# Patient Record
Sex: FEMALE | Race: WHITE | NOT HISPANIC OR LATINO | Employment: OTHER | URBAN - NONMETROPOLITAN AREA
[De-identification: names, ages, dates, MRNs, and addresses within clinical notes are randomized per-mention and may not be internally consistent; named-entity substitution may affect disease eponyms.]

---

## 2023-03-10 DIAGNOSIS — F41.1 GENERALIZED ANXIETY DISORDER: Primary | ICD-10-CM

## 2023-03-10 PROBLEM — I74.9 CHRONIC THROMBOEMBOLIC DISEASE (MULTI): Status: ACTIVE | Noted: 2023-03-10

## 2023-03-10 PROBLEM — R60.0 LOWER LEG EDEMA: Status: ACTIVE | Noted: 2023-03-10

## 2023-03-10 PROBLEM — R29.898 WEAKNESS OF BOTH LOWER EXTREMITIES: Status: ACTIVE | Noted: 2023-03-10

## 2023-03-10 PROBLEM — K11.1 SUBMANDIBULAR GLAND HYPERTROPHY: Status: RESOLVED | Noted: 2023-03-10 | Resolved: 2023-03-10

## 2023-03-10 PROBLEM — R73.9 ELEVATED BLOOD SUGAR: Status: ACTIVE | Noted: 2023-03-10

## 2023-03-10 PROBLEM — R53.83 MALAISE AND FATIGUE: Status: RESOLVED | Noted: 2023-03-10 | Resolved: 2023-03-10

## 2023-03-10 PROBLEM — I10 HYPERTENSION: Status: ACTIVE | Noted: 2023-03-10

## 2023-03-10 PROBLEM — G47.33 OBSTRUCTIVE SLEEP APNEA: Status: ACTIVE | Noted: 2023-03-10

## 2023-03-10 PROBLEM — M48.062 NEUROGENIC CLAUDICATION DUE TO LUMBAR SPINAL STENOSIS: Status: ACTIVE | Noted: 2023-03-10

## 2023-03-10 PROBLEM — D52.9 FOLATE-DEFICIENCY ANEMIA: Status: ACTIVE | Noted: 2023-03-10

## 2023-03-10 PROBLEM — R10.9 ABDOMINAL PAIN: Status: RESOLVED | Noted: 2023-03-10 | Resolved: 2023-03-10

## 2023-03-10 PROBLEM — R01.1 HEART MURMUR: Status: RESOLVED | Noted: 2023-03-10 | Resolved: 2023-03-10

## 2023-03-10 PROBLEM — E87.5 HYPERKALEMIA: Status: RESOLVED | Noted: 2023-03-10 | Resolved: 2023-03-10

## 2023-03-10 PROBLEM — G25.81 RESTLESS LEGS SYNDROME (RLS): Status: ACTIVE | Noted: 2023-03-10

## 2023-03-10 PROBLEM — K59.09 OTHER CONSTIPATION: Status: ACTIVE | Noted: 2023-03-10

## 2023-03-10 PROBLEM — I34.2 NONRHEUMATIC MITRAL VALVE STENOSIS: Status: ACTIVE | Noted: 2023-03-10

## 2023-03-10 PROBLEM — M51.26 LUMBAR DISC HERNIATION: Status: RESOLVED | Noted: 2023-03-10 | Resolved: 2023-03-10

## 2023-03-10 PROBLEM — I87.2 CHRONIC VENOUS INSUFFICIENCY: Status: ACTIVE | Noted: 2023-03-10

## 2023-03-10 PROBLEM — E53.8 VITAMIN B12 DEFICIENCY: Status: ACTIVE | Noted: 2023-03-10

## 2023-03-10 PROBLEM — M25.561 RIGHT KNEE PAIN: Status: RESOLVED | Noted: 2023-03-10 | Resolved: 2023-03-10

## 2023-03-10 PROBLEM — D50.9 IRON DEFICIENCY ANEMIA: Status: ACTIVE | Noted: 2023-03-10

## 2023-03-10 PROBLEM — K11.5 SUBMANDIBULAR SIALOLITHIASIS: Status: RESOLVED | Noted: 2023-03-10 | Resolved: 2023-03-10

## 2023-03-10 PROBLEM — I27.20 PULMONARY HYPERTENSION (MULTI): Status: ACTIVE | Noted: 2023-03-10

## 2023-03-10 PROBLEM — R53.81 MALAISE AND FATIGUE: Status: RESOLVED | Noted: 2023-03-10 | Resolved: 2023-03-10

## 2023-03-10 PROBLEM — D64.9 ANEMIA: Status: RESOLVED | Noted: 2023-03-10 | Resolved: 2023-03-10

## 2023-03-10 PROBLEM — M17.11 OSTEOARTHRITIS OF RIGHT KNEE: Status: ACTIVE | Noted: 2023-03-10

## 2023-03-10 PROBLEM — R00.0 SINUS TACHYCARDIA: Status: RESOLVED | Noted: 2023-03-10 | Resolved: 2023-03-10

## 2023-03-10 PROBLEM — E78.5 HYPERLIPIDEMIA: Status: ACTIVE | Noted: 2023-03-10

## 2023-03-10 PROBLEM — D75.9 BONE MARROW DISORDER: Status: ACTIVE | Noted: 2023-03-10

## 2023-03-10 PROBLEM — I35.0 AORTIC STENOSIS, MODERATE: Status: ACTIVE | Noted: 2023-03-10

## 2023-03-10 PROBLEM — M25.552 ACUTE HIP PAIN, LEFT: Status: RESOLVED | Noted: 2023-03-10 | Resolved: 2023-03-10

## 2023-03-10 PROBLEM — R00.2 PALPITATIONS: Status: RESOLVED | Noted: 2023-03-10 | Resolved: 2023-03-10

## 2023-03-10 PROBLEM — M17.12 OSTEOARTHRITIS OF LEFT KNEE: Status: ACTIVE | Noted: 2023-03-10

## 2023-03-10 PROBLEM — M19.072: Status: ACTIVE | Noted: 2023-03-10

## 2023-03-10 PROBLEM — N63.0 LUMP, BREAST: Status: RESOLVED | Noted: 2023-03-10 | Resolved: 2023-03-10

## 2023-03-10 PROBLEM — F51.01 IDIOPATHIC INSOMNIA: Status: ACTIVE | Noted: 2023-03-10

## 2023-03-10 PROBLEM — R06.02 SHORTNESS OF BREATH: Status: RESOLVED | Noted: 2023-03-10 | Resolved: 2023-03-10

## 2023-03-10 PROBLEM — Z85.038 HISTORY OF COLON CANCER: Status: RESOLVED | Noted: 2023-03-10 | Resolved: 2023-03-10

## 2023-03-10 PROBLEM — I89.0 LYMPHEDEMA: Status: ACTIVE | Noted: 2023-03-10

## 2023-03-10 PROBLEM — J44.9 COPD (CHRONIC OBSTRUCTIVE PULMONARY DISEASE) (MULTI): Status: ACTIVE | Noted: 2023-03-10

## 2023-03-10 PROBLEM — R60.9 LIPOEDEMA: Status: ACTIVE | Noted: 2023-03-10

## 2023-03-10 PROBLEM — L29.9 PRURITUS: Status: RESOLVED | Noted: 2023-03-10 | Resolved: 2023-03-10

## 2023-03-10 PROBLEM — I51.7 ATRIAL DILATATION, BILATERAL: Status: ACTIVE | Noted: 2023-03-10

## 2023-03-10 PROBLEM — N39.41 URGE INCONTINENCE OF URINE: Status: ACTIVE | Noted: 2023-03-10

## 2023-03-10 PROBLEM — M54.16 CHRONIC LUMBAR RADICULOPATHY: Status: ACTIVE | Noted: 2023-03-10

## 2023-03-10 PROBLEM — J01.80 OTHER ACUTE SINUSITIS: Status: RESOLVED | Noted: 2023-03-10 | Resolved: 2023-03-10

## 2023-03-10 PROBLEM — L03.115 CELLULITIS OF LEG WITHOUT FOOT, RIGHT: Status: RESOLVED | Noted: 2023-03-10 | Resolved: 2023-03-10

## 2023-03-10 PROBLEM — F33.1 MDD (MAJOR DEPRESSIVE DISORDER), RECURRENT EPISODE, MODERATE (MULTI): Status: ACTIVE | Noted: 2023-03-10

## 2023-03-10 PROBLEM — D75.1 POLYCYTHEMIA: Status: ACTIVE | Noted: 2023-03-10

## 2023-03-10 PROBLEM — R04.2 HEMOPTYSIS: Status: RESOLVED | Noted: 2023-03-10 | Resolved: 2023-03-10

## 2023-03-10 PROBLEM — B30.9 ACUTE VIRAL CONJUNCTIVITIS OF LEFT EYE: Status: RESOLVED | Noted: 2023-03-10 | Resolved: 2023-03-10

## 2023-03-10 RX ORDER — ALPRAZOLAM 0.5 MG/1
0.25 TABLET ORAL 2 TIMES DAILY PRN
Qty: 30 TABLET | Refills: 0 | Status: SHIPPED | OUTPATIENT
Start: 2023-03-10 | End: 2023-05-17

## 2023-03-10 NOTE — PROGRESS NOTES
Subjective   Patient ID: Neisha Graham is a 62 y.o. female who presents for No chief complaint on file..  HPI    Review of Systems    Objective   Physical Exam    Assessment/Plan

## 2023-03-15 DIAGNOSIS — I74.9 CHRONIC THROMBOEMBOLIC DISEASE (MULTI): Primary | ICD-10-CM

## 2023-03-15 DIAGNOSIS — J43.2 CENTRILOBULAR EMPHYSEMA (MULTI): ICD-10-CM

## 2023-03-15 RX ORDER — FLUTICASONE FUROATE, UMECLIDINIUM BROMIDE AND VILANTEROL TRIFENATATE 200; 62.5; 25 UG/1; UG/1; UG/1
1 POWDER RESPIRATORY (INHALATION) DAILY
COMMUNITY
End: 2023-03-15 | Stop reason: SDUPTHER

## 2023-03-15 RX ORDER — FLUTICASONE FUROATE, UMECLIDINIUM BROMIDE AND VILANTEROL TRIFENATATE 200; 62.5; 25 UG/1; UG/1; UG/1
1 POWDER RESPIRATORY (INHALATION) DAILY
Qty: 1 EACH | Refills: 2 | Status: SHIPPED | OUTPATIENT
Start: 2023-03-15 | End: 2023-06-10 | Stop reason: SDUPTHER

## 2023-04-11 DIAGNOSIS — E78.2 MIXED HYPERLIPIDEMIA: Primary | ICD-10-CM

## 2023-04-11 RX ORDER — PRAVASTATIN SODIUM 40 MG/1
40 TABLET ORAL NIGHTLY
Qty: 90 TABLET | Refills: 0 | Status: SHIPPED | OUTPATIENT
Start: 2023-04-11 | End: 2023-06-10 | Stop reason: SDUPTHER

## 2023-04-11 RX ORDER — PRAVASTATIN SODIUM 40 MG/1
40 TABLET ORAL NIGHTLY
COMMUNITY
Start: 2015-01-08 | End: 2023-04-11 | Stop reason: SDUPTHER

## 2023-05-05 ENCOUNTER — TELEPHONE (OUTPATIENT)
Dept: PRIMARY CARE | Facility: CLINIC | Age: 63
End: 2023-05-05
Payer: MEDICARE

## 2023-05-05 DIAGNOSIS — J43.2 CENTRILOBULAR EMPHYSEMA (MULTI): Primary | ICD-10-CM

## 2023-05-05 NOTE — TELEPHONE ENCOUNTER
Roya from [facility] called and said that Neisha INR is 4.6. I told her I would let you know.

## 2023-05-15 RX ORDER — ALBUTEROL SULFATE 90 UG/1
2 AEROSOL, METERED RESPIRATORY (INHALATION) EVERY 4 HOURS PRN
Qty: 18 G | Refills: 3 | Status: SHIPPED | OUTPATIENT
Start: 2023-05-15 | End: 2023-06-10 | Stop reason: SDUPTHER

## 2023-05-15 RX ORDER — ALBUTEROL SULFATE 90 UG/1
2 AEROSOL, METERED RESPIRATORY (INHALATION) EVERY 4 HOURS PRN
COMMUNITY
Start: 2016-01-19 | End: 2023-05-15 | Stop reason: SDUPTHER

## 2023-05-16 DIAGNOSIS — F41.1 GENERALIZED ANXIETY DISORDER: ICD-10-CM

## 2023-05-17 RX ORDER — ALPRAZOLAM 0.5 MG/1
TABLET ORAL
Qty: 30 TABLET | Refills: 0 | Status: SHIPPED | OUTPATIENT
Start: 2023-05-17 | End: 2023-06-10 | Stop reason: SDUPTHER

## 2023-05-31 DIAGNOSIS — I74.9 CHRONIC THROMBOEMBOLIC DISEASE (MULTI): ICD-10-CM

## 2023-05-31 RX ORDER — WARFARIN 7.5 MG/1
TABLET ORAL
COMMUNITY
Start: 2021-04-16 | End: 2023-05-31 | Stop reason: SDUPTHER

## 2023-05-31 RX ORDER — WARFARIN 7.5 MG/1
7.5 TABLET ORAL EVERY EVENING
Qty: 30 TABLET | Refills: 3 | Status: SHIPPED | OUTPATIENT
Start: 2023-05-31 | End: 2023-06-10 | Stop reason: SDUPTHER

## 2023-06-10 ENCOUNTER — TELEPHONE (OUTPATIENT)
Dept: PRIMARY CARE | Facility: CLINIC | Age: 63
End: 2023-06-10
Payer: MEDICARE

## 2023-06-10 DIAGNOSIS — F41.1 GENERALIZED ANXIETY DISORDER: ICD-10-CM

## 2023-06-10 DIAGNOSIS — I74.9 CHRONIC THROMBOEMBOLIC DISEASE (MULTI): ICD-10-CM

## 2023-06-10 DIAGNOSIS — J43.2 CENTRILOBULAR EMPHYSEMA (MULTI): ICD-10-CM

## 2023-06-10 DIAGNOSIS — E78.2 MIXED HYPERLIPIDEMIA: ICD-10-CM

## 2023-06-10 RX ORDER — ALBUTEROL SULFATE 90 UG/1
2 AEROSOL, METERED RESPIRATORY (INHALATION) EVERY 4 HOURS PRN
Qty: 18 G | Refills: 3 | Status: SHIPPED | OUTPATIENT
Start: 2023-06-10 | End: 2023-09-05

## 2023-06-10 RX ORDER — PRAVASTATIN SODIUM 40 MG/1
40 TABLET ORAL NIGHTLY
Qty: 90 TABLET | Refills: 0 | Status: SHIPPED | OUTPATIENT
Start: 2023-06-10 | End: 2023-08-17

## 2023-06-10 RX ORDER — ALPRAZOLAM 0.5 MG/1
TABLET ORAL
Qty: 30 TABLET | Refills: 0 | Status: SHIPPED | OUTPATIENT
Start: 2023-06-10

## 2023-06-10 RX ORDER — WARFARIN 7.5 MG/1
7.5 TABLET ORAL EVERY EVENING
Qty: 30 TABLET | Refills: 3 | Status: SHIPPED | OUTPATIENT
Start: 2023-06-10 | End: 2023-11-13

## 2023-06-10 RX ORDER — FLUTICASONE FUROATE, UMECLIDINIUM BROMIDE AND VILANTEROL TRIFENATATE 200; 62.5; 25 UG/1; UG/1; UG/1
1 POWDER RESPIRATORY (INHALATION) DAILY
Qty: 1 EACH | Refills: 2 | Status: SHIPPED | OUTPATIENT
Start: 2023-06-10 | End: 2023-09-12

## 2023-06-10 NOTE — TELEPHONE ENCOUNTER
Left message on vm but believes he has bladder infection wants a prescription sent over. Not able to come in today. [facility] in [place].

## 2023-06-12 DIAGNOSIS — N30.00 ACUTE CYSTITIS WITHOUT HEMATURIA: Primary | ICD-10-CM

## 2023-06-12 RX ORDER — NITROFURANTOIN 25; 75 MG/1; MG/1
100 CAPSULE ORAL 2 TIMES DAILY
Qty: 10 CAPSULE | Refills: 0 | Status: SHIPPED | OUTPATIENT
Start: 2023-06-12 | End: 2023-06-16 | Stop reason: SINTOL

## 2023-06-16 NOTE — PROGRESS NOTES
Subjective   Patient ID: Neisha Graham is a 63 y.o. female who presents for No chief complaint on file..  HPI    Review of Systems    Objective   Physical Exam    Assessment/Plan

## 2023-06-20 DIAGNOSIS — I10 PRIMARY HYPERTENSION: Primary | ICD-10-CM

## 2023-06-20 RX ORDER — DILTIAZEM HYDROCHLORIDE 180 MG/1
180 CAPSULE, EXTENDED RELEASE ORAL DAILY
COMMUNITY
Start: 2020-12-29 | End: 2023-06-20 | Stop reason: SDUPTHER

## 2023-06-20 RX ORDER — DILTIAZEM HYDROCHLORIDE 180 MG/1
180 CAPSULE, EXTENDED RELEASE ORAL DAILY
Qty: 30 CAPSULE | Refills: 1 | Status: SHIPPED | OUTPATIENT
Start: 2023-06-20 | End: 2023-06-22

## 2023-06-22 DIAGNOSIS — I10 PRIMARY HYPERTENSION: ICD-10-CM

## 2023-06-22 RX ORDER — DILTIAZEM HYDROCHLORIDE 180 MG/1
CAPSULE, EXTENDED RELEASE ORAL
Qty: 90 CAPSULE | Refills: 1 | Status: SHIPPED | OUTPATIENT
Start: 2023-06-22 | End: 2023-08-14

## 2023-06-28 PROCEDURE — G0250 MD INR TEST REVIE INTER MGMT: HCPCS | Performed by: FAMILY MEDICINE

## 2023-07-18 DIAGNOSIS — D75.1 POLYCYTHEMIA: Primary | ICD-10-CM

## 2023-07-18 RX ORDER — NALOXONE HYDROCHLORIDE 4 MG/.1ML
SPRAY NASAL
COMMUNITY
Start: 2020-04-06

## 2023-07-18 RX ORDER — HYDROCORTISONE 25 MG/G
1 CREAM TOPICAL
COMMUNITY
Start: 2022-03-01

## 2023-07-18 RX ORDER — TORSEMIDE 20 MG/1
1 TABLET ORAL DAILY
COMMUNITY
Start: 2021-02-10 | End: 2023-08-17 | Stop reason: ALTCHOICE

## 2023-07-18 RX ORDER — VENLAFAXINE HYDROCHLORIDE 37.5 MG/1
1 CAPSULE, EXTENDED RELEASE ORAL DAILY
COMMUNITY
Start: 2022-10-19 | End: 2023-08-17 | Stop reason: ALTCHOICE

## 2023-07-18 RX ORDER — OXYCODONE AND ACETAMINOPHEN 10; 325 MG/1; MG/1
1 TABLET ORAL EVERY 4 HOURS PRN
COMMUNITY
End: 2023-12-19 | Stop reason: WASHOUT

## 2023-07-18 RX ORDER — NYSTATIN 100000 U/G
CREAM TOPICAL
COMMUNITY
Start: 2021-06-11 | End: 2023-11-10 | Stop reason: SDUPTHER

## 2023-07-18 RX ORDER — CYCLOBENZAPRINE HCL 10 MG
10 TABLET ORAL 2 TIMES DAILY PRN
COMMUNITY
End: 2023-12-19 | Stop reason: SDUPTHER

## 2023-07-18 RX ORDER — OXYCODONE HCL 10 MG/1
TABLET, FILM COATED, EXTENDED RELEASE ORAL
COMMUNITY
End: 2023-08-17 | Stop reason: ALTCHOICE

## 2023-07-18 RX ORDER — CHOLESTYRAMINE 4 G/9G
POWDER, FOR SUSPENSION ORAL 2 TIMES DAILY
COMMUNITY
Start: 2022-11-23

## 2023-07-18 RX ORDER — OXYCODONE HYDROCHLORIDE 10 MG/1
TABLET ORAL
COMMUNITY
End: 2023-11-17 | Stop reason: SDUPTHER

## 2023-07-18 RX ORDER — DULOXETIN HYDROCHLORIDE 20 MG/1
20 CAPSULE, DELAYED RELEASE ORAL DAILY
COMMUNITY
End: 2023-08-26 | Stop reason: SDUPTHER

## 2023-07-18 RX ORDER — TRIAMCINOLONE ACETONIDE 1 MG/G
CREAM TOPICAL
COMMUNITY
Start: 2019-12-11

## 2023-07-18 RX ORDER — CHOLESTYRAMINE 4 G/9G
POWDER, FOR SUSPENSION ORAL
COMMUNITY
Start: 2023-01-26 | End: 2023-08-17 | Stop reason: SDUPTHER

## 2023-07-18 RX ORDER — TOBRAMYCIN 3 MG/ML
1 SOLUTION/ DROPS OPHTHALMIC
COMMUNITY
Start: 2022-09-15 | End: 2023-08-17 | Stop reason: ALTCHOICE

## 2023-07-18 RX ORDER — AMINO ACIDS/PROTEIN HYDROLYS 15 G-60/30
LIQUID (ML) ORAL
COMMUNITY
Start: 2021-10-26

## 2023-07-18 RX ORDER — CHOLECALCIFEROL (VITAMIN D3) 50 MCG
1 TABLET ORAL DAILY
COMMUNITY

## 2023-07-18 RX ORDER — HYDROXYZINE HYDROCHLORIDE 50 MG/1
TABLET, FILM COATED ORAL
COMMUNITY
Start: 2021-08-19 | End: 2023-08-17 | Stop reason: ALTCHOICE

## 2023-07-18 RX ORDER — MUPIROCIN 20 MG/G
OINTMENT TOPICAL
COMMUNITY
Start: 2021-09-13 | End: 2023-08-17 | Stop reason: ALTCHOICE

## 2023-07-18 RX ORDER — TORSEMIDE 10 MG/1
TABLET ORAL
COMMUNITY
Start: 2021-06-10 | End: 2023-08-17 | Stop reason: ALTCHOICE

## 2023-07-18 RX ORDER — PRAMIPEXOLE DIHYDROCHLORIDE 1 MG/1
1 TABLET ORAL 2 TIMES DAILY
COMMUNITY
Start: 2021-06-09

## 2023-07-18 RX ORDER — AMMONIUM LACTATE 12 G/100G
1 CREAM TOPICAL 2 TIMES DAILY
COMMUNITY

## 2023-07-18 RX ORDER — HYDROXYUREA 500 MG/1
2 CAPSULE ORAL DAILY
COMMUNITY
Start: 2012-11-08 | End: 2023-07-18 | Stop reason: SDUPTHER

## 2023-07-18 RX ORDER — DOCUSATE SODIUM 100 MG/1
1 CAPSULE, LIQUID FILLED ORAL DAILY
COMMUNITY

## 2023-07-18 RX ORDER — FOLIC ACID 1 MG/1
1 TABLET ORAL DAILY
COMMUNITY
Start: 2020-06-23 | End: 2024-05-06 | Stop reason: ALTCHOICE

## 2023-07-18 RX ORDER — LISINOPRIL 20 MG/1
TABLET ORAL
COMMUNITY
Start: 2022-09-02

## 2023-07-18 RX ORDER — HYDROXYUREA 500 MG/1
1000 CAPSULE ORAL DAILY
Qty: 180 CAPSULE | Refills: 3 | Status: SHIPPED | OUTPATIENT
Start: 2023-07-18 | End: 2024-05-06 | Stop reason: SDUPTHER

## 2023-07-25 ENCOUNTER — TELEPHONE (OUTPATIENT)
Dept: PRIMARY CARE | Facility: CLINIC | Age: 63
End: 2023-07-25
Payer: MEDICARE

## 2023-07-25 NOTE — TELEPHONE ENCOUNTER
Moved into a handicap unit in [place] and unable to move hospital bed with her she needs another request for hospital bed.

## 2023-08-14 DIAGNOSIS — I10 PRIMARY HYPERTENSION: ICD-10-CM

## 2023-08-14 RX ORDER — DILTIAZEM HYDROCHLORIDE 180 MG/1
CAPSULE, EXTENDED RELEASE ORAL
Qty: 90 CAPSULE | Refills: 0 | Status: SHIPPED | OUTPATIENT
Start: 2023-08-14 | End: 2024-03-04 | Stop reason: CLARIF

## 2023-08-17 ENCOUNTER — OFFICE VISIT (OUTPATIENT)
Dept: PRIMARY CARE | Facility: CLINIC | Age: 63
End: 2023-08-17
Payer: MEDICARE

## 2023-08-17 VITALS
HEIGHT: 64 IN | DIASTOLIC BLOOD PRESSURE: 84 MMHG | OXYGEN SATURATION: 92 % | BODY MASS INDEX: 50.02 KG/M2 | SYSTOLIC BLOOD PRESSURE: 136 MMHG | HEART RATE: 84 BPM | WEIGHT: 293 LBS

## 2023-08-17 DIAGNOSIS — I10 PRIMARY HYPERTENSION: ICD-10-CM

## 2023-08-17 DIAGNOSIS — F33.1 MDD (MAJOR DEPRESSIVE DISORDER), RECURRENT EPISODE, MODERATE (MULTI): ICD-10-CM

## 2023-08-17 DIAGNOSIS — I27.20 PULMONARY HYPERTENSION (MULTI): ICD-10-CM

## 2023-08-17 DIAGNOSIS — I74.9 CHRONIC THROMBOEMBOLIC DISEASE (MULTI): ICD-10-CM

## 2023-08-17 DIAGNOSIS — I89.0 LYMPHEDEMA: ICD-10-CM

## 2023-08-17 DIAGNOSIS — I48.3 TYPICAL ATRIAL FLUTTER (MULTI): Primary | ICD-10-CM

## 2023-08-17 DIAGNOSIS — E78.2 MIXED HYPERLIPIDEMIA: ICD-10-CM

## 2023-08-17 DIAGNOSIS — D47.3 ESSENTIAL THROMBOCYTOSIS (MULTI): ICD-10-CM

## 2023-08-17 DIAGNOSIS — E66.01 CLASS 3 SEVERE OBESITY DUE TO EXCESS CALORIES WITH SERIOUS COMORBIDITY AND BODY MASS INDEX (BMI) OF 50.0 TO 59.9 IN ADULT (MULTI): ICD-10-CM

## 2023-08-17 DIAGNOSIS — J43.2 CENTRILOBULAR EMPHYSEMA (MULTI): ICD-10-CM

## 2023-08-17 DIAGNOSIS — Z12.31 ENCOUNTER FOR SCREENING MAMMOGRAM FOR BREAST CANCER: ICD-10-CM

## 2023-08-17 PROBLEM — E66.813 CLASS 3 SEVERE OBESITY DUE TO EXCESS CALORIES WITH SERIOUS COMORBIDITY AND BODY MASS INDEX (BMI) OF 50.0 TO 59.9 IN ADULT: Status: ACTIVE | Noted: 2023-08-17

## 2023-08-17 PROBLEM — K76.0 FATTY LIVER: Status: ACTIVE | Noted: 2023-08-17

## 2023-08-17 PROBLEM — R17 ELEVATED BILIRUBIN: Status: RESOLVED | Noted: 2023-08-17 | Resolved: 2023-08-17

## 2023-08-17 PROBLEM — R50.9 FEVER: Status: RESOLVED | Noted: 2023-08-17 | Resolved: 2023-08-17

## 2023-08-17 PROBLEM — R74.01 ELEVATED TRANSAMINASE LEVEL: Status: RESOLVED | Noted: 2023-08-17 | Resolved: 2023-08-17

## 2023-08-17 PROBLEM — D75.9 BONE MARROW DISORDER: Status: RESOLVED | Noted: 2023-08-17 | Resolved: 2023-08-17

## 2023-08-17 PROBLEM — R51.9 DAILY HEADACHE: Status: RESOLVED | Noted: 2023-08-17 | Resolved: 2023-08-17

## 2023-08-17 PROBLEM — R74.8 ALKALINE PHOSPHATASE ELEVATION: Status: RESOLVED | Noted: 2023-08-17 | Resolved: 2023-08-17

## 2023-08-17 PROCEDURE — 3075F SYST BP GE 130 - 139MM HG: CPT | Performed by: FAMILY MEDICINE

## 2023-08-17 PROCEDURE — 3008F BODY MASS INDEX DOCD: CPT | Performed by: FAMILY MEDICINE

## 2023-08-17 PROCEDURE — 99214 OFFICE O/P EST MOD 30 MIN: CPT | Performed by: FAMILY MEDICINE

## 2023-08-17 PROCEDURE — 3079F DIAST BP 80-89 MM HG: CPT | Performed by: FAMILY MEDICINE

## 2023-08-17 PROCEDURE — 4004F PT TOBACCO SCREEN RCVD TLK: CPT | Performed by: FAMILY MEDICINE

## 2023-08-17 NOTE — PROGRESS NOTES
Subjective   Patient ID: Neisha Graham is a 63 y.o. female who presents for paperwork.  HPI  Needs a new hospital bed  Needs to be able to elevate legs to get swelling down  Also breathes better with legs elevated so sleeps better and feels more refreshed- still wearing oxygen at night (2.5 liters)- makes her more comfortable and sleep better  No coughing, wheezing  Breathing has improved since stopped smoking for the most part'  No dizziness    Ophtho- been awhile  Dentist- been awhile  Colonoscopy- 12/19- repeat 5 years  DERICK-  FOBT-  UA/Micro-  Mammo- ordered  DEXA-  PAP-  Lung CT-  Coronary Calcium CT Score-  AAA-  EKG-  Pneumovax-  Prevnar-  Flu-  Shingrix-  Td-  Hep C-  Advance Directives-        Current Outpatient Medications:     albuterol (Ventolin HFA) 90 mcg/actuation inhaler, Inhale 2 puffs every 4 hours if needed for wheezing., Disp: 18 g, Rfl: 3    ALPRAZolam (Xanax) 0.5 mg tablet, take 1/2 tablet (0.25 MILLIGRAMS) by mouth twice a day if needed for anxiety, Disp: 30 tablet, Rfl: 0    cyclobenzaprine (Flexeril) 10 mg tablet, Take 1 tablet (10 mg) by mouth 2 times a day as needed., Disp: , Rfl:     dilTIAZem ER (Tiazac) 180 mg 24 hr capsule, take 1 capsule by mouth once daily, Disp: 90 capsule, Rfl: 0    hydroxyurea (Hydrea) 500 mg capsule, Take 2 capsules (1,000 mg total) by mouth once daily., Disp: 180 capsule, Rfl: 3    amino acids-protein hydrolys (ProSource No Carb) 15-60 gram-kcal/30 mL liquid, Take by mouth., Disp: , Rfl:     ammonium lactate (Amlactin) 12 % cream, Apply 1 Film topically 2 times a day., Disp: , Rfl:     cholecalciferol (Vitamin D-3) 50 MCG (2000 UT) tablet, Take 1 tablet (50 mcg) by mouth once daily., Disp: , Rfl:     cholestyramine (Questran) 4 gram packet, Take by mouth twice a day., Disp: , Rfl:     docusate sodium (Colace) 100 mg capsule, Take 1 capsule (100 mg) by mouth once daily., Disp: , Rfl:     DULoxetine (Cymbalta) 20 mg DR capsule, Take 1 capsule (20 mg) by mouth  once daily., Disp: , Rfl:     fluticasone-umeclidin-vilanter (Trelegy Ellipta) 200-62.5-25 mcg blister with device, Inhale 1 puff once daily., Disp: 1 each, Rfl: 2    folic acid (Folvite) 1 mg tablet, Take 1 tablet (1 mg) by mouth once daily., Disp: , Rfl:     hydrocortisone 2.5 % cream, Insert 1 Applicatorful into the rectum., Disp: , Rfl:     hydrocortisone acetate 2.5 % cream with perineal applicator, twice a day., Disp: , Rfl:     lisinopril 20 mg tablet, take 1 tablet by mouth once daily  -DR REQUESTING APPOINTMENT, Disp: , Rfl:     naloxone (Narcan) 4 mg/0.1 mL nasal spray, Administer into affected nostril(s)., Disp: , Rfl:     nystatin (Mycostatin) cream, APPLY TO AFFECTED AREA(S) 2 TO 3 TIMES A DAY, Disp: , Rfl:     oxyCODONE (Roxicodone) 10 mg immediate release tablet, take 1 tablet by mouth every 4 hours if needed, Disp: , Rfl:     oxyCODONE-acetaminophen (Percocet)  mg tablet, Take 1 tablet by mouth every 4 hours if needed., Disp: , Rfl:     pramipexole (Mirapex) 1 mg tablet, Take 1 tablet (1 mg) by mouth 2 times a day., Disp: , Rfl:     triamcinolone (Kenalog) 0.1 % cream, APPLY SPARINGLY AND MASSAGE IN ONCE A DAY, Disp: , Rfl:     warfarin (Coumadin) 7.5 mg tablet, Take 1 tablet (7.5 mg) by mouth once daily in the evening for 30 doses., Disp: 30 tablet, Rfl: 3   Past Surgical History:   Procedure Laterality Date    KNEE SURGERY  09/19/2013    Knee Surgery Right    OTHER SURGICAL HISTORY  09/19/2013    Direct Laryngoscopy With Foreign Body Removal    OTHER SURGICAL HISTORY  09/19/2013    Laminectomy With Drainage Of Intramedullary Cyst    OTHER SURGICAL HISTORY  09/27/2013    Ovarian Cystectomy    TONSILLECTOMY  09/19/2013    Tonsillectomy    TUBAL LIGATION  09/19/2013    Tubal Ligation      Past Medical History:   Diagnosis Date    Acute bronchitis due to other specified organisms 10/14/2019    Acute bronchitis due to other specified organisms    Acute viral conjunctivitis of left eye 03/10/2023     Anemia 03/10/2023    Cellulitis of left lower limb 04/08/2021    Cellulitis of left lower leg    Chronic obstructive pulmonary disease with (acute) exacerbation (CMS/[facility]) 01/09/2019    COPD exacerbation    Chronic sinusitis, unspecified     Sinusitis    COVID-19 11/28/2022    COVID-19    Heart murmur 03/10/2023    Hemoptysis 03/10/2023    Hyperkalemia 03/10/2023    Malaise and fatigue 03/10/2023    Morbid (severe) obesity due to excess calories (CMS/[facility])     Morbid obesity    Other conditions influencing health status     History Of ___ Previous Pregnancies    Other conditions influencing health status 03/12/2020    Arthritis    Other conditions influencing health status 03/06/2017    History of cough    Other conditions influencing health status     Menstruation    Other conditions influencing health status     Osteoarthritis    Other conditions influencing health status     Pulmonary Disease    Pain in unspecified ankle and joints of unspecified foot 06/24/2016    Ankle joint pain    Pain in unspecified foot 12/08/2015    Foot pain    Pain in unspecified knee     Joint pain, knee    Palpitations 03/10/2023    Personal history of diseases of the skin and subcutaneous tissue     History of cellulitis    Personal history of other diseases of the female genital tract     Vaginal delivery    Personal history of other diseases of the nervous system and sense organs 09/12/2019    History of conjunctivitis    Personal history of other diseases of the nervous system and sense organs 09/17/2015    History of blurred vision    Personal history of other diseases of the respiratory system     History of pleurisy    Personal history of other diseases of the respiratory system 10/17/2016    History of bronchitis    Personal history of other diseases of the respiratory system 11/29/2017    History of acute bronchitis    Personal history of other diseases of the respiratory system 11/16/2016    History of acute pharyngitis     "Personal history of other drug therapy 10/14/2016    History of influenza vaccination    Personal history of other infectious and parasitic diseases     History of hepatitis    Personal history of other infectious and parasitic diseases 07/22/2020    History of candidiasis of mouth    Personal history of other medical treatment     History of mammogram    Personal history of other specified conditions     History of shortness of breath    Personal history of other specified conditions 01/28/2015    History of shortness of breath    Personal history of other specified conditions     History of abnormal Pap smear    Personal history of pneumonia (recurrent)     History of pneumonia    Pneumonia, unspecified organism 01/11/2018    Community acquired pneumonia    Postmenopausal bleeding 09/30/2014    Postmenopausal bleeding    Right knee pain 03/10/2023    Shortness of breath 03/10/2023    Sinus tachycardia 03/10/2023    Submandibular sialolithiasis 03/10/2023    Unilateral primary osteoarthritis, unspecified knee 02/03/2017    Osteoarthritis, localized, knee    Unspecified acute conjunctivitis, bilateral 08/20/2020    Acute bacterial conjunctivitis of both eyes     Social History     Tobacco Use    Smoking status: Some Days     Types: Cigarettes    Smokeless tobacco: Never      No family history on file.   Review of Systems    Objective   /84   Pulse 84   Ht 1.626 m (5' 4\")   Wt 147 kg (324 lb 9.6 oz)   SpO2 92%   BMI 55.72 kg/m²    Physical Exam  Vitals and nursing note reviewed.   Constitutional:       General: She is not in acute distress.     Appearance: Normal appearance.   HENT:      Head: Normocephalic and atraumatic.      Right Ear: Tympanic membrane, ear canal and external ear normal.      Left Ear: Tympanic membrane, ear canal and external ear normal.      Nose: Nose normal.      Mouth/Throat:      Mouth: Mucous membranes are moist.      Pharynx: Oropharynx is clear.   Eyes:      Extraocular " Movements: Extraocular movements intact.      Conjunctiva/sclera: Conjunctivae normal.      Pupils: Pupils are equal, round, and reactive to light.   Neck:      Vascular: No carotid bruit.   Cardiovascular:      Rate and Rhythm: Normal rate and regular rhythm.      Pulses: Normal pulses.      Heart sounds: Murmur heard.      Systolic murmur is present with a grade of 3/6.      Comments: B/L lymphedema  Pulmonary:      Effort: Pulmonary effort is normal.      Breath sounds: Normal breath sounds.   Abdominal:      General: Abdomen is flat. Bowel sounds are normal.      Palpations: Abdomen is soft. There is no mass.   Musculoskeletal:      Cervical back: Normal range of motion and neck supple.   Lymphadenopathy:      Cervical: No cervical adenopathy.   Skin:     Capillary Refill: Capillary refill takes less than 2 seconds.   Neurological:      General: No focal deficit present.      Mental Status: She is alert and oriented to person, place, and time.   Psychiatric:         Mood and Affect: Mood normal.         Behavior: Behavior normal.       Assessment/Plan   Problem List Items Addressed This Visit    None    Positioning to get legs elevated to help with lymphedema and breathing not possible with a normal bed. Elevation is over 30 degrees of head and/or legs due to these issues. Will need frequent repositioning due to excess weight increasing her risk for pressure sores. She also needs to reposition due to chronic low back pain so needs to be able to find a comfortable postion.    Renewed/continued rest of medications  will check labs     HTN, controlled- continue meds, low sodium diet     Obesity- caloric restriction, increase CV exercise     Hyperlipidemia- continue meds, low fat/cholesterol diet     RLS, controlled- requip, fluids     Pulmonary HTN/COPD- albuterol, trelegy, nocturnal oxygen, hospital bed, avoid poor air quality     BE- CPA, weight loss     Lymphedema- wrap legs, elevate legs, hospital bed      Lumbar Radiculitis- f/u with pain management     Anemia/thrombocytosis- follows with hematology, folic acid, iron     Chronic Thromboembolic Dx/atrial flutter (resolved)- coumadin, check INR's     Weakness- Home PT, [facility]     MDD- duloxetine, xanax prn     F/U 3-6 months    Per patient: movers refused to move her bed and then old landlord threw out the bed before anyone else could come in and move the bed for her.      Patient understands and agrees with treatment plan    Micheal Gonsalez, DO

## 2023-08-26 DIAGNOSIS — F33.1 MDD (MAJOR DEPRESSIVE DISORDER), RECURRENT EPISODE, MODERATE (MULTI): ICD-10-CM

## 2023-08-26 DIAGNOSIS — F41.1 GENERALIZED ANXIETY DISORDER: Primary | ICD-10-CM

## 2023-08-26 RX ORDER — DULOXETIN HYDROCHLORIDE 20 MG/1
20 CAPSULE, DELAYED RELEASE ORAL DAILY
Qty: 90 CAPSULE | Refills: 1 | Status: SHIPPED | OUTPATIENT
Start: 2023-08-26 | End: 2024-04-19

## 2023-09-03 DIAGNOSIS — J43.2 CENTRILOBULAR EMPHYSEMA (MULTI): ICD-10-CM

## 2023-09-05 RX ORDER — ALBUTEROL SULFATE 90 UG/1
2 AEROSOL, METERED RESPIRATORY (INHALATION) EVERY 4 HOURS PRN
Qty: 18 G | Refills: 3 | Status: SHIPPED | OUTPATIENT
Start: 2023-09-05

## 2023-09-12 DIAGNOSIS — J43.2 CENTRILOBULAR EMPHYSEMA (MULTI): ICD-10-CM

## 2023-09-12 RX ORDER — FLUTICASONE FUROATE, UMECLIDINIUM BROMIDE AND VILANTEROL TRIFENATATE 200; 62.5; 25 UG/1; UG/1; UG/1
POWDER RESPIRATORY (INHALATION)
Qty: 180 EACH | Refills: 1 | Status: SHIPPED | OUTPATIENT
Start: 2023-09-12 | End: 2024-03-20

## 2023-10-02 DIAGNOSIS — L03.119 CELLULITIS OF LOWER EXTREMITY, UNSPECIFIED LATERALITY: Primary | ICD-10-CM

## 2023-10-02 RX ORDER — DOXYCYCLINE 100 MG/1
100 CAPSULE ORAL 2 TIMES DAILY
Qty: 20 CAPSULE | Refills: 0 | Status: SHIPPED | OUTPATIENT
Start: 2023-10-02 | End: 2023-10-13 | Stop reason: ALTCHOICE

## 2023-10-13 DIAGNOSIS — M54.16 CHRONIC LUMBAR RADICULOPATHY: Primary | ICD-10-CM

## 2023-10-13 DIAGNOSIS — M17.12 PRIMARY OSTEOARTHRITIS OF LEFT KNEE: ICD-10-CM

## 2023-10-13 RX ORDER — PREDNISONE 10 MG/1
TABLET ORAL
Qty: 30 TABLET | Refills: 0 | Status: SHIPPED | OUTPATIENT
Start: 2023-10-13 | End: 2023-10-23

## 2023-10-16 RX ORDER — PREDNISONE 20 MG/1
TABLET ORAL
Qty: 10 TABLET | Refills: 0 | Status: SHIPPED | OUTPATIENT
Start: 2023-10-16

## 2023-10-30 DIAGNOSIS — L03.119 CELLULITIS OF LOWER EXTREMITY, UNSPECIFIED LATERALITY: ICD-10-CM

## 2023-10-30 RX ORDER — DOXYCYCLINE 100 MG/1
100 CAPSULE ORAL 2 TIMES DAILY
Qty: 20 CAPSULE | Refills: 0 | Status: SHIPPED | OUTPATIENT
Start: 2023-10-30 | End: 2024-02-03 | Stop reason: SDUPTHER

## 2023-11-10 DIAGNOSIS — B35.4 TINEA CORPORIS: Primary | ICD-10-CM

## 2023-11-10 RX ORDER — NYSTATIN 100000 U/G
CREAM TOPICAL
Qty: 30 G | Refills: 2 | Status: SHIPPED | OUTPATIENT
Start: 2023-11-10

## 2023-11-12 DIAGNOSIS — I74.9 CHRONIC THROMBOEMBOLIC DISEASE (MULTI): ICD-10-CM

## 2023-11-13 RX ORDER — WARFARIN 7.5 MG/1
7.5 TABLET ORAL
Qty: 30 TABLET | Refills: 3 | Status: SHIPPED | OUTPATIENT
Start: 2023-11-13 | End: 2024-02-24 | Stop reason: SDUPTHER

## 2023-11-17 DIAGNOSIS — M54.16 CHRONIC LUMBAR RADICULOPATHY: ICD-10-CM

## 2023-11-20 RX ORDER — OXYCODONE HYDROCHLORIDE 10 MG/1
10 TABLET ORAL EVERY 4 HOURS PRN
Qty: 168 TABLET | Refills: 0 | Status: SHIPPED | OUTPATIENT
Start: 2023-11-21 | End: 2023-12-19 | Stop reason: SDUPTHER

## 2023-12-01 DIAGNOSIS — J20.8 ACUTE BRONCHITIS DUE TO OTHER SPECIFIED ORGANISMS: Primary | ICD-10-CM

## 2023-12-01 RX ORDER — AZITHROMYCIN 250 MG/1
TABLET, FILM COATED ORAL
Qty: 6 TABLET | Refills: 0 | Status: SHIPPED | OUTPATIENT
Start: 2023-12-01 | End: 2023-12-06

## 2023-12-18 NOTE — PROGRESS NOTES
Assessment/Plan   Problem List Items Addressed This Visit             ICD-10-CM    Neurogenic claudication due to lumbar spinal stenosis - Primary M48.062    Relevant Medications    oxyCODONE (Roxicodone) 10 mg immediate release tablet (Start on 1/16/2024)    oxyCODONE (Roxicodone) 10 mg immediate release tablet (Start on 2/13/2024)    cyclobenzaprine (Flexeril) 10 mg tablet    lidocaine 5 % gel    Chronic lumbar radiculopathy M54.16    Relevant Medications    oxyCODONE (Roxicodone) 10 mg immediate release tablet    oxyCODONE (Roxicodone) 10 mg immediate release tablet (Start on 1/16/2024)    oxyCODONE (Roxicodone) 10 mg immediate release tablet (Start on 2/13/2024)    cyclobenzaprine (Flexeril) 10 mg tablet    lidocaine 5 % gel    Osteoarthritis of left knee M17.12    Relevant Medications    oxyCODONE (Roxicodone) 10 mg immediate release tablet (Start on 1/16/2024)    oxyCODONE (Roxicodone) 10 mg immediate release tablet (Start on 2/13/2024)    cyclobenzaprine (Flexeril) 10 mg tablet    lidocaine 5 % gel    Osteoarthritis of right knee M17.11    Relevant Medications    oxyCODONE (Roxicodone) 10 mg immediate release tablet (Start on 1/16/2024)    oxyCODONE (Roxicodone) 10 mg immediate release tablet (Start on 2/13/2024)    cyclobenzaprine (Flexeril) 10 mg tablet    lidocaine 5 % gel       Follow-up 12 weeks.    Patient ID: Neisha Graham is a 63 y.o. female who presents for chronic pain management of low back pain and knee joint pain from arthritis.    HPI    Neisha follows up for refill of medications and interval reevaluation of her low back pain from lumbar stenosis, disc herniation and radiculitis of the lower extremities. Neisha also follows up for hip and knee pain from arthritis     Percocet 10 mg every 4 hours up to 6 per daily as needed and Flexeril 10 mg once to twice daily as needed help to decrease pain and improve function up to 60%. Average pain score with medication is 6 out of 10. Senokot or other  laxative as needed to help avoid constipation. Is able to manage her ADLs with improved function from medications. She reports of having a below average quality of family and social life with current condition and treatment.      Neisha is of polypharmacy and was targeting the medications of Flexeril, Xanax and hydroxyzine. To address Flexeril she takes this very as needed and not twice daily. I do not prescribe the Xanax or the hydroxyzine. She understands to space her pain medication with these other medications as mentioned above by 2 hours to avoid sedation, respiratory depression, risk of coma and death. She does have a Narcan on status sent 2023.     Toxicology consistent April 5, 2023. Annual controlled substance agreement and opioid risk tool are completed and scanned into the chart April 5, 2023.     Her chronic low back pain is related to degenerative disc disease, lumbar stenosis. This low back pain is greater on the right than the left is dull and achy. reports she was lifting object off the floor and is having a muscle spasm in the back. This was a couple of days ago and continues to improve. When she does have radiating pain it does travel as shooting and sharp pain to the lateral hip, posterior lateral lower leg and into the foot. As of right now there is no paresthesias or weakness in the lower extremities.      Neisha has chronic knee pain from osteoarthritis. Pain is increased with weightbearing activities. the right knee has more pain and decreased range of motion compared to the left knee. She cannot have surgery because of her BMI greater than 40. She does have instability in the knees especially with weightbearing activity in standing and walking. Reports that the right knee can give out on her with prolonged standing. Has difficulty getting in and because of weakness of the knee and has decreased range of motion. Has tried hinged bracing to the knees. Bracing feels too heavy and hard to get  around. She no longer wears them.     History of intra-articular knee joint injection steroid therapy has helped in the past from orthopedic surgeon. She does have several of these per year.     She currently gets around in her [employer]. Feels much more secure then utilizing a walker at home. No falls since last office visit. Renewed new tires. Dr. Gonsalez renewed new chair and arm.     Moved into adapted living apartment since last office visit. Continues to adjust.     For continuity:   Given the patient's report of reduced pain and improved functional ability without adverse effects, it is reasonable to treat with narcotic medications. The terms of the opioid agreement as well as the potential risks and adverse effects of the patient's medication regimen were discussed in detail. This includes if applicable due to dosage of medication permission to discuss and coordinate care with other treatment providers relevant to the patients condition. The patient verbalized understanding.      Risks and side effects of chronic opioid therapy including but not limited to tolerance, dependence, constipation, hyperalgesia, cognitive side effects, addiction and possible death due to overuse and or misuse were discussed. I also discussed that such medications when co-administered with other sedative agents including but not limited to alcohol, benzodiazepines, sedative hypnotics and illegal drugs could pose life threatening consequences including death. I also explained the impact that the administration of such medication has on a patient with obstructive sleep apnea and continued recommendations for use of apnea devices if ordered are prescribed by other physicians. In order to effectively and safely treat the pain, I also emphasized the importance of compliance with the treatment plan, as well as compliance with the terms of the opioid agreement, which was reviewed in detail. I explained the importance of being responsible  with the medications and to take these only as prescribed, never in excess and never for reasons other than pain reduction. The patient was counseled on keeping the medications safe and locked away from children and other adults as well as disposal methods and options. The patient understood the risks and instructions.      I also discussed with the patient in detail that based on the clinical response to the opioid medications and improvements of activities of daily living, sleep, and work performance in light of compliance with the treatment plan we can continue this form of therapy for the above chronic pain. The goal and rationale used for current treatment with chronic opioid medication is to control the pain and alleviate disability induced by the chronic pain condition noted above after failures of other non-opioid and nonpharmacological modalities to treat the chronic pain and the symptoms associated with have failed. The patient understood the goals in terms of the above treatment plan and had no further questions prior to leaving the office today.      Of note, the above-mentioned diagnoses/conditions and expected fluctuating nature of pain, and pain characteristic changes may lead to prolonged functional impairment requiring frequent and multiple reassessments with continued high level medical decision making. As noted, medication and medication management may require opiate therapy in excess of a routine less than 30 day medication requirement. The patient may require daily opiate therapy necessitating month-long prescription medication as noted above in order to perform activities of daily living and achieve acceptable quality of life with respect to their chronic pain condition for the foreseeable future. We monitor our patient's carefully through drug monitoring, medication counts, urine drug testing specific to their medication as well as a myriad of other substances and with frequent follow-ups with  interval reassement of the chronic pain condition, its pathophysiology and prognosis.      The level of clinical decision making at this office visit is high due to high risks and complications including mortality and morbidity related to acute and chronic pain with respects to life, bodily function, and treatment. Risks and clinical decisions with respect to under treatment, failure to maintain adequate treatment, and/or overtreatment complications and outcomes were discussed with the patient with respect to their chronic pain conditions, interventional therapies, as well as the use of various medications including possible controlled/dangerous medications. The amount and complexity of reviewed data at this in subsequent office visits is high given patient's fluctuating clinical presentation, laboratory and radiographic reports, prescription monitoring program data, and medication history as well as other relevant data as noted above. Pertinent negatives and positives data was used in consideration for the above-mentioned high complexity.       Given the patient's total MED, general use of daily opiates, or other coadministered medications in various classes the patient was offered a prescription for Narcan. I instructed the patient that it is important that patient fill this medication in order to demonstrate understanding of the gravity of possible side effects including respiratory depression and risk of overdose of this opiate load or medication combination. As such patient will be required to bring Narcan prescription to follow-up appointments as part of compliance with continued opiate care.      With respect to opiate induced constipation I discussed multiple ways to combat this problem including staying hydrated and taking over-the-counter medications such as Dulcolax, Miralax and Senna. If these treatments are not effective we could consider such medications as Amitiza, Linzess and Movantik.       Disclaimer: This note was transcribed using an audio transcription device. As such, minor errors may be present with regard to spelling, punctuation, and inadvertent word insertion. Please disregard such errors.       Review of Systems   Constitutional: Negative.    HENT: Negative.     Eyes: Negative.    Respiratory: Negative.     Cardiovascular: Negative.    Gastrointestinal: Negative.    Endocrine: Negative.    Genitourinary: Negative.    Musculoskeletal:  Positive for arthralgias, back pain, gait problem, joint swelling and myalgias. Negative for neck pain and neck stiffness.   Skin: Negative.    Allergic/Immunologic: Negative.    Neurological:  Positive for weakness and numbness. Negative for dizziness, tremors, seizures, syncope, facial asymmetry, speech difficulty, light-headedness and headaches.   Hematological: Negative.    Psychiatric/Behavioral: Negative.         Physical Exam  Vitals and nursing note reviewed.   Constitutional:       Appearance: Normal appearance.   HENT:      Head: Normocephalic and atraumatic.   Cardiovascular:      Rate and Rhythm: Normal rate and regular rhythm.      Pulses: Normal pulses.      Heart sounds: Murmur heard.      Comments: Systolic murmur right upper sternal border 2-3 out of 6.  Pulmonary:      Effort: Pulmonary effort is normal.      Breath sounds: Rhonchi present.      Comments: Harsh cough clears rhonchi posterior bases.  Fair air exchange.  Abdominal:      General: Abdomen is flat. Bowel sounds are normal.      Palpations: Abdomen is soft.   Musculoskeletal:      Right lower leg: Edema present.      Left lower leg: Edema present.      Comments: Lymphedema lower extremities.  Lower extremities considered with venous stasis changes of swelling, hyperpigmentation dry scaly skin.  Noted no open wounds or sores to lower extremities.    Active range of motion of right and left knees limited secondary to pain and stiffness with extension and flexion.    Active range of  motion of lumbar spine admitted secondary to pain with extension but not with flexion.    Presents in electric chair.   Skin:     General: Skin is warm and dry.      Capillary Refill: Capillary refill takes 2 to 3 seconds.   Neurological:      Mental Status: She is alert and oriented to person, place, and time.      Cranial Nerves: No cranial nerve deficit.      Sensory: No sensory deficit.      Motor: Weakness present.      Gait: Gait abnormal.   Psychiatric:         Behavior: Behavior normal.             Past Surgical History:   Procedure Laterality Date    KNEE SURGERY  09/19/2013    Knee Surgery Right    OTHER SURGICAL HISTORY  09/19/2013    Direct Laryngoscopy With Foreign Body Removal    OTHER SURGICAL HISTORY  09/19/2013    Laminectomy With Drainage Of Intramedullary Cyst    OTHER SURGICAL HISTORY  09/27/2013    Ovarian Cystectomy    TONSILLECTOMY  09/19/2013    Tonsillectomy    TUBAL LIGATION  09/19/2013    Tubal Ligation         Current Outpatient Medications:     ALPRAZolam (Xanax) 0.5 mg tablet, take 1/2 tablet (0.25 MILLIGRAMS) by mouth twice a day if needed for anxiety, Disp: 30 tablet, Rfl: 0    amino acids-protein hydrolys (ProSource No Carb) 15-60 gram-kcal/30 mL liquid, Take by mouth., Disp: , Rfl:     ammonium lactate (Amlactin) 12 % cream, Apply 1 Film topically 2 times a day., Disp: , Rfl:     cholecalciferol (Vitamin D-3) 50 MCG (2000 UT) tablet, Take 1 tablet (50 mcg) by mouth once daily., Disp: , Rfl:     cholestyramine (Questran) 4 gram packet, Take by mouth twice a day., Disp: , Rfl:     cyclobenzaprine (Flexeril) 10 mg tablet, Take 1 tablet (10 mg) by mouth 2 times a day as needed., Disp: , Rfl:     dilTIAZem ER (Tiazac) 180 mg 24 hr capsule, take 1 capsule by mouth once daily, Disp: 90 capsule, Rfl: 0    docusate sodium (Colace) 100 mg capsule, Take 1 capsule (100 mg) by mouth once daily., Disp: , Rfl:     DULoxetine (Cymbalta) 20 mg DR capsule, Take 1 capsule (20 mg) by mouth once daily.,  Disp: 90 capsule, Rfl: 1    fluticasone-umeclidin-vilanter (Trelegy Ellipta) 200-62.5-25 mcg blister with device, INHALE 1 PUFF BY MOUTH AND INTO THE LUNGS ONCE A DAY, Disp: 180 each, Rfl: 1    folic acid (Folvite) 1 mg tablet, Take 1 tablet (1 mg) by mouth once daily., Disp: , Rfl:     hydrocortisone 2.5 % cream, Insert 1 Applicatorful into the rectum., Disp: , Rfl:     hydrocortisone acetate 2.5 % cream with perineal applicator, twice a day., Disp: , Rfl:     hydroxyurea (Hydrea) 500 mg capsule, Take 2 capsules (1,000 mg total) by mouth once daily., Disp: 180 capsule, Rfl: 3    lisinopril 20 mg tablet, take 1 tablet by mouth once daily  -DR REQUESTING APPOINTMENT, Disp: , Rfl:     naloxone (Narcan) 4 mg/0.1 mL nasal spray, Administer into affected nostril(s)., Disp: , Rfl:     nystatin (Mycostatin) cream, APPLY TO AFFECTED AREA(S) 2 TO 3 TIMES A DAY, Disp: 30 g, Rfl: 2    oxyCODONE (Roxicodone) 10 mg immediate release tablet, Take 1 tablet (10 mg) by mouth every 4 hours if needed for severe pain (7 - 10). Do not start before November 21, 2023., Disp: 168 tablet, Rfl: 0    oxyCODONE-acetaminophen (Percocet)  mg tablet, Take 1 tablet by mouth every 4 hours if needed., Disp: , Rfl:     pramipexole (Mirapex) 1 mg tablet, Take 1 tablet (1 mg) by mouth 2 times a day., Disp: , Rfl:     predniSONE (Deltasone) 20 mg tablet, Take one tab po bid for 5 days, Disp: 10 tablet, Rfl: 0    triamcinolone (Kenalog) 0.1 % cream, APPLY SPARINGLY AND MASSAGE IN ONCE A DAY, Disp: , Rfl:     Ventolin HFA 90 mcg/actuation inhaler, inhale 2 puffs by mouth and INTO THE LUNGS every 4 hours if needed for wheezing, Disp: 18 g, Rfl: 3    warfarin (Coumadin) 7.5 mg tablet, take 1 tablet by mouth once daily, Disp: 30 tablet, Rfl: 3    Allergies   Allergen Reactions    Vancomycin Other    Macrobid [Nitrofurantoin Monohyd/M-Cryst] Dizziness    Sulfamethoxazole-Trimethoprim Unknown       Results/Data  Xray Bilateral Knee, 1 or 2 views  [id number] 08:52AM Soren Martinez   ORDER REVISED TO A BILATERAL KNEE; 1 OR 2 VIEWS BY RADIOLOGIST; Original Order Number: [id number]      Test Name Result Flag Reference   Xray Bilateral Knee, 1 or 2 views (Report)       FINAL REPORT  Interpreted by: SCHOENBERGER, JOSEPH, A, MD   05/19/22 11:29  MRN: [id number]  Patient Name: LION PEREZ     STUDY:  BILATERAL KNEE; 1 OR 2 VIEWS; ; 5/17/2022 8:52 am     INDICATION:  AP WB bilat in one shot (orthoball in plane w/ bone), PA 30 degree  flex WB bilat in one shot (no orthoball), LAT (orthoball in plane w/  bone), merchant view 30 degree flex bilat in one shot (no orthoball)  M25.561: Bilateral knee pain M25.562:.     COMPARISON:  None.     ACCESSION NUMBER(S):  [id number]     ORDERING CLINICIAN:  SOREN MARTINEZ     FINDINGS:  There is advanced tricompartmental degenerative change in the left  knee with bone-on-bone appearance of both the medial tibiofemoral and  lateral tibiofemoral compartments. There is slight lateral  subluxation of the tibia in like relation to the femur. There is no  fracture or dislocation.     There is advanced tricompartmental degenerative change in the right  knee. Bone-on-bone appearance of both the medial tibiofemoral and  lateral tibiofemoral compartments. Lateral subluxation of the tibia  in relation to the femoral condyles. No acute fracture or dislocation.     IMPRESSION:  Advanced degenerative change in both knees.        Electronically signed by: SCHOENBERGER, JOSEPH  05/19/22 11:29      CT L Spine without Contrast [id number] 11:49AM Non Ambulatory, Provider   Ordering Provider: LION ALMANZA [id number]      Test Name Result Flag Reference   CT L Spine without Contrast (Report)       Interpreted by: MADELAINE LANDERS  02/03/21 12:10  MRN: [id number]  Patient Name: LION PEREZ     STUDY:  CT L-SPINE WO CONTRAST 2/3/2021 11:49 am     INDICATION:  fall , severe back pain     COMPARISON:  None.     ACCESSION NUMBER(S):  [id number]      ORDERING CLINICIAN:  LION ALMANZA     TECHNIQUE:  Axial CT images of the lumbar spine are obtained. Axial, coronal and  sagittal reconstructions are provided for review.     FINDINGS:  There is disc space narrowing, vacuum disc phenomenon and moderate  anterior osteophytic spurring at T12-L1.  There is moderate anterior osteophytic spurring at L1-2.  There is mild anterior osteophytic spurring at L2-3 through L5-S1.     Alignment: There is 5 mm anterior subluxation of L4 with respect to  L5.  There is a mild left convex lumbar scoliosis.     Vertebrae/Disc Spaces:  The vertebral body heights are intact. The  disc spaces are preserved.     Lower Thoracic Spine: There is no significant central canal stenosis  in the included lower thoracic region.     T12-L1: There is no significant central canal stenosis.     L1-2: There is no significant central canal or neural foraminal  stenosis.     L2-3: There is mild facet joint hypertrophy. There is no significant  central canal or neural foraminal stenosis.     L3-4: There is moderate facet joint hypertrophy. There is no  significant central canal or neural foraminal stenosis.     L4-5: There is moderate facet joint hypertrophy. There is no  significant central canal or neural foraminal stenosis.     L5-S1: There is mild facet joint hypertrophy. There is no  significant central canal stenosis.  There is a small right lateral disc bulge or herniation. This causes  no compression on the exiting right L4 nerve root.     Prevertebral/Paraspinal Soft Tissues: The prevertebral and paraspinal  soft tissues are unremarkable.     There is atherosclerotic calcification of the thoracic aorta.     IMPRESSION:  1. No acute bony abnormality.  2. Degenerative change.  3. No central canal stenosis or neural foraminal compromise at any  level.  4. Small right lateral disc bulge or herniation causing no  compression on the exiting right L4 nerve root.     Electronically signed by:  MADELAINE LANDERS  02/03/21 12:10      MRI L Spine without Contrast [id number] 05:47PM Karla Artis   [Feb 28, 2017 11:15AM Chetan Chauhan]  AMA Intake Activity Log Entry by Chetan Chauhan ([email or web address]) on 2/28/2017 11:10 AM  Status Change: To Closed - Confirmed-Appt Past   [Feb 13, 2017 4:00PM Karla Artis]  Reason: Unspecified for MRI L Spine without Contrast      Test Name Result Flag Reference   MRI L Spine without Contrast (Report)       Interpreted by: ALEAH GLOVER  02/16/17 07:03  MRN: [id number]  Patient Name: LION PEREZ     STUDY:  MRI L-SPINE WO; 2/15/2017 5:47 pm  MRI L-SPINE WO; 2/15/2017 5:47 pm     INDICATION:  Signs/Symptoms: Acute on chronic low back pain; increased low back  pain.  Signs/Symptoms: Acute on chronic low back pain; increased low back  pain. RIGHT ONE     COMPARISON:  1/2016.     ACCESSION NUMBER(S):  [id number]     ORDERING CLINICIAN:  KARLA BARBOUR     TECHNIQUE:  The lumbar spine was studied in the sagital, axial and coronal planes  utiliing T1 and T2 weighted images.     FINDINGS:     The marrow signal and vertebral body height are normal. The conus and  sacrum are normal.  Images at each interspace reveal the following:  L1/L2  There is normal alignment and vertebral body height. The disc space  is normal. There is no evidence of canal or foraminal narrowing.  There is no evidence of bulging or herniated disc.  L2/L3  There is normal alignment and vertebral body height. The disc space  is normal. There is no evidence of canal or foraminal narrowing.  There is no evidence of bulging or herniated disc.  L3/L4  Slight progression of spondylolisthesis since the previous exam.  Interval development of focal herniated fragment in the midline  measuring approximately 4 mm in size on parasagittal T2 weighted  image 7/14. Flattening of the thecal sac which measures 9 mm in AP  dimension in the midline. Focal narrowing of the left lateral recess  and neural  foramen.  L4/L5  Trace spondylolisthesis and facet hypertrophy. Bilateral facet  hypertrophy with retained fluid in the facet joint bilaterally as  well as re-demonstration of a small synovial cyst which narrows the  right lateral recess on axial T2 weighted image 34/48. No evidence of  disc herniation or measurable canal stenosis  L5/S1  Mild facet hypertrophy without canal or foraminal narrowing     IMPRESSION:  *Progressive spondylolisthesis and central disc herniation at L3/L4  THIS EXAMIN[place]PRET[place] [facility] [place] [facility] [place]  Transcribed by: Serjio, User  Electronically signed by: Serjio, User  02/16/17 07:03      Xray Knee 1 or 2 View [id number] 12:56PM Junaid Pettit   [Dec 16, 2015 12:47PM Junaid Pettit]  Reason: Unspecified for Xray Knee 1 or 2 View      Test Name Result Flag Reference   Xray Knee 1 or 2 View (Report)       Interpreted by: MARILYN COLE  12/16/15 14:53  STUDY: Bilateral knee dated 12/16/2015 12:56 PM.     INDICATION: Pain.     COMPARISON: None.     ACCESSION NUMBER(S): [id number], [id number].     ORDERING CLINICIAN: JUNAID PETTIT, [phone].     TECHNIQUE: 2 views of the bilateral knee.     FINDINGS: No fracture or dislocation evident. No joint effusion   evident. There is moderate bilateral patellofemoral and lateral   femorotibial and severe bilateral medial femorotibial joint space   degenerative change with note of a degree of lateral shift being of   the tibias with respect to the femurs greater on the right as   compared to the left. The soft tissues are grossly unremarkable.     IMPRESSION:     Degenerative change as discussed above without osseous injury evident.  Transcribed by: Alysia Matute  Electronically signed by: Giovani [facility]  12/16/15 14:53           Active Ambulatory Problems     Diagnosis Date Noted    Neurogenic claudication due to lumbar spinal stenosis 03/10/2023    Folate-deficiency anemia 03/10/2023    Iron deficiency anemia 03/10/2023     Aortic stenosis, moderate 03/10/2023    Atrial dilatation, bilateral 03/10/2023    COPD (chronic obstructive pulmonary disease) (CMS/[facility]) 03/10/2023    Chronic thromboembolic disease (CMS/[facility]) 03/10/2023    Chronic venous insufficiency 03/10/2023    Generalized anxiety disorder 03/10/2023    Mixed hyperlipidemia 03/10/2023    Primary hypertension 03/10/2023    Idiopathic insomnia 03/10/2023    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    Lymphedema 03/10/2023    Lower leg edema 03/10/2023    Chronic lumbar radiculopathy 03/10/2023    MDD (major depressive disorder), recurrent episode, moderate (CMS/[facility]) 03/10/2023    Nonrheumatic mitral valve stenosis 03/10/2023    BE on CPAP 12/12/2011    Osteoarthritis of left knee 03/10/2023    Osteoarthritis of right knee 03/10/2023    Other constipation 03/10/2023    Pulmonary hypertension (CMS/[facility]) 03/10/2023    Primary localized osteoarthritis of ankle, left 03/10/2023    Urge incontinence of urine 03/10/2023    Vitamin B12 deficiency 03/10/2023    Restless legs syndrome (RLS) 03/10/2023    Weakness of both lower extremities 03/10/2023    Elevated blood sugar 03/10/2023    Polycythemia 03/10/2023    Typical atrial flutter (CMS/[facility]) 08/17/2023    Class 3 severe obesity due to excess calories with serious comorbidity and body mass index (BMI) of 50.0 to 59.9 in adult (CMS/[facility]) 08/17/2023    Fatty liver 08/17/2023    Essential thrombocytosis (CMS/[facility]) 08/17/2023    Pain in joint, lower leg 03/31/2010    Low back pain 02/24/2010    Spinal stenosis, lumbar region, without neurogenic claudication 03/31/2010    Panic disorder without agoraphobia 07/06/2009    Depression 07/06/2009    Degeneration of lumbar or lumbosacral intervertebral disc 02/01/2010    Cavernous sinus thrombosis 05/10/2012    Asthma 04/22/2011    Arthritis of knee 02/01/2010     Resolved Ambulatory Problems     Diagnosis Date Noted    Abdominal pain 03/10/2023    Acute hip pain, left 03/10/2023    Acute viral conjunctivitis of  left eye 03/10/2023    Anemia 03/10/2023    Cellulitis of leg without foot, right 03/10/2023    Heart murmur 03/10/2023    Hemoptysis 03/10/2023    History of colon cancer 03/10/2023    Hyperkalemia 03/10/2023    Lumbar disc herniation 03/10/2023    Lump, breast 03/10/2023    Malaise and fatigue 03/10/2023    Other acute sinusitis 03/10/2023    Palpitations 03/10/2023    Pruritus 03/10/2023    Right knee pain 03/10/2023    Shortness of breath 03/10/2023    Sinus tachycardia 03/10/2023    Submandibular gland hypertrophy 03/10/2023    Submandibular sialolithiasis 03/10/2023    Bone marrow disorder 08/17/2023    Alkaline phosphatase elevation 08/17/2023    Daily headache 08/17/2023    Elevated bilirubin 08/17/2023    Elevated transaminase level 08/17/2023    Fever 08/17/2023    Fever 08/17/2023     Past Medical History:   Diagnosis Date    Acute bronchitis due to other specified organisms 10/14/2019    Cellulitis of left lower limb 04/08/2021    Chronic obstructive pulmonary disease with (acute) exacerbation (CMS/HCC) 01/09/2019    Chronic sinusitis, unspecified     COVID-19 11/28/2022    Morbid (severe) obesity due to excess calories (CMS/[facility])     Other conditions influencing health status     Other conditions influencing health status 03/12/2020    Other conditions influencing health status 03/06/2017    Other conditions influencing health status     Other conditions influencing health status     Other conditions influencing health status     Pain in unspecified ankle and joints of unspecified foot 06/24/2016    Pain in unspecified foot 12/08/2015    Pain in unspecified knee     Personal history of diseases of the skin and subcutaneous tissue     Personal history of other diseases of the female genital tract     Personal history of other diseases of the nervous system and sense organs 09/12/2019    Personal history of other diseases of the nervous system and sense organs 09/17/2015    Personal history of other  diseases of the respiratory system     Personal history of other diseases of the respiratory system 10/17/2016    Personal history of other diseases of the respiratory system 11/29/2017    Personal history of other diseases of the respiratory system 11/16/2016    Personal history of other drug therapy 10/14/2016    Personal history of other infectious and parasitic diseases     Personal history of other infectious and parasitic diseases 07/22/2020    Personal history of other medical treatment     Personal history of other specified conditions     Personal history of other specified conditions 01/28/2015    Personal history of other specified conditions     Personal history of pneumonia (recurrent)     Pneumonia, unspecified organism 01/11/2018    Postmenopausal bleeding 09/30/2014    Unilateral primary osteoarthritis, unspecified knee 02/03/2017    Unspecified acute conjunctivitis, bilateral 08/20/2020

## 2023-12-19 ENCOUNTER — OFFICE VISIT (OUTPATIENT)
Dept: PAIN MEDICINE | Facility: CLINIC | Age: 63
End: 2023-12-19
Payer: MEDICARE

## 2023-12-19 VITALS
SYSTOLIC BLOOD PRESSURE: 166 MMHG | HEART RATE: 90 BPM | HEIGHT: 64 IN | RESPIRATION RATE: 18 BRPM | DIASTOLIC BLOOD PRESSURE: 96 MMHG | BODY MASS INDEX: 50.02 KG/M2 | WEIGHT: 293 LBS

## 2023-12-19 DIAGNOSIS — M48.062 NEUROGENIC CLAUDICATION DUE TO LUMBAR SPINAL STENOSIS: Primary | ICD-10-CM

## 2023-12-19 DIAGNOSIS — M17.12 PRIMARY OSTEOARTHRITIS OF LEFT KNEE: ICD-10-CM

## 2023-12-19 DIAGNOSIS — M54.16 CHRONIC LUMBAR RADICULOPATHY: ICD-10-CM

## 2023-12-19 DIAGNOSIS — M17.11 PRIMARY OSTEOARTHRITIS OF RIGHT KNEE: ICD-10-CM

## 2023-12-19 PROCEDURE — 99213 OFFICE O/P EST LOW 20 MIN: CPT | Performed by: NURSE PRACTITIONER

## 2023-12-19 PROCEDURE — 3077F SYST BP >= 140 MM HG: CPT | Performed by: NURSE PRACTITIONER

## 2023-12-19 PROCEDURE — 3080F DIAST BP >= 90 MM HG: CPT | Performed by: NURSE PRACTITIONER

## 2023-12-19 PROCEDURE — 4004F PT TOBACCO SCREEN RCVD TLK: CPT | Performed by: NURSE PRACTITIONER

## 2023-12-19 PROCEDURE — 3008F BODY MASS INDEX DOCD: CPT | Performed by: NURSE PRACTITIONER

## 2023-12-19 RX ORDER — OXYCODONE HYDROCHLORIDE 10 MG/1
10 TABLET ORAL EVERY 4 HOURS PRN
Qty: 168 TABLET | Refills: 0 | Status: SHIPPED | OUTPATIENT
Start: 2023-12-19 | End: 2024-03-07 | Stop reason: SDUPTHER

## 2023-12-19 RX ORDER — OXYCODONE HYDROCHLORIDE 10 MG/1
10 TABLET ORAL EVERY 4 HOURS PRN
Qty: 168 TABLET | Refills: 0 | Status: SHIPPED | OUTPATIENT
Start: 2024-01-16 | End: 2024-03-07 | Stop reason: SDUPTHER

## 2023-12-19 RX ORDER — OXYCODONE HYDROCHLORIDE 10 MG/1
10 TABLET ORAL EVERY 4 HOURS PRN
Qty: 168 TABLET | Refills: 0 | Status: SHIPPED | OUTPATIENT
Start: 2024-02-13 | End: 2024-03-07 | Stop reason: SDUPTHER

## 2023-12-19 RX ORDER — CYCLOBENZAPRINE HCL 10 MG
10 TABLET ORAL 2 TIMES DAILY PRN
Qty: 30 TABLET | Refills: 2 | Status: SHIPPED | OUTPATIENT
Start: 2023-12-19 | End: 2024-03-07 | Stop reason: SDUPTHER

## 2023-12-19 ASSESSMENT — PAIN DESCRIPTION - DESCRIPTORS: DESCRIPTORS: ACHING;SHARP

## 2023-12-19 ASSESSMENT — PAIN - FUNCTIONAL ASSESSMENT: PAIN_FUNCTIONAL_ASSESSMENT: 0-10

## 2023-12-19 ASSESSMENT — ENCOUNTER SYMPTOMS
MYALGIAS: 1
EYES NEGATIVE: 1
DIZZINESS: 0
ARTHRALGIAS: 1
ALLERGIC/IMMUNOLOGIC NEGATIVE: 1
SEIZURES: 0
PSYCHIATRIC NEGATIVE: 1
RESPIRATORY NEGATIVE: 1
WEAKNESS: 1
NECK STIFFNESS: 0
FACIAL ASYMMETRY: 0
CARDIOVASCULAR NEGATIVE: 1
HEADACHES: 0
LOSS OF SENSATION IN FEET: 0
JOINT SWELLING: 1
SPEECH DIFFICULTY: 0
CONSTITUTIONAL NEGATIVE: 1
DEPRESSION: 0
BACK PAIN: 1
HEMATOLOGIC/LYMPHATIC NEGATIVE: 1
NUMBNESS: 1
NECK PAIN: 0
LIGHT-HEADEDNESS: 0
GASTROINTESTINAL NEGATIVE: 1
OCCASIONAL FEELINGS OF UNSTEADINESS: 1
ENDOCRINE NEGATIVE: 1
TREMORS: 0

## 2023-12-19 ASSESSMENT — PAIN SCALES - GENERAL
PAINLEVEL: 6
PAINLEVEL_OUTOF10: 6

## 2024-01-03 DIAGNOSIS — M54.16 CHRONIC LUMBAR RADICULOPATHY: ICD-10-CM

## 2024-01-03 DIAGNOSIS — M17.12 PRIMARY OSTEOARTHRITIS OF LEFT KNEE: ICD-10-CM

## 2024-01-03 DIAGNOSIS — M48.062 NEUROGENIC CLAUDICATION DUE TO LUMBAR SPINAL STENOSIS: ICD-10-CM

## 2024-01-03 DIAGNOSIS — M17.11 PRIMARY OSTEOARTHRITIS OF RIGHT KNEE: ICD-10-CM

## 2024-01-04 ENCOUNTER — OFFICE VISIT (OUTPATIENT)
Dept: HEMATOLOGY/ONCOLOGY | Facility: CLINIC | Age: 64
End: 2024-01-04
Payer: MEDICARE

## 2024-01-04 ENCOUNTER — APPOINTMENT (OUTPATIENT)
Dept: LAB | Facility: HOSPITAL | Age: 64
End: 2024-01-04
Payer: COMMERCIAL

## 2024-01-04 VITALS
BODY MASS INDEX: 57.52 KG/M2 | SYSTOLIC BLOOD PRESSURE: 161 MMHG | TEMPERATURE: 97.9 F | RESPIRATION RATE: 17 BRPM | DIASTOLIC BLOOD PRESSURE: 101 MMHG | WEIGHT: 293 LBS | HEART RATE: 89 BPM | OXYGEN SATURATION: 96 %

## 2024-01-04 DIAGNOSIS — R05.2 SUBACUTE COUGH: ICD-10-CM

## 2024-01-04 DIAGNOSIS — D50.9 IRON DEFICIENCY ANEMIA, UNSPECIFIED IRON DEFICIENCY ANEMIA TYPE: Primary | ICD-10-CM

## 2024-01-04 LAB
ALBUMIN SERPL BCP-MCNC: 4.2 G/DL (ref 3.4–5)
ALP SERPL-CCNC: 159 U/L (ref 33–136)
ALT SERPL W P-5'-P-CCNC: 14 U/L (ref 7–45)
ANION GAP SERPL CALC-SCNC: 12 MMOL/L (ref 10–20)
AST SERPL W P-5'-P-CCNC: 19 U/L (ref 9–39)
BASOPHILS # BLD AUTO: 0.02 X10*3/UL (ref 0–0.1)
BASOPHILS NFR BLD AUTO: 0.3 %
BILIRUB SERPL-MCNC: 0.4 MG/DL (ref 0–1.2)
BUN SERPL-MCNC: 13 MG/DL (ref 6–23)
CALCIUM SERPL-MCNC: 9.1 MG/DL (ref 8.6–10.3)
CHLORIDE SERPL-SCNC: 102 MMOL/L (ref 98–107)
CO2 SERPL-SCNC: 28 MMOL/L (ref 21–32)
CREAT SERPL-MCNC: 0.71 MG/DL (ref 0.5–1.05)
EOSINOPHIL # BLD AUTO: 0.2 X10*3/UL (ref 0–0.7)
EOSINOPHIL NFR BLD AUTO: 3.3 %
ERYTHROCYTE [DISTWIDTH] IN BLOOD BY AUTOMATED COUNT: 13 % (ref 11.5–14.5)
FERRITIN SERPL-MCNC: 140 NG/ML (ref 8–150)
GFR SERPL CREATININE-BSD FRML MDRD: >90 ML/MIN/1.73M*2
GLUCOSE SERPL-MCNC: 96 MG/DL (ref 74–99)
HCT VFR BLD AUTO: 48.4 % (ref 36–46)
HGB BLD-MCNC: 15.6 G/DL (ref 12–16)
IMM GRANULOCYTES # BLD AUTO: 0 X10*3/UL (ref 0–0.7)
IMM GRANULOCYTES NFR BLD AUTO: 0 % (ref 0–0.9)
IRON SATN MFR SERPL: 24 % (ref 25–45)
IRON SERPL-MCNC: 83 UG/DL (ref 35–150)
LYMPHOCYTES # BLD AUTO: 1.14 X10*3/UL (ref 1.2–4.8)
LYMPHOCYTES NFR BLD AUTO: 18.7 %
MCH RBC QN AUTO: 36.3 PG (ref 26–34)
MCHC RBC AUTO-ENTMCNC: 32.2 G/DL (ref 32–36)
MCV RBC AUTO: 113 FL (ref 80–100)
MONOCYTES # BLD AUTO: 0.52 X10*3/UL (ref 0.1–1)
MONOCYTES NFR BLD AUTO: 8.5 %
NEUTROPHILS # BLD AUTO: 4.23 X10*3/UL (ref 1.2–7.7)
NEUTROPHILS NFR BLD AUTO: 69.2 %
NRBC BLD-RTO: ABNORMAL /100{WBCS}
PLATELET # BLD AUTO: 341 X10*3/UL (ref 150–450)
POTASSIUM SERPL-SCNC: 4.9 MMOL/L (ref 3.5–5.3)
PROT SERPL-MCNC: 6.9 G/DL (ref 6.4–8.2)
RBC # BLD AUTO: 4.3 X10*6/UL (ref 4–5.2)
RBC MORPH BLD: NORMAL
SODIUM SERPL-SCNC: 137 MMOL/L (ref 136–145)
TIBC SERPL-MCNC: 344 UG/DL (ref 240–445)
UIBC SERPL-MCNC: 261 UG/DL (ref 110–370)
VIT B12 SERPL-MCNC: 655 PG/ML (ref 211–911)
WBC # BLD AUTO: 6.1 X10*3/UL (ref 4.4–11.3)

## 2024-01-04 PROCEDURE — 80053 COMPREHEN METABOLIC PANEL: CPT | Performed by: PHYSICIAN ASSISTANT

## 2024-01-04 PROCEDURE — 3077F SYST BP >= 140 MM HG: CPT | Performed by: PHYSICIAN ASSISTANT

## 2024-01-04 PROCEDURE — 3080F DIAST BP >= 90 MM HG: CPT | Performed by: PHYSICIAN ASSISTANT

## 2024-01-04 PROCEDURE — 85025 COMPLETE CBC W/AUTO DIFF WBC: CPT | Performed by: PHYSICIAN ASSISTANT

## 2024-01-04 PROCEDURE — 99214 OFFICE O/P EST MOD 30 MIN: CPT | Performed by: PHYSICIAN ASSISTANT

## 2024-01-04 PROCEDURE — 3008F BODY MASS INDEX DOCD: CPT | Performed by: PHYSICIAN ASSISTANT

## 2024-01-04 PROCEDURE — 85060 BLOOD SMEAR INTERPRETATION: CPT | Performed by: PHYSICIAN ASSISTANT

## 2024-01-04 PROCEDURE — 82728 ASSAY OF FERRITIN: CPT | Performed by: PHYSICIAN ASSISTANT

## 2024-01-04 PROCEDURE — 99214 OFFICE O/P EST MOD 30 MIN: CPT | Mod: 25 | Performed by: PHYSICIAN ASSISTANT

## 2024-01-04 PROCEDURE — 36415 COLL VENOUS BLD VENIPUNCTURE: CPT | Performed by: PHYSICIAN ASSISTANT

## 2024-01-04 PROCEDURE — 82607 VITAMIN B-12: CPT | Performed by: PHYSICIAN ASSISTANT

## 2024-01-04 PROCEDURE — 83540 ASSAY OF IRON: CPT | Performed by: PHYSICIAN ASSISTANT

## 2024-01-04 ASSESSMENT — ENCOUNTER SYMPTOMS
OCCASIONAL FEELINGS OF UNSTEADINESS: 1
LOSS OF SENSATION IN FEET: 1
DEPRESSION: 0

## 2024-01-04 ASSESSMENT — PAIN SCALES - GENERAL: PAINLEVEL: 5

## 2024-01-05 LAB — PATH REVIEW-CBC DIFFERENTIAL: NORMAL

## 2024-01-05 NOTE — PROGRESS NOTES
Patient Visit Information:   Visit Type: Follow Up Visit     Cancer History:   Treatment Synopsis:    Essential thrombocytosis, on Coumadin, aspirin, and hydroxyurea. 2. History of central venous sinus thromboses. 3. Tobacco abuse.     History of Present Illness:   Patient presents for follow up. She continues on Hydrea, tolerating well.  CPAP machine not working so she hasn't been using it. Continues to smoke but is down to 6 cigarettes. C/O intermittent left chest pain, relieved with food. Otherwise doing ok and denies any other complaints at this time.    Review of Systems:    Constitutional:  No fever, chills, anorexia, or weight loss  Eyes:  No blurry vision, diplopia, or vision loss  ENT:  No nasal congestion, earache, or sore throat  Respiratory:  No cough, SOB, hemoptysis or wheezing  Cardiac:  No chest pain, palpitations, dyspnea on exertion, or orthopnea  Gastrointestinal:  No abdominal pain, nausea, vomiting, diarrhea, melena, hemoptysis, or hematochezia  Genitourinary:  No dysuria, hematuria, frequency, urgency, or flank pain  Musculoskeletal:  No myalgia, or weakness  Neurological:  No dizziness, light-headedness, headache, or syncope  Psychiatric:  No history of anxiety, depression, hallucinations  Skin:  No rash or pruritis  Endocrine:  No heat or cold intolerance, polyuria, or polydipsia  Hematologic:  No bruising     Allergies and Intolerances:       Allergies:   vancomycin: Drug, Other (Severe), Active   Bactrim: Drug, Unknown, Active    Outpatient Medication Profile:  * Patient Currently Takes Medications as of 07-Sep-2023 15:55 documented in Structured Notes   Hydrea 500 mg oral capsule: 2 cap(s) orally once a day , Start Date: 19-May-2022   warfarin 2.5 mg oral tablet: 1 tab(s) orally once a day plus 5mg for total of 7.5mg daily      GOAL INR 2.5-3.5, Start Date: 11-Feb-2021   warfarin 5 mg oral tablet: 1 tab(s) orally once a day      TARGET INR 2.5-3.5, Start Date: 11-Feb-2021   torsemide 20 mg  oral tablet: 1 tab(s) orally once a day , Start Date: 10-Feb-2021   Trelegy Ellipta inhalation powder: 1 puff(s) inhaled once a day, Start Date: 29-Nov-2019   albuterol 90 mcg/inh inhalation aerosol: 2 puff(s) inhaled every 4 hours, As Needed   DilTIAZem (Eqv-Cardizem CD) 180 mg/24 hours oral capsule, extended release: 1 cap(s) orally once a day   Questran:    oxyCODONE 10 mg oral tablet, extended release:    Cymbalta 20 mg oral delayed release capsule:            Medical History:   Murmur, cardiac: ICD-10: R01.1, Status: Active   Essential thrombocythemia: ICD-10: D47.3, Status: Active    Family History: No Family History items are recorded in the problem list.     Social History:   Social Substance History:  · Smoking Status current every day smoker (1)         Performance:   ECOG Performance Status: 1- Restricted from physically stenuous work     Visit Vitals  BP (!) 161/101 (BP Location: Right arm)   Pulse 89   Temp 36.6 °C (97.9 °F)   Resp 17   Wt (!) 152 kg (335 lb 1.6 oz)   SpO2 96%   BMI 57.52 kg/m²   Smoking Status Some Days   BSA 2.62 m²     Physical Exam:   Constitutional: wheelchair bound, alert, awake and oriented, not in acute distress   HEENT: moist mucous membranes, normal nose   Neck: supple, no lymphadenopathy   EYES: PERRL, EOM intact, conjunctiva normal  Skin: no jaundice, rash or erythema  Neurological: AAOx3, no gross focal deficit   Psychiatric: normal mood and behavior   Lymph: no supraclavicular, axillary or inguinal LAD    Assessment:    ET, diagnosed via [facility] 30+ years ago, on hydrea for many years.     CMP w/elevated T bili and LFTs--now improved. Off Statin and On Cholestyramine. F/U w/PCP    currently on hydrea 2 tabs daily. Continue current dose and schedule, plts w/good response    Polycythemia,-stable, improving with trying to quit smoking and encourage to get new CPAP machine.    CT chest to evaluate CP; Consider EGD if negative; Due for c-scope 12/2024.    COPD/sleep apnea on CPAP  and O2 at home.     continue warfarin for DVT.    H/O hep B, hep panel negative    RTC in 4 months     Patient verbalized understanding, and all her questions were answered to her satisfaction    30 min spent with patient greater than 50 % of which was spent in consultation and coordination of care.

## 2024-01-19 ENCOUNTER — APPOINTMENT (OUTPATIENT)
Dept: RADIOLOGY | Facility: HOSPITAL | Age: 64
End: 2024-01-19
Payer: MEDICARE

## 2024-02-03 DIAGNOSIS — L03.119 CELLULITIS OF LOWER EXTREMITY, UNSPECIFIED LATERALITY: ICD-10-CM

## 2024-02-03 RX ORDER — DOXYCYCLINE 100 MG/1
100 CAPSULE ORAL 2 TIMES DAILY
Qty: 20 CAPSULE | Refills: 0 | Status: SHIPPED | OUTPATIENT
Start: 2024-02-03 | End: 2024-02-13

## 2024-02-24 DIAGNOSIS — I74.9 CHRONIC THROMBOEMBOLIC DISEASE (MULTI): ICD-10-CM

## 2024-02-24 RX ORDER — WARFARIN 7.5 MG/1
7.5 TABLET ORAL EVERY EVENING
Qty: 30 TABLET | Refills: 3 | Status: SHIPPED | OUTPATIENT
Start: 2024-02-24 | End: 2024-06-11

## 2024-03-04 DIAGNOSIS — I10 PRIMARY HYPERTENSION: ICD-10-CM

## 2024-03-04 RX ORDER — DILTIAZEM HYDROCHLORIDE 180 MG/1
180 CAPSULE, EXTENDED RELEASE ORAL DAILY
Qty: 90 CAPSULE | Refills: 3 | Status: SHIPPED | OUTPATIENT
Start: 2024-03-04 | End: 2024-03-07 | Stop reason: SDUPTHER

## 2024-03-07 ENCOUNTER — OFFICE VISIT (OUTPATIENT)
Dept: PAIN MEDICINE | Facility: CLINIC | Age: 64
End: 2024-03-07
Payer: MEDICARE

## 2024-03-07 VITALS
OXYGEN SATURATION: 96 % | RESPIRATION RATE: 17 BRPM | DIASTOLIC BLOOD PRESSURE: 82 MMHG | HEART RATE: 82 BPM | SYSTOLIC BLOOD PRESSURE: 154 MMHG

## 2024-03-07 DIAGNOSIS — M17.12 PRIMARY OSTEOARTHRITIS OF LEFT KNEE: ICD-10-CM

## 2024-03-07 DIAGNOSIS — M54.16 CHRONIC LUMBAR RADICULOPATHY: ICD-10-CM

## 2024-03-07 DIAGNOSIS — I51.7 ATRIAL DILATATION, BILATERAL: Primary | ICD-10-CM

## 2024-03-07 DIAGNOSIS — M48.062 NEUROGENIC CLAUDICATION DUE TO LUMBAR SPINAL STENOSIS: Primary | ICD-10-CM

## 2024-03-07 DIAGNOSIS — I48.3 TYPICAL ATRIAL FLUTTER (MULTI): ICD-10-CM

## 2024-03-07 DIAGNOSIS — M17.11 PRIMARY OSTEOARTHRITIS OF RIGHT KNEE: ICD-10-CM

## 2024-03-07 PROCEDURE — 99214 OFFICE O/P EST MOD 30 MIN: CPT | Performed by: NURSE PRACTITIONER

## 2024-03-07 PROCEDURE — 3077F SYST BP >= 140 MM HG: CPT | Performed by: NURSE PRACTITIONER

## 2024-03-07 PROCEDURE — 3008F BODY MASS INDEX DOCD: CPT | Performed by: NURSE PRACTITIONER

## 2024-03-07 PROCEDURE — 3079F DIAST BP 80-89 MM HG: CPT | Performed by: NURSE PRACTITIONER

## 2024-03-07 RX ORDER — CYCLOBENZAPRINE HCL 10 MG
10 TABLET ORAL 2 TIMES DAILY PRN
Qty: 30 TABLET | Refills: 2 | Status: SHIPPED | OUTPATIENT
Start: 2024-03-07 | End: 2024-05-30 | Stop reason: SDUPTHER

## 2024-03-07 RX ORDER — DILTIAZEM HYDROCHLORIDE 180 MG/1
180 CAPSULE, EXTENDED RELEASE ORAL DAILY
Qty: 90 CAPSULE | Refills: 1 | Status: SHIPPED | OUTPATIENT
Start: 2024-03-07

## 2024-03-07 RX ORDER — OXYCODONE HYDROCHLORIDE 10 MG/1
10 TABLET ORAL EVERY 4 HOURS PRN
Qty: 168 TABLET | Refills: 0 | Status: SHIPPED | OUTPATIENT
Start: 2024-03-12 | End: 2024-05-30 | Stop reason: SDUPTHER

## 2024-03-07 RX ORDER — OXYCODONE HYDROCHLORIDE 10 MG/1
10 TABLET ORAL EVERY 4 HOURS PRN
Qty: 168 TABLET | Refills: 0 | Status: SHIPPED | OUTPATIENT
Start: 2024-05-07 | End: 2024-05-30 | Stop reason: SDUPTHER

## 2024-03-07 RX ORDER — OXYCODONE HYDROCHLORIDE 10 MG/1
10 TABLET ORAL EVERY 4 HOURS PRN
Qty: 168 TABLET | Refills: 0 | Status: SHIPPED | OUTPATIENT
Start: 2024-04-09 | End: 2024-05-30 | Stop reason: SDUPTHER

## 2024-03-07 ASSESSMENT — ENCOUNTER SYMPTOMS
CONSTITUTIONAL NEGATIVE: 1
NUMBNESS: 1
LIGHT-HEADEDNESS: 0
GASTROINTESTINAL NEGATIVE: 1
DIZZINESS: 0
ALLERGIC/IMMUNOLOGIC NEGATIVE: 1
HEMATOLOGIC/LYMPHATIC NEGATIVE: 1
CARDIOVASCULAR NEGATIVE: 1
WEAKNESS: 1
NECK PAIN: 0
TREMORS: 0
RESPIRATORY NEGATIVE: 1
SPEECH DIFFICULTY: 0
BACK PAIN: 1
MYALGIAS: 1
EYES NEGATIVE: 1
JOINT SWELLING: 1
ARTHRALGIAS: 1
SEIZURES: 0
PAIN: 1
PSYCHIATRIC NEGATIVE: 1
NECK STIFFNESS: 0
HEADACHES: 0
ENDOCRINE NEGATIVE: 1
FACIAL ASYMMETRY: 0

## 2024-03-07 ASSESSMENT — PAIN SCALES - GENERAL: PAINLEVEL: 6

## 2024-03-07 NOTE — PROGRESS NOTES
Assessment/Plan   Problem List Items Addressed This Visit    None    Follow-up 12 weeks.    Patient ID: Neisha Graham is a 63 y.o. female who presents for chronic pain management of low back pain and knee joint pain from arthritis.    Med Refill  Associated symptoms include arthralgias, joint swelling, myalgias, numbness and weakness. Pertinent negatives include no headaches or neck pain.   Pain  Associated symptoms include joint swelling and weakness. Pertinent negatives include no headaches.       Neisha follows up for refill of medications and interval reevaluation of her low back pain from lumbar stenosis, disc herniation and radiculitis of the lower extremities. Neisha also follows up for hip and knee pain from arthritis     Percocet 10 mg every 4 hours up to 6 per daily as needed and Flexeril 10 mg once to twice daily as needed help to decrease pain and improve function between 50 and 60%. Average pain score with medication is 6 out of 10. Senokot or other laxative as needed to help avoid constipation. Is able to manage her ADLs with improved function from medications. She reports of having a below average quality of family and social life with current condition and treatment.      Neisha is of polypharmacy and was targeting the medications of Flexeril, Xanax and hydroxyzine. To address Flexeril she takes this very as needed and not twice daily. I do not prescribe the Xanax or the hydroxyzine. She understands to space her pain medication with these other medications as mentioned above by 2 hours to avoid sedation, respiratory depression, risk of coma and death. She does have a Narcan on status sent 2023.     Toxicology consistent April 5, 2023.  Toxicology today.  Annual controlled substance agreement and opioid risk tool are completed and scanned into the chart March 7, 2024.     Over the last several weeks, today is with increased sharp stabbing radiating pain from the left anterior knee to ankle and foot  that awakens her most mornings at 3 AM.  She has had sparing use of the Flexeril and only fills this prescription once every 3 months.  Advised to take a Flexeril 10 mg in the evening within 2 hours of her pain medication to help with overnight muscle spasm to the legs.  Has tried gabapentin, pregabalin and baclofen in the past that have given her negative side effects.     Neisha has chronic knee pain from osteoarthritis. Pain is increased with weightbearing activities. the right knee has more pain and decreased range of motion compared to the left knee. She cannot have surgery because of her BMI greater than 40. She does have instability in the knees especially with weightbearing activity in standing and walking. Reports that the right knee can give out on her with prolonged standing. Has difficulty getting in and because of weakness of the knee and has decreased range of motion. Has tried hinged bracing to the knees. Bracing feels too heavy and hard to get around. She no longer wears them.     History of intra-articular knee joint injection steroid therapy has helped in the past from orthopedic surgeon. She does have several of these per year.     She currently gets around in her Hoveround. Feels much more secure then utilizing a walker at home. No falls since last office visit.      For continuity:   Given the patient's report of reduced pain and improved functional ability without adverse effects, it is reasonable to treat with narcotic medications. The terms of the opioid agreement as well as the potential risks and adverse effects of the patient's medication regimen were discussed in detail. This includes if applicable due to dosage of medication permission to discuss and coordinate care with other treatment providers relevant to the patients condition. The patient verbalized understanding.      Risks and side effects of chronic opioid therapy including but not limited to tolerance, dependence, constipation,  hyperalgesia, cognitive side effects, addiction and possible death due to overuse and or misuse were discussed. I also discussed that such medications when co-administered with other sedative agents including but not limited to alcohol, benzodiazepines, sedative hypnotics and illegal drugs could pose life threatening consequences including death. I also explained the impact that the administration of such medication has on a patient with obstructive sleep apnea and continued recommendations for use of apnea devices if ordered are prescribed by other physicians. In order to effectively and safely treat the pain, I also emphasized the importance of compliance with the treatment plan, as well as compliance with the terms of the opioid agreement, which was reviewed in detail. I explained the importance of being responsible with the medications and to take these only as prescribed, never in excess and never for reasons other than pain reduction. The patient was counseled on keeping the medications safe and locked away from children and other adults as well as disposal methods and options. The patient understood the risks and instructions.      I also discussed with the patient in detail that based on the clinical response to the opioid medications and improvements of activities of daily living, sleep, and work performance in light of compliance with the treatment plan we can continue this form of therapy for the above chronic pain. The goal and rationale used for current treatment with chronic opioid medication is to control the pain and alleviate disability induced by the chronic pain condition noted above after failures of other non-opioid and nonpharmacological modalities to treat the chronic pain and the symptoms associated with have failed. The patient understood the goals in terms of the above treatment plan and had no further questions prior to leaving the office today.      Of note, the above-mentioned  diagnoses/conditions and expected fluctuating nature of pain, and pain characteristic changes may lead to prolonged functional impairment requiring frequent and multiple reassessments with continued high level medical decision making. As noted, medication and medication management may require opiate therapy in excess of a routine less than 30 day medication requirement. The patient may require daily opiate therapy necessitating month-long prescription medication as noted above in order to perform activities of daily living and achieve acceptable quality of life with respect to their chronic pain condition for the foreseeable future. We monitor our patient's carefully through drug monitoring, medication counts, urine drug testing specific to their medication as well as a myriad of other substances and with frequent follow-ups with interval reassement of the chronic pain condition, its pathophysiology and prognosis.      The level of clinical decision making at this office visit is high due to high risks and complications including mortality and morbidity related to acute and chronic pain with respects to life, bodily function, and treatment. Risks and clinical decisions with respect to under treatment, failure to maintain adequate treatment, and/or overtreatment complications and outcomes were discussed with the patient with respect to their chronic pain conditions, interventional therapies, as well as the use of various medications including possible controlled/dangerous medications. The amount and complexity of reviewed data at this in subsequent office visits is high given patient's fluctuating clinical presentation, laboratory and radiographic reports, prescription monitoring program data, and medication history as well as other relevant data as noted above. Pertinent negatives and positives data was used in consideration for the above-mentioned high complexity.       Given the patient's total MED, general use of  daily opiates, or other coadministered medications in various classes the patient was offered a prescription for Narcan. I instructed the patient that it is important that patient fill this medication in order to demonstrate understanding of the gravity of possible side effects including respiratory depression and risk of overdose of this opiate load or medication combination. As such patient will be required to bring Narcan prescription to follow-up appointments as part of compliance with continued opiate care.      With respect to opiate induced constipation I discussed multiple ways to combat this problem including staying hydrated and taking over-the-counter medications such as Dulcolax, Miralax and Senna. If these treatments are not effective we could consider such medications as Amitiza, Linzess and Movantik.      Disclaimer: This note was transcribed using an audio transcription device. As such, minor errors may be present with regard to spelling, punctuation, and inadvertent word insertion. Please disregard such errors.       Review of Systems   Constitutional: Negative.    HENT: Negative.     Eyes: Negative.    Respiratory: Negative.     Cardiovascular: Negative.    Gastrointestinal: Negative.    Endocrine: Negative.    Genitourinary: Negative.    Musculoskeletal:  Positive for arthralgias, back pain, gait problem, joint swelling and myalgias. Negative for neck pain and neck stiffness.   Skin: Negative.    Allergic/Immunologic: Negative.    Neurological:  Positive for weakness and numbness. Negative for dizziness, tremors, seizures, syncope, facial asymmetry, speech difficulty, light-headedness and headaches.   Hematological: Negative.    Psychiatric/Behavioral: Negative.         Physical Exam  Vitals and nursing note reviewed.   Constitutional:       Appearance: Normal appearance.   HENT:      Head: Normocephalic and atraumatic.   Eyes:      Conjunctiva/sclera: Conjunctivae normal.   Cardiovascular:       Rate and Rhythm: Normal rate and regular rhythm.      Pulses: Normal pulses.      Heart sounds: Murmur heard.      Comments: Systolic murmur right upper sternal border 2-3 out of 6.  Pulmonary:      Effort: Pulmonary effort is normal.      Breath sounds: Rhonchi present.      Comments: Harsh cough clears rhonchi posterior bases.  Fair air exchange.  Abdominal:      General: Abdomen is flat. Bowel sounds are normal.      Palpations: Abdomen is soft.   Musculoskeletal:      Right lower leg: Edema present.      Left lower leg: Edema present.      Comments: Lymphedema lower extremities.  Lower extremities considered with venous stasis changes of swelling, hyperpigmentation dry scaly skin.  Noted no open wounds or sores to lower extremities.    Active range of motion of right and left knees limited secondary to pain and stiffness with extension and flexion.    Active range of motion of lumbar spine admitted secondary to pain with extension but not with flexion.    Presents in electric chair.   Skin:     General: Skin is warm and dry.      Capillary Refill: Capillary refill takes 2 to 3 seconds.   Neurological:      Mental Status: She is alert and oriented to person, place, and time.      Cranial Nerves: No cranial nerve deficit.      Sensory: No sensory deficit.      Motor: Weakness present.      Gait: Gait abnormal.   Psychiatric:         Behavior: Behavior normal.             Past Surgical History:   Procedure Laterality Date    KNEE SURGERY  09/19/2013    Knee Surgery Right    OTHER SURGICAL HISTORY  09/19/2013    Direct Laryngoscopy With Foreign Body Removal    OTHER SURGICAL HISTORY  09/19/2013    Laminectomy With Drainage Of Intramedullary Cyst    OTHER SURGICAL HISTORY  09/27/2013    Ovarian Cystectomy    TONSILLECTOMY  09/19/2013    Tonsillectomy    TUBAL LIGATION  09/19/2013    Tubal Ligation         Current Outpatient Medications:     ALPRAZolam (Xanax) 0.5 mg tablet, take 1/2 tablet (0.25 MILLIGRAMS) by mouth  twice a day if needed for anxiety, Disp: 30 tablet, Rfl: 0    amino acids-protein hydrolys (ProSource No Carb) 15-60 gram-kcal/30 mL liquid, Take by mouth., Disp: , Rfl:     ammonium lactate (Amlactin) 12 % cream, Apply 1 Film topically 2 times a day., Disp: , Rfl:     cholecalciferol (Vitamin D-3) 50 MCG (2000 UT) tablet, Take 1 tablet (2,000 Units) by mouth once daily., Disp: , Rfl:     cholestyramine (Questran) 4 gram packet, Take by mouth twice a day., Disp: , Rfl:     cyclobenzaprine (Flexeril) 10 mg tablet, Take 1 tablet (10 mg) by mouth 2 times a day as needed for muscle spasms., Disp: 30 tablet, Rfl: 2    dilTIAZem XR (Dilacor XR) 180 mg 24 hr capsule, Take 1 capsule (180 mg) by mouth once daily., Disp: 90 capsule, Rfl: 3    docusate sodium (Colace) 100 mg capsule, Take 1 capsule (100 mg) by mouth once daily., Disp: , Rfl:     DULoxetine (Cymbalta) 20 mg DR capsule, Take 1 capsule (20 mg) by mouth once daily., Disp: 90 capsule, Rfl: 1    fluticasone-umeclidin-vilanter (Trelegy Ellipta) 200-62.5-25 mcg blister with device, INHALE 1 PUFF BY MOUTH AND INTO THE LUNGS ONCE A DAY, Disp: 180 each, Rfl: 1    folic acid (Folvite) 1 mg tablet, Take 1 tablet (1 mg) by mouth once daily., Disp: , Rfl:     hydrocortisone 2.5 % cream, Insert 1 Applicatorful into the rectum., Disp: , Rfl:     hydrocortisone acetate 2.5 % cream with perineal applicator, twice a day., Disp: , Rfl:     hydroxyurea (Hydrea) 500 mg capsule, Take 2 capsules (1,000 mg total) by mouth once daily., Disp: 180 capsule, Rfl: 3    lidocaine 5 % gel, Apply 1 inch topically 3 times a day as needed (apply to affected area)., Disp: 100 g, Rfl: 2    lisinopril 20 mg tablet, take 1 tablet by mouth once daily  -DR REQUESTING APPOINTMENT, Disp: , Rfl:     naloxone (Narcan) 4 mg/0.1 mL nasal spray, Administer into affected nostril(s)., Disp: , Rfl:     nystatin (Mycostatin) cream, APPLY TO AFFECTED AREA(S) 2 TO 3 TIMES A DAY, Disp: 30 g, Rfl: 2    oxyCODONE  (Roxicodone) 10 mg immediate release tablet, Take 1 tablet (10 mg) by mouth every 4 hours if needed for severe pain (7 - 10) or moderate pain (4 - 6) for up to 28 days., Disp: 168 tablet, Rfl: 0    oxyCODONE (Roxicodone) 10 mg immediate release tablet, Take 1 tablet (10 mg) by mouth every 4 hours if needed for severe pain (7 - 10) or moderate pain (4 - 6) for up to 28 days. Do not start before January 16, 2024., Disp: 168 tablet, Rfl: 0    oxyCODONE (Roxicodone) 10 mg immediate release tablet, Take 1 tablet (10 mg) by mouth every 4 hours if needed for severe pain (7 - 10) or moderate pain (4 - 6) for up to 28 days. Do not start before February 13, 2024., Disp: 168 tablet, Rfl: 0    pramipexole (Mirapex) 1 mg tablet, Take 1 tablet (1 mg) by mouth 2 times a day., Disp: , Rfl:     predniSONE (Deltasone) 20 mg tablet, Take one tab po bid for 5 days (Patient not taking: Reported on 12/19/2023), Disp: 10 tablet, Rfl: 0    triamcinolone (Kenalog) 0.1 % cream, APPLY SPARINGLY AND MASSAGE IN ONCE A DAY, Disp: , Rfl:     Ventolin HFA 90 mcg/actuation inhaler, inhale 2 puffs by mouth and INTO THE LUNGS every 4 hours if needed for wheezing, Disp: 18 g, Rfl: 3    warfarin (Coumadin) 7.5 mg tablet, Take 1 tablet (7.5 mg) by mouth once daily in the evening., Disp: 30 tablet, Rfl: 3    Allergies   Allergen Reactions    Vancomycin Other    Macrobid [Nitrofurantoin Monohyd/M-Cryst] Dizziness    Sulfamethoxazole-Trimethoprim Unknown       Results/Data  Xray Bilateral Knee, 1 or 2 views [id number] 08:52AM Soren Martinez   ORDER REVISED TO A BILATERAL KNEE; 1 OR 2 VIEWS BY RADIOLOGIST; Original Order Number: [id number]      Test Name Result Flag Reference   Xray Bilateral Knee, 1 or 2 views (Report)       FINAL REPORT  Interpreted by: SCHOENBERGER, JOSEPH, A, MD   05/19/22 11:29  MRN: [id number]  Patient Name: LION PEREZ     STUDY:  BILATERAL KNEE; 1 OR 2 VIEWS; ; 5/17/2022 8:52 am     INDICATION:  AP WB bilat in one shot  (orthoball in plane w/ bone), PA 30 degree  flex WB bilat in one shot (no orthoball), LAT (orthoball in plane w/  bone), merchant view 30 degree flex bilat in one shot (no orthoball)  M25.561: Bilateral knee pain M25.562:.     COMPARISON:  None.     ACCESSION NUMBER(S):  [id number]     ORDERING CLINICIAN:  VANNESSA RAPHAEL     FINDINGS:  There is advanced tricompartmental degenerative change in the left  knee with bone-on-bone appearance of both the medial tibiofemoral and  lateral tibiofemoral compartments. There is slight lateral  subluxation of the tibia in like relation to the femur. There is no  fracture or dislocation.     There is advanced tricompartmental degenerative change in the right  knee. Bone-on-bone appearance of both the medial tibiofemoral and  lateral tibiofemoral compartments. Lateral subluxation of the tibia  in relation to the femoral condyles. No acute fracture or dislocation.     IMPRESSION:  Advanced degenerative change in both knees.        Electronically signed by: SCHOENBERGER, JOSEPH  05/19/22 11:29      CT L Spine without Contrast [id number] 11:49AM Non Ambulatory, Provider   Ordering Provider: LION ALMANZA [id number]      Test Name Result Flag Reference   CT L Spine without Contrast (Report)       Interpreted by: MADELAINE LANDERS  02/03/21 12:10  MRN: [id number]  Patient Name: LION PEREZ     STUDY:  CT L-SPINE WO CONTRAST 2/3/2021 11:49 am     INDICATION:  fall , severe back pain     COMPARISON:  None.     ACCESSION NUMBER(S):  [id number]     ORDERING CLINICIAN:  LION ALMANZA     TECHNIQUE:  Axial CT images of the lumbar spine are obtained. Axial, coronal and  sagittal reconstructions are provided for review.     FINDINGS:  There is disc space narrowing, vacuum disc phenomenon and moderate  anterior osteophytic spurring at T12-L1.  There is moderate anterior osteophytic spurring at L1-2.  There is mild anterior osteophytic spurring at L2-3 through L5-S1.     Alignment:  There is 5 mm anterior subluxation of L4 with respect to  L5.  There is a mild left convex lumbar scoliosis.     Vertebrae/Disc Spaces:  The vertebral body heights are intact. The  disc spaces are preserved.     Lower Thoracic Spine: There is no significant central canal stenosis  in the included lower thoracic region.     T12-L1: There is no significant central canal stenosis.     L1-2: There is no significant central canal or neural foraminal  stenosis.     L2-3: There is mild facet joint hypertrophy. There is no significant  central canal or neural foraminal stenosis.     L3-4: There is moderate facet joint hypertrophy. There is no  significant central canal or neural foraminal stenosis.     L4-5: There is moderate facet joint hypertrophy. There is no  significant central canal or neural foraminal stenosis.     L5-S1: There is mild facet joint hypertrophy. There is no  significant central canal stenosis.  There is a small right lateral disc bulge or herniation. This causes  no compression on the exiting right L4 nerve root.     Prevertebral/Paraspinal Soft Tissues: The prevertebral and paraspinal  soft tissues are unremarkable.     There is atherosclerotic calcification of the thoracic aorta.     IMPRESSION:  1. No acute bony abnormality.  2. Degenerative change.  3. No central canal stenosis or neural foraminal compromise at any  level.  4. Small right lateral disc bulge or herniation causing no  compression on the exiting right L4 nerve root.     Electronically signed by: MADELAINE LANDERS  02/03/21 12:10      MRI L Spine without Contrast [id number] 05:47PM Karla Artis   [Feb 28, 2017 11:15AM Chetan Chauhan]  AMA Intake Activity Log Entry by Chetan Chauhan ([email or web address]) on 2/28/2017 11:10 AM  Status Change: To Closed - Confirmed-Appt Past   [Feb 13, 2017 4:00PM Karla Artis]  Reason: Unspecified for MRI L Spine without Contrast      Test Name Result Flag Reference   MRI L Spine without  Contrast (Report)       Interpreted by: ALEAH GLOVER  02/16/17 07:03  MRN: [id number]  Patient Name: LION PEREZ     STUDY:  MRI L-SPINE WO; 2/15/2017 5:47 pm  MRI L-SPINE WO; 2/15/2017 5:47 pm     INDICATION:  Signs/Symptoms: Acute on chronic low back pain; increased low back  pain.  Signs/Symptoms: Acute on chronic low back pain; increased low back  pain. RIGHT ONE     COMPARISON:  1/2016.     ACCESSION NUMBER(S):  [id number]     ORDERING CLINICIAN:  DIGNA BARBOUR     TECHNIQUE:  The lumbar spine was studied in the sagital, axial and coronal planes  utiliing T1 and T2 weighted images.     FINDINGS:     The marrow signal and vertebral body height are normal. The conus and  sacrum are normal.  Images at each interspace reveal the following:  L1/L2  There is normal alignment and vertebral body height. The disc space  is normal. There is no evidence of canal or foraminal narrowing.  There is no evidence of bulging or herniated disc.  L2/L3  There is normal alignment and vertebral body height. The disc space  is normal. There is no evidence of canal or foraminal narrowing.  There is no evidence of bulging or herniated disc.  L3/L4  Slight progression of spondylolisthesis since the previous exam.  Interval development of focal herniated fragment in the midline  measuring approximately 4 mm in size on parasagittal T2 weighted  image 7/14. Flattening of the thecal sac which measures 9 mm in AP  dimension in the midline. Focal narrowing of the left lateral recess  and neural foramen.  L4/L5  Trace spondylolisthesis and facet hypertrophy. Bilateral facet  hypertrophy with retained fluid in the facet joint bilaterally as  well as re-demonstration of a small synovial cyst which narrows the  right lateral recess on axial T2 weighted image 34/48. No evidence of  disc herniation or measurable canal stenosis  L5/S1  Mild facet hypertrophy without canal or foraminal narrowing     IMPRESSION:  *Progressive  spondylolisthesis and central disc herniation at L3/L4  [place]AM[place] [facility] [place]  Transcribed by: [email or web address], User  Electronically signed by: [email or web address], User  02/16/17 07:03      Xray Knee 1 or 2 View [id number] 12:56PM Junaid Pettit   [Dec 16, 2015 12:47PM Junaid Pettit]  Reason: Unspecified for Xray Knee 1 or 2 View      Test Name Result Flag Reference   Xray Knee 1 or 2 View (Report)       Interpreted by: MARILYN COLE  12/16/15 14:53  STUDY: Bilateral knee dated 12/16/2015 12:56 PM.     INDICATION: Pain.     COMPARISON: None.     ACCESSION NUMBER(S): [id number], [id number].     ORDERING CLINICIAN: JUNAID PETTIT, [phone].     TECHNIQUE: 2 views of the bilateral knee.     FINDINGS: No fracture or dislocation evident. No joint effusion   evident. There is moderate bilateral patellofemoral and lateral   femorotibial and severe bilateral medial femorotibial joint space   degenerative change with note of a degree of lateral shift being of   the tibias with respect to the femurs greater on the right as   compared to the left. The soft tissues are grossly unremarkable.     IMPRESSION:     Degenerative change as discussed above without osseous injury evident.  Transcribed by: Alysia Matute  Electronically signed by: Giovani Secured [employer]  12/16/15 14:53           Active Ambulatory Problems     Diagnosis Date Noted    Neurogenic claudication due to lumbar spinal stenosis 03/10/2023    Folate-deficiency anemia 03/10/2023    Iron deficiency anemia 03/10/2023    Aortic stenosis, moderate 03/10/2023    Atrial dilatation, bilateral 03/10/2023    COPD (chronic obstructive pulmonary disease) (CMS/HCC) 03/10/2023    Chronic thromboembolic disease (CMS/[facility]) 03/10/2023    Chronic venous insufficiency 03/10/2023    Generalized anxiety disorder 03/10/2023    Mixed hyperlipidemia 03/10/2023    Primary hypertension 03/10/2023    Idiopathic insomnia 03/10/2023    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     Lymphedema 03/10/2023    Lower leg edema 03/10/2023    Chronic lumbar radiculopathy 03/10/2023    MDD (major depressive disorder), recurrent episode, moderate (CMS/HCC) 03/10/2023    Nonrheumatic mitral valve stenosis 03/10/2023    BE on CPAP 12/12/2011    Osteoarthritis of left knee 03/10/2023    Osteoarthritis of right knee 03/10/2023    Other constipation 03/10/2023    Pulmonary hypertension (CMS/HCC) 03/10/2023    Primary localized osteoarthritis of ankle, left 03/10/2023    Urge incontinence of urine 03/10/2023    Vitamin B12 deficiency 03/10/2023    Restless legs syndrome (RLS) 03/10/2023    Weakness of both lower extremities 03/10/2023    Elevated blood sugar 03/10/2023    Polycythemia 03/10/2023    Typical atrial flutter (CMS/[facility]) 08/17/2023    Class 3 severe obesity due to excess calories with serious comorbidity and body mass index (BMI) of 50.0 to 59.9 in adult (CMS/[facility]) 08/17/2023    Fatty liver 08/17/2023    Essential thrombocytosis (CMS/[facility]) 08/17/2023    Pain in joint, lower leg 03/31/2010    Low back pain 02/24/2010    Spinal stenosis, lumbar region, without neurogenic claudication 03/31/2010    Panic disorder without agoraphobia 07/06/2009    Depression 07/06/2009    Degeneration of lumbar or lumbosacral intervertebral disc 02/01/2010    Cavernous sinus thrombosis 05/10/2012    Asthma 04/22/2011    Arthritis of knee 02/01/2010     Resolved Ambulatory Problems     Diagnosis Date Noted    Abdominal pain 03/10/2023    Acute hip pain, left 03/10/2023    Acute viral conjunctivitis of left eye 03/10/2023    Anemia 03/10/2023    Cellulitis of leg without foot, right 03/10/2023    Heart murmur 03/10/2023    Hemoptysis 03/10/2023    History of colon cancer 03/10/2023    Hyperkalemia 03/10/2023    Lumbar disc herniation 03/10/2023    Lump, breast 03/10/2023    Malaise and fatigue 03/10/2023    Other acute sinusitis 03/10/2023    Palpitations 03/10/2023    Pruritus 03/10/2023    Right knee pain 03/10/2023     Shortness of breath 03/10/2023    Sinus tachycardia 03/10/2023    Submandibular gland hypertrophy 03/10/2023    Submandibular sialolithiasis 03/10/2023    Bone marrow disorder 08/17/2023    Alkaline phosphatase elevation 08/17/2023    Daily headache 08/17/2023    Elevated bilirubin 08/17/2023    Elevated transaminase level 08/17/2023    Fever 08/17/2023    Fever 08/17/2023     Past Medical History:   Diagnosis Date    Acute bronchitis due to other specified organisms 10/14/2019    Cellulitis of left lower limb 04/08/2021    Chronic obstructive pulmonary disease with (acute) exacerbation (CMS/[facility]) 01/09/2019    Chronic sinusitis, unspecified     COVID-19 11/28/2022    Morbid (severe) obesity due to excess calories (CMS/[facility])     Other conditions influencing health status     Other conditions influencing health status 03/12/2020    Other conditions influencing health status 03/06/2017    Other conditions influencing health status     Other conditions influencing health status     Other conditions influencing health status     Pain in unspecified ankle and joints of unspecified foot 06/24/2016    Pain in unspecified foot 12/08/2015    Pain in unspecified knee     Personal history of diseases of the skin and subcutaneous tissue     Personal history of other diseases of the female genital tract     Personal history of other diseases of the nervous system and sense organs 09/12/2019    Personal history of other diseases of the nervous system and sense organs 09/17/2015    Personal history of other diseases of the respiratory system     Personal history of other diseases of the respiratory system 10/17/2016    Personal history of other diseases of the respiratory system 11/29/2017    Personal history of other diseases of the respiratory system 11/16/2016    Personal history of other drug therapy 10/14/2016    Personal history of other infectious and parasitic diseases     Personal history of other infectious and parasitic  diseases 07/22/2020    Personal history of other medical treatment     Personal history of other specified conditions     Personal history of other specified conditions 01/28/2015    Personal history of other specified conditions     Personal history of pneumonia (recurrent)     Pneumonia, unspecified organism 01/11/2018    Postmenopausal bleeding 09/30/2014    Unilateral primary osteoarthritis, unspecified knee 02/03/2017    Unspecified acute conjunctivitis, bilateral 08/20/2020

## 2024-03-14 DIAGNOSIS — N30.00 ACUTE CYSTITIS WITHOUT HEMATURIA: Primary | ICD-10-CM

## 2024-03-14 RX ORDER — CIPROFLOXACIN 500 MG/1
500 TABLET ORAL 2 TIMES DAILY
Qty: 10 TABLET | Refills: 0 | Status: SHIPPED | OUTPATIENT
Start: 2024-03-14 | End: 2024-03-19

## 2024-03-19 DIAGNOSIS — J43.2 CENTRILOBULAR EMPHYSEMA (MULTI): ICD-10-CM

## 2024-03-20 ENCOUNTER — HOSPITAL ENCOUNTER (OUTPATIENT)
Dept: RADIOLOGY | Facility: HOSPITAL | Age: 64
Discharge: HOME | End: 2024-03-20
Payer: MEDICARE

## 2024-03-20 DIAGNOSIS — D50.9 IRON DEFICIENCY ANEMIA, UNSPECIFIED IRON DEFICIENCY ANEMIA TYPE: ICD-10-CM

## 2024-03-20 DIAGNOSIS — R05.2 SUBACUTE COUGH: ICD-10-CM

## 2024-03-20 PROCEDURE — 71250 CT THORAX DX C-: CPT

## 2024-03-20 PROCEDURE — 71250 CT THORAX DX C-: CPT | Performed by: RADIOLOGY

## 2024-03-20 RX ORDER — FLUTICASONE FUROATE, UMECLIDINIUM BROMIDE AND VILANTEROL TRIFENATATE 200; 62.5; 25 UG/1; UG/1; UG/1
POWDER RESPIRATORY (INHALATION)
Qty: 180 EACH | Refills: 1 | Status: SHIPPED | OUTPATIENT
Start: 2024-03-20

## 2024-04-19 DIAGNOSIS — F33.1 MDD (MAJOR DEPRESSIVE DISORDER), RECURRENT EPISODE, MODERATE (MULTI): ICD-10-CM

## 2024-04-19 DIAGNOSIS — F41.1 GENERALIZED ANXIETY DISORDER: ICD-10-CM

## 2024-04-19 RX ORDER — DULOXETIN HYDROCHLORIDE 20 MG/1
20 CAPSULE, DELAYED RELEASE ORAL DAILY
Qty: 90 CAPSULE | Refills: 1 | Status: SHIPPED | OUTPATIENT
Start: 2024-04-19

## 2024-05-06 ENCOUNTER — OFFICE VISIT (OUTPATIENT)
Dept: HEMATOLOGY/ONCOLOGY | Facility: CLINIC | Age: 64
End: 2024-05-06
Payer: COMMERCIAL

## 2024-05-06 VITALS
SYSTOLIC BLOOD PRESSURE: 158 MMHG | HEIGHT: 64 IN | TEMPERATURE: 97.5 F | HEART RATE: 86 BPM | DIASTOLIC BLOOD PRESSURE: 88 MMHG | RESPIRATION RATE: 16 BRPM | BODY MASS INDEX: 50.02 KG/M2 | WEIGHT: 293 LBS | OXYGEN SATURATION: 92 %

## 2024-05-06 DIAGNOSIS — D75.1 POLYCYTHEMIA: ICD-10-CM

## 2024-05-06 DIAGNOSIS — D50.9 IRON DEFICIENCY ANEMIA, UNSPECIFIED IRON DEFICIENCY ANEMIA TYPE: ICD-10-CM

## 2024-05-06 DIAGNOSIS — D47.3 ESSENTIAL THROMBOCYTOSIS (MULTI): Primary | ICD-10-CM

## 2024-05-06 LAB
ALBUMIN SERPL BCP-MCNC: 4.2 G/DL (ref 3.4–5)
ALP SERPL-CCNC: 153 U/L (ref 33–136)
ALT SERPL W P-5'-P-CCNC: 14 U/L (ref 7–45)
ANION GAP SERPL CALC-SCNC: 12 MMOL/L (ref 10–20)
AST SERPL W P-5'-P-CCNC: 23 U/L (ref 9–39)
BASOPHILS # BLD AUTO: 0.02 X10*3/UL (ref 0–0.1)
BASOPHILS NFR BLD AUTO: 0.2 %
BILIRUB SERPL-MCNC: 0.4 MG/DL (ref 0–1.2)
BUN SERPL-MCNC: 13 MG/DL (ref 6–23)
CALCIUM SERPL-MCNC: 9.2 MG/DL (ref 8.6–10.3)
CHLORIDE SERPL-SCNC: 104 MMOL/L (ref 98–107)
CO2 SERPL-SCNC: 27 MMOL/L (ref 21–32)
CREAT SERPL-MCNC: 0.73 MG/DL (ref 0.5–1.05)
EGFRCR SERPLBLD CKD-EPI 2021: >90 ML/MIN/1.73M*2
EOSINOPHIL # BLD AUTO: 0.11 X10*3/UL (ref 0–0.7)
EOSINOPHIL NFR BLD AUTO: 1.3 %
ERYTHROCYTE [DISTWIDTH] IN BLOOD BY AUTOMATED COUNT: 13.1 % (ref 11.5–14.5)
GLUCOSE SERPL-MCNC: 97 MG/DL (ref 74–99)
HCT VFR BLD AUTO: 46.4 % (ref 36–46)
HGB BLD-MCNC: 14.6 G/DL (ref 12–16)
IMM GRANULOCYTES # BLD AUTO: 0 X10*3/UL (ref 0–0.7)
IMM GRANULOCYTES NFR BLD AUTO: 0 % (ref 0–0.9)
LYMPHOCYTES # BLD AUTO: 1.93 X10*3/UL (ref 1.2–4.8)
LYMPHOCYTES NFR BLD AUTO: 23.4 %
MCH RBC QN AUTO: 35.2 PG (ref 26–34)
MCHC RBC AUTO-ENTMCNC: 31.5 G/DL (ref 32–36)
MCV RBC AUTO: 112 FL (ref 80–100)
MONOCYTES # BLD AUTO: 0.73 X10*3/UL (ref 0.1–1)
MONOCYTES NFR BLD AUTO: 8.8 %
NEUTROPHILS # BLD AUTO: 5.46 X10*3/UL (ref 1.2–7.7)
NEUTROPHILS NFR BLD AUTO: 66.3 %
PLATELET # BLD AUTO: 338 X10*3/UL (ref 150–450)
POTASSIUM SERPL-SCNC: 4.8 MMOL/L (ref 3.5–5.3)
PROT SERPL-MCNC: 6.9 G/DL (ref 6.4–8.2)
RBC # BLD AUTO: 4.15 X10*6/UL (ref 4–5.2)
SODIUM SERPL-SCNC: 138 MMOL/L (ref 136–145)
WBC # BLD AUTO: 8.3 X10*3/UL (ref 4.4–11.3)

## 2024-05-06 PROCEDURE — 99214 OFFICE O/P EST MOD 30 MIN: CPT | Performed by: PHYSICIAN ASSISTANT

## 2024-05-06 PROCEDURE — 3008F BODY MASS INDEX DOCD: CPT | Performed by: PHYSICIAN ASSISTANT

## 2024-05-06 PROCEDURE — 85025 COMPLETE CBC W/AUTO DIFF WBC: CPT | Performed by: PHYSICIAN ASSISTANT

## 2024-05-06 PROCEDURE — 36415 COLL VENOUS BLD VENIPUNCTURE: CPT | Performed by: PHYSICIAN ASSISTANT

## 2024-05-06 PROCEDURE — 1125F AMNT PAIN NOTED PAIN PRSNT: CPT | Performed by: PHYSICIAN ASSISTANT

## 2024-05-06 PROCEDURE — 3079F DIAST BP 80-89 MM HG: CPT | Performed by: PHYSICIAN ASSISTANT

## 2024-05-06 PROCEDURE — 1160F RVW MEDS BY RX/DR IN RCRD: CPT | Performed by: PHYSICIAN ASSISTANT

## 2024-05-06 PROCEDURE — 1159F MED LIST DOCD IN RCRD: CPT | Performed by: PHYSICIAN ASSISTANT

## 2024-05-06 PROCEDURE — 84075 ASSAY ALKALINE PHOSPHATASE: CPT | Performed by: PHYSICIAN ASSISTANT

## 2024-05-06 PROCEDURE — 3077F SYST BP >= 140 MM HG: CPT | Performed by: PHYSICIAN ASSISTANT

## 2024-05-06 RX ORDER — HYDROXYUREA 500 MG/1
1000 CAPSULE ORAL DAILY
Qty: 180 CAPSULE | Refills: 3 | Status: SHIPPED | OUTPATIENT
Start: 2024-05-06 | End: 2025-05-06

## 2024-05-06 ASSESSMENT — PAIN SCALES - GENERAL: PAINLEVEL: 6

## 2024-05-06 NOTE — PROGRESS NOTES
Patient Visit Information:   Visit Type: Follow Up Visit     Cancer History:   Treatment Synopsis:    Essential thrombocytosis, on Coumadin, aspirin, and hydroxyurea. 2. History of central venous sinus thromboses. 3. Tobacco abuse.     History of Present Illness:   Patient presents for follow up. She continues on Hydrea, tolerating well.  CPAP machine not working so she hasn't been using it. Continues to smoke but down to only a couple of cigarette a day. C/O intermittent left chest pain, relieved with food. Otherwise doing ok and denies any other complaints at this time.    Review of Systems:    All of the systems have been reviewed and are negative for complaints except what is stated in the HPI and/or past medical history.    Allergies and Intolerances:       Allergies:   vancomycin: Drug, Other (Severe), Active    Current Outpatient Medications on File Prior to Visit   Medication Sig Dispense Refill    ALPRAZolam (Xanax) 0.5 mg tablet take 1/2 tablet (0.25 MILLIGRAMS) by mouth twice a day if needed for anxiety 30 tablet 0    amino acids-protein hydrolys (ProSource No Carb) 15-60 gram-kcal/30 mL liquid Take by mouth.      ammonium lactate (Amlactin) 12 % cream Apply 1 Film topically 2 times a day.      cholecalciferol (Vitamin D-3) 50 MCG (2000 UT) tablet Take 1 tablet (2,000 Units) by mouth once daily.      cholestyramine (Questran) 4 gram packet Take by mouth twice a day.      cyclobenzaprine (Flexeril) 10 mg tablet Take 1 tablet (10 mg) by mouth 2 times a day as needed for muscle spasms. 30 tablet 2    docusate sodium (Colace) 100 mg capsule Take 1 capsule (100 mg) by mouth once daily.      DULoxetine (Cymbalta) 20 mg DR capsule TAKE 1 CAPSULE BY MOUTH ONCE DAILY 90 capsule 1    hydrocortisone 2.5 % cream Insert 1 Applicatorful into the rectum.      hydrocortisone acetate 2.5 % cream with perineal applicator twice a day.      hydroxyurea (Hydrea) 500 mg capsule Take 2 capsules (1,000 mg total) by mouth once  daily. 180 capsule 3    lidocaine 5 % gel Apply 1 inch topically 3 times a day as needed (apply to affected area). 100 g 2    lisinopril 20 mg tablet take 1 tablet by mouth once daily  -DR REQUESTING APPOINTMENT      naloxone (Narcan) 4 mg/0.1 mL nasal spray Administer into affected nostril(s).      nystatin (Mycostatin) cream APPLY TO AFFECTED AREA(S) 2 TO 3 TIMES A DAY 30 g 2    oxyCODONE (Roxicodone) 10 mg immediate release tablet Take 1 tablet (10 mg) by mouth every 4 hours if needed for moderate pain (4 - 6) for up to 28 days. Do not start before March 12, 2024. 168 tablet 0    oxyCODONE (Roxicodone) 10 mg immediate release tablet Take 1 tablet (10 mg) by mouth every 4 hours if needed for moderate pain (4 - 6) for up to 28 days. Do not start before April 9, 2024. 168 tablet 0    [START ON 5/7/2024] oxyCODONE (Roxicodone) 10 mg immediate release tablet Take 1 tablet (10 mg) by mouth every 4 hours if needed for moderate pain (4 - 6) for up to 28 days. Do not start before May 7, 2024. 168 tablet 0    pramipexole (Mirapex) 1 mg tablet Take 1 tablet (1 mg) by mouth 2 times a day.      predniSONE (Deltasone) 20 mg tablet Take one tab po bid for 5 days (Patient not taking: Reported on 12/19/2023) 10 tablet 0    Tiadylt  mg 24 hr capsule TAKE 1 CAPSULE BY MOUTH ONCE DAILY 90 capsule 1    Trelegy Ellipta 200-62.5-25 mcg blister with device INHALE 1 PUFF BY MOUTH AND IN TO LUNGS ONCE DAILY 180 each 1    triamcinolone (Kenalog) 0.1 % cream APPLY SPARINGLY AND MASSAGE IN ONCE A DAY      Ventolin HFA 90 mcg/actuation inhaler inhale 2 puffs by mouth and INTO THE LUNGS every 4 hours if needed for wheezing 18 g 3    warfarin (Coumadin) 7.5 mg tablet Take 1 tablet (7.5 mg) by mouth once daily in the evening. 30 tablet 3    [DISCONTINUED] folic acid (Folvite) 1 mg tablet Take 1 tablet (1 mg) by mouth once daily.       No current facility-administered medications on file prior to visit.     Visit Vitals  Ht 1.622 m (5'  "3.86\")   BMI 57.78 kg/m²   Smoking Status Some Days   BSA 2.62 m²     Physical Exam:   Constitutional: wheelchair bound, alert, awake and oriented, not in acute distress   HEENT: moist mucous membranes, normal nose   Neck: supple, no lymphadenopathy   EYES: PERRL, EOM intact, conjunctiva normal  Skin: no jaundice, rash or erythema  Neurological: AAOx3, no gross focal deficit   Psychiatric: normal mood and behavior   Lymph: no supraclavicular, axillary or inguinal LAD    Labs:  Lab Results   Component Value Date    WBC 8.3 05/06/2024    NEUTROABS 5.46 05/06/2024    IGABSOL 0.00 05/06/2024    LYMPHSABS 1.93 05/06/2024    MONOSABS 0.73 05/06/2024    EOSABS 0.11 05/06/2024    BASOSABS 0.02 05/06/2024    RBC 4.15 05/06/2024     (H) 05/06/2024    MCHC 31.5 (L) 05/06/2024    HGB 14.6 05/06/2024    HCT 46.4 (H) 05/06/2024     05/06/2024     No results found for: \"RETICCTPCT\"   Lab Results   Component Value Date    CREATININE 0.73 05/06/2024    BUN 13 05/06/2024    EGFR >90 05/06/2024     05/06/2024    K 4.8 05/06/2024     05/06/2024    CO2 27 05/06/2024      Lab Results   Component Value Date    ALT 14 05/06/2024    AST 23 05/06/2024     (H) 11/14/2022    ALKPHOS 153 (H) 05/06/2024    BILITOT 0.4 05/06/2024      Lab Results   Component Value Date    TSH 2.16 11/14/2022     Lab Results   Component Value Date    TSH 2.16 11/14/2022     Lab Results   Component Value Date    IRON 83 01/04/2024    TIBC 344 01/04/2024    FERRITIN 140 01/04/2024      Lab Results   Component Value Date    RNCKZVPD49 655 01/04/2024      Lab Results   Component Value Date    FOLATE >24.0 11/14/2022     Lab Results   Component Value Date    ROLLY NEGATIVE 06/19/2020    RF 10 06/19/2020    SEDRATE 36 (H) 06/19/2020      Lab Results   Component Value Date    CRP 1.84 (A) 06/19/2020      Lab Results   Component Value Date     09/27/2020     No images are attached to the encounter.    Narrative & Impression "   Interpreted By:  Corinne Perry,   STUDY:  CT CHEST WO IV CONTRAST;  3/20/2024 1:27 pm      INDICATION:  Signs/Symptoms:smoker, chest pain, cough.      COMPARISON:  11/25/2019      ACCESSION NUMBER(S):  [id number]      ORDERING CLINICIAN:  VIRGIE CABRAL      TECHNIQUE:  CT of the chest was performed. Sagittal and coronal reconstructions  were generated.  No intravenous contrast given for the examination.      FINDINGS:  Hilar, vessel, and solid organ evaluation limited without IV  contrast. Artifact related to overlying left upper extremity.      CHEST WALL AND LOWER NECK: No significant axillary lymphadenopathy.      MEDIASTINUM AND EVLIA:  Limited hilar assessment on unenhanced exam.  No significant mediastinal or hilar lymphadenopathy within limits of  unenhanced study.      HEART AND VESSELS:  Lack of IV contrast precludes vascular luminal  assessment. The heart is normal in size. No significant pericardial  effusion. Multifocal atherosclerotic calcifications including the  coronary arteries and mitral valve region.      LUNGS, PLEURA, LARGE AIRWAYS:  Trace apical cystic/emphysematous  changes. 10 mm right upper lobe nodule image 64/304 with minimal  surrounding micro nodularity. Few faint bandlike densities scattered  in each lung base. No significant pleural effusion. The central  airways are patent.      UPPER ABDOMEN:  No focal lesion in the included unenhanced upper  abdominal viscera.      BONES:  Scoliosis and degenerative changes of the visualized spine.  Small amount of fluid surrounding the left shoulder and proximal  humerus.      IMPRESSION:  10 mm nodule in the right upper lobe with minimal surrounding micro  nodularity could be infectious/inflammatory however neoplasm not  excluded. Recommend clinical correlation and further  evaluation/follow-up. See below.      Prominent atherosclerotic calcifications.      Nonspecific fluid around the left shoulder and proximal humerus.      MACRO:  Incidental  Finding:  A solid non-calcified pulmonary nodule measuring  greater than 8 mm.  (**-YCF-**)      Instructions:  Consider short term follow up non-contrast Chest CT at  3 months, PET/CT or tissue sampling. Note, PET/CT may be limited in  low grade malignancy, nodules <1 cm size or those located close to  diaphragm. (Bogdan Ferreira et al., Guidelines for management of  incidental pulmonary nodules detected on CT images: From the  Fleischner Society 2017, Radiology. 2017 Jul;284 (1):228-243.)  [id number]         Assessment:    ET, diagnosed via [facility] 30+ years ago, on hydrea for many years.     CMP w/elevated T bili and LFTs--now improved. Off Statin and On Cholestyramine. F/U w/PCP    currently on hydrea 2 tabs daily. Continue current dose and schedule, plts w/good response    Polycythemia,-stable, improving with trying to quit smoking and encourage to get new CPAP machine.    Due for c-scope 12/2024.    CT chest with 10 mm RUL nodularity; will repeat CT in 2 months     COPD/sleep apnea on CPAP and O2 at home.     continue warfarin for DVT.    H/O hep B, hep panel negative    RTC in 4 months     Patient verbalized understanding, and all her questions were answered to her satisfaction    30 min spent with patient greater than 50 % of which was spent in consultation and coordination of care.

## 2024-05-30 ENCOUNTER — OFFICE VISIT (OUTPATIENT)
Dept: PAIN MEDICINE | Facility: CLINIC | Age: 64
End: 2024-05-30
Payer: MEDICARE

## 2024-05-30 VITALS
RESPIRATION RATE: 17 BRPM | HEART RATE: 85 BPM | OXYGEN SATURATION: 98 % | SYSTOLIC BLOOD PRESSURE: 164 MMHG | DIASTOLIC BLOOD PRESSURE: 88 MMHG

## 2024-05-30 DIAGNOSIS — M54.16 CHRONIC LUMBAR RADICULOPATHY: ICD-10-CM

## 2024-05-30 DIAGNOSIS — M48.062 NEUROGENIC CLAUDICATION DUE TO LUMBAR SPINAL STENOSIS: ICD-10-CM

## 2024-05-30 DIAGNOSIS — M17.11 PRIMARY OSTEOARTHRITIS OF RIGHT KNEE: ICD-10-CM

## 2024-05-30 DIAGNOSIS — M17.12 PRIMARY OSTEOARTHRITIS OF LEFT KNEE: Primary | ICD-10-CM

## 2024-05-30 PROCEDURE — 99213 OFFICE O/P EST LOW 20 MIN: CPT | Performed by: NURSE PRACTITIONER

## 2024-05-30 PROCEDURE — 3008F BODY MASS INDEX DOCD: CPT | Performed by: NURSE PRACTITIONER

## 2024-05-30 PROCEDURE — 1125F AMNT PAIN NOTED PAIN PRSNT: CPT | Performed by: NURSE PRACTITIONER

## 2024-05-30 PROCEDURE — 3079F DIAST BP 80-89 MM HG: CPT | Performed by: NURSE PRACTITIONER

## 2024-05-30 PROCEDURE — 1159F MED LIST DOCD IN RCRD: CPT | Performed by: NURSE PRACTITIONER

## 2024-05-30 PROCEDURE — 3077F SYST BP >= 140 MM HG: CPT | Performed by: NURSE PRACTITIONER

## 2024-05-30 PROCEDURE — 1160F RVW MEDS BY RX/DR IN RCRD: CPT | Performed by: NURSE PRACTITIONER

## 2024-05-30 RX ORDER — CYCLOBENZAPRINE HCL 10 MG
10 TABLET ORAL 2 TIMES DAILY PRN
Qty: 30 TABLET | Refills: 2 | Status: SHIPPED | OUTPATIENT
Start: 2024-05-30 | End: 2024-08-28

## 2024-05-30 RX ORDER — OXYCODONE HYDROCHLORIDE 10 MG/1
10 TABLET ORAL EVERY 4 HOURS PRN
Qty: 168 TABLET | Refills: 0 | Status: SHIPPED | OUTPATIENT
Start: 2024-06-04 | End: 2024-07-02

## 2024-05-30 RX ORDER — OXYCODONE HYDROCHLORIDE 10 MG/1
10 TABLET ORAL EVERY 4 HOURS PRN
Qty: 168 TABLET | Refills: 0 | Status: SHIPPED | OUTPATIENT
Start: 2024-07-02 | End: 2024-07-30

## 2024-05-30 RX ORDER — OXYCODONE HYDROCHLORIDE 10 MG/1
10 TABLET ORAL EVERY 4 HOURS PRN
Qty: 168 TABLET | Refills: 0 | Status: SHIPPED | OUTPATIENT
Start: 2024-07-30 | End: 2024-08-27

## 2024-05-30 ASSESSMENT — ENCOUNTER SYMPTOMS
NECK PAIN: 0
SEIZURES: 0
OCCASIONAL FEELINGS OF UNSTEADINESS: 1
TREMORS: 0
CARDIOVASCULAR NEGATIVE: 1
HEADACHES: 0
ARTHRALGIAS: 1
HEMATOLOGIC/LYMPHATIC NEGATIVE: 1
PAIN: 1
LOSS OF SENSATION IN FEET: 0
PSYCHIATRIC NEGATIVE: 1
MYALGIAS: 1
WEAKNESS: 1
NUMBNESS: 1
FACIAL ASYMMETRY: 0
EYES NEGATIVE: 1
DIZZINESS: 0
ALLERGIC/IMMUNOLOGIC NEGATIVE: 1
ENDOCRINE NEGATIVE: 1
JOINT SWELLING: 1
RESPIRATORY NEGATIVE: 1
CONSTITUTIONAL NEGATIVE: 1
NECK STIFFNESS: 0
DEPRESSION: 1
SPEECH DIFFICULTY: 0
LIGHT-HEADEDNESS: 0
GASTROINTESTINAL NEGATIVE: 1
BACK PAIN: 1

## 2024-05-30 ASSESSMENT — PAIN SCALES - GENERAL: PAINLEVEL: 6

## 2024-05-30 NOTE — PROGRESS NOTES
Patient ID: Neisha Graham is a 64 y.o. female who presents for chronic pain management of low back pain and knee joint pain from arthritis.    Med Refill  Associated symptoms include arthralgias, joint swelling, myalgias, numbness and weakness. Pertinent negatives include no headaches or neck pain.   Pain  Associated symptoms include joint swelling and weakness. Pertinent negatives include no headaches.       Neisha follows up for refill of medications and interval reevaluation of her low back pain from lumbar stenosis, disc herniation and radiculitis of the lower extremities. Neisha also follows up for hip and knee pain from arthritis     Percocet 10 mg every 4 hours up to 6 per daily as needed and Flexeril 10 mg once to twice daily as needed help to decrease pain and improve function between 50 and 60%. Average pain score with medication is 6 out of 10. Senokot or other laxative as needed to help avoid constipation. Is able to manage her ADLs with improved function from medications. She reports of having a below average quality of family and social life with current condition and treatment.      Neisha is of polypharmacy and was targeting the medications of Flexeril, Xanax and hydroxyzine. To address Flexeril she takes this very as needed and not twice daily. I do not prescribe the Xanax or the hydroxyzine. She understands to space her pain medication with these other medications as mentioned above by 2 hours to avoid sedation, respiratory depression, risk of coma and death. She does have a Narcan on status sent 2024.     Toxicology consistent March 7, 2024.  Annual controlled substance agreement and opioid risk tool are completed and scanned into the chart March 7, 2024.     Neisha has chronic knee pain from osteoarthritis. Pain is increased with weightbearing activities. Right knee has more pain and decreased range of motion compared to the left knee. She cannot have surgery because of her BMI greater than 40. She  does have instability in the knees especially with weightbearing activity in standing and walking. Reports that the right knee can give out on her with prolonged standing. Has difficulty getting in and because of weakness of the knee and has decreased range of motion. Has tried hinged bracing to the knees. Bracing feels too heavy and hard to get around. She no longer wears them.     History of intra-articular knee joint injection steroid therapy has helped in the past from orthopedic surgeon. She does have several of these per year.     She currently gets around in her Hoveround. Feels much more secure then utilizing a walker at home. No falls since last office visit.      For continuity:   Given the patient's report of reduced pain and improved functional ability without adverse effects, it is reasonable to treat with narcotic medications. The terms of the opioid agreement as well as the potential risks and adverse effects of the patient's medication regimen were discussed in detail. This includes if applicable due to dosage of medication permission to discuss and coordinate care with other treatment providers relevant to the patients condition. The patient verbalized understanding.      Risks and side effects of chronic opioid therapy including but not limited to tolerance, dependence, constipation, hyperalgesia, cognitive side effects, addiction and possible death due to overuse and or misuse were discussed. I also discussed that such medications when co-administered with other sedative agents including but not limited to alcohol, benzodiazepines, sedative hypnotics and illegal drugs could pose life threatening consequences including death. I also explained the impact that the administration of such medication has on a patient with obstructive sleep apnea and continued recommendations for use of apnea devices if ordered are prescribed by other physicians. In order to effectively and safely treat the pain, I also  emphasized the importance of compliance with the treatment plan, as well as compliance with the terms of the opioid agreement, which was reviewed in detail. I explained the importance of being responsible with the medications and to take these only as prescribed, never in excess and never for reasons other than pain reduction. The patient was counseled on keeping the medications safe and locked away from children and other adults as well as disposal methods and options. The patient understood the risks and instructions.      I also discussed with the patient in detail that based on the clinical response to the opioid medications and improvements of activities of daily living, sleep, and work performance in light of compliance with the treatment plan we can continue this form of therapy for the above chronic pain. The goal and rationale used for current treatment with chronic opioid medication is to control the pain and alleviate disability induced by the chronic pain condition noted above after failures of other non-opioid and nonpharmacological modalities to treat the chronic pain and the symptoms associated with have failed. The patient understood the goals in terms of the above treatment plan and had no further questions prior to leaving the office today.      Of note, the above-mentioned diagnoses/conditions and expected fluctuating nature of pain, and pain characteristic changes may lead to prolonged functional impairment requiring frequent and multiple reassessments with continued high level medical decision making. As noted, medication and medication management may require opiate therapy in excess of a routine less than 30 day medication requirement. The patient may require daily opiate therapy necessitating month-long prescription medication as noted above in order to perform activities of daily living and achieve acceptable quality of life with respect to their chronic pain condition for the foreseeable  future. We monitor our patient's carefully through drug monitoring, medication counts, urine drug testing specific to their medication as well as a myriad of other substances and with frequent follow-ups with interval reassement of the chronic pain condition, its pathophysiology and prognosis.      The level of clinical decision making at this office visit is high due to high risks and complications including mortality and morbidity related to acute and chronic pain with respects to life, bodily function, and treatment. Risks and clinical decisions with respect to under treatment, failure to maintain adequate treatment, and/or overtreatment complications and outcomes were discussed with the patient with respect to their chronic pain conditions, interventional therapies, as well as the use of various medications including possible controlled/dangerous medications. The amount and complexity of reviewed data at this in subsequent office visits is high given patient's fluctuating clinical presentation, laboratory and radiographic reports, prescription monitoring program data, and medication history as well as other relevant data as noted above. Pertinent negatives and positives data was used in consideration for the above-mentioned high complexity.       Given the patient's total MED, general use of daily opiates, or other coadministered medications in various classes the patient was offered a prescription for Narcan. I instructed the patient that it is important that patient fill this medication in order to demonstrate understanding of the gravity of possible side effects including respiratory depression and risk of overdose of this opiate load or medication combination. As such patient will be required to bring Narcan prescription to follow-up appointments as part of compliance with continued opiate care.      With respect to opiate induced constipation I discussed multiple ways to combat this problem including  staying hydrated and taking over-the-counter medications such as Dulcolax, Miralax and Senna. If these treatments are not effective we could consider such medications as Amitiza, Linzess and Movantik.      Disclaimer: This note was transcribed using an audio transcription device. As such, minor errors may be present with regard to spelling, punctuation, and inadvertent word insertion. Please disregard such errors.       Review of Systems   Constitutional: Negative.    HENT: Negative.     Eyes: Negative.    Respiratory: Negative.     Cardiovascular: Negative.    Gastrointestinal: Negative.    Endocrine: Negative.    Genitourinary: Negative.    Musculoskeletal:  Positive for arthralgias, back pain, gait problem, joint swelling and myalgias. Negative for neck pain and neck stiffness.   Skin: Negative.    Allergic/Immunologic: Negative.    Neurological:  Positive for weakness and numbness. Negative for dizziness, tremors, seizures, syncope, facial asymmetry, speech difficulty, light-headedness and headaches.   Hematological: Negative.    Psychiatric/Behavioral: Negative.         Physical Exam  Vitals and nursing note reviewed.   Constitutional:       Appearance: Normal appearance.   HENT:      Head: Normocephalic and atraumatic.   Eyes:      Conjunctiva/sclera: Conjunctivae normal.   Cardiovascular:      Rate and Rhythm: Normal rate and regular rhythm.      Pulses: Normal pulses.      Heart sounds: Murmur heard.      Comments: Systolic murmur right upper sternal border 2-3 out of 6.  Pulmonary:      Effort: Pulmonary effort is normal.      Breath sounds: Rhonchi present.      Comments: Harsh cough clears rhonchi posterior bases.  Fair air exchange.  Abdominal:      General: Abdomen is flat. Bowel sounds are normal.      Palpations: Abdomen is soft.   Musculoskeletal:      Right lower leg: Edema present.      Left lower leg: Edema present.      Comments: Lymphedema lower extremities.  Lower extremities considered with  venous stasis changes of swelling, hyperpigmentation dry scaly skin.  Noted no open wounds or sores to lower extremities.    Active range of motion of right and left knees limited secondary to pain and stiffness with extension and flexion.    Active range of motion of lumbar spine admitted secondary to pain with extension but not with flexion.    Presents in electric chair.   Skin:     General: Skin is warm and dry.      Capillary Refill: Capillary refill takes 2 to 3 seconds.   Neurological:      Mental Status: She is alert and oriented to person, place, and time.      Cranial Nerves: No cranial nerve deficit.      Sensory: No sensory deficit.      Motor: Weakness present.      Gait: Gait abnormal.   Psychiatric:         Behavior: Behavior normal.     Results/Data  Xray Bilateral Knee, 1 or 2 views 17May2022 08:52AM Vannessa Raphael   ORDER REVISED TO A BILATERAL KNEE; 1 OR 2 VIEWS BY RADIOLOGIST; Original Order Number: [id number]      Test Name Result Flag Reference   Xray Bilateral Knee, 1 or 2 views (Report)       FINAL REPORT  Interpreted by: SCHOENBERGER, JOSEPH, A, MD   05/19/22 11:29  MRN: [id number]  Patient Name: LION PEREZ     STUDY:  BILATERAL KNEE; 1 OR 2 VIEWS; ; 5/17/2022 8:52 am     INDICATION:  AP WB bilat in one shot (orthoball in plane w/ bone), PA 30 degree  flex WB bilat in one shot (no orthoball), LAT (orthoball in plane w/  bone), merchant view 30 degree flex bilat in one shot (no orthoball)  M25.561: Bilateral knee pain M25.562:.     COMPARISON:  None.     ACCESSION NUMBER(S):  [id number]     ORDERING CLINICIAN:  VANNESSA RAPHAEL     FINDINGS:  There is advanced tricompartmental degenerative change in the left  knee with bone-on-bone appearance of both the medial tibiofemoral and  lateral tibiofemoral compartments. There is slight lateral  subluxation of the tibia in like relation to the femur. There is no  fracture or dislocation.     There is advanced tricompartmental degenerative change  in the right  knee. Bone-on-bone appearance of both the medial tibiofemoral and  lateral tibiofemoral compartments. Lateral subluxation of the tibia  in relation to the femoral condyles. No acute fracture or dislocation.     IMPRESSION:  Advanced degenerative change in both knees.        Electronically signed by: SCHOENBERGER, BHUPENDRA  05/19/22 11:29      CT L Spine without Contrast [id number] 11:49AM Non Ambulatory, Provider   Ordering Provider: LION ALMANZA [id number]      Test Name Result Flag Reference   CT L Spine without Contrast (Report)       Interpreted by: MADELAINE LANDERS  02/03/21 12:10  MRN: [id number]  Patient Name: LION PEREZ     STUDY:  CT L-SPINE WO CONTRAST 2/3/2021 11:49 am     INDICATION:  fall , severe back pain     COMPARISON:  None.     ACCESSION NUMBER(S):  [id number]     ORDERING CLINICIAN:  LION ALMANZA     TECHNIQUE:  Axial CT images of the lumbar spine are obtained. Axial, coronal and  sagittal reconstructions are provided for review.     FINDINGS:  There is disc space narrowing, vacuum disc phenomenon and moderate  anterior osteophytic spurring at T12-L1.  There is moderate anterior osteophytic spurring at L1-2.  There is mild anterior osteophytic spurring at L2-3 through L5-S1.     Alignment: There is 5 mm anterior subluxation of L4 with respect to  L5.  There is a mild left convex lumbar scoliosis.     Vertebrae/Disc Spaces:  The vertebral body heights are intact. The  disc spaces are preserved.     Lower Thoracic Spine: There is no significant central canal stenosis  in the included lower thoracic region.     T12-L1: There is no significant central canal stenosis.     L1-2: There is no significant central canal or neural foraminal  stenosis.     L2-3: There is mild facet joint hypertrophy. There is no significant  central canal or neural foraminal stenosis.     L3-4: There is moderate facet joint hypertrophy. There is no  significant central canal or neural foraminal  stenosis.     L4-5: There is moderate facet joint hypertrophy. There is no  significant central canal or neural foraminal stenosis.     L5-S1: There is mild facet joint hypertrophy. There is no  significant central canal stenosis.  There is a small right lateral disc bulge or herniation. This causes  no compression on the exiting right L4 nerve root.     Prevertebral/Paraspinal Soft Tissues: The prevertebral and paraspinal  soft tissues are unremarkable.     There is atherosclerotic calcification of the thoracic aorta.     IMPRESSION:  1. No acute bony abnormality.  2. Degenerative change.  3. No central canal stenosis or neural foraminal compromise at any  level.  4. Small right lateral disc bulge or herniation causing no  compression on the exiting right L4 nerve root.     Electronically signed by: MADELAINE LANDERS  02/03/21 12:10      MRI L Spine without Contrast [id number] 05:47PM Karla Artis   [Feb 28, 2017 11:15AM Chetan Chauhan]  AMA Intake Activity Log Entry by Chetan Chauhan ([email or web address]) on 2/28/2017 11:10 AM  Status Change: To Closed - Confirmed-Appt Past   [Feb 13, 2017 4:00PM Karla Artis]  Reason: Unspecified for MRI L Spine without Contrast      Test Name Result Flag Reference   MRI L Spine without Contrast (Report)       Interpreted by: ALEAH GLOVER  02/16/17 07:03  MRN: [id number]  Patient Name: LION PEREZ     STUDY:  MRI L-SPINE WO; 2/15/2017 5:47 pm  MRI L-SPINE WO; 2/15/2017 5:47 pm     INDICATION:  Signs/Symptoms: Acute on chronic low back pain; increased low back  pain.  Signs/Symptoms: Acute on chronic low back pain; increased low back  pain. RIGHT ONE     COMPARISON:  1/2016.     ACCESSION NUMBER(S):  [id number]     ORDERING CLINICIAN:  KARLA BARBOUR     TECHNIQUE:  The lumbar spine was studied in the sagital, axial and coronal planes  utiliing T1 and T2 weighted images.     FINDINGS:     The marrow signal and vertebral body height are normal. The conus  and  sacrum are normal.  Images at each interspace reveal the following:  L1/L2  There is normal alignment and vertebral body height. The disc space  is normal. There is no evidence of canal or foraminal narrowing.  There is no evidence of bulging or herniated disc.  L2/L3  There is normal alignment and vertebral body height. The disc space  is normal. There is no evidence of canal or foraminal narrowing.  There is no evidence of bulging or herniated disc.  L3/L4  Slight progression of spondylolisthesis since the previous exam.  Interval development of focal herniated fragment in the midline  measuring approximately 4 mm in size on parasagittal T2 weighted  image 7/14. Flattening of the thecal sac which measures 9 mm in AP  dimension in the midline. Focal narrowing of the left lateral recess  and neural foramen.  L4/L5  Trace spondylolisthesis and facet hypertrophy. Bilateral facet  hypertrophy with retained fluid in the facet joint bilaterally as  well as re-demonstration of a small synovial cyst which narrows the  right lateral recess on axial T2 weighted image 34/48. No evidence of  disc herniation or measurable canal stenosis  L5/S1  Mild facet hypertrophy without canal or foraminal narrowing     IMPRESSION:  *Progressive spondylolisthesis and central disc herniation at L3/L4  [place] [facility] [place]  Transcribed by: [email or web address], User  Electronically signed by: [email or web address], User  02/16/17 07:03      Xray Knee 1 or 2 View [id number] 12:56PM Junaid Pettit   [Dec 16, 2015 12:47PM Junaid Pettit]  Reason: Unspecified for Xray Knee 1 or 2 View      Test Name Result Flag Reference   Xray Knee 1 or 2 View (Report)       Interpreted by: MARILYN COLE  12/16/15 14:53  STUDY: Bilateral knee dated 12/16/2015 12:56 PM.     INDICATION: Pain.     COMPARISON: None.     ACCESSION NUMBER(S): [id number], [id number].     ORDERING CLINICIAN: JUNAID PETTIT, [phone].     TECHNIQUE: 2 views of the  bilateral knee.     FINDINGS: No fracture or dislocation evident. No joint effusion   evident. There is moderate bilateral patellofemoral and lateral   femorotibial and severe bilateral medial femorotibial joint space   degenerative change with note of a degree of lateral shift being of   the tibias with respect to the femurs greater on the right as   compared to the left. The soft tissues are grossly unremarkable.     IMPRESSION:     Degenerative change as discussed above without osseous injury evident.  Transcribed by: Giovani [employer]  Electronically signed by: Giovani UBIKODpaula  12/16/15 14:53      Neisha was seen today for med refill and pain.  Diagnoses and all orders for this visit:  Primary osteoarthritis of left knee (Primary)  -     oxyCODONE (Roxicodone) 10 mg immediate release tablet; Take 1 tablet (10 mg) by mouth every 4 hours if needed for moderate pain (4 - 6) for up to 28 days. Do not fill before July 2, 2024.  -     oxyCODONE (Roxicodone) 10 mg immediate release tablet; Take 1 tablet (10 mg) by mouth every 4 hours if needed for moderate pain (4 - 6) for up to 28 days. Do not fill before July 30, 2024.  -     cyclobenzaprine (Flexeril) 10 mg tablet; Take 1 tablet (10 mg) by mouth 2 times a day as needed for muscle spasms.  Primary osteoarthritis of right knee  -     oxyCODONE (Roxicodone) 10 mg immediate release tablet; Take 1 tablet (10 mg) by mouth every 4 hours if needed for moderate pain (4 - 6) for up to 28 days. Do not fill before July 2, 2024.  -     oxyCODONE (Roxicodone) 10 mg immediate release tablet; Take 1 tablet (10 mg) by mouth every 4 hours if needed for moderate pain (4 - 6) for up to 28 days. Do not fill before July 30, 2024.  -     cyclobenzaprine (Flexeril) 10 mg tablet; Take 1 tablet (10 mg) by mouth 2 times a day as needed for muscle spasms.  Chronic lumbar radiculopathy  -     oxyCODONE (Roxicodone) 10 mg immediate release tablet; Take 1 tablet (10 mg) by mouth every 4  hours if needed for moderate pain (4 - 6) for up to 28 days. Do not fill before June 4, 2024.  -     oxyCODONE (Roxicodone) 10 mg immediate release tablet; Take 1 tablet (10 mg) by mouth every 4 hours if needed for moderate pain (4 - 6) for up to 28 days. Do not fill before July 2, 2024.  -     oxyCODONE (Roxicodone) 10 mg immediate release tablet; Take 1 tablet (10 mg) by mouth every 4 hours if needed for moderate pain (4 - 6) for up to 28 days. Do not fill before July 30, 2024.  -     cyclobenzaprine (Flexeril) 10 mg tablet; Take 1 tablet (10 mg) by mouth 2 times a day as needed for muscle spasms.  Neurogenic claudication due to lumbar spinal stenosis  -     oxyCODONE (Roxicodone) 10 mg immediate release tablet; Take 1 tablet (10 mg) by mouth every 4 hours if needed for moderate pain (4 - 6) for up to 28 days. Do not fill before July 2, 2024.  -     oxyCODONE (Roxicodone) 10 mg immediate release tablet; Take 1 tablet (10 mg) by mouth every 4 hours if needed for moderate pain (4 - 6) for up to 28 days. Do not fill before July 30, 2024.  -     cyclobenzaprine (Flexeril) 10 mg tablet; Take 1 tablet (10 mg) by mouth 2 times a day as needed for muscle spasms.     Follow up in 12 weeks.

## 2024-06-11 DIAGNOSIS — I74.9 CHRONIC THROMBOEMBOLIC DISEASE (MULTI): ICD-10-CM

## 2024-06-11 RX ORDER — WARFARIN 7.5 MG/1
7.5 TABLET ORAL EVERY EVENING
Qty: 90 TABLET | Refills: 2 | Status: SHIPPED | OUTPATIENT
Start: 2024-06-11

## 2024-06-24 DIAGNOSIS — F41.1 GENERALIZED ANXIETY DISORDER: ICD-10-CM

## 2024-06-24 RX ORDER — ALPRAZOLAM 0.5 MG/1
TABLET ORAL
Qty: 30 TABLET | Refills: 0 | Status: SHIPPED | OUTPATIENT
Start: 2024-06-24

## 2024-06-28 DIAGNOSIS — J43.2 CENTRILOBULAR EMPHYSEMA (MULTI): ICD-10-CM

## 2024-06-28 RX ORDER — ALBUTEROL SULFATE 90 UG/1
2 AEROSOL, METERED RESPIRATORY (INHALATION) EVERY 4 HOURS PRN
Qty: 18 G | Refills: 3 | Status: SHIPPED | OUTPATIENT
Start: 2024-06-28

## 2024-07-02 DIAGNOSIS — M17.12 PRIMARY OSTEOARTHRITIS OF LEFT KNEE: ICD-10-CM

## 2024-07-02 RX ORDER — PREDNISONE 20 MG/1
TABLET ORAL
Qty: 10 TABLET | Refills: 0 | Status: SHIPPED | OUTPATIENT
Start: 2024-07-02

## 2024-07-08 ENCOUNTER — HOSPITAL ENCOUNTER (OUTPATIENT)
Dept: RADIOLOGY | Facility: HOSPITAL | Age: 64
Discharge: HOME | End: 2024-07-08
Payer: MEDICARE

## 2024-07-08 DIAGNOSIS — D47.3 ESSENTIAL THROMBOCYTOSIS (MULTI): ICD-10-CM

## 2024-07-08 DIAGNOSIS — D50.9 IRON DEFICIENCY ANEMIA, UNSPECIFIED IRON DEFICIENCY ANEMIA TYPE: ICD-10-CM

## 2024-07-08 PROCEDURE — 71250 CT THORAX DX C-: CPT | Performed by: RADIOLOGY

## 2024-07-08 PROCEDURE — 71250 CT THORAX DX C-: CPT

## 2024-07-09 DIAGNOSIS — D47.3 ESSENTIAL THROMBOCYTOSIS (MULTI): Primary | ICD-10-CM

## 2024-07-09 RX ORDER — ENOXAPARIN SODIUM 150 MG/ML
150 INJECTION SUBCUTANEOUS 2 TIMES DAILY
Qty: 14 EACH | Refills: 1 | Status: SHIPPED | OUTPATIENT
Start: 2024-07-09

## 2024-07-24 DIAGNOSIS — S81.802A WOUND OF LEFT LOWER EXTREMITY, INITIAL ENCOUNTER: Primary | ICD-10-CM

## 2024-07-24 RX ORDER — CEFUROXIME AXETIL 250 MG/1
250 TABLET ORAL 2 TIMES DAILY
Qty: 20 TABLET | Refills: 0 | Status: SHIPPED | OUTPATIENT
Start: 2024-07-24 | End: 2024-08-03

## 2024-08-01 ENCOUNTER — OFFICE VISIT (OUTPATIENT)
Dept: WOUND CARE | Facility: HOSPITAL | Age: 64
End: 2024-08-01
Payer: MEDICARE

## 2024-08-01 DIAGNOSIS — L97.909 ATHEROSCLEROSIS OF ARTERY OF EXTREMITY WITH ULCERATION (MULTI): Primary | ICD-10-CM

## 2024-08-01 DIAGNOSIS — I70.299 ATHEROSCLEROSIS OF ARTERY OF EXTREMITY WITH ULCERATION (MULTI): Primary | ICD-10-CM

## 2024-08-01 DIAGNOSIS — I70.242 ATHEROSCLEROSIS OF NATIVE ARTERY OF LEFT LOWER EXTREMITY WITH ULCERATION OF CALF (MULTI): ICD-10-CM

## 2024-08-01 DIAGNOSIS — B37.2 CANDIDAL INTERTRIGO: ICD-10-CM

## 2024-08-01 PROCEDURE — 11042 DBRDMT SUBQ TIS 1ST 20SQCM/<: CPT

## 2024-08-01 PROCEDURE — 99213 OFFICE O/P EST LOW 20 MIN: CPT | Mod: 25

## 2024-08-01 RX ORDER — NYSTATIN 100000 [USP'U]/G
1 POWDER TOPICAL 2 TIMES DAILY
Status: SHIPPED | OUTPATIENT
Start: 2024-08-01 | End: 2024-08-31

## 2024-08-05 ENCOUNTER — TELEPHONE (OUTPATIENT)
Dept: PRIMARY CARE | Facility: CLINIC | Age: 64
End: 2024-08-05
Payer: COMMERCIAL

## 2024-08-05 DIAGNOSIS — B35.4 TINEA CORPORIS: Primary | ICD-10-CM

## 2024-08-05 RX ORDER — CLOTRIMAZOLE 1 %
CREAM (GRAM) TOPICAL 2 TIMES DAILY
Qty: 60 G | Refills: 1 | Status: SHIPPED | OUTPATIENT
Start: 2024-08-05 | End: 2024-12-03

## 2024-08-05 RX ORDER — FLUCONAZOLE 150 MG/1
150 TABLET ORAL ONCE
Qty: 1 TABLET | Refills: 0 | Status: SHIPPED | OUTPATIENT
Start: 2024-08-05 | End: 2024-08-05

## 2024-08-05 NOTE — TELEPHONE ENCOUNTER
Romy from [facility] calling they are taking care of her legs  She has yeast infection under her knee's breast's   Cn you please call her in an oral and a cream for this to [facility]

## 2024-08-08 ENCOUNTER — APPOINTMENT (OUTPATIENT)
Dept: WOUND CARE | Facility: HOSPITAL | Age: 64
End: 2024-08-08
Payer: COMMERCIAL

## 2024-08-09 DIAGNOSIS — M54.16 CHRONIC LUMBAR RADICULOPATHY: ICD-10-CM

## 2024-08-09 RX ORDER — PREGABALIN 25 MG/1
25 CAPSULE ORAL 3 TIMES DAILY
Qty: 90 CAPSULE | Refills: 0 | Status: SHIPPED | OUTPATIENT
Start: 2024-08-09 | End: 2024-09-08

## 2024-08-09 NOTE — TELEPHONE ENCOUNTER
Pt called requesting to restart lyrica. She tapered off over a year ago because her nerve pain was improved. She is having a reoccurrence of leg wounds and nerve pain has returned. She had 100mg caps at home. Advised since she has been off of it she should not start at that dose. Will review with provider and request RX for 25mg dose. She does have a follow up appt next week

## 2024-08-12 ENCOUNTER — HOSPITAL ENCOUNTER (INPATIENT)
Facility: HOSPITAL | Age: 64
LOS: 1 days | Discharge: HOME HEALTH | End: 2024-08-15
Attending: STUDENT IN AN ORGANIZED HEALTH CARE EDUCATION/TRAINING PROGRAM | Admitting: STUDENT IN AN ORGANIZED HEALTH CARE EDUCATION/TRAINING PROGRAM
Payer: MEDICARE

## 2024-08-12 DIAGNOSIS — L03.119 CELLULITIS OF LOWER EXTREMITY, UNSPECIFIED LATERALITY: ICD-10-CM

## 2024-08-12 DIAGNOSIS — B37.2 CANDIDAL INTERTRIGO: ICD-10-CM

## 2024-08-12 DIAGNOSIS — L03.90 CELLULITIS, UNSPECIFIED CELLULITIS SITE: Primary | ICD-10-CM

## 2024-08-12 DIAGNOSIS — I15.9 SECONDARY HYPERTENSION: ICD-10-CM

## 2024-08-12 DIAGNOSIS — I74.9 CHRONIC THROMBOEMBOLIC DISEASE (MULTI): ICD-10-CM

## 2024-08-12 DIAGNOSIS — R79.1 SUPRATHERAPEUTIC INR: ICD-10-CM

## 2024-08-12 DIAGNOSIS — E87.5 HYPERKALEMIA: ICD-10-CM

## 2024-08-12 LAB
ALBUMIN SERPL BCP-MCNC: 3.7 G/DL (ref 3.4–5)
ALP SERPL-CCNC: 206 U/L (ref 33–136)
ALT SERPL W P-5'-P-CCNC: 19 U/L (ref 7–45)
ANION GAP SERPL CALC-SCNC: 13 MMOL/L (ref 10–20)
AST SERPL W P-5'-P-CCNC: 22 U/L (ref 9–39)
BASOPHILS # BLD AUTO: 0.05 X10*3/UL (ref 0–0.1)
BASOPHILS NFR BLD AUTO: 0.5 %
BILIRUB SERPL-MCNC: 0.4 MG/DL (ref 0–1.2)
BNP SERPL-MCNC: 49 PG/ML (ref 0–99)
BUN SERPL-MCNC: 35 MG/DL (ref 6–23)
CALCIUM SERPL-MCNC: 9.1 MG/DL (ref 8.6–10.3)
CHLORIDE SERPL-SCNC: 98 MMOL/L (ref 98–107)
CO2 SERPL-SCNC: 26 MMOL/L (ref 21–32)
CREAT SERPL-MCNC: 1.03 MG/DL (ref 0.5–1.05)
EGFRCR SERPLBLD CKD-EPI 2021: 60 ML/MIN/1.73M*2
EOSINOPHIL # BLD AUTO: 0.13 X10*3/UL (ref 0–0.7)
EOSINOPHIL NFR BLD AUTO: 1.2 %
ERYTHROCYTE [DISTWIDTH] IN BLOOD BY AUTOMATED COUNT: 13.1 % (ref 11.5–14.5)
GLUCOSE SERPL-MCNC: 126 MG/DL (ref 74–99)
HCT VFR BLD AUTO: 48.3 % (ref 36–46)
HGB BLD-MCNC: 16.6 G/DL (ref 12–16)
IMM GRANULOCYTES # BLD AUTO: 0.1 X10*3/UL (ref 0–0.7)
IMM GRANULOCYTES NFR BLD AUTO: 0.9 % (ref 0–0.9)
INR PPP: 5.9 (ref 0.9–1.1)
LACTATE SERPL-SCNC: 1.4 MMOL/L (ref 0.4–2)
LYMPHOCYTES # BLD AUTO: 1.62 X10*3/UL (ref 1.2–4.8)
LYMPHOCYTES NFR BLD AUTO: 14.9 %
MAGNESIUM SERPL-MCNC: 2.29 MG/DL (ref 1.6–2.4)
MCH RBC QN AUTO: 35 PG (ref 26–34)
MCHC RBC AUTO-ENTMCNC: 34.4 G/DL (ref 32–36)
MCV RBC AUTO: 102 FL (ref 80–100)
MONOCYTES # BLD AUTO: 1.59 X10*3/UL (ref 0.1–1)
MONOCYTES NFR BLD AUTO: 14.6 %
NEUTROPHILS # BLD AUTO: 7.41 X10*3/UL (ref 1.2–7.7)
NEUTROPHILS NFR BLD AUTO: 67.9 %
NRBC BLD-RTO: 0 /100 WBCS (ref 0–0)
PLATELET # BLD AUTO: 544 X10*3/UL (ref 150–450)
POTASSIUM SERPL-SCNC: 5.9 MMOL/L (ref 3.5–5.3)
PROT SERPL-MCNC: 6.8 G/DL (ref 6.4–8.2)
PROTHROMBIN TIME: 67.8 SECONDS (ref 9.8–12.8)
RBC # BLD AUTO: 4.74 X10*6/UL (ref 4–5.2)
SODIUM SERPL-SCNC: 131 MMOL/L (ref 136–145)
WBC # BLD AUTO: 10.9 X10*3/UL (ref 4.4–11.3)

## 2024-08-12 PROCEDURE — 2500000004 HC RX 250 GENERAL PHARMACY W/ HCPCS (ALT 636 FOR OP/ED): Performed by: STUDENT IN AN ORGANIZED HEALTH CARE EDUCATION/TRAINING PROGRAM

## 2024-08-12 PROCEDURE — 86140 C-REACTIVE PROTEIN: CPT | Performed by: NURSE PRACTITIONER

## 2024-08-12 PROCEDURE — 83735 ASSAY OF MAGNESIUM: CPT | Performed by: HEALTH CARE PROVIDER

## 2024-08-12 PROCEDURE — 87040 BLOOD CULTURE FOR BACTERIA: CPT | Mod: GEALAB | Performed by: HEALTH CARE PROVIDER

## 2024-08-12 PROCEDURE — 85610 PROTHROMBIN TIME: CPT | Performed by: HEALTH CARE PROVIDER

## 2024-08-12 PROCEDURE — 94640 AIRWAY INHALATION TREATMENT: CPT

## 2024-08-12 PROCEDURE — 81001 URINALYSIS AUTO W/SCOPE: CPT | Performed by: HEALTH CARE PROVIDER

## 2024-08-12 PROCEDURE — 36415 COLL VENOUS BLD VENIPUNCTURE: CPT | Performed by: HEALTH CARE PROVIDER

## 2024-08-12 PROCEDURE — 83605 ASSAY OF LACTIC ACID: CPT | Performed by: HEALTH CARE PROVIDER

## 2024-08-12 PROCEDURE — 99285 EMERGENCY DEPT VISIT HI MDM: CPT | Mod: 25

## 2024-08-12 PROCEDURE — 85025 COMPLETE CBC W/AUTO DIFF WBC: CPT | Performed by: HEALTH CARE PROVIDER

## 2024-08-12 PROCEDURE — 80053 COMPREHEN METABOLIC PANEL: CPT | Performed by: HEALTH CARE PROVIDER

## 2024-08-12 PROCEDURE — 83880 ASSAY OF NATRIURETIC PEPTIDE: CPT | Performed by: STUDENT IN AN ORGANIZED HEALTH CARE EDUCATION/TRAINING PROGRAM

## 2024-08-12 PROCEDURE — 96365 THER/PROPH/DIAG IV INF INIT: CPT

## 2024-08-12 PROCEDURE — 84132 ASSAY OF SERUM POTASSIUM: CPT | Performed by: STUDENT IN AN ORGANIZED HEALTH CARE EDUCATION/TRAINING PROGRAM

## 2024-08-12 ASSESSMENT — COLUMBIA-SUICIDE SEVERITY RATING SCALE - C-SSRS
6. HAVE YOU EVER DONE ANYTHING, STARTED TO DO ANYTHING, OR PREPARED TO DO ANYTHING TO END YOUR LIFE?: NO
1. IN THE PAST MONTH, HAVE YOU WISHED YOU WERE DEAD OR WISHED YOU COULD GO TO SLEEP AND NOT WAKE UP?: NO
2. HAVE YOU ACTUALLY HAD ANY THOUGHTS OF KILLING YOURSELF?: NO

## 2024-08-12 ASSESSMENT — LIFESTYLE VARIABLES
EVER FELT BAD OR GUILTY ABOUT YOUR DRINKING: NO
EVER HAD A DRINK FIRST THING IN THE MORNING TO STEADY YOUR NERVES TO GET RID OF A HANGOVER: NO
TOTAL SCORE: 0
HAVE PEOPLE ANNOYED YOU BY CRITICIZING YOUR DRINKING: NO
HAVE YOU EVER FELT YOU SHOULD CUT DOWN ON YOUR DRINKING: NO

## 2024-08-12 ASSESSMENT — PAIN SCALES - GENERAL: PAINLEVEL_OUTOF10: 5 - MODERATE PAIN

## 2024-08-12 ASSESSMENT — PAIN DESCRIPTION - LOCATION: LOCATION: LEG

## 2024-08-12 ASSESSMENT — PAIN DESCRIPTION - ORIENTATION: ORIENTATION: RIGHT;LEFT

## 2024-08-12 ASSESSMENT — PAIN - FUNCTIONAL ASSESSMENT: PAIN_FUNCTIONAL_ASSESSMENT: 0-10

## 2024-08-12 NOTE — ED TRIAGE NOTES
BIBS ( [facility] ) for Bilateral leg swellig and wounds.  Has been under [place] for wound s for several months.  Has C coming n to do dressing changes.

## 2024-08-13 ENCOUNTER — APPOINTMENT (OUTPATIENT)
Dept: CARDIOLOGY | Facility: HOSPITAL | Age: 64
End: 2024-08-13
Payer: MEDICARE

## 2024-08-13 PROBLEM — L03.90 CELLULITIS: Status: ACTIVE | Noted: 2024-08-13

## 2024-08-13 LAB
ANION GAP SERPL CALC-SCNC: 12 MMOL/L (ref 10–20)
APPEARANCE UR: CLEAR
BACTERIA #/AREA URNS AUTO: ABNORMAL /HPF
BILIRUB UR STRIP.AUTO-MCNC: NEGATIVE MG/DL
BUN SERPL-MCNC: 27 MG/DL (ref 6–23)
CALCIUM SERPL-MCNC: 8.6 MG/DL (ref 8.6–10.3)
CHLORIDE SERPL-SCNC: 100 MMOL/L (ref 98–107)
CO2 SERPL-SCNC: 25 MMOL/L (ref 21–32)
COLOR UR: YELLOW
CREAT SERPL-MCNC: 0.84 MG/DL (ref 0.5–1.05)
CRP SERPL-MCNC: 7.94 MG/DL
EGFRCR SERPLBLD CKD-EPI 2021: 77 ML/MIN/1.73M*2
ERYTHROCYTE [DISTWIDTH] IN BLOOD BY AUTOMATED COUNT: 12.9 % (ref 11.5–14.5)
EST. AVERAGE GLUCOSE BLD GHB EST-MCNC: 123 MG/DL
GLUCOSE BLD MANUAL STRIP-MCNC: 114 MG/DL (ref 74–99)
GLUCOSE BLD MANUAL STRIP-MCNC: 151 MG/DL (ref 74–99)
GLUCOSE SERPL-MCNC: 149 MG/DL (ref 74–99)
GLUCOSE UR STRIP.AUTO-MCNC: NORMAL MG/DL
HBA1C MFR BLD: 5.9 %
HCT VFR BLD AUTO: 49.2 % (ref 36–46)
HGB BLD-MCNC: 15.8 G/DL (ref 12–16)
HOLD SPECIMEN: NORMAL
HOLD SPECIMEN: NORMAL
HYALINE CASTS #/AREA URNS AUTO: ABNORMAL /LPF
INR PPP: 5.5 (ref 0.9–1.1)
KETONES UR STRIP.AUTO-MCNC: NEGATIVE MG/DL
LEUKOCYTE ESTERASE UR QL STRIP.AUTO: NEGATIVE
MCH RBC QN AUTO: 34.1 PG (ref 26–34)
MCHC RBC AUTO-ENTMCNC: 32.1 G/DL (ref 32–36)
MCV RBC AUTO: 106 FL (ref 80–100)
NITRITE UR QL STRIP.AUTO: NEGATIVE
NRBC BLD-RTO: 0 /100 WBCS (ref 0–0)
PH UR STRIP.AUTO: 5.5 [PH]
PLATELET # BLD AUTO: 478 X10*3/UL (ref 150–450)
POTASSIUM SERPL-SCNC: 5.2 MMOL/L (ref 3.5–5.3)
POTASSIUM SERPL-SCNC: 5.8 MMOL/L (ref 3.5–5.3)
PROT UR STRIP.AUTO-MCNC: NORMAL MG/DL
PROTHROMBIN TIME: 63.5 SECONDS (ref 9.8–12.8)
RBC # BLD AUTO: 4.64 X10*6/UL (ref 4–5.2)
RBC # UR STRIP.AUTO: NEGATIVE /UL
RBC #/AREA URNS AUTO: ABNORMAL /HPF
SODIUM SERPL-SCNC: 132 MMOL/L (ref 136–145)
SP GR UR STRIP.AUTO: 1.02
SQUAMOUS #/AREA URNS AUTO: ABNORMAL /HPF
UROBILINOGEN UR STRIP.AUTO-MCNC: NORMAL MG/DL
WBC # BLD AUTO: 9.5 X10*3/UL (ref 4.4–11.3)
WBC #/AREA URNS AUTO: ABNORMAL /HPF

## 2024-08-13 PROCEDURE — 99223 1ST HOSP IP/OBS HIGH 75: CPT | Performed by: INTERNAL MEDICINE

## 2024-08-13 PROCEDURE — 93005 ELECTROCARDIOGRAM TRACING: CPT

## 2024-08-13 PROCEDURE — 2500000001 HC RX 250 WO HCPCS SELF ADMINISTERED DRUGS (ALT 637 FOR MEDICARE OP): Performed by: INTERNAL MEDICINE

## 2024-08-13 PROCEDURE — 2500000004 HC RX 250 GENERAL PHARMACY W/ HCPCS (ALT 636 FOR OP/ED): Performed by: STUDENT IN AN ORGANIZED HEALTH CARE EDUCATION/TRAINING PROGRAM

## 2024-08-13 PROCEDURE — 2500000004 HC RX 250 GENERAL PHARMACY W/ HCPCS (ALT 636 FOR OP/ED): Performed by: INTERNAL MEDICINE

## 2024-08-13 PROCEDURE — 2500000002 HC RX 250 W HCPCS SELF ADMINISTERED DRUGS (ALT 637 FOR MEDICARE OP, ALT 636 FOR OP/ED): Performed by: NURSE PRACTITIONER

## 2024-08-13 PROCEDURE — G0378 HOSPITAL OBSERVATION PER HR: HCPCS

## 2024-08-13 PROCEDURE — 2500000004 HC RX 250 GENERAL PHARMACY W/ HCPCS (ALT 636 FOR OP/ED): Mod: JZ | Performed by: STUDENT IN AN ORGANIZED HEALTH CARE EDUCATION/TRAINING PROGRAM

## 2024-08-13 PROCEDURE — 2500000004 HC RX 250 GENERAL PHARMACY W/ HCPCS (ALT 636 FOR OP/ED): Performed by: NURSE PRACTITIONER

## 2024-08-13 PROCEDURE — 2500000002 HC RX 250 W HCPCS SELF ADMINISTERED DRUGS (ALT 637 FOR MEDICARE OP, ALT 636 FOR OP/ED): Performed by: STUDENT IN AN ORGANIZED HEALTH CARE EDUCATION/TRAINING PROGRAM

## 2024-08-13 PROCEDURE — 82947 ASSAY GLUCOSE BLOOD QUANT: CPT

## 2024-08-13 PROCEDURE — 94760 N-INVAS EAR/PLS OXIMETRY 1: CPT

## 2024-08-13 PROCEDURE — 36415 COLL VENOUS BLD VENIPUNCTURE: CPT | Performed by: NURSE PRACTITIONER

## 2024-08-13 PROCEDURE — 2500000005 HC RX 250 GENERAL PHARMACY W/O HCPCS: Performed by: STUDENT IN AN ORGANIZED HEALTH CARE EDUCATION/TRAINING PROGRAM

## 2024-08-13 PROCEDURE — 80048 BASIC METABOLIC PNL TOTAL CA: CPT | Performed by: NURSE PRACTITIONER

## 2024-08-13 PROCEDURE — 87081 CULTURE SCREEN ONLY: CPT | Mod: GEALAB | Performed by: INTERNAL MEDICINE

## 2024-08-13 PROCEDURE — 85027 COMPLETE CBC AUTOMATED: CPT | Performed by: NURSE PRACTITIONER

## 2024-08-13 PROCEDURE — 83036 HEMOGLOBIN GLYCOSYLATED A1C: CPT | Mod: GEALAB | Performed by: INTERNAL MEDICINE

## 2024-08-13 PROCEDURE — 96375 TX/PRO/DX INJ NEW DRUG ADDON: CPT

## 2024-08-13 PROCEDURE — 96367 TX/PROPH/DG ADDL SEQ IV INF: CPT

## 2024-08-13 PROCEDURE — 85610 PROTHROMBIN TIME: CPT | Performed by: NURSE PRACTITIONER

## 2024-08-13 PROCEDURE — 96366 THER/PROPH/DIAG IV INF ADDON: CPT

## 2024-08-13 PROCEDURE — 96365 THER/PROPH/DIAG IV INF INIT: CPT

## 2024-08-13 PROCEDURE — 2500000001 HC RX 250 WO HCPCS SELF ADMINISTERED DRUGS (ALT 637 FOR MEDICARE OP): Performed by: NURSE PRACTITIONER

## 2024-08-13 PROCEDURE — S4991 NICOTINE PATCH NONLEGEND: HCPCS | Performed by: NURSE PRACTITIONER

## 2024-08-13 RX ORDER — DEXTROSE 50 % IN WATER (D50W) INTRAVENOUS SYRINGE
25 ONCE
Status: COMPLETED | OUTPATIENT
Start: 2024-08-13 | End: 2024-08-13

## 2024-08-13 RX ORDER — WARFARIN SODIUM 5 MG/1
7.5 TABLET ORAL DAILY
Status: DISCONTINUED | OUTPATIENT
Start: 2024-08-13 | End: 2024-08-13

## 2024-08-13 RX ORDER — WARFARIN 7.5 MG/1
1.5 TABLET ORAL 3 TIMES WEEKLY
COMMUNITY

## 2024-08-13 RX ORDER — IBUPROFEN 200 MG
1 TABLET ORAL DAILY
Status: DISCONTINUED | OUTPATIENT
Start: 2024-09-24 | End: 2024-08-13

## 2024-08-13 RX ORDER — ACETAMINOPHEN 325 MG/1
650 TABLET ORAL EVERY 4 HOURS PRN
Status: DISCONTINUED | OUTPATIENT
Start: 2024-08-13 | End: 2024-08-14

## 2024-08-13 RX ORDER — IBUPROFEN 200 MG
1 TABLET ORAL DAILY
Status: DISCONTINUED | OUTPATIENT
Start: 2024-08-13 | End: 2024-08-15 | Stop reason: HOSPADM

## 2024-08-13 RX ORDER — IBUPROFEN 200 MG
1 TABLET ORAL DAILY
Status: DISCONTINUED | OUTPATIENT
Start: 2024-09-24 | End: 2024-08-15 | Stop reason: HOSPADM

## 2024-08-13 RX ORDER — OXYCODONE HYDROCHLORIDE 10 MG/1
10 TABLET ORAL EVERY 4 HOURS PRN
Status: DISCONTINUED | OUTPATIENT
Start: 2024-08-13 | End: 2024-08-15 | Stop reason: HOSPADM

## 2024-08-13 RX ORDER — FLUTICASONE FUROATE AND VILANTEROL 200; 25 UG/1; UG/1
1 POWDER RESPIRATORY (INHALATION)
Status: DISCONTINUED | OUTPATIENT
Start: 2024-08-13 | End: 2024-08-15 | Stop reason: HOSPADM

## 2024-08-13 RX ORDER — NYSTATIN 100000 U/G
CREAM TOPICAL 3 TIMES DAILY
Status: DISCONTINUED | OUTPATIENT
Start: 2024-08-13 | End: 2024-08-15 | Stop reason: HOSPADM

## 2024-08-13 RX ORDER — ALBUTEROL SULFATE 90 UG/1
2 INHALANT RESPIRATORY (INHALATION) EVERY 4 HOURS PRN
Status: DISCONTINUED | OUTPATIENT
Start: 2024-08-13 | End: 2024-08-15 | Stop reason: HOSPADM

## 2024-08-13 RX ORDER — CHOLESTYRAMINE 4 G/9G
4 POWDER, FOR SUSPENSION ORAL
Status: DISCONTINUED | OUTPATIENT
Start: 2024-08-13 | End: 2024-08-15 | Stop reason: HOSPADM

## 2024-08-13 RX ORDER — SODIUM CHLORIDE 9 MG/ML
75 INJECTION, SOLUTION INTRAVENOUS CONTINUOUS
Status: DISCONTINUED | OUTPATIENT
Start: 2024-08-13 | End: 2024-08-15 | Stop reason: HOSPADM

## 2024-08-13 RX ORDER — ALBUTEROL SULFATE 0.83 MG/ML
10 SOLUTION RESPIRATORY (INHALATION) ONCE
Status: COMPLETED | OUTPATIENT
Start: 2024-08-13 | End: 2024-08-13

## 2024-08-13 RX ORDER — TALC
3 POWDER (GRAM) TOPICAL NIGHTLY PRN
Status: DISCONTINUED | OUTPATIENT
Start: 2024-08-13 | End: 2024-08-15 | Stop reason: HOSPADM

## 2024-08-13 RX ORDER — IBUPROFEN 200 MG
1 TABLET ORAL DAILY
Status: DISCONTINUED | OUTPATIENT
Start: 2024-08-13 | End: 2024-08-13

## 2024-08-13 RX ORDER — PRAMIPEXOLE DIHYDROCHLORIDE 1 MG/1
1 TABLET ORAL 2 TIMES DAILY
Status: DISCONTINUED | OUTPATIENT
Start: 2024-08-13 | End: 2024-08-15 | Stop reason: HOSPADM

## 2024-08-13 RX ORDER — ACETAMINOPHEN 650 MG/1
650 SUPPOSITORY RECTAL EVERY 4 HOURS PRN
Status: DISCONTINUED | OUTPATIENT
Start: 2024-08-13 | End: 2024-08-14

## 2024-08-13 RX ORDER — ALPRAZOLAM 0.5 MG/1
0.5 TABLET ORAL 2 TIMES DAILY PRN
Status: DISCONTINUED | OUTPATIENT
Start: 2024-08-13 | End: 2024-08-15 | Stop reason: HOSPADM

## 2024-08-13 RX ORDER — DOCUSATE SODIUM 100 MG/1
100 CAPSULE, LIQUID FILLED ORAL 2 TIMES DAILY
Status: DISCONTINUED | OUTPATIENT
Start: 2024-08-13 | End: 2024-08-15 | Stop reason: HOSPADM

## 2024-08-13 RX ORDER — ACETAMINOPHEN 160 MG/5ML
650 SOLUTION ORAL EVERY 4 HOURS PRN
Status: DISCONTINUED | OUTPATIENT
Start: 2024-08-13 | End: 2024-08-14

## 2024-08-13 RX ORDER — AMMONIUM LACTATE 12 G/100G
LOTION TOPICAL 2 TIMES DAILY
Status: DISCONTINUED | OUTPATIENT
Start: 2024-08-13 | End: 2024-08-15 | Stop reason: HOSPADM

## 2024-08-13 RX ORDER — CHOLECALCIFEROL (VITAMIN D3) 25 MCG
2000 TABLET ORAL DAILY
Status: DISCONTINUED | OUTPATIENT
Start: 2024-08-13 | End: 2024-08-15 | Stop reason: HOSPADM

## 2024-08-13 RX ORDER — VANCOMYCIN HYDROCHLORIDE 1 G/20ML
INJECTION, POWDER, LYOPHILIZED, FOR SOLUTION INTRAVENOUS DAILY PRN
Status: DISCONTINUED | OUTPATIENT
Start: 2024-08-13 | End: 2024-08-13

## 2024-08-13 RX ORDER — SODIUM POLYSTYRENE SULFONATE 15 G/60ML
15 SUSPENSION ORAL; RECTAL ONCE
Status: DISCONTINUED | OUTPATIENT
Start: 2024-08-13 | End: 2024-08-15 | Stop reason: HOSPADM

## 2024-08-13 RX ORDER — PREGABALIN 25 MG/1
25 CAPSULE ORAL 3 TIMES DAILY
Status: DISCONTINUED | OUTPATIENT
Start: 2024-08-13 | End: 2024-08-13

## 2024-08-13 RX ORDER — LINEZOLID 2 MG/ML
600 INJECTION, SOLUTION INTRAVENOUS EVERY 12 HOURS
Status: DISCONTINUED | OUTPATIENT
Start: 2024-08-13 | End: 2024-08-13

## 2024-08-13 RX ORDER — ALBUTEROL SULFATE 5 MG/ML
10 SOLUTION RESPIRATORY (INHALATION) ONCE
Status: DISCONTINUED | OUTPATIENT
Start: 2024-08-13 | End: 2024-08-13

## 2024-08-13 RX ORDER — CEFAZOLIN SODIUM 1 G/50ML
1 SOLUTION INTRAVENOUS EVERY 8 HOURS
Status: DISCONTINUED | OUTPATIENT
Start: 2024-08-13 | End: 2024-08-15 | Stop reason: HOSPADM

## 2024-08-13 RX ORDER — DILTIAZEM HYDROCHLORIDE 180 MG/1
180 CAPSULE, COATED, EXTENDED RELEASE ORAL DAILY
Status: DISCONTINUED | OUTPATIENT
Start: 2024-08-13 | End: 2024-08-15 | Stop reason: HOSPADM

## 2024-08-13 RX ORDER — SODIUM BICARBONATE 1 MEQ/ML
50 SYRINGE (ML) INTRAVENOUS ONCE
Status: COMPLETED | OUTPATIENT
Start: 2024-08-13 | End: 2024-08-13

## 2024-08-13 RX ORDER — PREGABALIN 25 MG/1
25 CAPSULE ORAL 3 TIMES DAILY
Status: DISCONTINUED | OUTPATIENT
Start: 2024-08-13 | End: 2024-08-15 | Stop reason: HOSPADM

## 2024-08-13 RX ORDER — HYDROXYUREA 500 MG/1
1000 CAPSULE ORAL DAILY
Status: DISCONTINUED | OUTPATIENT
Start: 2024-08-13 | End: 2024-08-15 | Stop reason: HOSPADM

## 2024-08-13 RX ORDER — LISINOPRIL 20 MG/1
20 TABLET ORAL DAILY
Status: DISCONTINUED | OUTPATIENT
Start: 2024-08-13 | End: 2024-08-15 | Stop reason: HOSPADM

## 2024-08-13 RX ORDER — NICOTINE 7MG/24HR
1 PATCH, TRANSDERMAL 24 HOURS TRANSDERMAL DAILY
Status: DISCONTINUED | OUTPATIENT
Start: 2024-10-08 | End: 2024-08-15 | Stop reason: HOSPADM

## 2024-08-13 RX ORDER — NICOTINE 7MG/24HR
1 PATCH, TRANSDERMAL 24 HOURS TRANSDERMAL DAILY
Status: DISCONTINUED | OUTPATIENT
Start: 2024-10-08 | End: 2024-08-13

## 2024-08-13 RX ORDER — CALCIUM GLUCONATE 20 MG/ML
2 INJECTION, SOLUTION INTRAVENOUS ONCE
Status: COMPLETED | OUTPATIENT
Start: 2024-08-13 | End: 2024-08-13

## 2024-08-13 RX ORDER — CYCLOBENZAPRINE HCL 10 MG
10 TABLET ORAL 2 TIMES DAILY PRN
Status: DISCONTINUED | OUTPATIENT
Start: 2024-08-13 | End: 2024-08-15 | Stop reason: HOSPADM

## 2024-08-13 RX ORDER — DULOXETIN HYDROCHLORIDE 20 MG/1
20 CAPSULE, DELAYED RELEASE ORAL DAILY
Status: DISCONTINUED | OUTPATIENT
Start: 2024-08-13 | End: 2024-08-15 | Stop reason: HOSPADM

## 2024-08-13 RX ORDER — MORPHINE SULFATE 4 MG/ML
4 INJECTION INTRAVENOUS ONCE
Status: COMPLETED | OUTPATIENT
Start: 2024-08-13 | End: 2024-08-13

## 2024-08-13 SDOH — SOCIAL STABILITY: SOCIAL INSECURITY: DO YOU FEEL ANYONE HAS EXPLOITED OR TAKEN ADVANTAGE OF YOU FINANCIALLY OR OF YOUR PERSONAL PROPERTY?: NO

## 2024-08-13 SDOH — SOCIAL STABILITY: SOCIAL INSECURITY: ARE YOU OR HAVE YOU BEEN THREATENED OR ABUSED PHYSICALLY, EMOTIONALLY, OR SEXUALLY BY ANYONE?: NO

## 2024-08-13 SDOH — SOCIAL STABILITY: SOCIAL INSECURITY: HAVE YOU HAD ANY THOUGHTS OF HARMING ANYONE ELSE?: NO

## 2024-08-13 SDOH — SOCIAL STABILITY: SOCIAL INSECURITY: DO YOU FEEL UNSAFE GOING BACK TO THE PLACE WHERE YOU ARE LIVING?: NO

## 2024-08-13 SDOH — SOCIAL STABILITY: SOCIAL INSECURITY: ARE THERE ANY APPARENT SIGNS OF INJURIES/BEHAVIORS THAT COULD BE RELATED TO ABUSE/NEGLECT?: NO

## 2024-08-13 SDOH — SOCIAL STABILITY: SOCIAL INSECURITY: HAS ANYONE EVER THREATENED TO HURT YOUR FAMILY OR YOUR PETS?: NO

## 2024-08-13 SDOH — SOCIAL STABILITY: SOCIAL INSECURITY: WERE YOU ABLE TO COMPLETE ALL THE BEHAVIORAL HEALTH SCREENINGS?: YES

## 2024-08-13 SDOH — SOCIAL STABILITY: SOCIAL INSECURITY: HAVE YOU HAD THOUGHTS OF HARMING ANYONE ELSE?: NO

## 2024-08-13 SDOH — SOCIAL STABILITY: SOCIAL INSECURITY: ABUSE: ADULT

## 2024-08-13 SDOH — SOCIAL STABILITY: SOCIAL INSECURITY: DOES ANYONE TRY TO KEEP YOU FROM HAVING/CONTACTING OTHER FRIENDS OR DOING THINGS OUTSIDE YOUR HOME?: NO

## 2024-08-13 ASSESSMENT — COGNITIVE AND FUNCTIONAL STATUS - GENERAL
DAILY ACTIVITIY SCORE: 23
DAILY ACTIVITIY SCORE: 23
MOVING TO AND FROM BED TO CHAIR: A LITTLE
TOILETING: A LITTLE
PATIENT BASELINE BEDBOUND: NO
TOILETING: A LITTLE
MOVING FROM LYING ON BACK TO SITTING ON SIDE OF FLAT BED WITH BEDRAILS: A LITTLE
CLIMB 3 TO 5 STEPS WITH RAILING: A LITTLE
CLIMB 3 TO 5 STEPS WITH RAILING: A LOT
WALKING IN HOSPITAL ROOM: A LITTLE
MOVING TO AND FROM BED TO CHAIR: A LITTLE
STANDING UP FROM CHAIR USING ARMS: A LITTLE
WALKING IN HOSPITAL ROOM: A LITTLE
STANDING UP FROM CHAIR USING ARMS: A LITTLE
MOBILITY SCORE: 20
MOBILITY SCORE: 18

## 2024-08-13 ASSESSMENT — LIFESTYLE VARIABLES
SKIP TO QUESTIONS 9-10: 1
HOW OFTEN DO YOU HAVE A DRINK CONTAINING ALCOHOL: NEVER
AUDIT-C TOTAL SCORE: 0
HOW OFTEN DO YOU HAVE 6 OR MORE DRINKS ON ONE OCCASION: NEVER
AUDIT-C TOTAL SCORE: 0
HOW MANY STANDARD DRINKS CONTAINING ALCOHOL DO YOU HAVE ON A TYPICAL DAY: PATIENT DOES NOT DRINK

## 2024-08-13 ASSESSMENT — ENCOUNTER SYMPTOMS
PSYCHIATRIC NEGATIVE: 1
ROS SKIN COMMENTS: SEE HPI
HEMATOLOGIC/LYMPHATIC NEGATIVE: 1
NEUROLOGICAL NEGATIVE: 1
ALLERGIC/IMMUNOLOGIC NEGATIVE: 1
RESPIRATORY NEGATIVE: 1
EYES NEGATIVE: 1
GASTROINTESTINAL NEGATIVE: 1
ACTIVITY CHANGE: 1
FEVER: 1
ENDOCRINE NEGATIVE: 1

## 2024-08-13 ASSESSMENT — PAIN - FUNCTIONAL ASSESSMENT
PAIN_FUNCTIONAL_ASSESSMENT: 0-10

## 2024-08-13 ASSESSMENT — PAIN SCALES - GENERAL
PAINLEVEL_OUTOF10: 0 - NO PAIN
PAINLEVEL_OUTOF10: 5 - MODERATE PAIN
PAINLEVEL_OUTOF10: 0 - NO PAIN
PAINLEVEL_OUTOF10: 5 - MODERATE PAIN
PAINLEVEL_OUTOF10: 5 - MODERATE PAIN
PAINLEVEL_OUTOF10: 6

## 2024-08-13 ASSESSMENT — ACTIVITIES OF DAILY LIVING (ADL)
GROOMING: INDEPENDENT
FEEDING YOURSELF: INDEPENDENT
TOILETING: INDEPENDENT
DRESSING YOURSELF: INDEPENDENT
ASSISTIVE_DEVICE: WALKER;WHEELCHAIR
BATHING: INDEPENDENT
HEARING - LEFT EAR: FUNCTIONAL
ADEQUATE_TO_COMPLETE_ADL: YES
JUDGMENT_ADEQUATE_SAFELY_COMPLETE_DAILY_ACTIVITIES: YES
HEARING - RIGHT EAR: FUNCTIONAL
LACK_OF_TRANSPORTATION: NO
PATIENT'S MEMORY ADEQUATE TO SAFELY COMPLETE DAILY ACTIVITIES?: YES
WALKS IN HOME: NEEDS ASSISTANCE

## 2024-08-13 ASSESSMENT — PATIENT HEALTH QUESTIONNAIRE - PHQ9
2. FEELING DOWN, DEPRESSED OR HOPELESS: NOT AT ALL
SUM OF ALL RESPONSES TO PHQ9 QUESTIONS 1 & 2: 0
1. LITTLE INTEREST OR PLEASURE IN DOING THINGS: NOT AT ALL

## 2024-08-13 NOTE — H&P
"History Of Present Illness  Neisha Graham is a 64 y.o. female with history of essential thrombocytosis, central venous sinus thrombosis, severe obesity and HTN presenting with BLE redness, pain and swelling. She says it all began in June when she noticed what she describes as \"little spots\" on her legs. Over the next couple of weeks, she developed superficial ulceration of the lower legs with spreading erythema with weeping and pain. Her PCP put her on a 10 day course of Ceftin but she did not improve so she presented to the ED tonight. She reports having low grade fever and chills.      Past Medical History  Essential thrombocytosis.  Central venous sinuses thrombosis.  Tobacco use disorder.  Lymphedema.  COPD  Hypertension.  Morbid obesity.  Anxiety disorder.  Sleep apnea.  Restless leg syndrome.  Hyperlipidemia.  Chronic low back pain.  Osteoarthritis.  Iron deficiency anemia.  Depression.  DM  Asthma  Hepatitis B  IBS    Past Surgical History  Right knee surgery.  Lumbar laminectomy.  Ovarian cystectomy.  Tonsillectomy.  Tubal ligation  Right eye cataract extraction    Social History  Smoking Status moderate tobacco user (smokes >10 cigs daily, OR > ½ ppd, OR 2-3 cans/pouches loose leaf tobacco per week)    Alcohol Use denies   Drug Use denies    Occupation She is on disability.   Social History      and lives alone. Has one child.       Family History  Cancer: yes    Diabetes: yes    Hypertension: yes    Stroke: yes         Allergies  Vancomycin, Macrobid [nitrofurantoin monohyd/m-cryst], and Sulfamethoxazole-trimethoprim    Review of Systems   Constitutional:  Positive for activity change and fever.   HENT: Negative.     Eyes: Negative.    Respiratory: Negative.     Cardiovascular:  Positive for leg swelling.   Gastrointestinal: Negative.    Endocrine: Negative.    Genitourinary: Negative.    Musculoskeletal:         See HPI   Skin:         See HPI   Allergic/Immunologic: Negative.    Neurological: " Negative.    Hematological: Negative.    Psychiatric/Behavioral: Negative.          Physical Exam  Constitutional:       General: She is not in acute distress.     Appearance: She is obese. She is ill-appearing. She is not toxic-appearing or diaphoretic.   HENT:      Head: Normocephalic and atraumatic.      Nose: Nose normal.      Mouth/Throat:      Mouth: Mucous membranes are dry.      Pharynx: Oropharynx is clear. No oropharyngeal exudate or posterior oropharyngeal erythema.   Eyes:      General: No scleral icterus.        Right eye: No discharge.         Left eye: No discharge.      Conjunctiva/sclera: Conjunctivae normal.   Cardiovascular:      Rate and Rhythm: Normal rate and regular rhythm.      Heart sounds: Murmur heard.   Pulmonary:      Breath sounds: No wheezing, rhonchi or rales.   Abdominal:      General: There is no distension.      Tenderness: There is no abdominal tenderness. There is no right CVA tenderness, left CVA tenderness, guarding or rebound.   Musculoskeletal:      Cervical back: Neck supple.      Right lower leg: Edema present.      Left lower leg: Edema present.      Comments: BLE edema and erythema with weeping   Lymphadenopathy:      Cervical: No cervical adenopathy.   Skin:     Comments: Superficial ulceration of both lower legs with spreading erythema  Candida intertrigo in groin and underneath both breasts   Neurological:      General: No focal deficit present.      Mental Status: She is oriented to person, place, and time.   Psychiatric:         Mood and Affect: Mood normal.         Behavior: Behavior normal.          Last Recorded Vitals  Blood pressure 164/80, pulse 82, temperature 36.5 °C (97.7 °F), temperature source Oral, resp. rate 18, weight (!) 152 kg (335 lb), SpO2 97%.    Relevant Results   Latest Reference Range & Units 08/12/24 20:57 08/12/24 22:46 08/12/24 22:50 08/12/24 23:54 08/12/24 23:56   GLUCOSE 74 - 99 mg/dL  126 (H)      SODIUM 136 - 145 mmol/L  131 (L)       POTASSIUM 3.5 - 5.3 mmol/L  5.9 (H)   5.8 (H)   CHLORIDE 98 - 107 mmol/L  98      Bicarbonate 21 - 32 mmol/L  26      Anion Gap 10 - 20 mmol/L  13      Blood Urea Nitrogen 6 - 23 mg/dL  35 (H)      Creatinine 0.50 - 1.05 mg/dL  1.03      EGFR >60 mL/min/1.73m*2  60 (L)      Calcium 8.6 - 10.3 mg/dL  9.1      Albumin 3.4 - 5.0 g/dL  3.7      Alkaline Phosphatase 33 - 136 U/L  206 (H)      ALT 7 - 45 U/L  19      AST 9 - 39 U/L  22      Bilirubin Total 0.0 - 1.2 mg/dL  0.4      Total Protein 6.4 - 8.2 g/dL  6.8      MAGNESIUM 1.60 - 2.40 mg/dL  2.29      Lactate 0.4 - 2.0 mmol/L 1.4       C-Reactive Protein <1.00 mg/dL     7.94 (H)   BNP 0 - 99 pg/mL  49      INR 0.9 - 1.1    5.9 (HH)     Protime 9.8 - 12.8 seconds   67.8 (HH)     LEUKOCYTES (10*3/UL) IN BLOOD BY AUTOMATED COUNT, Thai 4.4 - 11.3 x10*3/uL 10.9       nRBC 0.0 - 0.0 /100 WBCs 0.0       ERYTHROCYTES (10*6/UL) IN BLOOD BY AUTOMATED COUNT, Thai 4.00 - 5.20 x10*6/uL 4.74       HEMOGLOBIN 12.0 - 16.0 g/dL 16.6 (H)       HEMATOCRIT 36.0 - 46.0 % 48.3 (H)       MCV 80 - 100 fL 102 (H)       MCH 26.0 - 34.0 pg 35.0 (H)       MCHC 32.0 - 36.0 g/dL 34.4       RED CELL DISTRIBUTION WIDTH 11.5 - 14.5 % 13.1       PLATELETS (10*3/UL) IN BLOOD AUTOMATED COUNT, Thai 150 - 450 x10*3/uL 544 (H)       NEUTROPHILS/100 LEUKOCYTES IN BLOOD BY AUTOMATED COUNT, Thai 40.0 - 80.0 % 67.9       Immature Granulocytes %, Automated 0.0 - 0.9 % 0.9       Lymphocytes % 13.0 - 44.0 % 14.9       Monocytes % 2.0 - 10.0 % 14.6       Eosinophils % 0.0 - 6.0 % 1.2       Basophils % 0.0 - 2.0 % 0.5       NEUTROPHILS (10*3/UL) IN BLOOD BY AUTOMATED COUNT, Thai 1.20 - 7.70 x10*3/uL 7.41       Immature Granulocytes Absolute, Automated 0.00 - 0.70 x10*3/uL 0.10       Lymphocytes Absolute 1.20 - 4.80 x10*3/uL 1.62       Monocytes Absolute 0.10 - 1.00 x10*3/uL 1.59 (H)       Eosinophils Absolute 0.00 - 0.70 x10*3/uL 0.13       Basophils Absolute 0.00 - 0.10 x10*3/uL 0.05        BLOOD CULTURE  Rpt (P)  Rpt (P)       Color, Urine Light-Yellow, Yellow, Dark-Yellow     Yellow    Appearance, Urine Clear     Clear    Specific Gravity, Urine 1.005 - 1.035     1.023    pH, Urine 5.0, 5.5, 6.0, 6.5, 7.0, 7.5, 8.0     5.5    Protein, Urine NEGATIVE, 10 (TRACE), 20 (TRACE) mg/dL    10 (TRACE)    Glucose, Urine Normal mg/dL    Normal    Blood, Urine NEGATIVE     NEGATIVE    Ketones, Urine NEGATIVE mg/dL    NEGATIVE    Bilirubin, Urine NEGATIVE     NEGATIVE    Urobilinogen, Urine Normal mg/dL    Normal    Nitrite, Urine NEGATIVE     NEGATIVE    Leukocyte Esterase, Urine NEGATIVE     NEGATIVE    Hyaline Casts, Urine NONE /LPF    OCCASIONAL !    Squamous Epithelial Cells, Urine Reference range not established. /HPF    1-9 (SPARSE)    Bacteria, Urine NONE SEEN /HPF    1+ !    RBC, Urine NONE, 1-2, 3-5 /HPF    1-2    WBC, Urine 1-5, NONE /HPF    NONE    (HH): Data is critically high  (H): Data is abnormally high  (L): Data is abnormally low  !: Data is abnormal  (P): Preliminary  Rpt: View report in Results Review for more information     Assessment/Plan   BLE Cellulitis  Pt with lower leg ulcerations although not concerned about necrotizing fasciitis at this time  Admit to medical floor  Failed outpatient treatment with a cephalosporin  Pt allergic to vancomycin  Will begin Linezolid and add Zosyn  Elevation  ID consulted    Hyperkalemia  Received the hyperkalemia protocol in ED  Will add Kayexalate and recheck K level    Hyponatremia  Na 131  Suspect hypotonic hypovolemic subtype as pt is clinically hypovolemic  Volume expansion with NS and will monitor     Type II DM  Accu checks and will add ISS coverage as needed  Check HgA1c    Essential thrombocytosis  On hydroxyurea and long term anticoagulation with warfarin    Supratherapeutic INR  INR 5.9  Will hold warfarin for now  Recheck INR in am.       I spent 70 minutes in the professional and overall care of this patient.      Luciana Beard MD

## 2024-08-13 NOTE — PROGRESS NOTES
08/13/24 0809   Discharge Planning   Living Arrangements Alone   Support Systems Children   Assistance Needed A&OX3; independent with ADLs with walker and electric wheelchair; doesn't drive; 2.5L NC @ HS PR[facility] (concentrator at home) currently 2L NC   Type of Residence Private residence;Other (Comment)  (Stated just moved into handicapped apartment)   Number of Stairs to Enter Residence 0   Number of Stairs Within Residence 0   Do you have animals or pets at home? No   Who is requesting discharge planning? Provider   Expected Discharge Disposition  Services   Does the patient need discharge transport arranged? No  (Patient active with [facility])   Financial Resource Strain   How hard is it for you to pay for the very basics like food, housing, medical care, and heating? Not hard   Housing Stability   In the last 12 months, was there a time when you were not able to pay the mortgage or rent on time? N   In the past 12 months, how many times have you moved where you were living? 1   At any time in the past 12 months, were you homeless or living in a shelter (including now)? N   Transportation Needs   In the past 12 months, has lack of transportation kept you from medical appointments or from getting medications? no   In the past 12 months, has lack of transportation kept you from meetings, work, or from getting things needed for daily living? No     08/13/2024 0811am  Spoke with patient bedside in ED

## 2024-08-13 NOTE — CARE PLAN
"Per HPI: Neisha Graham is a 64 y.o. female with history of essential thrombocytosis, central venous sinus thrombosis, severe obesity and HTN presenting with BLE redness, pain and swelling. She says it all began in June when she noticed what she describes as \"little spots\" on her legs. Over the next couple of weeks, she developed superficial ulceration of the lower legs with spreading erythema with weeping and pain.      BLE Cellulitis  Pt with lower leg ulcerations although not concerned about necrotizing fasciitis at this time  Pt allergic to vancomycin  Stopping Linezolid and Zosyn  Starting cefazolin  Leg elevation recommended to the patient and she verbalized understanding.  Wound care consult added  Blood cultures pending  ID consulted    Intertrigo (severe)  She uses nystatin powder and cream at home with no improvement  ID provided rec interdry dressings  Wound care consult     Hyperkalemia (resolved)  Treated with Kayexalate     Hyponatremia  Na 131  Suspect hypotonic hypovolemic subtype as pt is clinically hypovolemic  Volume expansion with NS continued     Type II DM  Accu checks and will add ISS coverage as needed  Check HgA1c     Essential thrombocytosis  On hydroxyurea and long term anticoagulation with warfarin     Supratherapeutic INR  INR 5.5  Will hold warfarin for now  Recheck INR in am.     DVT prophylaxis  SCDs     " Subjective:      Patient ID: Brie Starks is a 50 y.o. female.    Vitals:  vitals were not taken for this visit.     Chief Complaint: URI      Visit Type: TELE AUDIOVISUAL    Present with the patient at the time of consultation: TELEMED PRESENT WITH PATIENT: None        Past Medical History:   Diagnosis Date    Anemia of chronic disorder 1/16/2019    Anemia, unspecified 1/16/2019    Autoimmune disorder     Hyperlipidemia     Hypertension     Leucopenia 1/16/2019    Normocytic normochromic anemia 1/16/2019     Past Surgical History:   Procedure Laterality Date    CARPAL TUNNEL RELEASE Right 06/2015    CYSTOSCOPY N/A 12/14/2020    Procedure: CYSTOSCOPY;  Surgeon: Luisa Villaseñor MD;  Location: [facility] OR;  Service: OB/GYN;  Laterality: N/A;    DILATION AND CURETTAGE OF UTERUS  2020    Attempt to dilate cervix, unsuccessful    HYSTERECTOMY  2020    ROBOT-ASSISTED LAPAROSCOPIC HYSTERECTOMY N/A 12/14/2020    Procedure: ROBOTIC HYSTERECTOMY;  Surgeon: Luisa Villaseñor MD;  Location: [facility] OR;  Service: OB/GYN;  Laterality: N/A;    ROBOT-ASSISTED LAPAROSCOPIC SALPINGO-OOPHORECTOMY Bilateral 12/14/2020    Procedure: ROBOTIC SALPINGO-OOPHORECTOMY;  Surgeon: Luisa Villaseñor MD;  Location: [facility] OR;  Service: OB/GYN;  Laterality: Bilateral;    WISDOM TOOTH EXTRACTION       Review of patient's allergies indicates:   Allergen Reactions    Sulfamethoxazole-trimethoprim Hives    Adhesive Rash     Current Outpatient Medications on File Prior to Visit   Medication Sig Dispense Refill    amLODIPine (NORVASC) 5 MG tablet Take 1 tablet (5 mg total) by mouth every evening. 90 tablet 1    cyclobenzaprine (FLEXERIL) 10 MG tablet Take 1 tablet (10 mg total) by mouth every evening. 1/2-1 po hs 90 tablet 1    diclofenac sodium (VOLTAREN) 1 % Gel Apply 2 g topically 4 (four) times daily. 1 Tube 3    estradioL (ESTRACE) 2 MG tablet Take 1 tablet (2 mg total) by mouth once daily. 90 tablet 3    hydroxychloroquine (PLAQUENIL) 200 mg tablet  Take 1.5 tablets (300 mg total) by mouth once daily. 180 tablet 1    lisinopriL (PRINIVIL,ZESTRIL) 20 MG tablet Take 1 tablet (20 mg total) by mouth once daily. 90 tablet 1    valACYclovir (VALTREX) 500 MG tablet Take 1 tablet twice daily for 3 days while symptomatic. 90 tablet 1     No current facility-administered medications on file prior to visit.     Family History   Problem Relation Age of Onset    Hypertension Mother     Scleroderma Sister     Arthritis Sister     Hypertension Sister     Hypertension Brother     Arthritis Paternal Grandmother         Great Grandmother    Glaucoma Neg Hx     Macular degeneration Neg Hx     Retinal detachment Neg Hx        Medications Ordered                [id number] [address] [place] [address]   [address] [place] [address] [place] [address]    Telephone: [phone]   Fax: [phone]   Hours: Not open 24 hours                         E-Prescribed (1 of 1)              predniSONE (DELTASONE) 20 MG tablet    Sig: Take 1 tablet (20 mg total) by mouth once daily. for 5 days       Start: 3/23/24     Quantity: 5 tablet Refills: 0                           Ohs Peq Odvv Intake    3/23/2024 12:14 PM CDT - Filed by Patient   What is your current physical address in the event of a medical emergency? [address]. [place] [address]   Are you able to take your vital signs? Yes   Systolic Blood Pressure: 120   Diastolic Blood Pressure: 80   Weight: 130   Height: 63   Pulse: 94   Temperature: 100.1   Respiration rate:    Pulse Oxygen:    Please attach any relevant images or files          49 yo female with c/o dry cough, fever 100.5 , congestion and body aches for one day. She states negative home covid. She denies chest congestion. Positive pnd. She states has mucous feeling in chest.     URI   Associated symptoms include congestion, coughing and sneezing. Pertinent negatives include no headaches, sore throat or wheezing.       Constitution: Positive for  generalized weakness. Negative for fever.   HENT:  Positive for congestion and postnasal drip. Negative for sore throat.    Cardiovascular:  Negative for sob on exertion.   Respiratory:  Positive for cough and sputum production. Negative for shortness of breath and wheezing.    Allergic/Immunologic: Positive for sneezing.   Neurological:  Negative for headaches.        Objective:   The physical exam was conducted virtually.  LOCATION OF PATIENT home  Physical Exam   Constitutional: She is oriented to person, place, and time. She appears well-developed.   HENT:   Head: Normocephalic and atraumatic.   Ears:   Right Ear: Hearing, tympanic membrane and external ear normal.   Left Ear: Hearing, tympanic membrane and external ear normal.   Nose: Rhinorrhea and congestion present.   Mouth/Throat: Uvula is midline, oropharynx is clear and moist and mucous membranes are normal.   Eyes: Conjunctivae and EOM are normal. Pupils are equal, round, and reactive to light.   Neck: Neck supple.   Cardiovascular: Normal rate.   Pulmonary/Chest: Effort normal and breath sounds normal.   Musculoskeletal: Normal range of motion.         General: Normal range of motion.   Neurological: She is alert and oriented to person, place, and time.   Skin: Skin is warm.   Psychiatric: Her behavior is normal. Thought content normal.   Nursing note and vitals reviewed.      Assessment:     1. URI with cough and congestion        Plan:     Patient Instructions   -Below are suggestions for symptomatic relief:              -Tylenol every 4 hours OR ibuprofen every 6 hours as needed for pain/fever.              -Salt water gargles to soothe throat pain.              -Chloroseptic spray also helps to numb throat pain.              -Nasal saline spray reduces inflammation and dryness.              -Warm face compresses to help with facial sinus pain/pressure.              -Vicks vapor rub at night.              -Flonase OTC or Nasacort OTC for nasal  congestion.              -Simple foods like chicken noodle soup.              -Delsym helps with coughing at night              -Zyrtec/Claritin during the day & Benadryl at night may help with allergies.     If you DO NOT have Hypertension or any history of palpitations, it is ok to take over the counter Sudafed or Mucinex D or Allegra-D or Claritin-D or Zyrtec-D.  If you do take one of the above, it is ok to combine that with plain over the counter Mucinex or Allegra or Claritin or Zyrtec. If, for example, you are taking Zyrtec -D, you can combine that with Mucinex, but not Mucinex-D.  If you are taking Mucinex-D, you can combine that with plain Allegra or Claritin or Zyrtec.   If you DO have Hypertension or palpitations, it is safe to take Coricidin HBP for relief of sinus symptoms.     Please follow up with your Primary care provider within 2-5 days if your signs and symptoms have not resolved or worsen.      If your condition worsens or fails to improve we recommend that you receive another evaluation at the emergency room immediately or contact your primary medical clinic to discuss your concerns.   You must understand that you have received an Urgent Care treatment only and that you may be released before all of your medical problems are known or treated. You, the patient, will arrange for follow up care as instructed.      RED FLAGS/WARNING SYMPTOMS DISCUSSED WITH PATIENT THAT WOULD WARRANT EMERGENT MEDICAL ATTENTION. PATIENT VERBALIZED UNDERSTANDING.       URI with cough and congestion  -     predniSONE (DELTASONE) 20 MG tablet; Take 1 tablet (20 mg total) by mouth once daily. for 5 days  Dispense: 5 tablet; Refill: 0

## 2024-08-13 NOTE — CONSULTS
"  Wound Care Consult     Visit Date: 8/13/2024      Patient Name: Neisha Graham         MRN: [id number]           YOB: 1960     Reason for Consult:   Cutaneous yeast;  cellulitis, weeping BLE and right medial thigh     Wound History:  Patient reports is very prone to yeast and did have cellulitis and sepsis in the past with BLE wounds      Pertinent Labs:   Albumin   Date Value Ref Range Status   08/12/2024 3.7 3.4 - 5.0 g/dL Final       Wound Assessment:  Pleasant, alert patient reports her BLE and chronic edema were under \"pretty good control\" until lately when they became more swollen and draining.  She is seen to have maroon red discoloration with taunt, weeping edema to RLE from ankle to upper medial thigh and left lower leg.  There are multiple patchy denuded areas to bilateral lateral lower legs, but no deep nor defined ulcerations. The drainage is most copious from the right thigh which cannot be dressed and protect her skin - recommend: adaptic, ABD, Kerlix, ace  wraps to BLE (change BID and as needed), for the thigh use Premier Pro pads to wick away fluid.  This was d/w attending provider, SLY Jean NP and orders obtained, care provided.     Patient is also seen to have brilliant red, moist, inflamed, malodorous rash under abdominal pannus and breasts consistent with cutaneous yeast.  Assessment d/w attending, ? Diflucan needed. Meanwhile skin folds cleansed, dried and Interdry AG applied.      Wound Team Plan: BLE - adaptic, ABDs, Kerlix, ace and elevate with Premier Pro pad under thighs.  Interdry AG with antifungals as ordered under breasts and pannus of abdomen.      Hilda Bowden RN  8/13/2024  5:50 PM        "

## 2024-08-13 NOTE — CARE PLAN
Problem: Skin  Goal: Decreased wound size/increased tissue granulation at next dressing change  Outcome: Progressing  Goal: Participates in plan/prevention/treatment measures  Outcome: Progressing  Goal: Prevent/manage excess moisture  Outcome: Progressing  Goal: Prevent/minimize sheer/friction injuries  Outcome: Progressing  Goal: Promote/optimize nutrition  Outcome: Progressing  Goal: Promote skin healing  Outcome: Progressing   The patient's goals for the shift include      The clinical goals for the shift include

## 2024-08-13 NOTE — ED PROVIDER NOTES
HPI   Chief Complaint   Patient presents with   • Leg Swelling     Under tx for leg wounds and drainage for several months        CC: Increased lymphedema concern for cellulitis  HPI:   64-year-old female, morbidly obese, history of iron deficiency anemia, thrombocytosis, polycythemia, chronic pain, chronic lymphedema chronic venous stasis, established with wound care, last seen on 8/1/2024 patient is here for concern for possible infection in the lower extremities bilaterally she notes increased lymphedema, chronic draining soft tissue, patient denies any fever, chills,     Additional Limitations to History:   External Records Reviewed: I reviewed recent and relevant outside records including   History Obtained From:     Past Medical History: Per HPI  Medications: Reviewed in EMR and with patient  Allergies:  Reviewed in EMR  Past Surgical History:   Social History:     ------------------------------------------------------------------------------------------------------  Physical Exam:  --Vital signs reviewed in nursing triage note, EMR flow sheets, and at patient's bedside  GEN:  A&Ox3, no acute distress, appears comfortable.  Conversational and appropriate.  No confusion or gross mental status changes.  EYES: EOMI, non-injected sclera.  ENT: Moist mucous membranes, no apparent injuries or lesions.   CARDIO: Normal rate and regular rhythm. No murmurs, rubs, or gallops.  2+ equal pulses of the distal extremities.   PULM: Clear to auscultation bilaterally. No rales, rhonchi, or wheezes. Good symmetric chest expansion.  GI: Soft, non-tender, non-distended. No rebound tenderness or guarding.  SKIN: Warm and dry, no rashes or lesions.  MSK: ROM intact the extremities without contractures.   EXT: Chronic mild severe lymphedema lower extremities bilaterally, moderately erythematous, warm, serous drainage pedal pulses are intact bilaterally, ROM intact.   NEURO: Cranial nerves II-XII grossly intact. Sensation to light  touch intact and equal bilaterally in upper and lower extremities.  Symmetric 5/5 strength in upper and lower extremities.  PSYCH: Appropriate mood and behavior, converses and responds appropriately during exam.  -------------------------------------------------------------------------------------------------------      Differential Diagnoses Considered:   Chronic Medical Conditions Significantly Affecting Care:   Diagnostic testing considered: [PERC, D-Dimer, PECARN, etc.]    - EKG interpreted by myself   - I independently interpreted: [CXR, CT, POCUS, etc. including your interpretation]  - Labs notable for     Escalation of Care: Appropriate for   Social Determinants of Health Significantly Affecting Care: [Homelessness, lacking transportation, uninsured, unable to afford medications]  Prescription Drug Consideration: [Antibiotics, antivirals, pain medications, etc.]  Discussion of Management with Other Providers:  I discussed the patient/results with: [admitting team, consultant, radiologist, social work, EPAT, case management, PT/OT, RT, PCP, etc.]      Ayo Larios PA-C              Patient History   Past Medical History:   Diagnosis Date   • Acute bronchitis due to other specified organisms 10/14/2019    Acute bronchitis due to other specified organisms   • Acute viral conjunctivitis of left eye 03/10/2023   • Alkaline phosphatase elevation 08/17/2023   • Anemia 03/10/2023   • Bone marrow disorder 08/17/2023   • Cellulitis of left lower limb 04/08/2021    Cellulitis of left lower leg   • Chronic obstructive pulmonary disease with (acute) exacerbation (Multi) 01/09/2019    COPD exacerbation   • Chronic sinusitis, unspecified     Sinusitis   • COVID-19 11/28/2022    COVID-19   • Daily headache 08/17/2023   • Elevated bilirubin 08/17/2023   • Elevated transaminase level 08/17/2023   • Fever 08/17/2023   • Fever 08/17/2023   • Heart murmur 03/10/2023   • Hemoptysis 03/10/2023   • Hyperkalemia 03/10/2023   •  Malaise and fatigue 03/10/2023   • Morbid (severe) obesity due to excess calories (Multi)     Morbid obesity   • Other conditions influencing health status     History Of ___ Previous Pregnancies   • Other conditions influencing health status 03/12/2020    Arthritis   • Other conditions influencing health status 03/06/2017    History of cough   • Other conditions influencing health status     Menstruation   • Other conditions influencing health status     Osteoarthritis   • Other conditions influencing health status     Pulmonary Disease   • Pain in unspecified ankle and joints of unspecified foot 06/24/2016    Ankle joint pain   • Pain in unspecified foot 12/08/2015    Foot pain   • Pain in unspecified knee     Joint pain, knee   • Palpitations 03/10/2023   • Personal history of diseases of the skin and subcutaneous tissue     History of cellulitis   • Personal history of other diseases of the female genital tract     Vaginal delivery   • Personal history of other diseases of the nervous system and sense organs 09/12/2019    History of conjunctivitis   • Personal history of other diseases of the nervous system and sense organs 09/17/2015    History of blurred vision   • Personal history of other diseases of the respiratory system     History of pleurisy   • Personal history of other diseases of the respiratory system 10/17/2016    History of bronchitis   • Personal history of other diseases of the respiratory system 11/29/2017    History of acute bronchitis   • Personal history of other diseases of the respiratory system 11/16/2016    History of acute pharyngitis   • Personal history of other drug therapy 10/14/2016    History of influenza vaccination   • Personal history of other infectious and parasitic diseases     History of hepatitis   • Personal history of other infectious and parasitic diseases 07/22/2020    History of candidiasis of mouth   • Personal history of other medical treatment     History of  mammogram   • Personal history of other specified conditions     History of shortness of breath   • Personal history of other specified conditions 01/28/2015    History of shortness of breath   • Personal history of other specified conditions     History of abnormal Pap smear   • Personal history of pneumonia (recurrent)     History of pneumonia   • Pneumonia, unspecified organism 01/11/2018    Community acquired pneumonia   • Postmenopausal bleeding 09/30/2014    Postmenopausal bleeding   • Right knee pain 03/10/2023   • Shortness of breath 03/10/2023   • Sinus tachycardia 03/10/2023   • Submandibular sialolithiasis 03/10/2023   • Unilateral primary osteoarthritis, unspecified knee 02/03/2017    Osteoarthritis, localized, knee   • Unspecified acute conjunctivitis, bilateral 08/20/2020    Acute bacterial conjunctivitis of both eyes     Past Surgical History:   Procedure Laterality Date   • KNEE SURGERY  09/19/2013    Knee Surgery Right   • OTHER SURGICAL HISTORY  09/19/2013    Direct Laryngoscopy With Foreign Body Removal   • OTHER SURGICAL HISTORY  09/19/2013    Laminectomy With Drainage Of Intramedullary Cyst   • OTHER SURGICAL HISTORY  09/27/2013    Ovarian Cystectomy   • TONSILLECTOMY  09/19/2013    Tonsillectomy   • TUBAL LIGATION  09/19/2013    Tubal Ligation     No family history on file.  Social History     Tobacco Use   • Smoking status: Some Days     Types: Cigarettes   • Smokeless tobacco: Never   Substance Use Topics   • Alcohol use: Not on file   • Drug use: Not on file       Physical Exam   ED Triage Vitals [08/12/24 1951]   Temperature Heart Rate Respirations BP   36.5 °C (97.7 °F) 86 18 162/79      Pulse Ox Temp Source Heart Rate Source Patient Position   98 % Oral Monitor;Radial Sitting      BP Location FiO2 (%)     Right arm --       Physical Exam      ED Course & MDM   ED Course as of 08/13/24 0034   Mon Aug 12, 2024   2320 INR(!!): 5.9 [WL]   7801 There is a 64-year-old female coming in with  chief complaint of increased leg swelling.  Patient actually here states her wounds are weeping and has worsened over the past month.  There is erythematous warm to touch tender to palpation.  Does follow with wound control and was on the fifth of this month started on fluconazole and cream with concern for possible yeast infection but stated to worsen since then.  Neurovascular intact distally.  No pain out of proportion to exam.  Erythema is present on feet up to just below knees because of the back pain with serous drainage on the lateral wound bilateral.  Findings distant with cellulitis.  Plan will be blood work and admission.  Is on Coumadin.  Is supratherapeutic so low suspicion for DVT.  Plan will be to admit to medicine.  Was given a dose of Zosyn given her antibiotic allergies. [WL]   2353 POTASSIUM(!): 5.9 [KM]   Tue Aug 13, 2024   0002 Spoke with Karla anderson for hospitalist who agrees on admission [WL]   0013 EKG interpreted by me shows normal sinus rhythm possible left atrial margin nonspecific ST-T wave abnormalities.  No STEMI.  Slightly peaked T waves.  Rate 83 bpm. [WL]   0014 Was given calcium gluconate given concern for possible hyperkalemia but awaiting the redraw. [WL]      ED Course User Index  [KM] Ayo Larios PA-C  [WL] Micheal Dahl, DO         Diagnoses as of 08/13/24 0034   Cellulitis, unspecified cellulitis site   Supratherapeutic INR                 No data recorded     Alicia Coma Scale Score: 15 (08/12/24 2005 : Wilton Ayoub RN)                           Medical Decision Making      Procedure  Procedures     Ayo Larios PA-C  08/12/24 2036       Ayo Larios PA-C  08/12/24 2037       Ayo Larios PA-C  08/13/24 0034

## 2024-08-13 NOTE — PROGRESS NOTES
Pharmacy Medication History Review    Neisha rGaham is a 64 y.o. female admitted for Cellulitis. Pharmacy reviewed the patient's vamcn-se-lmgyrrdvr medications and allergies for accuracy.    The list below reflectives the updated PTA list. Please review each medication in order reconciliation for additional clarification and justification.  Prior to Admission Medications   Prescriptions Last Dose Informant Patient Reported? Taking?   ALPRAZolam (Xanax) 0.5 mg tablet 8/12/2024 at pm Self No Yes   Sig: take 1/2 tablet (0.25 MILLIGRAMS) by mouth twice a day if needed for anxiety   DULoxetine (Cymbalta) 20 mg DR capsule 8/12/2024 at am Self No Yes   Sig: TAKE 1 CAPSULE BY MOUTH ONCE DAILY   Tiadylt  mg 24 hr capsule 8/12/2024 at am Self No Yes   Sig: TAKE 1 CAPSULE BY MOUTH ONCE DAILY   Trelegy Ellipta 200-62.5-25 mcg blister with device 8/12/2024 at am Self No Yes   Sig: INHALE 1 PUFF BY MOUTH AND IN TO LUNGS ONCE DAILY   albuterol 90 mcg/actuation inhaler 8/11/2024 Self No Yes   Sig: INHALE 2 PUFFS BY MOUTH AND INTO THE LUNGS EVERY 4 HOURS IF NEEDED FOR WHEEZING   amino acids-protein hydrolys ([employer] No Carb) 15-60 gram-kcal/30 mL liquid  Self Yes Yes   Sig: Take by mouth.   ammonium lactate (Amlactin) 12 % cream Unknown Self Yes Yes   Sig: Apply 1 Film topically 2 times a day as needed.   clotrimazole (Lotrimin) 1 % cream Unknown Self No Yes   Sig: Apply topically 2 times a day. apply to affected area   cyclobenzaprine (Flexeril) 10 mg tablet 8/12/2024 Self No Yes   Sig: Take 1 tablet (10 mg) by mouth 2 times a day as needed for muscle spasms.   enoxaparin (Lovenox) 150 mg/mL injection Unknown Self No See Med Rec   Sig: Inject 1 mL (150 mg) under the skin 2 times a day.   fluconazole (Diflucan) 150 mg tablet 8/12/2024 at am Self No Yes   Sig: Take 1 tablet (150 mg) by mouth 1 time for 1 dose.   hydroxyurea (Hydrea) 500 mg capsule 8/12/2024 at pm Self No Yes   Sig: Take 2 capsules (1,000 mg total) by mouth  once daily.   lidocaine 5 % gel Unknown Self No Yes   Sig: Apply 1 inch topically 3 times a day as needed (apply to affected area).   naloxone (Narcan) 4 mg/0.1 mL nasal spray  Self Yes    Sig: Administer into affected nostril(s).   nystatin (Mycostatin) cream 8/12/2024 Self No Yes   Sig: APPLY TO AFFECTED AREA(S) 2 TO 3 TIMES A DAY   oxyCODONE (Roxicodone) 10 mg immediate release tablet   No    Sig: Take 1 tablet (10 mg) by mouth every 4 hours if needed for moderate pain (4 - 6) for up to 28 days. Do not fill before June 4, 2024.   oxyCODONE (Roxicodone) 10 mg immediate release tablet   No    Sig: Take 1 tablet (10 mg) by mouth every 4 hours if needed for moderate pain (4 - 6) for up to 28 days. Do not fill before July 2, 2024.   oxyCODONE (Roxicodone) 10 mg immediate release tablet 8/12/2024 at pm Self No Yes   Sig: Take 1 tablet (10 mg) by mouth every 4 hours if needed for moderate pain (4 - 6) for up to 28 days. Do not fill before July 30, 2024.   pramipexole (Mirapex) 1 mg tablet 8/11/2024 Self Yes Yes   Sig: Take 1 tablet (1 mg) by mouth 2 times a day.   predniSONE (Deltasone) 20 mg tablet Not Taking Self No No   Sig: Take one tab po bid for 5 days   Patient not taking: Reported on 8/13/2024   pregabalin (Lyrica) 25 mg capsule 8/12/2024 at pm Self No Yes   Sig: Take 1 capsule (25 mg) by mouth 3 times a day.   triamcinolone (Kenalog) 0.1 % cream Unknown Self Yes Yes   Sig: APPLY SPARINGLY AND MASSAGE IN ONCE A DAY   warfarin (Coumadin) 7.5 mg tablet 8/12/2024 at pm Self No Yes   Sig: TAKE 1 TABLET (7.5 MG) BY MOUTH ONCE DAILY IN THE EVENING.   Patient taking differently: Take 1 tablet (7.5 mg) by mouth 4 times a week. On Sunday, Monday, Wednesday, Friday   warfarin (Coumadin) 7.5 mg tablet 8/10/2024 at pm Self Yes No   Sig: Take 1.5 tablets (11.25 mg) by mouth 3 (three) times a week. On Tuesday Thursday and Saturday      Facility-Administered Medications Last Administration Doses Remaining   nystatin  (Mycostatin) 100,000 unit/gram powder 1 Application None recorded 60              The list below reflectives the updated allergy list. Please review each documented allergy for additional clarification and justification.  Allergies  Reviewed by Luciana Beard MD on 8/13/2024        Severity Reactions Comments    Vancomycin High Other     Macrobid [nitrofurantoin Monohyd/m-cryst] Not Specified Dizziness     Sulfamethoxazole-trimethoprim Not Specified Unknown             Below are additional concerns with the patient's PTA list.      Carito Paris CPhT     7

## 2024-08-13 NOTE — CONSULTS
Consults  Reason For Consult  BLE cellulitis    History Of Present Illness  Neisha Graham is a 64 y.o. female  with history of essential thrombocytosis, central venous sinus thrombosis, severe obesity, chronic lymphedema, chronic venous stasis and HTN presenting with BLE redness, pain, and swelling with concern of infection. States that this began weeks ago when she started to notice dark spots on her lower legs which then turned to ulcerated, weeping wounds. She was able to establish with wound care at this point, However, the erythema spread to her proximal lower extremity. Notably she developed redness and discoloration to her R medial thigh, the skin then broke and became ulcerated and has been weeping a clear/yellow fluid since. She also endorse subjective fever and chills but never had a true fever upon taking her temperature. She went to her PCP who put her on a 10-day course of Ceftin, but she states that this did not improve her symptoms and they continued to worsen which brought her to the ED. Additionally, notes fungal intertrigo under bilateral breasts and pannus. She has been treating this with nystatin cream and powder with very little improvement.     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), Cellulitis of left lower limb (04/08/2021), Chronic obstructive pulmonary disease with (acute) exacerbation (Multi) (01/09/2019), Chronic sinusitis, unspecified, COVID-19 (11/28/2022), Daily headache (08/17/2023), Elevated bilirubin (08/17/2023), Elevated transaminase level (08/17/2023), Fever (08/17/2023), Fever (08/17/2023), Heart murmur (03/10/2023), Hemoptysis (03/10/2023), Hyperkalemia (03/10/2023), Malaise and fatigue (03/10/2023), Morbid (severe) obesity due to excess calories  (Multi), Other conditions influencing health status, Other conditions influencing health status (03/12/2020), Other conditions influencing health status (03/06/2017), Other conditions influencing health status, Other conditions influencing health status, Other conditions influencing health status, Pain in unspecified ankle and joints of unspecified foot (06/24/2016), Pain in unspecified foot (12/08/2015), Pain in unspecified knee, Palpitations (03/10/2023), Personal history of diseases of the skin and subcutaneous tissue, Personal history of other diseases of the female genital tract, Personal history of other diseases of the nervous system and sense organs (09/12/2019), Personal history of other diseases of the nervous system and sense organs (09/17/2015), Personal history of other diseases of the respiratory system, Personal history of other diseases of the respiratory system (10/17/2016), Personal history of other diseases of the respiratory system (11/29/2017), Personal history of other diseases of the respiratory system (11/16/2016), Personal history of other drug therapy (10/14/2016), Personal history of other infectious and parasitic diseases, Personal history of other infectious and parasitic diseases (07/22/2020), Personal history of other medical treatment, Personal history of other specified conditions, Personal history of other specified conditions (01/28/2015), Personal history of other specified conditions, Personal history of pneumonia (recurrent), Pneumonia, unspecified organism (01/11/2018), Postmenopausal bleeding (09/30/2014), Right knee pain (03/10/2023), Shortness of breath (03/10/2023), Sinus tachycardia (03/10/2023), Submandibular sialolithiasis (03/10/2023), Unilateral primary osteoarthritis, unspecified knee (02/03/2017), and Unspecified acute conjunctivitis, bilateral (08/20/2020).    Surgical History  She has a past surgical history that includes Other surgical history (09/19/2013); Other  surgical history (09/19/2013); Tonsillectomy (09/19/2013); Knee surgery (09/19/2013); Tubal ligation (09/19/2013); and Other surgical history (09/27/2013).     Social History  She reports that she has been smoking cigarettes. She has never used smokeless tobacco. No history on file for alcohol use and drug use.    Family History  No family history on file.   Pneumoniavaccine is up to date  Foreman fall risk 75, preventive protocol was implemented  Depression screen is negative    Allergies  Vancomycin, Macrobid [nitrofurantoin monohyd/m-cryst], and Sulfamethoxazole-trimethoprim    Review of Systems  12 points review of system is negative except as stated in the HPI above.      Physical Exam  Physical Exam  Constitutional:       Appearance: She is obese.   HENT:      Head: Normocephalic and atraumatic.      Mouth/Throat:      Pharynx: Oropharynx is clear.   Cardiovascular:      Rate and Rhythm: Normal rate and regular rhythm.      Heart sounds: Murmur (Systolic murmur heard) heard.   Pulmonary:      Effort: Respiratory distress present.      Breath sounds: Normal breath sounds.   Abdominal:      General: Bowel sounds are normal.      Palpations: Abdomen is soft.   Musculoskeletal:         General: Swelling and tenderness present.      Right lower leg: Edema present.      Left lower leg: Edema present.      Comments: Bilateral lower extremity edema and erythema with weeping wounds   Skin:     Findings: Erythema and lesion present.   Neurological:      General: No focal deficit present.      Mental Status: She is alert and oriented to person, place, and time.   Psychiatric:         Mood and Affect: Mood normal.         Behavior: Behavior normal.          Last Recorded Vitals  /72   Pulse 98   Temp 36.5 °C (97.7 °F) (Oral)   Resp 19   Wt (!) 152 kg (335 lb)   SpO2 96%   BMI 57.50, nutritional consult    Relevant Results  Results for orders placed or performed during the hospital encounter of 08/12/24 (from the  past 24 hour(s))   CBC and Auto Differential   Result Value Ref Range    WBC 10.9 4.4 - 11.3 x10*3/uL    nRBC 0.0 0.0 - 0.0 /100 WBCs    RBC 4.74 4.00 - 5.20 x10*6/uL    Hemoglobin 16.6 (H) 12.0 - 16.0 g/dL    Hematocrit 48.3 (H) 36.0 - 46.0 %     (H) 80 - 100 fL    MCH 35.0 (H) 26.0 - 34.0 pg    MCHC 34.4 32.0 - 36.0 g/dL    RDW 13.1 11.5 - 14.5 %    Platelets 544 (H) 150 - 450 x10*3/uL    Neutrophils % 67.9 40.0 - 80.0 %    Immature Granulocytes %, Automated 0.9 0.0 - 0.9 %    Lymphocytes % 14.9 13.0 - 44.0 %    Monocytes % 14.6 2.0 - 10.0 %    Eosinophils % 1.2 0.0 - 6.0 %    Basophils % 0.5 0.0 - 2.0 %    Neutrophils Absolute 7.41 1.20 - 7.70 x10*3/uL    Immature Granulocytes Absolute, Automated 0.10 0.00 - 0.70 x10*3/uL    Lymphocytes Absolute 1.62 1.20 - 4.80 x10*3/uL    Monocytes Absolute 1.59 (H) 0.10 - 1.00 x10*3/uL    Eosinophils Absolute 0.13 0.00 - 0.70 x10*3/uL    Basophils Absolute 0.05 0.00 - 0.10 x10*3/uL   Lactate   Result Value Ref Range    Lactate 1.4 0.4 - 2.0 mmol/L   Blood Culture    Specimen: Peripheral Venipuncture; Blood culture   Result Value Ref Range    Blood Culture Loaded on Instrument - Culture in progress    Blood Culture    Specimen: Peripheral Venipuncture; Blood culture   Result Value Ref Range    Blood Culture Loaded on Instrument - Culture in progress    Magnesium   Result Value Ref Range    Magnesium 2.29 1.60 - 2.40 mg/dL   Comprehensive metabolic panel   Result Value Ref Range    Glucose 126 (H) 74 - 99 mg/dL    Sodium 131 (L) 136 - 145 mmol/L    Potassium 5.9 (H) 3.5 - 5.3 mmol/L    Chloride 98 98 - 107 mmol/L    Bicarbonate 26 21 - 32 mmol/L    Anion Gap 13 10 - 20 mmol/L    Urea Nitrogen 35 (H) 6 - 23 mg/dL    Creatinine 1.03 0.50 - 1.05 mg/dL    eGFR 60 (L) >60 mL/min/1.73m*2    Calcium 9.1 8.6 - 10.3 mg/dL    Albumin 3.7 3.4 - 5.0 g/dL    Alkaline Phosphatase 206 (H) 33 - 136 U/L    Total Protein 6.8 6.4 - 8.2 g/dL    AST 22 9 - 39 U/L    Bilirubin, Total 0.4 0.0 -  1.2 mg/dL    ALT 19 7 - 45 U/L   Lavender Top   Result Value Ref Range    Extra Tube Hold for add-ons.    B-Type Natriuretic Peptide   Result Value Ref Range    BNP 49 0 - 99 pg/mL   Protime-INR   Result Value Ref Range    Protime 67.8 (HH) 9.8 - 12.8 seconds    INR 5.9 (HH) 0.9 - 1.1   Urinalysis with Reflex Culture and Microscopic   Result Value Ref Range    Color, Urine Yellow Light-Yellow, Yellow, Dark-Yellow    Appearance, Urine Clear Clear    Specific Gravity, Urine 1.023 1.005 - 1.035    pH, Urine 5.5 5.0, 5.5, 6.0, 6.5, 7.0, 7.5, 8.0    Protein, Urine 10 (TRACE) NEGATIVE, 10 (TRACE), 20 (TRACE) mg/dL    Glucose, Urine Normal Normal mg/dL    Blood, Urine NEGATIVE NEGATIVE    Ketones, Urine NEGATIVE NEGATIVE mg/dL    Bilirubin, Urine NEGATIVE NEGATIVE    Urobilinogen, Urine Normal Normal mg/dL    Nitrite, Urine NEGATIVE NEGATIVE    Leukocyte Esterase, Urine NEGATIVE NEGATIVE   Extra Urine Gray Tube   Result Value Ref Range    Extra Tube Hold for add-ons.    Urinalysis Microscopic   Result Value Ref Range    WBC, Urine NONE 1-5, NONE /HPF    RBC, Urine 1-2 NONE, 1-2, 3-5 /HPF    Squamous Epithelial Cells, Urine 1-9 (SPARSE) Reference range not established. /HPF    Bacteria, Urine 1+ (A) NONE SEEN /HPF    Hyaline Casts, Urine OCCASIONAL (A) NONE /LPF   Potassium   Result Value Ref Range    Potassium 5.8 (H) 3.5 - 5.3 mmol/L   C-reactive protein   Result Value Ref Range    C-Reactive Protein 7.94 (H) <1.00 mg/dL   POCT GLUCOSE   Result Value Ref Range    POCT Glucose 114 (H) 74 - 99 mg/dL   POCT GLUCOSE   Result Value Ref Range    POCT Glucose 151 (H) 74 - 99 mg/dL   CBC   Result Value Ref Range    WBC 9.5 4.4 - 11.3 x10*3/uL    nRBC 0.0 0.0 - 0.0 /100 WBCs    RBC 4.64 4.00 - 5.20 x10*6/uL    Hemoglobin 15.8 12.0 - 16.0 g/dL    Hematocrit 49.2 (H) 36.0 - 46.0 %     (H) 80 - 100 fL    MCH 34.1 (H) 26.0 - 34.0 pg    MCHC 32.1 32.0 - 36.0 g/dL    RDW 12.9 11.5 - 14.5 %    Platelets 478 (H) 150 - 450 x10*3/uL    Basic metabolic panel   Result Value Ref Range    Glucose 149 (H) 74 - 99 mg/dL    Sodium 132 (L) 136 - 145 mmol/L    Potassium 5.2 3.5 - 5.3 mmol/L    Chloride 100 98 - 107 mmol/L    Bicarbonate 25 21 - 32 mmol/L    Anion Gap 12 10 - 20 mmol/L    Urea Nitrogen 27 (H) 6 - 23 mg/dL    Creatinine 0.84 0.50 - 1.05 mg/dL    eGFR 77 >60 mL/min/1.73m*2    Calcium 8.6 8.6 - 10.3 mg/dL   Protime-INR   Result Value Ref Range    Protime 63.5 (HH) 9.8 - 12.8 seconds    INR 5.5 (HH) 0.9 - 1.1          Assessment/Plan     Bilateral lower extremity cellulitis 2/2 chronic venous stasis and lymphedema   Fungal intertrigo under bilateral breasts and pannus     Recommendations:  -Can de-escalate antibiotics - stop Linezolid/Zosyn and start Cephazolin - as infection is likely due to strep  -Await blood culture results  -Follow inflammatory markers  -Follow labs  -Wound care - recommend interdry dressings for intertrigo   -Ensure legs are elevated, especially when sleeping  -Ultimately, patient will need to make lifestyle changes to avoid repeated infection - will need to keep legs elevated when sleeping and have sufficient wound care      Alyse Spann MD  Internal Medicine, PGY-1        Attending note : the patient was evaluated, the note was reviewed and updated  Legs stasis / possible cellulitis  Recommendations :  Cefazolin  Keep the leg elevated  Wound care    Gorge Myers M.D

## 2024-08-14 ENCOUNTER — TELEPHONE (OUTPATIENT)
Dept: PAIN MEDICINE | Facility: CLINIC | Age: 64
End: 2024-08-14
Payer: COMMERCIAL

## 2024-08-14 ENCOUNTER — APPOINTMENT (OUTPATIENT)
Dept: PAIN MEDICINE | Facility: CLINIC | Age: 64
End: 2024-08-14
Payer: MEDICARE

## 2024-08-14 DIAGNOSIS — M54.16 CHRONIC LUMBAR RADICULOPATHY: ICD-10-CM

## 2024-08-14 PROBLEM — L03.90 CELLULITIS, UNSPECIFIED CELLULITIS SITE: Status: ACTIVE | Noted: 2024-08-14

## 2024-08-14 LAB
ANION GAP SERPL CALC-SCNC: 10 MMOL/L (ref 10–20)
BUN SERPL-MCNC: 17 MG/DL (ref 6–23)
CALCIUM SERPL-MCNC: 7.7 MG/DL (ref 8.6–10.3)
CHLORIDE SERPL-SCNC: 105 MMOL/L (ref 98–107)
CO2 SERPL-SCNC: 25 MMOL/L (ref 21–32)
CREAT SERPL-MCNC: 0.75 MG/DL (ref 0.5–1.05)
EGFRCR SERPLBLD CKD-EPI 2021: 88 ML/MIN/1.73M*2
ERYTHROCYTE [DISTWIDTH] IN BLOOD BY AUTOMATED COUNT: 13.1 % (ref 11.5–14.5)
GLUCOSE SERPL-MCNC: 103 MG/DL (ref 74–99)
HCT VFR BLD AUTO: 43.7 % (ref 36–46)
HGB BLD-MCNC: 14.1 G/DL (ref 12–16)
INR PPP: 4.1 (ref 0.9–1.1)
MCH RBC QN AUTO: 33.8 PG (ref 26–34)
MCHC RBC AUTO-ENTMCNC: 32.3 G/DL (ref 32–36)
MCV RBC AUTO: 105 FL (ref 80–100)
NRBC BLD-RTO: 0 /100 WBCS (ref 0–0)
PLATELET # BLD AUTO: 425 X10*3/UL (ref 150–450)
POTASSIUM SERPL-SCNC: 4.8 MMOL/L (ref 3.5–5.3)
PROTHROMBIN TIME: 47.1 SECONDS (ref 9.8–12.8)
RBC # BLD AUTO: 4.17 X10*6/UL (ref 4–5.2)
SODIUM SERPL-SCNC: 135 MMOL/L (ref 136–145)
WBC # BLD AUTO: 8.8 X10*3/UL (ref 4.4–11.3)

## 2024-08-14 PROCEDURE — 85610 PROTHROMBIN TIME: CPT | Performed by: NURSE PRACTITIONER

## 2024-08-14 PROCEDURE — 94760 N-INVAS EAR/PLS OXIMETRY 1: CPT

## 2024-08-14 PROCEDURE — 80048 BASIC METABOLIC PNL TOTAL CA: CPT | Performed by: NURSE PRACTITIONER

## 2024-08-14 PROCEDURE — 2500000004 HC RX 250 GENERAL PHARMACY W/ HCPCS (ALT 636 FOR OP/ED): Performed by: NURSE PRACTITIONER

## 2024-08-14 PROCEDURE — 1100000001 HC PRIVATE ROOM DAILY

## 2024-08-14 PROCEDURE — 2500000001 HC RX 250 WO HCPCS SELF ADMINISTERED DRUGS (ALT 637 FOR MEDICARE OP): Performed by: INTERNAL MEDICINE

## 2024-08-14 PROCEDURE — 85027 COMPLETE CBC AUTOMATED: CPT | Performed by: NURSE PRACTITIONER

## 2024-08-14 PROCEDURE — 2500000002 HC RX 250 W HCPCS SELF ADMINISTERED DRUGS (ALT 637 FOR MEDICARE OP, ALT 636 FOR OP/ED): Performed by: INTERNAL MEDICINE

## 2024-08-14 PROCEDURE — 2500000004 HC RX 250 GENERAL PHARMACY W/ HCPCS (ALT 636 FOR OP/ED): Performed by: INTERNAL MEDICINE

## 2024-08-14 PROCEDURE — 94664 DEMO&/EVAL PT USE INHALER: CPT

## 2024-08-14 PROCEDURE — 99233 SBSQ HOSP IP/OBS HIGH 50: CPT | Performed by: NURSE PRACTITIONER

## 2024-08-14 PROCEDURE — 2500000004 HC RX 250 GENERAL PHARMACY W/ HCPCS (ALT 636 FOR OP/ED): Mod: JZ | Performed by: STUDENT IN AN ORGANIZED HEALTH CARE EDUCATION/TRAINING PROGRAM

## 2024-08-14 PROCEDURE — S4991 NICOTINE PATCH NONLEGEND: HCPCS | Performed by: INTERNAL MEDICINE

## 2024-08-14 PROCEDURE — 9420000001 HC RT PATIENT EDUCATION 5 MIN

## 2024-08-14 PROCEDURE — 36415 COLL VENOUS BLD VENIPUNCTURE: CPT | Performed by: NURSE PRACTITIONER

## 2024-08-14 RX ORDER — OXYCODONE HYDROCHLORIDE 10 MG/1
10 TABLET ORAL EVERY 4 HOURS PRN
Qty: 168 TABLET | Refills: 0 | Status: SHIPPED | OUTPATIENT
Start: 2024-08-27 | End: 2024-08-15 | Stop reason: HOSPADM

## 2024-08-14 RX ORDER — ACETAMINOPHEN 325 MG/1
975 TABLET ORAL EVERY 8 HOURS
Status: DISCONTINUED | OUTPATIENT
Start: 2024-08-14 | End: 2024-08-15 | Stop reason: HOSPADM

## 2024-08-14 ASSESSMENT — PAIN SCALES - GENERAL
PAINLEVEL_OUTOF10: 0 - NO PAIN
PAINLEVEL_OUTOF10: 7
PAINLEVEL_OUTOF10: 3
PAINLEVEL_OUTOF10: 8
PAINLEVEL_OUTOF10: 6
PAINLEVEL_OUTOF10: 7
PAINLEVEL_OUTOF10: 8

## 2024-08-14 ASSESSMENT — COGNITIVE AND FUNCTIONAL STATUS - GENERAL
STANDING UP FROM CHAIR USING ARMS: A LITTLE
TOILETING: A LITTLE
STANDING UP FROM CHAIR USING ARMS: A LITTLE
DAILY ACTIVITIY SCORE: 23
CLIMB 3 TO 5 STEPS WITH RAILING: A LOT
MOVING TO AND FROM BED TO CHAIR: A LITTLE
MOBILITY SCORE: 19
TOILETING: A LITTLE
CLIMB 3 TO 5 STEPS WITH RAILING: A LOT
WALKING IN HOSPITAL ROOM: A LITTLE
WALKING IN HOSPITAL ROOM: A LITTLE
MOVING TO AND FROM BED TO CHAIR: A LITTLE
DAILY ACTIVITIY SCORE: 23
MOBILITY SCORE: 19

## 2024-08-14 ASSESSMENT — PAIN DESCRIPTION - LOCATION
LOCATION: LEG
LOCATION: LEG

## 2024-08-14 ASSESSMENT — PAIN SCALES - PAIN ASSESSMENT IN ADVANCED DEMENTIA (PAINAD): TOTALSCORE: MEDICATION (SEE MAR)

## 2024-08-14 ASSESSMENT — PAIN - FUNCTIONAL ASSESSMENT
PAIN_FUNCTIONAL_ASSESSMENT: 0-10

## 2024-08-14 ASSESSMENT — ACTIVITIES OF DAILY LIVING (ADL): LACK_OF_TRANSPORTATION: NO

## 2024-08-14 ASSESSMENT — PAIN DESCRIPTION - ORIENTATION
ORIENTATION: RIGHT;LEFT;LOWER
ORIENTATION: RIGHT;LEFT;LOWER

## 2024-08-14 NOTE — PROGRESS NOTES
08/14/24 1027   Discharge Planning   Living Arrangements Alone   Support Systems Children   Assistance Needed Alert and oriented x 3; Independent with ADL's,  Utilizes a walker and electric wheelchair; Doesn't drive, Patient's insurance pays for transportation to patient's appointments, Patient has her groceries delivered,  2.5L NC @ HS [facility] (concentrator at home) currently 2L NC   Type of Residence Private residence   Number of Stairs to Enter Residence 0   Number of Stairs Within Residence 0   Do you have animals or pets at home? No   Who is requesting discharge planning? Provider   Home or Post Acute Services In home services   Type of Home Care Services Home nursing visits   Expected Discharge Disposition  Services  ([facility]   Does the patient need discharge transport arranged? Yes   RoundTrip coordination needed? Yes   Has discharge transport been arranged? No   Financial Resource Strain   How hard is it for you to pay for the very basics like food, housing, medical care, and heating? Not hard   Housing Stability   In the last 12 months, was there a time when you were not able to pay the mortgage or rent on time? N   In the past 12 months, how many times have you moved where you were living? 1   At any time in the past 12 months, were you homeless or living in a shelter (including now)? N   Transportation Needs   In the past 12 months, has lack of transportation kept you from medical appointments or from getting medications? no   In the past 12 months, has lack of transportation kept you from meetings, work, or from getting things needed for daily living? No   Patient Choice   Provider Choice list and CMS website (https://medicare.gov/care-compare#search) for post-acute Quality and Resource Measure Data were provided and reviewed with: Patient   Patient / Family choosing to utilize agency / facility established prior to hospitalization No

## 2024-08-14 NOTE — CARE PLAN
The patient's goals for the shift include will be free from injury throughout shift.    The clinical goals for the shift include pt will remain safe and use call light for assistance during this shift      Problem: Skin  Goal: Decreased wound size/increased tissue granulation at next dressing change  Outcome: Progressing  Goal: Participates in plan/prevention/treatment measures  Outcome: Progressing  Goal: Prevent/manage excess moisture  Outcome: Progressing  Goal: Prevent/minimize sheer/friction injuries  Outcome: Progressing  Flowsheets (Taken 8/14/2024 1812)  Prevent/minimize sheer/friction injuries: Turn/reposition every 2 hours/use positioning/transfer devices  Goal: Promote/optimize nutrition  Outcome: Progressing  Goal: Promote skin healing  Outcome: Progressing

## 2024-08-14 NOTE — PROGRESS NOTES
"Neisha Graham is a 64 y.o. female on day 0 of admission presenting with Cellulitis.      Subjective   Patient seen and examined at bedside this morning. States that she feels \"crappy\", but expresses a lot of frustration about her room. Her legs feel better overall. She states they are less red, there is less pain, and decreases drainage from the R thigh. In terms of her intertrigo, she feels that it is slightly better with the interdry dressings in place but has not been able to clean it well since getting to the hospital.     Discussed that she will need to make lifestyle modifications for her legs to improve. She would need to keep them elevated, especially at night when she is sleeping. She states this isn't possible for her because she cannot lay flat because she becomes short of breath and she cannot reach well to put pillows under her legs.       Objective     Last Recorded Vitals  /67 (BP Location: Right arm, Patient Position: Lying)   Pulse 90   Temp 36.4 °C (97.5 °F) (Temporal)   Resp 19   Wt (!) 152 kg (335 lb)   SpO2 93%   Intake/Output last 3 Shifts:    Intake/Output Summary (Last 24 hours) at 8/14/2024 1310  Last data filed at 8/14/2024 0551  Gross per 24 hour   Intake 1196.25 ml   Output --   Net 1196.25 ml       Admission Weight  Weight: (!) 152 kg (335 lb) (08/12/24 1951)    Daily Weight  08/13/24 : (!) 152 kg (335 lb)    Image Results  CT chest wo IV contrast  Narrative: Interpreted By:  Eric Nunez,   STUDY:  CT CHEST WO IV CONTRAST;  7/8/2024 12:32 pm      INDICATION:  Signs/Symptoms:f/u 10 mm RUL nodule.      COMPARISON:  03/20/2024      ACCESSION NUMBER(S):  [id number]      ORDERING CLINICIAN:  VIRGIE CABRAL      TECHNIQUE:  Helical data acquisition of the chest was obtained  without IV  contrast material.  Images were reformatted in axial, coronal, and  sagittal planes.      FINDINGS:  LUNGS AND AIRWAYS:  The trachea and central airways are patent. No endobronchial lesion.    "   There is mild bibasilar atelectasis. There is no consolidation or  effusion.      Previously seen right upper lobe nodule has near completely resolved  with only a 3 mm nodule is present on today's study in this location  on slice 76. No new nodules seen      MEDIASTINUM AND ELVIA, LOWER NECK AND AXILLA:  The visualized thyroid gland is within normal limits.      No evidence of thoracic lymphadenopathy by CT criteria.      Esophagus appears within normal limits as seen.      HEART AND VESSELS:  The thoracic aorta is of normal course and caliber with moderate  vascular calcifications.      Main pulmonary artery and its branches are normal in caliber.      Moderate coronary artery calcifications are seen. The study is not  optimized for evaluation of coronary arteries.      The cardiac chambers are not enlarged. Severe mitral annular  calcifications present      No evidence of pericardial effusion.      UPPER ABDOMEN:  The visualized subdiaphragmatic structures demonstrate no remarkable  findings.      CHEST WALL AND OSSEOUS STRUCTURES:  There are no suspicious osseous lesions. Mild multilevel spondylosis      Impression: 1. Interval significant decrease in size of a 9 mm nodule in the  right upper lobe which now measures approximately 3 mm. No new  nodules seen.  2. Moderate atherosclerotic calcifications present.      MACRO:  None      Signed by: Eric Nunez 7/9/2024 11:21 PM  Dictation workstation:   [id number]      Physical Exam  Constitutional:       Appearance: She is obese.   HENT:      Head: Normocephalic and atraumatic.      Mouth/Throat:      Pharynx: Oropharynx is clear.   Cardiovascular:      Rate and Rhythm: Normal rate and regular rhythm.      Heart sounds: Murmur (Systolic murmur heard) heard.   Pulmonary:      Effort: Normal effort, no respiratory distress present     Breath sounds: Normal breath sounds.   Abdominal:      General: Bowel sounds are normal.      Palpations: Abdomen is soft.    Musculoskeletal:         General: Swelling and tenderness present.      Right lower leg: Edema present.      Left lower leg: Edema present.      Comments: Bilateral lower extremity edema and erythema with weeping wounds- erythema has decreased from previous exam with less fluid drainage  Skin:     Findings: Erythema and lesion present.   Neurological:      General: No focal deficit present.      Mental Status: She is alert and oriented to person, place, and time.   Psychiatric:         Mood and Affect: Mood normal.         Behavior: Behavior normal.     Relevant Results  Scheduled medications  ammonium lactate, , Topical, BID  ceFAZolin, 1 g, intravenous, q8h  cholecalciferol, 2,000 Units, oral, Daily  cholestyramine, 4 g, oral, BID  dilTIAZem CD, 180 mg, oral, Daily  docusate sodium, 100 mg, oral, BID  DULoxetine, 20 mg, oral, Daily  tiotropium, 2 puff, inhalation, Daily   And  fluticasone furoate-vilanteroL, 1 puff, inhalation, Daily  hydroxyurea, 1,000 mg, oral, Daily  lisinopril, 20 mg, oral, Daily  nicotine, 1 patch, transdermal, Daily   Followed by  [START ON 9/24/2024] nicotine, 1 patch, transdermal, Daily   Followed by  [START ON 10/8/2024] nicotine, 1 patch, transdermal, Daily  nystatin, , Topical, TID  pramipexole, 1 mg, oral, BID  pregabalin, 25 mg, oral, TID  sodium polystyrene, 15 g, oral, Once      Continuous medications  sodium chloride 0.9%, 75 mL/hr, Last Rate: 75 mL/hr (08/14/24 0106)      PRN medications  PRN medications: acetaminophen **OR** acetaminophen **OR** acetaminophen, albuterol, ALPRAZolam, cyclobenzaprine, melatonin, oxyCODONE    Results for orders placed or performed during the hospital encounter of 08/12/24 (from the past 24 hour(s))   Protime-INR   Result Value Ref Range    Protime 47.1 (H) 9.8 - 12.8 seconds    INR 4.1 (H) 0.9 - 1.1   CBC   Result Value Ref Range    WBC 8.8 4.4 - 11.3 x10*3/uL    nRBC 0.0 0.0 - 0.0 /100 WBCs    RBC 4.17 4.00 - 5.20 x10*6/uL    Hemoglobin 14.1 12.0  - 16.0 g/dL    Hematocrit 43.7 36.0 - 46.0 %     (H) 80 - 100 fL    MCH 33.8 26.0 - 34.0 pg    MCHC 32.3 32.0 - 36.0 g/dL    RDW 13.1 11.5 - 14.5 %    Platelets 425 150 - 450 x10*3/uL   Basic Metabolic Panel   Result Value Ref Range    Glucose 103 (H) 74 - 99 mg/dL    Sodium 135 (L) 136 - 145 mmol/L    Potassium 4.8 3.5 - 5.3 mmol/L    Chloride 105 98 - 107 mmol/L    Bicarbonate 25 21 - 32 mmol/L    Anion Gap 10 10 - 20 mmol/L    Urea Nitrogen 17 6 - 23 mg/dL    Creatinine 0.75 0.50 - 1.05 mg/dL    eGFR 88 >60 mL/min/1.73m*2    Calcium 7.7 (L) 8.6 - 10.3 mg/dL         Assessment/Plan        Assessment & Plan  Cellulitis    Bilateral lower extremity cellulitis 2/2 chronic venous stasis and lymphedema   Fungal intertrigo under bilateral breasts and pannus      Recommendations:  -Continue Cefazolin - can transition to Keflex at discharge for 7 days  -Follow blood cultures, negative to date  -Follow labs  -Wound care - recommend interdry dressings for intertrigo   -Ensure legs are elevated, especially when sleeping  -Ultimately, patient will need to make lifestyle changes to avoid repeated infection - will need to keep legs elevated when sleeping and have sufficient wound care    Discussed with Dr. Louis Spann MD  Internal Medicine, PGY-1          Attending note : the patient was evaluated, the note was reviewed and updated  Legs stasis / possible cellulitis  Recommendations :  Continue Cefazolin, plan on oral Keflex if discharged  Keep the leg elevated     Gorge Myers M.D

## 2024-08-14 NOTE — PROGRESS NOTES
Neisha Graham is a 64 y.o. female on day 0 of admission presenting with Cellulitis.      Subjective   The patient is sitting up to the side of the bed with legs dangling. She also complains of generalized pain.        Objective     Last Recorded Vitals  /78 (BP Location: Right arm, Patient Position: Lying)   Pulse 94   Temp 36.2 °C (97.2 °F) (Temporal)   Resp 18   Wt (!) 152 kg (335 lb)   SpO2 92%   Intake/Output last 3 Shifts:    Intake/Output Summary (Last 24 hours) at 8/14/2024 1440  Last data filed at 8/14/2024 1304  Gross per 24 hour   Intake 1246.25 ml   Output --   Net 1246.25 ml       Admission Weight  Weight: (!) 152 kg (335 lb) (08/12/24 1951)    Daily Weight  08/13/24 : (!) 152 kg (335 lb)    Image Results  CT chest wo IV contrast  Narrative: Interpreted By:  Eric Nunez,   STUDY:  CT CHEST WO IV CONTRAST;  7/8/2024 12:32 pm      INDICATION:  Signs/Symptoms:f/u 10 mm RUL nodule.      COMPARISON:  03/20/2024      ACCESSION NUMBER(S):  [id number]      ORDERING CLINICIAN:  VIRGIE CABRAL      TECHNIQUE:  Helical data acquisition of the chest was obtained  without IV  contrast material.  Images were reformatted in axial, coronal, and  sagittal planes.      FINDINGS:  LUNGS AND AIRWAYS:  The trachea and central airways are patent. No endobronchial lesion.      There is mild bibasilar atelectasis. There is no consolidation or  effusion.      Previously seen right upper lobe nodule has near completely resolved  with only a 3 mm nodule is present on today's study in this location  on slice 76. No new nodules seen      MEDIASTINUM AND ELVIA, LOWER NECK AND AXILLA:  The visualized thyroid gland is within normal limits.      No evidence of thoracic lymphadenopathy by CT criteria.      Esophagus appears within normal limits as seen.      HEART AND VESSELS:  The thoracic aorta is of normal course and caliber with moderate  vascular calcifications.      Main pulmonary artery and its branches are normal  in caliber.      Moderate coronary artery calcifications are seen. The study is not  optimized for evaluation of coronary arteries.      The cardiac chambers are not enlarged. Severe mitral annular  calcifications present      No evidence of pericardial effusion.      UPPER ABDOMEN:  The visualized subdiaphragmatic structures demonstrate no remarkable  findings.      CHEST WALL AND OSSEOUS STRUCTURES:  There are no suspicious osseous lesions. Mild multilevel spondylosis      Impression: 1. Interval significant decrease in size of a 9 mm nodule in the  right upper lobe which now measures approximately 3 mm. No new  nodules seen.  2. Moderate atherosclerotic calcifications present.      MACRO:  None      Signed by: Eric Nnuez 7/9/2024 11:21 PM  Dictation workstation:   [id number]      Physical Exam  Constitutional:       Appearance: She is obese.   Eyes:      Extraocular Movements: Extraocular movements intact.   Cardiovascular:      Rate and Rhythm: Regular rhythm.      Heart sounds: Murmur heard.   Pulmonary:      Effort: No respiratory distress.   Musculoskeletal:      Right lower leg: Swelling and deformity present. 3+ Edema present.      Left lower leg: Swelling and deformity present. 3+ Edema present.      Comments: Lymphedema   Non pitting   Neurological:      Mental Status: She is alert and oriented to person, place, and time.   Psychiatric:         Mood and Affect: Mood is anxious.         Relevant Results      Results for orders placed or performed during the hospital encounter of 08/12/24 (from the past 24 hour(s))   Protime-INR   Result Value Ref Range    Protime 47.1 (H) 9.8 - 12.8 seconds    INR 4.1 (H) 0.9 - 1.1   CBC   Result Value Ref Range    WBC 8.8 4.4 - 11.3 x10*3/uL    nRBC 0.0 0.0 - 0.0 /100 WBCs    RBC 4.17 4.00 - 5.20 x10*6/uL    Hemoglobin 14.1 12.0 - 16.0 g/dL    Hematocrit 43.7 36.0 - 46.0 %     (H) 80 - 100 fL    MCH 33.8 26.0 - 34.0 pg    MCHC 32.3 32.0 - 36.0 g/dL    RDW  "13.1 11.5 - 14.5 %    Platelets 425 150 - 450 x10*3/uL   Basic Metabolic Panel   Result Value Ref Range    Glucose 103 (H) 74 - 99 mg/dL    Sodium 135 (L) 136 - 145 mmol/L    Potassium 4.8 3.5 - 5.3 mmol/L    Chloride 105 98 - 107 mmol/L    Bicarbonate 25 21 - 32 mmol/L    Anion Gap 10 10 - 20 mmol/L    Urea Nitrogen 17 6 - 23 mg/dL    Creatinine 0.75 0.50 - 1.05 mg/dL    eGFR 88 >60 mL/min/1.73m*2    Calcium 7.7 (L) 8.6 - 10.3 mg/dL               Assessment/Plan        Neisha Graham is a 64 y.o. female with history of essential thrombocytosis, central venous sinus thrombosis, severe obesity and HTN presenting with BLE redness, pain and swelling. She says it all began in June when she noticed what she describes as \"little spots\" on her legs. Over the next couple of weeks, she developed superficial ulceration of the lower legs with spreading erythema with weeping and pain.     #BLE Cellulitis  #BLE Lymphedema  Pt with lower leg ulcerations although not concerned about necrotizing fasciitis at this time  Pt allergic to vancomycin  Stopping Linezolid and Zosyn  Continuing the cefazolin, plan to transition to Keflex for 7 days at discharge  Leg elevation recommended to the patient and she verbalized understanding.  Blood cultures results pending  ID following  She has difficulty walking due to the above. PT/OT evaluation. Thanks for your recommendations.     #Intertrigo (severe)  She uses nystatin powder and cream at home with no improvement  ID provided rec interdry dressings  Wound care following     #Hyperkalemia (resolved)  Treated with Kayexalate      #Hyponatremia (resolved)  Suspect hypotonic hypovolemic subtype as pt is clinically hypovolemic  Volume expansion with NS continued     #Type II DM  Accu checks and will add ISS coverage as needed  Check HgA1c     #Essential thrombocytosis  On hydroxyurea and long term anticoagulation with warfarin     #Supra therapeutic INR  INR 4.1  Will hold warfarin for " now  Recheck INR in am.      #DVT prophylaxis  SCDs    Dispo: Anticipate discharge tomorrow, 8/15/24. Pending BC results and PT/ OT evaluation.                 Maricruz Jean, APRN-CNP

## 2024-08-14 NOTE — TELEPHONE ENCOUNTER
Patient had appointment today but is in the hospital. Should be in hospital couple more days but unclear on exactly when will be discharged. I scheduled her for 09/05/24 but if she gets out soon she will be out of meds before next appointment. How to proceed?

## 2024-08-14 NOTE — CONSULTS
Do you have any home inhalers?    yes  Do you get relief when using it?     yes  Spacer education and have them teach back yes  If using home inhaler do you rinse your mouth? no  Any barriers? no  When were you diagnosed with COPD? 5 yrs ago  Any previous PFTs? yes  If so, when? 5 yrs ago  Do you have a pulmonary Dr.? no  Name:  Phone number:  Date of last appt:  Pulmonary cards given yes  Do you currently smoke or vape or have you ever?   former  Quit date or planning to quit? April 2024 quit   How long have you smoked for? 30+ yrs  PPD?   1  Smoking education given and class information given yrd  Get orders for nicotine supplement no  Do you have a Primary Dr.? yes  Name: Dr. Angel  Phone number: n/a  Date of last appt: last year  Do you have any home O2 or CPAP/BiPAP?  Home oxygen thru medical services for 2.5L  Educate on acceptable O2 levels and the importance of monitoring O2 at home. Complete home O2 evaluation if needed.    This RT to see patient for COPD consult. The patient was given a COPD booklet with educational materials regarding pulmonary issues. Smoking cessation education reviewed, documentation given. Smoking history? I discussed various options for quitting smoking. I talked about different strategies that can be used to support changing habits and reduce the urges of withdraw symptoms.  [employer] support group discussed. Flyer given for next month's meeting. Patient diagnosed with COPD When? I educated patient about the disease process and how it affected the lungs making it difficult to breathe. We discussed current medications and if their current medications give them relief. PFTs? Pulmonary Dr? Last appt? Primary Dr? O2 at home? Last 6mwt? CPAP/BiPAP? Patient given  pulmonary office phone number to make an appt. Patient was very receptive to all information given.     *Add education notes- Spacer, Acapella or Peak Flow   Spacer- The patient was given an aerochamber (spacer) to  be administered with a meter dose inhaler at home. I instructed the patient on how to use the spacer with the proper technique to get the best results. In return, the patient demonstrated spacer training appropriately with a good understanding of how it is utilized. I also discussed the names, reasons, and side effects of respiratory medications that are prescribed at home, including does and frequency.     P

## 2024-08-15 ENCOUNTER — PHARMACY VISIT (OUTPATIENT)
Dept: PHARMACY | Facility: CLINIC | Age: 64
End: 2024-08-15
Payer: COMMERCIAL

## 2024-08-15 VITALS
HEART RATE: 102 BPM | RESPIRATION RATE: 18 BRPM | SYSTOLIC BLOOD PRESSURE: 150 MMHG | TEMPERATURE: 97.7 F | WEIGHT: 293 LBS | OXYGEN SATURATION: 95 % | HEIGHT: 64 IN | DIASTOLIC BLOOD PRESSURE: 83 MMHG | BODY MASS INDEX: 50.02 KG/M2

## 2024-08-15 PROBLEM — L03.90 CELLULITIS, UNSPECIFIED CELLULITIS SITE: Status: RESOLVED | Noted: 2024-08-14 | Resolved: 2024-08-15

## 2024-08-15 PROBLEM — L03.90 CELLULITIS: Status: RESOLVED | Noted: 2024-08-13 | Resolved: 2024-08-15

## 2024-08-15 LAB
ANION GAP SERPL CALC-SCNC: 11 MMOL/L (ref 10–20)
BUN SERPL-MCNC: 14 MG/DL (ref 6–23)
CALCIUM SERPL-MCNC: 7.6 MG/DL (ref 8.6–10.3)
CHLORIDE SERPL-SCNC: 105 MMOL/L (ref 98–107)
CO2 SERPL-SCNC: 25 MMOL/L (ref 21–32)
CREAT SERPL-MCNC: 0.64 MG/DL (ref 0.5–1.05)
EGFRCR SERPLBLD CKD-EPI 2021: >90 ML/MIN/1.73M*2
ERYTHROCYTE [DISTWIDTH] IN BLOOD BY AUTOMATED COUNT: 13.2 % (ref 11.5–14.5)
GLUCOSE BLD MANUAL STRIP-MCNC: 122 MG/DL (ref 74–99)
GLUCOSE BLD MANUAL STRIP-MCNC: 126 MG/DL (ref 74–99)
GLUCOSE SERPL-MCNC: 91 MG/DL (ref 74–99)
HCT VFR BLD AUTO: 44.5 % (ref 36–46)
HGB BLD-MCNC: 14.4 G/DL (ref 12–16)
INR PPP: 2.4 (ref 0.9–1.1)
MCH RBC QN AUTO: 34.1 PG (ref 26–34)
MCHC RBC AUTO-ENTMCNC: 32.4 G/DL (ref 32–36)
MCV RBC AUTO: 106 FL (ref 80–100)
NRBC BLD-RTO: 0 /100 WBCS (ref 0–0)
PLATELET # BLD AUTO: 420 X10*3/UL (ref 150–450)
POTASSIUM SERPL-SCNC: 4.9 MMOL/L (ref 3.5–5.3)
PROTHROMBIN TIME: 26.8 SECONDS (ref 9.8–12.8)
RBC # BLD AUTO: 4.22 X10*6/UL (ref 4–5.2)
SODIUM SERPL-SCNC: 136 MMOL/L (ref 136–145)
STAPHYLOCOCCUS SPEC CULT: NORMAL
WBC # BLD AUTO: 6.7 X10*3/UL (ref 4.4–11.3)

## 2024-08-15 PROCEDURE — 2500000004 HC RX 250 GENERAL PHARMACY W/ HCPCS (ALT 636 FOR OP/ED): Mod: JZ | Performed by: STUDENT IN AN ORGANIZED HEALTH CARE EDUCATION/TRAINING PROGRAM

## 2024-08-15 PROCEDURE — 2500000004 HC RX 250 GENERAL PHARMACY W/ HCPCS (ALT 636 FOR OP/ED): Performed by: INTERNAL MEDICINE

## 2024-08-15 PROCEDURE — 82947 ASSAY GLUCOSE BLOOD QUANT: CPT

## 2024-08-15 PROCEDURE — 2500000001 HC RX 250 WO HCPCS SELF ADMINISTERED DRUGS (ALT 637 FOR MEDICARE OP): Performed by: INTERNAL MEDICINE

## 2024-08-15 PROCEDURE — 2500000004 HC RX 250 GENERAL PHARMACY W/ HCPCS (ALT 636 FOR OP/ED): Performed by: NURSE PRACTITIONER

## 2024-08-15 PROCEDURE — 85027 COMPLETE CBC AUTOMATED: CPT | Performed by: NURSE PRACTITIONER

## 2024-08-15 PROCEDURE — 36415 COLL VENOUS BLD VENIPUNCTURE: CPT | Performed by: NURSE PRACTITIONER

## 2024-08-15 PROCEDURE — RXMED WILLOW AMBULATORY MEDICATION CHARGE

## 2024-08-15 PROCEDURE — 82374 ASSAY BLOOD CARBON DIOXIDE: CPT | Performed by: NURSE PRACTITIONER

## 2024-08-15 PROCEDURE — 85610 PROTHROMBIN TIME: CPT | Performed by: NURSE PRACTITIONER

## 2024-08-15 PROCEDURE — 99239 HOSP IP/OBS DSCHRG MGMT >30: CPT | Performed by: NURSE PRACTITIONER

## 2024-08-15 PROCEDURE — 97161 PT EVAL LOW COMPLEX 20 MIN: CPT | Mod: GP

## 2024-08-15 PROCEDURE — 2500000002 HC RX 250 W HCPCS SELF ADMINISTERED DRUGS (ALT 637 FOR MEDICARE OP, ALT 636 FOR OP/ED): Performed by: INTERNAL MEDICINE

## 2024-08-15 PROCEDURE — S4991 NICOTINE PATCH NONLEGEND: HCPCS | Performed by: INTERNAL MEDICINE

## 2024-08-15 RX ORDER — CEPHALEXIN 500 MG/1
500 CAPSULE ORAL 2 TIMES DAILY
Qty: 14 CAPSULE | Refills: 0 | Status: SHIPPED | OUTPATIENT
Start: 2024-08-15 | End: 2024-08-22

## 2024-08-15 RX ORDER — LISINOPRIL 20 MG/1
20 TABLET ORAL DAILY
Qty: 30 TABLET | Refills: 0 | Status: SHIPPED | OUTPATIENT
Start: 2024-08-16 | End: 2024-09-15

## 2024-08-15 RX ORDER — WARFARIN 7.5 MG/1
11.25 TABLET ORAL ONCE
Status: DISCONTINUED | OUTPATIENT
Start: 2024-08-15 | End: 2024-08-15 | Stop reason: HOSPADM

## 2024-08-15 RX ORDER — WARFARIN 7.5 MG/1
7.5 TABLET ORAL
Qty: 16 TABLET | Refills: 0 | Status: SHIPPED | OUTPATIENT
Start: 2024-08-15 | End: 2024-09-14

## 2024-08-15 ASSESSMENT — PAIN SCALES - GENERAL
PAINLEVEL_OUTOF10: 5 - MODERATE PAIN
PAINLEVEL_OUTOF10: 6
PAINLEVEL_OUTOF10: 6
PAINLEVEL_OUTOF10: 10 - WORST POSSIBLE PAIN
PAINLEVEL_OUTOF10: 5 - MODERATE PAIN
PAINLEVEL_OUTOF10: 5 - MODERATE PAIN
PAINLEVEL_OUTOF10: 4
PAINLEVEL_OUTOF10: 8

## 2024-08-15 ASSESSMENT — COGNITIVE AND FUNCTIONAL STATUS - GENERAL
DAILY ACTIVITIY SCORE: 23
TOILETING: A LITTLE
CLIMB 3 TO 5 STEPS WITH RAILING: TOTAL
MOBILITY SCORE: 19
MOBILITY SCORE: 21
CLIMB 3 TO 5 STEPS WITH RAILING: A LOT
STANDING UP FROM CHAIR USING ARMS: A LITTLE
MOVING TO AND FROM BED TO CHAIR: A LITTLE
WALKING IN HOSPITAL ROOM: A LITTLE

## 2024-08-15 ASSESSMENT — PAIN - FUNCTIONAL ASSESSMENT
PAIN_FUNCTIONAL_ASSESSMENT: 0-10

## 2024-08-15 ASSESSMENT — PAIN DESCRIPTION - DESCRIPTORS: DESCRIPTORS: ACHING;SORE;HEAVINESS

## 2024-08-15 ASSESSMENT — ACTIVITIES OF DAILY LIVING (ADL): ADLS_ADDRESSED: YES

## 2024-08-15 ASSESSMENT — PAIN DESCRIPTION - ORIENTATION: ORIENTATION: RIGHT;LEFT

## 2024-08-15 ASSESSMENT — PAIN DESCRIPTION - LOCATION: LOCATION: LEG

## 2024-08-15 NOTE — CARE PLAN
The patient's goals for the shift include  Pt will sit up in chair for 2 hours this shift Progressing  Problem: Skin  Goal: Decreased wound size/increased tissue granulation at next dressing change  Outcome: Progressing  Goal: Participates in plan/prevention/treatment measures  Outcome: Progressing  Goal: Prevent/manage excess moisture  Outcome: Progressing  Goal: Prevent/minimize sheer/friction injuries  Outcome: Progressing  Goal: Promote/optimize nutrition  Outcome: Progressing  Goal: Promote skin healing  Outcome: Progressing

## 2024-08-15 NOTE — PROGRESS NOTES
Physical Therapy    Physical Therapy Evaluation    Patient Name: Neisha Graham  MRN: [id number]  Today's Date: 8/15/2024   Time Calculation  Start Time: 1009  Stop Time: 1023  Time Calculation (min): 14 min    Assessment/Plan   PT Assessment  PT Assessment Results: Decreased endurance, Obesity, Decreased skin integrity, Pain  Rehab Prognosis: Good  Barriers to Discharge: None - pt denied concerns with returning home  Evaluation/Treatment Tolerance: Patient limited by fatigue, Patient limited by pain  Medical Staff Made Aware: Yes  End of Session Communication: Bedside nurse, Care Coordinator  End of Session Patient Position: Bed, 3 rail up, Alarm off, not on at start of session (notified RN of alarm status)    Assessment:   Pt is a 63 yo female presenting for PT evaluation this date.  Pt currently completes functional mobility and ADLs with mod-I and ww. Pt is currently functioning close to baseline. No further acute PT needs at this time; will discontinue order. Pt to continue mobilizing with nursing staff until medically cleared for discharge.       IP OR SWING BED PT PLAN  Inpatient or Swing Bed: Inpatient  PT Plan  PT Plan: PT Eval only  PT Eval Only Reason: At baseline function  PT Frequency: PT eval only  PT Discharge Recommendations: No further acute PT, No PT needed after discharge ([facility] services 3x/week for wound care)  Equipment Recommended upon Discharge: Wheeled walker, Wheelchair (attempted to issue bariatric ww; pt declined need)  PT Recommended Transfer Status: Independent  PT - OK to Discharge: Yes (per PT POC)      Subjective   General Visit Information:  General  Reason for Referral: Pt is a 63 yo female who presented to [facility] ED on 8/12/24 with BLE redness, pain, and swelling.  Past Medical History Relevant to Rehab: HTN, essential thrombocytosis, central venous sinus thrombosis, obesity  Family/Caregiver Present: No  Prior to Session Communication: Care Coordinator  Patient Position  Received: Bed, 3 rail up, Alarm off, not on at start of session  General Comment: Pt agreeable to PT session with encouragement.  Home Living:  Home Living  Type of Home: Apartment  Lives With: Alone  Home Adaptive Equipment: Wheelchair-power (Rollator (broken brakes))  Home Layout: One level  Home Access: Level entry, No concerns  Bathroom Shower/Tub: Walk-in tub  Bathroom Toilet: Handicapped height  Bathroom Equipment: Grab bars in shower, Shower chair with back  Home Living Comments: Pt lives in ADA-accessible apt  Prior Level of Function:  Prior Function Per Pt/Caregiver Report  Level of [place]: Independent with ADLs and functional transfers, Independent with homemaking with wheelchair  Prior Function Comments: Pt reports amb with rollator for household distances and uses power w/c when out in the community. +Falls history (in April, pt reports tripping over threshold of doorway, falling flat on her stomach; activated EMS button for assistance).  Precautions:  Precautions  Medical Precautions: Fall precautions  Precautions Comment: BLE lymphedema, legs wrapped in ACE    Objective   Pain:  Pain Assessment  Pain Assessment: 0-10  0-10 (Numeric) Pain Score: 6  Pain Type: Chronic pain  Pain Location: Leg  Pain Orientation: Right, Left  Pain Descriptors: Aching, Sore, Heaviness  Pain Frequency: Constant/continuous  Pain Interventions: Repositioned, Ambulation/increased activity  Response to Interventions: Pt tolerated session with no increased pain  Cognition:  Cognition  Overall Cognitive Status: Within Functional Limits    General Assessments:       Activity Tolerance  Endurance: Tolerates less than 10 min exercise, no significant change in vital signs    Strength  Strength Comments: No functional strength deficit noted  Static Sitting Balance  Static Sitting-Balance Support: Feet supported, No upper extremity supported  Static Sitting-Level of Assistance: Independent    Static Standing Balance  Static  Standing-Balance Support: Bilateral upper extremity supported  Static Standing-Level of Assistance: Modified independent  Functional Assessments:  ADL  ADL's Addressed: Yes  Eating Assistance: Independent  Grooming Assistance: Independent  UE Dressing Assistance: Independent  LE Dressing Assistance: Independent  Toileting Assistance with Device: Independent    Bed Mobility  Bed Mobility: Yes  Bed Mobility 1  Bed Mobility 1: Supine to sitting, Sitting to supine  Level of Assistance 1: Modified independent  Bed Mobility Comments 1: flat bed; increased time to complete    Transfers  Transfer: Yes  Transfer 1  Technique 1: Sit to stand, Stand to sit  Transfer Device 1: Walker  Transfer Level of Assistance 1: Modified independent  Trials/Comments 1: pt completes with modified technique (wide RITA, uses momentum/rocking to initiate transfer)  Transfers 2  Technique 2: Sit to stand, Stand to sit  Transfer Device 2: Walker  Transfer Level of Assistance 2: Modified independent  Trials/Comments 2: toilet transfer, with grab bar    Ambulation/Gait Training  Ambulation/Gait Training Performed: Yes  Ambulation/Gait Training 1  Device 1: Rolling walker  Assistance 1: Modified independent  Quality of Gait 1: Wide base of support, Decreased step length, Antalgic, Trendelenburg  Comments/Distance (ft) 1: 15' x2; pt demo slow, steady gait and safe techniques with ww  Extremity/Trunk Assessments:  RLE   RLE : Within Functional Limits  LLE   LLE : Within Functional Limits  Outcome Measures:  [facility] Basic Mobility  Turning from your back to your side while in a flat bed without using bedrails: None  Moving from lying on your back to sitting on the side of a flat bed without using bedrails: None  Moving to and from bed to chair (including a wheelchair): None  Standing up from a chair using your arms (e.g. wheelchair or bedside chair): None  To walk in hospital room: None  Climbing 3-5 steps with railing: Total  Basic Mobility - Total  Score: 21        Education Documentation  Mobility Training, taught by Katerina Ayala, PT at 8/15/2024 12:41 PM.  Learner: Patient  Readiness: Acceptance  Method: Explanation, Demonstration  Response: Verbalizes Understanding    Role of acute care PT, POC, discharge recs, hospital safety, importance of mobility in recovery process.

## 2024-08-15 NOTE — PROGRESS NOTES
08/15/24 1100   Discharge Planning   Expected Discharge Disposition  Services  (dc confirm and patient aware of it. Transport being requested by floor for [facility]. After visit summary and home care orders sent to [facility] and they are aware of dc. RN and LAURIE Bear on floor aware of dc)

## 2024-08-15 NOTE — NURSING NOTE
1900- Assumed care of the patient.    2315- Took pictures of her leg wounds and put on fresh clean dressings on both lower legs.    2345- Patient said the pain in her legs is back up to an 8/10, I sent a secure chat to the night hospitalist Dr. Diaz and NP Karla Allan to get some other medication that might help with the leg pain.

## 2024-08-15 NOTE — DISCHARGE SUMMARY
Discharge Diagnosis  Cellulitis    Issues Requiring Follow-Up  Wound care    Discharge Meds     Your medication list        START taking these medications        Instructions Last Dose Given Next Dose Due   cephalexin 500 mg capsule  Commonly known as: Keflex      Take 1 capsule (500 mg) by mouth 2 times a day for 7 days.              CONTINUE taking these medications        Instructions Last Dose Given Next Dose Due   albuterol 90 mcg/actuation inhaler      INHALE 2 PUFFS BY MOUTH AND INTO THE LUNGS EVERY 4 HOURS IF NEEDED FOR WHEEZING       ALPRAZolam 0.5 mg tablet  Commonly known as: Xanax      take 1/2 tablet (0.25 MILLIGRAMS) by mouth twice a day if needed for anxiety       ammonium lactate 12 % cream  Commonly known as: Amlactin           clotrimazole 1 % cream  Commonly known as: Lotrimin      Apply topically 2 times a day. apply to affected area       cyclobenzaprine 10 mg tablet  Commonly known as: Flexeril      Take 1 tablet (10 mg) by mouth 2 times a day as needed for muscle spasms.       DULoxetine 20 mg DR capsule  Commonly known as: Cymbalta      TAKE 1 CAPSULE BY MOUTH ONCE DAILY       hydroxyurea 500 mg capsule  Commonly known as: Hydrea      Take 2 capsules (1,000 mg total) by mouth once daily.       lidocaine 5 % gel      Apply 1 inch topically 3 times a day as needed (apply to affected area).       lisinopril 20 mg tablet  Start taking on: August 16, 2024      Take 1 tablet (20 mg) by mouth once daily.       Narcan 4 mg/0.1 mL nasal spray  Generic drug: naloxone           oxyCODONE 10 mg immediate release tablet  Commonly known as: Roxicodone      Take 1 tablet (10 mg) by mouth every 4 hours if needed for moderate pain (4 - 6) for up to 28 days. Do not fill before July 2, 2024.       oxyCODONE 10 mg immediate release tablet  Commonly known as: Roxicodone      Take 1 tablet (10 mg) by mouth every 4 hours if needed for moderate pain (4 - 6) for up to 28 days. Do not fill before July 30, 2024.      "  pramipexole 1 mg tablet  Commonly known as: Mirapex           pregabalin 25 mg capsule  Commonly known as: Lyrica      Take 1 capsule (25 mg) by mouth 3 times a day.       ProSource No Carb 15-60 gram-kcal/30 mL liquid  Generic drug: amino acids-protein hydrolys           Tiadylt  mg 24 hr capsule  Generic drug: dilTIAZem ER      TAKE 1 CAPSULE BY MOUTH ONCE DAILY       Trelegy Ellipta 200-62.5-25 mcg blister with device  Generic drug: fluticasone-umeclidin-vilanter      INHALE 1 PUFF BY MOUTH AND IN TO LUNGS ONCE DAILY       triamcinolone 0.1 % cream  Commonly known as: Kenalog           warfarin 7.5 mg tablet  Commonly known as: Coumadin           warfarin 7.5 mg tablet  Commonly known as: Coumadin      Take 1 tablet (7.5 mg) by mouth 4 times a week. On Sunday, Monday, Wednesday, Friday              STOP taking these medications      nystatin cream  Commonly known as: Mycostatin               ASK your doctor about these medications        Instructions Last Dose Given Next Dose Due   predniSONE 20 mg tablet  Commonly known as: Deltasone      Take one tab po bid for 5 days                 Where to Get Your Medications        These medications were sent to [facility]  [id number] , [facility] [address]      Hours: 9 AM to 5 PM Mon-Fri Phone: [phone]   cephalexin 500 mg capsule  lisinopril 20 mg tablet  warfarin 7.5 mg tablet         Test Results Pending At Discharge  Pending Labs       Order Current Status    Blood Culture Preliminary result    Blood Culture Preliminary result            Hospital Course   Neisha Graham is a 64 y.o. female with history of essential thrombocytosis, central venous sinus thrombosis, severe obesity and HTN presenting with BLE redness, pain and swelling. She says it all began in June when she noticed what she describes as \"little spots\" on her legs. Over the next couple of weeks, she developed superficial ulceration of the lower legs with spreading erythema " with weeping and pain.      #BLE Cellulitis  #BLE Lymphedema  Stopped Linezolid and Zosyn given upon admission  Treated with cefazolin,transitioned to Keflex for 7 days at discharge  Leg elevation recommended to the patient and she verbalized understanding.  Blood cultures negative  She has difficulty walking due to the above. PT/OT rec low intensity therapy. Bariatric walker provided.   #Intertrigo (severe)  She uses nystatin powder and cream at home with no improvement. Will stop the nystatin at home.   ID provided rec interdry dressings, which improved the erythema   Wound care provided rec  #Hyperkalemia (resolved)  Treated with Kayexalate   #Hyponatremia (resolved)    #Essential thrombocytosis  #Supra therapeutic INR  On hydroxyurea and long term anticoagulation with warfarin  INR 2.4  Resumed warfarin at prescribed dose  Patient to discharge home on Keflex for 7 days. [facility] for wound care and      Pertinent Physical Exam At Time of Discharge  Physical Exam  Constitutional:       Appearance: She is obese.   Eyes:      Extraocular Movements: Extraocular movements intact.   Cardiovascular:      Rate and Rhythm: Regular rhythm.      Heart sounds: Murmur heard.   Pulmonary:      Effort: No respiratory distress.   Musculoskeletal:      Right lower leg: Swelling and deformity present. 3+ Edema present.      Left lower leg: Swelling and deformity present. 3+ Edema present.      Comments: Lymphedema   Non pitting   Skin: red, moist, inflamed, malodorous rash under abdominal pannus and breasts consistent with cutaneous yeast. Weeping wounds on the lower extremities  Neurological:      Mental Status: She is alert and oriented to person, place, and time.   Psychiatric:         Mood and Affect: Mood is anxious.     Outpatient Follow-Up  Future Appointments   Date Time Provider Department Center   9/5/2024 11:45 AM LAKEISHA Lo-CNP [facility]   9/9/2024 12:30 PM Wen Reese PA-C [id number]         Maricruz WYNNE  Ted, APRN-CNP

## 2024-08-15 NOTE — PROGRESS NOTES
Occupational Therapy                 Therapy Communication Note    Patient Name: Neisha Graham  MRN: [id number]  Today's Date: 8/15/2024     Discipline: Occupational Therapy    Missed Visit Reason: Missed Visit Reason: Cancel (Discussed with evaluating PT at 1100, pt presents near baseline with no skilled OT needs at this time. OT eval not indicated, re-consult if needs change)    Missed Time: Cancel

## 2024-08-15 NOTE — NURSING NOTE
1300 Discharge instructions reviewed with pt. All questions and concerns addressed. Medication provided to pt from outpt pharmacy. IV to be removed closer to  time.

## 2024-08-16 ENCOUNTER — PATIENT OUTREACH (OUTPATIENT)
Dept: PRIMARY CARE | Facility: CLINIC | Age: 64
End: 2024-08-16
Payer: COMMERCIAL

## 2024-08-16 ENCOUNTER — DOCUMENTATION (OUTPATIENT)
Dept: PRIMARY CARE | Facility: CLINIC | Age: 64
End: 2024-08-16
Payer: COMMERCIAL

## 2024-08-16 ENCOUNTER — TELEPHONE (OUTPATIENT)
Dept: PRIMARY CARE | Facility: CLINIC | Age: 64
End: 2024-08-16
Payer: COMMERCIAL

## 2024-08-16 NOTE — PROGRESS NOTES
Discharge Facility: [facility]     Discharge Diagnosis:    Cellulitis     Issues Requiring Follow-Up  Wound care       Admission Date: 8/13/2024   Discharge Date:  8/15/2024     PCP Appointment Date: Tasked to office     Specialist Appointment Date:     [facility] involved     Follow up with CAMDEN Lo (Pain Medicine) 9/5/2024     Hospital Encounter and Summary Linked: Yes    See discharge assessment below for further details     Engagement  Call Start Time: 1106 (8/16/2024 11:06 AM)    Medications  Medications reviewed with patient/caregiver?: Yes (8/16/2024 11:06 AM)  Is the patient having any side effects they believe may be caused by any medication additions or changes?: No (8/16/2024 11:06 AM)  Does the patient have all medications ordered at discharge?: Yes (8/16/2024 11:06 AM)  Care Management Interventions: No intervention needed (8/16/2024 11:06 AM)  Prescription Comments: New Keflex reviewed (8/16/2024 11:06 AM)  Is the patient taking all medications as directed (includes completed medication regime)?: -- (Pt states has meds) (8/16/2024 11:06 AM)  Medication Comments: Patient has no questions or concerns Aware to STOP Nystatin Cream (8/16/2024 11:06 AM)    Appointments  Does the patient have a primary care provider?: Yes (8/16/2024 11:06 AM)  Care Management Interventions: Educated patient on importance of making appointment (8/16/2024 11:06 AM)  Has the patient kept scheduled appointments due by today?: Yes (8/16/2024 11:06 AM)    Self Management  What is the home health agency?: [facility] , aware of OBS patient has been in contact with nurse (8/16/2024 11:06 AM)  What Durable Medical Equipment (DME) was ordered?: Walker (8/16/2024 11:06 AM)  Has all Durable Medical Equipment (DME) been delivered?: Yes (8/16/2024 11:06 AM)    Patient Teaching  Care Management Interventions: Reviewed instructions with patient (8/16/2024 11:06 AM)  What is the patient's perception of their health  status since discharge?: Improving (8/16/2024 11:06 AM)  Is the patient/caregiver able to teach back the hierarchy of who to call/visit for symptoms/problems? PCP, Specialist, Home Health nurse, Urgent Care, ED, 911: Yes (8/16/2024 11:06 AM)  Patient/Caregiver Education Comments: Patient encourgaed to keep legs elevated , able to do wound care , has supplies , nurse will be in to visit Tuesday , patient awrae to call providers for any questions cocnerns or change in condition Patients uses Dial a ride though insurance , message to PCP regarding F/U appt. Patient states she has family DTR assistance if in need. Patient denies any side effects of ATB states no diarrhea she will eat Yogurt daily has in home. (8/16/2024 11:06 AM)

## 2024-08-16 NOTE — TELEPHONE ENCOUNTER
----- Message from Christi Abdalla sent at 8/16/2024 11:00 AM EDT -----  Regarding: Hello, can you please call patient to [facility] F/U with in 2 weeks by 8/28/2024 for TCM billing thank You !    Can you please contact pt to schedule hosp  follow up within 14 days of discharge.    This patient was discharged from:  [facility]     Discharge diagnosis:       Cellulitis     Issues Requiring Follow-Up  Wound care     Date of discharge:    8/15/2024       Thank you

## 2024-08-17 LAB
BACTERIA BLD CULT: NORMAL
BACTERIA BLD CULT: NORMAL

## 2024-08-18 NOTE — DOCUMENTATION CLARIFICATION NOTE
"    PATIENT:               LION PEREZ  ACCT #:                  [id number]  MRN:                       [id number]  :                       1960  ADMIT DATE:       2024 7:41 PM  DISCH DATE:        8/15/2024 5:24 PM  RESPONDING PROVIDER #:        [id number]          PROVIDER RESPONSE TEXT:    Hyponatremia is ruled out    CDI QUERY TEXT:    Clarification      Instruction:    Based on your assessment of the patient and the clinical information, please provide the requested documentation by clicking on the appropriate radio button and enter any additional information if prompted.    Question: Please clarify the diagnosis of Hyponatremia    When answering this query, please exercise your independent professional judgment. The fact that a question is being asked, does not imply that any particular answer is desired or expected.    The patient's clinical indicators include:  Clinical Information: Patient admitted with bilateral lower extremity cellulitis 2/2 chronic venous stasis and lymphedema    -Documented Diagnosis: History and physical assessment: \"Hyponatremia\"  -Laboratory Results: 24-8/15/24: Sodium-131, 132, 135, 136  -Clinical Manifestations/Physical Exam Findings: H/P- \"Suspect hypotonic hypovolemic subtype as pt is clinically hypovolemic\"    Treatment: sodium chloride 0.9 percent infusion-24-8/15/24    Risk Factors: polypharmacy  Options provided:  -- Hyponatremia is clinically significant, as evidenced by and treated with, Please specify additional information below  -- Hyponatremia is ruled out  -- Other - I will add my own diagnosis  -- Refer to Clinical Documentation Reviewer    Query created by: Karla Jasso on 8/15/2024 1:50 PM      Electronically signed by:  VIANCA KINNEY MD 2024 3:54 PM          "

## 2024-08-19 NOTE — SIGNIFICANT EVENT
Follow Up Phone Call    Outgoing phone call    Spoke to: Neisha Graham Relationship:self   Phone number: [phone]      Outcome: contacted patient/ family   Chief Complaint   Patient presents with    Leg Swelling     Under tx for leg wounds and drainage for several months           Diagnosis:Not applicable    States she is feeling better in general but still not feeling well. She is preparing to move to [place] in mid September.  No further questions or concerns.

## 2024-08-20 NOTE — SIGNIFICANT EVENT
Follow Up Phone Call    Outgoing phone call    Spoke to: Neisha Graham Relationship:self   Phone number: [phone]      Outcome: contacted patient/ family   Chief Complaint   Patient presents with    Leg Swelling     Under tx for leg wounds and drainage for several months           Diagnosis:Not applicable    States she is feeling somewhat better. She has a call out to the wound center. She missed last weeks appointment. States the skin is flaking off on the back of her thigh. This is new. She will discuss with the wound center. She needs a 2 day notice to obtain a ride to appointments. No further questions or concerns.

## 2024-08-23 DIAGNOSIS — J44.9 CHRONIC OBSTRUCTIVE PULMONARY DISEASE, UNSPECIFIED COPD TYPE (MULTI): Primary | ICD-10-CM

## 2024-08-26 ENCOUNTER — APPOINTMENT (OUTPATIENT)
Dept: PRIMARY CARE | Facility: CLINIC | Age: 64
End: 2024-08-26
Payer: MEDICARE

## 2024-08-26 DIAGNOSIS — I27.20 PULMONARY HYPERTENSION (MULTI): ICD-10-CM

## 2024-08-26 DIAGNOSIS — E66.01 CLASS 3 SEVERE OBESITY DUE TO EXCESS CALORIES WITH SERIOUS COMORBIDITY AND BODY MASS INDEX (BMI) OF 50.0 TO 59.9 IN ADULT (MULTI): ICD-10-CM

## 2024-08-26 DIAGNOSIS — Z12.12 SCREENING FOR COLORECTAL CANCER: ICD-10-CM

## 2024-08-26 DIAGNOSIS — I89.0 LYMPHEDEMA: ICD-10-CM

## 2024-08-26 DIAGNOSIS — J45.30 MILD PERSISTENT ASTHMA WITHOUT COMPLICATION (HHS-HCC): ICD-10-CM

## 2024-08-26 DIAGNOSIS — Z12.31 ENCOUNTER FOR SCREENING MAMMOGRAM FOR BREAST CANCER: ICD-10-CM

## 2024-08-26 DIAGNOSIS — G47.33 OSA ON CPAP: ICD-10-CM

## 2024-08-26 DIAGNOSIS — B35.4 TINEA CORPORIS: ICD-10-CM

## 2024-08-26 DIAGNOSIS — I48.3 TYPICAL ATRIAL FLUTTER (MULTI): ICD-10-CM

## 2024-08-26 DIAGNOSIS — Z71.89 CARDIAC RISK COUNSELING: ICD-10-CM

## 2024-08-26 DIAGNOSIS — F33.1 MDD (MAJOR DEPRESSIVE DISORDER), RECURRENT EPISODE, MODERATE (MULTI): ICD-10-CM

## 2024-08-26 DIAGNOSIS — Z00.00 ROUTINE GENERAL MEDICAL EXAMINATION AT HEALTH CARE FACILITY: Primary | ICD-10-CM

## 2024-08-26 DIAGNOSIS — J43.2 CENTRILOBULAR EMPHYSEMA (MULTI): ICD-10-CM

## 2024-08-26 DIAGNOSIS — L03.119 CELLULITIS OF LOWER EXTREMITY, UNSPECIFIED LATERALITY: ICD-10-CM

## 2024-08-26 DIAGNOSIS — I74.9 CHRONIC THROMBOEMBOLIC DISEASE (MULTI): ICD-10-CM

## 2024-08-26 DIAGNOSIS — Z12.11 SCREENING FOR COLORECTAL CANCER: ICD-10-CM

## 2024-08-26 DIAGNOSIS — G08 CAVERNOUS SINUS THROMBOSIS (HHS-HCC): ICD-10-CM

## 2024-08-26 DIAGNOSIS — E78.2 MIXED HYPERLIPIDEMIA: ICD-10-CM

## 2024-08-26 DIAGNOSIS — D47.3 ESSENTIAL THROMBOCYTOSIS (MULTI): ICD-10-CM

## 2024-08-26 PROCEDURE — G0439 PPPS, SUBSEQ VISIT: HCPCS | Performed by: FAMILY MEDICINE

## 2024-08-26 PROCEDURE — 1111F DSCHRG MED/CURRENT MED MERGE: CPT | Performed by: FAMILY MEDICINE

## 2024-08-26 PROCEDURE — 99495 TRANSJ CARE MGMT MOD F2F 14D: CPT | Performed by: FAMILY MEDICINE

## 2024-08-26 PROCEDURE — 1170F FXNL STATUS ASSESSED: CPT | Performed by: FAMILY MEDICINE

## 2024-08-26 PROCEDURE — 1123F ACP DISCUSS/DSCN MKR DOCD: CPT | Performed by: FAMILY MEDICINE

## 2024-08-26 PROCEDURE — G0446 INTENS BEHAVE THER CARDIO DX: HCPCS | Performed by: FAMILY MEDICINE

## 2024-08-26 PROCEDURE — 1158F ADVNC CARE PLAN TLK DOCD: CPT | Performed by: FAMILY MEDICINE

## 2024-08-26 PROCEDURE — 4004F PT TOBACCO SCREEN RCVD TLK: CPT | Performed by: FAMILY MEDICINE

## 2024-08-26 PROCEDURE — 1159F MED LIST DOCD IN RCRD: CPT | Performed by: FAMILY MEDICINE

## 2024-08-26 PROCEDURE — 1160F RVW MEDS BY RX/DR IN RCRD: CPT | Performed by: FAMILY MEDICINE

## 2024-08-26 RX ORDER — FLUCONAZOLE 150 MG/1
150 TABLET ORAL
Qty: 3 TABLET | Refills: 0 | Status: SHIPPED | OUTPATIENT
Start: 2024-08-26 | End: 2024-09-16

## 2024-08-26 RX ORDER — CLOTRIMAZOLE 1 %
CREAM (GRAM) TOPICAL 2 TIMES DAILY
Qty: 60 G | Refills: 1 | Status: SHIPPED | OUTPATIENT
Start: 2024-08-26 | End: 2024-12-24

## 2024-08-26 ASSESSMENT — ACTIVITIES OF DAILY LIVING (ADL)
BATHING: INDEPENDENT
MANAGING_FINANCES: INDEPENDENT
DRESSING: INDEPENDENT
GROCERY_SHOPPING: NEEDS ASSISTANCE
TAKING_MEDICATION: INDEPENDENT
DOING_HOUSEWORK: NEEDS ASSISTANCE

## 2024-08-26 ASSESSMENT — PATIENT HEALTH QUESTIONNAIRE - PHQ9
1. LITTLE INTEREST OR PLEASURE IN DOING THINGS: NOT AT ALL
2. FEELING DOWN, DEPRESSED OR HOPELESS: NOT AT ALL
SUM OF ALL RESPONSES TO PHQ9 QUESTIONS 1 AND 2: 0

## 2024-08-26 ASSESSMENT — ENCOUNTER SYMPTOMS
OCCASIONAL FEELINGS OF UNSTEADINESS: 1
DEPRESSION: 0
LOSS OF SENSATION IN FEET: 0

## 2024-08-26 NOTE — PATIENT INSTRUCTIONS
Quitting Smoking    Quitting smoking is the most important step you can take to improve your health. We're glad you have set a goal to improve your health.    Quit Smoking Resources    In addition to medications, use the STAR plan to help you successfully quit.   Stick with your quit date!   Tell friends, family, and coworkers your quit date. Request their understanding and support.  Anticipate and prepare for challenges. Some examples are withdrawal symptoms, being around others who smoke, and drinking alcohol.  Remove all tobacco products and paraphernalia from your environment. Make your home and vehicles smoke-free.    Free resources for additional support:  National tobacco quitline: 1-800-QUIT-NOW (1-140.123.6155).  SmokefreeTXT is a free text program to assist you in quitting. Visit https://www.smokefree.gov/smokefreetxt for more information.  Feel free to call your care manager at ([phone]) for additional support.            Ways to Help Prevent Falls at Home    Quick Tips   ? Ask for help if you need it. Most people want to help!   ? Get up slowly after sitting or laying down   ? Wear a medical alert device or keep cell phone in your pocket   ? Use night lights, especially areas near a bathroom   ? Keep the items you use often within reach on a small stool or end table   ? Use an assistive device such as walker or cane, as directed by provider/physical therapy   ? Use a non-slip mat and grab bars in your bathroom. Look for home health sections for best options     Other Areas to Focus On   ? Exercise and nutrition: Regular exercise or taking a falls prevention class are great ways improve strength and balance. Don’t forget to stay hydrated and bring a snack!   ? Medicine side effects: Some medicines can make you sleepy or dizzy, which could cause a fall. Ask your healthcare provider about the side effects your medicines could cause. Be sure to let them know if you take any vitamins or supplements as  well.   ? Tripping hazards: Remove items you could trip on, such as loose mats, rugs, cords, and clutter. Wear closed toe shoes with rubber soles.   ? Health and wellness: Get regular checkups with your healthcare provider, plus routine vision and hearing screenings. Talk with your healthcare provider about:   o Your medicines and the possible side effects - bring them in a bag if that is easier!   o Problems with balance or feeling dizzy   o Ways to promote bone health, such as Vitamin D and calcium supplements   o Questions or concerns about falling     *Ask your healthcare team if you have questions     ©[facility], 2022

## 2024-08-26 NOTE — PROGRESS NOTES
"Subjective   Reason for Visit: Neisha Graham is an 64 y.o. female here for a Medicare Wellness visit.     Past Medical, Surgical, and Family History reviewed and updated in chart.    Reviewed all medications by prescribing practitioner or clinical pharmacist (such as prescriptions, OTCs, herbal therapies and supplements) and documented in the medical record.    HPI  Patient: Neisha Graham  : 1960  PCP: Micheal Gonsalez DO  MRN: [id number]  Program: Transitional Care Management  Status: Enrolled  Effective Dates: 2024 - present  Responsible Staff: Christi Abdalla  Social Determinants to be Addressed: Physical Activity, Social Connections, Stress, Tobacco Use         Neisha Graham is a 64 y.o. female presenting today for follow-up after being discharged from the hospital 11 days ago. The main problem requiring admission was Cellulitis. The discharge summary and/or Transitional Care Management documentation was reviewed. Medication reconciliation was performed as indicated via the \"Haroldo as Reviewed\" timestamp.     Neisha Graham was contacted by Transitional Care Management services two days after her discharge. This encounter and supporting documentation was reviewed.    Was in the hospital for cellulitis- 3 days of IV antibiotics  Sent home on Keflex  Legs looking about the same- they will dry up but then open back up and start seeping  Having to lay with legs elevated to help  Will be at wound clinic on Wednesday  Having peeling on back of leg and buttocks    Having rash under breasts that won't go away  Has been on nystatin and clotrimazole for this- has not gone away  Area is burning  Will itch at times also  No discharge from it  It is turning bras green    No fever  Some chills  No dizziness, vomiting  Slight nausea  No CP, SOB, palpitations, numbness, weakness    Ophtho-  Dentist-  Colonoscopy- - repeat 5 years  DERICK-  FOBT-  UA/Micro-  Mammo- ordered  DEXA-  PAP-  Lung CT-  Coronary " Calcium CT Score-  AAA-  EKG-  Pneumovax-  Prevnar-  Flu- 10/16  Shingrix-  Td-  Hep C-  Advance Directives-  AWV- 8/24  CV risk- 8/24      The complexity of medical decision making for this patient's transitional care is moderate.    Patient Care Team:  Micheal Gonsalez DO as PCP - General  Micheal Gonsalez DO as PCP - [facility] PCP  LAKEISHA Lo-CNP as PCP - [facility] Attributed Provider  Christi Abdalla as Care Manager (Case Management)     Review of Systems    Objective   Vitals:  There were no vitals taken for this visit.      Physical Exam    Assessment/Plan   Problem List Items Addressed This Visit             ICD-10-CM    Typical atrial flutter (Multi) I48.3    Pulmonary hypertension (Multi) I27.20    BE on CPAP G47.33    Mixed hyperlipidemia E78.2    MDD (major depressive disorder), recurrent episode, moderate (Multi) F33.1    Lymphedema I89.0    Essential thrombocytosis (Multi) D47.3    COPD (chronic obstructive pulmonary disease) (Multi) J44.9    Class 3 severe obesity due to excess calories with serious comorbidity and body mass index (BMI) of 50.0 to 59.9 in adult (Multi) E66.01, Z68.43    Chronic thromboembolic disease (Multi) I74.9    [facility] G08    Asthma ([facility]) J45.909     Other Visit Diagnoses         Codes    Routine general medical examination at health care facility    -  Primary Z00.00    Cardiac risk counseling [Z71.89]     Z71.89    Cellulitis of lower extremity, unspecified laterality     L03.119    Tinea corporis     B35.4    Relevant Medications    fluconazole (Diflucan) 150 mg tablet    clotrimazole (Lotrimin) 1 % cream    Encounter for screening mammogram for breast cancer     Z12.31    Relevant Orders    BI mammo bilateral screening tomosynthesis    Screening for colorectal cancer     Z12.11, Z12.12    Relevant Orders    Colonoscopy Screening; Average Risk Patient        Renewed/continued rest of medications  Updated HM     HTN,  controlled- continue meds, low sodium diet     Obesity- caloric restriction, increase CV exercise     Hyperlipidemia- continue meds, low fat/cholesterol diet     RLS, controlled- requip, fluids     Pulmonary HTN/COPD/Asthma- albuterol, trelegy, nocturnal oxygen, hospital bed, avoid poor air quality     BE- CPA, weight loss     Lymphedema- wrap legs, elevate legs, hospital bed, wound management     Lumbar Radiculitis- f/u with pain management     Anemia/thrombocytosis- follows with hematology, folic acid, iron     Chronic Thromboembolic Dx/atrial flutter (resolved)/cavernpous sinus thrombosis- coumadin, check INR's     MDD- duloxetine, xanax prn    Thrombocytosis- follow CBC    Tinea Corporis- fluconazole 150 mg weekly for 3 weeks, clotrimazole cream daily    Cellulitis- follow up with wound management     F/U 3-6 months    Cardiovascular risk discussed and, if needed, lifestyle modifications recommended, including nutritional choices, exercise, and elimination of habits contributing to risk. We agreed on a plan to reduce the current cardiovascular risk. See the ASCVD Risk  Plus (23.2%) for data discussed regarding risk and risk reduction opportunities. Aspirin use/disuse was discussed after reviewing updated guidelines    I discussed with the patient the potential benefits and risks of the use of telephone or video-conferencing that differ from in-person services (e.g., limits to patient confidentiality, limitations on the provider’s ability to observe the patient, limitations on the diagnostic tools available). I explained to the patient that I may determine at any point that telehealth services are not appropriate based on the patient’s circumstances and either party may therefore end the service to schedule an alternative in-person service or contact 911 to address a medical emergency. With the understanding of these risks, benefits and alternatives, the patient agreed to use the telephone or  "video-conferencing platform selected for this virtual session and further the patient confirmed his/her understanding that the services do not guarantee a specific outcome or recovery.  Spent 36 minutes with pt face to face and more than 50% of this time was spent in counseling and coordination of care.\"      "

## 2024-08-27 DIAGNOSIS — M17.12 PRIMARY OSTEOARTHRITIS OF LEFT KNEE: ICD-10-CM

## 2024-08-27 DIAGNOSIS — M48.062 NEUROGENIC CLAUDICATION DUE TO LUMBAR SPINAL STENOSIS: ICD-10-CM

## 2024-08-27 DIAGNOSIS — M54.16 CHRONIC LUMBAR RADICULOPATHY: ICD-10-CM

## 2024-08-27 DIAGNOSIS — M17.11 PRIMARY OSTEOARTHRITIS OF RIGHT KNEE: ICD-10-CM

## 2024-08-27 DIAGNOSIS — J43.2 CENTRILOBULAR EMPHYSEMA (MULTI): ICD-10-CM

## 2024-08-27 RX ORDER — FLUTICASONE FUROATE, UMECLIDINIUM BROMIDE AND VILANTEROL TRIFENATATE 200; 62.5; 25 UG/1; UG/1; UG/1
POWDER RESPIRATORY (INHALATION)
Qty: 180 EACH | Refills: 1 | Status: SHIPPED | OUTPATIENT
Start: 2024-08-27

## 2024-08-27 RX ORDER — OXYCODONE HYDROCHLORIDE 10 MG/1
10 TABLET ORAL EVERY 4 HOURS PRN
Qty: 168 TABLET | Refills: 0 | Status: SHIPPED | OUTPATIENT
Start: 2024-08-27 | End: 2024-09-24

## 2024-08-28 ENCOUNTER — APPOINTMENT (OUTPATIENT)
Dept: WOUND CARE | Facility: HOSPITAL | Age: 64
End: 2024-08-28
Payer: COMMERCIAL

## 2024-08-28 ENCOUNTER — PATIENT OUTREACH (OUTPATIENT)
Dept: PRIMARY CARE | Facility: CLINIC | Age: 64
End: 2024-08-28
Payer: COMMERCIAL

## 2024-08-28 NOTE — PROGRESS NOTES
Call regarding appt. with PCP on 8/26/2024 after hospitalization. Neisha states her ride for wound clinic  car broke down per transportation service, will now see on Wed. 9/4/2024 , has visiting nurse active in home through  [facility] . Patient has completed ATB from [facility] . Patient states legs are no better feels as though they are becoming worse ..  I Called [facility] spoke with  Sasha nurse states nurse in home had a  missed visit on 8/28   D/T patient  appt. Will see she if can have a PRN yue. In home asap. I provided my  name and number as well for any communication. Needs/concerns. I will F/U

## 2024-08-30 ENCOUNTER — TELEMEDICINE (OUTPATIENT)
Dept: PHARMACY | Facility: HOSPITAL | Age: 64
End: 2024-08-30
Payer: COMMERCIAL

## 2024-08-30 ENCOUNTER — PATIENT OUTREACH (OUTPATIENT)
Dept: PRIMARY CARE | Facility: CLINIC | Age: 64
End: 2024-08-30

## 2024-08-30 DIAGNOSIS — F41.1 GENERALIZED ANXIETY DISORDER: Primary | ICD-10-CM

## 2024-08-30 DIAGNOSIS — J44.9 CHRONIC OBSTRUCTIVE PULMONARY DISEASE, UNSPECIFIED COPD TYPE (MULTI): ICD-10-CM

## 2024-08-30 RX ORDER — HYDROXYZINE HYDROCHLORIDE 50 MG/1
50 TABLET, FILM COATED ORAL EVERY 8 HOURS PRN
Qty: 30 TABLET | Refills: 3 | Status: SHIPPED | OUTPATIENT
Start: 2024-08-30

## 2024-08-30 RX ORDER — PRAMIPEXOLE DIHYDROCHLORIDE 1 MG/1
1 TABLET ORAL 2 TIMES DAILY
COMMUNITY

## 2024-08-30 RX ORDER — HYDROXYZINE HYDROCHLORIDE 50 MG/1
50 TABLET, FILM COATED ORAL EVERY 8 HOURS PRN
COMMUNITY
End: 2024-08-30 | Stop reason: SDUPTHER

## 2024-08-30 NOTE — PROGRESS NOTES
Pharmacy Post-Discharge Visit    Neisha Graham is a 64 y.o. female was referred to Clinical Pharmacy Team to complete a post-discharge medication optimization and monitoring visit.  The patient was referred for their COPD.    Admission Date: 8/12/24 (cellulitis)  Discharge Date: 8/15/24    Referring Provider: Micheal Gonsalez DO  PCP: Micheal Gonsalez DO  Last Visit: 8/26/24 telehealth  Next visit: not yet scheduled    Subjective   HPI  COPD  Patient has been diagnosed with: COPD and Asthma  does not see a pulmonologist    Current Regimen  albuterol as needed  Trelegy Ellipta 200/62.5/25 mcg, one puff once daily    Symptom Management  Current symptoms: some sputum is normal for patient, no change in color or amount  No recent COPD exacerbations, but patient is frequently on antibiotics and/or steroids for management of other conditions  Patient continues smoking, but reports it is minimal due to being unable to smoke in her apartment    Appropriate Inhaler Technique  How do you use your rescue inhaler?  Appropriate technique, occasional use  How do you use your maintenance inhaler?  Appropriate technique, no missed doses, rinses mouth after    Notable Medication changes following discharge  Start: cephalexin (now completed)  Stop: lisinopril, nystatin, oxycodone    Medication Reconciliation  Updated med list  Added hydroxyzine, adjusted warfarin dose  Patient needs a new script for hydroxyzine as her current bottle is from 2021     Medication System Management  Patient's preferred pharmacy: [facility] in [place], [place]  Adherence/Organization: Patient fills 3 weeks of pill boxes, denies missed doses  Affordability/Accessibility: No concerns at this time      Objective   Allergies   Allergen Reactions    Vancomycin Other    Macrobid [Nitrofurantoin Monohyd/M-Cryst] Dizziness    Sulfamethoxazole-Trimethoprim Unknown     Social History     Social History Narrative    Not on file      Medication Review  Current Outpatient  Medications   Medication Instructions    albuterol 90 mcg/actuation inhaler 2 puffs, inhalation, Every 4 hours PRN    ALPRAZolam (Xanax) 0.5 mg tablet take 1/2 tablet (0.25 MILLIGRAMS) by mouth twice a day if needed for anxiety    amino acids-protein hydrolys (ProSource No Carb) 15-60 gram-kcal/30 mL liquid oral    ammonium lactate (Amlactin) 12 % cream 1 Film, Topical, 2 times daily PRN    clotrimazole (Lotrimin) 1 % cream Topical, 2 times daily, apply to affected area    cyclobenzaprine (FLEXERIL) 10 mg, oral, 2 times daily PRN    DULoxetine (CYMBALTA) 20 mg, oral, Daily    fluconazole (DIFLUCAN) 150 mg, oral, Once Weekly    hydroxyurea (HYDREA) 1,000 mg, oral, Daily    hydrOXYzine HCL (ATARAX) 50 mg, oral, Every 8 hours PRN    lidocaine 5 % gel 1 inch, topical (top), 3 times daily PRN    naloxone (Narcan) 4 mg/0.1 mL nasal spray nasal    oxyCODONE (ROXICODONE) 10 mg, oral, Every 4 hours PRN    pramipexole (MIRAPEX) 1 mg, oral, 2 times daily    pregabalin (LYRICA) 25 mg, oral, 3 times daily    Tiadylt  mg, oral, Daily    Trelegy Ellipta 200-62.5-25 mcg blister with device INHALE 1 PUFF BY MOUTH AND IN TO LUNGS ONCE DAILY    triamcinolone (Kenalog) 0.1 % cream APPLY SPARINGLY AND MASSAGE IN ONCE A DAY    warfarin (Coumadin) 7.5 mg tablet 1.5 tablets, oral, Weekly, On Tuesday Thursday and Saturday    warfarin (COUMADIN) 7.5 mg, oral, 4 times weekly, On Sunday, Monday, Wednesday, Friday      Vitals  BP Readings from Last 2 Encounters:   08/15/24 150/83   05/30/24 164/88     BMI Readings from Last 1 Encounters:   08/13/24 57.50 kg/m²      Labs  A1C  Lab Results   Component Value Date    HGBA1C 5.9 (H) 08/13/2024    HGBA1C 5.8 09/27/2020     BMP  Lab Results   Component Value Date    CALCIUM 7.6 (L) 08/15/2024     08/15/2024    K 4.9 08/15/2024    CO2 25 08/15/2024     08/15/2024    BUN 14 08/15/2024    CREATININE 0.64 08/15/2024    EGFR >90 08/15/2024     LFTs  Lab Results   Component Value Date     "ALT 19 08/12/2024    AST 22 08/12/2024    ALKPHOS 206 (H) 08/12/2024    BILITOT 0.4 08/12/2024     FLP  Lab Results   Component Value Date    TRIG 200 (H) 11/14/2022    CHOL 157 11/14/2022    LDLF 96 11/14/2022    HDL 21.1 (A) 11/14/2022     Urine Microalbumin  No results found for: \"MICROALBCREA\"  Wt Readings from Last 3 Encounters:   08/13/24 (!) 152 kg (335 lb)   05/06/24 (!) 155 kg (341 lb 4.4 oz)   01/04/24 (!) 152 kg (335 lb 1.6 oz)      There is no height or weight on file to calculate BMI.       Assessment/Plan   Problem List Items Addressed This Visit       COPD (chronic obstructive pulmonary disease) (Multi)     DISCUSSION/PLAN:  Patient with COPD that is currently controlled and stable.    CONTINUE  albuterol as needed  Trelegy Ellipta 200/62.5/25 mcg, one puff once daily    Monitoring and Education:  Reviewed inhaler technique, monitoring, and adverse effects  Discussed patient's rash that she reports started the day after hospital discharge. The rash extends from right thigh and buttocks with areas of peeling skin. Patient denies black areas and states that the area has not increased in size over the last 24-48 hours. She was discharged on Keflex but reports tolerating this medication before. Patient currently follows with home health nurse and has a wound care appointment next week. Advised her to seek emergency treatment if rash extends or if she notices black/blistering areas.  Strongly encouraged smoking cessation  Answered all patient questions    Continue all meds under the continuation of care with the referring provider and clinical pharmacy team.    Clinical Pharmacist follow-up: as needed  Orders placed: None at this time    Thank you,   Neva Membreno, PharmD  Clinical Pharmacy Specialist, Primary Care   [phone]    Verbal consent to manage patient's drug therapy was obtained from the patient . Patient was informed she may decline to participate or withdraw from participation in pharmacy " services at any time.

## 2024-08-30 NOTE — PROGRESS NOTES
Call regarding  F/U Patient states [facility]illed [facility] nurse in home today , assessment done on wounds. Will see Wound clinic next week, ordered wound supplies, states she is doing okay for right now.    Encouraged to call providers for any questions concerns or change in condition

## 2024-08-30 NOTE — Clinical Note
Hello Doctor, Please send a new script for hydroxyzine PRN if you feel appropriate - patient is currently using  medication. She also reports a new rash since discharge.

## 2024-09-03 DIAGNOSIS — L03.119 CELLULITIS OF LOWER EXTREMITY, UNSPECIFIED LATERALITY: ICD-10-CM

## 2024-09-03 RX ORDER — DOXYCYCLINE 100 MG/1
100 CAPSULE ORAL 2 TIMES DAILY
Qty: 20 CAPSULE | Refills: 0 | Status: SHIPPED | OUTPATIENT
Start: 2024-09-03 | End: 2024-09-13

## 2024-09-04 ENCOUNTER — OFFICE VISIT (OUTPATIENT)
Dept: WOUND CARE | Facility: HOSPITAL | Age: 64
End: 2024-09-04
Payer: MEDICARE

## 2024-09-04 DIAGNOSIS — I48.3 TYPICAL ATRIAL FLUTTER (MULTI): ICD-10-CM

## 2024-09-04 PROCEDURE — 11042 DBRDMT SUBQ TIS 1ST 20SQCM/<: CPT

## 2024-09-04 PROCEDURE — 11045 DBRDMT SUBQ TISS EACH ADDL: CPT

## 2024-09-04 RX ORDER — DILTIAZEM HYDROCHLORIDE 180 MG/1
180 CAPSULE, EXTENDED RELEASE ORAL DAILY
Qty: 90 CAPSULE | Refills: 1 | Status: SHIPPED | OUTPATIENT
Start: 2024-09-04

## 2024-09-05 ENCOUNTER — OFFICE VISIT (OUTPATIENT)
Dept: PAIN MEDICINE | Facility: CLINIC | Age: 64
End: 2024-09-05
Payer: MEDICARE

## 2024-09-05 VITALS
DIASTOLIC BLOOD PRESSURE: 76 MMHG | OXYGEN SATURATION: 98 % | RESPIRATION RATE: 17 BRPM | SYSTOLIC BLOOD PRESSURE: 127 MMHG | HEART RATE: 79 BPM

## 2024-09-05 DIAGNOSIS — M48.062 NEUROGENIC CLAUDICATION DUE TO LUMBAR SPINAL STENOSIS: ICD-10-CM

## 2024-09-05 DIAGNOSIS — M54.16 CHRONIC LUMBAR RADICULOPATHY: Primary | ICD-10-CM

## 2024-09-05 DIAGNOSIS — M17.11 PRIMARY OSTEOARTHRITIS OF RIGHT KNEE: ICD-10-CM

## 2024-09-05 DIAGNOSIS — M17.12 PRIMARY OSTEOARTHRITIS OF LEFT KNEE: ICD-10-CM

## 2024-09-05 DIAGNOSIS — Z79.891 ENCOUNTER FOR LONG-TERM USE OF OPIATE ANALGESIC: ICD-10-CM

## 2024-09-05 DIAGNOSIS — M79.18 MYOFASCIAL PAIN: ICD-10-CM

## 2024-09-05 PROCEDURE — 3074F SYST BP LT 130 MM HG: CPT | Performed by: NURSE PRACTITIONER

## 2024-09-05 PROCEDURE — 1111F DSCHRG MED/CURRENT MED MERGE: CPT | Performed by: NURSE PRACTITIONER

## 2024-09-05 PROCEDURE — 99213 OFFICE O/P EST LOW 20 MIN: CPT | Performed by: NURSE PRACTITIONER

## 2024-09-05 PROCEDURE — 1160F RVW MEDS BY RX/DR IN RCRD: CPT | Performed by: NURSE PRACTITIONER

## 2024-09-05 PROCEDURE — 3078F DIAST BP <80 MM HG: CPT | Performed by: NURSE PRACTITIONER

## 2024-09-05 PROCEDURE — 4004F PT TOBACCO SCREEN RCVD TLK: CPT | Performed by: NURSE PRACTITIONER

## 2024-09-05 PROCEDURE — 1159F MED LIST DOCD IN RCRD: CPT | Performed by: NURSE PRACTITIONER

## 2024-09-05 RX ORDER — OXYCODONE HYDROCHLORIDE 10 MG/1
10 TABLET ORAL EVERY 4 HOURS PRN
Qty: 168 TABLET | Refills: 0 | Status: SHIPPED | OUTPATIENT
Start: 2024-09-24 | End: 2024-10-22

## 2024-09-05 RX ORDER — CYCLOBENZAPRINE HCL 10 MG
10 TABLET ORAL 2 TIMES DAILY PRN
Qty: 30 TABLET | Refills: 2 | Status: SHIPPED | OUTPATIENT
Start: 2024-09-05 | End: 2024-12-04

## 2024-09-05 RX ORDER — OXYCODONE HYDROCHLORIDE 10 MG/1
10 TABLET ORAL EVERY 4 HOURS PRN
Qty: 168 TABLET | Refills: 0 | Status: SHIPPED | OUTPATIENT
Start: 2024-10-22 | End: 2024-11-19

## 2024-09-05 RX ORDER — NALOXONE HYDROCHLORIDE 4 MG/.1ML
1 SPRAY NASAL AS NEEDED
Qty: 2 EACH | Refills: 0 | Status: SHIPPED | OUTPATIENT
Start: 2024-09-05

## 2024-09-05 RX ORDER — PREGABALIN 50 MG/1
50 CAPSULE ORAL 3 TIMES DAILY
Qty: 180 CAPSULE | Refills: 2 | Status: SHIPPED | OUTPATIENT
Start: 2024-09-05 | End: 2025-03-04

## 2024-09-05 ASSESSMENT — ENCOUNTER SYMPTOMS
ALLERGIC/IMMUNOLOGIC NEGATIVE: 1
CARDIOVASCULAR NEGATIVE: 1
BACK PAIN: 1
PAIN: 1
MYALGIAS: 1
WOUND: 1
NECK STIFFNESS: 0
JOINT SWELLING: 1
CONSTITUTIONAL NEGATIVE: 1
GASTROINTESTINAL NEGATIVE: 1
HEMATOLOGIC/LYMPHATIC NEGATIVE: 1
ENDOCRINE NEGATIVE: 1
DIZZINESS: 0
ARTHRALGIAS: 1
SEIZURES: 0
NECK PAIN: 0
LIGHT-HEADEDNESS: 0
HEADACHES: 0
FACIAL ASYMMETRY: 0
PSYCHIATRIC NEGATIVE: 1
TREMORS: 0
SPEECH DIFFICULTY: 0
EYES NEGATIVE: 1
WEAKNESS: 1
NUMBNESS: 1
RESPIRATORY NEGATIVE: 1

## 2024-09-05 NOTE — PROGRESS NOTES
Patient ID: Neisha Graham is a 64 y.o. female who presents for chronic pain management of low back pain and knee joint pain from arthritis.    Med Refill  Associated symptoms include arthralgias, joint swelling, myalgias, numbness, a rash and weakness. Pertinent negatives include no headaches or neck pain.   Pain  Associated symptoms include joint swelling, a rash and weakness. Pertinent negatives include no headaches.       Neisha follows up for refill of medications and interval reevaluation of her low back pain from lumbar stenosis, disc herniation and radiculitis of the lower extremities. Neisha also follows up for hip and knee pain from arthritis     Percocet 10 mg every 4 hours up to 6 per daily as needed and Flexeril 10 mg once to twice daily as needed help to decrease pain and improve function between 40 and 50%. Average pain score with medication is 6  to 7 out of 10. Senokot or other laxative as needed to help avoid constipation. Is able to manage her ADLs with improved function from medications. She reports of having a below average quality of family and social life with current condition and treatment.      Neisha is of polypharmacy with Flexeril, Xanax and hydroxyzine. To address Flexeril she takes this very as needed and not twice daily. I do not prescribe the Xanax or the hydroxyzine. She understands to space her pain medication with these other medications as mentioned above by 2 hours to avoid sedation, respiratory depression, risk of coma and death. She does have a Narcan on status sent 2024.     Toxicology consistent March 7, 2024.  Annual controlled substance agreement and opioid risk tool are completed and scanned into the chart March 7, 2024.     Neisha has chronic knee pain from osteoarthritis. Pain is increased with weightbearing activities. Right knee has more pain and decreased range of motion compared to the left knee. She cannot have surgery because of her BMI greater than 40. She does have  instability in the knees especially with weightbearing activity in standing and walking. Reports that the right knee can give out on her with prolonged standing. Has difficulty getting in and because of weakness of the knee and has decreased range of motion. Has tried hinged bracing to the knees. Bracing feels too heavy and hard to get around. She no longer wears them.     History of intra-articular knee joint injection steroid therapy has helped in the past from orthopedic surgeon. She does have several of these per year.     She currently gets around in her Hoveround. Feels much more secure then utilizing a walker at home. No falls since last office visit.     Hospitalized for cellulitis and venous stasis ulcers August 12 through 15.  Currently follows with wound clinic weekly.  Is with increased leg pain from wound ulcers.  Sharp stabbing and burning pain can be as high as a 7 out of 10.  Requesting an increase on pregabalin.  Reviewed renal function from August 2024 hospitalization.  Increase pregabalin from 25 mg 3 times daily to 50 mg 3 times daily.  To have increased moodiness and sleepiness when medication was increased to 100 mg capsules.     For continuity:   Given the patient's report of reduced pain and improved functional ability without adverse effects, it is reasonable to treat with narcotic medications. The terms of the opioid agreement as well as the potential risks and adverse effects of the patient's medication regimen were discussed in detail. This includes if applicable due to dosage of medication permission to discuss and coordinate care with other treatment providers relevant to the patients condition. The patient verbalized understanding.      Risks and side effects of chronic opioid therapy including but not limited to tolerance, dependence, constipation, hyperalgesia, cognitive side effects, addiction and possible death due to overuse and or misuse were discussed. I also discussed that such  medications when co-administered with other sedative agents including but not limited to alcohol, benzodiazepines, sedative hypnotics and illegal drugs could pose life threatening consequences including death. I also explained the impact that the administration of such medication has on a patient with obstructive sleep apnea and continued recommendations for use of apnea devices if ordered are prescribed by other physicians. In order to effectively and safely treat the pain, I also emphasized the importance of compliance with the treatment plan, as well as compliance with the terms of the opioid agreement, which was reviewed in detail. I explained the importance of being responsible with the medications and to take these only as prescribed, never in excess and never for reasons other than pain reduction. The patient was counseled on keeping the medications safe and locked away from children and other adults as well as disposal methods and options. The patient understood the risks and instructions.      I also discussed with the patient in detail that based on the clinical response to the opioid medications and improvements of activities of daily living, sleep, and work performance in light of compliance with the treatment plan we can continue this form of therapy for the above chronic pain. The goal and rationale used for current treatment with chronic opioid medication is to control the pain and alleviate disability induced by the chronic pain condition noted above after failures of other non-opioid and nonpharmacological modalities to treat the chronic pain and the symptoms associated with have failed. The patient understood the goals in terms of the above treatment plan and had no further questions prior to leaving the office today.      Of note, the above-mentioned diagnoses/conditions and expected fluctuating nature of pain, and pain characteristic changes may lead to prolonged functional impairment requiring  frequent and multiple reassessments with continued high level medical decision making. As noted, medication and medication management may require opiate therapy in excess of a routine less than 30 day medication requirement. The patient may require daily opiate therapy necessitating month-long prescription medication as noted above in order to perform activities of daily living and achieve acceptable quality of life with respect to their chronic pain condition for the foreseeable future. We monitor our patient's carefully through drug monitoring, medication counts, urine drug testing specific to their medication as well as a myriad of other substances and with frequent follow-ups with interval reassement of the chronic pain condition, its pathophysiology and prognosis.      The level of clinical decision making at this office visit is high due to high risks and complications including mortality and morbidity related to acute and chronic pain with respects to life, bodily function, and treatment. Risks and clinical decisions with respect to under treatment, failure to maintain adequate treatment, and/or overtreatment complications and outcomes were discussed with the patient with respect to their chronic pain conditions, interventional therapies, as well as the use of various medications including possible controlled/dangerous medications. The amount and complexity of reviewed data at this in subsequent office visits is high given patient's fluctuating clinical presentation, laboratory and radiographic reports, prescription monitoring program data, and medication history as well as other relevant data as noted above. Pertinent negatives and positives data was used in consideration for the above-mentioned high complexity.       Given the patient's total MED, general use of daily opiates, or other coadministered medications in various classes the patient was offered a prescription for Narcan. I instructed the patient  that it is important that patient fill this medication in order to demonstrate understanding of the gravity of possible side effects including respiratory depression and risk of overdose of this opiate load or medication combination. As such patient will be required to bring Narcan prescription to follow-up appointments as part of compliance with continued opiate care.      With respect to opiate induced constipation I discussed multiple ways to combat this problem including staying hydrated and taking over-the-counter medications such as Dulcolax, Miralax and Senna. If these treatments are not effective we could consider such medications as Amitiza, Linzess and Movantik.      Disclaimer: This note was transcribed using an audio transcription device. As such, minor errors may be present with regard to spelling, punctuation, and inadvertent word insertion. Please disregard such errors.       Review of Systems   Constitutional: Negative.    HENT: Negative.     Eyes: Negative.    Respiratory: Negative.     Cardiovascular: Negative.    Gastrointestinal: Negative.    Endocrine: Negative.    Genitourinary: Negative.    Musculoskeletal:  Positive for arthralgias, back pain, gait problem, joint swelling and myalgias. Negative for neck pain and neck stiffness.   Skin:  Positive for rash and wound.        Treated for fungal skin infection with Diflucan weekly for 3 weeks.    Follows wound clinic for lower leg wounds.   Allergic/Immunologic: Negative.    Neurological:  Positive for weakness and numbness. Negative for dizziness, tremors, seizures, syncope, facial asymmetry, speech difficulty, light-headedness and headaches.   Hematological: Negative.    Psychiatric/Behavioral: Negative.         Physical Exam  Vitals and nursing note reviewed.   Constitutional:       Appearance: Normal appearance.   HENT:      Head: Normocephalic and atraumatic.   Eyes:      Conjunctiva/sclera: Conjunctivae normal.   Cardiovascular:      Rate  and Rhythm: Normal rate and regular rhythm.      Pulses: Normal pulses.      Heart sounds: Murmur heard.      Comments: Systolic murmur right upper sternal border 2-3 out of 6.  Pulmonary:      Effort: Pulmonary effort is normal. No respiratory distress.      Breath sounds: Rhonchi present.      Comments: Harsh cough clears rhonchi posterior bases.  Fair air exchange.  Abdominal:      General: Abdomen is flat. Bowel sounds are normal.      Palpations: Abdomen is soft.   Musculoskeletal:      Right lower leg: Edema present.      Left lower leg: Edema present.      Comments: Lymphedema lower extremities.  Lower extremities consistent  with venous stasis changes of swelling, hyperpigmentation dry scaly skin.  Noted no open wounds or sores to lower extremities.    Active range of motion of right and left knees limited secondary to pain and stiffness with extension and flexion.    Active range of motion of lumbar spine admitted secondary to pain with extension but not with flexion.    Presents in electric chair.   Skin:     General: Skin is warm and dry.      Capillary Refill: Capillary refill takes 2 to 3 seconds.      Findings: Rash present.      Comments: Changes consistent with intertrigo they are widespread to trunk, skin folds, arms and hands including fissuring on the hands.   Neurological:      Mental Status: She is alert and oriented to person, place, and time.      Cranial Nerves: No cranial nerve deficit.      Sensory: No sensory deficit.      Motor: Weakness present.      Gait: Gait abnormal.   Psychiatric:         Behavior: Behavior normal.       Results/Data  Xray Bilateral Knee, 1 or 2 views [id number] 08:52AM Soren Martinez   ORDER REVISED TO A BILATERAL KNEE; 1 OR 2 VIEWS BY RADIOLOGIST; Original Order Number: [id number]      Test Name Result Flag Reference   Xray Bilateral Knee, 1 or 2 views (Report)       FINAL REPORT  Interpreted by: SCHOENBERGER, JOSEPH, A, MD   05/19/22 11:29  MRN:  [id number]  Patient Name: LION PEREZ     STUDY:  BILATERAL KNEE; 1 OR 2 VIEWS; ; 5/17/2022 8:52 am     INDICATION:  AP WB bilat in one shot (orthoball in plane w/ bone), PA 30 degree  flex WB bilat in one shot (no orthoball), LAT (orthoball in plane w/  bone), merchant view 30 degree flex bilat in one shot (no orthoball)  M25.561: Bilateral knee pain M25.562:.     COMPARISON:  None.     ACCESSION NUMBER(S):  [id number]     ORDERING CLINICIAN:  VANNESSA RAPHAEL     FINDINGS:  There is advanced tricompartmental degenerative change in the left  knee with bone-on-bone appearance of both the medial tibiofemoral and  lateral tibiofemoral compartments. There is slight lateral  subluxation of the tibia in like relation to the femur. There is no  fracture or dislocation.     There is advanced tricompartmental degenerative change in the right  knee. Bone-on-bone appearance of both the medial tibiofemoral and  lateral tibiofemoral compartments. Lateral subluxation of the tibia  in relation to the femoral condyles. No acute fracture or dislocation.     IMPRESSION:  Advanced degenerative change in both knees.        Electronically signed by: SCHOENBERGER, JOSEPH  05/19/22 11:29      CT L Spine without Contrast 03Feb2021 11:49AM Non Ambulatory, Provider   Ordering Provider: LION ALMANZA [id number]      Test Name Result Flag Reference   CT L Spine without Contrast (Report)       Interpreted by: MADELAINE LANDERS  02/03/21 12:10  MRN: [id number]  Patient Name: LION PEREZ     STUDY:  CT L-SPINE WO CONTRAST 2/3/2021 11:49 am     INDICATION:  fall , severe back pain     COMPARISON:  None.     ACCESSION NUMBER(S):  [id number]     ORDERING CLINICIAN:  LION ALMANZA     TECHNIQUE:  Axial CT images of the lumbar spine are obtained. Axial, coronal and  sagittal reconstructions are provided for review.     FINDINGS:  There is disc space narrowing, vacuum disc phenomenon and moderate  anterior osteophytic spurring at  T12-L1.  There is moderate anterior osteophytic spurring at L1-2.  There is mild anterior osteophytic spurring at L2-3 through L5-S1.     Alignment: There is 5 mm anterior subluxation of L4 with respect to  L5.  There is a mild left convex lumbar scoliosis.     Vertebrae/Disc Spaces:  The vertebral body heights are intact. The  disc spaces are preserved.     Lower Thoracic Spine: There is no significant central canal stenosis  in the included lower thoracic region.     T12-L1: There is no significant central canal stenosis.     L1-2: There is no significant central canal or neural foraminal  stenosis.     L2-3: There is mild facet joint hypertrophy. There is no significant  central canal or neural foraminal stenosis.     L3-4: There is moderate facet joint hypertrophy. There is no  significant central canal or neural foraminal stenosis.     L4-5: There is moderate facet joint hypertrophy. There is no  significant central canal or neural foraminal stenosis.     L5-S1: There is mild facet joint hypertrophy. There is no  significant central canal stenosis.  There is a small right lateral disc bulge or herniation. This causes  no compression on the exiting right L4 nerve root.     Prevertebral/Paraspinal Soft Tissues: The prevertebral and paraspinal  soft tissues are unremarkable.     There is atherosclerotic calcification of the thoracic aorta.     IMPRESSION:  1. No acute bony abnormality.  2. Degenerative change.  3. No central canal stenosis or neural foraminal compromise at any  level.  4. Small right lateral disc bulge or herniation causing no  compression on the exiting right L4 nerve root.     Electronically signed by: MADELAINE LANDERS  02/03/21 12:10      MRI L Spine without Contrast [id number] 05:47PM Karla Artis   [Feb 28, 2017 11:15AM Chetan Chauhan]  AMA Intake Activity Log Entry by Chetan Chauhan ([email or web address]) on 2/28/2017 11:10 AM  Status Change: To Closed - Confirmed-Appt Past   [Feb 13,  2017 4:00PM Karla Artis  Reason: Unspecified for MRI L Spine without Contrast      Test Name Result Flag Reference   MRI L Spine without Contrast (Report)       Interpreted by: ALEAH GLOVER  02/16/17 07:03  MRN: [id number]  Patient Name: LION PEREZ     STUDY:  MRI L-SPINE WO; 2/15/2017 5:47 pm  MRI L-SPINE WO; 2/15/2017 5:47 pm     INDICATION:  Signs/Symptoms: Acute on chronic low back pain; increased low back  pain.  Signs/Symptoms: Acute on chronic low back pain; increased low back  pain. RIGHT ONE     COMPARISON:  1/2016.     ACCESSION NUMBER(S):  [id number]     ORDERING CLINICIAN:  KARLA BARBOUR     TECHNIQUE:  The lumbar spine was studied in the sagital, axial and coronal planes  utiliing T1 and T2 weighted images.     FINDINGS:     The marrow signal and vertebral body height are normal. The conus and  sacrum are normal.  Images at each interspace reveal the following:  L1/L2  There is normal alignment and vertebral body height. The disc space  is normal. There is no evidence of canal or foraminal narrowing.  There is no evidence of bulging or herniated disc.  L2/L3  There is normal alignment and vertebral body height. The disc space  is normal. There is no evidence of canal or foraminal narrowing.  There is no evidence of bulging or herniated disc.  L3/L4  Slight progression of spondylolisthesis since the previous exam.  Interval development of focal herniated fragment in the midline  measuring approximately 4 mm in size on parasagittal T2 weighted  image 7/14. Flattening of the thecal sac which measures 9 mm in AP  dimension in the midline. Focal narrowing of the left lateral recess  and neural foramen.  L4/L5  Trace spondylolisthesis and facet hypertrophy. Bilateral facet  hypertrophy with retained fluid in the facet joint bilaterally as  well as re-demonstration of a small synovial cyst which narrows the  right lateral recess on axial T2 weighted image 34/48. No evidence of  disc  herniation or measurable canal stenosis  L5/S1  Mild facet hypertrophy without canal or foraminal narrowing     IMPRESSION:  *Progressive spondylolisthesis and central disc herniation at L3/L4  THIS EXAMINATION WAS INTERPRETED AT [facility]  Transcribed by: Serjio, User  Electronically signed by: Serjio, User  02/16/17 07:03      Xray Knee 1 or 2 View [id number] 12:56PM Junaid Pettit   [Dec 16, 2015 12:47PM Junaid Pettit]  Reason: Unspecified for Xray Knee 1 or 2 View      Test Name Result Flag Reference   Xray Knee 1 or 2 View (Report)       Interpreted by: MARILYN COLE  12/16/15 14:53  STUDY: Bilateral knee dated 12/16/2015 12:56 PM.     INDICATION: Pain.     COMPARISON: None.     ACCESSION NUMBER(S): [id number], [id number].     ORDERING CLINICIAN: JUNAID PETTIT, [phone].     TECHNIQUE: 2 views of the bilateral knee.     FINDINGS: No fracture or dislocation evident. No joint effusion   evident. There is moderate bilateral patellofemoral and lateral   femorotibial and severe bilateral medial femorotibial joint space   degenerative change with note of a degree of lateral shift being of   the tibias with respect to the femurs greater on the right as   compared to the left. The soft tissues are grossly unremarkable.     IMPRESSION:     Degenerative change as discussed above without osseous injury evident.  Transcribed by: Alysia Matute  Electronically signed by: Alysia Matute  12/16/15 14:53      Neisha was seen today for med refill and pain.  Diagnoses and all orders for this visit:  Chronic lumbar radiculopathy (Primary)  -     oxyCODONE (Roxicodone) 10 mg immediate release tablet; Take 1 tablet (10 mg) by mouth every 4 hours if needed for severe pain (7 - 10) for up to 28 days. Do not fill before September 24, 2024.  -     oxyCODONE (Roxicodone) 10 mg immediate release tablet; Take 1 tablet (10 mg) by mouth every 4 hours if needed for severe pain (7 - 10) for up to 28 days. Do not  fill before October 22, 2024.  -     lidocaine 5 % gel; Apply 1 inch topically 3 times a day as needed (apply to affected area).  -     cyclobenzaprine (Flexeril) 10 mg tablet; Take 1 tablet (10 mg) by mouth 2 times a day as needed for muscle spasms.  -     pregabalin (Lyrica) 50 mg capsule; Take 1 capsule (50 mg) by mouth 3 times a day.  Neurogenic claudication due to lumbar spinal stenosis  -     oxyCODONE (Roxicodone) 10 mg immediate release tablet; Take 1 tablet (10 mg) by mouth every 4 hours if needed for severe pain (7 - 10) for up to 28 days. Do not fill before September 24, 2024.  -     oxyCODONE (Roxicodone) 10 mg immediate release tablet; Take 1 tablet (10 mg) by mouth every 4 hours if needed for severe pain (7 - 10) for up to 28 days. Do not fill before October 22, 2024.  -     lidocaine 5 % gel; Apply 1 inch topically 3 times a day as needed (apply to affected area).  -     cyclobenzaprine (Flexeril) 10 mg tablet; Take 1 tablet (10 mg) by mouth 2 times a day as needed for muscle spasms.  Primary osteoarthritis of left knee  -     oxyCODONE (Roxicodone) 10 mg immediate release tablet; Take 1 tablet (10 mg) by mouth every 4 hours if needed for severe pain (7 - 10) for up to 28 days. Do not fill before September 24, 2024.  -     oxyCODONE (Roxicodone) 10 mg immediate release tablet; Take 1 tablet (10 mg) by mouth every 4 hours if needed for severe pain (7 - 10) for up to 28 days. Do not fill before October 22, 2024.  -     lidocaine 5 % gel; Apply 1 inch topically 3 times a day as needed (apply to affected area).  -     cyclobenzaprine (Flexeril) 10 mg tablet; Take 1 tablet (10 mg) by mouth 2 times a day as needed for muscle spasms.  Primary osteoarthritis of right knee  -     oxyCODONE (Roxicodone) 10 mg immediate release tablet; Take 1 tablet (10 mg) by mouth every 4 hours if needed for severe pain (7 - 10) for up to 28 days. Do not fill before September 24, 2024.  -     oxyCODONE (Roxicodone) 10 mg  immediate release tablet; Take 1 tablet (10 mg) by mouth every 4 hours if needed for severe pain (7 - 10) for up to 28 days. Do not fill before October 22, 2024.  -     lidocaine 5 % gel; Apply 1 inch topically 3 times a day as needed (apply to affected area).  -     cyclobenzaprine (Flexeril) 10 mg tablet; Take 1 tablet (10 mg) by mouth 2 times a day as needed for muscle spasms.  Myofascial pain  -     oxyCODONE (Roxicodone) 10 mg immediate release tablet; Take 1 tablet (10 mg) by mouth every 4 hours if needed for severe pain (7 - 10) for up to 28 days. Do not fill before September 24, 2024.  -     oxyCODONE (Roxicodone) 10 mg immediate release tablet; Take 1 tablet (10 mg) by mouth every 4 hours if needed for severe pain (7 - 10) for up to 28 days. Do not fill before October 22, 2024.  -     lidocaine 5 % gel; Apply 1 inch topically 3 times a day as needed (apply to affected area).  -     cyclobenzaprine (Flexeril) 10 mg tablet; Take 1 tablet (10 mg) by mouth 2 times a day as needed for muscle spasms.  Encounter for long-term use of opiate analgesic  -     naloxone (Narcan) 4 mg/0.1 mL nasal spray; Administer 1 spray (4 mg) into affected nostril(s) if needed for opioid reversal or respiratory depression.     Follow up in 12 weeks.

## 2024-09-09 ENCOUNTER — APPOINTMENT (OUTPATIENT)
Dept: HEMATOLOGY/ONCOLOGY | Facility: CLINIC | Age: 64
End: 2024-09-09
Payer: MEDICARE

## 2024-09-09 DIAGNOSIS — D75.1 POLYCYTHEMIA: Primary | ICD-10-CM

## 2024-09-11 ENCOUNTER — PATIENT OUTREACH (OUTPATIENT)
Dept: PRIMARY CARE | Facility: CLINIC | Age: 64
End: 2024-09-11
Payer: COMMERCIAL

## 2024-09-11 ENCOUNTER — OFFICE VISIT (OUTPATIENT)
Dept: WOUND CARE | Facility: HOSPITAL | Age: 64
End: 2024-09-11
Payer: MEDICARE

## 2024-09-11 PROCEDURE — 11042 DBRDMT SUBQ TIS 1ST 20SQCM/<: CPT

## 2024-09-11 PROCEDURE — 11045 DBRDMT SUBQ TISS EACH ADDL: CPT

## 2024-09-11 NOTE — PROGRESS NOTES
Call regarding aF/U     Patient states she is doing fine, now is not a good time as she is waiting for her ride

## 2024-09-15 ENCOUNTER — APPOINTMENT (OUTPATIENT)
Dept: CARDIOLOGY | Facility: HOSPITAL | Age: 64
DRG: 602 | End: 2024-09-15
Payer: MEDICARE

## 2024-09-15 ENCOUNTER — HOSPITAL ENCOUNTER (INPATIENT)
Facility: HOSPITAL | Age: 64
End: 2024-09-15
Attending: EMERGENCY MEDICINE | Admitting: INTERNAL MEDICINE
Payer: MEDICARE

## 2024-09-15 DIAGNOSIS — M17.12 PRIMARY OSTEOARTHRITIS OF LEFT KNEE: ICD-10-CM

## 2024-09-15 DIAGNOSIS — M17.11 PRIMARY OSTEOARTHRITIS OF RIGHT KNEE: ICD-10-CM

## 2024-09-15 DIAGNOSIS — L30.4 INTERTRIGO: ICD-10-CM

## 2024-09-15 DIAGNOSIS — L03.90 WOUND CELLULITIS: ICD-10-CM

## 2024-09-15 DIAGNOSIS — I48.3 TYPICAL ATRIAL FLUTTER (MULTI): ICD-10-CM

## 2024-09-15 DIAGNOSIS — K59.01 SLOW TRANSIT CONSTIPATION: ICD-10-CM

## 2024-09-15 DIAGNOSIS — M79.18 MYOFASCIAL PAIN: ICD-10-CM

## 2024-09-15 DIAGNOSIS — E87.5 HYPERKALEMIA: ICD-10-CM

## 2024-09-15 DIAGNOSIS — M54.16 CHRONIC LUMBAR RADICULOPATHY: ICD-10-CM

## 2024-09-15 DIAGNOSIS — R60.0 LOWER LEG EDEMA: ICD-10-CM

## 2024-09-15 DIAGNOSIS — I48.91 UNSPECIFIED ATRIAL FIBRILLATION (MULTI): ICD-10-CM

## 2024-09-15 DIAGNOSIS — I73.9 CLAUDICATION, INTERMITTENT (CMS-HCC): ICD-10-CM

## 2024-09-15 DIAGNOSIS — N17.9 AKI (ACUTE KIDNEY INJURY) (CMS-HCC): Primary | ICD-10-CM

## 2024-09-15 DIAGNOSIS — M48.062 NEUROGENIC CLAUDICATION DUE TO LUMBAR SPINAL STENOSIS: ICD-10-CM

## 2024-09-15 DIAGNOSIS — R53.1 GENERALIZED WEAKNESS: ICD-10-CM

## 2024-09-15 DIAGNOSIS — L30.9 DERMATITIS: ICD-10-CM

## 2024-09-15 LAB
BASOPHILS # BLD AUTO: 0.03 X10*3/UL (ref 0–0.1)
BASOPHILS NFR BLD AUTO: 0.3 %
EOSINOPHIL # BLD AUTO: 0.05 X10*3/UL (ref 0–0.7)
EOSINOPHIL NFR BLD AUTO: 0.4 %
ERYTHROCYTE [DISTWIDTH] IN BLOOD BY AUTOMATED COUNT: 15 % (ref 11.5–14.5)
HCT VFR BLD AUTO: 43.8 % (ref 36–46)
HGB BLD-MCNC: 14.3 G/DL (ref 12–16)
IMM GRANULOCYTES # BLD AUTO: 0.07 X10*3/UL (ref 0–0.7)
IMM GRANULOCYTES NFR BLD AUTO: 0.6 % (ref 0–0.9)
LYMPHOCYTES # BLD AUTO: 1.33 X10*3/UL (ref 1.2–4.8)
LYMPHOCYTES NFR BLD AUTO: 11.8 %
MCH RBC QN AUTO: 35.1 PG (ref 26–34)
MCHC RBC AUTO-ENTMCNC: 32.6 G/DL (ref 32–36)
MCV RBC AUTO: 108 FL (ref 80–100)
MONOCYTES # BLD AUTO: 1.06 X10*3/UL (ref 0.1–1)
MONOCYTES NFR BLD AUTO: 9.4 %
NEUTROPHILS # BLD AUTO: 8.7 X10*3/UL (ref 1.2–7.7)
NEUTROPHILS NFR BLD AUTO: 77.5 %
NRBC BLD-RTO: 0 /100 WBCS (ref 0–0)
PLATELET # BLD AUTO: 324 X10*3/UL (ref 150–450)
RBC # BLD AUTO: 4.07 X10*6/UL (ref 4–5.2)
WBC # BLD AUTO: 11.2 X10*3/UL (ref 4.4–11.3)

## 2024-09-15 PROCEDURE — 93005 ELECTROCARDIOGRAM TRACING: CPT

## 2024-09-15 PROCEDURE — 84484 ASSAY OF TROPONIN QUANT: CPT | Performed by: NURSE PRACTITIONER

## 2024-09-15 PROCEDURE — 87040 BLOOD CULTURE FOR BACTERIA: CPT | Mod: GEALAB | Performed by: NURSE PRACTITIONER

## 2024-09-15 PROCEDURE — 85652 RBC SED RATE AUTOMATED: CPT | Performed by: NURSE PRACTITIONER

## 2024-09-15 PROCEDURE — 87077 CULTURE AEROBIC IDENTIFY: CPT | Mod: GEALAB | Performed by: NURSE PRACTITIONER

## 2024-09-15 PROCEDURE — 87635 SARS-COV-2 COVID-19 AMP PRB: CPT | Performed by: NURSE PRACTITIONER

## 2024-09-15 PROCEDURE — 99285 EMERGENCY DEPT VISIT HI MDM: CPT

## 2024-09-15 PROCEDURE — 83605 ASSAY OF LACTIC ACID: CPT | Performed by: NURSE PRACTITIONER

## 2024-09-15 PROCEDURE — 85025 COMPLETE CBC W/AUTO DIFF WBC: CPT | Performed by: NURSE PRACTITIONER

## 2024-09-15 PROCEDURE — 36415 COLL VENOUS BLD VENIPUNCTURE: CPT | Performed by: NURSE PRACTITIONER

## 2024-09-15 PROCEDURE — 83880 ASSAY OF NATRIURETIC PEPTIDE: CPT | Performed by: NURSE PRACTITIONER

## 2024-09-15 ASSESSMENT — LIFESTYLE VARIABLES
HAVE YOU EVER FELT YOU SHOULD CUT DOWN ON YOUR DRINKING: NO
TOTAL SCORE: 0
EVER HAD A DRINK FIRST THING IN THE MORNING TO STEADY YOUR NERVES TO GET RID OF A HANGOVER: NO
HAVE PEOPLE ANNOYED YOU BY CRITICIZING YOUR DRINKING: NO
EVER FELT BAD OR GUILTY ABOUT YOUR DRINKING: NO

## 2024-09-15 ASSESSMENT — PAIN - FUNCTIONAL ASSESSMENT: PAIN_FUNCTIONAL_ASSESSMENT: 0-10

## 2024-09-16 ENCOUNTER — APPOINTMENT (OUTPATIENT)
Dept: CARDIOLOGY | Facility: HOSPITAL | Age: 64
End: 2024-09-16
Payer: MEDICARE

## 2024-09-16 ENCOUNTER — APPOINTMENT (OUTPATIENT)
Dept: RADIOLOGY | Facility: HOSPITAL | Age: 64
End: 2024-09-16
Payer: MEDICARE

## 2024-09-16 PROBLEM — R53.1 GENERALIZED WEAKNESS: Status: ACTIVE | Noted: 2024-09-16

## 2024-09-16 PROBLEM — E87.5 HYPERKALEMIA: Status: ACTIVE | Noted: 2024-09-16

## 2024-09-16 PROBLEM — N17.9 AKI (ACUTE KIDNEY INJURY) (CMS-HCC): Status: ACTIVE | Noted: 2024-09-16

## 2024-09-16 PROBLEM — L98.411: Status: ACTIVE | Noted: 2024-09-16

## 2024-09-16 LAB
ALBUMIN SERPL BCP-MCNC: 3.2 G/DL (ref 3.4–5)
ALP SERPL-CCNC: 295 U/L (ref 33–136)
ALT SERPL W P-5'-P-CCNC: 22 U/L (ref 7–45)
ANION GAP SERPL CALC-SCNC: 13 MMOL/L (ref 10–20)
APPEARANCE UR: CLEAR
APTT PPP: 57 SECONDS (ref 27–38)
AST SERPL W P-5'-P-CCNC: 22 U/L (ref 9–39)
ATRIAL RATE: 105 BPM
BACTERIA #/AREA URNS AUTO: ABNORMAL /HPF
BILIRUB SERPL-MCNC: 0.7 MG/DL (ref 0–1.2)
BILIRUB UR STRIP.AUTO-MCNC: NEGATIVE MG/DL
BNP SERPL-MCNC: 87 PG/ML (ref 0–99)
BUN SERPL-MCNC: 60 MG/DL (ref 6–23)
CALCIUM SERPL-MCNC: 9.1 MG/DL (ref 8.6–10.3)
CARDIAC TROPONIN I PNL SERPL HS: 19 NG/L (ref 0–13)
CARDIAC TROPONIN I PNL SERPL HS: 20 NG/L (ref 0–13)
CARDIAC TROPONIN I PNL SERPL HS: 21 NG/L (ref 0–13)
CHLORIDE SERPL-SCNC: 100 MMOL/L (ref 98–107)
CO2 SERPL-SCNC: 18 MMOL/L (ref 21–32)
COLOR UR: YELLOW
CREAT SERPL-MCNC: 1.31 MG/DL (ref 0.5–1.05)
CRP SERPL-MCNC: 16.36 MG/DL
EGFRCR SERPLBLD CKD-EPI 2021: 45 ML/MIN/1.73M*2
ERYTHROCYTE [SEDIMENTATION RATE] IN BLOOD BY WESTERGREN METHOD: 29 MM/H (ref 0–30)
FLUAV RNA RESP QL NAA+PROBE: NOT DETECTED
FLUBV RNA RESP QL NAA+PROBE: NOT DETECTED
GLUCOSE BLD MANUAL STRIP-MCNC: 117 MG/DL (ref 74–99)
GLUCOSE BLD MANUAL STRIP-MCNC: 132 MG/DL (ref 74–99)
GLUCOSE BLD MANUAL STRIP-MCNC: 132 MG/DL (ref 74–99)
GLUCOSE BLD MANUAL STRIP-MCNC: 87 MG/DL (ref 74–99)
GLUCOSE SERPL-MCNC: 88 MG/DL (ref 74–99)
GLUCOSE UR STRIP.AUTO-MCNC: NORMAL MG/DL
HOLD SPECIMEN: NORMAL
INR PPP: 3.4 (ref 0.9–1.1)
KETONES UR STRIP.AUTO-MCNC: NEGATIVE MG/DL
LACTATE SERPL-SCNC: 0.9 MMOL/L (ref 0.4–2)
LEUKOCYTE ESTERASE UR QL STRIP.AUTO: ABNORMAL
MAGNESIUM SERPL-MCNC: 2.37 MG/DL (ref 1.6–2.4)
NITRITE UR QL STRIP.AUTO: NEGATIVE
P AXIS: 61 DEGREES
P OFFSET: 188 MS
P ONSET: 137 MS
PH UR STRIP.AUTO: 5.5 [PH]
POTASSIUM SERPL-SCNC: 5.2 MMOL/L (ref 3.5–5.3)
POTASSIUM SERPL-SCNC: 6 MMOL/L (ref 3.5–5.3)
PR INTERVAL: 162 MS
PROT SERPL-MCNC: 6.7 G/DL (ref 6.4–8.2)
PROT UR STRIP.AUTO-MCNC: ABNORMAL MG/DL
PROTHROMBIN TIME: 38.5 SECONDS (ref 9.8–12.8)
Q ONSET: 218 MS
QRS COUNT: 17 BEATS
QRS DURATION: 86 MS
QT INTERVAL: 338 MS
QTC CALCULATION(BAZETT): 446 MS
QTC FREDERICIA: 407 MS
R AXIS: 20 DEGREES
RBC # UR STRIP.AUTO: NEGATIVE /UL
RBC #/AREA URNS AUTO: ABNORMAL /HPF
SARS-COV-2 RNA RESP QL NAA+PROBE: NOT DETECTED
SODIUM SERPL-SCNC: 125 MMOL/L (ref 136–145)
SODIUM SERPL-SCNC: 128 MMOL/L (ref 136–145)
SODIUM SERPL-SCNC: 131 MMOL/L (ref 136–145)
SODIUM SERPL-SCNC: 131 MMOL/L (ref 136–145)
SP GR UR STRIP.AUTO: 1.02
SQUAMOUS #/AREA URNS AUTO: ABNORMAL /HPF
T AXIS: 60 DEGREES
T OFFSET: 387 MS
UROBILINOGEN UR STRIP.AUTO-MCNC: NORMAL MG/DL
VENTRICULAR RATE: 105 BPM
WBC #/AREA URNS AUTO: ABNORMAL /HPF

## 2024-09-16 PROCEDURE — 86140 C-REACTIVE PROTEIN: CPT | Performed by: NURSE PRACTITIONER

## 2024-09-16 PROCEDURE — 99223 1ST HOSP IP/OBS HIGH 75: CPT | Performed by: NURSE PRACTITIONER

## 2024-09-16 PROCEDURE — 84484 ASSAY OF TROPONIN QUANT: CPT | Performed by: NURSE PRACTITIONER

## 2024-09-16 PROCEDURE — 2500000005 HC RX 250 GENERAL PHARMACY W/O HCPCS: Performed by: EMERGENCY MEDICINE

## 2024-09-16 PROCEDURE — 74176 CT ABD & PELVIS W/O CONTRAST: CPT

## 2024-09-16 PROCEDURE — 84295 ASSAY OF SERUM SODIUM: CPT | Performed by: FAMILY MEDICINE

## 2024-09-16 PROCEDURE — 87081 CULTURE SCREEN ONLY: CPT | Mod: 59,GEALAB | Performed by: NURSE PRACTITIONER

## 2024-09-16 PROCEDURE — 84132 ASSAY OF SERUM POTASSIUM: CPT | Performed by: NURSE PRACTITIONER

## 2024-09-16 PROCEDURE — 81001 URINALYSIS AUTO W/SCOPE: CPT | Performed by: NURSE PRACTITIONER

## 2024-09-16 PROCEDURE — S4991 NICOTINE PATCH NONLEGEND: HCPCS | Performed by: EMERGENCY MEDICINE

## 2024-09-16 PROCEDURE — 2500000001 HC RX 250 WO HCPCS SELF ADMINISTERED DRUGS (ALT 637 FOR MEDICARE OP): Performed by: NURSE PRACTITIONER

## 2024-09-16 PROCEDURE — 2500000002 HC RX 250 W HCPCS SELF ADMINISTERED DRUGS (ALT 637 FOR MEDICARE OP, ALT 636 FOR OP/ED): Performed by: EMERGENCY MEDICINE

## 2024-09-16 PROCEDURE — 36415 COLL VENOUS BLD VENIPUNCTURE: CPT | Performed by: NURSE PRACTITIONER

## 2024-09-16 PROCEDURE — 2500000004 HC RX 250 GENERAL PHARMACY W/ HCPCS (ALT 636 FOR OP/ED): Performed by: EMERGENCY MEDICINE

## 2024-09-16 PROCEDURE — 93005 ELECTROCARDIOGRAM TRACING: CPT

## 2024-09-16 PROCEDURE — 85610 PROTHROMBIN TIME: CPT | Performed by: NURSE PRACTITIONER

## 2024-09-16 PROCEDURE — 71250 CT THORAX DX C-: CPT | Performed by: STUDENT IN AN ORGANIZED HEALTH CARE EDUCATION/TRAINING PROGRAM

## 2024-09-16 PROCEDURE — 2500000001 HC RX 250 WO HCPCS SELF ADMINISTERED DRUGS (ALT 637 FOR MEDICARE OP): Performed by: EMERGENCY MEDICINE

## 2024-09-16 PROCEDURE — 87086 URINE CULTURE/COLONY COUNT: CPT | Mod: GEALAB | Performed by: NURSE PRACTITIONER

## 2024-09-16 PROCEDURE — 96374 THER/PROPH/DIAG INJ IV PUSH: CPT | Mod: 59

## 2024-09-16 PROCEDURE — 96375 TX/PRO/DX INJ NEW DRUG ADDON: CPT

## 2024-09-16 PROCEDURE — 85730 THROMBOPLASTIN TIME PARTIAL: CPT | Performed by: NURSE PRACTITIONER

## 2024-09-16 PROCEDURE — 83735 ASSAY OF MAGNESIUM: CPT | Performed by: NURSE PRACTITIONER

## 2024-09-16 PROCEDURE — 2500000004 HC RX 250 GENERAL PHARMACY W/ HCPCS (ALT 636 FOR OP/ED): Mod: JZ | Performed by: NURSE PRACTITIONER

## 2024-09-16 PROCEDURE — 82947 ASSAY GLUCOSE BLOOD QUANT: CPT

## 2024-09-16 PROCEDURE — 1200000002 HC GENERAL ROOM WITH TELEMETRY DAILY

## 2024-09-16 PROCEDURE — 84295 ASSAY OF SERUM SODIUM: CPT | Performed by: NURSE PRACTITIONER

## 2024-09-16 PROCEDURE — 2500000001 HC RX 250 WO HCPCS SELF ADMINISTERED DRUGS (ALT 637 FOR MEDICARE OP): Performed by: FAMILY MEDICINE

## 2024-09-16 PROCEDURE — 74176 CT ABD & PELVIS W/O CONTRAST: CPT | Performed by: STUDENT IN AN ORGANIZED HEALTH CARE EDUCATION/TRAINING PROGRAM

## 2024-09-16 PROCEDURE — 80053 COMPREHEN METABOLIC PANEL: CPT | Performed by: NURSE PRACTITIONER

## 2024-09-16 RX ORDER — DEXTROSE MONOHYDRATE 100 MG/ML
50 INJECTION, SOLUTION INTRAVENOUS CONTINUOUS
Status: DISCONTINUED | OUTPATIENT
Start: 2024-09-16 | End: 2024-09-16

## 2024-09-16 RX ORDER — ACETAMINOPHEN 325 MG/1
650 TABLET ORAL EVERY 4 HOURS PRN
Status: DISCONTINUED | OUTPATIENT
Start: 2024-09-16 | End: 2024-09-24

## 2024-09-16 RX ORDER — ALPRAZOLAM 0.5 MG/1
0.25 TABLET ORAL 2 TIMES DAILY PRN
Status: DISCONTINUED | OUTPATIENT
Start: 2024-09-16 | End: 2024-09-16

## 2024-09-16 RX ORDER — PREGABALIN 50 MG/1
50 CAPSULE ORAL 3 TIMES DAILY
Status: DISCONTINUED | OUTPATIENT
Start: 2024-09-16 | End: 2024-09-23

## 2024-09-16 RX ORDER — ONDANSETRON HYDROCHLORIDE 2 MG/ML
4 INJECTION, SOLUTION INTRAVENOUS EVERY 8 HOURS PRN
Status: DISCONTINUED | OUTPATIENT
Start: 2024-09-16 | End: 2024-09-28 | Stop reason: HOSPADM

## 2024-09-16 RX ORDER — CEFAZOLIN SODIUM 2 G/100ML
2 INJECTION, SOLUTION INTRAVENOUS EVERY 8 HOURS
Status: DISCONTINUED | OUTPATIENT
Start: 2024-09-16 | End: 2024-09-18

## 2024-09-16 RX ORDER — SODIUM BICARBONATE 1 MEQ/ML
50 SYRINGE (ML) INTRAVENOUS ONCE
Status: COMPLETED | OUTPATIENT
Start: 2024-09-16 | End: 2024-09-16

## 2024-09-16 RX ORDER — HYDROXYZINE HYDROCHLORIDE 25 MG/1
50 TABLET, FILM COATED ORAL 2 TIMES DAILY PRN
Status: DISCONTINUED | OUTPATIENT
Start: 2024-09-16 | End: 2024-09-17

## 2024-09-16 RX ORDER — ENOXAPARIN SODIUM 100 MG/ML
60 INJECTION SUBCUTANEOUS EVERY 12 HOURS SCHEDULED
Status: DISCONTINUED | OUTPATIENT
Start: 2024-09-16 | End: 2024-09-16

## 2024-09-16 RX ORDER — DILTIAZEM HYDROCHLORIDE 180 MG/1
180 CAPSULE, COATED, EXTENDED RELEASE ORAL DAILY
Status: DISCONTINUED | OUTPATIENT
Start: 2024-09-16 | End: 2024-09-28 | Stop reason: HOSPADM

## 2024-09-16 RX ORDER — SODIUM CHLORIDE 9 MG/ML
50 INJECTION, SOLUTION INTRAVENOUS CONTINUOUS
Status: DISCONTINUED | OUTPATIENT
Start: 2024-09-16 | End: 2024-09-16

## 2024-09-16 RX ORDER — DOCUSATE SODIUM 100 MG/1
100 CAPSULE, LIQUID FILLED ORAL 2 TIMES DAILY
Status: DISCONTINUED | OUTPATIENT
Start: 2024-09-16 | End: 2024-09-24

## 2024-09-16 RX ORDER — CEFAZOLIN SODIUM 2 G/50ML
2 SOLUTION INTRAVENOUS EVERY 8 HOURS
Status: DISCONTINUED | OUTPATIENT
Start: 2024-09-16 | End: 2024-09-16

## 2024-09-16 RX ORDER — ACETAMINOPHEN 160 MG/5ML
650 SOLUTION ORAL EVERY 4 HOURS PRN
Status: DISCONTINUED | OUTPATIENT
Start: 2024-09-16 | End: 2024-09-24

## 2024-09-16 RX ORDER — HYDROXYUREA 500 MG/1
1000 CAPSULE ORAL DAILY
Status: DISCONTINUED | OUTPATIENT
Start: 2024-09-16 | End: 2024-09-28 | Stop reason: HOSPADM

## 2024-09-16 RX ORDER — ALBUTEROL SULFATE 0.83 MG/ML
2.5 SOLUTION RESPIRATORY (INHALATION) EVERY 6 HOURS PRN
Status: DISCONTINUED | OUTPATIENT
Start: 2024-09-16 | End: 2024-09-16

## 2024-09-16 RX ORDER — ACETAMINOPHEN 650 MG/1
650 SUPPOSITORY RECTAL EVERY 4 HOURS PRN
Status: DISCONTINUED | OUTPATIENT
Start: 2024-09-16 | End: 2024-09-17

## 2024-09-16 RX ORDER — NALOXONE HYDROCHLORIDE 0.4 MG/ML
0.4 INJECTION, SOLUTION INTRAMUSCULAR; INTRAVENOUS; SUBCUTANEOUS AS NEEDED
Status: DISCONTINUED | OUTPATIENT
Start: 2024-09-16 | End: 2024-09-28 | Stop reason: HOSPADM

## 2024-09-16 RX ORDER — DEXTROSE 50 % IN WATER (D50W) INTRAVENOUS SYRINGE
25 ONCE
Status: COMPLETED | OUTPATIENT
Start: 2024-09-16 | End: 2024-09-16

## 2024-09-16 RX ORDER — ONDANSETRON 4 MG/1
4 TABLET, FILM COATED ORAL EVERY 8 HOURS PRN
Status: DISCONTINUED | OUTPATIENT
Start: 2024-09-16 | End: 2024-09-28 | Stop reason: HOSPADM

## 2024-09-16 RX ORDER — NYSTATIN 100000 U/G
CREAM TOPICAL 2 TIMES DAILY
Status: DISCONTINUED | OUTPATIENT
Start: 2024-09-16 | End: 2024-09-28 | Stop reason: HOSPADM

## 2024-09-16 RX ORDER — SODIUM CHLORIDE 9 MG/ML
50 INJECTION, SOLUTION INTRAVENOUS CONTINUOUS
Status: ACTIVE | OUTPATIENT
Start: 2024-09-16 | End: 2024-09-16

## 2024-09-16 RX ORDER — OXYCODONE HYDROCHLORIDE 10 MG/1
10 TABLET ORAL EVERY 4 HOURS PRN
Status: DISCONTINUED | OUTPATIENT
Start: 2024-09-16 | End: 2024-09-28 | Stop reason: HOSPADM

## 2024-09-16 RX ORDER — LISINOPRIL 20 MG/1
20 TABLET ORAL DAILY
COMMUNITY

## 2024-09-16 RX ORDER — PRAMIPEXOLE DIHYDROCHLORIDE 1 MG/1
1 TABLET ORAL 2 TIMES DAILY
Status: DISCONTINUED | OUTPATIENT
Start: 2024-09-16 | End: 2024-09-28 | Stop reason: HOSPADM

## 2024-09-16 RX ORDER — IBUPROFEN 200 MG
1 TABLET ORAL DAILY
Status: DISCONTINUED | OUTPATIENT
Start: 2024-09-16 | End: 2024-09-28 | Stop reason: HOSPADM

## 2024-09-16 RX ORDER — OXYCODONE HYDROCHLORIDE 5 MG/1
5 TABLET ORAL EVERY 4 HOURS PRN
Status: DISCONTINUED | OUTPATIENT
Start: 2024-09-24 | End: 2024-09-24

## 2024-09-16 RX ORDER — DULOXETIN HYDROCHLORIDE 20 MG/1
20 CAPSULE, DELAYED RELEASE ORAL DAILY
Status: DISCONTINUED | OUTPATIENT
Start: 2024-09-16 | End: 2024-09-28 | Stop reason: HOSPADM

## 2024-09-16 RX ORDER — OXYCODONE HYDROCHLORIDE 5 MG/1
10 TABLET ORAL ONCE
Status: COMPLETED | OUTPATIENT
Start: 2024-09-16 | End: 2024-09-16

## 2024-09-16 RX ORDER — SODIUM CHLORIDE 9 MG/ML
100 INJECTION, SOLUTION INTRAVENOUS CONTINUOUS
Status: DISCONTINUED | OUTPATIENT
Start: 2024-09-16 | End: 2024-09-16

## 2024-09-16 RX ORDER — ALBUTEROL SULFATE 0.83 MG/ML
2.5 SOLUTION RESPIRATORY (INHALATION) EVERY 2 HOUR PRN
Status: DISCONTINUED | OUTPATIENT
Start: 2024-09-16 | End: 2024-09-28 | Stop reason: HOSPADM

## 2024-09-16 RX ORDER — FLUTICASONE FUROATE AND VILANTEROL 200; 25 UG/1; UG/1
1 POWDER RESPIRATORY (INHALATION)
Status: DISCONTINUED | OUTPATIENT
Start: 2024-09-16 | End: 2024-09-28 | Stop reason: HOSPADM

## 2024-09-16 RX ORDER — EAR PLUGS
1 EACH OTIC (EAR) 3 TIMES DAILY
Status: DISCONTINUED | OUTPATIENT
Start: 2024-09-16 | End: 2024-09-28 | Stop reason: HOSPADM

## 2024-09-16 SDOH — SOCIAL STABILITY: SOCIAL INSECURITY: HAVE YOU HAD ANY THOUGHTS OF HARMING ANYONE ELSE?: NO

## 2024-09-16 SDOH — SOCIAL STABILITY: SOCIAL INSECURITY: HAS ANYONE EVER THREATENED TO HURT YOUR FAMILY OR YOUR PETS?: NO

## 2024-09-16 SDOH — SOCIAL STABILITY: SOCIAL INSECURITY: HAVE YOU HAD THOUGHTS OF HARMING ANYONE ELSE?: NO

## 2024-09-16 SDOH — SOCIAL STABILITY: SOCIAL INSECURITY: ABUSE: ADULT

## 2024-09-16 SDOH — SOCIAL STABILITY: SOCIAL INSECURITY: ARE THERE ANY APPARENT SIGNS OF INJURIES/BEHAVIORS THAT COULD BE RELATED TO ABUSE/NEGLECT?: NO

## 2024-09-16 SDOH — SOCIAL STABILITY: SOCIAL INSECURITY: WERE YOU ABLE TO COMPLETE ALL THE BEHAVIORAL HEALTH SCREENINGS?: YES

## 2024-09-16 SDOH — SOCIAL STABILITY: SOCIAL INSECURITY: DO YOU FEEL ANYONE HAS EXPLOITED OR TAKEN ADVANTAGE OF YOU FINANCIALLY OR OF YOUR PERSONAL PROPERTY?: NO

## 2024-09-16 SDOH — SOCIAL STABILITY: SOCIAL INSECURITY: ARE YOU OR HAVE YOU BEEN THREATENED OR ABUSED PHYSICALLY, EMOTIONALLY, OR SEXUALLY BY ANYONE?: NO

## 2024-09-16 SDOH — SOCIAL STABILITY: SOCIAL INSECURITY: DOES ANYONE TRY TO KEEP YOU FROM HAVING/CONTACTING OTHER FRIENDS OR DOING THINGS OUTSIDE YOUR HOME?: NO

## 2024-09-16 SDOH — SOCIAL STABILITY: SOCIAL INSECURITY: DO YOU FEEL UNSAFE GOING BACK TO THE PLACE WHERE YOU ARE LIVING?: NO

## 2024-09-16 ASSESSMENT — ACTIVITIES OF DAILY LIVING (ADL)
PATIENT'S MEMORY ADEQUATE TO SAFELY COMPLETE DAILY ACTIVITIES?: YES
JUDGMENT_ADEQUATE_SAFELY_COMPLETE_DAILY_ACTIVITIES: YES
ASSISTIVE_DEVICE: WALKER;WHEELCHAIR
LACK_OF_TRANSPORTATION: NO
HEARING - LEFT EAR: FUNCTIONAL
DRESSING YOURSELF: INDEPENDENT
ADEQUATE_TO_COMPLETE_ADL: YES
LACK_OF_TRANSPORTATION: NO
HEARING - RIGHT EAR: FUNCTIONAL
WALKS IN HOME: NEEDS ASSISTANCE
BATHING: INDEPENDENT
GROOMING: INDEPENDENT
FEEDING YOURSELF: INDEPENDENT
TOILETING: NEEDS ASSISTANCE

## 2024-09-16 ASSESSMENT — PAIN DESCRIPTION - LOCATION
LOCATION: LEG

## 2024-09-16 ASSESSMENT — ENCOUNTER SYMPTOMS
MYALGIAS: 1
VOMITING: 0
WEAKNESS: 1
HEADACHES: 0
SHORTNESS OF BREATH: 0
FEVER: 1
DIARRHEA: 0
BRUISES/BLEEDS EASILY: 0
AGITATION: 0
NAUSEA: 1
BACK PAIN: 1
CHILLS: 1
FATIGUE: 1
ARTHRALGIAS: 1
COUGH: 0
POLYDIPSIA: 0
EYE REDNESS: 1
WOUND: 1
EYE ITCHING: 0
CONFUSION: 1
ABDOMINAL PAIN: 0
SORE THROAT: 0

## 2024-09-16 ASSESSMENT — PAIN - FUNCTIONAL ASSESSMENT
PAIN_FUNCTIONAL_ASSESSMENT: 0-10

## 2024-09-16 ASSESSMENT — PATIENT HEALTH QUESTIONNAIRE - PHQ9
SUM OF ALL RESPONSES TO PHQ9 QUESTIONS 1 & 2: 0
2. FEELING DOWN, DEPRESSED OR HOPELESS: NOT AT ALL
1. LITTLE INTEREST OR PLEASURE IN DOING THINGS: NOT AT ALL

## 2024-09-16 ASSESSMENT — PAIN DESCRIPTION - ORIENTATION
ORIENTATION: RIGHT;LEFT
ORIENTATION: OTHER (COMMENT)

## 2024-09-16 ASSESSMENT — COGNITIVE AND FUNCTIONAL STATUS - GENERAL
HELP NEEDED FOR BATHING: A LITTLE
WALKING IN HOSPITAL ROOM: A LITTLE
TURNING FROM BACK TO SIDE WHILE IN FLAT BAD: A LITTLE
DAILY ACTIVITIY SCORE: 20
DRESSING REGULAR LOWER BODY CLOTHING: A LITTLE
DAILY ACTIVITIY SCORE: 20
WALKING IN HOSPITAL ROOM: A LITTLE
MOVING TO AND FROM BED TO CHAIR: A LITTLE
DRESSING REGULAR LOWER BODY CLOTHING: A LITTLE
STANDING UP FROM CHAIR USING ARMS: A LITTLE
MOVING FROM LYING ON BACK TO SITTING ON SIDE OF FLAT BED WITH BEDRAILS: A LITTLE
MOVING TO AND FROM BED TO CHAIR: A LITTLE
DRESSING REGULAR UPPER BODY CLOTHING: A LITTLE
DRESSING REGULAR UPPER BODY CLOTHING: A LITTLE
PATIENT BASELINE BEDBOUND: NO
CLIMB 3 TO 5 STEPS WITH RAILING: A LOT
HELP NEEDED FOR BATHING: A LITTLE
STANDING UP FROM CHAIR USING ARMS: A LITTLE
TOILETING: A LITTLE
MOBILITY SCORE: 17
TOILETING: A LITTLE
TURNING FROM BACK TO SIDE WHILE IN FLAT BAD: A LITTLE
MOVING FROM LYING ON BACK TO SITTING ON SIDE OF FLAT BED WITH BEDRAILS: A LITTLE
MOBILITY SCORE: 17
CLIMB 3 TO 5 STEPS WITH RAILING: A LOT

## 2024-09-16 ASSESSMENT — LIFESTYLE VARIABLES
HOW OFTEN DO YOU HAVE 6 OR MORE DRINKS ON ONE OCCASION: NEVER
AUDIT-C TOTAL SCORE: 0
SKIP TO QUESTIONS 9-10: 1
HOW MANY STANDARD DRINKS CONTAINING ALCOHOL DO YOU HAVE ON A TYPICAL DAY: PATIENT DOES NOT DRINK
AUDIT-C TOTAL SCORE: 0
HOW OFTEN DO YOU HAVE A DRINK CONTAINING ALCOHOL: NEVER

## 2024-09-16 ASSESSMENT — PAIN SCALES - PAIN ASSESSMENT IN ADVANCED DEMENTIA (PAINAD): TOTALSCORE: MEDICATION (SEE MAR)

## 2024-09-16 ASSESSMENT — PAIN SCALES - GENERAL
PAINLEVEL_OUTOF10: 7
PAINLEVEL_OUTOF10: 3
PAINLEVEL_OUTOF10: 8
PAINLEVEL_OUTOF10: 5 - MODERATE PAIN
PAINLEVEL_OUTOF10: 4
PAINLEVEL_OUTOF10: 3
PAINLEVEL_OUTOF10: 4
PAINLEVEL_OUTOF10: 8
PAINLEVEL_OUTOF10: 7

## 2024-09-16 NOTE — PROGRESS NOTES
Physical Therapy                 Therapy Communication Note    Patient Name: Neisha Graham  MRN: [id number]  Department: [address]  Room: [address] [place] [address]  Today's Date: 9/16/2024     Discipline: Physical Therapy    Missed Visit Reason: Missed Visit Reason: Patient refused (2nd attempt to see pt for PT eval. RN provided clearance. Pt refused, as she wishes to have her legs wrapped prior to trying to get out of bed. NA stated that pt was going to have a bath, then a consult with wound care. No PT eval performed)    Missed Time: Attempt    Comment: 13:11

## 2024-09-16 NOTE — ASSESSMENT & PLAN NOTE
-Repeat problem from last admission; Resolved  Received kayexalate and cocktail in   Low potassium diet added  Lokelma added in ER x's 6 doses   Would rec stopping lisinopril  Tele    Hypovolemic Hyponatremia  -125, now 128 with IV fluids  Do not exceed 5 point increase in 24 hr period  Continue NS 50 ml/hr and monitor, repeat Na+ at 1600-f/U

## 2024-09-16 NOTE — CONSULTS
Consults  Referred by Radha Diaz    Primary MD: Micheal Gonsalez, DO    Reason For Consult  Stasis / cellulitis / abnormal urine    History Of Present Illness  Neisha Graham is a 64 y.o. female, hx of obesity, hx of legs stasis / wounds / recurrent cellulitis, hx of HTN, hx of COPD, hx of BE, she was admitted for intermittent fever, weakness, confusion, she has not been elevating her legs, no cough or chest pain, no dysuria, no diarrhea, her WBC were slightly up, K and Cr were up, the ct showed no acute changes, the covid screen is negative     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), Cellulitis of left lower limb (04/08/2021), Chronic obstructive pulmonary disease with (acute) exacerbation (Multi) (01/09/2019), Chronic sinusitis, unspecified, COVID-19 (11/28/2022), Daily headache (08/17/2023), Elevated bilirubin (08/17/2023), Elevated transaminase level (08/17/2023), Fever (08/17/2023), Fever (08/17/2023), Heart murmur (03/10/2023), Hemoptysis (03/10/2023), Hyperkalemia (03/10/2023), Malaise and fatigue (03/10/2023), Morbid (severe) obesity due to excess calories (Multi), Other conditions influencing health status, Other conditions influencing health status (03/12/2020), Other conditions influencing health status (03/06/2017), Other conditions influencing health status, Other conditions influencing health status, Other conditions influencing health status, Pain in unspecified ankle and joints of unspecified foot (06/24/2016), Pain in unspecified foot (12/08/2015), Pain in unspecified knee, Palpitations (03/10/2023), Personal history of diseases of the skin and subcutaneous tissue, Personal history of other diseases of the female genital tract, Personal history of other diseases of  the nervous system and sense organs (09/12/2019), Personal history of other diseases of the nervous system and sense organs (09/17/2015), Personal history of other diseases of the respiratory system, Personal history of other diseases of the respiratory system (10/17/2016), Personal history of other diseases of the respiratory system (11/29/2017), Personal history of other diseases of the respiratory system (11/16/2016), Personal history of other drug therapy (10/14/2016), Personal history of other infectious and parasitic diseases, Personal history of other infectious and parasitic diseases (07/22/2020), Personal history of other medical treatment, Personal history of other specified conditions, Personal history of other specified conditions (01/28/2015), Personal history of other specified conditions, Personal history of pneumonia (recurrent), Pneumonia, unspecified organism (01/11/2018), Postmenopausal bleeding (09/30/2014), Right knee pain (03/10/2023), Shortness of breath (03/10/2023), Sinus tachycardia (03/10/2023), Submandibular sialolithiasis (03/10/2023), Unilateral primary osteoarthritis, unspecified knee (02/03/2017), and Unspecified acute conjunctivitis, bilateral (08/20/2020).    Surgical History  She has a past surgical history that includes Other surgical history (09/19/2013); Other surgical history (09/19/2013); Tonsillectomy (09/19/2013); Knee surgery (09/19/2013); Tubal ligation (09/19/2013); and Other surgical history (09/27/2013).     Social History     Occupational History    Not on file   Tobacco Use    Smoking status: Some Days     Types: Cigarettes    Smokeless tobacco: Never   Substance and Sexual Activity    Alcohol use: Not on file    Drug use: Not on file    Sexual activity: Not on file     Travel History   Travel since 08/16/24    No documented travel since 08/16/24            Family History  No family history on file., no immunodeficiency  Allergies  Vancomycin, Macrobid [nitrofurantoin  monohyd/m-cryst], and Sulfamethoxazole-trimethoprim     Immunization History   Administered Date(s) Administered    Flu vaccine, quadrivalent, no egg protein, age 6 month or greater (FLUCELVAX) 11/15/2023    Flu vaccine, trivalent, preservative free, age 6 months and greater (Fluarix/Fluzone/Flulaval) 10/14/2016    Influenza, Unspecified 10/14/2016, 11/29/2017    Moderna COVID-19 vaccine, 12 years and older (50mcg/0.5mL)(Comirnaty) 11/15/2023    Moderna SARS-CoV-2 Vaccination 04/07/2021, 05/04/2021, 12/16/2021    Novel Influenza-H1N1-09, all formulations 01/13/2010    Tdap vaccine, age 7 year and older (BOOSTRIX, ADACEL) 10/14/2016   Pneumonia vaccine is up to date  Foreman fall risk 60, preventive protocol was implemented  Depression screen is negative  5 min smoking cessation was provided     Medications  Home medications:  Medications Prior to Admission   Medication Sig Dispense Refill Last Dose    amino acids-protein hydrolys (ProSource No Carb) 15-60 gram-kcal/30 mL liquid Take by mouth.   9/15/2024    DULoxetine (Cymbalta) 20 mg DR capsule TAKE 1 CAPSULE BY MOUTH ONCE DAILY 90 capsule 1 9/15/2024    hydroxyurea (Hydrea) 500 mg capsule Take 2 capsules (1,000 mg total) by mouth once daily. 180 capsule 3 9/15/2024    lisinopril 20 mg tablet Take 1 tablet (20 mg) by mouth once daily.   9/15/2024    pramipexole (Mirapex) 1 mg tablet Take 1 tablet (1 mg) by mouth 2 times a day.   9/15/2024    pregabalin (Lyrica) 50 mg capsule Take 1 capsule (50 mg) by mouth 3 times a day. 180 capsule 2 9/15/2024    Tiadylt  mg 24 hr capsule TAKE 1 CAPSULE BY MOUTH EVERY DAY 90 capsule 1 9/15/2024    Trelegy Ellipta 200-62.5-25 mcg blister with device INHALE 1 PUFF BY MOUTH AND IN TO LUNGS ONCE DAILY 180 each 1 9/15/2024    warfarin (Coumadin) 7.5 mg tablet Take 1.5 tablets (11.25 mg) by mouth 1 (one) time per week. On Tuesday Thursday and Saturday   Past Week    albuterol 90 mcg/actuation inhaler INHALE 2 PUFFS BY MOUTH AND INTO  THE LUNGS EVERY 4 HOURS IF NEEDED FOR WHEEZING 18 g 3 Unknown    ammonium lactate (Amlactin) 12 % cream Apply 1 Film topically 2 times a day as needed.   Unknown    clotrimazole (Lotrimin) 1 % cream Apply topically 2 times a day. apply to affected area 60 g 1 Unknown    cyclobenzaprine (Flexeril) 10 mg tablet Take 1 tablet (10 mg) by mouth 2 times a day as needed for muscle spasms. 30 tablet 2 Unknown    [] doxycycline (Vibramycin) 100 mg capsule Take 1 capsule (100 mg) by mouth 2 times a day for 10 days. 20 capsule 0     hydrOXYzine HCL (Atarax) 50 mg tablet Take 1 tablet (50 mg) by mouth every 8 hours if needed for anxiety. 30 tablet 3 Unknown    lidocaine 5 % gel Apply 1 inch topically 3 times a day as needed (apply to affected area). 100 g 2 Unknown    naloxone (Narcan) 4 mg/0.1 mL nasal spray Administer 1 spray (4 mg) into affected nostril(s) if needed for opioid reversal or respiratory depression. 2 each 0 Unknown    [START ON 2024] oxyCODONE (Roxicodone) 10 mg immediate release tablet Take 1 tablet (10 mg) by mouth every 4 hours if needed for severe pain (7 - 10) for up to 28 days. Do not fill before 2024. 168 tablet 0 Unknown    [START ON 10/22/2024] oxyCODONE (Roxicodone) 10 mg immediate release tablet Take 1 tablet (10 mg) by mouth every 4 hours if needed for severe pain (7 - 10) for up to 28 days. Do not fill before 2024. 168 tablet 0 Unknown    triamcinolone (Kenalog) 0.1 % cream APPLY SPARINGLY AND MASSAGE IN ONCE A DAY   Unknown    warfarin (Coumadin) 7.5 mg tablet Take 1 tablet (7.5 mg) by mouth 4 times a week. On , Monday, Wednesday, Friday 16 tablet 0      Current medications:  Scheduled medications  ceFAZolin, 2 g, intravenous, q8h  dilTIAZem CD, 180 mg, oral, Daily  docusate sodium, 100 mg, oral, BID  DULoxetine, 20 mg, oral, Daily  influenza, 0.5 mL, intramuscular, During hospitalization  tiotropium, 2 puff, inhalation, Daily   And  fluticasone  "furoate-vilanteroL, 1 puff, inhalation, Daily  hydroxyurea, 1,000 mg, oral, Daily  nicotine, 1 patch, transdermal, Daily  pramipexole, 1 mg, oral, BID  pregabalin, 50 mg, oral, TID  sodium zirconium cyclosilicate, 10 g, oral, q8h      Continuous medications  sodium chloride 0.9%, 50 mL/hr, Last Rate: 50 mL/hr (09/16/24 0839)      PRN medications  PRN medications: acetaminophen **OR** acetaminophen **OR** acetaminophen, albuterol, hydrOXYzine HCL, naloxone, ondansetron **OR** ondansetron, oxyCODONE, [START ON 9/24/2024] oxyCODONE    Review of Systems   All other systems reviewed and are negative.       Objective  Range of Vitals (last 24 hours)  Heart Rate:  [74-99]   Temp:  [36.4 °C (97.6 °F)-36.5 °C (97.7 °F)]   Resp:  [16]   BP: (127-143)/(74-98)   Height:  [154.9 cm (5' 1\")-162.6 cm (5' 4\")]   Weight:  [142 kg (313 lb 11.4 oz)-150 kg (330 lb)]   SpO2:  [95 %-98 %]   Daily Weight  09/16/24 : 142 kg (313 lb 11.4 oz)    Body mass index is 53.85 kg/m².   Nutritional consult    Physical Exam  Constitutional:       Appearance: Normal appearance.   HENT:      Head: Normocephalic and atraumatic.      Mouth/Throat:      Mouth: Mucous membranes are moist.      Pharynx: Oropharynx is clear.   Eyes:      Pupils: Pupils are equal, round, and reactive to light.   Cardiovascular:      Rate and Rhythm: Normal rate and regular rhythm.      Heart sounds: Normal heart sounds.   Pulmonary:      Effort: Pulmonary effort is normal.      Breath sounds: Normal breath sounds.   Abdominal:      General: Abdomen is flat. Bowel sounds are normal.      Palpations: Abdomen is soft.   Musculoskeletal:      Cervical back: Normal range of motion.      Comments: Bilateral legs stasis, superficial wounds, no purulence    Neurological:      Mental Status: She is alert.          Relevant Results  Outside Hospital Results  reviewed  Labs  Results from last 72 hours   Lab Units 09/15/24  2342   WBC AUTO x10*3/uL 11.2   HEMOGLOBIN g/dL 14.3 " "  HEMATOCRIT % 43.8   PLATELETS AUTO x10*3/uL 324   NEUTROS PCT AUTO % 77.5   LYMPHS PCT AUTO % 11.8   MONOS PCT AUTO % 9.4   EOS PCT AUTO % 0.4     Results from last 72 hours   Lab Units 09/16/24  0707 09/16/24  0356   SODIUM mmol/L 128* 125*   POTASSIUM mmol/L 5.2 6.0*   CHLORIDE mmol/L  --  100   CO2 mmol/L  --  18*   BUN mg/dL  --  60*   CREATININE mg/dL  --  1.31*   GLUCOSE mg/dL  --  88   CALCIUM mg/dL  --  9.1   ANION GAP mmol/L  --  13   EGFR mL/min/1.73m*2  --  45*     Results from last 72 hours   Lab Units 09/16/24  0356   ALK PHOS U/L 295*   BILIRUBIN TOTAL mg/dL 0.7   PROTEIN TOTAL g/dL 6.7   ALT U/L 22   AST U/L 22   ALBUMIN g/dL 3.2*     Estimated Creatinine Clearance: 60.6 mL/min (A) (by C-G formula based on SCr of 1.31 mg/dL (H)).  C-Reactive Protein   Date Value Ref Range Status   09/16/2024 16.36 (H) <1.00 mg/dL Final   08/12/2024 7.94 (H) <1.00 mg/dL Final     CRP   Date Value Ref Range Status   06/19/2020 1.84 (A) mg/dL Final     Comment:     REF VALUE  < 1.00       Sedimentation Rate   Date Value Ref Range Status   09/15/2024 29 0 - 30 mm/h Final   06/19/2020 36 (H) 0 - 30 mm/h Final     No results found for: \"HIV1X2\", \"HIVCONF\", \"[id number]\"  Hepatitis C Ab   Date Value Ref Range Status   11/14/2022 NONREACTIVE NONREACTIVE Final     Comment:      Results from patients taking biotin supplements or receiving   high-dose biotin therapy should be interpreted with caution   due to possible interference with this test. Providers may    contact their local laboratory for further information.       Microbiology  Reviewed  Imaging  Reviewed      Assessment/Plan   Fever  Legs stasis / wounds / likely cellulitis  Encephalopathy    Recommendations :  Continue Cefazolin  Keep the legs elevated  Wound care  Culture    I spent minutes in the professional and overall care of this patient.      Gorge Myers MD  "

## 2024-09-16 NOTE — ED PROVIDER NOTES
[facility]  ED  Encounter Note  Admit Date/RoomTime: 9/15/2024 10:54 PM  ED Room: 250/250-B  NAME: Neisha Graham  : 1960  MRN: [id number]     Chief Complaint:  Weakness, Gen    HISTORY OF PRESENT ILLNESS        Neisha Graham is a 64 y.o. female who presents to the ED for evaluation of generalized weakness, fever and chills. She was hospitalized a month ago for leg  wounds. She was prescribed antibiotics and still felt unwell and called her PCP and placed her on Doxy. Pt reports buttock wound and leg wounds. Since discharge she saw her pcp, pain management and wound. Has health home care.     Arrived via 911.  Present w daughter.     ROS   Pertinent positives and negatives are stated within HPI, all other systems reviewed and are negative.    Past Medical History:  has a past medical history of Acute bronchitis due to other specified organisms (10/14/2019), Acute viral conjunctivitis of left eye (03/10/2023), Alkaline phosphatase elevation (2023), Anemia (03/10/2023), Bone marrow disorder (2023), Cellulitis of left lower limb (2021), Chronic obstructive pulmonary disease with (acute) exacerbation (Multi) (2019), Chronic sinusitis, unspecified, COVID-19 (2022), Daily headache (2023), Elevated bilirubin (2023), Elevated transaminase level (2023), Fever (2023), Fever (2023), Heart murmur (03/10/2023), Hemoptysis (03/10/2023), Hyperkalemia (03/10/2023), Malaise and fatigue (03/10/2023), Morbid (severe) obesity due to excess calories (Multi), Other conditions influencing health status, Other conditions influencing health status (2020), Other conditions influencing health status (2017), Other conditions influencing health status, Other conditions influencing health status, Other conditions influencing health status, Pain in unspecified ankle and joints of unspecified foot (2016), Pain in unspecified foot  (12/08/2015), Pain in unspecified knee, Palpitations (03/10/2023), Personal history of diseases of the skin and subcutaneous tissue, Personal history of other diseases of the female genital tract, Personal history of other diseases of the nervous system and sense organs (09/12/2019), Personal history of other diseases of the nervous system and sense organs (09/17/2015), Personal history of other diseases of the respiratory system, Personal history of other diseases of the respiratory system (10/17/2016), Personal history of other diseases of the respiratory system (11/29/2017), Personal history of other diseases of the respiratory system (11/16/2016), Personal history of other drug therapy (10/14/2016), Personal history of other infectious and parasitic diseases, Personal history of other infectious and parasitic diseases (07/22/2020), Personal history of other medical treatment, Personal history of other specified conditions, Personal history of other specified conditions (01/28/2015), Personal history of other specified conditions, Personal history of pneumonia (recurrent), Pneumonia, unspecified organism (01/11/2018), Postmenopausal bleeding (09/30/2014), Right knee pain (03/10/2023), Shortness of breath (03/10/2023), Sinus tachycardia (03/10/2023), Submandibular sialolithiasis (03/10/2023), Unilateral primary osteoarthritis, unspecified knee (02/03/2017), and Unspecified acute conjunctivitis, bilateral (08/20/2020).    Surgical History:  has a past surgical history that includes Other surgical history (09/19/2013); Other surgical history (09/19/2013); Tonsillectomy (09/19/2013); Knee surgery (09/19/2013); Tubal ligation (09/19/2013); and Other surgical history (09/27/2013).    Social History:  reports that she has been smoking cigarettes. She has never used smokeless tobacco.    Family History: family history is not on file.     Allergies: Vancomycin, Macrobid [nitrofurantoin monohyd/m-cryst], and  Sulfamethoxazole-trimethoprim    PHYSICAL EXAM   Oxygen Saturation Interpretation: Normal.       Physical Exam  Constitutional/General: Alert and oriented x3, well appearing, non toxic  HEENT:  NC/NT. PERRLA.  Airway patent.  Neck: Supple, full ROM. No midline vertebral tenderness or crepitus.   Respiratory: Lung sounds clear to auscultation bilaterally. No wheezes, rhonchi or stridor. Not in respiratory distress.  CV:  Regular rate. Regular rhythm. No murmurs or rubs. 2+ distal pulses.  GI:  Abdomen soft, non-tender, non-distended. +BS. No rebound, guarding, or rigidity. No pulsatile masses.  Musculoskeletal: Moves all extremities x 4. Warm and well perfused. Capillary refill <3 seconds  Integument: Skin warm and dry. No rashes.   Neurologic: Alert and oriented with no focal deficits, symmetric strength 5/5 in the upper and lower extremities bilaterally.  Psychiatric: Normal affect.//leg edema with wounds on right lower leg     Lab / Imaging Results   (All laboratory and radiology results have been personally reviewed by myself)  Labs:  Results for orders placed or performed during the hospital encounter of 09/15/24   Tissue/Wound Culture/Smear    Specimen: Buttock; Tissue/Biopsy   Result Value Ref Range    Tissue/Wound Culture/Smear (4+) Abundant Staphylococcus aureus (A)     Gram Stain No polymorphonuclear leukocytes seen (A)     Gram Stain (3+) Moderate Gram positive cocci (A)    Urine culture    Specimen: Clean Catch/Voided; Urine   Result Value Ref Range    Urine Culture No significant growth    Blood Culture    Specimen: Arterial Line; Blood culture   Result Value Ref Range    Blood Culture No growth at 1 day    Blood Culture    Specimen: Peripheral Venipuncture; Blood culture   Result Value Ref Range    Blood Culture No growth at 1 day    CBC and Auto Differential   Result Value Ref Range    WBC 11.2 4.4 - 11.3 x10*3/uL    nRBC 0.0 0.0 - 0.0 /100 WBCs    RBC 4.07 4.00 - 5.20 x10*6/uL    Hemoglobin 14.3 12.0  - 16.0 g/dL    Hematocrit 43.8 36.0 - 46.0 %     (H) 80 - 100 fL    MCH 35.1 (H) 26.0 - 34.0 pg    MCHC 32.6 32.0 - 36.0 g/dL    RDW 15.0 (H) 11.5 - 14.5 %    Platelets 324 150 - 450 x10*3/uL    Neutrophils % 77.5 40.0 - 80.0 %    Immature Granulocytes %, Automated 0.6 0.0 - 0.9 %    Lymphocytes % 11.8 13.0 - 44.0 %    Monocytes % 9.4 2.0 - 10.0 %    Eosinophils % 0.4 0.0 - 6.0 %    Basophils % 0.3 0.0 - 2.0 %    Neutrophils Absolute 8.70 (H) 1.20 - 7.70 x10*3/uL    Immature Granulocytes Absolute, Automated 0.07 0.00 - 0.70 x10*3/uL    Lymphocytes Absolute 1.33 1.20 - 4.80 x10*3/uL    Monocytes Absolute 1.06 (H) 0.10 - 1.00 x10*3/uL    Eosinophils Absolute 0.05 0.00 - 0.70 x10*3/uL    Basophils Absolute 0.03 0.00 - 0.10 x10*3/uL   Comprehensive metabolic panel   Result Value Ref Range    Glucose 88 74 - 99 mg/dL    Sodium 125 (L) 136 - 145 mmol/L    Potassium 6.0 (H) 3.5 - 5.3 mmol/L    Chloride 100 98 - 107 mmol/L    Bicarbonate 18 (L) 21 - 32 mmol/L    Anion Gap 13 10 - 20 mmol/L    Urea Nitrogen 60 (H) 6 - 23 mg/dL    Creatinine 1.31 (H) 0.50 - 1.05 mg/dL    eGFR 45 (L) >60 mL/min/1.73m*2    Calcium 9.1 8.6 - 10.3 mg/dL    Albumin 3.2 (L) 3.4 - 5.0 g/dL    Alkaline Phosphatase 295 (H) 33 - 136 U/L    Total Protein 6.7 6.4 - 8.2 g/dL    AST 22 9 - 39 U/L    Bilirubin, Total 0.7 0.0 - 1.2 mg/dL    ALT 22 7 - 45 U/L   Magnesium   Result Value Ref Range    Magnesium 2.37 1.60 - 2.40 mg/dL   Lactate   Result Value Ref Range    Lactate 0.9 0.4 - 2.0 mmol/L   B-Type Natriuretic Peptide   Result Value Ref Range    BNP 87 0 - 99 pg/mL   Protime-INR   Result Value Ref Range    Protime 38.5 (H) 9.8 - 12.8 seconds    INR 3.4 (H) 0.9 - 1.1   aPTT   Result Value Ref Range    aPTT 57 (H) 27 - 38 seconds   Urinalysis with Reflex Culture and Microscopic   Result Value Ref Range    Color, Urine Yellow Light-Yellow, Yellow, Dark-Yellow    Appearance, Urine Clear Clear    Specific Gravity, Urine 1.022 1.005 - 1.035    pH,  Urine 5.5 5.0, 5.5, 6.0, 6.5, 7.0, 7.5, 8.0    Protein, Urine 20 (TRACE) NEGATIVE, 10 (TRACE), 20 (TRACE) mg/dL    Glucose, Urine Normal Normal mg/dL    Blood, Urine NEGATIVE NEGATIVE    Ketones, Urine NEGATIVE NEGATIVE mg/dL    Bilirubin, Urine NEGATIVE NEGATIVE    Urobilinogen, Urine Normal Normal mg/dL    Nitrite, Urine NEGATIVE NEGATIVE    Leukocyte Esterase, Urine 25 Erika/µL (A) NEGATIVE   Extra Urine Gray Tube   Result Value Ref Range    Extra Tube Hold for add-ons.    Troponin I, High Sensitivity, Initial   Result Value Ref Range    Troponin I, High Sensitivity 19 (H) 0 - 13 ng/L   Troponin, High Sensitivity, 1 Hour   Result Value Ref Range    Troponin I, High Sensitivity 20 (H) 0 - 13 ng/L   Sars-CoV-2 PCR   Result Value Ref Range    Coronavirus 2019, PCR Not Detected Not Detected   Influenza A, and B PCR   Result Value Ref Range    Flu A Result Not Detected Not Detected    Flu B Result Not Detected Not Detected   Microscopic Only, Urine   Result Value Ref Range    WBC, Urine 1-5 1-5, NONE /HPF    RBC, Urine NONE NONE, 1-2, 3-5 /HPF    Squamous Epithelial Cells, Urine 1-9 (SPARSE) Reference range not established. /HPF    Bacteria, Urine 1+ (A) NONE SEEN /HPF   Potassium   Result Value Ref Range    Potassium 5.2 3.5 - 5.3 mmol/L   Troponin I, High Sensitivity   Result Value Ref Range    Troponin I, High Sensitivity 21 (H) 0 - 13 ng/L   Sedimentation Rate   Result Value Ref Range    Sedimentation Rate 29 0 - 30 mm/h   C-reactive protein   Result Value Ref Range    C-Reactive Protein 16.36 (H) <1.00 mg/dL   Sodium   Result Value Ref Range    Sodium 128 (L) 136 - 145 mmol/L   Sodium   Result Value Ref Range    Sodium 131 (L) 136 - 145 mmol/L   Sodium   Result Value Ref Range    Sodium 131 (L) 136 - 145 mmol/L   Protime-INR   Result Value Ref Range    Protime 29.5 (H) 9.8 - 12.8 seconds    INR 2.6 (H) 0.9 - 1.1   Renal Function Panel   Result Value Ref Range    Glucose 94 74 - 99 mg/dL    Sodium 132 (L) 136 - 145  mmol/L    Potassium 4.0 3.5 - 5.3 mmol/L    Chloride 104 98 - 107 mmol/L    Bicarbonate 22 21 - 32 mmol/L    Anion Gap 10 10 - 20 mmol/L    Urea Nitrogen 41 (H) 6 - 23 mg/dL    Creatinine 1.03 0.50 - 1.05 mg/dL    eGFR 60 (L) >60 mL/min/1.73m*2    Calcium 7.6 (L) 8.6 - 10.3 mg/dL    Phosphorus 2.8 2.5 - 4.9 mg/dL    Albumin 2.4 (L) 3.4 - 5.0 g/dL   CBC   Result Value Ref Range    WBC 7.0 4.4 - 11.3 x10*3/uL    nRBC 0.0 0.0 - 0.0 /100 WBCs    RBC 3.74 (L) 4.00 - 5.20 x10*6/uL    Hemoglobin 12.8 12.0 - 16.0 g/dL    Hematocrit 38.9 36.0 - 46.0 %     (H) 80 - 100 fL    MCH 34.2 (H) 26.0 - 34.0 pg    MCHC 32.9 32.0 - 36.0 g/dL    RDW 14.5 11.5 - 14.5 %    Platelets 319 150 - 450 x10*3/uL   POCT GLUCOSE   Result Value Ref Range    POCT Glucose 87 74 - 99 mg/dL   POCT GLUCOSE   Result Value Ref Range    POCT Glucose 132 (H) 74 - 99 mg/dL   POCT GLUCOSE   Result Value Ref Range    POCT Glucose 132 (H) 74 - 99 mg/dL   POCT GLUCOSE   Result Value Ref Range    POCT Glucose 117 (H) 74 - 99 mg/dL   POCT GLUCOSE   Result Value Ref Range    POCT Glucose 87 74 - 99 mg/dL   POCT GLUCOSE   Result Value Ref Range    POCT Glucose 124 (H) 74 - 99 mg/dL   ECG 12 lead   Result Value Ref Range    Ventricular Rate 101 BPM    Atrial Rate 101 BPM    VA Interval 154 ms    QRS Duration 66 ms    QT Interval 308 ms    QTC Calculation(Bazett) 399 ms    P Axis 66 degrees    R Axis -19 degrees    T Axis 58 degrees    QRS Count 16 beats    Q Onset 219 ms    P Onset 142 ms    P Offset 181 ms    T Offset 373 ms    QTC Fredericia 366 ms   ECG 12 lead   Result Value Ref Range    Ventricular Rate 105 BPM    Atrial Rate 105 BPM    VA Interval 162 ms    QRS Duration 86 ms    QT Interval 338 ms    QTC Calculation(Bazett) 446 ms    P Axis 61 degrees    R Axis 20 degrees    T Axis 60 degrees    QRS Count 17 beats    Q Onset 218 ms    P Onset 137 ms    P Offset 188 ms    T Offset 387 ms    QTC Fredericia 407 ms     Imaging:  All Radiology results  interpreted by Radiologist unless otherwise noted.  CT chest abdomen pelvis wo IV contrast   Final Result   CHEST   1.  No evidence of acute abnormality in the chest. Mild upper lobe   predominant centrilobular emphysema. Enlarged main pulmonary trunk   measuring up to 3.5 cm in caliber suggestive of pulmonary arterial   hypertension.   2. At least moderate aortic valve atherosclerotic calcifications.   Clinical correlation for aortic stenosis is recommended.        ABDOMEN - PELVIS   1.  No acute abnormality in the abdomen/pelvis. Hepatomegaly.             MACRO:   None        Signed by: Wilmar Bower 9/16/2024 1:55 AM   Dictation workstation:   [id number]          ED Course / Medical Decision Making     Medications   nicotine (Nicoderm CQ) 21 mg/24 hr patch 1 patch (1 patch transdermal Medication Applied 9/17/24 0352)   sodium zirconium cyclosilicate (Lokelma) packet 10 g (10 g oral Given 9/17/24 1249)   acetaminophen (Tylenol) tablet 650 mg (has no administration in time range)     Or   acetaminophen (Tylenol) oral liquid 650 mg (has no administration in time range)   ondansetron (Zofran) tablet 4 mg (has no administration in time range)     Or   ondansetron (Zofran) injection 4 mg (has no administration in time range)   docusate sodium (Colace) capsule 100 mg (100 mg oral Not Given 9/17/24 1010)   DULoxetine (Cymbalta) DR capsule 20 mg (20 mg oral Given 9/17/24 1000)   hydroxyurea (Hydrea) capsule 1,000 mg (1,000 mg oral Given 9/17/24 1002)   pramipexole (Mirapex) tablet 1 mg (1 mg oral Given 9/17/24 1002)   tiotropium (Spiriva Respimat) 2.5 mcg/actuation inhaler 2 puff (2 puffs inhalation Given 9/17/24 1005)     And   fluticasone furoate-vilanteroL (Breo Ellipta) 200-25 mcg/dose inhaler 1 puff (1 puff inhalation Given 9/17/24 1004)   pregabalin (Lyrica) capsule 50 mg (50 mg oral Given 9/17/24 1510)   dilTIAZem CD (Cardizem CD) 24 hr capsule 180 mg (180 mg oral Given 9/17/24 1000)   oxyCODONE (Roxicodone)  immediate release tablet 5 mg (has no administration in time range)   oxyCODONE (Roxicodone) immediate release tablet 10 mg (10 mg oral Given 9/17/24 1510)   naloxone (Narcan) injection 0.4 mg (has no administration in time range)   sodium chloride 0.9% infusion (50 mL/hr intravenous Restarted 9/16/24 0839)   ceFAZolin (Ancef) 2 g in dextrose (iso)  mL (0 g intravenous Stopped 9/17/24 1142)   flu vaccine, trivalent, preservative free, HIGH-DOSE, age 65y+ (Fluzone) (has no administration in time range)   albuterol 2.5 mg /3 mL (0.083 %) nebulizer solution 2.5 mg (has no administration in time range)   nystatin (Mycostatin) cream ( Topical Given 9/17/24 1048)   zinc oxide 40 % ointment 1 Application (1 Application Topical Not Given 9/17/24 1512)   eucerin cream ( Topical Given 9/17/24 1048)   hydrOXYzine HCL (Atarax) tablet 50 mg (has no administration in time range)   warfarin (Coumadin) tablet 7.5 mg (has no administration in time range)   cyclobenzaprine (Flexeril) tablet 10 mg (has no administration in time range)   magnesium hydroxide (Milk of Magnesia) 2,400 mg/10 mL suspension 10 mL (10 mL oral Not Given 9/17/24 1511)   insulin regular (HumuLIN R,NovoLIN R) injection 10 Units (10 Units intravenous Given 9/16/24 0537)     Followed by   dextrose 50 % injection 25 g (25 g intravenous Given 9/16/24 0540)   sodium bicarbonate 8.4 % (1 mEq/mL) 50 mEq (50 mEq intravenous Given 9/16/24 0540)   sodium chloride 0.9 % bolus 500 mL (0 mL intravenous Stopped 9/16/24 0653)   oxyCODONE (Roxicodone) immediate release tablet 10 mg (10 mg oral Given 9/16/24 0554)     ED Course as of 09/17/24 1514   Sun Sep 15, 2024   2212 EKG rate 101 , pr interval 154 ms qrs duration 66 ms qt qtc 308 309 ms prt axes 66 -19 58 sinus tachycardia normal rate rhythm and axis. Personally reviewed by me [PK]   Mon Sep 16, 2024   0332 The nurses were having difficulty getting blood kept hemolyzing.  I attempted to get a femoral sample of blood  using ultrasound-guided.  The needle was not long after he got a longer needle and still could not get it.  I contacted anesthesia they will come the emergency room to see the patient and attempt to get blood sample. [RZ]      ED Course User Index  [PK] Galina Marti, APRN-CNP  [RZ] Blas Chavarria MD         Diagnoses as of 09/17/24 1514   Generalized weakness   Hyperkalemia   ALBERTINA (acute kidney injury) (CMS-Formerly [facility])     Re-examination:    Patient’s condition stable      MDM:       Neisha Graham is a 64 y.o. female who presents to the ED for evaluation of generalized weakness, fever and chills. She was hospitalized a month ago for leg  wounds. She was prescribed antibiotics and still felt unwell and called her PCP and placed her on Doxy. Pt reports buttock wound and leg wounds. Since discharge she saw her pcp, pain management and wound. Has health home care.     Arrived via 911.  Present w daughter.     Ed course stable  Staff had issues obtaining labs.  Labs imaging signed out to attending ED physician for final labs imaging and disposition  Vss currently stable  Dx: generalized weaness  Plan of Care/Counseling:  I reviewed today's visit with the patient family in addition to providing specific details for the plan of care and counseling regarding the diagnosis and prognosis.  Questions are answered at this time and are agreeable with the plan.    ASSESSMENT     1. ALBERTINA (acute kidney injury) (CMS-Formerly [facility])    2. Generalized weakness    3. Hyperkalemia      PLAN   DISPOSITION PENDING SIGNED OUT TO ATTENDING    New Medications     New Medications Ordered This Visit   Medications    nicotine (Nicoderm CQ) 21 mg/24 hr patch 1 patch    sodium zirconium cyclosilicate (Lokelma) packet 10 g     0.4 g sodium.    FOLLOWED BY Linked Order Group     insulin regular (HumuLIN R,NovoLIN R) injection 10 Units      Humulin R is the  formulay brand for insulin regular human     dextrose 50 % injection 25 g    sodium bicarbonate  8.4 % (1 mEq/mL) 50 mEq    sodium chloride 0.9 % bolus 500 mL    oxyCODONE (Roxicodone) immediate release tablet 10 mg    OR Linked Order Group     acetaminophen (Tylenol) tablet 650 mg      Order Specific Question:   If ordered PRN for pain, nurse is permitted to administer this medication for higher pain scores based on patient preference?      Answer:   Yes     acetaminophen (Tylenol) oral liquid 650 mg    OR Linked Order Group     ondansetron (Zofran) tablet 4 mg     ondansetron (Zofran) injection 4 mg    docusate sodium (Colace) capsule 100 mg    DULoxetine (Cymbalta) DR capsule 20 mg    hydroxyurea (Hydrea) capsule 1,000 mg    pramipexole (Mirapex) tablet 1 mg    AND Linked Order Group     tiotropium (Spiriva Respimat) 2.5 mcg/actuation inhaler 2 puff     fluticasone furoate-vilanteroL (Breo Ellipta) 200-25 mcg/dose inhaler 1 puff    pregabalin (Lyrica) capsule 50 mg    dilTIAZem CD (Cardizem CD) 24 hr capsule 180 mg    oxyCODONE (Roxicodone) immediate release tablet 5 mg    lisinopril 20 mg tablet     Sig: Take 1 tablet (20 mg) by mouth once daily.    oxyCODONE (Roxicodone) immediate release tablet 10 mg     Order Specific Question:   If ordered PRN for pain, nurse is permitted to administer this medication for higher pain scores based on patient preference?     Answer:   Yes    naloxone (Narcan) injection 0.4 mg    sodium chloride 0.9% infusion    ceFAZolin (Ancef) 2 g in dextrose (iso)  mL     For Na-restricted patients, note sodium content of ~50 mg per gram cefazolin.     Order Specific Question:   Dosing of this medication varies based on severity of illness. Does this patient have sepsis or concern for sepsis (probable or documented infection plus systemic manifestations of infection)?     Answer:   No     Order Specific Question:   Suspected Indication (Select all that apply)     Answer:   Cellulitis, Skin and Soft Tissue     Order Specific Question:   Type of Therapy     Answer:   Empiric    flu  vaccine, trivalent, preservative free, HIGH-DOSE, age 65y+ (Fluzone)    albuterol 2.5 mg /3 mL (0.083 %) nebulizer solution 2.5 mg    nystatin (Mycostatin) cream    zinc oxide 40 % ointment 1 Application    eucerin cream    hydrOXYzine HCL (Atarax) tablet 50 mg    warfarin (Coumadin) tablet 7.5 mg     Ensure proper admin timing per warfarin policy.    cyclobenzaprine (Flexeril) tablet 10 mg    magnesium hydroxide (Milk of Magnesia) 2,400 mg/10 mL suspension 10 mL     Electronically signed by CMADEN Quiroz     **This report was transcribed using voice recognition software. Every effort was made to ensure accuracy; however, inadvertent computerized transcription errors may be present.  END OF ED PROVIDER NOTE     CAMDEN Quiroz  09/17/24 1727

## 2024-09-16 NOTE — ASSESSMENT & PLAN NOTE
PT/SW consult    BLE Cellulitis with Venous Stasis Wounds/Failed Outpt Txt  -Greenish yellow drainage from LLE and IFD made aware, may need switched to cefepime  -Not septic  Wound consult-appreciate recs  Ancef started, same txt as last visit  ID consulted-appreciate recs  Could benefit from wound clinic  Elevated BLE   Blood cultures and MRSA-f/U  Prostat BID

## 2024-09-16 NOTE — H&P
History Of Present Illness  Neisha Graham is a 64 y.o. female with a pertinent history of lymphedema, obesity, and recurrent cellulitis with venous stasis wounds who presented to [facility] ER with complaints of subjective fever 101F, chills, rigors, dry heaves, nausea, increased fatigue, confusion and generalized weakness intermittently for a couple weeks that worsened over the past couple of days. Patient went and saw her PCP due to a rash that she says she suspects came from the Keflex she was sent home on s/p last dc from the hospital. PCP started her on doxycycline and the patient quit taking 2 days prior to completing the course because she thought it was the cause of her rigors.  She said she got even more sick after stopping it with worsening fevers, confusion, fatigue and generalized weakness. She said she has been voiding less and having incontinence episodes of urine.  She denied dysuria, back pain, chest pain, dyspnea, diarrhea, vomiting.  Pertinent workup in the ER included: No evidence of acute abnormality in the chest.  Sodium 125 now 128, potassium 6.0 now 5.2, creatinine 1.3 and previously 0.6, chronically elevated alk phos at 295, elevated CRP at 16, troponin 19, 20 and 21, INR 3.4, UA showed 25 leuks, no white blood cells or nitrites, urine culture in process, flu and COVID are negative. Blood culture and wound cultures in process. WBC 11,200 with + neutrophilia. Lactate neg. No documented fevers.     Past Medical History  Essential thrombocytosis.  Central venous sinuses thrombosis.  Tobacco use disorder.  Lymphedema.  COPD  Hypertension.  Morbid obesity.  Anxiety disorder.  Sleep apnea-does not wear CPAP or BiPAP or oxygen  Restless leg syndrome.  Hyperlipidemia.  Chronic low back pain.  Osteoarthritis.  Iron deficiency anemia.  Depression  Asthma  Hepatitis B  IBS    Surgical History  Right knee surgery.  Lumbar laminectomy.  Ovarian cystectomy.  Tonsillectomy.  Tubal ligation  Right eye cataract  extraction     Social History  She reports that she has been smoking cigarettes. She has never used smokeless tobacco. She denies alcohol use and drug use.    Family History  Cancer: yes    Diabetes: yes    Hypertension: yes    Stroke: yes       Allergies  Vancomycin, Macrobid [nitrofurantoin monohyd/m-cryst], and Sulfamethoxazole-trimethoprim    Review of Systems   Constitutional:  Positive for chills, fatigue and fever.   HENT:  Negative for sneezing and sore throat.    Eyes:  Positive for redness. Negative for itching.   Respiratory:  Negative for cough and shortness of breath.    Cardiovascular:  Positive for leg swelling. Negative for chest pain.   Gastrointestinal:  Positive for nausea. Negative for abdominal pain, diarrhea and vomiting.   Endocrine: Negative for polydipsia and polyuria.   Genitourinary:  Positive for decreased urine volume.        Voiding less   Musculoskeletal:  Positive for arthralgias, back pain and myalgias.   Skin:  Positive for wound. Negative for rash.   Neurological:  Positive for weakness. Negative for headaches.        + generalized weakness   Hematological:  Does not bruise/bleed easily.   Psychiatric/Behavioral:  Positive for confusion. Negative for agitation and behavioral problems.      Physical Exam  Vitals reviewed.   Constitutional:       General: She is not in acute distress.     Appearance: She is obese. She is ill-appearing.      Comments: + chronic ill appearing   HENT:      Head: Normocephalic and atraumatic.      Mouth/Throat:      Mouth: Mucous membranes are dry.      Pharynx: Oropharynx is clear.   Eyes:      Extraocular Movements: Extraocular movements intact.      Conjunctiva/sclera: Conjunctivae normal.   Cardiovascular:      Rate and Rhythm: Regular rhythm. Tachycardia present.      Pulses: Normal pulses.      Heart sounds: Murmur heard.   Pulmonary:      Effort: Pulmonary effort is normal. No respiratory distress.      Breath sounds: Normal breath sounds. No  wheezing, rhonchi or rales.      Comments: On RA  Abdominal:      General: Bowel sounds are normal. There is no distension.      Palpations: Abdomen is soft.      Tenderness: There is no abdominal tenderness. There is no guarding or rebound.   Musculoskeletal:         General: No swelling.      Right lower leg: No edema.      Left lower leg: No edema.      Comments: Cannot lift legs off the bed   Skin:     General: Skin is warm and dry.      Findings: Bruising present.      Comments: Wounds to buttocks, see nursing doc to BLE, LLE with greenish yellow drainage when dressing removed   Neurological:      Mental Status: She is alert and oriented to person, place, and time.      Motor: Weakness present.      Comments: Generalized weakness and unable to lift legs of the bed   Psychiatric:         Mood and Affect: Mood normal.         Behavior: Behavior normal.       Last Recorded Vitals  BP (!) 143/98   Pulse 86   Temp 36.5 °C (97.7 °F)   Resp 16   Wt 150 kg (330 lb)   SpO2 97%     Relevant Results  Scheduled medications  ceFAZolin, 2 g, intravenous, q8h  dilTIAZem ER, 180 mg, oral, Daily  docusate sodium, 100 mg, oral, BID  DULoxetine, 20 mg, oral, Daily  tiotropium, 2 puff, inhalation, Daily   And  fluticasone furoate-vilanteroL, 1 puff, inhalation, Daily  hydroxyurea, 1,000 mg, oral, Daily  nicotine, 1 patch, transdermal, Daily  pramipexole, 1 mg, oral, BID  pregabalin, 50 mg, oral, TID  sodium zirconium cyclosilicate, 10 g, oral, q8h      Continuous medications  sodium chloride 0.9%, 50 mL/hr, Last Rate: 50 mL/hr (09/16/24 0656)      PRN medications  PRN medications: acetaminophen **OR** acetaminophen **OR** acetaminophen, albuterol, hydrOXYzine HCL, naloxone, ondansetron **OR** ondansetron, oxyCODONE, [START ON 9/24/2024] oxyCODONE    Results for orders placed or performed during the hospital encounter of 09/15/24 (from the past 24 hour(s))   CBC and Auto Differential   Result Value Ref Range    WBC 11.2 4.4 -  11.3 x10*3/uL    nRBC 0.0 0.0 - 0.0 /100 WBCs    RBC 4.07 4.00 - 5.20 x10*6/uL    Hemoglobin 14.3 12.0 - 16.0 g/dL    Hematocrit 43.8 36.0 - 46.0 %     (H) 80 - 100 fL    MCH 35.1 (H) 26.0 - 34.0 pg    MCHC 32.6 32.0 - 36.0 g/dL    RDW 15.0 (H) 11.5 - 14.5 %    Platelets 324 150 - 450 x10*3/uL    Neutrophils % 77.5 40.0 - 80.0 %    Immature Granulocytes %, Automated 0.6 0.0 - 0.9 %    Lymphocytes % 11.8 13.0 - 44.0 %    Monocytes % 9.4 2.0 - 10.0 %    Eosinophils % 0.4 0.0 - 6.0 %    Basophils % 0.3 0.0 - 2.0 %    Neutrophils Absolute 8.70 (H) 1.20 - 7.70 x10*3/uL    Immature Granulocytes Absolute, Automated 0.07 0.00 - 0.70 x10*3/uL    Lymphocytes Absolute 1.33 1.20 - 4.80 x10*3/uL    Monocytes Absolute 1.06 (H) 0.10 - 1.00 x10*3/uL    Eosinophils Absolute 0.05 0.00 - 0.70 x10*3/uL    Basophils Absolute 0.03 0.00 - 0.10 x10*3/uL   Lactate   Result Value Ref Range    Lactate 0.9 0.4 - 2.0 mmol/L   B-Type Natriuretic Peptide   Result Value Ref Range    BNP 87 0 - 99 pg/mL   Troponin I, High Sensitivity, Initial   Result Value Ref Range    Troponin I, High Sensitivity 19 (H) 0 - 13 ng/L   Sedimentation Rate   Result Value Ref Range    Sedimentation Rate 29 0 - 30 mm/h   Sars-CoV-2 PCR   Result Value Ref Range    Coronavirus 2019, PCR Not Detected Not Detected   Influenza A, and B PCR   Result Value Ref Range    Flu A Result Not Detected Not Detected    Flu B Result Not Detected Not Detected   Protime-INR   Result Value Ref Range    Protime 38.5 (H) 9.8 - 12.8 seconds    INR 3.4 (H) 0.9 - 1.1   aPTT   Result Value Ref Range    aPTT 57 (H) 27 - 38 seconds   Troponin, High Sensitivity, 1 Hour   Result Value Ref Range    Troponin I, High Sensitivity 20 (H) 0 - 13 ng/L   Urinalysis with Reflex Culture and Microscopic   Result Value Ref Range    Color, Urine Yellow Light-Yellow, Yellow, Dark-Yellow    Appearance, Urine Clear Clear    Specific Gravity, Urine 1.022 1.005 - 1.035    pH, Urine 5.5 5.0, 5.5, 6.0, 6.5,  7.0, 7.5, 8.0    Protein, Urine 20 (TRACE) NEGATIVE, 10 (TRACE), 20 (TRACE) mg/dL    Glucose, Urine Normal Normal mg/dL    Blood, Urine NEGATIVE NEGATIVE    Ketones, Urine NEGATIVE NEGATIVE mg/dL    Bilirubin, Urine NEGATIVE NEGATIVE    Urobilinogen, Urine Normal Normal mg/dL    Nitrite, Urine NEGATIVE NEGATIVE    Leukocyte Esterase, Urine 25 Erika/µL (A) NEGATIVE   Microscopic Only, Urine   Result Value Ref Range    WBC, Urine 1-5 1-5, NONE /HPF    RBC, Urine NONE NONE, 1-2, 3-5 /HPF    Squamous Epithelial Cells, Urine 1-9 (SPARSE) Reference range not established. /HPF    Bacteria, Urine 1+ (A) NONE SEEN /HPF   Comprehensive metabolic panel   Result Value Ref Range    Glucose 88 74 - 99 mg/dL    Sodium 125 (L) 136 - 145 mmol/L    Potassium 6.0 (H) 3.5 - 5.3 mmol/L    Chloride 100 98 - 107 mmol/L    Bicarbonate 18 (L) 21 - 32 mmol/L    Anion Gap 13 10 - 20 mmol/L    Urea Nitrogen 60 (H) 6 - 23 mg/dL    Creatinine 1.31 (H) 0.50 - 1.05 mg/dL    eGFR 45 (L) >60 mL/min/1.73m*2    Calcium 9.1 8.6 - 10.3 mg/dL    Albumin 3.2 (L) 3.4 - 5.0 g/dL    Alkaline Phosphatase 295 (H) 33 - 136 U/L    Total Protein 6.7 6.4 - 8.2 g/dL    AST 22 9 - 39 U/L    Bilirubin, Total 0.7 0.0 - 1.2 mg/dL    ALT 22 7 - 45 U/L   Magnesium   Result Value Ref Range    Magnesium 2.37 1.60 - 2.40 mg/dL   C-reactive protein   Result Value Ref Range    C-Reactive Protein 16.36 (H) <1.00 mg/dL     CT chest abdomen pelvis wo IV contrast    Result Date: 9/16/2024  Interpreted By:  Wilmar Bower, STUDY: CT CHEST ABDOMEN PELVIS WO CONTRAST;  9/16/2024 1:31 am   INDICATION: Signs/Symptoms:weakness     COMPARISON: CT chest abdomen pelvis dated 11/25/2019. CT chest dated 07/08/2024.   ACCESSION NUMBER(S): [id number]   ORDERING CLINICIAN: BESS CLEMENTE   TECHNIQUE: CT of the chest, abdomen, and pelvis was performed. Contiguous axial images were obtained at  5 mm slice thickness through the chest, and at  3 mm through the abdomen and pelvis. Coronal and  sagittal reconstructions at  3 mm slice thickness were performed.   FINDINGS: CHEST:   LUNG/PLEURA/LARGE AIRWAYS: No air space opacity, focal consolidation, pleural effusion/hemothorax, or pneumothorax are appreciated.   There is mild upper lobe predominant centrilobular emphysema.   Previously seen right lung apex pulmonary nodule has resolved.   VESSELS: Thoracic aorta is normal in caliber and demonstrates moderate atherosclerotic calcifications. There are also moderate coronary artery atherosclerotic calcifications. Main pulmonary trunk is enlarged measuring 3.5 cm in caliber suggestive of pulmonary arterial hypertension.   HEART: The heart is normal in size.   There is no pericardial effusion.   MEDIASTINUM AND ELVIA: No pneumomediastinum, abnormal mediastinal fluid collection or mediastinal hematoma are appreciated.  No mediastinal, hilar or biaxillary adenopathy is present.  The esophagus is normal in course and caliber.   CHEST WALL AND LOWER NECK: No acute fracture or dislocation of the included osseous structures are appreciated.  No suspicious osseous lesions are identified.  The thoracic wall soft tissues are within normal limits.   ABDOMEN:   LIVER: Liver is mildly enlarged and normal in contour and density.   GALLBLADDER: The gallbladder is nondistended without evidence of radiopaque stone.   BILE DUCTS: The intahepatic and extrahepatic bile ducts are not dilated.   PANCREAS: The pancreas appears unremarkable.   SPLEEN: Normal size..   ADRENAL GLANDS: Stable 1.6 x 1.3 cm left adrenal nodule consistent with an adenoma and a smaller right adrenal nodule measuring 1.1 cm also consistent with adenoma.   KIDNEYS AND URETERS: Normal size and contour. There is a punctate left lower pole intrarenal calculus. No obstructing calculi..   PELVIS:   BLADDER: The urinary bladder appears within normal limits.   REPRODUCTIVE ORGANS: Uterus is present. No evidence of adnexal mass.   BOWEL: The stomach is unremarkable.   The small bowel is normal in caliber without evidence of focal wall thickening or obstruction.  There is no evidence of focal wall thickening or dilatation of the large bowel.  Normal appendix. Moderate amount of formed stool throughout the colon.   VESSELS: The aorta and IVC are normal in caliber. Moderate atherosclerotic calcifications of the abdominal aorta and its branches.   PERITONEUM/RETROPERITONEUM/LYMPH NODES: There is no evidence of intra- or retroperitoneal hematoma.  There is no free or loculated fluid collection, no free intraperitoneal air. No abdominopelvic lymphadenopathy is present.   BONES AND ABDOMINAL WALL: No evidence of acute fracture or dislocation of the included osseous structures.  No suspicious osseous lesions are identified. Multilevel endplate degenerative changes predominantly in the lower thoracic spine. No evidence of high-grade osseous canal stenosis. The abdominal wall soft tissues appear normal.       CHEST 1.  No evidence of acute abnormality in the chest. Mild upper lobe predominant centrilobular emphysema. Enlarged main pulmonary trunk measuring up to 3.5 cm in caliber suggestive of pulmonary arterial hypertension. 2. At least moderate aortic valve atherosclerotic calcifications. Clinical correlation for aortic stenosis is recommended.   ABDOMEN - PELVIS 1.  No acute abnormality in the abdomen/pelvis. Hepatomegaly.     MACRO: None   Signed by: Wilmar Bower 9/16/2024 1:55 AM Dictation workstation:   [id number]     Assessment/Plan   Assessment & Plan  Generalized weakness  PT/SW consult    BLE Cellulitis with Venous Stasis Wounds/Failed Outpt Txt  -Greenish yellow drainage from LLE and IFD made aware, may need switched to cefepime  -Not septic  Wound consult-appreciate recs  Ancef started, same txt as last visit  ID consulted-appreciate recs  Could benefit from wound clinic  Elevated BLE   Blood cultures and MRSA-f/U  Prostat BID    Hyperkalemia  -Repeat problem from last  admission; Resolved  Received kayexalate and cocktail in   Low potassium diet added  Lokelma added in ER x's 6 doses   Would rec stopping lisinopril  Tele    Hypovolemic Hyponatremia  -125, now 128 with IV fluids  Do not exceed 5 point increase in 24 hr period  Continue NS 50 ml/hr and monitor, repeat Na+ at 1600-f/U    ALBERTINA (acute kidney injury) (CMS-HCC)  Hold nephrotoxic meds-lisinopril  IV fluids  Monitor for improvement    Pyuria  Urine culture-f/U  Ancef should cover    Supra therapeutic INR/Thrombocytosis  -INR 3.4  Coumadin on hold and monitor INR  Continue hydroxyurea    Elevated Troponin/Tachycardia  -d/t demand d/t ALBERTINA  EKG reviewed and no acute ST abn  No need for further evaluation    Nicotine Use Disorder  Smoking cessation education  Nicotine replacements  Defer Wellbutrin start to PCP    Full code    DVT PX  Coumadin currently on hold, will need resumed    Karla Allan, APRN-CNP  76 min spent interviewing and assessing patient, reviewing chart, labs, diagnostics, placing admission orders and updating MAR.    Pt says medications are same as on dc from last visit and updated MAR. All home meds resumed unless otherwise contraindicated.    Pt lives alone and suspect could benefit from wound care at home or possible rehab placement for short-term. Strong suspicion for readmission if not set up with more diligent wound care.

## 2024-09-16 NOTE — PROGRESS NOTES
09/16/24 1340   Discharge Planning   Living Arrangements Spouse/significant other   Support Systems Children   Assistance Needed Home alone, A&0x3, mobilizes with rollator or wheelchair, has walk in shower,  doesn't drive ( gets transport thru insurance  Provide a ride), room air( does have 02 set up at home but doesn't use), no cpap or bipap, active with [facility] for SN, PCP Micheal Gonsalez   Type of Residence Private residence   Number of Stairs to Enter Residence 0   Number of Stairs Within Residence 0   Do you have animals or pets at home? No   Who is requesting discharge planning? Provider   Home or Post Acute Services In home services   Type of Home Care Services Home nursing visits   Expected Discharge Disposition Home Health  (Patient preference is to return home alone with resumed [facility] for SN, referral sent in [facility], PT ordered, eval pending, Patient will need transport arranged upon d/c.)   Does the patient need discharge transport arranged? Yes   RoundTrip coordination needed? Yes   Has discharge transport been arranged? No   Financial Resource Strain   How hard is it for you to pay for the very basics like food, housing, medical care, and heating? Not hard   Housing Stability   In the last 12 months, was there a time when you were not able to pay the mortgage or rent on time? N   In the past 12 months, how many times have you moved where you were living? 0   At any time in the past 12 months, were you homeless or living in a shelter (including now)? N   Transportation Needs   In the past 12 months, has lack of transportation kept you from medical appointments or from getting medications? no   In the past 12 months, has lack of transportation kept you from meetings, work, or from getting things needed for daily living? No   Patient Choice   Provider Choice list and CMS website (https://medicare.gov/care-compare#search) for post-acute Quality and Resource Measure Data were provided  and reviewed with: Patient   Patient / Family choosing to utilize agency / facility established prior to hospitalization Yes

## 2024-09-16 NOTE — ED PROVIDER NOTES
The patient was seen by the midlevel/resident.  I have personally saw the patient and made/approved the management plan and take responsibility for the patient management.  I reviewed the EKG's (when done) and agree with the interpretation.  I have seen and examined the patient; agree with the workup, evaluation, MDM, and diagnosis.  The care plan has been discussed with the midlevel/resident; I have reviewed the note and agree with the documented findings.     Patient presents with complaint of general weakness.  She says she is been weak since she left the hospital a month ago.  She is having trouble getting around at home.  She has a couple of wounds on her right buttock on his core size 1's dime size.  She also has some leg wounds that are being cared for at the wound clinic.  She will be worked up in ED and dissipate hospitalization for weakness.  Found to have high potassium and ALBERTINA which are both new.  Patient will be treated and hospitalized.  ED Course as of 09/16/24 0535   Sun Sep 15, 2024   2212 EKG rate 101 , pr interval 154 ms qrs duration 66 ms qt qtc 308 309 ms prt axes 66 -19 58 sinus tachycardia normal rate rhythm and axis. Personally reviewed by me [PK]   Mon Sep 16, 2024   0332 The nurses were having difficulty getting blood kept hemolyzing.  I attempted to get a femoral sample of blood using ultrasound-guided.  The needle was not long after he got a longer needle and still could not get it.  I contacted anesthesia they will come the emergency room to see the patient and attempt to get blood sample. [RZ]      ED Course User Index  [PK] Galina Marti, APRN-CNP  [RZ] Blas Chavarria MD         Diagnoses as of 09/16/24 0535   Generalized weakness   Hyperkalemia   ALBERTINA (acute kidney injury) (CMS-[facility])     MD Blas Plata MD  09/16/24 0575

## 2024-09-16 NOTE — SIGNIFICANT EVENT
Neisha Graham is a 64 y.o. female with a pertinent history of lymphedema, obesity, and recurrent cellulitis with venous stasis wounds who presented to [facility] ER with complaints of subjective fever 101F, chills, rigors, dry heaves found to have bilateral lower extremity cellulitis.    Cellulitis -ID following advised to continue Ancef keep legs elevated    Hyperkalemia - continue to hold ACE and likely DC it upon discharge    Hyponatremia, improving, will continue serial sodium levels, will watch for an increase in sodium level greater than 8 in a 24-hour period    Central venous sinus thrombosis/thrombocytosis-warfarin held, INR supratherapeutic, repeat INR ordered    ALBERTINA -serum creatinine baseline appears to be around 0.7    Demand ischemia-due to cellulitis and ALBERTINA, troponin elevation mild, EKG is mildly tachycardic but appears stable from prior EKG    [id number] with urine culture, Ancef ordered

## 2024-09-16 NOTE — CARE PLAN
The patient's goals for the shift include  to have pain managed    The clinical goals for the shift include Pt will remain hemodynamically stable during this shift.

## 2024-09-16 NOTE — CONSULTS
"  Wound Care Consult     Visit Date: 9/16/2024      Patient Name: Neisha Graham         MRN: [id number]           YOB: 1960     Reason for Consult: wound        Wound History:   Wounds on buttock from since after last admission, patient  states, \"I got home and my legs/thighs just peeled, my hands, my skin was just awful red\".  Patient followed at Lymphedema clinic with therapist but did not have the $600. for next step rt insurance but now has insurance coverage so may be willing to go back.  Previously treated with Oral diflucan but did not finish 3rd pill on 3rd week, patient wondered if it was making her sick.     Pertinent Labs:   Albumin   Date Value Ref Range Status   09/16/2024 3.2 (L) 3.4 - 5.0 g/dL Final       Wound Assessment:  Wound 08/13/24 Venous Ulcer Tibial Dorsal;Right (Active)   Wound Image   09/16/24 0822   Site Assessment Painful;Purple;Red;Sloughing 09/16/24 0830   Rebecca-Wound Assessment Purple 09/16/24 0830   Drainage Amount Moderate 09/16/24 0830   Dressing Xeroform 09/16/24 0830   Dressing Changed Changed 09/16/24 0830   Dressing Status Clean 09/16/24 0830       Wound 08/13/24 Venous Ulcer Tibial Dorsal;Left (Active)   Wound Image   09/16/24 0822   Site Assessment Painful;Purple;Red;Sloughing 09/16/24 0830   Rebecca-Wound Assessment Red 09/16/24 0830   Drainage Description Green;Purulent 09/16/24 0830   Drainage Amount Small 09/16/24 0830   Dressing Xeroform 09/16/24 0830   Dressing Changed Changed 09/16/24 0830   Dressing Status Clean 09/16/24 0830       Wound 09/16/24 Venous Ulcer Buttocks (Active)   Wound Image   09/16/24 0823   Drainage Description Foul odor;Purulent 09/16/24 0830   Drainage Amount Small 09/16/24 0830   Dressing Foam 09/16/24 0830   Dressing Changed Changed 09/16/24 0830   Dressing Status Clean;Dry 09/16/24 0830       Wound Team Summary Assessment:   Patient seen sitting in bed, some drainage from BLE on pro pads.  -- Under pannus, breast, groin and buttocks, " red rash with satellite lesions  -- Right buttock /thigh with 2, 1-2 cm open wounds, appear moisture related.  -- Skin is dry, scaly, brigido, chronic discolored to BLE with open wounds and drainage.  -- Left lower extremity is more painful with open areas.  -- Right lower extremity along lateral side of calf has wounds with more drainage per patient.  -- Bilateral heels crusty and dry.    Wound Team Plan:   Obtained orders, care for wounds- patient input.  Nystatin for yeasty rashy, Zinc to promote blood flow and protect from moisture, Adaptic,, Aquacel, ABD, Kerlix to BLE with waffle boots and elevation with pillows, Eucerin to moisturize. Please change dressings if they are wet, Q 2 hour turns, and please message with questions.  Patient will need to return to Wound Center on discharge and make appointment with Lymphedema clinic.     Smiley Worthington RN, [facility]  9/16/2024  6:55 PM

## 2024-09-16 NOTE — PROGRESS NOTES
Physical Therapy                 Therapy Communication Note    Patient Name: Neisha Graham  MRN: [id number]  Department: [address]  Room: [address] [place] [address]  Today's Date: 9/16/2024     Discipline: Physical Therapy    Missed Visit Reason: Missed Visit Reason: Patient sleeping (Attempted to see the pt for PT eval. The RN requested that PT defer until the p.m. to allow pt to sleep.  No PT Eval performed at this time.)    Missed Time: Attempt    Comment: 10:19

## 2024-09-17 LAB
ALBUMIN SERPL BCP-MCNC: 2.4 G/DL (ref 3.4–5)
ANION GAP SERPL CALC-SCNC: 10 MMOL/L (ref 10–20)
BACTERIA UR CULT: NORMAL
BUN SERPL-MCNC: 41 MG/DL (ref 6–23)
CALCIUM SERPL-MCNC: 7.6 MG/DL (ref 8.6–10.3)
CHLORIDE SERPL-SCNC: 104 MMOL/L (ref 98–107)
CO2 SERPL-SCNC: 22 MMOL/L (ref 21–32)
CREAT SERPL-MCNC: 1.03 MG/DL (ref 0.5–1.05)
EGFRCR SERPLBLD CKD-EPI 2021: 60 ML/MIN/1.73M*2
ERYTHROCYTE [DISTWIDTH] IN BLOOD BY AUTOMATED COUNT: 14.5 % (ref 11.5–14.5)
GLUCOSE BLD MANUAL STRIP-MCNC: 117 MG/DL (ref 74–99)
GLUCOSE BLD MANUAL STRIP-MCNC: 124 MG/DL (ref 74–99)
GLUCOSE BLD MANUAL STRIP-MCNC: 148 MG/DL (ref 74–99)
GLUCOSE BLD MANUAL STRIP-MCNC: 87 MG/DL (ref 74–99)
GLUCOSE SERPL-MCNC: 94 MG/DL (ref 74–99)
HCT VFR BLD AUTO: 38.9 % (ref 36–46)
HGB BLD-MCNC: 12.8 G/DL (ref 12–16)
INR PPP: 2.6 (ref 0.9–1.1)
MCH RBC QN AUTO: 34.2 PG (ref 26–34)
MCHC RBC AUTO-ENTMCNC: 32.9 G/DL (ref 32–36)
MCV RBC AUTO: 104 FL (ref 80–100)
NRBC BLD-RTO: 0 /100 WBCS (ref 0–0)
PHOSPHATE SERPL-MCNC: 2.8 MG/DL (ref 2.5–4.9)
PLATELET # BLD AUTO: 319 X10*3/UL (ref 150–450)
POTASSIUM SERPL-SCNC: 4 MMOL/L (ref 3.5–5.3)
PROTHROMBIN TIME: 29.5 SECONDS (ref 9.8–12.8)
RBC # BLD AUTO: 3.74 X10*6/UL (ref 4–5.2)
SODIUM SERPL-SCNC: 132 MMOL/L (ref 136–145)
WBC # BLD AUTO: 7 X10*3/UL (ref 4.4–11.3)

## 2024-09-17 PROCEDURE — 82947 ASSAY GLUCOSE BLOOD QUANT: CPT

## 2024-09-17 PROCEDURE — S4991 NICOTINE PATCH NONLEGEND: HCPCS | Performed by: EMERGENCY MEDICINE

## 2024-09-17 PROCEDURE — 2500000002 HC RX 250 W HCPCS SELF ADMINISTERED DRUGS (ALT 637 FOR MEDICARE OP, ALT 636 FOR OP/ED): Performed by: EMERGENCY MEDICINE

## 2024-09-17 PROCEDURE — 2500000001 HC RX 250 WO HCPCS SELF ADMINISTERED DRUGS (ALT 637 FOR MEDICARE OP): Performed by: INTERNAL MEDICINE

## 2024-09-17 PROCEDURE — 85027 COMPLETE CBC AUTOMATED: CPT | Performed by: FAMILY MEDICINE

## 2024-09-17 PROCEDURE — 2500000002 HC RX 250 W HCPCS SELF ADMINISTERED DRUGS (ALT 637 FOR MEDICARE OP, ALT 636 FOR OP/ED): Performed by: INTERNAL MEDICINE

## 2024-09-17 PROCEDURE — 2500000001 HC RX 250 WO HCPCS SELF ADMINISTERED DRUGS (ALT 637 FOR MEDICARE OP): Performed by: NURSE PRACTITIONER

## 2024-09-17 PROCEDURE — 99233 SBSQ HOSP IP/OBS HIGH 50: CPT | Performed by: INTERNAL MEDICINE

## 2024-09-17 PROCEDURE — 1200000002 HC GENERAL ROOM WITH TELEMETRY DAILY

## 2024-09-17 PROCEDURE — 80069 RENAL FUNCTION PANEL: CPT | Performed by: FAMILY MEDICINE

## 2024-09-17 PROCEDURE — 36415 COLL VENOUS BLD VENIPUNCTURE: CPT | Performed by: FAMILY MEDICINE

## 2024-09-17 PROCEDURE — 97161 PT EVAL LOW COMPLEX 20 MIN: CPT | Mod: GP

## 2024-09-17 PROCEDURE — 85610 PROTHROMBIN TIME: CPT | Performed by: NURSE PRACTITIONER

## 2024-09-17 PROCEDURE — 2500000004 HC RX 250 GENERAL PHARMACY W/ HCPCS (ALT 636 FOR OP/ED): Mod: JZ | Performed by: NURSE PRACTITIONER

## 2024-09-17 PROCEDURE — 2500000001 HC RX 250 WO HCPCS SELF ADMINISTERED DRUGS (ALT 637 FOR MEDICARE OP): Performed by: FAMILY MEDICINE

## 2024-09-17 RX ORDER — MAGNESIUM HYDROXIDE 2400 MG/10ML
10 SUSPENSION ORAL ONCE
Status: DISCONTINUED | OUTPATIENT
Start: 2024-09-17 | End: 2024-09-18

## 2024-09-17 RX ORDER — CYCLOBENZAPRINE HCL 10 MG
10 TABLET ORAL 2 TIMES DAILY PRN
Status: DISCONTINUED | OUTPATIENT
Start: 2024-09-17 | End: 2024-09-28 | Stop reason: HOSPADM

## 2024-09-17 RX ORDER — HYDROXYZINE HYDROCHLORIDE 25 MG/1
50 TABLET, FILM COATED ORAL 3 TIMES DAILY PRN
Status: DISCONTINUED | OUTPATIENT
Start: 2024-09-17 | End: 2024-09-28 | Stop reason: HOSPADM

## 2024-09-17 RX ORDER — WARFARIN SODIUM 5 MG/1
7.5 TABLET ORAL DAILY
Status: DISCONTINUED | OUTPATIENT
Start: 2024-09-17 | End: 2024-09-19

## 2024-09-17 ASSESSMENT — PAIN - FUNCTIONAL ASSESSMENT
PAIN_FUNCTIONAL_ASSESSMENT: 0-10

## 2024-09-17 ASSESSMENT — COGNITIVE AND FUNCTIONAL STATUS - GENERAL
WALKING IN HOSPITAL ROOM: A LITTLE
DAILY ACTIVITIY SCORE: 20
DRESSING REGULAR LOWER BODY CLOTHING: A LITTLE
CLIMB 3 TO 5 STEPS WITH RAILING: A LOT
MOVING FROM LYING ON BACK TO SITTING ON SIDE OF FLAT BED WITH BEDRAILS: A LOT
WALKING IN HOSPITAL ROOM: A LITTLE
MOBILITY SCORE: 18
STANDING UP FROM CHAIR USING ARMS: A LITTLE
DRESSING REGULAR UPPER BODY CLOTHING: A LITTLE
MOBILITY SCORE: 15
MOVING TO AND FROM BED TO CHAIR: A LITTLE
STANDING UP FROM CHAIR USING ARMS: A LITTLE
CLIMB 3 TO 5 STEPS WITH RAILING: TOTAL
TOILETING: A LITTLE
MOVING TO AND FROM BED TO CHAIR: A LITTLE
TURNING FROM BACK TO SIDE WHILE IN FLAT BAD: A LOT
HELP NEEDED FOR BATHING: A LITTLE

## 2024-09-17 ASSESSMENT — PAIN SCALES - GENERAL
PAINLEVEL_OUTOF10: 8
PAINLEVEL_OUTOF10: 2
PAINLEVEL_OUTOF10: 7
PAINLEVEL_OUTOF10: 7
PAINLEVEL_OUTOF10: 0 - NO PAIN
PAINLEVEL_OUTOF10: 7
PAINLEVEL_OUTOF10: 7

## 2024-09-17 ASSESSMENT — ACTIVITIES OF DAILY LIVING (ADL)
ADL_ASSISTANCE: INDEPENDENT
ADLS_ADDRESSED: YES

## 2024-09-17 ASSESSMENT — PAIN DESCRIPTION - LOCATION
LOCATION: GENERALIZED
LOCATION: LEG
LOCATION: KNEE

## 2024-09-17 NOTE — PROGRESS NOTES
Physical Therapy    Physical Therapy Evaluation    Patient Name: Neisha Graham  MRN: [id number]  Department: [address]  Room: [address] [place] [address]  Today's Date: 9/17/2024   Time Calculation  Start Time: 0955  Stop Time: 1027  Time Calculation (min): 32 min    Assessment/Plan   PT Assessment  PT Assessment Results: Decreased strength, Decreased endurance, Impaired balance, Decreased mobility  Rehab Prognosis: Good  Barriers to Discharge: Pain control  Evaluation/Treatment Tolerance: Patient limited by fatigue  Medical Staff Made Aware: Yes  Strengths: Ability to acquire knowledge, Housing layout  Barriers to Participation: Comorbidities, Support of Caregivers  End of Session Communication: Bedside nurse  Assessment Comment: Patient tolerates mobility well despite fatigue and generalized pain. No physical assist required. Rec low intensity PT intervention at this time.  End of Session Patient Position: Bed, 3 rail up, Alarm on  IP OR SWING BED PT PLAN  Inpatient or Swing Bed: Inpatient  PT Plan  Treatment/Interventions: Transfer training, Gait training, Balance training, Strengthening, Endurance training  PT Plan: Ongoing PT  PT Frequency: 3 times per week  PT Discharge Recommendations: Low intensity level of continued care  Equipment Recommended upon Discharge: Wheeled walker (owns)  PT Recommended Transfer Status: Assist x1  PT - OK to Discharge: Yes (per PT POC)      Subjective   General Visit Information:  General  Reason for Referral: Impaired functional mobility; ALBERTINA, B LE cellulitis  Referred By: Faiza Chino  Past Medical History Relevant to Rehab: Essential thrombocytosis.  Central venous sinuses thrombosis.  Tobacco use disorder.  Lymphedema.  COPD  Hypertension.  Morbid obesity.  Anxiety disorder.  Sleep apnea-does not wear CPAP or BiPAP or oxygen  Restless leg syndrome.  Hyperlipidemia.  Chronic low back pain.  Osteoarthritis.  Iron deficiency anemia.  Depression  Asthma  Hepatitis B  IBS  Family/Caregiver Present:  No  Prior to Session Communication: Bedside nurse  Patient Position Received: Bed, 3 rail up, Alarm on  General Comment: Patient cooperative and agreeable to PT eval  Home Living:  Home Living  Type of Home: Apartment  Lives With: Alone  Home Adaptive Equipment: Walker rolling or standard, Wheelchair-power (Rollator)  Home Layout: One level  Home Access: Level entry  Bathroom Shower/Tub: Walk-in shower  Bathroom Equipment: Grab bars in shower, Shower chair with back  Prior Level of Function:  Prior Function Per Pt/Caregiver Report  Level of [place]: Independent with ADLs and functional transfers, Independent with homemaking with ambulation  ADL Assistance: Independent  Homemaking Assistance: Independent (Grocery delivered)  Ambulatory Assistance: Independent (Rollator within apartment, power chair outside)  Precautions:  Precautions  Medical Precautions: Fall precautions  Precautions Comment: FRANDY OSMAN wounds    Vital Signs (Past 2hrs)        Date/Time Vitals Session Patient Position Pulse Resp SpO2 BP MAP (mmHg)    09/17/24 0959 --  --  72  --  --  116/68  84                   Vital Signs Comment: Vitals obtained by NA upon entry to room     Objective   Pain:  Pain Assessment  Pain Assessment: 0-10  0-10 (Numeric) Pain Score: 7 (RN aware)  Pain Type: Acute pain  Pain Location: Back (and legs)  Pain Orientation: Lower, Upper (LBP and cervical neck pain)  Pain Interventions: Repositioned, Ambulation/increased activity (RN provided patient with meds)  Cognition:  Cognition  Overall Cognitive Status: Within Functional Limits  Orientation Level: Oriented X4    General Assessments:    Activity Tolerance  Endurance: Tolerates 10 - 20 min exercise with multiple rests    Sensation  Light Touch: No apparent deficits    Strength  Strength Comments: Functionally B LE 4/5    Coordination  Movements are Fluid and Coordinated: Yes    Postural Control  Postural Control: Within Functional Limits    Static Sitting Balance  Static  Sitting-Balance Support: No upper extremity supported, Feet supported  Static Sitting-Level of Assistance: Independent    Static Standing Balance  Static Standing-Balance Support: Bilateral upper extremity supported  Static Standing-Level of Assistance: Close supervision  Functional Assessments:  ADL  ADL's Addressed: Yes (mod A donning slipper socks in bed, elastic portion cut from socks to improve patient comfort)    Bed Mobility  Bed Mobility: Yes  Bed Mobility 1  Bed Mobility 1: Supine to sitting, Sitting to supine  Level of Assistance 1: Independent    Transfers  Transfer: Yes  Transfer 1  Transfer From 1: Sit to, Stand to  Transfer to 1: Sit, Stand  Technique 1: Sit to stand, Stand to sit  Transfer Device 1: Walker  Transfer Level of Assistance 1: Close supervision    Ambulation/Gait Training  Ambulation/Gait Training Performed: Yes  Ambulation/Gait Training 1  Surface 1: Level tile  Device 1: Rolling walker  Assistance 1: Close supervision  Quality of Gait 1:  (increased dependence on B UE, decreased step length/height)  Comments/Distance (ft) 1: 10' (5' fwd, 5' bkwd)    Outcome Measures:  [facility] Basic Mobility  Turning from your back to your side while in a flat bed without using bedrails: None  Moving from lying on your back to sitting on the side of a flat bed without using bedrails: None  Moving to and from bed to chair (including a wheelchair): A little  Standing up from a chair using your arms (e.g. wheelchair or bedside chair): A little  To walk in hospital room: A little  Climbing 3-5 steps with railing: Total  Basic Mobility - Total Score: 18    Encounter Problems       Encounter Problems (Active)       Balance       STG - Maintains dynamic standing balance with upper extremity support x 5' with dual task supervision  (Progressing)       Start:  09/17/24    Expected End:  10/01/24               Mobility       STG - Patient will ambulate with 2WW 50' mod I  (Progressing)       Start:  09/17/24     Expected End:  10/01/24               PT Transfers       STG - Patient will transfer sit to and from stand mod I  (Progressing)       Start:  09/17/24    Expected End:  10/01/24               Pain - Adult              Education Documentation  Body Mechanics, taught by Eduarda Desai PT at 9/17/2024 10:45 AM.  Learner: Patient  Readiness: Acceptance  Method: Explanation  Response: Verbalizes Understanding    Mobility Training, taught by Eduarda Desai, PT at 9/17/2024 10:45 AM.  Learner: Patient  Readiness: Acceptance  Method: Explanation  Response: Verbalizes Understanding    Education Comments  No comments found.

## 2024-09-17 NOTE — PROGRESS NOTES
Patient: Neisha Graham  Room/bed: 250/250-B  Admitted on: 9/15/2024    Age: 64 y.o.   Gender: female  Code Status:  Full Code   Admitting Dx: Hyperkalemia [E87.5]  ALBERTINA (acute kidney injury) (CMS-HCC) [N17.9]  Generalized weakness [R53.1]    MRN: [id number]  PCP: Micheal Gonsalez, DO       Subjective   Complaining of abd pain, cramping, needs to have a bm and is unable to do on bedpan. Main complaing    Objective    Physical Exam  Constitutional:       Comments: Obese woman, in obvious discomfort at this time   HENT:      Head: Normocephalic.      Nose: Nose normal.      Mouth/Throat:      Mouth: Mucous membranes are dry.   Eyes:      Extraocular Movements: Extraocular movements intact.      Pupils: Pupils are equal, round, and reactive to light.   Cardiovascular:      Comments: Regular, 3-4/6 harsh systolic murmur  Pulmonary:      Effort: Pulmonary effort is normal.      Breath sounds: Normal breath sounds.      Comments: Decreased breath sounds in bases  Abdominal:      Comments: Obese, soft.   Good bowel sounds  Diffusely tender, sharon in LLQ   Musculoskeletal:      Comments: Dressings both lower extremities.  Faint distal pulses  Skin dry. Slightly warm to touch  No pitting edema   Skin:     General: Skin is warm and dry.   Neurological:      Mental Status: She is oriented to person, place, and time.   Psychiatric:         Mood and Affect: Mood normal.          Temp:  [36.4 °C (97.5 °F)-36.7 °C (98 °F)] 36.6 °C (97.8 °F)  Heart Rate:  [] 83  Resp:  [16-20] 16  BP: ()/(55-73) 96/55    Vitals:    09/16/24 0833   Weight: 142 kg (313 lb 11.4 oz)             I/Os    Intake/Output Summary (Last 24 hours) at 9/17/2024 0903  Last data filed at 9/17/2024 0811  Gross per 24 hour   Intake 1478.33 ml   Output 1150 ml   Net 328.33 ml       Labs:   Results from last 72 hours   Lab Units 09/17/24  0556 09/16/24  1943 09/16/24  1506 09/16/24  0707 09/16/24  0356   SODIUM mmol/L 132* 131* 131* 128* 125*   POTASSIUM  "mmol/L 4.0  --   --  5.2 6.0*   CHLORIDE mmol/L 104  --   --   --  100   CO2 mmol/L 22  --   --   --  18*   BUN mg/dL 41*  --   --   --  60*   CREATININE mg/dL 1.03  --   --   --  1.31*   GLUCOSE mg/dL 94  --   --   --  88   CALCIUM mg/dL 7.6*  --   --   --  9.1   ANION GAP mmol/L 10  --   --   --  13   EGFR mL/min/1.73m*2 60*  --   --   --  45*   PHOSPHORUS mg/dL 2.8  --   --   --   --       Results from last 72 hours   Lab Units 09/17/24  0556 09/15/24  2342   WBC AUTO x10*3/uL 7.0 11.2   HEMOGLOBIN g/dL 12.8 14.3   HEMATOCRIT % 38.9 43.8   PLATELETS AUTO x10*3/uL 319 324   NEUTROS PCT AUTO %  --  77.5   LYMPHS PCT AUTO %  --  11.8   MONOS PCT AUTO %  --  9.4   EOS PCT AUTO %  --  0.4      Lab Results   Component Value Date    CALCIUM 7.6 (L) 09/17/2024    PHOS 2.8 09/17/2024      Lab Results   Component Value Date    CRP 16.36 (H) 09/16/2024      [unfilled]     Micro/ID:   No results found for the last 90 days.                   No lab exists for component: \"AGALPCRNB\"   .ID  Lab Results   Component Value Date    URINECULTURE No significant growth 09/16/2024    BLOODCULT No growth at 1 day 09/15/2024    BLOODCULT No growth at 1 day 09/15/2024       Images:  ECG 12 lead  Sinus tachycardia  Septal infarct , age undetermined  Abnormal ECG  When compared with ECG of 04-FEB-2021 17:36,  Questionable change in QRS duration  Septal infarct is now Present  ECG 12 lead  Sinus tachycardia  Possible Left atrial enlargement  Borderline ECG  When compared with ECG of 15-SEP-2024 23:12, (unconfirmed)  Criteria for Septal infarct are no longer Present  ST no longer depressed in Anterior leads  CT chest abdomen pelvis wo IV contrast  Narrative: Interpreted By:  Wilmar Bower,   STUDY:  CT CHEST ABDOMEN PELVIS WO CONTRAST;  9/16/2024 1:31 am      INDICATION:  Signs/Symptoms:weakness          COMPARISON:  CT chest abdomen pelvis dated 11/25/2019. CT chest dated 07/08/2024.      ACCESSION NUMBER(S):  [id number]    "   ORDERING CLINICIAN:  BESS CLEMENTE      TECHNIQUE:  CT of the chest, abdomen, and pelvis was performed.  Contiguous axial images were obtained at  5 mm slice thickness  through the chest, and at  3 mm through the abdomen and pelvis.  Coronal and sagittal reconstructions at  3 mm slice thickness were  performed.      FINDINGS:  CHEST:      LUNG/PLEURA/LARGE AIRWAYS:  No air space opacity, focal consolidation, pleural  effusion/hemothorax, or pneumothorax are appreciated.      There is mild upper lobe predominant centrilobular emphysema.      Previously seen right lung apex pulmonary nodule has resolved.      VESSELS:  Thoracic aorta is normal in caliber and demonstrates moderate  atherosclerotic calcifications. There are also moderate coronary  artery atherosclerotic calcifications. Main pulmonary trunk is  enlarged measuring 3.5 cm in caliber suggestive of pulmonary arterial  hypertension.      HEART:  The heart is normal in size.   There is no pericardial effusion.      MEDIASTINUM AND ELVIA:  No pneumomediastinum, abnormal mediastinal fluid collection or  mediastinal hematoma are appreciated.  No mediastinal, hilar or  biaxillary adenopathy is present.  The esophagus is normal in course  and caliber.      CHEST WALL AND LOWER NECK:  No acute fracture or dislocation of the included osseous structures  are appreciated.  No suspicious osseous lesions are identified.  The  thoracic wall soft tissues are within normal limits.      ABDOMEN:      LIVER:  Liver is mildly enlarged and normal in contour and density.      GALLBLADDER:  The gallbladder is nondistended without evidence of radiopaque stone.      BILE DUCTS:  The intahepatic and extrahepatic bile ducts are not dilated.      PANCREAS:  The pancreas appears unremarkable.      SPLEEN:  Normal size..      ADRENAL GLANDS:  Stable 1.6 x 1.3 cm left adrenal nodule consistent with an adenoma  and a smaller right adrenal nodule measuring 1.1 cm also consistent  with  adenoma.      KIDNEYS AND URETERS:  Normal size and contour. There is a punctate left lower pole  intrarenal calculus. No obstructing calculi..      PELVIS:      BLADDER:  The urinary bladder appears within normal limits.      REPRODUCTIVE ORGANS:  Uterus is present. No evidence of adnexal mass.      BOWEL:  The stomach is unremarkable.  The small bowel is normal in caliber  without evidence of focal wall thickening or obstruction.  There is  no evidence of focal wall thickening or dilatation of the large  bowel.  Normal appendix. Moderate amount of formed stool throughout  the colon.      VESSELS:  The aorta and IVC are normal in caliber. Moderate atherosclerotic  calcifications of the abdominal aorta and its branches.      PERITONEUM/RETROPERITONEUM/LYMPH NODES:  There is no evidence of intra- or retroperitoneal hematoma.  There is  no free or loculated fluid collection, no free intraperitoneal air.  No abdominopelvic lymphadenopathy is present.      BONES AND ABDOMINAL WALL:  No evidence of acute fracture or dislocation of the included osseous  structures.  No suspicious osseous lesions are identified. Multilevel  endplate degenerative changes predominantly in the lower thoracic  spine. No evidence of high-grade osseous canal stenosis. The  abdominal wall soft tissues appear normal.      Impression: CHEST  1.  No evidence of acute abnormality in the chest. Mild upper lobe  predominant centrilobular emphysema. Enlarged main pulmonary trunk  measuring up to 3.5 cm in caliber suggestive of pulmonary arterial  hypertension.  2. At least moderate aortic valve atherosclerotic calcifications.  Clinical correlation for aortic stenosis is recommended.      ABDOMEN - PELVIS  1.  No acute abnormality in the abdomen/pelvis. Hepatomegaly.          MACRO:  None      Signed by: Wilmar Bower 9/16/2024 1:55 AM  Dictation workstation:   [id number]       Meds    Scheduled medications  ceFAZolin, 2 g, intravenous, q8h  dilTIAZem  CD, 180 mg, oral, Daily  docusate sodium, 100 mg, oral, BID  DULoxetine, 20 mg, oral, Daily  eucerin, , Topical, BID  influenza, 0.5 mL, intramuscular, During hospitalization  tiotropium, 2 puff, inhalation, Daily   And  fluticasone furoate-vilanteroL, 1 puff, inhalation, Daily  hydroxyurea, 1,000 mg, oral, Daily  nicotine, 1 patch, transdermal, Daily  nystatin, , Topical, BID  pramipexole, 1 mg, oral, BID  pregabalin, 50 mg, oral, TID  sodium zirconium cyclosilicate, 10 g, oral, q8h  zinc oxide, 1 Application, Topical, TID      Continuous medications     PRN medications  PRN medications: acetaminophen **OR** acetaminophen **OR** acetaminophen, albuterol, hydrOXYzine HCL, naloxone, ondansetron **OR** ondansetron, oxyCODONE, [START ON 9/24/2024] oxyCODONE     Assessment and Plan    Neisha Graham is a 64 y.o. female   Acute bilateral LE cellulitis, with accompanying lymphedema. Continue ancef. Initial tissue stains showing GPC. Afebrile . Appreciate consultants input  Hyponatremia, stable. Slightly improved.   Pulmonary hypertension. Per ct scan  Chronic venous sinus thrombosis, stable  BE-no cpap  Lower extremity wounds, continue local wound care.  Essential thrombocytosis. Continue hydroxyurea  Hypertension. Stable. Reviewed tele, stable  Depression/IBS. Continue usual meds      Faiza Chino MD

## 2024-09-17 NOTE — CARE PLAN
The patient's goals for the shift include      The clinical goals for the shift include pt will report pain less than 5/10 throughout shift

## 2024-09-17 NOTE — PROGRESS NOTES
Neisha Graham is a 64 y.o. female on day 1 of admission presenting with ALBERTINA (acute kidney injury) (CMS-[facility]).    Subjective   Interval History: no fever, no new complaints        Review of Systems    Objective   Range of Vitals (last 24 hours)  Heart Rate:  [67-84]   Temp:  [36.4 °C (97.5 °F)-36.7 °C (98 °F)]   Resp:  [16-20]   BP: ()/(55-73)   SpO2:  [92 %-98 %]   Daily Weight  09/16/24 : 142 kg (313 lb 11.4 oz)    Body mass index is 53.85 kg/m².    Physical Exam  Constitutional:       Appearance: Normal appearance.   HENT:      Head: Normocephalic and atraumatic.      Mouth/Throat:      Mouth: Mucous membranes are moist.      Pharynx: Oropharynx is clear.   Eyes:      Pupils: Pupils are equal, round, and reactive to light.   Cardiovascular:      Rate and Rhythm: Normal rate and regular rhythm.      Heart sounds: Normal heart sounds.   Pulmonary:      Effort: Pulmonary effort is normal.      Breath sounds: Normal breath sounds.   Abdominal:      General: Abdomen is flat. Bowel sounds are normal.      Palpations: Abdomen is soft.   Musculoskeletal:      Cervical back: Normal range of motion.      Comments: Stasis / wounds   Neurological:      Mental Status: She is alert.         Antibiotics  ceFAZolin - 2 gram/100 mL    Relevant Results  Labs  Results from last 72 hours   Lab Units 09/17/24  0556 09/15/24  2342   WBC AUTO x10*3/uL 7.0 11.2   HEMOGLOBIN g/dL 12.8 14.3   HEMATOCRIT % 38.9 43.8   PLATELETS AUTO x10*3/uL 319 324   NEUTROS PCT AUTO %  --  77.5   LYMPHS PCT AUTO %  --  11.8   MONOS PCT AUTO %  --  9.4   EOS PCT AUTO %  --  0.4     Results from last 72 hours   Lab Units 09/17/24  0556 09/16/24  1943 09/16/24  1506 09/16/24  0707 09/16/24  0356   SODIUM mmol/L 132* 131* 131* 128* 125*   POTASSIUM mmol/L 4.0  --   --  5.2 6.0*   CHLORIDE mmol/L 104  --   --   --  100   CO2 mmol/L 22  --   --   --  18*   BUN mg/dL 41*  --   --   --  60*   CREATININE mg/dL 1.03  --   --   --  1.31*   GLUCOSE mg/dL 94   --   --   --  88   CALCIUM mg/dL 7.6*  --   --   --  9.1   ANION GAP mmol/L 10  --   --   --  13   EGFR mL/min/1.73m*2 60*  --   --   --  45*   PHOSPHORUS mg/dL 2.8  --   --   --   --      Results from last 72 hours   Lab Units 09/17/24  0556 09/16/24  0356   ALK PHOS U/L  --  295*   BILIRUBIN TOTAL mg/dL  --  0.7   PROTEIN TOTAL g/dL  --  6.7   ALT U/L  --  22   AST U/L  --  22   ALBUMIN g/dL 2.4* 3.2*     Estimated Creatinine Clearance: 77 mL/min (by C-G formula based on SCr of 1.03 mg/dL).  C-Reactive Protein   Date Value Ref Range Status   09/16/2024 16.36 (H) <1.00 mg/dL Final   08/12/2024 7.94 (H) <1.00 mg/dL Final     CRP   Date Value Ref Range Status   06/19/2020 1.84 (A) mg/dL Final     Comment:     REF VALUE  < 1.00       Microbiology  Susceptibility data from last 14 days.  Collected Specimen Info Organism   09/15/24 Tissue/Biopsy from Buttock Staphylococcus aureus   Imaging  Reviewed        Assessment/Plan   Fever, the temp is down, BC is negative  Legs stasis / wounds / likely cellulitis, the culture with Staph (s) pending  Encephalopathy     Recommendations :  Continue Cefazolin     I spent minutes in the professional and overall care of this patient.      Gorge Myers MD

## 2024-09-17 NOTE — CARE PLAN
The patient's goals for the shift include      The clinical goals for the shift include Patient will participate in wound care and BLE elevated throughout shift

## 2024-09-17 NOTE — CONSULTS
"Nutrition Initial Assessment:   Nutrition Assessment    Reason for Assessment: Admission nursing screening    Patient is a 64 y.o. female presenting with fever, chills, rigors, nausea, fatigue, confusion, and weakness. Pt denied D/V. Pt with wt loss of 8.2% in 4 months. Significant wt change. Pt stated her appetite hasn't changed much but she is losing weight. Wt loss could be potentially fluid due to acute bilateral LE cellulitis with accompanying lymphedema and no big appetite changes.       Nutrition History:  Energy Intake: Good > 75 %  Food and Nutrient History:  (Pt reported nausea and poor appeitite prior to admission. Pt with better appetite eating 100%.)  Food Allergies/Intolerances:  None  GI Symptoms: None  Oral Problems: None       Anthropometrics:  Height: 162.6 cm (5' 4\")   Weight: 142 kg (313 lb 11.4 oz)   BMI (Calculated): 53.82    Weight Change %:  Weight History / % Weight Change:  (wt loss on 8.2% in 4 months.)  Significant Weight Loss: Yes  Interpretation of Weight Loss: >7.5% in 3 months    Nutrition Focused Physical Exam Findings:    Subcutaneous Fat Loss:   Orbital Fat Pads: Well nourished (slightly bulging fat pads)  Buccal Fat Pads: Well nourished (full, rounded cheeks)  Muscle Wasting:  Temporalis: Well nourished (well-defined muscle)  Pectoralis (Clavicular Region): Well nourished (clavicle not visible)  Edema:  Edema: +2 mild  Physical Findings:  Hair: Negative  Eyes: Negative  Mouth: Negative  Skin: Negative (skin noting wound coccyx, and cellulitis on BLE)    Nutrition Significant Labs:  CBC Trend:   Results from last 7 days   Lab Units 09/17/24  0556 09/15/24  2342   WBC AUTO x10*3/uL 7.0 11.2   RBC AUTO x10*6/uL 3.74* 4.07   HEMOGLOBIN g/dL 12.8 14.3   HEMATOCRIT % 38.9 43.8   MCV fL 104* 108*   PLATELETS AUTO x10*3/uL 319 324    , BMP Trend:   Results from last 7 days   Lab Units 09/17/24  0556 09/16/24  0707 09/16/24  0356   GLUCOSE mg/dL 94  --  88   CALCIUM mg/dL 7.6*  --  9.1 "   SODIUM mmol/L 132*   < > 125*   POTASSIUM mmol/L 4.0   < > 6.0*   CO2 mmol/L 22  --  18*   CHLORIDE mmol/L 104  --  100   BUN mg/dL 41*  --  60*   CREATININE mg/dL 1.03  --  1.31*    < > = values in this interval not displayed.    , A1C:  Lab Results   Component Value Date    HGBA1C 5.9 (H) 08/13/2024       Nutrition Specific Medications:  ceFAZolin, 2 g, intravenous, q8h  dilTIAZem CD, 180 mg, oral, Daily  docusate sodium, 100 mg, oral, BID  DULoxetine, 20 mg, oral, Daily  eucerin, , Topical, BID  influenza, 0.5 mL, intramuscular, During hospitalization  tiotropium, 2 puff, inhalation, Daily   And  fluticasone furoate-vilanteroL, 1 puff, inhalation, Daily  hydroxyurea, 1,000 mg, oral, Daily  nicotine, 1 patch, transdermal, Daily  nystatin, , Topical, BID  pramipexole, 1 mg, oral, BID  pregabalin, 50 mg, oral, TID  sodium zirconium cyclosilicate, 10 g, oral, q8h  warfarin, 7.5 mg, oral, Daily  zinc oxide, 1 Application, Topical, TID         I/O:   Last BM Date: 09/15/24;      Dietary Orders (From admission, onward)       Start     Ordered    09/16/24 0904  May Participate in Room Service  Once        Question:  .  Answer:  Yes    09/16/24 0903    09/16/24 0737  Adult diet Regular, Potassium restricted 2 gm (50mEq)  Diet effective now        Question Answer Comment   Diet type Regular    Diet type Potassium restricted 2 gm (50mEq)        09/16/24 0737    09/16/24 0726  Oral nutritional supplements  Until discontinued        Question Answer Comment   Deliver with Breakfast    Deliver with Dinner    Select supplement: Pro-Stat Sugar Free        09/16/24 0729                     Estimated Needs:   Total Energy Estimated Needs (kCal): 1900 kCal  Method for Estimating Needs: 30-35kcals/kg IBW (5130-2646)  Total Protein Estimated Needs (g): 66 g  Method for Estimating Needs: 1.2g/kg IBW  Total Fluid Estimated Needs (mL): 1900 mL  Method for Estimating Needs: 1 ml/kcal        Nutrition Diagnosis   Malnutrition  Diagnosis  Patient has Malnutrition Diagnosis: No    Nutrition Diagnosis  Patient has Nutrition Diagnosis: Yes  Diagnosis Status (1): New  Nutrition Diagnosis 1: Inadequate protein energy intake  Related to (1): increased demand for nutrients  As Evidenced by (1): wounds       Nutrition Interventions/Recommendations         Nutrition Prescription:  Individualized Nutrition Prescription Provided for :  (Individual nutrition prescription of 1900 kcals and 66g of protein to be provided with diet order.)        Nutrition Interventions:   Interventions: Meals and snacks  Meals and Snacks: Mineral-modified diet      Nutrition Monitoring and Evaluation   Food/Nutrient Related History Monitoring  Monitoring and Evaluation Plan: Energy intake  Energy Intake: Estimated energy intake  Criteria: continue with PO intakes, goal of >75% of all meals.    Body Composition/Growth/Weight History  Monitoring and Evaluation Plan: Weight  Weight: Weight change  Criteria: Continue to monitor wt status and wt changes.    Time Spent (min): 60 minutes

## 2024-09-17 NOTE — PROGRESS NOTES
09/17/24 1432   Discharge Planning   Expected Discharge Disposition Home Health  (PT recommending low frequency skilled services. Discussed with patient preference is to return home alone with resumed [facility] upon d/c. Referral sent in [facility]. Patient will need transport arranged on d/c. May need ambulance transport.)

## 2024-09-18 ENCOUNTER — APPOINTMENT (OUTPATIENT)
Dept: CARDIOLOGY | Facility: HOSPITAL | Age: 64
End: 2024-09-18
Payer: MEDICARE

## 2024-09-18 ENCOUNTER — APPOINTMENT (OUTPATIENT)
Dept: WOUND CARE | Facility: HOSPITAL | Age: 64
End: 2024-09-18
Payer: COMMERCIAL

## 2024-09-18 LAB
ANION GAP SERPL CALC-SCNC: 15 MMOL/L (ref 10–20)
AORTIC VALVE MEAN GRADIENT: 37.1 MMHG
AORTIC VALVE PEAK VELOCITY: 3.79 M/S
AV PEAK GRADIENT: 57.5 MMHG
AVA (PEAK VEL): 1.11 CM2
AVA (VTI): 1.18 CM2
BUN SERPL-MCNC: 40 MG/DL (ref 6–23)
CALCIUM SERPL-MCNC: 7.9 MG/DL (ref 8.6–10.3)
CHLORIDE SERPL-SCNC: 100 MMOL/L (ref 98–107)
CO2 SERPL-SCNC: 20 MMOL/L (ref 21–32)
CREAT SERPL-MCNC: 1.13 MG/DL (ref 0.5–1.05)
EGFRCR SERPLBLD CKD-EPI 2021: 54 ML/MIN/1.73M*2
EJECTION FRACTION APICAL 4 CHAMBER: 65.2
EJECTION FRACTION: 63 %
GLUCOSE BLD MANUAL STRIP-MCNC: 143 MG/DL (ref 74–99)
GLUCOSE BLD MANUAL STRIP-MCNC: 84 MG/DL (ref 74–99)
GLUCOSE BLD MANUAL STRIP-MCNC: 89 MG/DL (ref 74–99)
GLUCOSE SERPL-MCNC: 110 MG/DL (ref 74–99)
INR PPP: 2.2 (ref 0.9–1.1)
LEFT ATRIUM VOLUME AREA LENGTH INDEX BSA: 30.6 ML/M2
LEFT VENTRICLE INTERNAL DIMENSION DIASTOLE: 4.7 CM (ref 3.5–6)
LEFT VENTRICULAR OUTFLOW TRACT DIAMETER: 2.01 CM
MITRAL VALVE E/A RATIO: 0.56
POTASSIUM SERPL-SCNC: 3.9 MMOL/L (ref 3.5–5.3)
PROTHROMBIN TIME: 24.9 SECONDS (ref 9.8–12.8)
RIGHT VENTRICLE FREE WALL PEAK S': 18 CM/S
RIGHT VENTRICLE PEAK SYSTOLIC PRESSURE: 37.9 MMHG
SODIUM SERPL-SCNC: 131 MMOL/L (ref 136–145)
STAPHYLOCOCCUS SPEC CULT: NORMAL
TRICUSPID ANNULAR PLANE SYSTOLIC EXCURSION: 2.3 CM

## 2024-09-18 PROCEDURE — 80051 ELECTROLYTE PANEL: CPT | Performed by: INTERNAL MEDICINE

## 2024-09-18 PROCEDURE — 2500000004 HC RX 250 GENERAL PHARMACY W/ HCPCS (ALT 636 FOR OP/ED): Performed by: NURSE PRACTITIONER

## 2024-09-18 PROCEDURE — 93306 TTE W/DOPPLER COMPLETE: CPT

## 2024-09-18 PROCEDURE — 9420000001 HC RT PATIENT EDUCATION 5 MIN

## 2024-09-18 PROCEDURE — 36415 COLL VENOUS BLD VENIPUNCTURE: CPT | Performed by: FAMILY MEDICINE

## 2024-09-18 PROCEDURE — 99233 SBSQ HOSP IP/OBS HIGH 50: CPT | Performed by: INTERNAL MEDICINE

## 2024-09-18 PROCEDURE — 93306 TTE W/DOPPLER COMPLETE: CPT | Performed by: INTERNAL MEDICINE

## 2024-09-18 PROCEDURE — 2500000002 HC RX 250 W HCPCS SELF ADMINISTERED DRUGS (ALT 637 FOR MEDICARE OP, ALT 636 FOR OP/ED): Performed by: INTERNAL MEDICINE

## 2024-09-18 PROCEDURE — 1200000002 HC GENERAL ROOM WITH TELEMETRY DAILY

## 2024-09-18 PROCEDURE — S4991 NICOTINE PATCH NONLEGEND: HCPCS | Performed by: EMERGENCY MEDICINE

## 2024-09-18 PROCEDURE — 2500000004 HC RX 250 GENERAL PHARMACY W/ HCPCS (ALT 636 FOR OP/ED): Performed by: INTERNAL MEDICINE

## 2024-09-18 PROCEDURE — 82947 ASSAY GLUCOSE BLOOD QUANT: CPT

## 2024-09-18 PROCEDURE — 2500000001 HC RX 250 WO HCPCS SELF ADMINISTERED DRUGS (ALT 637 FOR MEDICARE OP): Performed by: NURSE PRACTITIONER

## 2024-09-18 PROCEDURE — 2500000001 HC RX 250 WO HCPCS SELF ADMINISTERED DRUGS (ALT 637 FOR MEDICARE OP): Performed by: FAMILY MEDICINE

## 2024-09-18 PROCEDURE — 85610 PROTHROMBIN TIME: CPT | Performed by: FAMILY MEDICINE

## 2024-09-18 PROCEDURE — 2500000002 HC RX 250 W HCPCS SELF ADMINISTERED DRUGS (ALT 637 FOR MEDICARE OP, ALT 636 FOR OP/ED): Performed by: EMERGENCY MEDICINE

## 2024-09-18 RX ORDER — SODIUM CHLORIDE 9 MG/ML
50 INJECTION, SOLUTION INTRAVENOUS CONTINUOUS
Status: DISCONTINUED | OUTPATIENT
Start: 2024-09-18 | End: 2024-09-20

## 2024-09-18 ASSESSMENT — COGNITIVE AND FUNCTIONAL STATUS - GENERAL
DAILY ACTIVITIY SCORE: 20
DRESSING REGULAR LOWER BODY CLOTHING: A LITTLE
TURNING FROM BACK TO SIDE WHILE IN FLAT BAD: A LITTLE
DRESSING REGULAR UPPER BODY CLOTHING: A LITTLE
MOVING FROM LYING ON BACK TO SITTING ON SIDE OF FLAT BED WITH BEDRAILS: A LITTLE
MOBILITY SCORE: 17
WALKING IN HOSPITAL ROOM: A LITTLE
TOILETING: A LITTLE
STANDING UP FROM CHAIR USING ARMS: A LITTLE
CLIMB 3 TO 5 STEPS WITH RAILING: A LOT
HELP NEEDED FOR BATHING: A LITTLE
MOVING TO AND FROM BED TO CHAIR: A LITTLE

## 2024-09-18 ASSESSMENT — PAIN - FUNCTIONAL ASSESSMENT: PAIN_FUNCTIONAL_ASSESSMENT: 0-10

## 2024-09-18 ASSESSMENT — PAIN SCALES - GENERAL
PAINLEVEL_OUTOF10: 4
PAINLEVEL_OUTOF10: 8
PAINLEVEL_OUTOF10: 7
PAINLEVEL_OUTOF10: 8
PAINLEVEL_OUTOF10: 4
PAINLEVEL_OUTOF10: 4
PAINLEVEL_OUTOF10: 3

## 2024-09-18 ASSESSMENT — PAIN DESCRIPTION - LOCATION
LOCATION: GENERALIZED
LOCATION: GENERALIZED

## 2024-09-18 NOTE — PROGRESS NOTES
Neisha Graham is a 64 y.o. female on day 2 of admission presenting with ALBERTINA (acute kidney injury) (CMS-[facility]).    Subjective   Interval History: no fever, no new complaints        Review of Systems    Objective   Range of Vitals (last 24 hours)  Heart Rate:  [88-98]   Temp:  [36.4 °C (97.5 °F)-36.6 °C (97.9 °F)]   Resp:  [16-18]   BP: (105-157)/(65-83)   SpO2:  [97 %-99 %]   Daily Weight  09/16/24 : 142 kg (313 lb 11.4 oz)    Body mass index is 53.85 kg/m².    Physical Exam  Constitutional:       Appearance: Normal appearance.   HENT:      Head: Normocephalic and atraumatic.      Mouth/Throat:      Mouth: Mucous membranes are moist.      Pharynx: Oropharynx is clear.   Eyes:      Pupils: Pupils are equal, round, and reactive to light.   Cardiovascular:      Rate and Rhythm: Normal rate and regular rhythm.      Heart sounds: Normal heart sounds.   Pulmonary:      Effort: Pulmonary effort is normal.      Breath sounds: Normal breath sounds.   Abdominal:      General: Abdomen is flat. Bowel sounds are normal.      Palpations: Abdomen is soft.   Musculoskeletal:      Cervical back: Normal range of motion.      Comments: Stasis / wounds   Neurological:      Mental Status: She is alert.         Antibiotics  ceFAZolin - 2 gram/100 mL    Relevant Results  Labs  Results from last 72 hours   Lab Units 09/17/24  0556 09/15/24  2342   WBC AUTO x10*3/uL 7.0 11.2   HEMOGLOBIN g/dL 12.8 14.3   HEMATOCRIT % 38.9 43.8   PLATELETS AUTO x10*3/uL 319 324   NEUTROS PCT AUTO %  --  77.5   LYMPHS PCT AUTO %  --  11.8   MONOS PCT AUTO %  --  9.4   EOS PCT AUTO %  --  0.4     Results from last 72 hours   Lab Units 09/18/24  0545 09/17/24  0556 09/16/24  1943 09/16/24  1506 09/16/24  0707 09/16/24  0356   SODIUM mmol/L 131* 132* 131*   < > 128* 125*   POTASSIUM mmol/L 3.9 4.0  --   --  5.2 6.0*   CHLORIDE mmol/L 100 104  --   --   --  100   CO2 mmol/L 20* 22  --   --   --  18*   BUN mg/dL 40* 41*  --   --   --  60*   CREATININE mg/dL 1.13*  1.03  --   --   --  1.31*   GLUCOSE mg/dL 110* 94  --   --   --  88   CALCIUM mg/dL 7.9* 7.6*  --   --   --  9.1   ANION GAP mmol/L 15 10  --   --   --  13   EGFR mL/min/1.73m*2 54* 60*  --   --   --  45*   PHOSPHORUS mg/dL  --  2.8  --   --   --   --     < > = values in this interval not displayed.     Results from last 72 hours   Lab Units 09/17/24  0556 09/16/24  0356   ALK PHOS U/L  --  295*   BILIRUBIN TOTAL mg/dL  --  0.7   PROTEIN TOTAL g/dL  --  6.7   ALT U/L  --  22   AST U/L  --  22   ALBUMIN g/dL 2.4* 3.2*     Estimated Creatinine Clearance: 70.2 mL/min (A) (by C-G formula based on SCr of 1.13 mg/dL (H)).  C-Reactive Protein   Date Value Ref Range Status   09/16/2024 16.36 (H) <1.00 mg/dL Final   08/12/2024 7.94 (H) <1.00 mg/dL Final     CRP   Date Value Ref Range Status   06/19/2020 1.84 (A) mg/dL Final     Comment:     REF VALUE  < 1.00       Microbiology  Susceptibility data from last 14 days.  Collected Specimen Info Organism Clindamycin Erythromycin Oxacillin Tetracycline Trimethoprim/Sulfamethoxazole Vancomycin   09/15/24 Tissue/Biopsy from Buttock Methicillin Resistant Staphylococcus aureus (MRSA)  R  R  R  R  S  S   Imaging  Reviewed        Assessment/Plan   Fever, the temp is down, BC G+ve   Legs stasis / wounds / likely cellulitis, the culture with MRSA  Encephalopathy     Recommendations :  Change the antibiotics to Daptomycin     I spent minutes in the professional and overall care of this patient.      Gorge Myers MD

## 2024-09-18 NOTE — PROGRESS NOTES
09/18/24 1230   Discharge Planning   Living Arrangements Alone   Support Systems Children;Spouse/significant other   Assistance Needed A&Ox3, mobilizes with a rollator or wheelchair, has walk in shower, does not drive (transport thru insurance provide a ride), room air (does have o2 set up at home but doesn't use), no CPAP/BIPAP, active with [facility] for SN, PCP Dr. Micheal Gonsalez   Type of Residence Private residence   Number of Stairs to Enter Residence 0   Number of Stairs Within Residence 0   Do you have animals or pets at home? No   Home or Post Acute Services In home services   Type of Home Care Services Home nursing visits   Expected Discharge Disposition Home Health  (PT recommending low frequency skilled services. Discussed with patient preference is to return home alone with resumed [facility] upon d/c. Referral sent in [facility]. Patient will need transport arranged on d/c. May need ambulance transport.)   Does the patient need discharge transport arranged? Yes   RoundTrip coordination needed? Yes   Has discharge transport been arranged? No   Patient Choice   Provider Choice list and CMS website (https://medicare.gov/care-compare#search) for post-acute Quality and Resource Measure Data were provided and reviewed with: Patient   Patient / Family choosing to utilize agency / facility established prior to hospitalization Yes

## 2024-09-18 NOTE — CARE PLAN
The patient's goals for the shift include      The clinical goals for the shift include Pt will get at least 5 hours of sleep through end of shift.    Pt was able to get 5 hours of sleep.

## 2024-09-18 NOTE — PROGRESS NOTES
Patient: Neisha Graham  Room/bed: 250/250-B  Admitted on: 9/15/2024    Age: 64 y.o.   Gender: female  Code Status:  Full Code   Admitting Dx: Hyperkalemia [E87.5]  ALBERTINA (acute kidney injury) (CMS-HCC) [N17.9]  Generalized weakness [R53.1]    MRN: [id number]  PCP: Micheal Gonsalez, DO       Subjective   Had decent bm yesterday, belly feels better. Feels more shaky today though, like she is getting sick all over again    Objective    Physical Exam  Constitutional:       Appearance: She is obese.   HENT:      Head: Normocephalic.      Nose: Nose normal.      Mouth/Throat:      Mouth: Mucous membranes are moist.   Eyes:      Extraocular Movements: Extraocular movements intact.      Pupils: Pupils are equal, round, and reactive to light.   Cardiovascular:      Comments: Regular, 3-4/6 harsh systolic murmur, 1/6 diastolic.   Pulmonary:      Effort: Pulmonary effort is normal.      Breath sounds: Normal breath sounds.   Abdominal:      Comments: Soft today, non distended  No tenderness to palpation. Present bowel sounds   Musculoskeletal:      Comments: Dressings to both lower extremities. Right is saturated with yellowish drainage  Multiple wounds, abrasions noted.   Slight increase in temp to touch and very tender  Faint distal pulses   Skin:     General: Skin is dry.   Neurological:      General: No focal deficit present.      Mental Status: She is alert and oriented to person, place, and time.   Psychiatric:         Mood and Affect: Mood normal.         Behavior: Behavior normal.          Temp:  [36.4 °C (97.5 °F)-36.6 °C (97.9 °F)] 36.5 °C (97.7 °F)  Heart Rate:  [72-98] 88  Resp:  [16-18] 16  BP: (105-157)/(66-83) 157/83    Vitals:    09/16/24 0833   Weight: 142 kg (313 lb 11.4 oz)             I/Os    Intake/Output Summary (Last 24 hours) at 9/18/2024 0901  Last data filed at 9/18/2024 0453  Gross per 24 hour   Intake 720 ml   Output 0 ml   Net 720 ml       Labs:   Results from last 72 hours   Lab Units 09/18/24  8114  "09/17/24  0556 09/16/24  1943 09/16/24  1506 09/16/24  0707 09/16/24  0356   SODIUM mmol/L 131* 132* 131*   < > 128* 125*   POTASSIUM mmol/L 3.9 4.0  --   --  5.2 6.0*   CHLORIDE mmol/L 100 104  --   --   --  100   CO2 mmol/L 20* 22  --   --   --  18*   BUN mg/dL 40* 41*  --   --   --  60*   CREATININE mg/dL 1.13* 1.03  --   --   --  1.31*   GLUCOSE mg/dL 110* 94  --   --   --  88   CALCIUM mg/dL 7.9* 7.6*  --   --   --  9.1   ANION GAP mmol/L 15 10  --   --   --  13   EGFR mL/min/1.73m*2 54* 60*  --   --   --  45*   PHOSPHORUS mg/dL  --  2.8  --   --   --   --     < > = values in this interval not displayed.      Results from last 72 hours   Lab Units 09/17/24  0556 09/15/24  2342   WBC AUTO x10*3/uL 7.0 11.2   HEMOGLOBIN g/dL 12.8 14.3   HEMATOCRIT % 38.9 43.8   PLATELETS AUTO x10*3/uL 319 324   NEUTROS PCT AUTO %  --  77.5   LYMPHS PCT AUTO %  --  11.8   MONOS PCT AUTO %  --  9.4   EOS PCT AUTO %  --  0.4      Lab Results   Component Value Date    CALCIUM 7.9 (L) 09/18/2024    PHOS 2.8 09/17/2024      Lab Results   Component Value Date    CRP 16.36 (H) 09/16/2024      [unfilled]     Micro/ID:   Susceptibility data from last 90 days.  Collected Specimen Info Organism Clindamycin Erythromycin Oxacillin Tetracycline Trimethoprim/Sulfamethoxazole Vancomycin   09/15/24 Tissue/Biopsy from Buttock Methicillin Resistant Staphylococcus aureus (MRSA)  R  R  R  R  S  S                    No lab exists for component: \"AGALPCRNB\"   .ID  Lab Results   Component Value Date    URINECULTURE No significant growth 09/16/2024    BLOODCULT No growth at 2 days 09/15/2024    BLOODCULT  09/15/2024     Identification and susceptibility testing to follow       Images:       Meds    Scheduled medications  ceFAZolin, 2 g, intravenous, q8h  dilTIAZem CD, 180 mg, oral, Daily  docusate sodium, 100 mg, oral, BID  DULoxetine, 20 mg, oral, Daily  eucerin, , Topical, BID  influenza, 0.5 mL, intramuscular, During hospitalization  tiotropium, 2 " puff, inhalation, Daily   And  fluticasone furoate-vilanteroL, 1 puff, inhalation, Daily  hydroxyurea, 1,000 mg, oral, Daily  magnesium hydroxide, 10 mL, oral, Once  nicotine, 1 patch, transdermal, Daily  nystatin, , Topical, BID  pramipexole, 1 mg, oral, BID  pregabalin, 50 mg, oral, TID  warfarin, 7.5 mg, oral, Daily  zinc oxide, 1 Application, Topical, TID      Continuous medications     PRN medications  PRN medications: acetaminophen **OR** acetaminophen **OR** [DISCONTINUED] acetaminophen, albuterol, cyclobenzaprine, hydrOXYzine HCL, naloxone, ondansetron **OR** ondansetron, oxyCODONE, [START ON 9/24/2024] oxyCODONE     Assessment and Plan    Neisha Graham is a 64 y.o. female   Bilateral LE cellulitis, with positive blood culture, it is possible this is mrsa bacteremia/sepsis, as her wound culture is pos for mrsa. Await final ID. Other bacteria may be contaminant. Appreciate Dr gaxiola's input. Afebrile.  Hx mitral and aortic stenosis both. Will obtain echo, check valves. Crp did rise  Moderate pulm hypertension/BE , does not use cpap  Chronic venous sinus thrombosis, on coumadin. INR is therapeutic , will continue to monitor  Constipation/IBS. Continue with bowel regimen  Obesity  Essential thrombocytosis, continue hydroxy  Chronic lymphedema, BLE.   Hypertension, stable  Hyperuricemia. Check uric acid in am  ALBERTINA, slight bump in creat today. Gentle fluids for one liter      Faiza Chino MD

## 2024-09-18 NOTE — CONSULTS
Do you have any home inhalers?    yes  Do you get relief when using it?     yes  [employer] education and have them teach back. Already has one and was re-educated  If using home inhaler do you rinse your mouth? yes  Any previous PFTs?no   explain the importance of a PFT. yes  Do you have a pulmonary Dr.? no  Pulmonary cards given. yes  Do you currently smoke or vape or have you ever?   sometimes  Quit date or planning to quit? Not sure  How long have you smoked for? years  PPD?  Few a day  Smoking education given and class information given . yes  Do you have a Primary Dr.? yes  Name: Micheal Rosalinojennifer, DO  Do you have any home O2 or CPAP/BiPAP?  yes             How much O2 at home? 2 1/2 LNC  Educate on acceptable O2 levels and the importance of monitoring O2 at home. Complete home O2 evaluation if needed. yes    This RT to see patient for COPD consult. The patient was given a COPD booklet with educational materials regarding pulmonary issues. Smoking cessation education reviewed, documentation given. I discussed various options for quitting smoking. I talked about different strategies that can be used to support changing habits and reduce the urges of withdraw symptoms.  [employer] support group discussed. Flyer given for next month's meeting. I educated patient about the disease process and how it affected the lungs making it difficult to breathe. We discussed current medications and if their current medications give them relief. Patient given  pulmonary office phone number to make an appt. Patient was very receptive to all information given.

## 2024-09-19 ENCOUNTER — APPOINTMENT (OUTPATIENT)
Dept: VASCULAR MEDICINE | Facility: HOSPITAL | Age: 64
End: 2024-09-19
Payer: MEDICARE

## 2024-09-19 LAB
ANION GAP SERPL CALC-SCNC: 12 MMOL/L (ref 10–20)
ATRIAL RATE: 101 BPM
BACTERIA SPEC CULT: ABNORMAL
BUN SERPL-MCNC: 36 MG/DL (ref 6–23)
CALCIUM SERPL-MCNC: 7.4 MG/DL (ref 8.6–10.3)
CHLORIDE SERPL-SCNC: 103 MMOL/L (ref 98–107)
CO2 SERPL-SCNC: 21 MMOL/L (ref 21–32)
CREAT SERPL-MCNC: 0.97 MG/DL (ref 0.5–1.05)
EGFRCR SERPLBLD CKD-EPI 2021: 65 ML/MIN/1.73M*2
GLUCOSE SERPL-MCNC: 107 MG/DL (ref 74–99)
GRAM STN SPEC: ABNORMAL
GRAM STN SPEC: ABNORMAL
INR PPP: 2.5 (ref 0.9–1.1)
P AXIS: 66 DEGREES
P OFFSET: 181 MS
P ONSET: 142 MS
POTASSIUM SERPL-SCNC: 3.9 MMOL/L (ref 3.5–5.3)
PR INTERVAL: 154 MS
PROTHROMBIN TIME: 28.6 SECONDS (ref 9.8–12.8)
Q ONSET: 219 MS
QRS COUNT: 16 BEATS
QRS DURATION: 66 MS
QT INTERVAL: 308 MS
QTC CALCULATION(BAZETT): 399 MS
QTC FREDERICIA: 366 MS
R AXIS: -19 DEGREES
SODIUM SERPL-SCNC: 132 MMOL/L (ref 136–145)
T AXIS: 58 DEGREES
T OFFSET: 373 MS
URATE SERPL-MCNC: 8.6 MG/DL (ref 2.3–6.7)
VENTRICULAR RATE: 101 BPM

## 2024-09-19 PROCEDURE — 2500000001 HC RX 250 WO HCPCS SELF ADMINISTERED DRUGS (ALT 637 FOR MEDICARE OP): Performed by: INTERNAL MEDICINE

## 2024-09-19 PROCEDURE — 2500000004 HC RX 250 GENERAL PHARMACY W/ HCPCS (ALT 636 FOR OP/ED): Performed by: NURSE PRACTITIONER

## 2024-09-19 PROCEDURE — 36415 COLL VENOUS BLD VENIPUNCTURE: CPT | Performed by: INTERNAL MEDICINE

## 2024-09-19 PROCEDURE — 80048 BASIC METABOLIC PNL TOTAL CA: CPT | Performed by: INTERNAL MEDICINE

## 2024-09-19 PROCEDURE — S4991 NICOTINE PATCH NONLEGEND: HCPCS | Performed by: EMERGENCY MEDICINE

## 2024-09-19 PROCEDURE — 2500000002 HC RX 250 W HCPCS SELF ADMINISTERED DRUGS (ALT 637 FOR MEDICARE OP, ALT 636 FOR OP/ED): Performed by: INTERNAL MEDICINE

## 2024-09-19 PROCEDURE — 93922 UPR/L XTREMITY ART 2 LEVELS: CPT | Performed by: SURGERY

## 2024-09-19 PROCEDURE — 1200000002 HC GENERAL ROOM WITH TELEMETRY DAILY

## 2024-09-19 PROCEDURE — 84550 ASSAY OF BLOOD/URIC ACID: CPT | Performed by: INTERNAL MEDICINE

## 2024-09-19 PROCEDURE — 2500000004 HC RX 250 GENERAL PHARMACY W/ HCPCS (ALT 636 FOR OP/ED): Performed by: INTERNAL MEDICINE

## 2024-09-19 PROCEDURE — 85610 PROTHROMBIN TIME: CPT | Performed by: INTERNAL MEDICINE

## 2024-09-19 PROCEDURE — 2500000002 HC RX 250 W HCPCS SELF ADMINISTERED DRUGS (ALT 637 FOR MEDICARE OP, ALT 636 FOR OP/ED): Performed by: EMERGENCY MEDICINE

## 2024-09-19 PROCEDURE — 97530 THERAPEUTIC ACTIVITIES: CPT | Mod: GP,CQ

## 2024-09-19 PROCEDURE — 99232 SBSQ HOSP IP/OBS MODERATE 35: CPT | Performed by: INTERNAL MEDICINE

## 2024-09-19 PROCEDURE — 93922 UPR/L XTREMITY ART 2 LEVELS: CPT

## 2024-09-19 PROCEDURE — 2500000001 HC RX 250 WO HCPCS SELF ADMINISTERED DRUGS (ALT 637 FOR MEDICARE OP): Performed by: NURSE PRACTITIONER

## 2024-09-19 PROCEDURE — 87040 BLOOD CULTURE FOR BACTERIA: CPT | Mod: GEALAB | Performed by: INTERNAL MEDICINE

## 2024-09-19 RX ORDER — WARFARIN 3 MG/1
6 TABLET ORAL DAILY
Status: DISCONTINUED | OUTPATIENT
Start: 2024-09-19 | End: 2024-09-22

## 2024-09-19 ASSESSMENT — COGNITIVE AND FUNCTIONAL STATUS - GENERAL
CLIMB 3 TO 5 STEPS WITH RAILING: A LOT
MOBILITY SCORE: 18
TURNING FROM BACK TO SIDE WHILE IN FLAT BAD: A LITTLE
CLIMB 3 TO 5 STEPS WITH RAILING: A LITTLE
DAILY ACTIVITIY SCORE: 20
MOBILITY SCORE: 17
STANDING UP FROM CHAIR USING ARMS: A LITTLE
WALKING IN HOSPITAL ROOM: A LITTLE
MOVING TO AND FROM BED TO CHAIR: A LITTLE
MOVING TO AND FROM BED TO CHAIR: A LITTLE
DRESSING REGULAR LOWER BODY CLOTHING: A LITTLE
MOVING FROM LYING ON BACK TO SITTING ON SIDE OF FLAT BED WITH BEDRAILS: A LITTLE
DRESSING REGULAR UPPER BODY CLOTHING: A LITTLE
MOVING FROM LYING ON BACK TO SITTING ON SIDE OF FLAT BED WITH BEDRAILS: A LITTLE
STANDING UP FROM CHAIR USING ARMS: A LITTLE
TURNING FROM BACK TO SIDE WHILE IN FLAT BAD: A LITTLE
HELP NEEDED FOR BATHING: A LITTLE
WALKING IN HOSPITAL ROOM: A LITTLE
TOILETING: A LITTLE

## 2024-09-19 ASSESSMENT — PAIN - FUNCTIONAL ASSESSMENT
PAIN_FUNCTIONAL_ASSESSMENT: 0-10

## 2024-09-19 ASSESSMENT — PAIN SCALES - GENERAL
PAINLEVEL_OUTOF10: 7
PAINLEVEL_OUTOF10: 8
PAINLEVEL_OUTOF10: 4
PAINLEVEL_OUTOF10: 2
PAINLEVEL_OUTOF10: 4
PAINLEVEL_OUTOF10: 3
PAINLEVEL_OUTOF10: 7
PAINLEVEL_OUTOF10: 3
PAINLEVEL_OUTOF10: 7

## 2024-09-19 ASSESSMENT — PAIN DESCRIPTION - LOCATION: LOCATION: GENERALIZED

## 2024-09-19 ASSESSMENT — ENCOUNTER SYMPTOMS
NEAR-SYNCOPE: 0
SYNCOPE: 0
SHORTNESS OF BREATH: 1
POOR WOUND HEALING: 1
CHILLS: 1
PALPITATIONS: 0
FEVER: 1
COLOR CHANGE: 1

## 2024-09-19 NOTE — CARE PLAN
The patient's goals for the shift include  pain managment    The clinical goals for the shift include Patient will remain free from injury this shift.    Over the shift, the patient did not make progress toward the following goals. Barriers to progression include acute illness. Recommendations to address these barriers include medications.

## 2024-09-19 NOTE — CONSULTS
Consults    Reason For Consult  Severe aortic stenosis    History Of Present Illness  Neisha Graham is a 64 y.o. female with past medical history of COPD, lymphedema, obesity, recurrent cellulitis with venous stasis who is admitted to [facility] for evaluation and treatment of bilateral lower extremity cellulitis with tissue cultures positive for MRSA. States that she has not felt well since she was discharged from the hospital about a month ago and began to develop chills, fever, and confusion which prompted her to present to the hospital. Patient was previously noted to have moderate aortic and mitral valve stenosis, repeat echo was done during this admission and now shows severe aortic and mitral valve stenosis.    Upon examination this morning, patient was unaware of aortic valve stenosis.  States that she has been told she has a murmur over the past few years but did not know what that meant or what was causing it.  Denies symptoms of chest pain, dyspnea on exertion worse than what is normal for her, near syncopal or syncopal episodes.     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), Cellulitis of left lower limb (04/08/2021), Chronic obstructive pulmonary disease with (acute) exacerbation (Multi) (01/09/2019), Chronic sinusitis, unspecified, COVID-19 (11/28/2022), Daily headache (08/17/2023), Elevated bilirubin (08/17/2023), Elevated transaminase level (08/17/2023), Fever (08/17/2023), Fever (08/17/2023), Heart murmur (03/10/2023), Hemoptysis (03/10/2023), Hyperkalemia (03/10/2023), Malaise and fatigue (03/10/2023), Morbid (severe) obesity due to excess calories (Multi), Other conditions influencing health status, Other conditions influencing health status (03/12/2020),  Other conditions influencing health status (03/06/2017), Other conditions influencing health status, Other conditions influencing health status, Other conditions influencing health status, Pain in unspecified ankle and joints of unspecified foot (06/24/2016), Pain in unspecified foot (12/08/2015), Pain in unspecified knee, Palpitations (03/10/2023), Personal history of diseases of the skin and subcutaneous tissue, Personal history of other diseases of the female genital tract, Personal history of other diseases of the nervous system and sense organs (09/12/2019), Personal history of other diseases of the nervous system and sense organs (09/17/2015), Personal history of other diseases of the respiratory system, Personal history of other diseases of the respiratory system (10/17/2016), Personal history of other diseases of the respiratory system (11/29/2017), Personal history of other diseases of the respiratory system (11/16/2016), Personal history of other drug therapy (10/14/2016), Personal history of other infectious and parasitic diseases, Personal history of other infectious and parasitic diseases (07/22/2020), Personal history of other medical treatment, Personal history of other specified conditions, Personal history of other specified conditions (01/28/2015), Personal history of other specified conditions, Personal history of pneumonia (recurrent), Pneumonia, unspecified organism (01/11/2018), Postmenopausal bleeding (09/30/2014), Right knee pain (03/10/2023), Shortness of breath (03/10/2023), Sinus tachycardia (03/10/2023), Submandibular sialolithiasis (03/10/2023), Unilateral primary osteoarthritis, unspecified knee (02/03/2017), and Unspecified acute conjunctivitis, bilateral (08/20/2020).    Surgical History  She has a past surgical history that includes Other surgical history (09/19/2013); Other surgical history (09/19/2013); Tonsillectomy (09/19/2013); Knee surgery (09/19/2013); Tubal ligation  (09/19/2013); and Other surgical history (09/27/2013).     Social History  She reports that she has been smoking cigarettes. She has never used smokeless tobacco. No history on file for alcohol use and drug use.    Family History  No family history on file.     Allergies  Vancomycin, Macrobid [nitrofurantoin monohyd/m-cryst], and Sulfamethoxazole-trimethoprim    Review of Systems  Review of Systems   Constitutional: Positive for chills and fever.   Cardiovascular:  Positive for leg swelling. Negative for chest pain, near-syncope, palpitations and syncope.   Respiratory:  Positive for shortness of breath.    Skin:  Positive for color change and poor wound healing.          Physical Exam  Physical Exam  Constitutional:       General: She is not in acute distress.     Appearance: She is obese.   HENT:      Head: Normocephalic and atraumatic.   Cardiovascular:      Rate and Rhythm: Normal rate and regular rhythm.      Heart sounds: Murmur (3/6 crescendo-decresenco murmur heard best at 2nd intercostal space) heard.   Pulmonary:      Effort: Pulmonary effort is normal.      Breath sounds: Normal breath sounds.   Musculoskeletal:         General: Swelling present.      Right lower leg: Edema present.      Left lower leg: Edema present.   Skin:     Findings: Lesion present.   Neurological:      Mental Status: She is alert.              Last Recorded Vitals  /69 (BP Location: Right arm, Patient Position: Sitting)   Pulse 78   Temp 36.2 °C (97.2 °F) (Temporal)   Resp 16   Wt 142 kg (313 lb 11.4 oz)   SpO2 97%     Relevant Results  Results for orders placed or performed during the hospital encounter of 09/15/24 (from the past 24 hour(s))   Transthoracic Echo (TTE) Complete   Result Value Ref Range    AV pk jaime 3.79 m/s    AV mn grad 37.1 mmHg    LVOT diam 2.01 cm    MV E/A ratio 0.56     LA vol index A/L 30.6 ml/m2    Tricuspid annular plane systolic excursion 2.3 cm    LV EF 63 %    RV free wall pk S' 18.00 cm/s     LVIDd 4.70 cm    RVSP 37.9 mmHg    Aortic Valve Area by Continuity of VTI 1.18 cm2    Aortic Valve Area by Continuity of Peak Velocity 1.11 cm2    AV pk grad 57.5 mmHg    LV A4C EF 65.2    POCT GLUCOSE   Result Value Ref Range    POCT Glucose 84 74 - 99 mg/dL   POCT GLUCOSE   Result Value Ref Range    POCT Glucose 143 (H) 74 - 99 mg/dL   Protime-INR   Result Value Ref Range    Protime 28.6 (H) 9.8 - 12.8 seconds    INR 2.5 (H) 0.9 - 1.1   Uric Acid   Result Value Ref Range    Uric Acid 8.6 (H) 2.3 - 6.7 mg/dL   Basic Metabolic Panel   Result Value Ref Range    Glucose 107 (H) 74 - 99 mg/dL    Sodium 132 (L) 136 - 145 mmol/L    Potassium 3.9 3.5 - 5.3 mmol/L    Chloride 103 98 - 107 mmol/L    Bicarbonate 21 21 - 32 mmol/L    Anion Gap 12 10 - 20 mmol/L    Urea Nitrogen 36 (H) 6 - 23 mg/dL    Creatinine 0.97 0.50 - 1.05 mg/dL    eGFR 65 >60 mL/min/1.73m*2    Calcium 7.4 (L) 8.6 - 10.3 mg/dL   Vascular US Ankle Brachial Index (GEORGE) Without Exercise   Result Value Ref Range    BSA 2.54 m2          Assessment/Plan   Neisha Graham is a 64 y.o. female with past medical history of COPD, lymphedema, obesity, recurrent cellulitis with venous stasis who is admitted to [facility] for evaluation and management of bilateral lower extremity cellulitis.  Echo found to have worsening valvular disease, patient now with severe aortic and mitral valve stenosis.  Denies symptoms of valvular disease at this time.     #Bilateral cellulitis with cultures positive for MRSA  #Severe aortic valve stenosis  #Chronic venous stasis with chronic venous sinus thrombosis on Coumadin  #Hypertension    Plan:  -Aortic stenosis moderate to severe based on gradients from TTE  -Patient not having acute symptoms of valvular dysfunction - discussed symptoms of aortic stenosis with her  -Will plan for patient to follow up with cardiology outpatient to discuss options for aortic stenosis such as TAVR, not a great candidate at this time in the  setting of active infection and significant bilateral swelling of lower extremities   -Continue home blood pressure medications as BP allows    Patient seen and discussed with Dr. Isidoro Spann MD  Internal Medicine, PGY-1    Patient seen, discussed and examined with Dr Spann, above is representative of my medical decision making.    Judson Chaudhry MD

## 2024-09-19 NOTE — PROGRESS NOTES
Patient: Neisha Graham  Room/bed: 122/122-A  Admitted on: 9/15/2024    Age: 64 y.o.   Gender: female  Code Status:  Full Code   Admitting Dx: Hyperkalemia [E87.5]  ALBERTINA (acute kidney injury) (CMS-HCC) [N17.9]  Generalized weakness [R53.1]    MRN: [id number]  PCP: Micheal Gonsalez, DO       Subjective   Feels better today. No issues overnight    Objective    Physical Exam  Constitutional:       Comments: Up in chair today. Looks better. Affect brighter   HENT:      Head: Normocephalic.      Nose: Nose normal.      Mouth/Throat:      Mouth: Mucous membranes are dry.   Eyes:      Extraocular Movements: Extraocular movements intact.      Pupils: Pupils are equal, round, and reactive to light.   Neck:      Comments: No jvd  Cardiovascular:      Pulses: Normal pulses.      Comments: Regular, 3-4/6 harsh crescendo systolic murmur, 1/6 diastolic. Soft S2  Pulmonary:      Effort: Pulmonary effort is normal.      Breath sounds: Normal breath sounds.   Abdominal:      General: Bowel sounds are normal.      Palpations: Abdomen is soft.   Musculoskeletal:      Comments: Legs are looking better.   Temp about back to normal.   Erythema resolving nicely. Less tender to touch   Neurological:      General: No focal deficit present.      Mental Status: She is alert. Mental status is at baseline.   Psychiatric:         Mood and Affect: Mood normal.         Behavior: Behavior normal.          Temp:  [36.2 °C (97.2 °F)-36.7 °C (98.1 °F)] 36.2 °C (97.2 °F)  Heart Rate:  [78-91] 78  Resp:  [16-18] 16  BP: ()/(63-77) 115/69    Vitals:    09/16/24 0833   Weight: 142 kg (313 lb 11.4 oz)             I/Os    Intake/Output Summary (Last 24 hours) at 9/19/2024 0958  Last data filed at 9/19/2024 0432  Gross per 24 hour   Intake 637.83 ml   Output --   Net 637.83 ml       Labs:   Results from last 72 hours   Lab Units 09/19/24  0655 09/18/24  0545 09/17/24  0556   SODIUM mmol/L 132* 131* 132*   POTASSIUM mmol/L 3.9 3.9 4.0   CHLORIDE mmol/L  "103 100 104   CO2 mmol/L 21 20* 22   BUN mg/dL 36* 40* 41*   CREATININE mg/dL 0.97 1.13* 1.03   GLUCOSE mg/dL 107* 110* 94   CALCIUM mg/dL 7.4* 7.9* 7.6*   ANION GAP mmol/L 12 15 10   EGFR mL/min/1.73m*2 65 54* 60*   PHOSPHORUS mg/dL  --   --  2.8      Results from last 72 hours   Lab Units 09/17/24  0556   WBC AUTO x10*3/uL 7.0   HEMOGLOBIN g/dL 12.8   HEMATOCRIT % 38.9   PLATELETS AUTO x10*3/uL 319      Lab Results   Component Value Date    CALCIUM 7.4 (L) 09/19/2024    PHOS 2.8 09/17/2024      Lab Results   Component Value Date    CRP 16.36 (H) 09/16/2024      [unfilled]     Micro/ID:   Susceptibility data from last 90 days.  Collected Specimen Info Organism Clindamycin Erythromycin Oxacillin Tetracycline Trimethoprim/Sulfamethoxazole Vancomycin   09/15/24 Tissue/Biopsy from Buttock Methicillin Resistant Staphylococcus aureus (MRSA)  R  R  R  R  S  S   09/15/24 Blood culture from Peripheral Venipuncture Aerococcus urinae                          No lab exists for component: \"AGALPCRNB\"   .ID  Lab Results   Component Value Date    URINECULTURE No significant growth 09/16/2024    BLOODCULT No growth at 3 days 09/15/2024    BLOODCULT Aerococcus urinae (AA) 09/15/2024       Images:  Vascular US Ankle Brachial Index (GEORGE) Without Exercise  Preliminary Cardiology Report            [place] [address]   Tel [phone] and Fax [phone]                 Preliminary Vascular Lab Report     [place] [facility] US ANKLE BRACHIAL INDEX (GEORGE) WITHOUT EXERCISE       Patient Name:      LION AURA PEREZ Reading Physician:  [id number] Alphonse Vargas DO  Study Date:        9/19/2024       Ordering Physician: [id number] BROOKE LY  MRN/PID:           [id number]        Technologist:       Indy Najera RVT  Accession#:        [id number]    Technologist 2:  Date of Birth/Age: 1960       Encounter#:         [id number]  Gender:            F  Admission Status:  Inpatient       Location Performed: " [facility]       Diagnosis/ICD: Peripheral vascular disease, unspecified-I73.9  Procedure/CPT: 47047 Peripheral artery GEORGE Only       PRELIMINARY CONCLUSIONS:  Right Lower PVR: No evidence of arterial occlusive disease in the right lower extremity at rest. Normal digital perfusion noted. Multiphasic flow is noted in the right common femoral artery, right posterior tibial artery and right dorsalis pedis artery.  Left Lower PVR: No evidence of arterial occlusive disease in the left lower extremity at rest. Normal digital perfusion noted. Multiphasic flow is noted in the left common femoral artery, left posterior tibial artery and left dorsalis pedis artery.     Imaging & Doppler Findings:     RIGHT Lower PVR                Pressures Ratios  Right Posterior Tibial (Ankle) 102 mmHg  0.92  Right Dorsalis Pedis (Ankle)   111 mmHg  1.00  Right Digit (Great Toe)        78 mmHg   0.70          LEFT Lower PVR                Pressures Ratios  Left Posterior Tibial (Ankle) 120 mmHg  1.08  Left Dorsalis Pedis (Ankle)   127 mmHg  1.14  Left Digit (Great Toe)        71 mmHg   0.64                             Right     Left  Brachial Pressure 108 mmHg 111 mmHg            VASCULAR PRELIMINARY REPORT  completed by Indy Najera [email or web address] on 9/19/2024 at 8:13:52 AM       ** Final **       Meds    Scheduled medications  daptomycin, 4 mg/kg (Adjusted), intravenous, q24h KAR  dilTIAZem CD, 180 mg, oral, Daily  docusate sodium, 100 mg, oral, BID  DULoxetine, 20 mg, oral, Daily  eucerin, , Topical, BID  influenza, 0.5 mL, intramuscular, During hospitalization  tiotropium, 2 puff, inhalation, Daily   And  fluticasone furoate-vilanteroL, 1 puff, inhalation, Daily  hydroxyurea, 1,000 mg, oral, Daily  nicotine, 1 patch, transdermal, Daily  nystatin, , Topical, BID  pramipexole, 1 mg, oral, BID  pregabalin, 50 mg, oral, TID  warfarin, 7.5 mg, oral, Daily  zinc oxide, 1 Application, Topical, TID      Continuous medications  sodium chloride  0.9%, 50 mL/hr, Last Rate: 50 mL/hr (09/18/24 0914)      PRN medications  PRN medications: acetaminophen **OR** acetaminophen **OR** [DISCONTINUED] acetaminophen, albuterol, cyclobenzaprine, hydrOXYzine HCL, naloxone, ondansetron **OR** ondansetron, oxyCODONE, [START ON 9/24/2024] oxyCODONE     Assessment and Plan    Neisha Graham is a 64 y.o. female     Cellulitis, lower extremity. Presumed MRSA due to culture from buttock wound. Afebrile. Blood culture negative for this , just with contaminant. Afebrile. Discussed with Dr Myers.  She will need to stay until dapto able to be discontinued.   CVI. Continues to have fairly significant drainage from wounds, edema of legs. Continue local wound care, wraps.   Aortic stenosis. Cards to discuss with her, symptoms to be aware of etc.   Obesity  Essential thrombocytosis. Continue hydroxurea. Stable  Pulmonary hypertension, moderate  Chronic venous sinus thrombosis. INR therapeutic, monitor as she was markedly elevated on admission  Hyperuricemia.   ALBERTINA, improved today    Faiza Chino MD

## 2024-09-19 NOTE — CARE PLAN
The patient's goals for the shift include  pain management    The clinical goals for the shift include Patient will remain free from injury this shift.    Over the shift, the patient did not make progress toward the following goals. Barriers to progression include acute illness. Recommendations to address these barriers include medications.

## 2024-09-19 NOTE — PROGRESS NOTES
Neisha Graham is a 64 y.o. female on day 3 of admission presenting with ALBERTINA (acute kidney injury) (CMS-[facility]).    Subjective   Interval History: no fever, no new complaints        Review of Systems    Objective   Range of Vitals (last 24 hours)  Heart Rate:  [78-91]   Temp:  [36.2 °C (97.2 °F)-36.7 °C (98.1 °F)]   Resp:  [16-18]   BP: ()/(63-77)   SpO2:  [96 %-98 %]   Daily Weight  09/16/24 : 142 kg (313 lb 11.4 oz)    Body mass index is 53.85 kg/m².    Physical Exam  Constitutional:       Appearance: Normal appearance.   HENT:      Head: Normocephalic and atraumatic.      Mouth/Throat:      Mouth: Mucous membranes are moist.      Pharynx: Oropharynx is clear.   Eyes:      Pupils: Pupils are equal, round, and reactive to light.   Cardiovascular:      Rate and Rhythm: Normal rate and regular rhythm.      Heart sounds: Normal heart sounds.   Pulmonary:      Effort: Pulmonary effort is normal.      Breath sounds: Normal breath sounds.   Abdominal:      General: Abdomen is flat. Bowel sounds are normal.      Palpations: Abdomen is soft.   Musculoskeletal:      Cervical back: Normal range of motion.      Comments: Stasis / wounds   Neurological:      Mental Status: She is alert.         Antibiotics  DAPTOmycin (Cubicin) IV in 50 mL NS    Relevant Results  Labs  Results from last 72 hours   Lab Units 09/17/24  0556   WBC AUTO x10*3/uL 7.0   HEMOGLOBIN g/dL 12.8   HEMATOCRIT % 38.9   PLATELETS AUTO x10*3/uL 319     Results from last 72 hours   Lab Units 09/19/24  0655 09/18/24  0545 09/17/24  0556   SODIUM mmol/L 132* 131* 132*   POTASSIUM mmol/L 3.9 3.9 4.0   CHLORIDE mmol/L 103 100 104   CO2 mmol/L 21 20* 22   BUN mg/dL 36* 40* 41*   CREATININE mg/dL 0.97 1.13* 1.03   GLUCOSE mg/dL 107* 110* 94   CALCIUM mg/dL 7.4* 7.9* 7.6*   ANION GAP mmol/L 12 15 10   EGFR mL/min/1.73m*2 65 54* 60*   PHOSPHORUS mg/dL  --   --  2.8     Results from last 72 hours   Lab Units 09/17/24  0556   ALBUMIN g/dL 2.4*     Estimated  Creatinine Clearance: 81.8 mL/min (by C-G formula based on SCr of 0.97 mg/dL).  C-Reactive Protein   Date Value Ref Range Status   09/16/2024 16.36 (H) <1.00 mg/dL Final   08/12/2024 7.94 (H) <1.00 mg/dL Final     CRP   Date Value Ref Range Status   06/19/2020 1.84 (A) mg/dL Final     Comment:     REF VALUE  < 1.00       Microbiology  Susceptibility data from last 14 days.  Collected Specimen Info Organism Clindamycin Erythromycin Oxacillin Tetracycline Trimethoprim/Sulfamethoxazole Vancomycin   09/15/24 Tissue/Biopsy from Buttock Methicillin Resistant Staphylococcus aureus (MRSA)  R  R  R  R  S  S   09/15/24 Blood culture from Peripheral Venipuncture Aerococcus urinae         Imaging  Reviewed        Assessment/Plan   Fever, the temp is down, BC G+ve   Legs stasis / wounds / likely cellulitis, the culture with MRSA  Encephalopathy     Recommendations :  Continue Daptomycin  Discussed with the medical team     I spent minutes in the professional and overall care of this patient.      Gorge Myers MD

## 2024-09-19 NOTE — NURSING NOTE
Patient's RN, Purnima requested assist with BLE wound assessment and care, concerning status.  EMR and current plan of care reviewed.  History includes CVI and lymphedema.  [facility]isha is [facility] at our [facility].    The BLE have deep maroon / purple chronic discoloration with xerosis and dry desquamation but also massive areas of very wet, weepy, macerated skin with multiple scattered areas of partial thickness skin loss, 14 x 5 cm on right posterior/lateral lower leg, 13 x 8 cm on left.  The drainage is thin, tan with mild malodor.  BLE and heels acutely tender, left greater than right.  Skin to heels intact but very boggy.  Patient states cannot tolerate leg elevation nor wraps of any sort at this time.  Goal: moisture management, edema control, heel off-loading, moist healing.  Recommend: podiatry consult and for now, while drainage so excessive wound care BID and as needed using simply adaptic and ABDs  (exudate too copious for Aquacel).  Heels were floated using 30 degree foam wedge as she states cannot tolerate pillows nor off-loading boots of any sort.

## 2024-09-20 ENCOUNTER — TELEPHONE (OUTPATIENT)
Dept: HEMATOLOGY/ONCOLOGY | Facility: CLINIC | Age: 64
End: 2024-09-20
Payer: COMMERCIAL

## 2024-09-20 LAB
BACTERIA BLD AEROBE CULT: ABNORMAL
BACTERIA BLD AEROBE CULT: ABNORMAL
BACTERIA BLD CULT: ABNORMAL
BACTERIA BLD CULT: ABNORMAL
BACTERIA BLD CULT: NORMAL
BASOPHILS # BLD AUTO: 0.03 X10*3/UL (ref 0–0.1)
BASOPHILS NFR BLD AUTO: 0.5 %
CK SERPL-CCNC: 27 U/L (ref 0–215)
EOSINOPHIL # BLD AUTO: 0.25 X10*3/UL (ref 0–0.7)
EOSINOPHIL NFR BLD AUTO: 4.3 %
ERYTHROCYTE [DISTWIDTH] IN BLOOD BY AUTOMATED COUNT: 14.8 % (ref 11.5–14.5)
GRAM STN SPEC: ABNORMAL
GRAM STN SPEC: ABNORMAL
HCT VFR BLD AUTO: 38.9 % (ref 36–46)
HGB BLD-MCNC: 12.7 G/DL (ref 12–16)
IMM GRANULOCYTES # BLD AUTO: 0.07 X10*3/UL (ref 0–0.7)
IMM GRANULOCYTES NFR BLD AUTO: 1.2 % (ref 0–0.9)
INR PPP: 3.2 (ref 0.9–1.1)
LYMPHOCYTES # BLD AUTO: 1.23 X10*3/UL (ref 1.2–4.8)
LYMPHOCYTES NFR BLD AUTO: 21 %
MCH RBC QN AUTO: 34 PG (ref 26–34)
MCHC RBC AUTO-ENTMCNC: 32.6 G/DL (ref 32–36)
MCV RBC AUTO: 104 FL (ref 80–100)
MONOCYTES # BLD AUTO: 0.82 X10*3/UL (ref 0.1–1)
MONOCYTES NFR BLD AUTO: 14 %
NEUTROPHILS # BLD AUTO: 3.47 X10*3/UL (ref 1.2–7.7)
NEUTROPHILS NFR BLD AUTO: 59 %
NRBC BLD-RTO: 0 /100 WBCS (ref 0–0)
PLATELET # BLD AUTO: 387 X10*3/UL (ref 150–450)
PROTHROMBIN TIME: 36.1 SECONDS (ref 9.8–12.8)
RBC # BLD AUTO: 3.74 X10*6/UL (ref 4–5.2)
WBC # BLD AUTO: 5.9 X10*3/UL (ref 4.4–11.3)

## 2024-09-20 PROCEDURE — 9420000001 HC RT PATIENT EDUCATION 5 MIN

## 2024-09-20 PROCEDURE — 99233 SBSQ HOSP IP/OBS HIGH 50: CPT | Performed by: INTERNAL MEDICINE

## 2024-09-20 PROCEDURE — S4991 NICOTINE PATCH NONLEGEND: HCPCS | Performed by: EMERGENCY MEDICINE

## 2024-09-20 PROCEDURE — 85610 PROTHROMBIN TIME: CPT | Performed by: INTERNAL MEDICINE

## 2024-09-20 PROCEDURE — 82550 ASSAY OF CK (CPK): CPT | Performed by: INTERNAL MEDICINE

## 2024-09-20 PROCEDURE — 2500000004 HC RX 250 GENERAL PHARMACY W/ HCPCS (ALT 636 FOR OP/ED): Performed by: INTERNAL MEDICINE

## 2024-09-20 PROCEDURE — 36415 COLL VENOUS BLD VENIPUNCTURE: CPT | Performed by: INTERNAL MEDICINE

## 2024-09-20 PROCEDURE — 1100000001 HC PRIVATE ROOM DAILY

## 2024-09-20 PROCEDURE — 2500000001 HC RX 250 WO HCPCS SELF ADMINISTERED DRUGS (ALT 637 FOR MEDICARE OP): Performed by: NURSE PRACTITIONER

## 2024-09-20 PROCEDURE — 85025 COMPLETE CBC W/AUTO DIFF WBC: CPT | Performed by: INTERNAL MEDICINE

## 2024-09-20 PROCEDURE — 2500000001 HC RX 250 WO HCPCS SELF ADMINISTERED DRUGS (ALT 637 FOR MEDICARE OP): Performed by: PODIATRIST

## 2024-09-20 PROCEDURE — 2500000002 HC RX 250 W HCPCS SELF ADMINISTERED DRUGS (ALT 637 FOR MEDICARE OP, ALT 636 FOR OP/ED): Performed by: EMERGENCY MEDICINE

## 2024-09-20 PROCEDURE — 2500000001 HC RX 250 WO HCPCS SELF ADMINISTERED DRUGS (ALT 637 FOR MEDICARE OP): Performed by: FAMILY MEDICINE

## 2024-09-20 PROCEDURE — 2500000004 HC RX 250 GENERAL PHARMACY W/ HCPCS (ALT 636 FOR OP/ED): Performed by: NURSE PRACTITIONER

## 2024-09-20 RX ORDER — FUROSEMIDE 10 MG/ML
20 INJECTION INTRAMUSCULAR; INTRAVENOUS ONCE
Status: COMPLETED | OUTPATIENT
Start: 2024-09-20 | End: 2024-09-20

## 2024-09-20 RX ORDER — PETROLATUM 420 MG/G
OINTMENT TOPICAL 2 TIMES DAILY
Status: DISCONTINUED | OUTPATIENT
Start: 2024-09-20 | End: 2024-09-28 | Stop reason: HOSPADM

## 2024-09-20 ASSESSMENT — PAIN - FUNCTIONAL ASSESSMENT
PAIN_FUNCTIONAL_ASSESSMENT: 0-10

## 2024-09-20 ASSESSMENT — COGNITIVE AND FUNCTIONAL STATUS - GENERAL
MOBILITY SCORE: 17
DRESSING REGULAR UPPER BODY CLOTHING: A LITTLE
PERSONAL GROOMING: A LITTLE
DRESSING REGULAR LOWER BODY CLOTHING: A LITTLE
DAILY ACTIVITIY SCORE: 19
CLIMB 3 TO 5 STEPS WITH RAILING: A LOT
MOVING TO AND FROM BED TO CHAIR: A LITTLE
MOVING FROM LYING ON BACK TO SITTING ON SIDE OF FLAT BED WITH BEDRAILS: A LITTLE
TOILETING: A LITTLE
TURNING FROM BACK TO SIDE WHILE IN FLAT BAD: A LITTLE
STANDING UP FROM CHAIR USING ARMS: A LITTLE
TURNING FROM BACK TO SIDE WHILE IN FLAT BAD: A LITTLE
HELP NEEDED FOR BATHING: A LITTLE
WALKING IN HOSPITAL ROOM: A LOT
DAILY ACTIVITIY SCORE: 20
MOBILITY SCORE: 15
HELP NEEDED FOR BATHING: A LITTLE
MOVING TO AND FROM BED TO CHAIR: A LITTLE
STANDING UP FROM CHAIR USING ARMS: A LOT
MOVING FROM LYING ON BACK TO SITTING ON SIDE OF FLAT BED WITH BEDRAILS: A LITTLE
DRESSING REGULAR UPPER BODY CLOTHING: A LITTLE
DRESSING REGULAR LOWER BODY CLOTHING: A LITTLE
WALKING IN HOSPITAL ROOM: A LITTLE
CLIMB 3 TO 5 STEPS WITH RAILING: A LOT
TOILETING: A LITTLE

## 2024-09-20 ASSESSMENT — PAIN DESCRIPTION - DESCRIPTORS: DESCRIPTORS: BURNING;DISCOMFORT

## 2024-09-20 ASSESSMENT — PAIN SCALES - GENERAL
PAINLEVEL_OUTOF10: 5 - MODERATE PAIN
PAINLEVEL_OUTOF10: 7
PAINLEVEL_OUTOF10: 7
PAINLEVEL_OUTOF10: 2
PAINLEVEL_OUTOF10: 7
PAINLEVEL_OUTOF10: 8
PAINLEVEL_OUTOF10: 5 - MODERATE PAIN
PAINLEVEL_OUTOF10: 2

## 2024-09-20 NOTE — PROGRESS NOTES
09/20/24 1039   Discharge Planning   Living Arrangements Alone   Support Systems Children;Spouse/significant other   Assistance Needed A&Ox3, mobilizes with a rollator or wheelchair, has walk in shower, does not drive (transport thru insurance provide a ride), room air (does have o2 set up at home but doesn't use), no CPAP/BIPAP, active with [facility] for SN, PCP Dr. Micheal Gonsalez   Type of Residence Private residence   Number of Stairs to Enter Residence 0   Number of Stairs Within Residence 0   Do you have animals or pets at home? No   Home or Post Acute Services In home services   Type of Home Care Services Home nursing visits   Expected Discharge Disposition Home Health  (PT recommending low frequency skilled services. Discussed with patient preference is to return home alone with resumed [facility] upon d/c. Referral sent in [facility]. Patient will need transport arranged on d/c. May need ambulance transport.)   Does the patient need discharge transport arranged? Yes   RoundTrip coordination needed? Yes   Has discharge transport been arranged? No

## 2024-09-20 NOTE — TELEPHONE ENCOUNTER
Called pt and let her know HERBERT LOPEZ stated her labs are where they need to be, better than before, platelets where they need to be and that she wishes her to be well.  Told to call back when she is discharged and we will reschedule her appointment with HERBERT LOPEZ.  Pt agreed to plan.

## 2024-09-20 NOTE — CONSULTS
Inpatient consult to Podiatry  Consult performed by: Venus Alexander DPM  Consult ordered by: Faiza Chino MD  Reason for consult: B/L lower leg ulcerations        Reason For Consult  B/L LE ulcerations/cellulitis    History Of Present Illness  Neisha Graham is a 64 y.o. female presenting with B/L LE ulcerations and cellulitis.  Long history of lymphedema and non-healing wounds.  Unable to be compliant with leg elevation and compression.  .     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), Cellulitis of left lower limb (04/08/2021), Chronic obstructive pulmonary disease with (acute) exacerbation (Multi) (01/09/2019), Chronic sinusitis, unspecified, COVID-19 (11/28/2022), Daily headache (08/17/2023), Elevated bilirubin (08/17/2023), Elevated transaminase level (08/17/2023), Fever (08/17/2023), Fever (08/17/2023), Heart murmur (03/10/2023), Hemoptysis (03/10/2023), Hyperkalemia (03/10/2023), Malaise and fatigue (03/10/2023), Morbid (severe) obesity due to excess calories (Multi), Other conditions influencing health status, Other conditions influencing health status (03/12/2020), Other conditions influencing health status (03/06/2017), Other conditions influencing health status, Other conditions influencing health status, Other conditions influencing health status, Pain in unspecified ankle and joints of unspecified foot (06/24/2016), Pain in unspecified foot (12/08/2015), Pain in unspecified knee, Palpitations (03/10/2023), Personal history of diseases of the skin and subcutaneous tissue, Personal history of other diseases of the female genital tract, Personal history of other diseases of the nervous system and sense organs (09/12/2019), Personal history of other diseases of the  nervous system and sense organs (09/17/2015), Personal history of other diseases of the respiratory system, Personal history of other diseases of the respiratory system (10/17/2016), Personal history of other diseases of the respiratory system (11/29/2017), Personal history of other diseases of the respiratory system (11/16/2016), Personal history of other drug therapy (10/14/2016), Personal history of other infectious and parasitic diseases, Personal history of other infectious and parasitic diseases (07/22/2020), Personal history of other medical treatment, Personal history of other specified conditions, Personal history of other specified conditions (01/28/2015), Personal history of other specified conditions, Personal history of pneumonia (recurrent), Pneumonia, unspecified organism (01/11/2018), Postmenopausal bleeding (09/30/2014), Right knee pain (03/10/2023), Shortness of breath (03/10/2023), Sinus tachycardia (03/10/2023), Submandibular sialolithiasis (03/10/2023), Unilateral primary osteoarthritis, unspecified knee (02/03/2017), and Unspecified acute conjunctivitis, bilateral (08/20/2020).    Surgical History  She has a past surgical history that includes Other surgical history (09/19/2013); Other surgical history (09/19/2013); Tonsillectomy (09/19/2013); Knee surgery (09/19/2013); Tubal ligation (09/19/2013); and Other surgical history (09/27/2013).     Social History  She reports that she has been smoking cigarettes. She has never used smokeless tobacco. No history on file for alcohol use and drug use.    Family History  No family history on file.     Allergies  Vancomycin, Macrobid [nitrofurantoin monohyd/m-cryst], and Sulfamethoxazole-trimethoprim    Review of Systems   Per above      Physical Exam   Constitutional:       Appearance: Normal appearance.   HENT:      Head: Normocephalic and atraumatic.      Mouth/Throat:      Mouth: Mucous membranes are moist.      Pharynx: Oropharynx is clear.   Eyes:  "     Pupils: Pupils are equal, round, and reactive to light.   Cardiovascular:      Rate and Rhythm: Normal rate and regular rhythm.      Heart sounds: Normal heart sounds.   Pulmonary:      Effort: Pulmonary effort is normal.      Breath sounds: Normal breath sounds.   Abdominal:      General: Abdomen is flat. Bowel sounds are normal.      Palpations: Abdomen is soft.   Musculoskeletal:      Cervical back: Normal range of motion.      Comments: stasis changes to B/L lower legs with lymphedema changes;  ulcerations to left lateral calf and right lateral calf with thin veil of slough;  no deeper structures visualized;  able to palpable pedal pulses.  Neurological:      Mental Status: She is alert.     Last Recorded Vitals  Blood pressure 145/84, pulse 92, temperature 36.3 °C (97.3 °F), temperature source Temporal, resp. rate 18, height 1.626 m (5' 4\"), weight 142 kg (313 lb 11.4 oz), SpO2 94%.    Relevant Results  Susceptibility data from last 90 days.  Collected Specimen Info Organism Clindamycin Erythromycin Oxacillin Tetracycline Trimethoprim/Sulfamethoxazole Vancomycin   09/15/24 Tissue/Biopsy from Buttock Methicillin Resistant Staphylococcus aureus (MRSA)  R  R  R  R  S  S   09/15/24 Blood culture from Peripheral Venipuncture Aerococcus urinae           Corynebacterium         ECG 12 lead    Result Date: 9/19/2024  Sinus tachycardia Septal infarct , age undetermined Abnormal ECG When compared with ECG of 04-FEB-2021 17:36, Questionable change in QRS duration Septal infarct is now Present See ED provider note for full interpretation and clinical correlation Confirmed by Katerina Ponce ([id number]) on 9/19/2024 11:34:25 PM    Vascular US Ankle Brachial Index (GEORGE) Without Exercise    Result Date: 9/19/2024          [facility] [id number] [place] [address]  Tel [phone] and Fax [phone]  Vascular Lab Report VASC US ANKLE BRACHIAL INDEX (GEORGE) WITHOUT EXERCISE  Patient Name:      LION" AURA PEREZ     Reading Physician:  [id number] Alphonse Vargas DO Study Date:        9/19/2024           Ordering Physician: [id number] BROOKE RADHA FINKBHUPINDER MRN/PID:           [id number]            Technologist:       Indy Najera RVT Accession#:        [id number]        Technologist 2: Date of Birth/Age: 1960 / 64      Encounter#:         [id number]                    years Gender:            F Admission Status:  Inpatient           Location Performed: [facility]  Diagnosis/ICD: Peripheral vascular disease, unspecified-I73.9 CPT Codes:     80261 Peripheral artery GEORGE Only  CONCLUSIONS: Right Lower PVR: No evidence of arterial occlusive disease in the right lower extremity at rest. Normal digital perfusion noted. Multiphasic flow is noted in the right common femoral artery, right posterior tibial artery and right dorsalis pedis artery. Left Lower PVR: No evidence of arterial occlusive disease in the left lower extremity at rest. Normal digital perfusion noted. Multiphasic flow is noted in the left common femoral artery, left posterior tibial artery and left dorsalis pedis artery.  Imaging & Doppler Findings:  RIGHT Lower PVR                Pressures Ratios Right Posterior Tibial (Ankle) 102 mmHg  0.92 Right Dorsalis Pedis (Ankle)   111 mmHg  1.00 Right Digit (Great Toe)        78 mmHg   0.70   LEFT Lower PVR                Pressures Ratios Left Posterior Tibial (Ankle) 120 mmHg  1.08 Left Dorsalis Pedis (Ankle)   127 mmHg  1.14 Left Digit (Great Toe)        71 mmHg   0.64                     Right     Left Brachial Pressure 108 mmHg 111 mmHg   [id number] Alphonse Vargas DO Electronically signed by Taj Vargas DO on 9/19/2024 at 1:05:51 PM  ** Final **     Transthoracic Echo (TTE) Complete    Result Date: 9/18/2024   [facility], [address] [place] [address]               Tel [phone] and Fax [phone] TRANSTHORACIC ECHOCARDIOGRAM  REPORT  Patient Name:      LION PEERZ      Reading Physician:    [id number] Judson Chaudhry MD Study Date:        9/18/2024            Ordering Provider:    [id number] BROOKE LY MRN/PID:           [id number]             Fellow: Accession#:        [id number]         Nurse: Date of Birth/Age: 1960 / 64 years Sonographer:          Sarahi Weston RDCS Gender:            F                    Additional Staff: Height:            162.56 cm            Admit Date:           9/15/2024 Weight:            141.97 kg            Admission Status:     Inpatient -                                                               Routine BSA / BMI:         2.37 m2 / 53.73      Encounter#:           [id number]                    kg/m2 Blood Pressure:    157/83 mmHg          Department Location:  [facility] Non                                                               Invasive Study Type:    TRANSTHORACIC ECHO (TTE) COMPLETE Diagnosis/ICD: Unspecified atrial fibrillation-I48.91 Indication:    Atrial Fibrillation CPT Code:      Echo Complete w Full Doppler-74835 Patient History: Smoker:            Current. Pertinent History: COPD, HTN and Hyperlipidemia. Known MS and AS. Study Detail: The following Echo studies were performed: 2D, M-Mode, Doppler and               color flow. Technically challenging study due to patient lying in               supine position and body habitus. The patient was asleep.  PHYSICIAN INTERPRETATION: Left Ventricle: The left ventricular systolic function is normal, with a visually estimated ejection fraction of 60-65%. There are no regional left ventricular wall motion abnormalities. The left ventricular cavity size is normal. Spectral Doppler shows an impaired relaxation pattern of left ventricular diastolic filling. Left Atrium: The left atrium is mildly dilated. Right Ventricle:  The right ventricle is normal in size. There is normal right ventricular global systolic function. Right Atrium: The right atrium is mildly dilated. Aortic Valve: The aortic valve is trileaflet. There is evidence of severe aortic valve stenosis. The aortic valve dimensionless index is 0.37. There is trace to mild aortic valve regurgitation. The peak instantaneous gradient of the aortic valve is 57.5 mmHg. The mean gradient of the aortic valve is 37.1 mmHg. JADYN 0.82cm2 by planimetry. Mitral Valve: The mitral valve is normal in structure. There is evidence of mild mitral valve stenosis. The doppler estimated mean and peak diastolic pressure gradients are 5.9 mmHg and 13.2 mmHg respectively. There is severe mitral annular calcification. There is mild mitral valve regurgitation. Tricuspid Valve: The tricuspid valve is structurally normal. There is mild tricuspid regurgitation. Pulmonic Valve: The pulmonic valve is structurally normal. There is no indication of pulmonic valve regurgitation. Pericardium: There is no pericardial effusion noted. Aorta: The aortic root is normal. Pulmonary Artery: The tricuspid regurgitant velocity is 2.96 m/s, and with an estimated right atrial pressure of 3 mmHg, the estimated pulmonary artery pressure is mildly elevated with the RVSP at 37.9 mmHg. Systemic Veins: The inferior vena cava appears normal in size.  CONCLUSIONS:  1. The left ventricular systolic function is normal, with a visually estimated ejection fraction of 60-65%.  2. Spectral Doppler shows an impaired relaxation pattern of left ventricular diastolic filling.  3. There is normal right ventricular global systolic function.  4. The left atrium is mildly dilated.  5. There is severe mitral annular calcification.  6. Severe aortic valve stenosis. QUANTITATIVE DATA SUMMARY:  2D MEASUREMENTS:          Normal Ranges: IVSd:            1.16 cm  (0.6-1.1cm) LVPWd:           1.12 cm  (0.6-1.1cm) LVIDd:           4.70 cm  (3.9-5.9cm)  LVIDs:           3.07 cm LV Mass Index:   83 g/m2 LVEDV Index:     48 ml/m2 LV % FS          34.7 %  LA VOLUME:                    Normal Ranges: LA Vol A4C:        69.9 ml    (22+/-6mL/m2) LA Vol A2C:        73.7 ml LA Vol BP:         72.3 ml LA Vol Index A4C:  29.5 ml/m2 LA Vol Index A2C:  31.1 ml/m2 LA Vol Index BP:   30.6 ml/m2 LA Area A4C:       23.3 cm2 LA Area A2C:       24.1 cm2 LA Major Axis A4C: 6.6 cm LA Major Axis A2C: 6.7 cm LA Volume Index:   30.6 ml/m2 LA Vol A4C:        66.3 ml LA Vol A2C:        71.0 ml LA Vol Index BSA:  29.0 ml/m2  RA VOLUME BY A/L METHOD:          Normal Ranges: RA Area A4C:             19.6 cm2  M-MODE MEASUREMENTS:         Normal Ranges: Ao Root:             3.20 cm (2.0-3.7cm) LAs:                 4.48 cm (2.7-4.0cm)  AORTA MEASUREMENTS:         Normal Ranges: Ao Sinus, d:        3.40 cm (2.1-3.5cm) Asc Ao, d:          3.50 cm (2.1-3.4cm)  LV SYSTOLIC FUNCTION BY 2D PLANIMETRY (MOD):                      Normal Ranges: EF-A4C View:    65 % (>=55%) EF-A2C View:    65 % EF-Biplane:     65 % EF-Visual:      63 % LV EF Reported: 63 %  LV DIASTOLIC FUNCTION:             Normal Ranges: MV Peak E:             0.85 m/s    (0.7-1.2 m/s) MV Peak A:             1.51 m/s    (0.42-0.7 m/s) E/A Ratio:             0.56        (1.0-2.2) MV e'                  0.080 m/s   (>8.0) MV lateral e'          0.09 m/s MV medial e'           0.07 m/s MV A Dur:              165.56 msec E/e' Ratio:            10.62       (<8.0) PulmV Sys Rodriguez:         46.51 cm/s PulmV Cazares Rodriguez:        28.62 cm/s PulmV S/D Rodriguez:         1.62 PulmV A Revs Rodriguez:      24.80 cm/s PulmV A Revs Dur:      93.24 msec  MITRAL VALVE:           Normal Ranges: MV Vmax:      1.82 m/s  (<=1.3m/s) MV peak P.2 mmHg (<5mmHg) MV mean P.9 mmHg  (<2mmHg) MV VTI:       38.89 cm  (10-13cm) MV DT:        127 msec  (150-240msec)  AORTIC VALVE:                      Normal Ranges: AoV Vmax:                3.79 m/s  (<=1.7m/s) AoV Peak  P.5 mmHg (<20mmHg) AoV Mean P.1 mmHg (1.7-11.5mmHg) LVOT Max Rodriguez:            1.32 m/s  (<=1.1m/s) AoV VTI:                 74.00 cm  (18-25cm) LVOT VTI:                27.60 cm LVOT Diameter:           2.01 cm   (1.8-2.4cm) AoV Area, VTI:           1.18 cm2  (2.5-5.5cm2) AoV Area,Vmax:           1.11 cm2  (2.5-4.5cm2) AoV Area, planim:        0.83 cm2  (2.5-4.5cm2) AoV Dimensionless Index: 0.37  RIGHT VENTRICLE: RV Basal 3.30 cm RV Mid   2.40 cm RV Major 8.0 cm TAPSE:   23.0 mm RV s'    0.18 m/s  TRICUSPID VALVE/RVSP:          Normal Ranges: Peak TR Velocity:     2.96 m/s RV Syst Pressure:     38 mmHg  (< 30mmHg) IVC Diam:             2.17 cm  PULMONIC VALVE:           Normal Ranges: PV Max Rodriguez:     1.6 m/s   (0.6-0.9m/s) PV Max PG:      10.7 mmHg  Pulmonary Veins: PulmV A Revs Dur: 93.24 msec PulmV A Revs Rodriguez: 24.80 cm/s PulmV Cazraes Rodriguez:   28.62 cm/s PulmV S/D Rodriguez:    1.62 PulmV Sys Rodriguez:    46.51 cm/s  AORTA: Asc Ao Diam 3.53 cm  [id number] Judson Chaudhry MD Electronically signed on 2024 at 1:44:02 PM  ** Final **     ECG 12 lead    Result Date: 2024  Sinus tachycardia Possible Left atrial enlargement Borderline ECG When compared with ECG of 15-SEP-2024 23:12, (unconfirmed) Criteria for Septal infarct are no longer Present ST no longer depressed in Anterior leads    CT chest abdomen pelvis wo IV contrast    Result Date: 2024  Interpreted By:  Wilmar Bower, STUDY: CT CHEST ABDOMEN PELVIS WO CONTRAST;  2024 1:31 am   INDICATION: Signs/Symptoms:weakness     COMPARISON: CT chest abdomen pelvis dated 2019. CT chest dated 2024.   ACCESSION NUMBER(S): [id number]   ORDERING CLINICIAN: BESS CLEMENTE   TECHNIQUE: CT of the chest, abdomen, and pelvis was performed. Contiguous axial images were obtained at  5 mm slice thickness through the chest, and at  3 mm through the abdomen and pelvis. Coronal and sagittal reconstructions at  3 mm slice thickness were  performed.   FINDINGS: CHEST:   LUNG/PLEURA/LARGE AIRWAYS: No air space opacity, focal consolidation, pleural effusion/hemothorax, or pneumothorax are appreciated.   There is mild upper lobe predominant centrilobular emphysema.   Previously seen right lung apex pulmonary nodule has resolved.   VESSELS: Thoracic aorta is normal in caliber and demonstrates moderate atherosclerotic calcifications. There are also moderate coronary artery atherosclerotic calcifications. Main pulmonary trunk is enlarged measuring 3.5 cm in caliber suggestive of pulmonary arterial hypertension.   HEART: The heart is normal in size.   There is no pericardial effusion.   MEDIASTINUM AND ELVIA: No pneumomediastinum, abnormal mediastinal fluid collection or mediastinal hematoma are appreciated.  No mediastinal, hilar or biaxillary adenopathy is present.  The esophagus is normal in course and caliber.   CHEST WALL AND LOWER NECK: No acute fracture or dislocation of the included osseous structures are appreciated.  No suspicious osseous lesions are identified.  The thoracic wall soft tissues are within normal limits.   ABDOMEN:   LIVER: Liver is mildly enlarged and normal in contour and density.   GALLBLADDER: The gallbladder is nondistended without evidence of radiopaque stone.   BILE DUCTS: The intahepatic and extrahepatic bile ducts are not dilated.   PANCREAS: The pancreas appears unremarkable.   SPLEEN: Normal size..   ADRENAL GLANDS: Stable 1.6 x 1.3 cm left adrenal nodule consistent with an adenoma and a smaller right adrenal nodule measuring 1.1 cm also consistent with adenoma.   KIDNEYS AND URETERS: Normal size and contour. There is a punctate left lower pole intrarenal calculus. No obstructing calculi..   PELVIS:   BLADDER: The urinary bladder appears within normal limits.   REPRODUCTIVE ORGANS: Uterus is present. No evidence of adnexal mass.   BOWEL: The stomach is unremarkable.  The small bowel is normal in caliber without evidence of  focal wall thickening or obstruction.  There is no evidence of focal wall thickening or dilatation of the large bowel.  Normal appendix. Moderate amount of formed stool throughout the colon.   VESSELS: The aorta and IVC are normal in caliber. Moderate atherosclerotic calcifications of the abdominal aorta and its branches.   PERITONEUM/RETROPERITONEUM/LYMPH NODES: There is no evidence of intra- or retroperitoneal hematoma.  There is no free or loculated fluid collection, no free intraperitoneal air. No abdominopelvic lymphadenopathy is present.   BONES AND ABDOMINAL WALL: No evidence of acute fracture or dislocation of the included osseous structures.  No suspicious osseous lesions are identified. Multilevel endplate degenerative changes predominantly in the lower thoracic spine. No evidence of high-grade osseous canal stenosis. The abdominal wall soft tissues appear normal.       CHEST 1.  No evidence of acute abnormality in the chest. Mild upper lobe predominant centrilobular emphysema. Enlarged main pulmonary trunk measuring up to 3.5 cm in caliber suggestive of pulmonary arterial hypertension. 2. At least moderate aortic valve atherosclerotic calcifications. Clinical correlation for aortic stenosis is recommended.   ABDOMEN - PELVIS 1.  No acute abnormality in the abdomen/pelvis. Hepatomegaly.     MACRO: None   Signed by: Wilmar Bower 9/16/2024 1:55 AM Dictation workstation:   [id number]   Results for orders placed or performed during the hospital encounter of 09/15/24 (from the past 24 hour(s))   Protime-INR   Result Value Ref Range    Protime 31.2 (H) 9.8 - 12.8 seconds    INR 2.7 (H) 0.9 - 1.1   Basic Metabolic Panel   Result Value Ref Range    Glucose 90 74 - 99 mg/dL    Sodium 134 (L) 136 - 145 mmol/L    Potassium 4.1 3.5 - 5.3 mmol/L    Chloride 105 98 - 107 mmol/L    Bicarbonate 23 21 - 32 mmol/L    Anion Gap 10 10 - 20 mmol/L    Urea Nitrogen 34 (H) 6 - 23 mg/dL    Creatinine 0.96 0.50 - 1.05 mg/dL     eGFR 66 >60 mL/min/1.73m*2    Calcium 8.0 (L) 8.6 - 10.3 mg/dL        Assessment/Plan      B/L LE ulcerations/cellulitis/lymphedema  Erythema is consistent with stasis changes.  Needs to elevate legs more.  Dakins wash to B/L lower leg ulcerations, rinse with saline,  adaptic, ABD, kerlix.  IV antibiotics as directed by ID.  Will follow.    I spent 40 minutes in the professional and overall care of this patient.

## 2024-09-20 NOTE — TELEPHONE ENCOUNTER
Pt called and she states she had to cancel her appt with HERBERT Reese PAC and she wanted to be sure HERBERT Reese knew she had a lab drawn even though she will not be at appt.  Informed HERBERT Reese of this as she was not sure why pt wanted her to look at lab results.

## 2024-09-20 NOTE — CARE PLAN
The patient's goals for the shift include  pain management.    The clinical goals for the shift include Patient will be free from injury.    Over the shift, the patient did not make progress toward the following goals. Barriers to progression include bilateral leg wounds. Recommendations to address these barriers include antibiotics, wound care..

## 2024-09-20 NOTE — PROGRESS NOTES
Patient: Neisha Graham  Room/bed: 122/122-A  Admitted on: 9/15/2024    Age: 64 y.o.   Gender: female  Code Status:  Full Code   Admitting Dx: Hyperkalemia [E87.5]  ALBERTINA (acute kidney injury) (CMS-HCC) [N17.9]  Generalized weakness [R53.1]    MRN: [id number]  PCP: Micheal Gonsalez, DO       Subjective   Doing ok, no new issues. Just concerned about her legs    Objective    Physical Exam  Constitutional:       Appearance: Normal appearance. She is obese.   HENT:      Head: Normocephalic.      Nose: Nose normal.      Mouth/Throat:      Mouth: Mucous membranes are moist.   Eyes:      Extraocular Movements: Extraocular movements intact.      Pupils: Pupils are equal, round, and reactive to light.   Cardiovascular:      Comments: Regular, with systolic/diastolic murmur, unchanged  Pulmonary:      Effort: Pulmonary effort is normal.      Breath sounds: Normal breath sounds.   Abdominal:      General: Bowel sounds are normal.      Palpations: Abdomen is soft.   Musculoskeletal:      Comments: Kyphosis  Right leg slightly warmer than left to touch.   Pics of wounds/ulcers reviewed.    Skin:     Findings: Bruising present.      Comments: As above   Neurological:      General: No focal deficit present.      Mental Status: She is alert and oriented to person, place, and time.   Psychiatric:         Mood and Affect: Mood normal.         Behavior: Behavior normal.          Temp:  [36.3 °C (97.3 °F)-36.4 °C (97.5 °F)] 36.3 °C (97.3 °F)  Heart Rate:  [80-88] 88  Resp:  [19] 19  BP: (107-133)/(65-72) 133/72    Vitals:    09/16/24 0833   Weight: 142 kg (313 lb 11.4 oz)             I/Os    Intake/Output Summary (Last 24 hours) at 9/20/2024 0943  Last data filed at 9/19/2024 2043  Gross per 24 hour   Intake 1852.83 ml   Output --   Net 1852.83 ml       Labs:   Results from last 72 hours   Lab Units 09/19/24  0655 09/18/24  0545   SODIUM mmol/L 132* 131*   POTASSIUM mmol/L 3.9 3.9   CHLORIDE mmol/L 103 100   CO2 mmol/L 21 20*   BUN mg/dL  "36* 40*   CREATININE mg/dL 0.97 1.13*   GLUCOSE mg/dL 107* 110*   CALCIUM mg/dL 7.4* 7.9*   ANION GAP mmol/L 12 15   EGFR mL/min/1.73m*2 65 54*      Results from last 72 hours   Lab Units 09/20/24  0755   WBC AUTO x10*3/uL 5.9   HEMOGLOBIN g/dL 12.7   HEMATOCRIT % 38.9   PLATELETS AUTO x10*3/uL 387   NEUTROS PCT AUTO % 59.0   LYMPHS PCT AUTO % 21.0   MONOS PCT AUTO % 14.0   EOS PCT AUTO % 4.3      Lab Results   Component Value Date    CALCIUM 7.4 (L) 09/19/2024    PHOS 2.8 09/17/2024      Lab Results   Component Value Date    CRP 16.36 (H) 09/16/2024      [unfilled]     Micro/ID:   Susceptibility data from last 90 days.  Collected Specimen Info Organism Clindamycin Erythromycin Oxacillin Tetracycline Trimethoprim/Sulfamethoxazole Vancomycin   09/15/24 Tissue/Biopsy from Buttock Methicillin Resistant Staphylococcus aureus (MRSA)  R  R  R  R  S  S   09/15/24 Blood culture from Peripheral Venipuncture Aerococcus urinae           Corynebacterium                          No lab exists for component: \"AGALPCRNB\"   .ID  Lab Results   Component Value Date    URINECULTURE No significant growth 09/16/2024    BLOODCULT Loaded on Instrument - Culture in progress 09/19/2024       Images:  ECG 12 lead  Sinus tachycardia  Septal infarct , age undetermined  Abnormal ECG  When compared with ECG of 04-FEB-2021 17:36,  Questionable change in QRS duration  Septal infarct is now Present  See ED provider note for full interpretation and clinical correlation  Confirmed by Katerina Ponce ([id number]) on 9/19/2024 11:34:25 PM  Vascular US Ankle Brachial Index (GEORGE) Without Exercise            [place] [address]   Tel [phone] and Fax [phone]       Vascular Lab Report  [facility] US ANKLE BRACHIAL INDEX (GEORGE) WITHOUT EXERCISE       Patient Name:      LION CHAVARRIA ANA     Reading Physician:  [id number] Alphonse Vargas DO  Study Date:        9/19/2024           Ordering Physician: [id number] BROOKE GARCIA"                                                        MALACHI  MRN/PID:           [id number]            Technologist:       Indy Najera RVT  Accession#:        [id number]        Technologist 2:  Date of Birth/Age: 1960 / 64      Encounter#:         [id number]                     years  Gender:            F  Admission Status:  Inpatient           [facility] Performed[facility]       Diagnosis/ICD: Peripheral vascular disease, unspecified-I73.9  CPT Codes:     96711 Peripheral artery GEORGE Only       CONCLUSIONS:  Right Lower PVR: No evidence of arterial occlusive disease in the right lower extremity at rest. Normal digital perfusion noted. Multiphasic flow is noted in the right common femoral artery, right posterior tibial artery and right dorsalis pedis artery.  Left Lower PVR: No evidence of arterial occlusive disease in the left lower extremity at rest. Normal digital perfusion noted. Multiphasic flow is noted in the left common femoral artery, left posterior tibial artery and left dorsalis pedis artery.     Imaging & Doppler Findings:     RIGHT Lower PVR                Pressures Ratios  Right Posterior Tibial (Ankle) 102 mmHg  0.92  Right Dorsalis Pedis (Ankle)   111 mmHg  1.00  Right Digit (Great Toe)        78 mmHg   0.70          LEFT Lower PVR                Pressures Ratios  Left Posterior Tibial (Ankle) 120 mmHg  1.08  Left Dorsalis Pedis (Ankle)   127 mmHg  1.14  Left Digit (Great Toe)        71 mmHg   0.64                          Right     Left  Brachial Pressure 108 mmHg 111 mmHg          [id number] Alphonse Vargas DO  Electronically signed by [id number] Alphonse Vargas DO on 9/19/2024 at 1:05:51 PM       ** Final **       Meds    Scheduled medications  daptomycin, 4 mg/kg (Adjusted), intravenous, q24h KAR  dilTIAZem CD, 180 mg, oral, Daily  docusate sodium, 100 mg, oral, BID  DULoxetine, 20 mg, oral, Daily  eucerin, , Topical, BID  influenza, 0.5 mL, intramuscular, During hospitalization  tiotropium, 2  puff, inhalation, Daily   And  fluticasone furoate-vilanteroL, 1 puff, inhalation, Daily  furosemide, 20 mg, intravenous, Once  hydroxyurea, 1,000 mg, oral, Daily  nicotine, 1 patch, transdermal, Daily  nystatin, , Topical, BID  pramipexole, 1 mg, oral, BID  pregabalin, 50 mg, oral, TID  [Held by provider] warfarin, 6 mg, oral, Daily  zinc oxide, 1 Application, Topical, TID      Continuous medications     PRN medications  PRN medications: acetaminophen **OR** acetaminophen **OR** [DISCONTINUED] acetaminophen, albuterol, cyclobenzaprine, hydrOXYzine HCL, naloxone, ondansetron **OR** ondansetron, oxyCODONE, [START ON 9/24/2024] oxyCODONE     Assessment and Plan    Neisha Graham is a 64 y.o. female   Bilateral LE cellulitis, with non healing wounds secondary to cvi. GEORGE's are normal. Positive MRSA, currently on dapto. Discussed with Hilda late yesterday, dressing changes have been modified. Podiatry to see today also, as she is wound clinic patient. Change dressings twice daily now.   Moderate to severe aortic stenosis. Outpt eval for TAVR. She may not be having symptoms beccause she does not move around or exert herself much  Hx thrombosis, AF. INR is rising again. Hold coumadin today  Obesity  ALBERTINA. Resolved. Giving a dose of lasix, recheck in am  Peripheral neuropathy/chronic pain. Stable, continue current regimen  Tobacco abuse. Encourage cessation. Patch in place  Essential thrombocytosis, stable on hydroxy      Faiza Chino MD

## 2024-09-20 NOTE — NURSING NOTE
Assisted Dr. Alexander with wound assessment and care at this time.  The BLE are weeping less, tissue much less macerated than 9-19-24.  She has developed a faint pink dry tay jazmín over right anterior thigh, etiology unclear, but no itching or other symptoms of allergy, no rash elsewhere on her body.  Plan is to moisturize intact skin and begin Dakin's compresses followed by adaptic and ABD dressings 9-21-24 with hopes of cleaning /drying her wounds.   She understands that key to all of this is avoiding dependent position of her legs.  Will plan to follow along and assist as needed.

## 2024-09-20 NOTE — CONSULTS
Do you have any home inhalers?    yes  Do you get relief whenusing it?     yes  Spacer education and have them teach back yes  If using home inhaler do you rinse your mouth? yes  Any barriers? yes  When were you diagnosed with COPD?  yes  Any previous PFTs? no  If so, when?   explain the importance of a PFT  Do you have a pulmonary Dr.? no  Name:  Phone number:  Date of last appt:  Pulmonary cards given  Do you currently smoke or vape or have you ever?     Quit date or planning to quit? yes  How long have you smoked for? 50year  PPD? 5 cigratte   Smoking education given yes  Get orders for nicotine  , have a patch  Do you have a Primary Dr.? yes  Name:  mario Gonsalez  Phone number: [phone]  Date of last appt: August 2024  Do you have any home O2 or  yesCPAP/BiPAP?  Had for 15yr did not use it  Settings for CPAP/BiPAP:   What company?                How much O2 at home? 2.5  Last time 6mwt was done? no  Educate on acceptable O2 levels and the importance of monitoring O2 at home. Complete home O2 evaluation if needed.  This RT to see patient for COPD consult. The patient was given a COPD booklet with educational materials regarding pulmonary issues. [employer] support group discussed. Flyer given for next month's meeting. We discussed current medications and if their current medications give them relief. Educated on acceptable O2 levels and the importance of monitoring O2 at home. Patient was very receptive to all information given.

## 2024-09-20 NOTE — PROGRESS NOTES
Neisha Graham is a 64 y.o. female on day 4 of admission presenting with ALBERTINA (acute kidney injury) (CMS-[facility]).    Subjective   Interval History: no fever, no new complaints        Review of Systems    Objective   Range of Vitals (last 24 hours)  Heart Rate:  [80-88]   Temp:  [36.3 °C (97.3 °F)-36.4 °C (97.5 °F)]   Resp:  [19]   BP: (107-133)/(65-72)   SpO2:  [97 %-98 %]   Daily Weight  09/16/24 : 142 kg (313 lb 11.4 oz)    Body mass index is 53.85 kg/m².    Physical Exam  Constitutional:       Appearance: Normal appearance.   HENT:      Head: Normocephalic and atraumatic.      Mouth/Throat:      Mouth: Mucous membranes are moist.      Pharynx: Oropharynx is clear.   Eyes:      Pupils: Pupils are equal, round, and reactive to light.   Cardiovascular:      Rate and Rhythm: Normal rate and regular rhythm.      Heart sounds: Normal heart sounds.   Pulmonary:      Effort: Pulmonary effort is normal.      Breath sounds: Normal breath sounds.   Abdominal:      General: Abdomen is flat. Bowel sounds are normal.      Palpations: Abdomen is soft.   Musculoskeletal:      Cervical back: Normal range of motion.      Comments: Stasis / wounds   Neurological:      Mental Status: She is alert.         Antibiotics  DAPTOmycin (Cubicin) IV in 50 mL NS    Relevant Results  Labs  Results from last 72 hours   Lab Units 09/20/24  0755   WBC AUTO x10*3/uL 5.9   HEMOGLOBIN g/dL 12.7   HEMATOCRIT % 38.9   PLATELETS AUTO x10*3/uL 387   NEUTROS PCT AUTO % 59.0   LYMPHS PCT AUTO % 21.0   MONOS PCT AUTO % 14.0   EOS PCT AUTO % 4.3     Results from last 72 hours   Lab Units 09/19/24  0655 09/18/24  0545   SODIUM mmol/L 132* 131*   POTASSIUM mmol/L 3.9 3.9   CHLORIDE mmol/L 103 100   CO2 mmol/L 21 20*   BUN mg/dL 36* 40*   CREATININE mg/dL 0.97 1.13*   GLUCOSE mg/dL 107* 110*   CALCIUM mg/dL 7.4* 7.9*   ANION GAP mmol/L 12 15   EGFR mL/min/1.73m*2 65 54*           Estimated Creatinine Clearance: 81.8 mL/min (by C-G formula based on SCr of 0.97  mg/dL).  C-Reactive Protein   Date Value Ref Range Status   09/16/2024 16.36 (H) <1.00 mg/dL Final   08/12/2024 7.94 (H) <1.00 mg/dL Final     CRP   Date Value Ref Range Status   06/19/2020 1.84 (A) mg/dL Final     Comment:     REF VALUE  < 1.00       Microbiology  Susceptibility data from last 14 days.  Collected Specimen Info Organism Clindamycin Erythromycin Oxacillin Tetracycline Trimethoprim/Sulfamethoxazole Vancomycin   09/15/24 Tissue/Biopsy from Buttock Methicillin Resistant Staphylococcus aureus (MRSA)  R  R  R  R  S  S   09/15/24 Blood culture from Peripheral Venipuncture Aerococcus urinae           Corynebacterium         Imaging  Reviewed        Assessment/Plan   Fever, the temp is down, BC G+ve   Legs stasis / wounds / likely cellulitis, the culture with MRSA  Encephalopathy     Recommendations :  Continue Daptomycin  Discussed with the medical team     I spent minutes in the professional and overall care of this patient.      Gorge Myers MD

## 2024-09-20 NOTE — DOCUMENTATION CLARIFICATION NOTE
"    PATIENT:               LION PEREZ  ACCT #:                  [id number]  MRN:                       [id number]  :                       1960  ADMIT DATE:       9/15/2024 10:54 PM  DISCH DATE:  RESPONDING PROVIDER #:        [id number]          PROVIDER RESPONSE TEXT:    Encephalopathy ruled out after workup    CDI QUERY TEXT:    Clarification    Instruction:    Based on your assessment of the patient and the clinical information, please provide the requested documentation by clicking on the appropriate radio button and enter any additional information if prompted.    Question: Please further clarify the diagnosis of Encephalopathy as    When answering this query, please exercise your independent professional judgment. The fact that a question is being asked, does not imply that any particular answer is desired or expected.    The patient's clinical indicators include:  Clinical Information:   64 y.o. female with a pertinent history of lymphedema, obesity, and recurrent cellulitis with venous stasis wounds who presented to [facility] ER with complaints of subjective fever 101F, chills, rigors, dry heaves, nausea, increased fatigue, confusion and generalized weakness intermittently for a couple weeks that worsened over the past couple of days.    Clinical Indicators:  Admitting VS: 143/98   Pulse 86   Temp 36.5 C (97.7 F)      ID consult and progress notes stated \"Encephalopathy\" with physical exam noting 'Neurological:  Mental Status: She is alert.'    H and P noted under physical exam \"Neurological: Mental Status: She is alert and oriented to person, place, and time. Psychiatric/Behavioral:  Positive for confusion. Negative for agitation and behavioral problems.\"    Treatment: IV fluids, Ancef for cellulitis    Risk Factors: cellulitis, ALBERTINA, demand ischemia, hyponatremia  Options provided:  -- Encephalopathy ruled out after workup  -- Encephalopathy ruled in for this admission, If present, please specify " type:  -- Other - I will add my own diagnosis  -- Refer to Clinical Documentation Reviewer    Query created by: Vickie Llamas on 9/20/2024 1:23 PM      Electronically signed by:  BROOKE LY MD 9/20/2024 1:34 PM

## 2024-09-21 LAB
ANION GAP SERPL CALC-SCNC: 10 MMOL/L (ref 10–20)
BUN SERPL-MCNC: 34 MG/DL (ref 6–23)
CALCIUM SERPL-MCNC: 8 MG/DL (ref 8.6–10.3)
CHLORIDE SERPL-SCNC: 105 MMOL/L (ref 98–107)
CO2 SERPL-SCNC: 23 MMOL/L (ref 21–32)
CREAT SERPL-MCNC: 0.96 MG/DL (ref 0.5–1.05)
EGFRCR SERPLBLD CKD-EPI 2021: 66 ML/MIN/1.73M*2
GLUCOSE SERPL-MCNC: 90 MG/DL (ref 74–99)
INR PPP: 2.7 (ref 0.9–1.1)
POTASSIUM SERPL-SCNC: 4.1 MMOL/L (ref 3.5–5.3)
PROTHROMBIN TIME: 31.2 SECONDS (ref 9.8–12.8)
SODIUM SERPL-SCNC: 134 MMOL/L (ref 136–145)

## 2024-09-21 PROCEDURE — 86038 ANTINUCLEAR ANTIBODIES: CPT | Mod: GEALAB | Performed by: INTERNAL MEDICINE

## 2024-09-21 PROCEDURE — 2500000002 HC RX 250 W HCPCS SELF ADMINISTERED DRUGS (ALT 637 FOR MEDICARE OP, ALT 636 FOR OP/ED): Performed by: EMERGENCY MEDICINE

## 2024-09-21 PROCEDURE — 2500000001 HC RX 250 WO HCPCS SELF ADMINISTERED DRUGS (ALT 637 FOR MEDICARE OP): Performed by: FAMILY MEDICINE

## 2024-09-21 PROCEDURE — 80048 BASIC METABOLIC PNL TOTAL CA: CPT | Performed by: INTERNAL MEDICINE

## 2024-09-21 PROCEDURE — 2500000002 HC RX 250 W HCPCS SELF ADMINISTERED DRUGS (ALT 637 FOR MEDICARE OP, ALT 636 FOR OP/ED): Performed by: NURSE PRACTITIONER

## 2024-09-21 PROCEDURE — 1100000001 HC PRIVATE ROOM DAILY

## 2024-09-21 PROCEDURE — 2500000001 HC RX 250 WO HCPCS SELF ADMINISTERED DRUGS (ALT 637 FOR MEDICARE OP): Performed by: NURSE PRACTITIONER

## 2024-09-21 PROCEDURE — 86235 NUCLEAR ANTIGEN ANTIBODY: CPT | Performed by: INTERNAL MEDICINE

## 2024-09-21 PROCEDURE — 36415 COLL VENOUS BLD VENIPUNCTURE: CPT | Performed by: INTERNAL MEDICINE

## 2024-09-21 PROCEDURE — 2500000004 HC RX 250 GENERAL PHARMACY W/ HCPCS (ALT 636 FOR OP/ED): Performed by: NURSE PRACTITIONER

## 2024-09-21 PROCEDURE — 2500000001 HC RX 250 WO HCPCS SELF ADMINISTERED DRUGS (ALT 637 FOR MEDICARE OP): Performed by: PODIATRIST

## 2024-09-21 PROCEDURE — 85610 PROTHROMBIN TIME: CPT | Performed by: INTERNAL MEDICINE

## 2024-09-21 PROCEDURE — 2500000004 HC RX 250 GENERAL PHARMACY W/ HCPCS (ALT 636 FOR OP/ED): Performed by: INTERNAL MEDICINE

## 2024-09-21 PROCEDURE — 99232 SBSQ HOSP IP/OBS MODERATE 35: CPT | Performed by: NURSE PRACTITIONER

## 2024-09-21 PROCEDURE — S4991 NICOTINE PATCH NONLEGEND: HCPCS | Performed by: EMERGENCY MEDICINE

## 2024-09-21 RX ORDER — WARFARIN 3 MG/1
3 TABLET ORAL ONCE
Status: COMPLETED | OUTPATIENT
Start: 2024-09-21 | End: 2024-09-21

## 2024-09-21 ASSESSMENT — PAIN - FUNCTIONAL ASSESSMENT
PAIN_FUNCTIONAL_ASSESSMENT: 0-10
PAIN_FUNCTIONAL_ASSESSMENT: UNABLE TO SELF-REPORT

## 2024-09-21 ASSESSMENT — COGNITIVE AND FUNCTIONAL STATUS - GENERAL
CLIMB 3 TO 5 STEPS WITH RAILING: A LOT
STANDING UP FROM CHAIR USING ARMS: A LITTLE
DAILY ACTIVITIY SCORE: 19
DRESSING REGULAR UPPER BODY CLOTHING: A LITTLE
HELP NEEDED FOR BATHING: A LITTLE
PERSONAL GROOMING: A LITTLE
MOVING FROM LYING ON BACK TO SITTING ON SIDE OF FLAT BED WITH BEDRAILS: A LITTLE
WALKING IN HOSPITAL ROOM: A LITTLE
TOILETING: A LITTLE
TURNING FROM BACK TO SIDE WHILE IN FLAT BAD: A LITTLE
MOBILITY SCORE: 17
MOVING TO AND FROM BED TO CHAIR: A LITTLE
DRESSING REGULAR LOWER BODY CLOTHING: A LITTLE

## 2024-09-21 ASSESSMENT — PAIN DESCRIPTION - LOCATION
LOCATION: LEG

## 2024-09-21 ASSESSMENT — PAIN SCALES - WONG BAKER: WONGBAKER_NUMERICALRESPONSE: HURTS WHOLE LOT

## 2024-09-21 ASSESSMENT — PAIN SCALES - GENERAL
PAINLEVEL_OUTOF10: 7
PAINLEVEL_OUTOF10: 4
PAINLEVEL_OUTOF10: 9
PAINLEVEL_OUTOF10: 5 - MODERATE PAIN
PAINLEVEL_OUTOF10: 7
PAINLEVEL_OUTOF10: 7
PAINLEVEL_OUTOF10: 8
PAINLEVEL_OUTOF10: 5 - MODERATE PAIN
PAINLEVEL_OUTOF10: 7
PAINLEVEL_OUTOF10: 4

## 2024-09-21 ASSESSMENT — PAIN DESCRIPTION - DESCRIPTORS
DESCRIPTORS: BURNING;DISCOMFORT
DESCRIPTORS: BURNING;DISCOMFORT

## 2024-09-21 ASSESSMENT — PAIN DESCRIPTION - ORIENTATION: ORIENTATION: RIGHT;LEFT

## 2024-09-21 NOTE — PROGRESS NOTES
"Patient: Neisha Graham  Room/bed: 122/122-A  Admitted on: 9/15/2024    Age: 64 y.o.   Gender: female  Code Status:  Full Code   Admitting Dx: Hyperkalemia [E87.5]  ALBERTINA (acute kidney injury) (CMS-HCC) [N17.9]  Generalized weakness [R53.1]    MRN: [id number]  PCP: Micheal Gonsalez DO       Subjective   Seen and examined in her room this AM. Still having a lot of leg pain with wound drainage. She feels like her skin is \"shedding\". She denies chest pain, breathing difficulties, abdominal pain, N/V/D/C, fever, or chills.      Objective    Physical Exam   Constitutional: A&O x 3; NAD; calm and cooperative  Eyes: EOM's intact  HEENT: Normocephalic, Atraumatic. Oral mucosa moist.   Neck: Supple. No JVD, lymphadenopathy.   Lungs: CTAB with fair air movement. Respirations even and unlabored on room air.   Heart: RRR; + murmur.   Abdomen: Soft, non-tender, non-distended, +BS. Obese.   MS/Extremities: LEDBETTER equally x 4. BLE lymphedema, R>L. Peripheral pulses intact bilaterally.   Neuro: A&O x3; no focal deficits; gross motor and sensation intact.   Skin: Simone, sloughing skin with BLE lymphadema, R>L. Seeping/weeping wound to bilateral lower extremities  Psych: Normal affect.      Temp:  [36.3 °C (97.3 °F)-36.9 °C (98.4 °F)] 36.9 °C (98.4 °F)  Heart Rate:  [74-92] 81  Resp:  [18-20] 20  BP: (115-145)/(73-84) 115/73    Vitals:    09/16/24 0833   Weight: 142 kg (313 lb 11.4 oz)             I/Os    Intake/Output Summary (Last 24 hours) at 9/21/2024 1518  Last data filed at 9/21/2024 1219  Gross per 24 hour   Intake 920 ml   Output --   Net 920 ml       Labs:   Results from last 72 hours   Lab Units 09/21/24  0717 09/19/24  0655   SODIUM mmol/L 134* 132*   POTASSIUM mmol/L 4.1 3.9   CHLORIDE mmol/L 105 103   CO2 mmol/L 23 21   BUN mg/dL 34* 36*   CREATININE mg/dL 0.96 0.97   GLUCOSE mg/dL 90 107*   CALCIUM mg/dL 8.0* 7.4*   ANION GAP mmol/L 10 12   EGFR mL/min/1.73m*2 66 65      Results from last 72 hours   Lab Units 09/20/24  0755 " "  WBC AUTO x10*3/uL 5.9   HEMOGLOBIN g/dL 12.7   HEMATOCRIT % 38.9   PLATELETS AUTO x10*3/uL 387   NEUTROS PCT AUTO % 59.0   LYMPHS PCT AUTO % 21.0   MONOS PCT AUTO % 14.0   EOS PCT AUTO % 4.3      Lab Results   Component Value Date    CALCIUM 8.0 (L) 09/21/2024    PHOS 2.8 09/17/2024      Lab Results   Component Value Date    CRP 16.36 (H) 09/16/2024          Micro/ID:   Susceptibility data from last 90 days.  Collected Specimen Info Organism Clindamycin Erythromycin Oxacillin Tetracycline Trimethoprim/Sulfamethoxazole Vancomycin   09/15/24 Tissue/Biopsy from Buttock Methicillin Resistant Staphylococcus aureus (MRSA)  R  R  R  R  S  S   09/15/24 Blood culture from Peripheral Venipuncture Aerococcus urinae           Corynebacterium                          No lab exists for component: \"AGALPCRNB\"   .ID  Lab Results   Component Value Date    URINECULTURE No significant growth 09/16/2024    BLOODCULT No growth at 1 day 09/19/2024       Images:  ECG 12 lead  Sinus tachycardia  Septal infarct , age undetermined  Abnormal ECG  When compared with ECG of 04-FEB-2021 17:36,  Questionable change in QRS duration  Septal infarct is now Present  See ED provider note for full interpretation and clinical correlation  Confirmed by Katerina Ponce ([id number]) on 9/19/2024 11:34:25 PM  Vascular US Ankle Brachial Index (GEORGE) Without Exercise            [place] [address]   Tel [phone] and Fax [phone]       Vascular Lab Report  [facility] US ANKLE BRACHIAL INDEX (GEORGE) WITHOUT EXERCISE       Patient Name:      LION Johnson Physician:  [id number] Alphonse Vargas DO  Study Date:        9/19/2024           Ordering Physician: [id number] BROOKE LY  MRN/PID:           [id number]            Technologist:       Indy Najera RVT  Accession#:        [id number]        Technologist 2:  Date of Birth/Age: 1960 / 64      " Encounter#:         [id number]                     years  Gender:            F  Admission Status:  Inpatient           Location Performed: [facility]       Diagnosis/ICD: Peripheral vascular disease, unspecified-I73.9  CPT Codes:     65085 Peripheral artery GEORGE Only       CONCLUSIONS:  Right Lower PVR: No evidence of arterial occlusive disease in the right lower extremity at rest. Normal digital perfusion noted. Multiphasic flow is noted in the right common femoral artery, right posterior tibial artery and right dorsalis pedis artery.  Left Lower PVR: No evidence of arterial occlusive disease in the left lower extremity at rest. Normal digital perfusion noted. Multiphasic flow is noted in the left common femoral artery, left posterior tibial artery and left dorsalis pedis artery.     Imaging & Doppler Findings:     RIGHT Lower PVR                Pressures Ratios  Right Posterior Tibial (Ankle) 102 mmHg  0.92  Right Dorsalis Pedis (Ankle)   111 mmHg  1.00  Right Digit (Great Toe)        78 mmHg   0.70          LEFT Lower PVR                Pressures Ratios  Left Posterior Tibial (Ankle) 120 mmHg  1.08  Left Dorsalis Pedis (Ankle)   127 mmHg  1.14  Left Digit (Great Toe)        71 mmHg   0.64                          Right     Left  Brachial Pressure 108 mmHg 111 mmHg          [id number] Alphonse Vargas DO  Electronically signed by [id number] Vargas DO on 9/19/2024 at 1:05:51 PM       ** Final **       Meds    Scheduled medications  daptomycin, 4 mg/kg (Adjusted), intravenous, q24h KAR  dilTIAZem CD, 180 mg, oral, Daily  docusate sodium, 100 mg, oral, BID  DULoxetine, 20 mg, oral, Daily  eucerin, , Topical, BID  influenza, 0.5 mL, intramuscular, During hospitalization  tiotropium, 2 puff, inhalation, Daily   And  fluticasone furoate-vilanteroL, 1 puff, inhalation, Daily  hydroxyurea, 1,000 mg, oral, Daily  nicotine, 1 patch, transdermal, Daily  nystatin, , Topical, BID  pramipexole, 1 mg, oral, BID  pregabalin, 50  mg, oral, TID  sodium hypochlorite, , irrigation, Daily  [Held by provider] warfarin, 6 mg, oral, Daily  white petrolatum, , Topical, BID  zinc oxide, 1 Application, Topical, TID      Continuous medications     PRN medications  PRN medications: acetaminophen **OR** acetaminophen **OR** [DISCONTINUED] acetaminophen, albuterol, cyclobenzaprine, hydrOXYzine HCL, naloxone, ondansetron **OR** ondansetron, oxyCODONE, [START ON 9/24/2024] oxyCODONE     Assessment and Plan    Neisha Graham is a 64 y.o. female with a history of BLE lymphadema with nonhealing wounds, aortic stenosis, and A.Fib who was admitted with BLE cellulitis and non-healing wounds.    BLE Cellulitis with Non-Healing Wounds and Bacteremia  -H/O chronic venous insufficiency with antibiotic resistance/intolerance  -Wound RN, ID, and Podiatry are all consulted and helping to manage  -Admitting BC (1 of 2) positive for aerococcus urinae, corynebacterium - likely contaminate  -Repeat BC collected on 9/19/24 is negative to date  -Wound culture positive for MRSA  -ID managing antibiotics - Daptomycin  -Local wound care per Podiatry  -GEORGE's normal   -Monitoring renal function closely while on Daptomycin  -Afebrile. CBC in AM.     CV: Mod-Severe Aortic Stenosis, A.Fib, HTN  -Medical therapy: Diltiazem  -On Warfarin for stroke prevention    COPD/BE/Tobacco Use Disorder  -No clinical signs of acute exacerbation  -On Breo, Spiriva, and PRN Albuterol  -No CPAP  -Nicotine Patch    Chronic Pain/Peripheral Neuropathy  -On Lyrica, Duloxetine  -PRN analgesics    Depression/Anxiety  -On Duloxetine    Essential Thrombocytosis  -On Hydroxyurea    DVT Prophylaxis  -On Warfarin (A.fib, thrombosis)  -INR 2.7 - given 1/2 dose of Warfarin tonight (3mg) and repeat INR in AM.     Fluids/Electrolytes/Nutrition  -Laboratory data reviewed.   -Electrolytes stable.   -No nutritional concerns at this time.      Disposition  -Plan of care discussed with attending, Dr. Chino.    -Home/SNF when medically stable.       Bronwyn Alvarado, APRN-CNP

## 2024-09-21 NOTE — PROGRESS NOTES
Neisha Graham is a 64 y.o. female on day 5 of admission presenting with ALBERTINA (acute kidney injury) (CMS-[facility]).    Subjective   Interval History: no fever, no new complaints        Review of Systems    Objective   Range of Vitals (last 24 hours)  Heart Rate:  [74-92]   Temp:  [36.3 °C (97.3 °F)-36.9 °C (98.4 °F)]   Resp:  [18-20]   BP: (115-145)/(73-84)   SpO2:  [94 %-97 %]   Daily Weight  09/16/24 : 142 kg (313 lb 11.4 oz)    Body mass index is 53.85 kg/m².    Physical Exam  Constitutional:       Appearance: Normal appearance.   HENT:      Head: Normocephalic and atraumatic.      Mouth/Throat:      Mouth: Mucous membranes are moist.      Pharynx: Oropharynx is clear.   Eyes:      Pupils: Pupils are equal, round, and reactive to light.   Cardiovascular:      Rate and Rhythm: Normal rate and regular rhythm.      Heart sounds: Normal heart sounds.   Pulmonary:      Effort: Pulmonary effort is normal.      Breath sounds: Normal breath sounds.   Abdominal:      General: Abdomen is flat. Bowel sounds are normal.      Palpations: Abdomen is soft.   Musculoskeletal:      Cervical back: Normal range of motion.      Comments: Stasis / wounds   Neurological:      Mental Status: She is alert.         Antibiotics  DAPTOmycin (Cubicin) IV in 50 mL NS    Relevant Results  Labs  Results from last 72 hours   Lab Units 09/20/24  0755   WBC AUTO x10*3/uL 5.9   HEMOGLOBIN g/dL 12.7   HEMATOCRIT % 38.9   PLATELETS AUTO x10*3/uL 387   NEUTROS PCT AUTO % 59.0   LYMPHS PCT AUTO % 21.0   MONOS PCT AUTO % 14.0   EOS PCT AUTO % 4.3     Results from last 72 hours   Lab Units 09/21/24  0717 09/19/24  0655   SODIUM mmol/L 134* 132*   POTASSIUM mmol/L 4.1 3.9   CHLORIDE mmol/L 105 103   CO2 mmol/L 23 21   BUN mg/dL 34* 36*   CREATININE mg/dL 0.96 0.97   GLUCOSE mg/dL 90 107*   CALCIUM mg/dL 8.0* 7.4*   ANION GAP mmol/L 10 12   EGFR mL/min/1.73m*2 66 65           Estimated Creatinine Clearance: 82.6 mL/min (by C-G formula based on SCr of 0.96  mg/dL).  C-Reactive Protein   Date Value Ref Range Status   09/16/2024 16.36 (H) <1.00 mg/dL Final   08/12/2024 7.94 (H) <1.00 mg/dL Final     CRP   Date Value Ref Range Status   06/19/2020 1.84 (A) mg/dL Final     Comment:     REF VALUE  < 1.00       Microbiology  Susceptibility data from last 14 days.  Collected Specimen Info Organism Clindamycin Erythromycin Oxacillin Tetracycline Trimethoprim/Sulfamethoxazole Vancomycin   09/15/24 Tissue/Biopsy from Buttock Methicillin Resistant Staphylococcus aureus (MRSA)  R  R  R  R  S  S   09/15/24 Blood culture from Peripheral Venipuncture Aerococcus urinae           Corynebacterium         Imaging  Reviewed        Assessment/Plan   Fever, the temp is down, BC G+ve   Legs stasis / wounds / likely cellulitis, the culture with MRSA  Encephalopathy, likely metabolic     Recommendations :  Continue Daptomycin  Discussed with the medical team     I spent minutes in the professional and overall care of this patient.      Gorge Myers MD

## 2024-09-21 NOTE — CARE PLAN
The patient's goals for the shift include pain control and rest    The clinical goals for the shift include pain will be controlled <6, pt will participate in BLE dressing changes, and pt will rest confortably this shift

## 2024-09-22 VITALS
RESPIRATION RATE: 20 BRPM | SYSTOLIC BLOOD PRESSURE: 135 MMHG | HEIGHT: 64 IN | TEMPERATURE: 97.5 F | HEART RATE: 99 BPM | DIASTOLIC BLOOD PRESSURE: 79 MMHG | BODY MASS INDEX: 50.02 KG/M2 | OXYGEN SATURATION: 100 % | WEIGHT: 293 LBS

## 2024-09-22 LAB
ANION GAP SERPL CALC-SCNC: 12 MMOL/L (ref 10–20)
BACTERIA BLD CULT: NORMAL
BUN SERPL-MCNC: 31 MG/DL (ref 6–23)
CALCIUM SERPL-MCNC: 8 MG/DL (ref 8.6–10.3)
CHLORIDE SERPL-SCNC: 102 MMOL/L (ref 98–107)
CO2 SERPL-SCNC: 21 MMOL/L (ref 21–32)
CREAT SERPL-MCNC: 0.92 MG/DL (ref 0.5–1.05)
EGFRCR SERPLBLD CKD-EPI 2021: 69 ML/MIN/1.73M*2
ERYTHROCYTE [DISTWIDTH] IN BLOOD BY AUTOMATED COUNT: 15.1 % (ref 11.5–14.5)
GLUCOSE SERPL-MCNC: 100 MG/DL (ref 74–99)
HCT VFR BLD AUTO: 41.1 % (ref 36–46)
HGB BLD-MCNC: 13.2 G/DL (ref 12–16)
INR PPP: 2 (ref 0.9–1.1)
MCH RBC QN AUTO: 34.3 PG (ref 26–34)
MCHC RBC AUTO-ENTMCNC: 32.1 G/DL (ref 32–36)
MCV RBC AUTO: 107 FL (ref 80–100)
NRBC BLD-RTO: 0 /100 WBCS (ref 0–0)
PLATELET # BLD AUTO: 411 X10*3/UL (ref 150–450)
POTASSIUM SERPL-SCNC: 4.4 MMOL/L (ref 3.5–5.3)
PROTHROMBIN TIME: 22.2 SECONDS (ref 9.8–12.8)
RBC # BLD AUTO: 3.85 X10*6/UL (ref 4–5.2)
SODIUM SERPL-SCNC: 131 MMOL/L (ref 136–145)
WBC # BLD AUTO: 5.5 X10*3/UL (ref 4.4–11.3)

## 2024-09-22 PROCEDURE — 2500000002 HC RX 250 W HCPCS SELF ADMINISTERED DRUGS (ALT 637 FOR MEDICARE OP, ALT 636 FOR OP/ED): Performed by: NURSE PRACTITIONER

## 2024-09-22 PROCEDURE — 99232 SBSQ HOSP IP/OBS MODERATE 35: CPT | Performed by: NURSE PRACTITIONER

## 2024-09-22 PROCEDURE — 36415 COLL VENOUS BLD VENIPUNCTURE: CPT | Performed by: NURSE PRACTITIONER

## 2024-09-22 PROCEDURE — 80048 BASIC METABOLIC PNL TOTAL CA: CPT | Performed by: NURSE PRACTITIONER

## 2024-09-22 PROCEDURE — 2500000004 HC RX 250 GENERAL PHARMACY W/ HCPCS (ALT 636 FOR OP/ED): Performed by: INTERNAL MEDICINE

## 2024-09-22 PROCEDURE — 85027 COMPLETE CBC AUTOMATED: CPT | Performed by: NURSE PRACTITIONER

## 2024-09-22 PROCEDURE — 2500000001 HC RX 250 WO HCPCS SELF ADMINISTERED DRUGS (ALT 637 FOR MEDICARE OP): Performed by: FAMILY MEDICINE

## 2024-09-22 PROCEDURE — 1100000001 HC PRIVATE ROOM DAILY

## 2024-09-22 PROCEDURE — 2500000004 HC RX 250 GENERAL PHARMACY W/ HCPCS (ALT 636 FOR OP/ED): Performed by: NURSE PRACTITIONER

## 2024-09-22 PROCEDURE — 2500000002 HC RX 250 W HCPCS SELF ADMINISTERED DRUGS (ALT 637 FOR MEDICARE OP, ALT 636 FOR OP/ED): Performed by: EMERGENCY MEDICINE

## 2024-09-22 PROCEDURE — 2500000001 HC RX 250 WO HCPCS SELF ADMINISTERED DRUGS (ALT 637 FOR MEDICARE OP): Performed by: PODIATRIST

## 2024-09-22 PROCEDURE — 85610 PROTHROMBIN TIME: CPT | Performed by: INTERNAL MEDICINE

## 2024-09-22 PROCEDURE — 2500000001 HC RX 250 WO HCPCS SELF ADMINISTERED DRUGS (ALT 637 FOR MEDICARE OP): Performed by: NURSE PRACTITIONER

## 2024-09-22 PROCEDURE — S4991 NICOTINE PATCH NONLEGEND: HCPCS | Performed by: EMERGENCY MEDICINE

## 2024-09-22 RX ORDER — WARFARIN SODIUM 5 MG/1
7.5 TABLET ORAL DAILY
Status: DISCONTINUED | OUTPATIENT
Start: 2024-09-22 | End: 2024-09-25

## 2024-09-22 ASSESSMENT — COGNITIVE AND FUNCTIONAL STATUS - GENERAL
MOBILITY SCORE: 16
CLIMB 3 TO 5 STEPS WITH RAILING: TOTAL
HELP NEEDED FOR BATHING: A LITTLE
STANDING UP FROM CHAIR USING ARMS: A LITTLE
TOILETING: A LITTLE
STANDING UP FROM CHAIR USING ARMS: A LITTLE
DRESSING REGULAR LOWER BODY CLOTHING: A LITTLE
MOVING TO AND FROM BED TO CHAIR: A LITTLE
TURNING FROM BACK TO SIDE WHILE IN FLAT BAD: A LITTLE
MOVING TO AND FROM BED TO CHAIR: A LITTLE
MOVING FROM LYING ON BACK TO SITTING ON SIDE OF FLAT BED WITH BEDRAILS: A LITTLE
DRESSING REGULAR UPPER BODY CLOTHING: A LITTLE
DAILY ACTIVITIY SCORE: 19
MOVING FROM LYING ON BACK TO SITTING ON SIDE OF FLAT BED WITH BEDRAILS: A LITTLE
TURNING FROM BACK TO SIDE WHILE IN FLAT BAD: A LITTLE
MOBILITY SCORE: 18
PERSONAL GROOMING: A LITTLE
TOILETING: A LITTLE
DAILY ACTIVITIY SCORE: 20
DRESSING REGULAR UPPER BODY CLOTHING: A LITTLE
HELP NEEDED FOR BATHING: A LITTLE
WALKING IN HOSPITAL ROOM: A LITTLE
WALKING IN HOSPITAL ROOM: A LITTLE
DRESSING REGULAR LOWER BODY CLOTHING: A LITTLE
CLIMB 3 TO 5 STEPS WITH RAILING: A LITTLE

## 2024-09-22 ASSESSMENT — PAIN DESCRIPTION - LOCATION
LOCATION: LEG

## 2024-09-22 ASSESSMENT — PAIN DESCRIPTION - ORIENTATION
ORIENTATION: RIGHT;LEFT
ORIENTATION: RIGHT;LEFT

## 2024-09-22 ASSESSMENT — PAIN SCALES - GENERAL
PAINLEVEL_OUTOF10: 7
PAINLEVEL_OUTOF10: 8
PAINLEVEL_OUTOF10: 5 - MODERATE PAIN
PAINLEVEL_OUTOF10: 5 - MODERATE PAIN
PAINLEVEL_OUTOF10: 7
PAINLEVEL_OUTOF10: 3
PAINLEVEL_OUTOF10: 7
PAINLEVEL_OUTOF10: 5 - MODERATE PAIN
PAINLEVEL_OUTOF10: 8
PAINLEVEL_OUTOF10: 8

## 2024-09-22 ASSESSMENT — PAIN - FUNCTIONAL ASSESSMENT
PAIN_FUNCTIONAL_ASSESSMENT: 0-10
PAIN_FUNCTIONAL_ASSESSMENT: 0-10
PAIN_FUNCTIONAL_ASSESSMENT: UNABLE TO SELF-REPORT
PAIN_FUNCTIONAL_ASSESSMENT: UNABLE TO SELF-REPORT

## 2024-09-22 NOTE — CARE PLAN
Problem: Nutrition  Goal: Lab values WNL  Outcome: Progressing  Goal: Electrolytes WNL  Outcome: Progressing     Problem: Pain - Adult  Goal: Verbalizes/displays adequate comfort level or baseline comfort level  Outcome: Progressing     Problem: Safety - Adult  Goal: Free from fall injury  Outcome: Progressing     Problem: Discharge Planning  Goal: Discharge to home or other facility with appropriate resources  Outcome: Progressing     Problem: Chronic Conditions and Co-morbidities  Goal: Patient's chronic conditions and co-morbidity symptoms are monitored and maintained or improved  Outcome: Progressing     Problem: Skin  Goal: Participates in plan/prevention/treatment measures  Outcome: Progressing  Goal: Prevent/manage excess moisture  Outcome: Progressing  Goal: Prevent/minimize sheer/friction injuries  Outcome: Progressing     Problem: Fall/Injury  Goal: Not fall by end of shift  Outcome: Progressing  Goal: Be free from injury by end of the shift  Outcome: Progressing  Goal: Verbalize understanding of personal risk factors for fall in the hospital  Outcome: Progressing   The patient's goals for the shift include      The clinical goals for the shift include patient will elevate legs for 75% of day    Patient elevated her legs in bed all day and used wedge under legs for a couple hours in the afternoon.

## 2024-09-22 NOTE — PROGRESS NOTES
Neisha Graham is a 64 y.o. female on day 6 of admission presenting with ALBERTINA (acute kidney injury) (CMS-[facility]).    Subjective   Interval History: no fever, no new complaints        Review of Systems    Objective   Range of Vitals (last 24 hours)  Heart Rate:  []   Temp:  [35.9 °C (96.6 °F)-36.2 °C (97.2 °F)]   Resp:  [20]   BP: (113-134)/(70-81)   SpO2:  [97 %-98 %]   Daily Weight  09/16/24 : 142 kg (313 lb 11.4 oz)    Body mass index is 53.85 kg/m².    Physical Exam  Constitutional:       Appearance: Normal appearance.   HENT:      Head: Normocephalic and atraumatic.      Mouth/Throat:      Mouth: Mucous membranes are moist.      Pharynx: Oropharynx is clear.   Eyes:      Pupils: Pupils are equal, round, and reactive to light.   Cardiovascular:      Rate and Rhythm: Normal rate and regular rhythm.      Heart sounds: Normal heart sounds.   Pulmonary:      Effort: Pulmonary effort is normal.      Breath sounds: Normal breath sounds.   Abdominal:      General: Abdomen is flat. Bowel sounds are normal.      Palpations: Abdomen is soft.   Musculoskeletal:      Cervical back: Normal range of motion.      Comments: Stasis / wounds   Neurological:      Mental Status: She is alert.         Antibiotics  DAPTOmycin (Cubicin) IV in 50 mL NS    Relevant Results  Labs  Results from last 72 hours   Lab Units 09/22/24  0723 09/20/24  0755   WBC AUTO x10*3/uL 5.5 5.9   HEMOGLOBIN g/dL 13.2 12.7   HEMATOCRIT % 41.1 38.9   PLATELETS AUTO x10*3/uL 411 387   NEUTROS PCT AUTO %  --  59.0   LYMPHS PCT AUTO %  --  21.0   MONOS PCT AUTO %  --  14.0   EOS PCT AUTO %  --  4.3     Results from last 72 hours   Lab Units 09/22/24  0723 09/21/24  0717   SODIUM mmol/L 131* 134*   POTASSIUM mmol/L 4.4 4.1   CHLORIDE mmol/L 102 105   CO2 mmol/L 21 23   BUN mg/dL 31* 34*   CREATININE mg/dL 0.92 0.96   GLUCOSE mg/dL 100* 90   CALCIUM mg/dL 8.0* 8.0*   ANION GAP mmol/L 12 10   EGFR mL/min/1.73m*2 69 66           Estimated Creatinine Clearance:  86.2 mL/min (by C-G formula based on SCr of 0.92 mg/dL).  C-Reactive Protein   Date Value Ref Range Status   09/16/2024 16.36 (H) <1.00 mg/dL Final   08/12/2024 7.94 (H) <1.00 mg/dL Final     CRP   Date Value Ref Range Status   06/19/2020 1.84 (A) mg/dL Final     Comment:     REF VALUE  < 1.00       Microbiology  Susceptibility data from last 14 days.  Collected Specimen Info Organism Clindamycin Erythromycin Oxacillin Tetracycline Trimethoprim/Sulfamethoxazole Vancomycin   09/15/24 Tissue/Biopsy from Buttock Methicillin Resistant Staphylococcus aureus (MRSA)  R  R  R  R  S  S   09/15/24 Blood culture from Peripheral Venipuncture Aerococcus urinae           Corynebacterium         Imaging  Reviewed        Assessment/Plan   Fever, the temp is down, BC with aerococcus / Corynebacterium, likely a contaminant  Legs stasis / wounds / likely cellulitis, the culture with MRSA  Encephalopathy, likely metabolic     Recommendations :  Continue Daptomycin  Discussed with the medical team     I spent minutes in the professional and overall care of this patient.      Gorge Myers MD

## 2024-09-22 NOTE — PROGRESS NOTES
Patient: Neisha Graham  Room/bed: 122/122-A  Admitted on: 9/15/2024    Age: 64 y.o.   Gender: female  Code Status:  Full Code   Admitting Dx: Hyperkalemia [E87.5]  ALBERTINA (acute kidney injury) (CMS-HCC) [N17.9]  Generalized weakness [R53.1]    MRN: [id number]  PCP: Micheal Gonsalez, DO       Subjective   Seen and examined in her room this AM. Still having a lot of leg pain with wound drainage but feels like may be some improvement. She denies chest pain, breathing difficulties, abdominal pain, N/V/D/C, fever, or chills. Asking to be tested to r/o Sjogren's and pastor dominic syndrome.     Objective    Physical Exam   Constitutional: A&O x 3; NAD; calm and cooperative  Eyes: EOM's intact  HEENT: Normocephalic, Atraumatic. Oral mucosa moist.   Neck: Supple. No JVD, lymphadenopathy.   Lungs: CTAB with fair air movement. Respirations even and unlabored on room air.   Heart: RRR; + murmur.   Abdomen: Soft, non-tender, non-distended, +BS. Obese.   MS/Extremities: LEDBETTER equally x 4. BLE lymphedema, R>L. Peripheral pulses intact bilaterally.   Neuro: A&O x3; no focal deficits; gross motor and sensation intact.   Skin: Simone, sloughing skin with BLE lymphadema, R>L. Seeping/weeping wound to bilateral lower extremities  Psych: Normal affect.      Temp:  [35.9 °C (96.6 °F)-36.2 °C (97.2 °F)] 36.1 °C (97 °F)  Heart Rate:  [] 87  Resp:  [20] 20  BP: (113-134)/(70-81) 121/70    Vitals:    09/16/24 0833   Weight: 142 kg (313 lb 11.4 oz)             I/Os    Intake/Output Summary (Last 24 hours) at 9/22/2024 1440  Last data filed at 9/22/2024 0700  Gross per 24 hour   Intake 777 ml   Output --   Net 777 ml       Labs:   Results from last 72 hours   Lab Units 09/22/24  0723 09/21/24  0717   SODIUM mmol/L 131* 134*   POTASSIUM mmol/L 4.4 4.1   CHLORIDE mmol/L 102 105   CO2 mmol/L 21 23   BUN mg/dL 31* 34*   CREATININE mg/dL 0.92 0.96   GLUCOSE mg/dL 100* 90   CALCIUM mg/dL 8.0* 8.0*   ANION GAP mmol/L 12 10   EGFR mL/min/1.73m*2 69  66      Results from last 72 hours   Lab Units 09/22/24  0723 09/20/24  0755   WBC AUTO x10*3/uL 5.5 5.9   HEMOGLOBIN g/dL 13.2 12.7   HEMATOCRIT % 41.1 38.9   PLATELETS AUTO x10*3/uL 411 387   NEUTROS PCT AUTO %  --  59.0   LYMPHS PCT AUTO %  --  21.0   MONOS PCT AUTO %  --  14.0   EOS PCT AUTO %  --  4.3      Lab Results   Component Value Date    CALCIUM 8.0 (L) 09/22/2024    PHOS 2.8 09/17/2024      Lab Results   Component Value Date    CRP 16.36 (H) 09/16/2024          Micro/ID:   Susceptibility data from last 90 days.  Collected Specimen Info Organism Clindamycin Erythromycin Oxacillin Tetracycline Trimethoprim/Sulfamethoxazole Vancomycin   09/15/24 Tissue/Biopsy from Buttock Methicillin Resistant Staphylococcus aureus (MRSA)  R  R  R  R  S  S   09/15/24 Blood culture from Peripheral Venipuncture Aerococcus urinae           Corynebacterium              .ID  Lab Results   Component Value Date    URINECULTURE No significant growth 09/16/2024    BLOODCULT No growth at 2 days 09/19/2024       Images:  Vascular US Ankle Brachial Index (GEORGE)  Right Lower PVR: No evidence of arterial occlusive disease in the right lower extremity at rest. Normal digital perfusion noted. Multiphasic flow is noted in the right common femoral artery, right posterior tibial artery and right dorsalis pedis artery.  Left Lower PVR: No evidence of arterial occlusive disease in the left lower extremity at rest. Normal digital perfusion noted. Multiphasic flow is noted in the left common femoral artery, left posterior tibial artery and left dorsalis pedis artery.       Meds    Scheduled medications  daptomycin, 4 mg/kg (Adjusted), intravenous, q24h KAR  dilTIAZem CD, 180 mg, oral, Daily  docusate sodium, 100 mg, oral, BID  DULoxetine, 20 mg, oral, Daily  eucerin, , Topical, BID  influenza, 0.5 mL, intramuscular, During hospitalization  tiotropium, 2 puff, inhalation, Daily   And  fluticasone furoate-vilanteroL, 1 puff, inhalation,  Daily  hydroxyurea, 1,000 mg, oral, Daily  nicotine, 1 patch, transdermal, Daily  nystatin, , Topical, BID  pramipexole, 1 mg, oral, BID  pregabalin, 50 mg, oral, TID  sodium hypochlorite, , irrigation, Daily  white petrolatum, , Topical, BID  zinc oxide, 1 Application, Topical, TID      Continuous medications     PRN medications  PRN medications: acetaminophen **OR** acetaminophen **OR** [DISCONTINUED] acetaminophen, albuterol, cyclobenzaprine, hydrOXYzine HCL, naloxone, ondansetron **OR** ondansetron, oxyCODONE, [START ON 9/24/2024] oxyCODONE     Assessment and Plan    Neisha Graham is a 64 y.o. female with a history of BLE lymphadema with nonhealing wounds, aortic stenosis, and A.Fib who was admitted with BLE cellulitis and non-healing wounds.    BLE Cellulitis with Non-Healing Wounds and Bacteremia  -H/O chronic venous insufficiency with antibiotic resistance/intolerance  -Wound RN, ID, and Podiatry are all consulted and helping to manage  -Admitting BC (1 of 2) positive for aerococcus urinae, corynebacterium - likely contaminate  -Repeat BC collected on 9/19/24 is negative to date  -Wound culture positive for MRSA  -ID managing antibiotics - Daptomycin  -Local wound care per Podiatry  -GEORGE's normal   -Monitoring renal function closely while on Daptomycin  -Per RN, mild clinical improvement noted in wounds  -Afebrile. CBC in AM.     CV: Mod-Severe Aortic Stenosis, A.Fib, HTN  -Medical therapy: Diltiazem  -On Warfarin for stroke prevention    COPD/BE/Tobacco Use Disorder  -No clinical signs of acute exacerbation  -On Breo, Spiriva, and PRN Albuterol  -No CPAP  -Nicotine Patch    Chronic Pain/Peripheral Neuropathy  -On Lyrica, Duloxetine  -PRN analgesics    Depression/Anxiety  -On Duloxetine    Essential Thrombocytosis  -On Hydroxyurea    DVT Prophylaxis  -On Warfarin (A.fib, thrombosis)  -INR 2.0 - resume home dose of 7.5 mg and repeat INR in AM.     Fluids/Electrolytes/Nutrition  -Laboratory data reviewed.    -Electrolytes stable.   -No nutritional concerns at this time.      Disposition  -Plan of care discussed with attending, Dr. Chino, ID.   -Home/SNF when medically stable.       LAKEISHA Vargas-CNP

## 2024-09-22 NOTE — CARE PLAN
Uneventful night. Pt rested comfortably. Pain controlled with current regimen. BLE dressings changed last night per orders. Pt continues to progress towards meeting goals. VSS. Will continue to monitor.

## 2024-09-23 ENCOUNTER — APPOINTMENT (OUTPATIENT)
Dept: CARDIOLOGY | Facility: HOSPITAL | Age: 64
End: 2024-09-23
Payer: MEDICARE

## 2024-09-23 ENCOUNTER — APPOINTMENT (OUTPATIENT)
Dept: RADIOLOGY | Facility: HOSPITAL | Age: 64
End: 2024-09-23
Payer: MEDICARE

## 2024-09-23 LAB
ALBUMIN SERPL BCP-MCNC: 2.9 G/DL (ref 3.4–5)
ALP SERPL-CCNC: 247 U/L (ref 33–136)
ALT SERPL W P-5'-P-CCNC: 14 U/L (ref 7–45)
ANA PATTERN: ABNORMAL
ANA SER QL HEP2 SUBST: POSITIVE
ANA TITR SER IF: ABNORMAL {TITER}
ANION GAP SERPL CALC-SCNC: 11 MMOL/L (ref 10–20)
ANION GAP SERPL CALC-SCNC: 16 MMOL/L (ref 10–20)
APPEARANCE UR: ABNORMAL
AST SERPL W P-5'-P-CCNC: 25 U/L (ref 9–39)
BACTERIA BLD CULT: NORMAL
BILIRUB SERPL-MCNC: 0.6 MG/DL (ref 0–1.2)
BILIRUB UR STRIP.AUTO-MCNC: NEGATIVE MG/DL
BUN SERPL-MCNC: 32 MG/DL (ref 6–23)
BUN SERPL-MCNC: 39 MG/DL (ref 6–23)
CALCIUM SERPL-MCNC: 7.9 MG/DL (ref 8.6–10.3)
CALCIUM SERPL-MCNC: 8.2 MG/DL (ref 8.6–10.3)
CENTROMERE B AB SER-ACNC: <0.2 AI
CHLORIDE SERPL-SCNC: 102 MMOL/L (ref 98–107)
CHLORIDE SERPL-SCNC: 103 MMOL/L (ref 98–107)
CHROMATIN AB SERPL-ACNC: <0.2 AI
CO2 SERPL-SCNC: 18 MMOL/L (ref 21–32)
CO2 SERPL-SCNC: 23 MMOL/L (ref 21–32)
COLOR UR: YELLOW
CREAT SERPL-MCNC: 0.97 MG/DL (ref 0.5–1.05)
CREAT SERPL-MCNC: 1.36 MG/DL (ref 0.5–1.05)
DSDNA AB SER-ACNC: <1 IU/ML
EGFRCR SERPLBLD CKD-EPI 2021: 43 ML/MIN/1.73M*2
EGFRCR SERPLBLD CKD-EPI 2021: 65 ML/MIN/1.73M*2
ENA JO1 AB SER QL IA: <0.2 AI
ENA RNP AB SER IA-ACNC: <0.2 AI
ENA SCL70 AB SER QL IA: <0.2 AI
ENA SM AB SER IA-ACNC: <0.2 AI
ENA SM+RNP AB SER QL IA: <0.2 AI
ENA SS-A AB SER IA-ACNC: <0.2 AI
ENA SS-B AB SER IA-ACNC: <0.2 AI
ERYTHROCYTE [DISTWIDTH] IN BLOOD BY AUTOMATED COUNT: 14.8 % (ref 11.5–14.5)
ERYTHROCYTE [DISTWIDTH] IN BLOOD BY AUTOMATED COUNT: 15 % (ref 11.5–14.5)
GLUCOSE SERPL-MCNC: 146 MG/DL (ref 74–99)
GLUCOSE SERPL-MCNC: 94 MG/DL (ref 74–99)
GLUCOSE UR STRIP.AUTO-MCNC: NORMAL MG/DL
HCT VFR BLD AUTO: 38.7 % (ref 36–46)
HCT VFR BLD AUTO: 40.4 % (ref 36–46)
HGB BLD-MCNC: 12.3 G/DL (ref 12–16)
HGB BLD-MCNC: 13.2 G/DL (ref 12–16)
HOLD SPECIMEN: NORMAL
HYALINE CASTS #/AREA URNS AUTO: ABNORMAL /LPF
INR PPP: 1.9 (ref 0.9–1.1)
KETONES UR STRIP.AUTO-MCNC: NEGATIVE MG/DL
LACTATE SERPL-SCNC: 2.1 MMOL/L (ref 0.4–2)
LEUKOCYTE ESTERASE UR QL STRIP.AUTO: NEGATIVE
MCH RBC QN AUTO: 34.1 PG (ref 26–34)
MCH RBC QN AUTO: 34.5 PG (ref 26–34)
MCHC RBC AUTO-ENTMCNC: 31.8 G/DL (ref 32–36)
MCHC RBC AUTO-ENTMCNC: 32.7 G/DL (ref 32–36)
MCV RBC AUTO: 106 FL (ref 80–100)
MCV RBC AUTO: 107 FL (ref 80–100)
MUCOUS THREADS #/AREA URNS AUTO: ABNORMAL /LPF
NITRITE UR QL STRIP.AUTO: NEGATIVE
NRBC BLD-RTO: 0 /100 WBCS (ref 0–0)
NRBC BLD-RTO: 0 /100 WBCS (ref 0–0)
PH UR STRIP.AUTO: 6 [PH]
PLATELET # BLD AUTO: 369 X10*3/UL (ref 150–450)
PLATELET # BLD AUTO: 389 X10*3/UL (ref 150–450)
POTASSIUM SERPL-SCNC: 4.6 MMOL/L (ref 3.5–5.3)
POTASSIUM SERPL-SCNC: 5.3 MMOL/L (ref 3.5–5.3)
PROT SERPL-MCNC: 5.5 G/DL (ref 6.4–8.2)
PROT UR STRIP.AUTO-MCNC: ABNORMAL MG/DL
PROTHROMBIN TIME: 21.3 SECONDS (ref 9.8–12.8)
RBC # BLD AUTO: 3.61 X10*6/UL (ref 4–5.2)
RBC # BLD AUTO: 3.83 X10*6/UL (ref 4–5.2)
RBC # UR STRIP.AUTO: NEGATIVE /UL
RBC #/AREA URNS AUTO: ABNORMAL /HPF
RIBOSOMAL P AB SER-ACNC: <0.2 AI
SODIUM SERPL-SCNC: 131 MMOL/L (ref 136–145)
SODIUM SERPL-SCNC: 132 MMOL/L (ref 136–145)
SP GR UR STRIP.AUTO: 1.02
SQUAMOUS #/AREA URNS AUTO: ABNORMAL /HPF
UROBILINOGEN UR STRIP.AUTO-MCNC: NORMAL MG/DL
WBC # BLD AUTO: 15.6 X10*3/UL (ref 4.4–11.3)
WBC # BLD AUTO: 5.7 X10*3/UL (ref 4.4–11.3)
WBC #/AREA URNS AUTO: ABNORMAL /HPF

## 2024-09-23 PROCEDURE — 2500000001 HC RX 250 WO HCPCS SELF ADMINISTERED DRUGS (ALT 637 FOR MEDICARE OP): Performed by: PODIATRIST

## 2024-09-23 PROCEDURE — 2500000001 HC RX 250 WO HCPCS SELF ADMINISTERED DRUGS (ALT 637 FOR MEDICARE OP): Performed by: NURSE PRACTITIONER

## 2024-09-23 PROCEDURE — 2500000004 HC RX 250 GENERAL PHARMACY W/ HCPCS (ALT 636 FOR OP/ED): Performed by: NURSE PRACTITIONER

## 2024-09-23 PROCEDURE — 36415 COLL VENOUS BLD VENIPUNCTURE: CPT | Performed by: NURSE PRACTITIONER

## 2024-09-23 PROCEDURE — 2500000002 HC RX 250 W HCPCS SELF ADMINISTERED DRUGS (ALT 637 FOR MEDICARE OP, ALT 636 FOR OP/ED): Performed by: EMERGENCY MEDICINE

## 2024-09-23 PROCEDURE — 85610 PROTHROMBIN TIME: CPT | Performed by: INTERNAL MEDICINE

## 2024-09-23 PROCEDURE — 71045 X-RAY EXAM CHEST 1 VIEW: CPT

## 2024-09-23 PROCEDURE — 93005 ELECTROCARDIOGRAM TRACING: CPT

## 2024-09-23 PROCEDURE — 84075 ASSAY ALKALINE PHOSPHATASE: CPT | Performed by: NURSE PRACTITIONER

## 2024-09-23 PROCEDURE — S4991 NICOTINE PATCH NONLEGEND: HCPCS | Performed by: EMERGENCY MEDICINE

## 2024-09-23 PROCEDURE — 81001 URINALYSIS AUTO W/SCOPE: CPT | Performed by: INTERNAL MEDICINE

## 2024-09-23 PROCEDURE — 71045 X-RAY EXAM CHEST 1 VIEW: CPT | Performed by: RADIOLOGY

## 2024-09-23 PROCEDURE — 1100000001 HC PRIVATE ROOM DAILY

## 2024-09-23 PROCEDURE — 87077 CULTURE AEROBIC IDENTIFY: CPT | Mod: GEALAB | Performed by: INTERNAL MEDICINE

## 2024-09-23 PROCEDURE — 2500000002 HC RX 250 W HCPCS SELF ADMINISTERED DRUGS (ALT 637 FOR MEDICARE OP, ALT 636 FOR OP/ED): Performed by: NURSE PRACTITIONER

## 2024-09-23 PROCEDURE — 99233 SBSQ HOSP IP/OBS HIGH 50: CPT | Performed by: INTERNAL MEDICINE

## 2024-09-23 PROCEDURE — 83605 ASSAY OF LACTIC ACID: CPT | Performed by: NURSE PRACTITIONER

## 2024-09-23 PROCEDURE — 85027 COMPLETE CBC AUTOMATED: CPT | Performed by: NURSE PRACTITIONER

## 2024-09-23 PROCEDURE — 2500000004 HC RX 250 GENERAL PHARMACY W/ HCPCS (ALT 636 FOR OP/ED): Performed by: INTERNAL MEDICINE

## 2024-09-23 PROCEDURE — 2500000005 HC RX 250 GENERAL PHARMACY W/O HCPCS: Performed by: NURSE PRACTITIONER

## 2024-09-23 PROCEDURE — 97110 THERAPEUTIC EXERCISES: CPT | Mod: GP,CQ

## 2024-09-23 PROCEDURE — 2500000001 HC RX 250 WO HCPCS SELF ADMINISTERED DRUGS (ALT 637 FOR MEDICARE OP): Performed by: FAMILY MEDICINE

## 2024-09-23 PROCEDURE — 36415 COLL VENOUS BLD VENIPUNCTURE: CPT | Performed by: INTERNAL MEDICINE

## 2024-09-23 PROCEDURE — 80048 BASIC METABOLIC PNL TOTAL CA: CPT | Performed by: NURSE PRACTITIONER

## 2024-09-23 PROCEDURE — 97116 GAIT TRAINING THERAPY: CPT | Mod: GP,CQ

## 2024-09-23 RX ORDER — PREGABALIN 50 MG/1
50 CAPSULE ORAL 2 TIMES DAILY
Status: DISCONTINUED | OUTPATIENT
Start: 2024-09-24 | End: 2024-09-24

## 2024-09-23 ASSESSMENT — COGNITIVE AND FUNCTIONAL STATUS - GENERAL
CLIMB 3 TO 5 STEPS WITH RAILING: A LOT
CLIMB 3 TO 5 STEPS WITH RAILING: A LITTLE
MOVING FROM LYING ON BACK TO SITTING ON SIDE OF FLAT BED WITH BEDRAILS: A LITTLE
TURNING FROM BACK TO SIDE WHILE IN FLAT BAD: A LITTLE
DAILY ACTIVITIY SCORE: 17
MOBILITY SCORE: 18
TOILETING: A LITTLE
DRESSING REGULAR LOWER BODY CLOTHING: A LITTLE
CLIMB 3 TO 5 STEPS WITH RAILING: A LITTLE
DRESSING REGULAR LOWER BODY CLOTHING: A LOT
WALKING IN HOSPITAL ROOM: A LOT
HELP NEEDED FOR BATHING: A LITTLE
MOBILITY SCORE: 16
MOVING TO AND FROM BED TO CHAIR: A LITTLE
DRESSING REGULAR UPPER BODY CLOTHING: A LITTLE
HELP NEEDED FOR BATHING: A LOT
STANDING UP FROM CHAIR USING ARMS: A LITTLE
DAILY ACTIVITIY SCORE: 20
MOBILITY SCORE: 18
TURNING FROM BACK TO SIDE WHILE IN FLAT BAD: A LITTLE
STANDING UP FROM CHAIR USING ARMS: A LITTLE
STANDING UP FROM CHAIR USING ARMS: A LITTLE
MOVING FROM LYING ON BACK TO SITTING ON SIDE OF FLAT BED WITH BEDRAILS: A LITTLE
MOVING TO AND FROM BED TO CHAIR: A LITTLE
DRESSING REGULAR UPPER BODY CLOTHING: A LOT
MOVING TO AND FROM BED TO CHAIR: A LITTLE
WALKING IN HOSPITAL ROOM: A LITTLE
MOVING FROM LYING ON BACK TO SITTING ON SIDE OF FLAT BED WITH BEDRAILS: A LITTLE
TURNING FROM BACK TO SIDE WHILE IN FLAT BAD: A LITTLE
WALKING IN HOSPITAL ROOM: A LITTLE
TOILETING: A LITTLE

## 2024-09-23 ASSESSMENT — PAIN SCALES - GENERAL
PAINLEVEL_OUTOF10: 4
PAINLEVEL_OUTOF10: 7
PAINLEVEL_OUTOF10: 5 - MODERATE PAIN
PAINLEVEL_OUTOF10: 3
PAINLEVEL_OUTOF10: 7
PAINLEVEL_OUTOF10: 7
PAINLEVEL_OUTOF10: 2
PAINLEVEL_OUTOF10: 3
PAINLEVEL_OUTOF10: 8
PAINLEVEL_OUTOF10: 7
PAINLEVEL_OUTOF10: 7

## 2024-09-23 ASSESSMENT — PAIN SCALES - PAIN ASSESSMENT IN ADVANCED DEMENTIA (PAINAD): TOTALSCORE: MEDICATION (SEE MAR)

## 2024-09-23 ASSESSMENT — PAIN - FUNCTIONAL ASSESSMENT
PAIN_FUNCTIONAL_ASSESSMENT: 0-10

## 2024-09-23 ASSESSMENT — PAIN DESCRIPTION - DESCRIPTORS: DESCRIPTORS: BURNING;DISCOMFORT

## 2024-09-23 NOTE — PROGRESS NOTES
09/23/24 1010   Discharge Planning   Living Arrangements Alone   Support Systems Children;Spouse/significant other   Assistance Needed A&Ox3, mobilizes with a rollator or wheelchair, has walk in shower, does not drive (transport thru insurance provide a ride), room air (does have o2 set up at home but doesn't use), no CPAP/BIPAP, active with [facility] for SN, PCP Dr. Micheal Gonsalez   Type of Residence Private residence   Number of Stairs to Enter Residence 0   Number of Stairs Within Residence 0   Do you have animals or pets at home? No   Home or Post Acute Services In home services   Type of Home Care Services Home nursing visits   Expected Discharge Disposition Home Health  (PT recommending low frequency skilled services. Discussed with patient preference is to return home alone with resumed [facility] upon d/c. Referral sent in [facility]. Patient will need transport arranged on d/c. May need ambulance transport.)   Does the patient need discharge transport arranged? Yes   RoundTrip coordination needed? Yes   Has discharge transport been arranged? No

## 2024-09-23 NOTE — NURSING NOTE
1438  Dr Chino and Bronwyn Alvarado CNP notified for change it pt status, pt is shivering, temperature is WNL at this time (36.5 C), HR elevated at 129 BPM. No new orders at this time    1530  Pt c/o pain 8/10. Per Bronwyn Alvarado ok to give PRN oxycodone early  -Pts Lyrica was held per provider    1630  Daughter at bedside, new orders were obtained to get blood cultures and a urine sample.     1700  -Checked pts temperature again r/t increased shivering, temp was at 39.2 C, , pt had increased confusion about situation and place. I got ahold of Dr. Chino and Bronwyn Alvarado. New orders were to obtain an EKG which showed Sinus tachycardia at 140 BPM.   -Chest XR was obtained  -Provider came down and assessed pt once again, see new orders  -PRN tylenol and zofran given     1715  -Temperature recheck showed 38.5 C    1900  -Bedside report given to oncoming nurse

## 2024-09-23 NOTE — PROGRESS NOTES
Patient: Neisha Graham  Room/bed: 122/122-A  Admitted on: 9/15/2024    Age: 64 y.o.   Gender: female  Code Status:  Full Code   Admitting Dx: Hyperkalemia [E87.5]  ALBERTINA (acute kidney injury) (CMS-HCC) [N17.9]  Generalized weakness [R53.1]    MRN: [id number]  PCP: Micheal Gonsalez DO       Subjective   Seen and examined in her room this AM. Reported less pain in left leg and mild pain in right leg. Reported current wound care regimen is much better. Still concerned bout Damián Calvin Syndrome. Denies N/V/D/C, SOB, CP.  Objective    Physical Exam   Constitutional: A&O x 3; NAD; calm and cooperative  Eyes: EOM's intact  HEENT: Normocephalic, Atraumatic. Oral mucosa moist.   Neck: Supple. No JVD, lymphadenopathy.   Lungs: CTAB with fair air movement. Respirations even and unlabored on room air.   Heart: RRR, + murmur  Abdomen: Soft, non-tender, non-distended, obese, +BS  MS/Extremities: LEDBETTER equally x 4. + BLE lymphedema. Peripheral pulses intact bilaterally.   Neuro: A&O x3; no focal deficits; gross motor and sensation intact.   Skin: Simone, sloughing skin noted on BLE. BLE Weeping wound   Psych: Normal affect.     Temp:  [36.1 °C (97 °F)-36.4 °C (97.5 °F)] 36.3 °C (97.3 °F)  Heart Rate:  [] 100  Resp:  [18-20] 18  BP: (108-141)/(65-82) 141/82    Vitals:    09/16/24 0833   Weight: 142 kg (313 lb 11.4 oz)             I/Os    Intake/Output Summary (Last 24 hours) at 9/23/2024 1208  Last data filed at 9/22/2024 1700  Gross per 24 hour   Intake 417 ml   Output --   Net 417 ml       Labs:   Results from last 72 hours   Lab Units 09/23/24  0635 09/22/24  0723 09/21/24  0717   SODIUM mmol/L 132* 131* 134*   POTASSIUM mmol/L 4.6 4.4 4.1   CHLORIDE mmol/L 103 102 105   CO2 mmol/L 23 21 23   BUN mg/dL 32* 31* 34*   CREATININE mg/dL 0.97 0.92 0.96   GLUCOSE mg/dL 94 100* 90   CALCIUM mg/dL 7.9* 8.0* 8.0*   ANION GAP mmol/L 11 12 10   EGFR mL/min/1.73m*2 65 69 66      Results from last 72 hours   Lab Units 09/23/24  0635  09/22/24  0723   WBC AUTO x10*3/uL 5.7 5.5   HEMOGLOBIN g/dL 12.3 13.2   HEMATOCRIT % 38.7 41.1   PLATELETS AUTO x10*3/uL 369 411      Lab Results   Component Value Date    CALCIUM 7.9 (L) 09/23/2024    PHOS 2.8 09/17/2024      Lab Results   Component Value Date    CRP 16.36 (H) 09/16/2024     Micro/ID:   Susceptibility data from last 90 days.  Collected Specimen Info Organism Clindamycin Erythromycin Oxacillin Tetracycline Trimethoprim/Sulfamethoxazole Vancomycin   09/15/24 Tissue/Biopsy from Buttock Methicillin Resistant Staphylococcus aureus (MRSA)  R  R  R  R  S  S   09/15/24 Blood culture from Peripheral Venipuncture Aerococcus urinae           Corynebacterium             Lab Results   Component Value Date    URINECULTURE No significant growth 09/16/2024    BLOODCULT No growth at 3 days 09/19/2024       Images:  Vascular US Ankle Brachial Index (GEORGE)  Right Lower PVR: No evidence of arterial occlusive disease in the right lower extremity at rest. Normal digital perfusion noted. Multiphasic flow is noted in the right common femoral artery, right posterior tibial artery and right dorsalis pedis artery.  Left Lower PVR: No evidence of arterial occlusive disease in the left lower extremity at rest. Normal digital perfusion noted. Multiphasic flow is noted in the left common femoral artery, left posterior tibial artery and left dorsalis pedis artery.      Meds    Scheduled medications  daptomycin, 4 mg/kg (Adjusted), intravenous, q24h KAR  dilTIAZem CD, 180 mg, oral, Daily  docusate sodium, 100 mg, oral, BID  DULoxetine, 20 mg, oral, Daily  eucerin, , Topical, BID  influenza, 0.5 mL, intramuscular, During hospitalization  tiotropium, 2 puff, inhalation, Daily   And  fluticasone furoate-vilanteroL, 1 puff, inhalation, Daily  hydroxyurea, 1,000 mg, oral, Daily  nicotine, 1 patch, transdermal, Daily  nystatin, , Topical, BID  pramipexole, 1 mg, oral, BID  pregabalin, 50 mg, oral, TID  sodium hypochlorite, , irrigation,  Daily  warfarin, 7.5 mg, oral, Daily  white petrolatum, , Topical, BID  zinc oxide, 1 Application, Topical, TID      Continuous medications     PRN medications  PRN medications: acetaminophen **OR** acetaminophen **OR** [DISCONTINUED] acetaminophen, albuterol, cyclobenzaprine, hydrOXYzine HCL, naloxone, ondansetron **OR** ondansetron, oxyCODONE, [START ON 9/24/2024] oxyCODONE     Assessment and Plan    Neisha Graham is a 64 y.o. female w/ PMH of lymphedema (non-healing wounds), HTN, HLD, aortic stenosis, a-fib, Morbid obesity who was admitted for BLE lymphedema w/ non healing wounds    BLE Cellulitis with Non-Healing Wounds and Bacteremia  -H/O chronic venous insufficiency with antibiotic resistance/intolerance  -Wound RN, continue wound care management  -ID following, continue IV antibiotic Daptomycin and managing (possibly stop by discharge)   -Podiatry following, orders for wound care advised  -Admitting BC (1 of 2) positive for aerococcus urinae, corynebacterium - likely contaminate  -Repeat BC, no growth 3 days   -Wound culture positive for MRSA  -Obtain renal function daily, while on Daptomycin  -Afebrile, no leukocytosis      CV: Mod-Severe Aortic Stenosis, A.Fib, HTN  -Continue warfarin (daily INR) and Diltiazem     COPD/BE/Tobacco Use Disorder  -No clinical signs of acute exacerbation  -Continue Breo, Spiriva, and PRN Albuterol  -No CPAP  -Nicotine Patch     Chronic Pain/Peripheral Neuropathy  -Continue Lyrica, Duloxetine and PRN analgesics     Depression/Anxiety  -Continue Duloxetine     Essential Thrombocytosis  -Continue Hydroxyurea  -Platelets 369    Fluids: monitor and replete as needed  Electrolytes: monitor and replete as needed  Nutrition: Regular diet   GI prophylaxis: Protonix 40mg QD  DVT prophylaxis: warfarin   Code Status: Full Code    Disposition  Plan of Care discuss with attending and nursing team  Discharge pending hemodynamic stability     Cheryl Hernandez RN BSN NP Student, seen in  collaboration with GARRY HANNAH, who was present during the entire examination.

## 2024-09-23 NOTE — PROGRESS NOTES
Neisha Graham is a 64 y.o. female on day 7 of admission presenting with ALBERTINA (acute kidney injury) (CMS-[facility]).    Subjective   Interval History: no fever, no new complaints        Review of Systems    Objective   Range of Vitals (last 24 hours)  Heart Rate:  []   Temp:  [36.1 °C (97 °F)-36.4 °C (97.5 °F)]   Resp:  [18-20]   BP: (108-141)/(65-82)   SpO2:  [93 %-100 %]   Daily Weight  09/16/24 : 142 kg (313 lb 11.4 oz)    Body mass index is 53.85 kg/m².    Physical Exam  Constitutional:       Appearance: Normal appearance.   HENT:      Head: Normocephalic and atraumatic.      Mouth/Throat:      Mouth: Mucous membranes are moist.      Pharynx: Oropharynx is clear.   Eyes:      Pupils: Pupils are equal, round, and reactive to light.   Cardiovascular:      Rate and Rhythm: Normal rate and regular rhythm.      Heart sounds: Normal heart sounds.   Pulmonary:      Effort: Pulmonary effort is normal.      Breath sounds: Normal breath sounds.   Abdominal:      General: Abdomen is flat. Bowel sounds are normal.      Palpations: Abdomen is soft.   Musculoskeletal:      Cervical back: Normal range of motion.      Comments: Stasis / wounds   Neurological:      Mental Status: She is alert.         Antibiotics  DAPTOmycin (Cubicin) IV in 50 mL NS    Relevant Results  Labs  Results from last 72 hours   Lab Units 09/23/24  0635 09/22/24  0723   WBC AUTO x10*3/uL 5.7 5.5   HEMOGLOBIN g/dL 12.3 13.2   HEMATOCRIT % 38.7 41.1   PLATELETS AUTO x10*3/uL 369 411     Results from last 72 hours   Lab Units 09/23/24  0635 09/22/24  0723 09/21/24  0717   SODIUM mmol/L 132* 131* 134*   POTASSIUM mmol/L 4.6 4.4 4.1   CHLORIDE mmol/L 103 102 105   CO2 mmol/L 23 21 23   BUN mg/dL 32* 31* 34*   CREATININE mg/dL 0.97 0.92 0.96   GLUCOSE mg/dL 94 100* 90   CALCIUM mg/dL 7.9* 8.0* 8.0*   ANION GAP mmol/L 11 12 10   EGFR mL/min/1.73m*2 65 69 66           Estimated Creatinine Clearance: 81.8 mL/min (by C-G formula based on SCr of 0.97  mg/dL).  C-Reactive Protein   Date Value Ref Range Status   09/16/2024 16.36 (H) <1.00 mg/dL Final   08/12/2024 7.94 (H) <1.00 mg/dL Final     CRP   Date Value Ref Range Status   06/19/2020 1.84 (A) mg/dL Final     Comment:     REF VALUE  < 1.00       Microbiology  Susceptibility data from last 14 days.  Collected Specimen Info Organism Clindamycin Erythromycin Oxacillin Tetracycline Trimethoprim/Sulfamethoxazole Vancomycin   09/15/24 Tissue/Biopsy from Buttock Methicillin Resistant Staphylococcus aureus (MRSA)  R  R  R  R  S  S   09/15/24 Blood culture from Peripheral Venipuncture Aerococcus urinae           Corynebacterium         Imaging  Reviewed        Assessment/Plan   Fever, the temp is down, BC with aerococcus / Corynebacterium, likely a contaminant  Legs stasis / wounds / likely cellulitis, the culture with MRSA  Encephalopathy, likely metabolic     Recommendations :  Continue Daptomycin, we should be able to stop the antibiotics with discharge  Discussed with the medical team     I spent minutes in the professional and overall care of this patient.      Gorge Myers MD

## 2024-09-23 NOTE — SIGNIFICANT EVENT
Called to come assess patient due to change in vital signs: fever, shaking, tachycardia. Seen and examined in her room. RN, NA, Daughter (Irma) all at bedside. CXR being done. Will get CBC, CMP, Lactate, BC, and UA. Will monitor closely.

## 2024-09-23 NOTE — PROGRESS NOTES
Physical Therapy    Physical Therapy Treatment    Patient Name: Neisha Graham  MRN: [id number]  Department: [address]  Room: [address] [place], [place] [address]  Today's Date: 9/23/2024  Time Calculation  Start Time: 1122  Stop Time: 1146  Time Calculation (min): 24 min         Assessment/Plan   PT Assessment  PT Assessment Results: Decreased strength, Decreased endurance, Impaired balance, Decreased mobility  Rehab Prognosis: Good  Evaluation/Treatment Tolerance: Patient limited by fatigue  Medical Staff Made Aware: Yes  Strengths: Ability to acquire knowledge  Barriers to Participation: Comorbidities, Support of Caregivers  End of Session Communication: Bedside nurse  End of Session Patient Position: Bed, 3 rail up, Alarm on     PT Plan  Treatment/Interventions: Transfer training, Gait training, Balance training, Strengthening, Endurance training  PT Plan: Ongoing PT  PT Frequency: 3 times per week  PT Discharge Recommendations: Low intensity level of continued care  Equipment Recommended upon Discharge: Wheeled walker (owns)  PT Recommended Transfer Status: Assist x1  PT - OK to Discharge: Yes      General Visit Information:   PT  Visit  PT Received On: 09/23/24  Response to Previous Treatment: Patient with no complaints from previous session.  General  Reason for Referral: Impaired functional mobility; ALBERTINA, B LE cellulitis  Referred By: Faiza Chino  Past Medical History Relevant to Rehab: Essential thrombocytosis.  Central venous sinuses thrombosis.  Tobacco use disorder.  Lymphedema.  COPD  Hypertension.  Morbid obesity.  Anxiety disorder.  Sleep apnea-does not wear CPAP or BiPAP or oxygen  Restless leg syndrome.  Hyperlipidemia.  Chronic low back pain.  Osteoarthritis.  Iron deficiency anemia.  Depression  Asthma  Hepatitis B  IBS  Family/Caregiver Present: No  Patient Position Received: Bed, 3 rail up, Alarm on  Preferred Learning Style: verbal  General Comment: Patient motivated and agreeable.    Subjective    Precautions:  Precautions  Medical Precautions: Fall precautions  Precautions Comment: BLE wound heeling applied. Per patient ge treatment applied to both legs. Ace wrap on BLE.    Vital Signs (Past 2hrs)                 Objective   Pain:  Pain Assessment  Pain Assessment: 0-10  0-10 (Numeric) Pain Score: 5 - Moderate pain (Moderate pain in standing)  Pain Type: Acute pain  Pain Location: Leg  Pain Orientation: Right, Left  Pain Descriptors: Burning, Discomfort  Pain Frequency: Constant/continuous  Pain Onset: Ongoing  Clinical Progression: Not changed  Pain Interventions: Medication (See MAR)  Cognition:  Cognition  Overall Cognitive Status: Within Functional Limits  Orientation Level: Oriented X4  Coordination:  Movements are Fluid and Coordinated: Yes  Postural Control:  Postural Control  Postural Control: Within Functional Limits  Static Sitting Balance  Static Sitting-Balance Support: Bilateral upper extremity supported  Static Sitting-Level of Assistance: Independent  Static Standing Balance  Static Standing-Balance Support: Bilateral upper extremity supported  Static Standing-Level of Assistance: Close supervision  Extremity/Trunk Assessments:    Activity Tolerance:  Activity Tolerance  Endurance: Tolerates 10 - 20 min exercise with multiple rests  Treatments:  Therapeutic Exercise  Therapeutic Exercise Performed: Yes  Therapeutic Exercise Activity 1: Seated ther ex x 15 each BLE (HR/ TR, LAQ, Hip flex, ABD/ADD, HS)         Bed Mobility  Bed Mobility: Yes  Bed Mobility 1  Bed Mobility 1: Supine to sitting, Sitting to supine  Level of Assistance 1: Independent (minimal assist with feet from floor >bed)    Ambulation/Gait Training  Ambulation/Gait Training Performed: Yes  Ambulation/Gait Training 1  Surface 1: Level tile  Device 1: Rolling walker  Assistance 1: Close supervision  Quality of Gait 1: Wide base of support  Comments/Distance (ft) 1: 20' within room  Transfers  Transfer: Yes  Transfer  1  Transfer From 1: Bed to  Transfer to 1: Sit, Stand  Technique 1: Sit to stand, Stand to sit  Transfer Device 1: Walker  Transfer Level of Assistance 1: Close supervision  Trials/Comments 1: x1         Outcome Measures:  [facility] Basic Mobility  Turning from your back to your side while in a flat bed without using bedrails: A little  Moving from lying on your back to sitting on the side of a flat bed without using bedrails: A little  Moving to and from bed to chair (including a wheelchair): A little  Standing up from a chair using your arms (e.g. wheelchair or bedside chair): A little  To walk in hospital room: A little  Climbing 3-5 steps with railing: A little  Basic Mobility - Total Score: 18    Education Documentation  Precautions, taught by Soledad Smith PTA at 9/23/2024  1:17 PM.  Learner: Patient  Readiness: Acceptance  Method: Explanation, Demonstration  Response: Verbalizes Understanding, Demonstrated Understanding    Home Exercise Program, taught by Soledad Smith PTA at 9/23/2024  1:17 PM.  Learner: Patient  Readiness: Acceptance  Method: Explanation, Demonstration  Response: Verbalizes Understanding, Demonstrated Understanding    Body Mechanics, taught by Soledad Smith PTA at 9/23/2024  1:17 PM.  Learner: Patient  Readiness: Acceptance  Method: Explanation, Demonstration  Response: Verbalizes Understanding, Demonstrated Understanding    Mobility Training, taught by Soledad Smith PTA at 9/23/2024  1:17 PM.  Learner: Patient  Readiness: Acceptance  Method: Explanation, Demonstration  Response: Verbalizes Understanding, Demonstrated Understanding    Precautions, taught by Soledad Smith PTA at 9/23/2024  1:16 PM.  Learner: Patient  Readiness: Acceptance  Method: Explanation, Demonstration  Response: Verbalizes Understanding, Demonstrated Understanding    Home Exercise Program, taught by Soledad Smith PTA at 9/23/2024  1:16 PM.  Learner: Patient  Readiness: Acceptance  Method:  Explanation, Demonstration  Response: Verbalizes Understanding, Demonstrated Understanding    Body Mechanics, taught by Soledad Smith PTA at 9/23/2024  1:16 PM.  Learner: Patient  Readiness: Acceptance  Method: Explanation, Demonstration  Response: Verbalizes Understanding, Demonstrated Understanding    Mobility Training, taught by Soledad Smith PTA at 9/23/2024  1:16 PM.  Learner: Patient  Readiness: Acceptance  Method: Explanation, Demonstration  Response: Verbalizes Understanding, Demonstrated Understanding    Education Comments  No comments found.        OP EDUCATION:       Encounter Problems       Encounter Problems (Active)       Balance       STG - Maintains dynamic standing balance with upper extremity support x 5' with dual task supervision  (Progressing)       Start:  09/17/24    Expected End:  10/01/24               Mobility       STG - Patient will ambulate with 2WW 50' mod I  (Progressing)       Start:  09/17/24    Expected End:  10/01/24               Pain - Adult             Encounter Problems (Resolved)       PT Transfers       STG - Patient will transfer sit to and from stand mod I  (Met)       Start:  09/17/24    Expected End:  10/01/24    Resolved:  09/19/24

## 2024-09-24 ENCOUNTER — APPOINTMENT (OUTPATIENT)
Dept: RADIOLOGY | Facility: HOSPITAL | Age: 64
End: 2024-09-24
Payer: MEDICARE

## 2024-09-24 LAB
ANION GAP SERPL CALC-SCNC: 13 MMOL/L (ref 10–20)
ATRIAL RATE: 140 BPM
BUN SERPL-MCNC: 38 MG/DL (ref 6–23)
CALCIUM SERPL-MCNC: 7.7 MG/DL (ref 8.6–10.3)
CHLORIDE SERPL-SCNC: 102 MMOL/L (ref 98–107)
CO2 SERPL-SCNC: 19 MMOL/L (ref 21–32)
CREAT SERPL-MCNC: 1.6 MG/DL (ref 0.5–1.05)
EGFRCR SERPLBLD CKD-EPI 2021: 36 ML/MIN/1.73M*2
ERYTHROCYTE [DISTWIDTH] IN BLOOD BY AUTOMATED COUNT: 15.4 % (ref 11.5–14.5)
GLUCOSE SERPL-MCNC: 85 MG/DL (ref 74–99)
HCT VFR BLD AUTO: 36.5 % (ref 36–46)
HGB BLD-MCNC: 11.7 G/DL (ref 12–16)
HOLD SPECIMEN: NORMAL
INR PPP: 2.3 (ref 0.9–1.1)
MAGNESIUM SERPL-MCNC: 1.69 MG/DL (ref 1.6–2.4)
MCH RBC QN AUTO: 34.5 PG (ref 26–34)
MCHC RBC AUTO-ENTMCNC: 32.1 G/DL (ref 32–36)
MCV RBC AUTO: 108 FL (ref 80–100)
NRBC BLD-RTO: 0 /100 WBCS (ref 0–0)
P AXIS: 69 DEGREES
P OFFSET: 190 MS
P ONSET: 140 MS
PLATELET # BLD AUTO: 316 X10*3/UL (ref 150–450)
POTASSIUM SERPL-SCNC: 5.7 MMOL/L (ref 3.5–5.3)
PR INTERVAL: 148 MS
PROTHROMBIN TIME: 26.5 SECONDS (ref 9.8–12.8)
Q ONSET: 214 MS
QRS COUNT: 23 BEATS
QRS DURATION: 78 MS
QT INTERVAL: 264 MS
QTC CALCULATION(BAZETT): 403 MS
QTC FREDERICIA: 350 MS
R AXIS: 64 DEGREES
RBC # BLD AUTO: 3.39 X10*6/UL (ref 4–5.2)
SODIUM SERPL-SCNC: 128 MMOL/L (ref 136–145)
T AXIS: 70 DEGREES
T OFFSET: 346 MS
VENTRICULAR RATE: 140 BPM
WBC # BLD AUTO: 20.8 X10*3/UL (ref 4.4–11.3)

## 2024-09-24 PROCEDURE — 1100000001 HC PRIVATE ROOM DAILY

## 2024-09-24 PROCEDURE — 74176 CT ABD & PELVIS W/O CONTRAST: CPT

## 2024-09-24 PROCEDURE — 2500000001 HC RX 250 WO HCPCS SELF ADMINISTERED DRUGS (ALT 637 FOR MEDICARE OP): Performed by: FAMILY MEDICINE

## 2024-09-24 PROCEDURE — 2500000004 HC RX 250 GENERAL PHARMACY W/ HCPCS (ALT 636 FOR OP/ED): Mod: JZ | Performed by: INTERNAL MEDICINE

## 2024-09-24 PROCEDURE — 80048 BASIC METABOLIC PNL TOTAL CA: CPT | Performed by: NURSE PRACTITIONER

## 2024-09-24 PROCEDURE — 2500000001 HC RX 250 WO HCPCS SELF ADMINISTERED DRUGS (ALT 637 FOR MEDICARE OP): Performed by: NURSE PRACTITIONER

## 2024-09-24 PROCEDURE — S4991 NICOTINE PATCH NONLEGEND: HCPCS | Performed by: EMERGENCY MEDICINE

## 2024-09-24 PROCEDURE — 2500000002 HC RX 250 W HCPCS SELF ADMINISTERED DRUGS (ALT 637 FOR MEDICARE OP, ALT 636 FOR OP/ED): Performed by: NURSE PRACTITIONER

## 2024-09-24 PROCEDURE — 85610 PROTHROMBIN TIME: CPT | Performed by: INTERNAL MEDICINE

## 2024-09-24 PROCEDURE — 2500000004 HC RX 250 GENERAL PHARMACY W/ HCPCS (ALT 636 FOR OP/ED): Performed by: INTERNAL MEDICINE

## 2024-09-24 PROCEDURE — 83735 ASSAY OF MAGNESIUM: CPT | Performed by: INTERNAL MEDICINE

## 2024-09-24 PROCEDURE — 2500000001 HC RX 250 WO HCPCS SELF ADMINISTERED DRUGS (ALT 637 FOR MEDICARE OP): Performed by: INTERNAL MEDICINE

## 2024-09-24 PROCEDURE — 99233 SBSQ HOSP IP/OBS HIGH 50: CPT | Performed by: INTERNAL MEDICINE

## 2024-09-24 PROCEDURE — 2500000002 HC RX 250 W HCPCS SELF ADMINISTERED DRUGS (ALT 637 FOR MEDICARE OP, ALT 636 FOR OP/ED): Performed by: INTERNAL MEDICINE

## 2024-09-24 PROCEDURE — 36415 COLL VENOUS BLD VENIPUNCTURE: CPT | Performed by: NURSE PRACTITIONER

## 2024-09-24 PROCEDURE — 85027 COMPLETE CBC AUTOMATED: CPT | Performed by: NURSE PRACTITIONER

## 2024-09-24 PROCEDURE — 74176 CT ABD & PELVIS W/O CONTRAST: CPT | Performed by: RADIOLOGY

## 2024-09-24 PROCEDURE — 2500000002 HC RX 250 W HCPCS SELF ADMINISTERED DRUGS (ALT 637 FOR MEDICARE OP, ALT 636 FOR OP/ED): Performed by: EMERGENCY MEDICINE

## 2024-09-24 PROCEDURE — 2500000004 HC RX 250 GENERAL PHARMACY W/ HCPCS (ALT 636 FOR OP/ED): Performed by: NURSE PRACTITIONER

## 2024-09-24 RX ORDER — PREGABALIN 25 MG/1
25 CAPSULE ORAL 2 TIMES DAILY
Status: DISCONTINUED | OUTPATIENT
Start: 2024-09-25 | End: 2024-09-28 | Stop reason: HOSPADM

## 2024-09-24 RX ORDER — OXYCODONE HYDROCHLORIDE 5 MG/1
5 TABLET ORAL EVERY 4 HOURS PRN
Status: DISCONTINUED | OUTPATIENT
Start: 2024-09-24 | End: 2024-09-28 | Stop reason: HOSPADM

## 2024-09-24 RX ORDER — ACETAMINOPHEN 325 MG/1
975 TABLET ORAL EVERY 8 HOURS
Status: DISCONTINUED | OUTPATIENT
Start: 2024-09-24 | End: 2024-09-24

## 2024-09-24 RX ORDER — ACETAMINOPHEN 325 MG/1
975 TABLET ORAL EVERY 8 HOURS PRN
Status: DISCONTINUED | OUTPATIENT
Start: 2024-09-24 | End: 2024-09-28 | Stop reason: HOSPADM

## 2024-09-24 RX ORDER — CEFEPIME 1 G/50ML
2 INJECTION, SOLUTION INTRAVENOUS EVERY 12 HOURS
Status: DISCONTINUED | OUTPATIENT
Start: 2024-09-24 | End: 2024-09-28 | Stop reason: HOSPADM

## 2024-09-24 RX ORDER — POLYETHYLENE GLYCOL 3350 17 G/17G
17 POWDER, FOR SOLUTION ORAL 2 TIMES DAILY
Status: DISCONTINUED | OUTPATIENT
Start: 2024-09-24 | End: 2024-09-28 | Stop reason: HOSPADM

## 2024-09-24 RX ORDER — AMOXICILLIN 250 MG
2 CAPSULE ORAL 2 TIMES DAILY
Status: DISCONTINUED | OUTPATIENT
Start: 2024-09-24 | End: 2024-09-28 | Stop reason: HOSPADM

## 2024-09-24 RX ORDER — SODIUM CHLORIDE 9 MG/ML
75 INJECTION, SOLUTION INTRAVENOUS CONTINUOUS
Status: ACTIVE | OUTPATIENT
Start: 2024-09-24 | End: 2024-09-25

## 2024-09-24 ASSESSMENT — COGNITIVE AND FUNCTIONAL STATUS - GENERAL
WALKING IN HOSPITAL ROOM: A LOT
CLIMB 3 TO 5 STEPS WITH RAILING: A LOT
MOBILITY SCORE: 16
STANDING UP FROM CHAIR USING ARMS: A LITTLE
HELP NEEDED FOR BATHING: A LOT
MOVING TO AND FROM BED TO CHAIR: A LITTLE
HELP NEEDED FOR BATHING: A LOT
DAILY ACTIVITIY SCORE: 17
DRESSING REGULAR UPPER BODY CLOTHING: A LOT
DRESSING REGULAR LOWER BODY CLOTHING: A LOT
MOBILITY SCORE: 16
MOVING FROM LYING ON BACK TO SITTING ON SIDE OF FLAT BED WITH BEDRAILS: A LITTLE
DRESSING REGULAR LOWER BODY CLOTHING: A LOT
WALKING IN HOSPITAL ROOM: A LOT
STANDING UP FROM CHAIR USING ARMS: A LITTLE
TURNING FROM BACK TO SIDE WHILE IN FLAT BAD: A LITTLE
DAILY ACTIVITIY SCORE: 17
CLIMB 3 TO 5 STEPS WITH RAILING: A LOT
MOVING TO AND FROM BED TO CHAIR: A LITTLE
DRESSING REGULAR UPPER BODY CLOTHING: A LOT
MOVING FROM LYING ON BACK TO SITTING ON SIDE OF FLAT BED WITH BEDRAILS: A LITTLE
TURNING FROM BACK TO SIDE WHILE IN FLAT BAD: A LITTLE
TOILETING: A LITTLE
TOILETING: A LITTLE

## 2024-09-24 ASSESSMENT — PAIN SCALES - GENERAL
PAINLEVEL_OUTOF10: 3
PAINLEVEL_OUTOF10: 7
PAINLEVEL_OUTOF10: 4
PAINLEVEL_OUTOF10: 8
PAINLEVEL_OUTOF10: 3
PAINLEVEL_OUTOF10: 8

## 2024-09-24 ASSESSMENT — PAIN - FUNCTIONAL ASSESSMENT: PAIN_FUNCTIONAL_ASSESSMENT: 0-10

## 2024-09-24 NOTE — DOCUMENTATION CLARIFICATION NOTE
"    PATIENT:               LION PEREZ  ACCT #:                  [id number]  MRN:                       [id number]  :                       1960  ADMIT DATE:       9/15/2024 10:54 PM  DISCH DATE:  RESPONDING PROVIDER #:        [id number]          PROVIDER RESPONSE TEXT:    Bacteremia (gram negative)new on 24, awaiting final cultures    CDI QUERY TEXT:    Clarification    Instruction:    Based on your assessment of the patient and the clinical information, please provide the requested documentation by clicking on the appropriate radio button and enter any additional information if prompted.    Question: Please further clarify if there is a diagnosis related to the clinical information    When answering this query, please exercise your independent professional judgment. The fact that a question is being asked, does not imply that any particular answer is desired or expected.    The patient's clinical indicators include:  Clinical Information: 64 y.o. female with a pertinent history of lymphedema, obesity, and recurrent cellulitis with venous stasis wounds who presented to [facility] ER with complaints of subjective fever 101F, chills, rigors, dry heaves, nausea, increased fatigue, confusion and generalized weakness intermittently for a couple weeks that worsened over the past couple of days.    Clinical Indicators:  Admitting VS: 143/98  Pulse 86  Temp 36.5 C (97.7 F)  Resp 16  SpO2 97  BMI 53  HR: 129/126/144/123 ()  T: 39.2/38.5 ()  Cr: 0.97/1.36/1.60 (-)  WBC: 5.7/15.6/20.8 (-)  lactate: 2.1 ()    PN  noted \"B/L LE cellulitis (MRSA) w/ chronic non healing wounds, BSI (russ negative), Positive blood culture (Aerococcus urinae and Corynebacterium), likely contamination, ALBERTINA.\"    PN  ID noted \"Fever, the temp is down, BC with aerococcus / Corynebacterium, likely a contaminant, had fever yesterday, BC with g-ve bacilli, Legs stasis / wounds / likely cellulitis, the culture with " "MRSA, Encephalopathy, likely metabolic.\"    Nursing note on 9/23 noted \"Dr Chino and Bronwyn Alvarado CNP notified for change it pt status, pt is shivering, temperature is WNL at this time (36.5 C), HR elevated at 129 BPM.\"    Treatment: daptomycin 350mg IV (9/19-9/23), Cefepime 2g IV added 9/24, NaCl 1000ml bolus IV (9/23)    Risk Factors: cellulitis, bacteremia, ALBERTINA, morbid obesity  Options provided:  -- Non-infectious SIRS with acute organ dysfunction of ALBERTINA and metabolic encephalopathy  -- Other - I will add my own diagnosis  -- Refer to Clinical Documentation Reviewer    Query created by: Vickie Llamas on 9/24/2024 4:02 PM      Electronically signed by:  CELESTINE DURHAM DO 9/24/2024 4:48 PM          "

## 2024-09-24 NOTE — CARE PLAN
The patient's goals for the shift include  pt will have stable blood pressures    The clinical goals for the shift include pt will have stable blood pressures      Problem: Nutrition  Goal: Oral intake greater than 50%  Outcome: Progressing  Goal: Nutrition support goals are met within 48 hrs  Outcome: Progressing  Goal: BG  mg/dL  Outcome: Progressing  Goal: Lab values WNL  Outcome: Progressing  Goal: Electrolytes WNL  Outcome: Progressing  Goal: Promote healing  Outcome: Progressing     Problem: Pain - Adult  Goal: Verbalizes/displays adequate comfort level or baseline comfort level  Outcome: Progressing     Problem: Safety - Adult  Goal: Free from fall injury  Outcome: Progressing     Problem: Discharge Planning  Goal: Discharge to home or other facility with appropriate resources  Outcome: Progressing     Problem: Chronic Conditions and Co-morbidities  Goal: Patient's chronic conditions and co-morbidity symptoms are monitored and maintained or improved  Outcome: Progressing     Problem: Skin  Goal: Decreased wound size/increased tissue granulation at next dressing change  Outcome: Progressing  Goal: Participates in plan/prevention/treatment measures  Outcome: Progressing  Goal: Prevent/manage excess moisture  Outcome: Progressing  Goal: Prevent/minimize sheer/friction injuries  Outcome: Progressing  Flowsheets (Taken 9/24/2024 1141)  Prevent/minimize sheer/friction injuries: Turn/reposition every 2 hours/use positioning/transfer devices  Goal: Promote/optimize nutrition  Outcome: Progressing  Goal: Promote skin healing  Outcome: Progressing     Problem: Fall/Injury  Goal: Not fall by end of shift  Outcome: Progressing  Goal: Be free from injury by end of the shift  Outcome: Progressing  Goal: Verbalize understanding of personal risk factors for fall in the hospital  Outcome: Progressing  Goal: Verbalize understanding of risk factor reduction measures to prevent injury from fall in the home  Outcome:  Progressing  Goal: Use assistive devices by end of the shift  Outcome: Progressing  Goal: Pace activities to prevent fatigue by end of the shift  Outcome: Progressing     Problem: Pain  Goal: Takes deep breaths with improved pain control throughout the shift  Outcome: Progressing  Goal: Turns in bed with improved pain control throughout the shift  Outcome: Progressing  Goal: Walks with improved pain control throughout the shift  Outcome: Progressing  Goal: Performs ADL's with improved pain control throughout shift  Outcome: Progressing  Goal: Participates in PT with improved pain control throughout the shift  Outcome: Progressing  Goal: Free from opioid side effects throughout the shift  Outcome: Progressing  Goal: Free from acute confusion related to pain meds throughout the shift  Outcome: Progressing

## 2024-09-24 NOTE — PROGRESS NOTES
Physical Therapy                 Therapy Communication Note    Patient Name: Neisha Graham  MRN: [id number]  Department: [address]  Room: [address] [place], [place] [address]  Today's Date: 9/24/2024     Discipline: Physical Therapy    Missed Visit Reason: Missed Visit Reason: Patient refused (Pt refusing 2' to B knee pain, She states that she has waited 7 hours to get pain medication and that she just got her medication 20 minutes ago. She said her pain was too much to try to get up. She refused LE ex's also. No treatment.)    Missed Time: Attempt    Comment:

## 2024-09-24 NOTE — PROGRESS NOTES
[facility] Hospitalist Progress Note       [id number]: Please page me for patient care issues.  [phone]: Please page night hospitalist for any issues.     Neisha Graham  :  1960(64 y.o.)  MRN:  [id number]  PCP: Micheal Gonsalez DO      Principal Problem:    ALBERTINA (acute kidney injury) (CMS-[facility])  Active Problems:    Generalized weakness    Hyperkalemia      Assessment and Plan:     Neisha Graham is a 64 y.o. female with PMH BLE lymphadema with nonhealing wounds, aortic stenosis, and A.Fib who was admitted with BLE cellulitis and non-healing wounds.     #B/L LE cellulitis (MRSA) w/ chronic non healing wounds  #BSI (russ negative)  #ALBERTINA  #Hyperkalemia  #Acute on chronic hyponatremia  #Positive blood culture (Aerococcus urinae and Corynebacterium), likely contamination  #Chronic b/l LE lymphedema  #Paroxysmal Afib  #Mod-severe aortic stenosis  #COPD w/o exacerbation  #BE  #Tobacco abuse  #Chronic pain on chronic prescription of opiates  #Class obesity with serious co morbidities, BMI 53.85 kg/m².  -on daptomycin->dapto/cefepime  -PRN IVF  -trial of PRN lokelmia  -permissive HTN, caution with nephrotoxins  -hold home lisinopril  -resume rest of home meds as indicated  -CS recs appreciated: ID    Disposition:await test results, await consultant recommendations, and await clinical improvement    DVT Prophylaxis: full anticoagulation coumadin    Code status: Full Code  Diet: Adult diet Regular, Potassium restricted 2 gm (50mEq)    Level of MDM:  High    I personally examined the patient and I personally reviewed chart, data, labs radiology reports    Family Communication  Number :   Name of Designated Family Representative:       Total time spent: 50 minutes, of total time providing counseling or in coordination of care. Total time on this day of visit includes record and documentation review before and after visit including documentation and time not explicitly included on EMR time stamp      Subjective:    Interval History:  HPI  The patient complains of b/l LE cellulitis  The patient feels their symptoms areimproving  no events or new concerns    Scheduled Meds:daptomycin, 4 mg/kg (Adjusted), intravenous, q24h KAR  [Held by provider] dilTIAZem CD, 180 mg, oral, Daily  docusate sodium, 100 mg, oral, BID  DULoxetine, 20 mg, oral, Daily  eucerin, , Topical, BID  influenza, 0.5 mL, intramuscular, During hospitalization  tiotropium, 2 puff, inhalation, Daily   And  fluticasone furoate-vilanteroL, 1 puff, inhalation, Daily  hydroxyurea, 1,000 mg, oral, Daily  nicotine, 1 patch, transdermal, Daily  nystatin, , Topical, BID  pramipexole, 1 mg, oral, BID  pregabalin, 50 mg, oral, BID  sodium hypochlorite, , irrigation, Daily  warfarin, 7.5 mg, oral, Daily  white petrolatum, , Topical, BID  zinc oxide, 1 Application, Topical, TID      Continuous Infusions:   PRN Meds:PRN medications: acetaminophen **OR** acetaminophen **OR** [DISCONTINUED] acetaminophen, albuterol, cyclobenzaprine, hydrOXYzine HCL, naloxone, ondansetron **OR** ondansetron, oxyCODONE, oxyCODONE    Review of Systems   All other systems reviewed and are negative.    Interval Pertinent History:  Social History     Tobacco Use    Smoking status: Some Days     Types: Cigarettes    Smokeless tobacco: Never   Substance Use Topics    Alcohol use: Not on file         Objective:   Patient Vitals for the past 24 hrs:   BP Temp Temp src Pulse Resp SpO2   09/24/24 0854 92/51 36.8 °C (98.2 °F) Temporal 87 20 93 %   09/24/24 0527 101/66 37.2 °C (99 °F) Oral 96 20 92 %   09/23/24 2126 106/66 37.8 °C (100.1 °F) Oral (!) 123 20 --   09/23/24 1711 114/70 (!) 38.5 °C (101.3 °F) Temporal (!) 144 21 96 %   09/23/24 1650 -- (!) 39.2 °C (102.6 °F) -- -- -- --   09/23/24 1438 -- 36.8 °C (98.2 °F) Temporal (!) 126 -- 96 %   09/23/24 1420 145/71 36.5 °C (97.7 °F) Temporal (!) 129 16 92 %   09/23/24 1112 141/82 -- -- 100 -- --       Average, Min, and Max for last 24 hours  Vitals:  TEMPERATURE:  Temp  Av.6 °C (99.6 °F)  Min: 36.5 °C (97.7 °F)  Max: 39.2 °C (102.6 °F)    RESPIRATIONS RANGE: Resp  Av.4  Min: 16  Max: 21    PULSE RANGE: Pulse  Av  Min: 87  Max: 144    BLOOD PRESSURE RANGE:  Systolic (24hrs), Av , Min:92 , Max:145   ; Diastolic (24hrs), Av, Min:51, Max:82      PULSE OXIMETRY RANGE: SpO2  Av.8 %  Min: 92 %  Max: 96 %  Body mass index is 53.85 kg/m².    I/O last 3 completed shifts:  In: 1680 (11.8 mL/kg) [P.O.:680; IV Piggyback:1000]  Out: 150 (1.1 mL/kg) [Urine:150 (0 mL/kg/hr)]  Weight: 142.3 kg   Weight change:      Physical Exam  Vitals and nursing note reviewed.   Constitutional:       Appearance: Normal appearance. She is morbidly obese. She is ill-appearing.   HENT:      Head: Normocephalic and atraumatic.      Right Ear: External ear normal.      Left Ear: External ear normal.      Nose: Nose normal.      Mouth/Throat:      Mouth: Mucous membranes are moist.   Eyes:      General: No scleral icterus.        Right eye: No discharge.         Left eye: No discharge.      Extraocular Movements: Extraocular movements intact.      Conjunctiva/sclera: Conjunctivae normal.      Pupils: Pupils are equal, round, and reactive to light.   Cardiovascular:      Rate and Rhythm: Normal rate and regular rhythm.   Pulmonary:      Effort: Pulmonary effort is normal.      Breath sounds: Normal breath sounds.   Abdominal:      General: Abdomen is flat. Bowel sounds are normal.      Palpations: Abdomen is soft.   Musculoskeletal:         General: Normal range of motion.      Right lower leg: Edema present.      Left lower leg: Edema present.   Skin:     General: Skin is warm and dry.      Capillary Refill: Capillary refill takes less than 2 seconds.      Findings: Wound present.   Neurological:      General: No focal deficit present.   Psychiatric:         Mood and Affect: Mood normal.         Thought Content: Thought content normal.         Judgment:  Judgment normal.           Lab Results   Component Value Date     (L) 09/24/2024    K 5.7 (H) 09/24/2024     09/24/2024    CO2 19 (L) 09/24/2024    BUN 38 (H) 09/24/2024    CREATININE 1.60 (H) 09/24/2024    GLUCOSE 85 09/24/2024    CALCIUM 7.7 (L) 09/24/2024    PROT 5.5 (L) 09/23/2024    BILITOT 0.6 09/23/2024    ALKPHOS 247 (H) 09/23/2024    AST 25 09/23/2024    ALT 14 09/23/2024       Lab Results   Component Value Date    WBC 20.8 (H) 09/24/2024    HGB 11.7 (L) 09/24/2024    HCT 36.5 09/24/2024     (H) 09/24/2024     09/24/2024    LYMPHOPCT 21.0 09/20/2024    RBC 3.39 (L) 09/24/2024    MCH 34.5 (H) 09/24/2024    MCHC 32.1 09/24/2024    RDW 15.4 (H) 09/24/2024    CRP 16.36 (H) 09/16/2024       Lab Results   Component Value Date    TSH 2.16 11/14/2022     Lab Results   Component Value Date    LACTATE 2.1 (H) 09/23/2024    TROPONINI 0.13 (HH) 02/04/2021    BNP 87 09/15/2024    INR 2.3 (H) 09/24/2024       IMAGES:  Encounter Date: 09/15/24   ECG 12 lead   Result Value    Ventricular Rate 140    Atrial Rate 140    MD Interval 148    QRS Duration 78    QT Interval 264    QTC Calculation(Bazett) 403    P Axis 69    R Axis 64    T Axis 70    QRS Count 23    Q Onset 214    P Onset 140    P Offset 190    T Offset 346    QTC Fredericia 350    Narrative    Sinus tachycardia  Otherwise normal ECG  When compared with ECG of 16-SEP-2024 08:10, (unconfirmed)  No significant change was found     XR chest 1 view    Result Date: 9/23/2024  Impression: No acute abnormalities   Signed by: Danielle Acosta 9/23/2024 8:14 PM Dictation workstation:   [id number]    Transthoracic Echo (TTE) Complete    Result Date: 9/18/2024   [facility], [address] [address]               Tel [phone] and Fax [phone] TRANSTHORACIC ECHOCARDIOGRAM REPORT  Patient Name:      LION Johnson Physician:    [id number] Judson                                                                Isidoro HUANG Study Date:        9/18/2024            Ordering Provider:    [id number] BROOKE LY MRN/PID:           [id number]             Fellow: Accession#:        [id number]         Nurse: Date of Birth/Age: 1960 / 64 years Sonographer:          Sarahi Weston RDCS Gender:            F                    Additional Staff: Height:            162.56 cm            Admit Date:           9/15/2024 Weight:            141.97 kg            Admission Status:     Inpatient -                                                               Routine BSA / BMI:         2.37 m2 / 53.73      Encounter#:           [id number]                    kg/m2 Blood Pressure:    157/83 mmHg          Department Location:  [facility] Non                                                               Invasive Study Type:    TRANSTHORACIC ECHO (TTE) COMPLETE Diagnosis/ICD: Unspecified atrial fibrillation-I48.91 Indication:    Atrial Fibrillation CPT Code:      Echo Complete w Full Doppler-[id number] Patient History: Smoker:            Current. Pertinent History: COPD, HTN and Hyperlipidemia. Known MS and AS. Study Detail: The following Echo studies were performed: 2D, M-Mode, Doppler and               color flow. Technically challenging study due to patient lying in               supine position and body habitus. The patient was asleep.  PHYSICIAN INTERPRETATION: Left Ventricle: The left ventricular systolic function is normal, with a visually estimated ejection fraction of 60-65%. There are no regional left ventricular wall motion abnormalities. The left ventricular cavity size is normal. Spectral Doppler shows an impaired relaxation pattern of left ventricular diastolic filling. Left Atrium: The left atrium is mildly dilated. Right Ventricle: The right ventricle is normal in size. There is normal right ventricular global systolic function. Right Atrium: The right atrium is mildly  dilated. Aortic Valve: The aortic valve is trileaflet. There is evidence of severe aortic valve stenosis. The aortic valve dimensionless index is 0.37. There is trace to mild aortic valve regurgitation. The peak instantaneous gradient of the aortic valve is 57.5 mmHg. The mean gradient of the aortic valve is 37.1 mmHg. JADYN 0.82cm2 by planimetry. Mitral Valve: The mitral valve is normal in structure. There is evidence of mild mitral valve stenosis. The doppler estimated mean and peak diastolic pressure gradients are 5.9 mmHg and 13.2 mmHg respectively. There is severe mitral annular calcification. There is mild mitral valve regurgitation. Tricuspid Valve: The tricuspid valve is structurally normal. There is mild tricuspid regurgitation. Pulmonic Valve: The pulmonic valve is structurally normal. There is no indication of pulmonic valve regurgitation. Pericardium: There is no pericardial effusion noted. Aorta: The aortic root is normal. Pulmonary Artery: The tricuspid regurgitant velocity is 2.96 m/s, and with an estimated right atrial pressure of 3 mmHg, the estimated pulmonary artery pressure is mildly elevated with the RVSP at 37.9 mmHg. Systemic Veins: The inferior vena cava appears normal in size.  CONCLUSIONS:  1. The left ventricular systolic function is normal, with a visually estimated ejection fraction of 60-65%.  2. Spectral Doppler shows an impaired relaxation pattern of left ventricular diastolic filling.  3. There is normal right ventricular global systolic function.  4. The left atrium is mildly dilated.  5. There is severe mitral annular calcification.  6. Severe aortic valve stenosis. QUANTITATIVE DATA SUMMARY:  2D MEASUREMENTS:          Normal Ranges: IVSd:            1.16 cm  (0.6-1.1cm) LVPWd:           1.12 cm  (0.6-1.1cm) LVIDd:           4.70 cm  (3.9-5.9cm) LVIDs:           3.07 cm LV Mass Index:   83 g/m2 LVEDV Index:     48 ml/m2 LV % FS          34.7 %  LA VOLUME:                    Normal  Ranges: LA Vol A4C:        69.9 ml    (22+/-6mL/m2) LA Vol A2C:        73.7 ml LA Vol BP:         72.3 ml LA Vol Index A4C:  29.5 ml/m2 LA Vol Index A2C:  31.1 ml/m2 LA Vol Index BP:   30.6 ml/m2 LA Area A4C:       23.3 cm2 LA Area A2C:       24.1 cm2 LA Major Axis A4C: 6.6 cm LA Major Axis A2C: 6.7 cm LA Volume Index:   30.6 ml/m2 LA Vol A4C:        66.3 ml LA Vol A2C:        71.0 ml LA Vol Index BSA:  29.0 ml/m2  RA VOLUME BY A/L METHOD:          Normal Ranges: RA Area A4C:             19.6 cm2  M-MODE MEASUREMENTS:         Normal Ranges: Ao Root:             3.20 cm (2.0-3.7cm) LAs:                 4.48 cm (2.7-4.0cm)  AORTA MEASUREMENTS:         Normal Ranges: Ao Sinus, d:        3.40 cm (2.1-3.5cm) Asc Ao, d:          3.50 cm (2.1-3.4cm)  LV SYSTOLIC FUNCTION BY 2D PLANIMETRY (MOD):                      Normal Ranges: EF-A4C View:    65 % (>=55%) EF-A2C View:    65 % EF-Biplane:     65 % EF-Visual:      63 % LV EF Reported: 63 %  LV DIASTOLIC FUNCTION:             Normal Ranges: MV Peak E:             0.85 m/s    (0.7-1.2 m/s) MV Peak A:             1.51 m/s    (0.42-0.7 m/s) E/A Ratio:             0.56        (1.0-2.2) MV e'                  0.080 m/s   (>8.0) MV lateral e'          0.09 m/s MV medial e'           0.07 m/s MV A Dur:              165.56 msec E/e' Ratio:            10.62       (<8.0) PulmV Sys Rodriguez:         46.51 cm/s PulmV Cazares Rodriguez:        28.62 cm/s PulmV S/D Rodriguez:         1.62 PulmV A Revs Rodriguez:      24.80 cm/s PulmV A Revs Dur:      93.24 msec  MITRAL VALVE:           Normal Ranges: MV Vmax:      1.82 m/s  (<=1.3m/s) MV peak P.2 mmHg (<5mmHg) MV mean P.9 mmHg  (<2mmHg) MV VTI:       38.89 cm  (10-13cm) MV DT:        127 msec  (150-240msec)  AORTIC VALVE:                      Normal Ranges: AoV Vmax:                3.79 m/s  (<=1.7m/s) AoV Peak P.5 mmHg (<20mmHg) AoV Mean P.1 mmHg (1.7-11.5mmHg) LVOT Max Rodriguez:            1.32 m/s  (<=1.1m/s) AoV VTI:                  74.00 cm  (18-25cm) LVOT VTI:                27.60 cm LVOT Diameter:           2.01 cm   (1.8-2.4cm) AoV Area, VTI:           1.18 cm2  (2.5-5.5cm2) AoV Area,Vmax:           1.11 cm2  (2.5-4.5cm2) AoV Area, planim:        0.83 cm2  (2.5-4.5cm2) AoV Dimensionless Index: 0.37  RIGHT VENTRICLE: RV Basal 3.30 cm RV Mid   2.40 cm RV Major 8.0 cm TAPSE:   23.0 mm RV s'    0.18 m/s  TRICUSPID VALVE/RVSP:          Normal Ranges: Peak TR Velocity:     2.96 m/s RV Syst Pressure:     38 mmHg  (< 30mmHg) IVC Diam:             2.17 cm  PULMONIC VALVE:           Normal Ranges: PV Max Rodriguez:     1.6 m/s   (0.6-0.9m/s) PV Max PG:      10.7 mmHg  Pulmonary Veins: PulmV A Revs Dur: 93.24 msec PulmV A Revs Rodriguez: 24.80 cm/s PulmV Cazares Rodriguez:   28.62 cm/s PulmV S/D Rodriguez:    1.62 PulmV Sys Rodriguez:    46.51 cm/s  AORTA: Asc Ao Diam 3.53 cm  [id number] Judson Chaudhry MD Electronically signed on 9/18/2024 at 1:44:02 PM  ** Final **    === 09/15/24 ===    XR CHEST 1 VIEW    - Impression -  No acute abnormalities    Signed by: Danielle Acosta 9/23/2024 8:14 PM  Dictation workstation:   [id number]  === 09/15/24 ===    CT CHEST ABDOMEN PELVIS WO CONTRAST    - Impression -  CHEST  1.  No evidence of acute abnormality in the chest. Mild upper lobe  predominant centrilobular emphysema. Enlarged main pulmonary trunk  measuring up to 3.5 cm in caliber suggestive of pulmonary arterial  hypertension.  2. At least moderate aortic valve atherosclerotic calcifications.  Clinical correlation for aortic stenosis is recommended.    ABDOMEN - PELVIS  1.  No acute abnormality in the abdomen/pelvis. Hepatomegaly.      MACRO:  None    Signed by: Wilmar Bower 9/16/2024 1:55 AM  Dictation workstation:   [id number]  === 09/15/24 ===    XR CHEST 1 VIEW    - Impression -  No acute abnormalities    Signed by: Danielle Acosta 9/23/2024 8:14 PM  Dictation workstation:   [id number]  === 02/15/17 ===    MRI LUMBAR SPINE WO CONTRAST    - Impression -  *Progressive  spondylolisthesis and central disc herniation at L3/L4  THIS EXAMINATION WAS INTERPRETED [place] [facility]    Additional results of the last 24 hours have been reviewed.    Dictated using Socratic Version 2.4  Proof read however unrecognized voice recognition errors may have occurred     Electronically signed by Anay Chris DO on 09/24/24 at 10:33 AM

## 2024-09-24 NOTE — PROGRESS NOTES
Neisha Graham is a 64 y.o. female on day 8 of admission presenting with ALBERTINA (acute kidney injury) (CMS-[facility]).    Subjective   Interval History: no fever, no new complaints        Review of Systems    Objective   Range of Vitals (last 24 hours)  Heart Rate:  []   Temp:  [36.5 °C (97.7 °F)-39.2 °C (102.6 °F)]   Resp:  [16-21]   BP: ()/(51-71)   SpO2:  [92 %-96 %]   Daily Weight  09/16/24 : 142 kg (313 lb 11.4 oz)    Body mass index is 53.85 kg/m².    Physical Exam  Constitutional:       Appearance: Normal appearance.   HENT:      Head: Normocephalic and atraumatic.      Mouth/Throat:      Mouth: Mucous membranes are moist.      Pharynx: Oropharynx is clear.   Eyes:      Pupils: Pupils are equal, round, and reactive to light.   Cardiovascular:      Rate and Rhythm: Normal rate and regular rhythm.      Heart sounds: Normal heart sounds.   Pulmonary:      Effort: Pulmonary effort is normal.      Breath sounds: Normal breath sounds.   Abdominal:      General: Abdomen is flat. Bowel sounds are normal.      Palpations: Abdomen is soft.      Comments: Lt flank tenderness   Musculoskeletal:      Cervical back: Normal range of motion.      Comments: Stasis / wounds   Neurological:      Mental Status: She is alert.         Antibiotics  DAPTOmycin (Cubicin) IV in 50 mL NS    Relevant Results  Labs  Results from last 72 hours   Lab Units 09/24/24  0554 09/23/24  1900 09/23/24  0635   WBC AUTO x10*3/uL 20.8* 15.6* 5.7   HEMOGLOBIN g/dL 11.7* 13.2 12.3   HEMATOCRIT % 36.5 40.4 38.7   PLATELETS AUTO x10*3/uL 316 389 369     Results from last 72 hours   Lab Units 09/24/24  0554 09/23/24  1818 09/23/24  0635   SODIUM mmol/L 128* 131* 132*   POTASSIUM mmol/L 5.7* 5.3 4.6   CHLORIDE mmol/L 102 102 103   CO2 mmol/L 19* 18* 23   BUN mg/dL 38* 39* 32*   CREATININE mg/dL 1.60* 1.36* 0.97   GLUCOSE mg/dL 85 146* 94   CALCIUM mg/dL 7.7* 8.2* 7.9*   ANION GAP mmol/L 13 16 11   EGFR mL/min/1.73m*2 36* 43* 65     Results from last  72 hours   Lab Units 09/23/24  1818   ALK PHOS U/L 247*   BILIRUBIN TOTAL mg/dL 0.6   PROTEIN TOTAL g/dL 5.5*   ALT U/L 14   AST U/L 25   ALBUMIN g/dL 2.9*       Estimated Creatinine Clearance: 49.6 mL/min (A) (by C-G formula based on SCr of 1.6 mg/dL (H)).  C-Reactive Protein   Date Value Ref Range Status   09/16/2024 16.36 (H) <1.00 mg/dL Final   08/12/2024 7.94 (H) <1.00 mg/dL Final     CRP   Date Value Ref Range Status   06/19/2020 1.84 (A) mg/dL Final     Comment:     REF VALUE  < 1.00       Microbiology  Susceptibility data from last 14 days.  Collected Specimen Info Organism Clindamycin Erythromycin Oxacillin Tetracycline Trimethoprim/Sulfamethoxazole Vancomycin   09/15/24 Tissue/Biopsy from Buttock Methicillin Resistant Staphylococcus aureus (MRSA)  R  R  R  R  S  S   09/15/24 Blood culture from Peripheral Venipuncture Aerococcus urinae           Corynebacterium         Imaging  Reviewed        Assessment/Plan   Fever, the temp is down, BC with aerococcus / Corynebacterium, likely a contaminant, had fever yesterday, BC with g-ve bacilli  Legs stasis / wounds / likely cellulitis, the culture with MRSA  Encephalopathy, likely metabolic     Recommendations :  Continue Daptomycin, we should be able to stop the antibiotics with discharge  Cefepime added   CT abdomen / pelvis if it can be done  Discussed with the medical team     I spent minutes in the professional and overall care of this patient.      Gorge Myers MD

## 2024-09-24 NOTE — CARE PLAN
The patient's goals for the shift include  not feel nauseous     The clinical goals for the shift include pt will have stable blood pressures

## 2024-09-24 NOTE — NURSING NOTE
Rounded to reassess status of BLE - patient off unit for CT scan.  PANKAJ Alcantara reports less drainage to BLE and heels off-loaded with pillows.

## 2024-09-25 LAB
ALBUMIN SERPL BCP-MCNC: 2.2 G/DL (ref 3.4–5)
ANION GAP SERPL CALC-SCNC: 13 MMOL/L (ref 10–20)
BASOPHILS # BLD AUTO: 0.03 X10*3/UL (ref 0–0.1)
BASOPHILS NFR BLD AUTO: 0.2 %
BUN SERPL-MCNC: 41 MG/DL (ref 6–23)
CALCIUM SERPL-MCNC: 7.4 MG/DL (ref 8.6–10.3)
CHLORIDE SERPL-SCNC: 102 MMOL/L (ref 98–107)
CO2 SERPL-SCNC: 18 MMOL/L (ref 21–32)
CREAT SERPL-MCNC: 1.22 MG/DL (ref 0.5–1.05)
EGFRCR SERPLBLD CKD-EPI 2021: 49 ML/MIN/1.73M*2
EOSINOPHIL # BLD AUTO: 0.08 X10*3/UL (ref 0–0.7)
EOSINOPHIL NFR BLD AUTO: 0.5 %
ERYTHROCYTE [DISTWIDTH] IN BLOOD BY AUTOMATED COUNT: 15.3 % (ref 11.5–14.5)
GLUCOSE SERPL-MCNC: 91 MG/DL (ref 74–99)
HCT VFR BLD AUTO: 35.5 % (ref 36–46)
HGB BLD-MCNC: 11.8 G/DL (ref 12–16)
IMM GRANULOCYTES # BLD AUTO: 0.56 X10*3/UL (ref 0–0.7)
IMM GRANULOCYTES NFR BLD AUTO: 3.3 % (ref 0–0.9)
INR PPP: 3.5 (ref 0.9–1.1)
LYMPHOCYTES # BLD AUTO: 1.37 X10*3/UL (ref 1.2–4.8)
LYMPHOCYTES NFR BLD AUTO: 8 %
MAGNESIUM SERPL-MCNC: 1.94 MG/DL (ref 1.6–2.4)
MCH RBC QN AUTO: 35 PG (ref 26–34)
MCHC RBC AUTO-ENTMCNC: 33.2 G/DL (ref 32–36)
MCV RBC AUTO: 105 FL (ref 80–100)
MONOCYTES # BLD AUTO: 0.68 X10*3/UL (ref 0.1–1)
MONOCYTES NFR BLD AUTO: 4 %
NEUTROPHILS # BLD AUTO: 14.37 X10*3/UL (ref 1.2–7.7)
NEUTROPHILS NFR BLD AUTO: 84 %
NRBC BLD-RTO: 0 /100 WBCS (ref 0–0)
OSMOLALITY SERPL: 283 MOSM/KG (ref 280–300)
PHOSPHATE SERPL-MCNC: 3.8 MG/DL (ref 2.5–4.9)
PLATELET # BLD AUTO: 288 X10*3/UL (ref 150–450)
POTASSIUM SERPL-SCNC: 4.9 MMOL/L (ref 3.5–5.3)
PROTHROMBIN TIME: 40.1 SECONDS (ref 9.8–12.8)
RBC # BLD AUTO: 3.37 X10*6/UL (ref 4–5.2)
RBC MORPH BLD: NORMAL
SODIUM SERPL-SCNC: 128 MMOL/L (ref 136–145)
WBC # BLD AUTO: 17.1 X10*3/UL (ref 4.4–11.3)

## 2024-09-25 PROCEDURE — 2500000004 HC RX 250 GENERAL PHARMACY W/ HCPCS (ALT 636 FOR OP/ED): Mod: JZ | Performed by: INTERNAL MEDICINE

## 2024-09-25 PROCEDURE — 2500000002 HC RX 250 W HCPCS SELF ADMINISTERED DRUGS (ALT 637 FOR MEDICARE OP, ALT 636 FOR OP/ED): Performed by: FAMILY MEDICINE

## 2024-09-25 PROCEDURE — 2500000004 HC RX 250 GENERAL PHARMACY W/ HCPCS (ALT 636 FOR OP/ED): Performed by: NURSE PRACTITIONER

## 2024-09-25 PROCEDURE — 83935 ASSAY OF URINE OSMOLALITY: CPT | Mod: GEALAB | Performed by: INTERNAL MEDICINE

## 2024-09-25 PROCEDURE — 97530 THERAPEUTIC ACTIVITIES: CPT | Mod: GP

## 2024-09-25 PROCEDURE — 82570 ASSAY OF URINE CREATININE: CPT | Mod: GEALAB | Performed by: INTERNAL MEDICINE

## 2024-09-25 PROCEDURE — 36415 COLL VENOUS BLD VENIPUNCTURE: CPT | Performed by: INTERNAL MEDICINE

## 2024-09-25 PROCEDURE — 94640 AIRWAY INHALATION TREATMENT: CPT

## 2024-09-25 PROCEDURE — 2500000004 HC RX 250 GENERAL PHARMACY W/ HCPCS (ALT 636 FOR OP/ED): Performed by: INTERNAL MEDICINE

## 2024-09-25 PROCEDURE — 83930 ASSAY OF BLOOD OSMOLALITY: CPT | Mod: GEALAB | Performed by: INTERNAL MEDICINE

## 2024-09-25 PROCEDURE — 83735 ASSAY OF MAGNESIUM: CPT | Performed by: INTERNAL MEDICINE

## 2024-09-25 PROCEDURE — 80069 RENAL FUNCTION PANEL: CPT | Performed by: INTERNAL MEDICINE

## 2024-09-25 PROCEDURE — 1100000001 HC PRIVATE ROOM DAILY

## 2024-09-25 PROCEDURE — 85025 COMPLETE CBC W/AUTO DIFF WBC: CPT | Performed by: INTERNAL MEDICINE

## 2024-09-25 PROCEDURE — 85610 PROTHROMBIN TIME: CPT | Performed by: INTERNAL MEDICINE

## 2024-09-25 PROCEDURE — 2500000001 HC RX 250 WO HCPCS SELF ADMINISTERED DRUGS (ALT 637 FOR MEDICARE OP): Performed by: INTERNAL MEDICINE

## 2024-09-25 PROCEDURE — 2500000002 HC RX 250 W HCPCS SELF ADMINISTERED DRUGS (ALT 637 FOR MEDICARE OP, ALT 636 FOR OP/ED): Performed by: EMERGENCY MEDICINE

## 2024-09-25 PROCEDURE — S4991 NICOTINE PATCH NONLEGEND: HCPCS | Performed by: EMERGENCY MEDICINE

## 2024-09-25 PROCEDURE — 94760 N-INVAS EAR/PLS OXIMETRY 1: CPT

## 2024-09-25 PROCEDURE — 2500000001 HC RX 250 WO HCPCS SELF ADMINISTERED DRUGS (ALT 637 FOR MEDICARE OP): Performed by: FAMILY MEDICINE

## 2024-09-25 PROCEDURE — 97110 THERAPEUTIC EXERCISES: CPT | Mod: GP

## 2024-09-25 PROCEDURE — 2500000001 HC RX 250 WO HCPCS SELF ADMINISTERED DRUGS (ALT 637 FOR MEDICARE OP): Performed by: NURSE PRACTITIONER

## 2024-09-25 PROCEDURE — 2500000002 HC RX 250 W HCPCS SELF ADMINISTERED DRUGS (ALT 637 FOR MEDICARE OP, ALT 636 FOR OP/ED): Performed by: INTERNAL MEDICINE

## 2024-09-25 PROCEDURE — 99232 SBSQ HOSP IP/OBS MODERATE 35: CPT | Performed by: INTERNAL MEDICINE

## 2024-09-25 RX ORDER — WARFARIN 3 MG/1
3 TABLET ORAL ONCE
Status: COMPLETED | OUTPATIENT
Start: 2024-09-25 | End: 2024-09-25

## 2024-09-25 RX ORDER — WARFARIN SODIUM 5 MG/1
7.5 TABLET ORAL DAILY
Status: DISCONTINUED | OUTPATIENT
Start: 2024-09-26 | End: 2024-09-28 | Stop reason: HOSPADM

## 2024-09-25 ASSESSMENT — COGNITIVE AND FUNCTIONAL STATUS - GENERAL
MOBILITY SCORE: 18
CLIMB 3 TO 5 STEPS WITH RAILING: TOTAL
WALKING IN HOSPITAL ROOM: A LITTLE
STANDING UP FROM CHAIR USING ARMS: A LITTLE
MOVING TO AND FROM BED TO CHAIR: A LITTLE

## 2024-09-25 ASSESSMENT — PAIN - FUNCTIONAL ASSESSMENT
PAIN_FUNCTIONAL_ASSESSMENT: 0-10

## 2024-09-25 ASSESSMENT — PAIN SCALES - GENERAL
PAINLEVEL_OUTOF10: 7
PAINLEVEL_OUTOF10: 7
PAINLEVEL_OUTOF10: 4
PAINLEVEL_OUTOF10: 8
PAINLEVEL_OUTOF10: 4
PAINLEVEL_OUTOF10: 7
PAINLEVEL_OUTOF10: 8

## 2024-09-25 NOTE — PROGRESS NOTES
09/25/24 1012   Discharge Planning   Living Arrangements Alone   Support Systems Children;Spouse/significant other   Assistance Needed A&Ox3, mobilizes with a rollator or wheelchair, has walk in shower, does not drive (transport thru insurance provide a ride), room air (does have o2 set up at home but doesn't use), no CPAP/BIPAP, active with [facility] for SN, PCP Dr. Micheal Gonsalez   Type of Residence Private residence   Number of Stairs to Enter Residence 0   Number of Stairs Within Residence 0   Do you have animals or pets at home? No   Home or Post Acute Services In home services   Type of Home Care Services Home nursing visits   Expected Discharge Disposition Home Health  (PT recommending low frequency skilled services. Discussed with patient preference is to return home alone with resumed [facility] upon d/c. Referral sent in [facility]. Patient will need transport arranged on d/c. May need ambulance transport.)   Does the patient need discharge transport arranged? Yes   RoundTrip coordination needed? Yes   Has discharge transport been arranged? No

## 2024-09-25 NOTE — PROGRESS NOTES
[facility] Hospitalist Progress Note       [phone]: Please page me for patient care issues.  [phone]: Please page night hospitalist for any issues.     Neisha Graham  :  1960(64 y.o.)  MRN:  [id number]  PCP: Micheal Gonsalez DO      Principal Problem:    ALBERTINA (acute kidney injury) (CMS-formerly [facility])  Active Problems:    Generalized weakness    Hyperkalemia      Assessment and Plan:     Neisha Graham is a 64 y.o. female with PMH BLE lymphadema with nonhealing wounds, aortic stenosis, and A.Fib who was admitted with BLE cellulitis and non-healing wounds.     #B/L LE cellulitis (MRSA) w/ chronic non healing wounds  #BSI (russ negative)  #ALBERTINA, improving  #Hyperkalemia, resolved s/p lokemia  #Acute on chronic hyponatremia of unclear etiology  #Constipation  #Positive blood culture (Aerococcus urinae and Corynebacterium), likely contamination  #Chronic b/l LE lymphedema  #Paroxysmal Afib  #Mod-severe aortic stenosis  #COPD w/o exacerbation  #BE  #Tobacco abuse  #Chronic pain on chronic prescription of opiates  #Class III obesity with serious co morbidities, BMI 53.85 kg/m².  -on daptomycin->dapto/cefepime  -repeat Bcx (24)  -PRN IVF  -free water restriction, liberalize salt intake, check urine lytes  -hold home lisinopril due to ALBERTINA  -permissive HTN, caution with nephrotoxins  -resume rest of home meds as indicated  -CS recs appreciated: ID    Disposition:await test results, await consultant recommendations, and await clinical improvement    DVT Prophylaxis: full anticoagulation coumadin    Code status: Full Code  Diet: Adult diet Regular, Potassium restricted 2 gm (50mEq)    Level of MDM:  High    I personally examined the patient and I personally reviewed chart, data, labs radiology reports    Family Communication  Number :   Name of Designated Family Representative:       Total time spent: 35 minutes, of total time providing counseling or in coordination of care. Total time on this day of visit includes  record and documentation review before and after visit including documentation and time not explicitly included on EMR time stamp      Subjective:   Interval History:  HPI  The patient complains of b/l LE cellulitis  The patient feels their symptoms areimproving  no events or new concerns    Scheduled Meds:cefepime, 2 g, intravenous, q12h  daptomycin, 4 mg/kg (Adjusted), intravenous, q24h KAR  [Held by provider] dilTIAZem CD, 180 mg, oral, Daily  DULoxetine, 20 mg, oral, Daily  eucerin, , Topical, BID  influenza, 0.5 mL, intramuscular, During hospitalization  tiotropium, 2 puff, inhalation, Daily   And  fluticasone furoate-vilanteroL, 1 puff, inhalation, Daily  hydroxyurea, 1,000 mg, oral, Daily  nicotine, 1 patch, transdermal, Daily  nystatin, , Topical, BID  polyethylene glycol, 17 g, oral, BID  pramipexole, 1 mg, oral, BID  pregabalin, 25 mg, oral, BID  sennosides-docusate sodium, 2 tablet, oral, BID  sodium hypochlorite, , irrigation, Daily  warfarin, 7.5 mg, oral, Daily  white petrolatum, , Topical, BID  zinc oxide, 1 Application, Topical, TID      Continuous Infusions:sodium chloride 0.9%, 75 mL/hr, Last Rate: 75 mL/hr (09/25/24 0408)      PRN Meds:PRN medications: acetaminophen **OR** [DISCONTINUED] acetaminophen **OR** [DISCONTINUED] acetaminophen, albuterol, cyclobenzaprine, hydrOXYzine HCL, naloxone, ondansetron **OR** ondansetron, oxyCODONE, oxyCODONE    Review of Systems   All other systems reviewed and are negative.    Interval Pertinent History:  Social History     Tobacco Use    Smoking status: Some Days     Types: Cigarettes    Smokeless tobacco: Never   Substance Use Topics    Alcohol use: Not on file         Objective:   Patient Vitals for the past 24 hrs:   BP Temp Temp src Pulse Resp SpO2   09/25/24 0520 114/80 36.3 °C (97.3 °F) Temporal 83 16 95 %   09/24/24 2034 109/61 36.6 °C (97.9 °F) Temporal 104 18 94 %   09/24/24 1800 -- 37.4 °C (99.3 °F) -- -- -- --   09/24/24 1400 98/62 36.5 °C (97.7 °F)  Temporal 99 19 97 %       Average, Min, and Max for last 24 hours Vitals:  TEMPERATURE:  Temp  Av.7 °C (98.1 °F)  Min: 36.3 °C (97.3 °F)  Max: 37.4 °C (99.3 °F)    RESPIRATIONS RANGE: Resp  Av.7  Min: 16  Max: 19    PULSE RANGE: Pulse  Av.3  Min: 83  Max: 104    BLOOD PRESSURE RANGE:  Systolic (24hrs), Av , Min:98 , Max:114   ; Diastolic (24hrs), Av, Min:61, Max:80      PULSE OXIMETRY RANGE: SpO2  Av.3 %  Min: 94 %  Max: 97 %  Body mass index is 53.85 kg/m².    I/O last 3 completed shifts:  In: 5042.5 (35.4 mL/kg) [P.O.:2220; I.V.:1747.5 (12.3 mL/kg); IV Piggyback:1075]  Out: 900 (6.3 mL/kg) [Urine:900 (0.2 mL/kg/hr)]  Weight: 142.3 kg   Weight change:      Physical Exam  Vitals and nursing note reviewed.   Constitutional:       Appearance: Normal appearance. She is morbidly obese. She is ill-appearing.   HENT:      Head: Normocephalic and atraumatic.      Right Ear: External ear normal.      Left Ear: External ear normal.      Nose: Nose normal.      Mouth/Throat:      Mouth: Mucous membranes are moist.   Eyes:      General: No scleral icterus.        Right eye: No discharge.         Left eye: No discharge.      Extraocular Movements: Extraocular movements intact.      Conjunctiva/sclera: Conjunctivae normal.      Pupils: Pupils are equal, round, and reactive to light.   Cardiovascular:      Rate and Rhythm: Normal rate and regular rhythm.   Pulmonary:      Effort: Pulmonary effort is normal.      Breath sounds: Normal breath sounds.   Abdominal:      General: Abdomen is flat. Bowel sounds are normal.      Palpations: Abdomen is soft.   Musculoskeletal:         General: Normal range of motion.      Right lower leg: Edema present.      Left lower leg: Edema present.   Skin:     General: Skin is warm and dry.      Capillary Refill: Capillary refill takes less than 2 seconds.      Findings: Wound present.   Neurological:      General: No focal deficit present.   Psychiatric:         Mood  and Affect: Mood normal.         Thought Content: Thought content normal.         Judgment: Judgment normal.           Lab Results   Component Value Date     (L) 09/25/2024    K 4.9 09/25/2024     09/25/2024    CO2 18 (L) 09/25/2024    BUN 41 (H) 09/25/2024    CREATININE 1.22 (H) 09/25/2024    GLUCOSE 91 09/25/2024    CALCIUM 7.4 (L) 09/25/2024    PROT 5.5 (L) 09/23/2024    BILITOT 0.6 09/23/2024    ALKPHOS 247 (H) 09/23/2024    AST 25 09/23/2024    ALT 14 09/23/2024       Lab Results   Component Value Date    WBC 17.1 (H) 09/25/2024    HGB 11.8 (L) 09/25/2024    HCT 35.5 (L) 09/25/2024     (H) 09/25/2024     09/25/2024    LYMPHOPCT 8.0 09/25/2024    RBC 3.37 (L) 09/25/2024    MCH 35.0 (H) 09/25/2024    MCHC 33.2 09/25/2024    RDW 15.3 (H) 09/25/2024    CRP 16.36 (H) 09/16/2024       Lab Results   Component Value Date    TSH 2.16 11/14/2022     Lab Results   Component Value Date    LACTATE 2.1 (H) 09/23/2024    TROPONINI 0.13 (HH) 02/04/2021    BNP 87 09/15/2024    INR 3.5 (H) 09/25/2024       IMAGES:  Encounter Date: 09/15/24   ECG 12 lead   Result Value    Ventricular Rate 140    Atrial Rate 140    NJ Interval 148    QRS Duration 78    QT Interval 264    QTC Calculation(Bazett) 403    P Axis 69    R Axis 64    T Axis 70    QRS Count 23    Q Onset 214    P Onset 140    P Offset 190    T Offset 346    QTC Fredericia 350    Narrative    Sinus tachycardia  Otherwise normal ECG  When compared with ECG of 16-SEP-2024 08:10, (unconfirmed)  No significant change was found     XR chest 1 view    Result Date: 9/23/2024  Impression: No acute abnormalities   Signed by: Danielle Acosta 9/23/2024 8:14 PM Dictation workstation:   [id number]    Transthoracic Echo (TTE) Complete    Result Date: 9/18/2024   [facility], [address] [place] [address]               Tel [phone] and Fax [phone] TRANSTHORACIC ECHOCARDIOGRAM REPORT  Patient Name:      LION Johnson  Physician:    [id number] Judson Chaudhry MD Study Date:        9/18/2024            Ordering Provider:    [id number] BROOKE LY MRN/PID:           [id number]             Fellow: Accession#:        [id number]         Nurse: Date of Birth/Age: 1960 / 64 years Sonographer:          Sarahi Weston RDCS Gender:            F                    Additional Staff: Height:            162.56 cm            Admit Date:           9/15/2024 Weight:            141.97 kg            Admission Status:     Inpatient -                                                               Routine BSA / BMI:         2.37 m2 / 53.73      Encounter#:           [id number]                    kg/m2 Blood Pressure:    157/83 mmHg          Department Location:  [facility] Non                                                               Invasive Study Type:    TRANSTHORACIC ECHO (TTE) COMPLETE Diagnosis/ICD: Unspecified atrial fibrillation-I48.91 Indication:    Atrial Fibrillation CPT Code:      Echo Complete w Full Doppler-10993 Patient History: Smoker:            Current. Pertinent History: COPD, HTN and Hyperlipidemia. Known MS and AS. Study Detail: The following Echo studies were performed: 2D, M-Mode, Doppler and               color flow. Technically challenging study due to patient lying in               supine position and body habitus. The patient was asleep.  PHYSICIAN INTERPRETATION: Left Ventricle: The left ventricular systolic function is normal, with a visually estimated ejection fraction of 60-65%. There are no regional left ventricular wall motion abnormalities. The left ventricular cavity size is normal. Spectral Doppler shows an impaired relaxation pattern of left ventricular diastolic filling. Left Atrium: The left atrium is mildly dilated. Right Ventricle: The right ventricle is normal in size. There is normal  right ventricular global systolic function. Right Atrium: The right atrium is mildly dilated. Aortic Valve: The aortic valve is trileaflet. There is evidence of severe aortic valve stenosis. The aortic valve dimensionless index is 0.37. There is trace to mild aortic valve regurgitation. The peak instantaneous gradient of the aortic valve is 57.5 mmHg. The mean gradient of the aortic valve is 37.1 mmHg. JADYN 0.82cm2 by planimetry. Mitral Valve: The mitral valve is normal in structure. There is evidence of mild mitral valve stenosis. The doppler estimated mean and peak diastolic pressure gradients are 5.9 mmHg and 13.2 mmHg respectively. There is severe mitral annular calcification. There is mild mitral valve regurgitation. Tricuspid Valve: The tricuspid valve is structurally normal. There is mild tricuspid regurgitation. Pulmonic Valve: The pulmonic valve is structurally normal. There is no indication of pulmonic valve regurgitation. Pericardium: There is no pericardial effusion noted. Aorta: The aortic root is normal. Pulmonary Artery: The tricuspid regurgitant velocity is 2.96 m/s, and with an estimated right atrial pressure of 3 mmHg, the estimated pulmonary artery pressure is mildly elevated with the RVSP at 37.9 mmHg. Systemic Veins: The inferior vena cava appears normal in size.  CONCLUSIONS:  1. The left ventricular systolic function is normal, with a visually estimated ejection fraction of 60-65%.  2. Spectral Doppler shows an impaired relaxation pattern of left ventricular diastolic filling.  3. There is normal right ventricular global systolic function.  4. The left atrium is mildly dilated.  5. There is severe mitral annular calcification.  6. Severe aortic valve stenosis. QUANTITATIVE DATA SUMMARY:  2D MEASUREMENTS:          Normal Ranges: IVSd:            1.16 cm  (0.6-1.1cm) LVPWd:           1.12 cm  (0.6-1.1cm) LVIDd:           4.70 cm  (3.9-5.9cm) LVIDs:           3.07 cm LV Mass Index:   83 g/m2 LVEDV  Index:     48 ml/m2 LV % FS          34.7 %  LA VOLUME:                    Normal Ranges: LA Vol A4C:        69.9 ml    (22+/-6mL/m2) LA Vol A2C:        73.7 ml LA Vol BP:         72.3 ml LA Vol Index A4C:  29.5 ml/m2 LA Vol Index A2C:  31.1 ml/m2 LA Vol Index BP:   30.6 ml/m2 LA Area A4C:       23.3 cm2 LA Area A2C:       24.1 cm2 LA Major Axis A4C: 6.6 cm LA Major Axis A2C: 6.7 cm LA Volume Index:   30.6 ml/m2 LA Vol A4C:        66.3 ml LA Vol A2C:        71.0 ml LA Vol Index BSA:  29.0 ml/m2  RA VOLUME BY A/L METHOD:          Normal Ranges: RA Area A4C:             19.6 cm2  M-MODE MEASUREMENTS:         Normal Ranges: Ao Root:             3.20 cm (2.0-3.7cm) LAs:                 4.48 cm (2.7-4.0cm)  AORTA MEASUREMENTS:         Normal Ranges: Ao Sinus, d:        3.40 cm (2.1-3.5cm) Asc Ao, d:          3.50 cm (2.1-3.4cm)  LV SYSTOLIC FUNCTION BY 2D PLANIMETRY (MOD):                      Normal Ranges: EF-A4C View:    65 % (>=55%) EF-A2C View:    65 % EF-Biplane:     65 % EF-Visual:      63 % LV EF Reported: 63 %  LV DIASTOLIC FUNCTION:             Normal Ranges: MV Peak E:             0.85 m/s    (0.7-1.2 m/s) MV Peak A:             1.51 m/s    (0.42-0.7 m/s) E/A Ratio:             0.56        (1.0-2.2) MV e'                  0.080 m/s   (>8.0) MV lateral e'          0.09 m/s MV medial e'           0.07 m/s MV A Dur:              165.56 msec E/e' Ratio:            10.62       (<8.0) PulmV Sys Rodriguez:         46.51 cm/s PulmV Cazares Rodriguez:        28.62 cm/s PulmV S/D Rodriguez:         1.62 PulmV A Revs Rodriguez:      24.80 cm/s PulmV A Revs Dur:      93.24 msec  MITRAL VALVE:           Normal Ranges: MV Vmax:      1.82 m/s  (<=1.3m/s) MV peak P.2 mmHg (<5mmHg) MV mean P.9 mmHg  (<2mmHg) MV VTI:       38.89 cm  (10-13cm) MV DT:        127 msec  (150-240msec)  AORTIC VALVE:                      Normal Ranges: AoV Vmax:                3.79 m/s  (<=1.7m/s) AoV Peak P.5 mmHg (<20mmHg) AoV Mean PG:              37.1 mmHg (1.7-11.5mmHg) LVOT Max Rodriguez:            1.32 m/s  (<=1.1m/s) AoV VTI:                 74.00 cm  (18-25cm) LVOT VTI:                27.60 cm LVOT Diameter:           2.01 cm   (1.8-2.4cm) AoV Area, VTI:           1.18 cm2  (2.5-5.5cm2) AoV Area,Vmax:           1.11 cm2  (2.5-4.5cm2) AoV Area, planim:        0.83 cm2  (2.5-4.5cm2) AoV Dimensionless Index: 0.37  RIGHT VENTRICLE: RV Basal 3.30 cm RV Mid   2.40 cm RV Major 8.0 cm TAPSE:   23.0 mm RV s'    0.18 m/s  TRICUSPID VALVE/RVSP:          Normal Ranges: Peak TR Velocity:     2.96 m/s RV Syst Pressure:     38 mmHg  (< 30mmHg) IVC Diam:             2.17 cm  PULMONIC VALVE:           Normal Ranges: PV Max Rodriguez:     1.6 m/s   (0.6-0.9m/s) PV Max PG:      10.7 mmHg  Pulmonary Veins: PulmV A Revs Dur: 93.24 msec PulmV A Revs Rodriguez: 24.80 cm/s PulmV Cazares Rodriguez:   28.62 cm/s PulmV S/D Rodriguez:    1.62 PulmV Sys Rodriguez:    46.51 cm/s  AORTA: Asc Ao Diam 3.53 cm  [id number] Judson Chaudhry MD Electronically signed on 9/18/2024 at 1:44:02 PM  ** Final **    === 09/15/24 ===    XR CHEST 1 VIEW    - Impression -  No acute abnormalities    Signed by: Danielle Acosta 9/23/2024 8:14 PM  Dictation workstation:   [id number]  === 09/15/24 ===    CT CHEST ABDOMEN PELVIS WO CONTRAST    - Impression -  CHEST  1.  No evidence of acute abnormality in the chest. Mild upper lobe  predominant centrilobular emphysema. Enlarged main pulmonary trunk  measuring up to 3.5 cm in caliber suggestive of pulmonary arterial  hypertension.  2. At least moderate aortic valve atherosclerotic calcifications.  Clinical correlation for aortic stenosis is recommended.    ABDOMEN - PELVIS  1.  No acute abnormality in the abdomen/pelvis. Hepatomegaly.      MACRO:  None    Signed by: Wilmar Bower 9/16/2024 1:55 AM  Dictation workstation:   [id number]  === 09/15/24 ===    XR CHEST 1 VIEW    - Impression -  No acute abnormalities    Signed by: Danielle Acosta 9/23/2024 8:14 PM  Dictation workstation:   [id number]  ===  02/15/17 ===    MRI LUMBAR SPINE WO CONTRAST    - Impression -  *Progressive spondylolisthesis and central disc herniation at L3/L4  [place]AM[place] [facility] [place]    Additional results of the last 24 hours have been reviewed.    Dictated using [employer] Version 2.4  Proof read however unrecognized voice recognition errors may have occurred     Electronically signed by Anay Chris DO on 09/25/24 at 9:06 AM

## 2024-09-25 NOTE — PROGRESS NOTES
"Nutrition Follow Up Assessment:   Nutrition Assessment       Patient is a 64 y.o. female with h/o BLE lymphedema, now presenting for BLE cellulitis & non-healing wounds. Found to have ALBERTINA.    PMH: Essential thrombocytosis, Tobacco use, COPD, HTN, Morbid obesity, Anxiety, Sleep apnea, RLS, HLD, Osteoarthritis, Iron deficiency anemia, Depression, Asthma, Hepatitis B, & IBS    Nutrition History:  Energy Intake: Good > 75 %  Food and Nutrient History: Visit made, pt resting in bed at that time. Reports a variable appetite but also notes she is always eating >75% of meals. Pt is taking her Pro-Stat ordered BID. Notes she takes this at home & prefers the cherry flavor. Would rather continue with citrus Pro-Stat than try the Ensure High Protein. Denies needs at this time.  Food Allergies/Intolerances:  None  GI Symptoms: Constipation and Nausea  Oral Problems: None     Anthropometrics:  Height: 162.6 cm (5' 4\")   Weight: 142 kg (313 lb 11.4 oz)   BMI (Calculated): 53.82  IBW/kg (Dietitian Calculated): 54.5 kg  Percent of IBW: 261 %     Weight History:   Weight         9/15/2024  2303 9/16/2024  0833          Weight: 150 kg (330 lb) 142 kg (313 lb 11.4 oz)       Weight Change %:  Weight History / % Weight Change: No updated weights since previous assessment. Unable to assess for weight change at this time.    Nutrition Focused Physical Exam Findings:  defer: Refer to RDN assessment (9/17), pt well nourished    Edema:  Edema: +2 mild  Edema Location: BLE    Physical Findings:  Skin:  (Buttocks venous ulcers & Bialteral tibial venous ulcers)    Nutrition Significant Labs:  BMP Trend:   Results from last 7 days   Lab Units 09/25/24  0755 09/24/24  0554 09/23/24  1818 09/23/24  0635   GLUCOSE mg/dL 91 85 146* 94   CALCIUM mg/dL 7.4* 7.7* 8.2* 7.9*   SODIUM mmol/L 128* 128* 131* 132*   POTASSIUM mmol/L 4.9 5.7* 5.3 4.6   CO2 mmol/L 18* 19* 18* 23   CHLORIDE mmol/L 102 102 102 103   BUN mg/dL 41* 38* 39* 32*   CREATININE mg/dL " 1.22* 1.60* 1.36* 0.97      Nutrition Specific Medications:  Scheduled medications  cefepime, 2 g, intravenous, q12h  polyethylene glycol, 17 g, oral, BID  sennosides-docusate sodium, 2 tablet, oral, BID  warfarin, 3 mg, oral, Once    Continuous medications  sodium chloride 0.9%, 75 mL/hr, Last Rate: 75 mL/hr (09/25/24 0408)    I/O:   Last BM Date: 09/25/24; Stool Appearance: Formed, Soft (09/25/24 0948)    Dietary Orders (From admission, onward)       Start     Ordered    09/25/24 1014  Adult diet Regular; 1800 mL fluid  Diet effective now        Comments: Free water restriction, 1600 mL/24Hr   Question Answer Comment   Diet type Regular    Dietary fluid restriction / 24h: 1800 mL fluid        09/25/24 1014    09/16/24 0904  May Participate in Room Service  Once        Question:  .  Answer:  Yes    09/16/24 0903    09/16/24 0726  Oral nutritional supplements  Until discontinued        Question Answer Comment   Deliver with Breakfast    Deliver with Dinner    Select supplement: Pro-Stat Sugar Free        09/16/24 0729             Estimated Needs:   Total Energy Estimated Needs (kCal): 1900 kCal  Method for Estimating Needs: 30-35kcals/kg IBW (6443-6581)  Total Protein Estimated Needs (g): 66 g  Method for Estimating Needs: 1.2g/kg IBW  Total Fluid Estimated Needs (mL): 1900 mL  Method for Estimating Needs: 1 ml/kcal        Nutrition Diagnosis   Malnutrition Diagnosis  Patient has Malnutrition Diagnosis: No    Nutrition Diagnosis  Patient has Nutrition Diagnosis: Yes  Diagnosis Status (1): Ongoing  Nutrition Diagnosis 1: Inadequate protein energy intake  Related to (1): increased demand for nutrients  As Evidenced by (1): wounds       Nutrition Interventions/Recommendations         Nutrition Prescription:  Continue regular diet, as tolerated  Per team, pt with 1800mL fluid restriction (1600mL free water)        Nutrition Interventions:   Interventions: Medical food supplement  Medical Food Supplement: Commercial  food  Goal: Pro-Stat BID (provides 100 kcals/15g protein each)    Nutrition Monitoring and Evaluation   Food/Nutrient Related History Monitoring  Monitoring and Evaluation Plan: Energy intake  Energy Intake: Estimated energy intake  Criteria: Meet >75% EENs    Time Spent (min): 60 minutes

## 2024-09-25 NOTE — PROGRESS NOTES
Physical Therapy    Physical Therapy Treatment    Patient Name: Neisha Graham  MRN: [id number]  Department: [address]  Room: [address] [place], [place] [address]  Today's Date: 9/25/2024  Time Calculation  Start Time: 1100  Stop Time: 1125  Time Calculation (min): 25 min         Assessment/Plan   PT Assessment  PT Assessment Results: Decreased strength, Decreased endurance, Impaired balance, Decreased mobility  Rehab Prognosis: Good  Barriers to Discharge: Pain control  Evaluation/Treatment Tolerance: Patient tolerated treatment well  Medical Staff Made Aware: Yes  Strengths: Ability to acquire knowledge, Housing layout  Barriers to Participation: Comorbidities, Support of Caregivers  End of Session Communication: Bedside nurse (Patient requesting tubi sleeve for B feet and breathing tx; RN aware)  Assessment Comment: Patient fatigues easily, improved mobility from previous report as patient had difficulty transitioning to bedside commode with staff 2 days ago. Patient encouraged by [facility] mobility. Recommend low intensity PT intervention and increased support for ADLs/IADLs.  End of Session Patient Position: Bed, 3 rail up, Alarm on (all needs within reach)  PT Plan  Inpatient/Swing Bed or Outpatient: Inpatient  PT Plan  Treatment/Interventions: Bed mobility, Transfer training, Gait training, Strengthening, Endurance training  PT Plan: Ongoing PT  PT Frequency: 3 times per week  PT Discharge Recommendations: Low intensity level of continued care  Equipment Recommended upon Discharge: Wheeled walker, Wheelchair (owns)  PT Recommended Transfer Status: Assist x1  PT - OK to Discharge: Yes (per PT POC)      General Visit Information:   PT  Visit  PT Received On: 09/25/24  Response to Previous Treatment: Patient with no complaints from previous session.  General  Reason for Referral: Impaired functional mobility; ALBERTINA, B LE cellulitis  Referred By: Faiza Chino  Past Medical History Relevant to Rehab: Essential thrombocytosis.  Central venous  sinuses thrombosis.  Tobacco use disorder.  Lymphedema.  COPD  Hypertension.  Morbid obesity.  Anxiety disorder.  Sleep apnea-does not wear CPAP or BiPAP or oxygen  Restless leg syndrome.  Hyperlipidemia.  Chronic low back pain.  Osteoarthritis.  Iron deficiency anemia.  Depression  Asthma  Hepatitis B  IBS  Family/Caregiver Present: No  Prior to Session Communication: Bedside nurse  Patient Position Received: Bed, 3 rail up, Alarm on  General Comment: Patient cooperative and agreeable to tx    Subjective   Precautions:  Precautions  Medical Precautions: Fall precautions (Purewick, IV, tele)    Objective   Pain:  Pain Assessment  Pain Assessment: 0-10  0-10 (Numeric) Pain Score: 8  Pain Type: Chronic pain  Pain Location: Back  Pain Orientation: Upper (Including shlds)  Pain Interventions: Repositioned, Ambulation/increased activity (Reports received pain meds this morning)  Cognition:  Cognition  Overall Cognitive Status: Within Functional Limits  Orientation Level: Oriented X4  Coordination:  Movements are Fluid and Coordinated: Yes  Postural Control:  Postural Control  Postural Control: Within Functional Limits  Static Sitting Balance  Static Sitting-Balance Support: No upper extremity supported, Feet supported  Static Sitting-Level of Assistance: Independent  Static Standing Balance  Static Standing-Balance Support: Bilateral upper extremity supported  Static Standing-Level of Assistance: Close supervision  Extremity/Trunk Assessments:  B LE wrapped     Activity Tolerance:  Activity Tolerance  Endurance: Tolerates 10 - 20 min exercise with multiple rests  Treatments:  Therapeutic Exercise  Therapeutic Exercise Performed: Yes  Therapeutic Exercise Activity 1: Seated: ankle pumps B x 20, LAQ B x 20, Marching B x 20    Therapeutic Activity  Therapeutic Activity Performed: Yes  Therapeutic Activity 1: Max A for donning socks    Bed Mobility  Bed Mobility: Yes  Bed Mobility 1  Bed Mobility 1: Supine to sitting  Level  of Assistance 1: Independent  Bed Mobility 2  Bed Mobility  2: Sitting to supine  Level of Assistance 2: Moderate assistance (B LE)    Ambulation/Gait Training  Ambulation/Gait Training Performed: Yes  Ambulation/Gait Training 1  Surface 1: Level tile  Device 1: Rolling walker  Assistance 1: Close supervision  Quality of Gait 1: Wide base of support (Increased demand on B UE)  Comments/Distance (ft) 1: 5'  Transfers  Transfer: Yes  Transfer 1  Transfer From 1: Sit to, Stand to  Transfer to 1: Sit, Stand  Technique 1: Sit to stand, Stand to sit  Transfer Device 1: Walker  Transfer Level of Assistance 1: Close supervision         Outcome Measures:  [facility] Basic Mobility  Turning from your back to your side while in a flat bed without using bedrails: None  Moving from lying on your back to sitting on the side of a flat bed without using bedrails: None  Moving to and from bed to chair (including a wheelchair): A little  Standing up from a chair using your arms (e.g. wheelchair or bedside chair): A little  To walk in hospital room: A little  Climbing 3-5 steps with railing: Total  Basic Mobility - Total Score: 18    Education Documentation  Handouts, taught by Eduarda Desai PT at 9/25/2024 11:51 AM.  Learner: Patient  Readiness: Acceptance  Method: Explanation  Response: Verbalizes Understanding    Precautions, taught by Eduarda Desai PT at 9/25/2024 11:51 AM.  Learner: Patient  Readiness: Acceptance  Method: Explanation  Response: Verbalizes Understanding    Home Exercise Program, taught by Eduarda Desai PT at 9/25/2024 11:51 AM.  Learner: Patient  Readiness: Acceptance  Method: Explanation  Response: Verbalizes Understanding    Body Mechanics, taught by Eduarda Desai PT at 9/25/2024 11:51 AM.  Learner: Patient  Readiness: Acceptance  Method: Explanation  Response: Verbalizes Understanding    Mobility Training, taught by Eduarda Desai PT at 9/25/2024 11:51 AM.  Learner: Patient  Readiness: Acceptance  Method:  Explanation  Response: Verbalizes Understanding    Education Comments  No comments found.        OP EDUCATION:       Encounter Problems       Encounter Problems (Active)       Balance       STG - Maintains dynamic standing balance with upper extremity support x 5' with dual task supervision  (Progressing)       Start:  09/17/24    Expected End:  10/01/24               Mobility       STG - Patient will ambulate with 2WW 50' mod I  (Progressing)       Start:  09/17/24    Expected End:  10/01/24               Pain - Adult             Encounter Problems (Resolved)       PT Transfers       STG - Patient will transfer sit to and from stand mod I  (Met)       Start:  09/17/24    Expected End:  10/01/24    Resolved:  09/19/24

## 2024-09-25 NOTE — PROGRESS NOTES
Neisha Graham is a 64 y.o. female on day 9 of admission presenting with ALBERTINA (acute kidney injury) (CMS-[facility]).    Subjective   Interval History: no fever, no flank pain       Review of Systems    Objective   Range of Vitals (last 24 hours)  Heart Rate:  []   Temp:  [36.3 °C (97.3 °F)-37.4 °C (99.3 °F)]   Resp:  [16-19]   BP: ()/(61-80)   SpO2:  [94 %-97 %]   Daily Weight  09/16/24 : 142 kg (313 lb 11.4 oz)    Body mass index is 53.85 kg/m².    Physical Exam  Constitutional:       Appearance: Normal appearance.   HENT:      Head: Normocephalic and atraumatic.      Mouth/Throat:      Mouth: Mucous membranes are moist.      Pharynx: Oropharynx is clear.   Eyes:      Pupils: Pupils are equal, round, and reactive to light.   Cardiovascular:      Rate and Rhythm: Normal rate and regular rhythm.      Heart sounds: Normal heart sounds.   Pulmonary:      Effort: Pulmonary effort is normal.      Breath sounds: Normal breath sounds.   Abdominal:      General: Abdomen is flat. Bowel sounds are normal.      Palpations: Abdomen is soft.   Musculoskeletal:      Cervical back: Normal range of motion.      Comments: Stasis / wounds   Neurological:      Mental Status: She is alert.         Antibiotics  cefepime - 2 gram/100 mL  DAPTOmycin (Cubicin) IV in 50 mL NS    Relevant Results  Labs  Results from last 72 hours   Lab Units 09/25/24  0804 09/24/24  0554 09/23/24  1900   WBC AUTO x10*3/uL 17.1* 20.8* 15.6*   HEMOGLOBIN g/dL 11.8* 11.7* 13.2   HEMATOCRIT % 35.5* 36.5 40.4   PLATELETS AUTO x10*3/uL 288 316 389   NEUTROS PCT AUTO % 84.0  --   --    LYMPHS PCT AUTO % 8.0  --   --    MONOS PCT AUTO % 4.0  --   --    EOS PCT AUTO % 0.5  --   --      Results from last 72 hours   Lab Units 09/25/24  0755 09/24/24  0554 09/23/24  1818   SODIUM mmol/L 128* 128* 131*   POTASSIUM mmol/L 4.9 5.7* 5.3   CHLORIDE mmol/L 102 102 102   CO2 mmol/L 18* 19* 18*   BUN mg/dL 41* 38* 39*   CREATININE mg/dL 1.22* 1.60* 1.36*   GLUCOSE mg/dL  91 85 146*   CALCIUM mg/dL 7.4* 7.7* 8.2*   ANION GAP mmol/L 13 13 16   EGFR mL/min/1.73m*2 49* 36* 43*   PHOSPHORUS mg/dL 3.8  --   --      Results from last 72 hours   Lab Units 09/25/24  0755 09/23/24  1818   ALK PHOS U/L  --  247*   BILIRUBIN TOTAL mg/dL  --  0.6   PROTEIN TOTAL g/dL  --  5.5*   ALT U/L  --  14   AST U/L  --  25   ALBUMIN g/dL 2.2* 2.9*       Estimated Creatinine Clearance: 65 mL/min (A) (by C-G formula based on SCr of 1.22 mg/dL (H)).  C-Reactive Protein   Date Value Ref Range Status   09/16/2024 16.36 (H) <1.00 mg/dL Final   08/12/2024 7.94 (H) <1.00 mg/dL Final     CRP   Date Value Ref Range Status   06/19/2020 1.84 (A) mg/dL Final     Comment:     REF VALUE  < 1.00       Microbiology  Susceptibility data from last 14 days.  Collected Specimen Info Organism Clindamycin Erythromycin Oxacillin Tetracycline Trimethoprim/Sulfamethoxazole Vancomycin   09/15/24 Tissue/Biopsy from Buttock Methicillin Resistant Staphylococcus aureus (MRSA)  R  R  R  R  S  S   09/15/24 Blood culture from Peripheral Venipuncture Aerococcus urinae           Corynebacterium         Imaging  Reviewed        Assessment/Plan   Fever, the temp is down, BC with aerococcus / Corynebacterium, likely a contaminant, had fever yesterday, BC with g-ve bacilli, ct no hydro  Legs stasis / wounds / likely cellulitis, the culture with MRSA  Encephalopathy, likely metabolic     Recommendations :  Discontinue Daptomycin  Continue Cefepime   Discussed with the medical team     I spent minutes in the professional and overall care of this patient.      Gorge Myers MD

## 2024-09-26 ENCOUNTER — APPOINTMENT (OUTPATIENT)
Dept: HEMATOLOGY/ONCOLOGY | Facility: CLINIC | Age: 64
End: 2024-09-26
Payer: MEDICARE

## 2024-09-26 LAB
ALBUMIN SERPL BCP-MCNC: 2.6 G/DL (ref 3.4–5)
ANION GAP SERPL CALC-SCNC: 11 MMOL/L (ref 10–20)
BASOPHILS # BLD AUTO: 0.05 X10*3/UL (ref 0–0.1)
BASOPHILS NFR BLD AUTO: 0.4 %
BUN SERPL-MCNC: 40 MG/DL (ref 6–23)
CALCIUM SERPL-MCNC: 8.1 MG/DL (ref 8.6–10.3)
CHLORIDE SERPL-SCNC: 104 MMOL/L (ref 98–107)
CO2 SERPL-SCNC: 21 MMOL/L (ref 21–32)
CREAT SERPL-MCNC: 1.04 MG/DL (ref 0.5–1.05)
CREAT UR-MCNC: 91.8 MG/DL (ref 20–320)
EGFRCR SERPLBLD CKD-EPI 2021: 60 ML/MIN/1.73M*2
EOSINOPHIL # BLD AUTO: 0.16 X10*3/UL (ref 0–0.7)
EOSINOPHIL NFR BLD AUTO: 1.3 %
ERYTHROCYTE [DISTWIDTH] IN BLOOD BY AUTOMATED COUNT: 15.9 % (ref 11.5–14.5)
GLUCOSE SERPL-MCNC: 84 MG/DL (ref 74–99)
HCT VFR BLD AUTO: 38.1 % (ref 36–46)
HGB BLD-MCNC: 11.9 G/DL (ref 12–16)
IMM GRANULOCYTES # BLD AUTO: 0.11 X10*3/UL (ref 0–0.7)
IMM GRANULOCYTES NFR BLD AUTO: 0.9 % (ref 0–0.9)
INR PPP: 3.5 (ref 0.9–1.1)
LYMPHOCYTES # BLD AUTO: 0.94 X10*3/UL (ref 1.2–4.8)
LYMPHOCYTES NFR BLD AUTO: 7.9 %
MAGNESIUM SERPL-MCNC: 2.16 MG/DL (ref 1.6–2.4)
MCH RBC QN AUTO: 34.4 PG (ref 26–34)
MCHC RBC AUTO-ENTMCNC: 31.2 G/DL (ref 32–36)
MCV RBC AUTO: 110 FL (ref 80–100)
MONOCYTES # BLD AUTO: 0.31 X10*3/UL (ref 0.1–1)
MONOCYTES NFR BLD AUTO: 2.6 %
NEUTROPHILS # BLD AUTO: 10.39 X10*3/UL (ref 1.2–7.7)
NEUTROPHILS NFR BLD AUTO: 86.9 %
NRBC BLD-RTO: 0 /100 WBCS (ref 0–0)
OSMOLALITY UR: 585 MOSM/KG (ref 200–1200)
PHOSPHATE SERPL-MCNC: 3.6 MG/DL (ref 2.5–4.9)
PLATELET # BLD AUTO: 368 X10*3/UL (ref 150–450)
POTASSIUM SERPL-SCNC: 4.5 MMOL/L (ref 3.5–5.3)
PROTHROMBIN TIME: 40.4 SECONDS (ref 9.8–12.8)
RBC # BLD AUTO: 3.46 X10*6/UL (ref 4–5.2)
SODIUM SERPL-SCNC: 131 MMOL/L (ref 136–145)
SODIUM UR-SCNC: <10 MMOL/L
SODIUM/CREAT UR-RTO: NORMAL
WBC # BLD AUTO: 12 X10*3/UL (ref 4.4–11.3)

## 2024-09-26 PROCEDURE — 1100000001 HC PRIVATE ROOM DAILY

## 2024-09-26 PROCEDURE — 36415 COLL VENOUS BLD VENIPUNCTURE: CPT | Performed by: INTERNAL MEDICINE

## 2024-09-26 PROCEDURE — 87040 BLOOD CULTURE FOR BACTERIA: CPT | Mod: GEALAB | Performed by: INTERNAL MEDICINE

## 2024-09-26 PROCEDURE — 2500000004 HC RX 250 GENERAL PHARMACY W/ HCPCS (ALT 636 FOR OP/ED): Mod: JZ | Performed by: INTERNAL MEDICINE

## 2024-09-26 PROCEDURE — S4991 NICOTINE PATCH NONLEGEND: HCPCS | Performed by: EMERGENCY MEDICINE

## 2024-09-26 PROCEDURE — 2500000001 HC RX 250 WO HCPCS SELF ADMINISTERED DRUGS (ALT 637 FOR MEDICARE OP): Performed by: FAMILY MEDICINE

## 2024-09-26 PROCEDURE — 2500000001 HC RX 250 WO HCPCS SELF ADMINISTERED DRUGS (ALT 637 FOR MEDICARE OP): Performed by: INTERNAL MEDICINE

## 2024-09-26 PROCEDURE — 83735 ASSAY OF MAGNESIUM: CPT | Performed by: INTERNAL MEDICINE

## 2024-09-26 PROCEDURE — 84100 ASSAY OF PHOSPHORUS: CPT | Performed by: INTERNAL MEDICINE

## 2024-09-26 PROCEDURE — 2500000004 HC RX 250 GENERAL PHARMACY W/ HCPCS (ALT 636 FOR OP/ED): Performed by: INTERNAL MEDICINE

## 2024-09-26 PROCEDURE — 2500000004 HC RX 250 GENERAL PHARMACY W/ HCPCS (ALT 636 FOR OP/ED): Performed by: NURSE PRACTITIONER

## 2024-09-26 PROCEDURE — 2500000001 HC RX 250 WO HCPCS SELF ADMINISTERED DRUGS (ALT 637 FOR MEDICARE OP): Performed by: NURSE PRACTITIONER

## 2024-09-26 PROCEDURE — 85610 PROTHROMBIN TIME: CPT | Performed by: INTERNAL MEDICINE

## 2024-09-26 PROCEDURE — 2500000002 HC RX 250 W HCPCS SELF ADMINISTERED DRUGS (ALT 637 FOR MEDICARE OP, ALT 636 FOR OP/ED): Performed by: EMERGENCY MEDICINE

## 2024-09-26 PROCEDURE — 99232 SBSQ HOSP IP/OBS MODERATE 35: CPT | Performed by: INTERNAL MEDICINE

## 2024-09-26 PROCEDURE — 85025 COMPLETE CBC W/AUTO DIFF WBC: CPT | Performed by: INTERNAL MEDICINE

## 2024-09-26 RX ORDER — DIPHENHYDRAMINE HYDROCHLORIDE 50 MG/ML
25 INJECTION INTRAMUSCULAR; INTRAVENOUS ONCE
Status: COMPLETED | OUTPATIENT
Start: 2024-09-26 | End: 2024-09-26

## 2024-09-26 RX ORDER — WARFARIN 3 MG/1
3 TABLET ORAL
Status: DISCONTINUED | OUTPATIENT
Start: 2024-09-27 | End: 2024-09-28 | Stop reason: HOSPADM

## 2024-09-26 ASSESSMENT — COGNITIVE AND FUNCTIONAL STATUS - GENERAL
TOILETING: A LITTLE
CLIMB 3 TO 5 STEPS WITH RAILING: A LOT
WALKING IN HOSPITAL ROOM: A LOT
DRESSING REGULAR UPPER BODY CLOTHING: A LOT
DRESSING REGULAR LOWER BODY CLOTHING: A LOT
WALKING IN HOSPITAL ROOM: A LOT
TURNING FROM BACK TO SIDE WHILE IN FLAT BAD: A LITTLE
TOILETING: A LOT
DRESSING REGULAR UPPER BODY CLOTHING: A LOT
HELP NEEDED FOR BATHING: A LOT
HELP NEEDED FOR BATHING: A LOT
MOVING TO AND FROM BED TO CHAIR: A LITTLE
STANDING UP FROM CHAIR USING ARMS: A LITTLE
MOBILITY SCORE: 16
MOVING FROM LYING ON BACK TO SITTING ON SIDE OF FLAT BED WITH BEDRAILS: A LITTLE
MOBILITY SCORE: 16
MOVING TO AND FROM BED TO CHAIR: A LITTLE
WALKING IN HOSPITAL ROOM: A LOT
CLIMB 3 TO 5 STEPS WITH RAILING: A LOT
TOILETING: A LOT
MOVING TO AND FROM BED TO CHAIR: A LITTLE
TURNING FROM BACK TO SIDE WHILE IN FLAT BAD: A LITTLE
DAILY ACTIVITIY SCORE: 15
DAILY ACTIVITIY SCORE: 17
STANDING UP FROM CHAIR USING ARMS: A LITTLE
STANDING UP FROM CHAIR USING ARMS: A LITTLE
PERSONAL GROOMING: A LITTLE
CLIMB 3 TO 5 STEPS WITH RAILING: A LOT
DRESSING REGULAR UPPER BODY CLOTHING: A LOT
TURNING FROM BACK TO SIDE WHILE IN FLAT BAD: A LITTLE
MOVING FROM LYING ON BACK TO SITTING ON SIDE OF FLAT BED WITH BEDRAILS: A LITTLE
DAILY ACTIVITIY SCORE: 15
MOVING FROM LYING ON BACK TO SITTING ON SIDE OF FLAT BED WITH BEDRAILS: A LITTLE
PERSONAL GROOMING: A LITTLE
MOBILITY SCORE: 16
HELP NEEDED FOR BATHING: A LOT

## 2024-09-26 ASSESSMENT — PAIN SCALES - GENERAL
PAINLEVEL_OUTOF10: 7
PAINLEVEL_OUTOF10: 7
PAINLEVEL_OUTOF10: 2
PAINLEVEL_OUTOF10: 2
PAINLEVEL_OUTOF10: 7
PAINLEVEL_OUTOF10: 8

## 2024-09-26 ASSESSMENT — PAIN DESCRIPTION - LOCATION
LOCATION: KNEE
LOCATION: KNEE

## 2024-09-26 ASSESSMENT — PAIN - FUNCTIONAL ASSESSMENT
PAIN_FUNCTIONAL_ASSESSMENT: 0-10

## 2024-09-26 ASSESSMENT — PAIN SCALES - PAIN ASSESSMENT IN ADVANCED DEMENTIA (PAINAD): TOTALSCORE: MEDICATION (SEE MAR)

## 2024-09-26 NOTE — NURSING NOTE
Assumed care of patient.     0800- Patients bed wet with urine, refused to get cleaned washed up and sheets changed before eating breakfast with promopts from [facility].     0845- Patient requesting dressing changes to be done now. Informed [facility] patient refused dressing changes on night shift and I will do them once I get a chance on this shift.    Patient has new wound area starting on buttocks. MD and wound nurse aware.     Patient requesting [facility] bed- states her new wound area starting is because of the bed. Requested bed.     1345- Went to do dressing change and patient wants to wait because she does not think we should be soaking in Dakins still. Informed patient order states too. Will wait for wound nurse who was reaching out to MD.     [facility] bed brought to room, patient transferred to chair, refused to elevate legs.    Patient requesting dressings be done again because they are leaking, informed patient they will have more drainage when she refuses to elevate them. Patient upset about fluid restriction, states she has lymphedema and that is why she is swollen, she does not need FR. Informed patient that it has to do with her NA levels. MD is aware that patient is non-compliant.     PAULA WARD, PANKAJ

## 2024-09-26 NOTE — PROGRESS NOTES
Neisha Graham is a 64 y.o. female on day 10 of admission presenting with ALBERTINA (acute kidney injury) (CMS-[facility]).    Subjective   Interval History: no fever, no new complaints       Review of Systems    Objective   Range of Vitals (last 24 hours)  Heart Rate:  [84-97]   Temp:  [35.9 °C (96.6 °F)-36.3 °C (97.3 °F)]   Resp:  [16-18]   BP: (132-145)/(79-88)   SpO2:  [95 %-99 %]   Daily Weight  09/16/24 : 142 kg (313 lb 11.4 oz)    Body mass index is 53.85 kg/m².    Physical Exam  Constitutional:       Appearance: Normal appearance.   HENT:      Head: Normocephalic and atraumatic.      Mouth/Throat:      Mouth: Mucous membranes are moist.      Pharynx: Oropharynx is clear.   Eyes:      Pupils: Pupils are equal, round, and reactive to light.   Cardiovascular:      Rate and Rhythm: Normal rate and regular rhythm.      Heart sounds: Normal heart sounds.   Pulmonary:      Effort: Pulmonary effort is normal.      Breath sounds: Normal breath sounds.   Abdominal:      General: Abdomen is flat. Bowel sounds are normal.      Palpations: Abdomen is soft.   Musculoskeletal:      Cervical back: Normal range of motion.      Comments: Stasis / wounds   Neurological:      Mental Status: She is alert.         Antibiotics  cefepime - 2 gram/100 mL    Relevant Results  Labs  Results from last 72 hours   Lab Units 09/26/24  0514 09/25/24  0804 09/24/24  0554   WBC AUTO x10*3/uL 12.0* 17.1* 20.8*   HEMOGLOBIN g/dL 11.9* 11.8* 11.7*   HEMATOCRIT % 38.1 35.5* 36.5   PLATELETS AUTO x10*3/uL 368 288 316   NEUTROS PCT AUTO % 86.9 84.0  --    LYMPHS PCT AUTO % 7.9 8.0  --    MONOS PCT AUTO % 2.6 4.0  --    EOS PCT AUTO % 1.3 0.5  --      Results from last 72 hours   Lab Units 09/26/24  0514 09/25/24  0755 09/24/24  0554   SODIUM mmol/L 131* 128* 128*   POTASSIUM mmol/L 4.5 4.9 5.7*   CHLORIDE mmol/L 104 102 102   CO2 mmol/L 21 18* 19*   BUN mg/dL 40* 41* 38*   CREATININE mg/dL 1.04 1.22* 1.60*   GLUCOSE mg/dL 84 91 85   CALCIUM mg/dL 8.1* 7.4*  7.7*   ANION GAP mmol/L 11 13 13   EGFR mL/min/1.73m*2 60* 49* 36*   PHOSPHORUS mg/dL 3.6 3.8  --      Results from last 72 hours   Lab Units 09/26/24  0514 09/25/24  0755 09/23/24  1818   ALK PHOS U/L  --   --  247*   BILIRUBIN TOTAL mg/dL  --   --  0.6   PROTEIN TOTAL g/dL  --   --  5.5*   ALT U/L  --   --  14   AST U/L  --   --  25   ALBUMIN g/dL 2.6* 2.2* 2.9*       Estimated Creatinine Clearance: 76.3 mL/min (by C-G formula based on SCr of 1.04 mg/dL).  C-Reactive Protein   Date Value Ref Range Status   09/16/2024 16.36 (H) <1.00 mg/dL Final   08/12/2024 7.94 (H) <1.00 mg/dL Final     CRP   Date Value Ref Range Status   06/19/2020 1.84 (A) mg/dL Final     Comment:     REF VALUE  < 1.00       Microbiology  Susceptibility data from last 14 days.  Collected Specimen Info Organism Cefepime Ceftazidime Ciprofloxacin Clindamycin Erythromycin Gentamicin Levofloxacin Oxacillin Piperacillin/Tazobactam Tetracycline Tobramycin Trimethoprim/Sulfamethoxazole Vancomycin   09/23/24 Blood culture from Peripheral Venipuncture Pseudomonas  S  S  S    S  S   S   S  S    09/15/24 Tissue/Biopsy from Buttock Methicillin Resistant Staphylococcus aureus (MRSA)     R  R    R   R   S  S   09/15/24 Blood culture from Peripheral Venipuncture Aerococcus urinae                  Corynebacterium                Imaging  Reviewed        Assessment/Plan   Fever, the temp is down, BC with aerococcus / Corynebacterium, likely a contaminant, had fever yesterday, BC with Pseudomonas, ct no hydro  Legs stasis / wounds / likely cellulitis, the culture with MRSA  Encephalopathy, likely metabolic     Recommendations :  Continue Cefepime   Discussed with the medical team     I spent minutes in the professional and overall care of this patient.      Gorge Myers MD

## 2024-09-26 NOTE — NURSING NOTE
Wound nurse note. Dressings changed as ordered with bedside nurse. New pictures taken for chart. Clarified with podiatry that patient needs seven total days of soaks with Dakins, today is day six. Reviewed new pictures of sacrum, will try triad hydrophilic wound dressing in place of zinc. Patient has large amounts of serous drainage from sacrum/buttock skin.  Orders obtained for Triad. Dr. Chris at bedside during dressing change and he was updated about changes to buttocks wounds.

## 2024-09-26 NOTE — PROGRESS NOTES
[facility] Hospitalist Progress Note       [id number]: Please page me for patient care issues.  [phone]: Please page night hospitalist for any issues.     Neisha Graham  :  1960(64 y.o.)  MRN:  [id number]  PCP: Micheal Gonsalez DO      Principal Problem:    ALBERTINA (acute kidney injury) (CMS-[facility])  Active Problems:    Generalized weakness    Hyperkalemia      Assessment and Plan:     Neisha Graham is a 64 y.o. female with PMH BLE lymphadema with nonhealing wounds, aortic stenosis, and A.Fib who was admitted with BLE cellulitis and non-healing wounds.     #B/L LE cellulitis (MRSA) w/ chronic non healing wounds  #Pseudomonas bacteremia,   -etiology unclear but suspect  ie nephrolithiasis w/ spontaneous resolution  #ALBERTINA, improving  #Hyperkalemia, resolved s/p lokemia  #Acute on chronic hyponatremia of unclear etiology, improving  #Constipation  #Positive blood culture (Aerococcus urinae and Corynebacterium), likely contamination  #Chronic b/l LE lymphedema  #Paroxysmal Afib  #Mod-severe aortic stenosis  #COPD w/o exacerbation  #BE  #Tobacco abuse  #Chronic pain on chronic prescription of opiates  #Class III obesity with serious co morbidities, BMI 53.85 kg/m².  -on daptomycin->dapto/cefepime  -awaiting repeat (24) Bcx  -PRN IVF  -free water restriction, liberalize salt intake, check urine lytes  -hold home lisinopril due to ALBERTINA  -permissive HTN, caution with nephrotoxins  -resume rest of home meds as indicated  -CS recs appreciated: ID    Disposition:await test results, await consultant recommendations, and await clinical improvement    DVT Prophylaxis: full anticoagulation coumadin    Code status: Full Code  Diet: Adult diet Regular; 1800 mL fluid    Level of MDM:  High    I personally examined the patient and I personally reviewed chart, data, labs radiology reports    Family Communication  Number :   Name of Designated Family Representative:       Total time spent: 35 minutes, of total time providing  counseling or in coordination of care. Total time on this day of visit includes record and documentation review before and after visit including documentation and time not explicitly included on EMR time stamp      Subjective:   Interval History:  HPI  The patient complains of b/l LE cellulitis  The patient feels their symptoms areimproving  no events or new concerns    Scheduled Meds:cefepime, 2 g, intravenous, q12h  [Held by provider] dilTIAZem CD, 180 mg, oral, Daily  DULoxetine, 20 mg, oral, Daily  eucerin, , Topical, BID  influenza, 0.5 mL, intramuscular, During hospitalization  tiotropium, 2 puff, inhalation, Daily   And  fluticasone furoate-vilanteroL, 1 puff, inhalation, Daily  hydroxyurea, 1,000 mg, oral, Daily  nicotine, 1 patch, transdermal, Daily  nystatin, , Topical, BID  polyethylene glycol, 17 g, oral, BID  pramipexole, 1 mg, oral, BID  pregabalin, 25 mg, oral, BID  sennosides-docusate sodium, 2 tablet, oral, BID  sodium hypochlorite, , irrigation, Daily  [START ON 9/27/2024] warfarin, 3 mg, oral, Once per day on Monday Wednesday Friday  [Held by provider] warfarin, 7.5 mg, oral, Daily  white petrolatum, , Topical, BID  zinc oxide, 1 Application, Topical, TID      Continuous Infusions:     PRN Meds:PRN medications: acetaminophen **OR** [DISCONTINUED] acetaminophen **OR** [DISCONTINUED] acetaminophen, albuterol, cyclobenzaprine, hydrOXYzine HCL, naloxone, ondansetron **OR** ondansetron, oxyCODONE, oxyCODONE    Review of Systems   All other systems reviewed and are negative.    Interval Pertinent History:  Social History     Tobacco Use    Smoking status: Some Days     Types: Cigarettes    Smokeless tobacco: Never   Substance Use Topics    Alcohol use: Not on file         Objective:   Patient Vitals for the past 24 hrs:   BP Temp Temp src Pulse Resp SpO2   09/26/24 0438 132/88 35.9 °C (96.6 °F) Temporal 84 18 95 %   09/25/24 2000 145/82 36.3 °C (97.3 °F) Temporal 97 18 95 %   09/25/24 1555 -- -- -- --  -- 98 %   24 1500 137/79 36.3 °C (97.3 °F) Temporal 92 16 99 %       Average, Min, and Max for last 24 hours Vitals:  TEMPERATURE:  Temp  Av.2 °C (97.1 °F)  Min: 35.9 °C (96.6 °F)  Max: 36.3 °C (97.3 °F)    RESPIRATIONS RANGE: Resp  Av.3  Min: 16  Max: 18    PULSE RANGE: Pulse  Av  Min: 84  Max: 97    BLOOD PRESSURE RANGE:  Systolic (24hrs), Av , Min:132 , Max:145   ; Diastolic (24hrs), Av, Min:79, Max:88      PULSE OXIMETRY RANGE: SpO2  Av.8 %  Min: 95 %  Max: 99 %  Body mass index is 53.85 kg/m².    I/O last 3 completed shifts:  In: 2972.5 (20.9 mL/kg) [P.O.:1345; I.V.:1427.5 (10 mL/kg); IV Piggyback:200]  Out: 1150 (8.1 mL/kg) [Urine:1150 (0.2 mL/kg/hr)]  Weight: 142.3 kg   Weight change:      Physical Exam  Vitals and nursing note reviewed.   Constitutional:       Appearance: Normal appearance. She is morbidly obese. She is ill-appearing.   HENT:      Head: Normocephalic and atraumatic.      Right Ear: External ear normal.      Left Ear: External ear normal.      Nose: Nose normal.      Mouth/Throat:      Mouth: Mucous membranes are moist.   Eyes:      General: No scleral icterus.        Right eye: No discharge.         Left eye: No discharge.      Extraocular Movements: Extraocular movements intact.      Conjunctiva/sclera: Conjunctivae normal.      Pupils: Pupils are equal, round, and reactive to light.   Cardiovascular:      Rate and Rhythm: Normal rate and regular rhythm.   Pulmonary:      Effort: Pulmonary effort is normal.      Breath sounds: Normal breath sounds.   Abdominal:      General: Abdomen is flat. Bowel sounds are normal.      Palpations: Abdomen is soft.   Musculoskeletal:         General: Normal range of motion.      Right lower leg: Edema present.      Left lower leg: Edema present.   Skin:     General: Skin is warm and dry.      Capillary Refill: Capillary refill takes less than 2 seconds.      Findings: Wound present.   Neurological:      General: No focal  deficit present.   Psychiatric:         Mood and Affect: Mood normal.         Thought Content: Thought content normal.         Judgment: Judgment normal.           Lab Results   Component Value Date     (L) 09/26/2024    K 4.5 09/26/2024     09/26/2024    CO2 21 09/26/2024    BUN 40 (H) 09/26/2024    CREATININE 1.04 09/26/2024    GLUCOSE 84 09/26/2024    CALCIUM 8.1 (L) 09/26/2024    PROT 5.5 (L) 09/23/2024    BILITOT 0.6 09/23/2024    ALKPHOS 247 (H) 09/23/2024    AST 25 09/23/2024    ALT 14 09/23/2024       Lab Results   Component Value Date    WBC 12.0 (H) 09/26/2024    HGB 11.9 (L) 09/26/2024    HCT 38.1 09/26/2024     (H) 09/26/2024     09/26/2024    LYMPHOPCT 7.9 09/26/2024    RBC 3.46 (L) 09/26/2024    MCH 34.4 (H) 09/26/2024    MCHC 31.2 (L) 09/26/2024    RDW 15.9 (H) 09/26/2024    CRP 16.36 (H) 09/16/2024       Lab Results   Component Value Date    TSH 2.16 11/14/2022     Lab Results   Component Value Date    LACTATE 2.1 (H) 09/23/2024    TROPONINI 0.13 (HH) 02/04/2021    BNP 87 09/15/2024    INR 3.5 (H) 09/26/2024       IMAGES:  Encounter Date: 09/15/24   ECG 12 lead   Result Value    Ventricular Rate 140    Atrial Rate 140    OH Interval 148    QRS Duration 78    QT Interval 264    QTC Calculation(Bazett) 403    P Axis 69    R Axis 64    T Axis 70    QRS Count 23    Q Onset 214    P Onset 140    P Offset 190    T Offset 346    QTC Fredericia 350    Narrative    Sinus tachycardia  Otherwise normal ECG  When compared with ECG of 16-SEP-2024 08:10, (unconfirmed)  No significant change was found     XR chest 1 view    Result Date: 9/23/2024  Impression: No acute abnormalities   Signed by: Danielle Acosta 9/23/2024 8:14 PM Dictation workstation:   [id number]    Transthoracic Echo (TTE) Complete    Result Date: 9/18/2024   [facility], [address] [place] [address]               Tel [phone] and Fax [phone] TRANSTHORACIC ECHOCARDIOGRAM REPORT  Patient  Name:      LION AURA Johnson Physician:    [id number] Judson Chaudhry MD Study Date:        9/18/2024            Ordering Provider:    [id number] BROOKE LY MRN/PID:           [id number]             Fellow: Accession#:        [id number]         Nurse: Date of Birth/Age: 1960 / 64 years Sonographer:          Sarahi Weston RDCS Gender:            F                    Additional Staff: Height:            162.56 cm            Admit Date:           9/15/2024 Weight:            141.97 kg            Admission Status:     Inpatient -                                                               Routine BSA / BMI:         2.37 m2 / 53.73      Encounter#:           [id number]                    kg/m2 Blood Pressure:    157/83 mmHg          Department Location:  [facility] Non                                                               Invasive Study Type:    TRANSTHORACIC ECHO (TTE) COMPLETE Diagnosis/ICD: Unspecified atrial fibrillation-I48.91 Indication:    Atrial Fibrillation CPT Code:      Echo Complete w Full Doppler-[id number] Patient History: Smoker:            Current. Pertinent History: COPD, HTN and Hyperlipidemia. Known MS and AS. Study Detail: The following Echo studies were performed: 2D, M-Mode, Doppler and               color flow. Technically challenging study due to patient lying in               supine position and body habitus. The patient was asleep.  PHYSICIAN INTERPRETATION: Left Ventricle: The left ventricular systolic function is normal, with a visually estimated ejection fraction of 60-65%. There are no regional left ventricular wall motion abnormalities. The left ventricular cavity size is normal. Spectral Doppler shows an impaired relaxation pattern of left ventricular diastolic filling. Left Atrium: The left atrium is mildly dilated. Right Ventricle: The right  ventricle is normal in size. There is normal right ventricular global systolic function. Right Atrium: The right atrium is mildly dilated. Aortic Valve: The aortic valve is trileaflet. There is evidence of severe aortic valve stenosis. The aortic valve dimensionless index is 0.37. There is trace to mild aortic valve regurgitation. The peak instantaneous gradient of the aortic valve is 57.5 mmHg. The mean gradient of the aortic valve is 37.1 mmHg. JADYN 0.82cm2 by planimetry. Mitral Valve: The mitral valve is normal in structure. There is evidence of mild mitral valve stenosis. The doppler estimated mean and peak diastolic pressure gradients are 5.9 mmHg and 13.2 mmHg respectively. There is severe mitral annular calcification. There is mild mitral valve regurgitation. Tricuspid Valve: The tricuspid valve is structurally normal. There is mild tricuspid regurgitation. Pulmonic Valve: The pulmonic valve is structurally normal. There is no indication of pulmonic valve regurgitation. Pericardium: There is no pericardial effusion noted. Aorta: The aortic root is normal. Pulmonary Artery: The tricuspid regurgitant velocity is 2.96 m/s, and with an estimated right atrial pressure of 3 mmHg, the estimated pulmonary artery pressure is mildly elevated with the RVSP at 37.9 mmHg. Systemic Veins: The inferior vena cava appears normal in size.  CONCLUSIONS:  1. The left ventricular systolic function is normal, with a visually estimated ejection fraction of 60-65%.  2. Spectral Doppler shows an impaired relaxation pattern of left ventricular diastolic filling.  3. There is normal right ventricular global systolic function.  4. The left atrium is mildly dilated.  5. There is severe mitral annular calcification.  6. Severe aortic valve stenosis. QUANTITATIVE DATA SUMMARY:  2D MEASUREMENTS:          Normal Ranges: IVSd:            1.16 cm  (0.6-1.1cm) LVPWd:           1.12 cm  (0.6-1.1cm) LVIDd:           4.70 cm  (3.9-5.9cm) LVIDs:            3.07 cm LV Mass Index:   83 g/m2 LVEDV Index:     48 ml/m2 LV % FS          34.7 %  LA VOLUME:                    Normal Ranges: LA Vol A4C:        69.9 ml    (22+/-6mL/m2) LA Vol A2C:        73.7 ml LA Vol BP:         72.3 ml LA Vol Index A4C:  29.5 ml/m2 LA Vol Index A2C:  31.1 ml/m2 LA Vol Index BP:   30.6 ml/m2 LA Area A4C:       23.3 cm2 LA Area A2C:       24.1 cm2 LA Major Axis A4C: 6.6 cm LA Major Axis A2C: 6.7 cm LA Volume Index:   30.6 ml/m2 LA Vol A4C:        66.3 ml LA Vol A2C:        71.0 ml LA Vol Index BSA:  29.0 ml/m2  RA VOLUME BY A/L METHOD:          Normal Ranges: RA Area A4C:             19.6 cm2  M-MODE MEASUREMENTS:         Normal Ranges: Ao Root:             3.20 cm (2.0-3.7cm) LAs:                 4.48 cm (2.7-4.0cm)  AORTA MEASUREMENTS:         Normal Ranges: Ao Sinus, d:        3.40 cm (2.1-3.5cm) Asc Ao, d:          3.50 cm (2.1-3.4cm)  LV SYSTOLIC FUNCTION BY 2D PLANIMETRY (MOD):                      Normal Ranges: EF-A4C View:    65 % (>=55%) EF-A2C View:    65 % EF-Biplane:     65 % EF-Visual:      63 % LV EF Reported: 63 %  LV DIASTOLIC FUNCTION:             Normal Ranges: MV Peak E:             0.85 m/s    (0.7-1.2 m/s) MV Peak A:             1.51 m/s    (0.42-0.7 m/s) E/A Ratio:             0.56        (1.0-2.2) MV e'                  0.080 m/s   (>8.0) MV lateral e'          0.09 m/s MV medial e'           0.07 m/s MV A Dur:              165.56 msec E/e' Ratio:            10.62       (<8.0) PulmV Sys Rodriguez:         46.51 cm/s PulmV Cazares Rodriguez:        28.62 cm/s PulmV S/D Rodriguez:         1.62 PulmV A Revs Rodriguez:      24.80 cm/s PulmV A Revs Dur:      93.24 msec  MITRAL VALVE:           Normal Ranges: MV Vmax:      1.82 m/s  (<=1.3m/s) MV peak P.2 mmHg (<5mmHg) MV mean P.9 mmHg  (<2mmHg) MV VTI:       38.89 cm  (10-13cm) MV DT:        127 msec  (150-240msec)  AORTIC VALVE:                      Normal Ranges: AoV Vmax:                3.79 m/s  (<=1.7m/s) AoV Peak PG:              57.5 mmHg (<20mmHg) AoV Mean P.1 mmHg (1.7-11.5mmHg) LVOT Max Rodriguez:            1.32 m/s  (<=1.1m/s) AoV VTI:                 74.00 cm  (18-25cm) LVOT VTI:                27.60 cm LVOT Diameter:           2.01 cm   (1.8-2.4cm) AoV Area, VTI:           1.18 cm2  (2.5-5.5cm2) AoV Area,Vmax:           1.11 cm2  (2.5-4.5cm2) AoV Area, planim:        0.83 cm2  (2.5-4.5cm2) AoV Dimensionless Index: 0.37  RIGHT VENTRICLE: RV Basal 3.30 cm RV Mid   2.40 cm RV Major 8.0 cm TAPSE:   23.0 mm RV s'    0.18 m/s  TRICUSPID VALVE/RVSP:          Normal Ranges: Peak TR Velocity:     2.96 m/s RV Syst Pressure:     38 mmHg  (< 30mmHg) IVC Diam:             2.17 cm  PULMONIC VALVE:           Normal Ranges: PV Max Rodriguez:     1.6 m/s   (0.6-0.9m/s) PV Max PG:      10.7 mmHg  Pulmonary Veins: PulmV A Revs Dur: 93.24 msec PulmV A Revs Rodriguez: 24.80 cm/s PulmV Cazares Rodriguez:   28.62 cm/s PulmV S/D Rodriguez:    1.62 PulmV Sys Rodriguez:    46.51 cm/s  AORTA: Asc Ao Diam 3.53 cm  [id number] Judson Chaudhry MD Electronically signed on 2024 at 1:44:02 PM  ** Final **    === 09/15/24 ===    XR CHEST 1 VIEW    - Impression -  No acute abnormalities    Signed by: Danielle Acosta 2024 8:14 PM  Dictation workstation:   [id number]  === 09/15/24 ===    CT CHEST ABDOMEN PELVIS WO CONTRAST    - Impression -  CHEST  1.  No evidence of acute abnormality in the chest. Mild upper lobe  predominant centrilobular emphysema. Enlarged main pulmonary trunk  measuring up to 3.5 cm in caliber suggestive of pulmonary arterial  hypertension.  2. At least moderate aortic valve atherosclerotic calcifications.  Clinical correlation for aortic stenosis is recommended.    ABDOMEN - PELVIS  1.  No acute abnormality in the abdomen/pelvis. Hepatomegaly.      MACRO:  None    Signed by: Wilmar Bower 2024 1:55 AM  Dictation workstation:   [id number]  === 09/15/24 ===    XR CHEST 1 VIEW    - Impression -  No acute abnormalities    Signed by: Danielle Acosta 2024 8:14  PM  Dictation workstation:   [id number]  === 02/15/17 ===    MRI LUMBAR SPINE WO CONTRAST    - Impression -  *Progressive spondylolisthesis and central disc herniation at L3/L4  THIS EXAMINATION WAS INTERPRETED AT [facility]    Additional results of the last 24 hours have been reviewed.    Dictated using TDX Version 2.4  Proof read however unrecognized voice recognition errors may have occurred     Electronically signed by Anay Chris DO on 09/26/24 at 8:11 AM

## 2024-09-27 ENCOUNTER — PHARMACY VISIT (OUTPATIENT)
Dept: PHARMACY | Facility: CLINIC | Age: 64
End: 2024-09-27
Payer: COMMERCIAL

## 2024-09-27 LAB
ALBUMIN SERPL BCP-MCNC: 2.2 G/DL (ref 3.4–5)
ANION GAP SERPL CALC-SCNC: 9 MMOL/L (ref 10–20)
BASOPHILS # BLD AUTO: 0.02 X10*3/UL (ref 0–0.1)
BASOPHILS NFR BLD AUTO: 0.4 %
BUN SERPL-MCNC: 29 MG/DL (ref 6–23)
CALCIUM SERPL-MCNC: 7.7 MG/DL (ref 8.6–10.3)
CHLORIDE SERPL-SCNC: 109 MMOL/L (ref 98–107)
CO2 SERPL-SCNC: 23 MMOL/L (ref 21–32)
CREAT SERPL-MCNC: 0.83 MG/DL (ref 0.5–1.05)
EGFRCR SERPLBLD CKD-EPI 2021: 78 ML/MIN/1.73M*2
EOSINOPHIL # BLD AUTO: 0.16 X10*3/UL (ref 0–0.7)
EOSINOPHIL NFR BLD AUTO: 2.9 %
ERYTHROCYTE [DISTWIDTH] IN BLOOD BY AUTOMATED COUNT: 15.5 % (ref 11.5–14.5)
GLUCOSE SERPL-MCNC: 74 MG/DL (ref 74–99)
HCT VFR BLD AUTO: 34.9 % (ref 36–46)
HGB BLD-MCNC: 11.2 G/DL (ref 12–16)
IMM GRANULOCYTES # BLD AUTO: 0.05 X10*3/UL (ref 0–0.7)
IMM GRANULOCYTES NFR BLD AUTO: 0.9 % (ref 0–0.9)
INR PPP: 3.4 (ref 0.9–1.1)
LYMPHOCYTES # BLD AUTO: 0.75 X10*3/UL (ref 1.2–4.8)
LYMPHOCYTES NFR BLD AUTO: 13.4 %
MAGNESIUM SERPL-MCNC: 1.94 MG/DL (ref 1.6–2.4)
MCH RBC QN AUTO: 33.8 PG (ref 26–34)
MCHC RBC AUTO-ENTMCNC: 32.1 G/DL (ref 32–36)
MCV RBC AUTO: 105 FL (ref 80–100)
MONOCYTES # BLD AUTO: 0.42 X10*3/UL (ref 0.1–1)
MONOCYTES NFR BLD AUTO: 7.5 %
NEUTROPHILS # BLD AUTO: 4.19 X10*3/UL (ref 1.2–7.7)
NEUTROPHILS NFR BLD AUTO: 74.9 %
NRBC BLD-RTO: 0.4 /100 WBCS (ref 0–0)
PHOSPHATE SERPL-MCNC: 2.8 MG/DL (ref 2.5–4.9)
PLATELET # BLD AUTO: 350 X10*3/UL (ref 150–450)
POTASSIUM SERPL-SCNC: 4.8 MMOL/L (ref 3.5–5.3)
PROTHROMBIN TIME: 39.2 SECONDS (ref 9.8–12.8)
RBC # BLD AUTO: 3.31 X10*6/UL (ref 4–5.2)
SODIUM SERPL-SCNC: 136 MMOL/L (ref 136–145)
WBC # BLD AUTO: 5.6 X10*3/UL (ref 4.4–11.3)

## 2024-09-27 PROCEDURE — 85025 COMPLETE CBC W/AUTO DIFF WBC: CPT | Performed by: INTERNAL MEDICINE

## 2024-09-27 PROCEDURE — 97116 GAIT TRAINING THERAPY: CPT | Mod: GP,CQ

## 2024-09-27 PROCEDURE — 2500000005 HC RX 250 GENERAL PHARMACY W/O HCPCS: Performed by: NURSE PRACTITIONER

## 2024-09-27 PROCEDURE — 2500000004 HC RX 250 GENERAL PHARMACY W/ HCPCS (ALT 636 FOR OP/ED): Performed by: NURSE PRACTITIONER

## 2024-09-27 PROCEDURE — 2500000001 HC RX 250 WO HCPCS SELF ADMINISTERED DRUGS (ALT 637 FOR MEDICARE OP): Performed by: NURSE PRACTITIONER

## 2024-09-27 PROCEDURE — 80069 RENAL FUNCTION PANEL: CPT | Performed by: INTERNAL MEDICINE

## 2024-09-27 PROCEDURE — 85610 PROTHROMBIN TIME: CPT | Performed by: INTERNAL MEDICINE

## 2024-09-27 PROCEDURE — 97530 THERAPEUTIC ACTIVITIES: CPT | Mod: GP,CQ

## 2024-09-27 PROCEDURE — 2500000001 HC RX 250 WO HCPCS SELF ADMINISTERED DRUGS (ALT 637 FOR MEDICARE OP): Performed by: INTERNAL MEDICINE

## 2024-09-27 PROCEDURE — 2500000002 HC RX 250 W HCPCS SELF ADMINISTERED DRUGS (ALT 637 FOR MEDICARE OP, ALT 636 FOR OP/ED): Performed by: EMERGENCY MEDICINE

## 2024-09-27 PROCEDURE — 99232 SBSQ HOSP IP/OBS MODERATE 35: CPT | Performed by: INTERNAL MEDICINE

## 2024-09-27 PROCEDURE — 2500000002 HC RX 250 W HCPCS SELF ADMINISTERED DRUGS (ALT 637 FOR MEDICARE OP, ALT 636 FOR OP/ED): Performed by: INTERNAL MEDICINE

## 2024-09-27 PROCEDURE — 2500000004 HC RX 250 GENERAL PHARMACY W/ HCPCS (ALT 636 FOR OP/ED): Mod: JZ | Performed by: INTERNAL MEDICINE

## 2024-09-27 PROCEDURE — 36415 COLL VENOUS BLD VENIPUNCTURE: CPT | Performed by: INTERNAL MEDICINE

## 2024-09-27 PROCEDURE — 1100000001 HC PRIVATE ROOM DAILY

## 2024-09-27 PROCEDURE — S4991 NICOTINE PATCH NONLEGEND: HCPCS | Performed by: EMERGENCY MEDICINE

## 2024-09-27 PROCEDURE — RXMED WILLOW AMBULATORY MEDICATION CHARGE

## 2024-09-27 PROCEDURE — 2500000004 HC RX 250 GENERAL PHARMACY W/ HCPCS (ALT 636 FOR OP/ED): Performed by: INTERNAL MEDICINE

## 2024-09-27 PROCEDURE — 83735 ASSAY OF MAGNESIUM: CPT | Performed by: INTERNAL MEDICINE

## 2024-09-27 PROCEDURE — 2500000001 HC RX 250 WO HCPCS SELF ADMINISTERED DRUGS (ALT 637 FOR MEDICARE OP): Performed by: FAMILY MEDICINE

## 2024-09-27 RX ORDER — NYSTATIN 100000 U/G
CREAM TOPICAL 2 TIMES DAILY
Qty: 30 G | Refills: 0 | Status: SHIPPED | OUTPATIENT
Start: 2024-09-27

## 2024-09-27 RX ORDER — PETROLATUM 420 MG/G
1 OINTMENT TOPICAL 2 TIMES DAILY
Qty: 50 G | Refills: 0 | Status: SHIPPED | OUTPATIENT
Start: 2024-09-27

## 2024-09-27 RX ORDER — CIPROFLOXACIN 500 MG/1
500 TABLET ORAL 2 TIMES DAILY
Qty: 20 TABLET | Refills: 0 | Status: SHIPPED | OUTPATIENT
Start: 2024-09-28 | End: 2024-10-08

## 2024-09-27 RX ORDER — EAR PLUGS
1 EACH OTIC (EAR) 3 TIMES DAILY
Qty: 57 G | Refills: 1 | Status: SHIPPED | OUTPATIENT
Start: 2024-09-27

## 2024-09-27 RX ORDER — AMOXICILLIN 250 MG
2 CAPSULE ORAL 2 TIMES DAILY
Qty: 120 TABLET | Refills: 0 | Status: SHIPPED | OUTPATIENT
Start: 2024-09-27

## 2024-09-27 ASSESSMENT — PAIN SCALES - GENERAL
PAINLEVEL_OUTOF10: 9
PAINLEVEL_OUTOF10: 7
PAINLEVEL_OUTOF10: 3
PAINLEVEL_OUTOF10: 2
PAINLEVEL_OUTOF10: 4
PAINLEVEL_OUTOF10: 1
PAINLEVEL_OUTOF10: 7
PAINLEVEL_OUTOF10: 6
PAINLEVEL_OUTOF10: 7
PAINLEVEL_OUTOF10: 4

## 2024-09-27 ASSESSMENT — COGNITIVE AND FUNCTIONAL STATUS - GENERAL
TURNING FROM BACK TO SIDE WHILE IN FLAT BAD: A LITTLE
DAILY ACTIVITIY SCORE: 15
MOVING FROM LYING ON BACK TO SITTING ON SIDE OF FLAT BED WITH BEDRAILS: A LITTLE
WALKING IN HOSPITAL ROOM: A LOT
HELP NEEDED FOR BATHING: A LOT
STANDING UP FROM CHAIR USING ARMS: A LITTLE
DRESSING REGULAR LOWER BODY CLOTHING: A LOT
TURNING FROM BACK TO SIDE WHILE IN FLAT BAD: A LITTLE
MOBILITY SCORE: 20
DAILY ACTIVITIY SCORE: 15
TOILETING: A LOT
PERSONAL GROOMING: A LITTLE
MOBILITY SCORE: 16
WALKING IN HOSPITAL ROOM: A LOT
STANDING UP FROM CHAIR USING ARMS: A LITTLE
WALKING IN HOSPITAL ROOM: A LITTLE
TOILETING: A LOT
DRESSING REGULAR LOWER BODY CLOTHING: A LOT
PERSONAL GROOMING: A LITTLE
HELP NEEDED FOR BATHING: A LOT
DRESSING REGULAR UPPER BODY CLOTHING: A LOT
MOVING FROM LYING ON BACK TO SITTING ON SIDE OF FLAT BED WITH BEDRAILS: A LITTLE
STANDING UP FROM CHAIR USING ARMS: A LITTLE
MOBILITY SCORE: 16
MOVING TO AND FROM BED TO CHAIR: A LITTLE
DRESSING REGULAR UPPER BODY CLOTHING: A LOT
CLIMB 3 TO 5 STEPS WITH RAILING: A LOT
CLIMB 3 TO 5 STEPS WITH RAILING: A LOT
CLIMB 3 TO 5 STEPS WITH RAILING: A LITTLE

## 2024-09-27 ASSESSMENT — PAIN - FUNCTIONAL ASSESSMENT
PAIN_FUNCTIONAL_ASSESSMENT: 0-10

## 2024-09-27 ASSESSMENT — PAIN SCALES - PAIN ASSESSMENT IN ADVANCED DEMENTIA (PAINAD)
TOTALSCORE: MEDICATION (SEE MAR)
TOTALSCORE: REPOSITIONED;MEDICATION (SEE MAR)

## 2024-09-27 ASSESSMENT — PAIN DESCRIPTION - LOCATION
LOCATION: LEG
LOCATION: LEG

## 2024-09-27 ASSESSMENT — PAIN DESCRIPTION - ORIENTATION
ORIENTATION: LEFT
ORIENTATION: RIGHT;LEFT

## 2024-09-27 NOTE — CARE PLAN
The patient's goals for the shift include      The clinical goals for the shift include Patient will remain HDS this shift      Problem: Pain - Adult  Goal: Verbalizes/displays adequate comfort level or baseline comfort level  Outcome: Progressing     Problem: Safety - Adult  Goal: Free from fall injury  Outcome: Progressing     Problem: Chronic Conditions and Co-morbidities  Goal: Patient's chronic conditions and co-morbidity symptoms are monitored and maintained or improved  Outcome: Progressing     Problem: Skin  Goal: Decreased wound size/increased tissue granulation at next dressing change  Outcome: Progressing  Goal: Participates in plan/prevention/treatment measures  Outcome: Progressing  Goal: Prevent/manage excess moisture  Outcome: Progressing  Goal: Promote skin healing  Outcome: Progressing     Problem: Fall/Injury  Goal: Not fall by end of shift  Outcome: Progressing  Goal: Be free from injury by end of the shift  Outcome: Progressing  Goal: Verbalize understanding of personal risk factors for fall in the hospital  Outcome: Progressing  Goal: Verbalize understanding of risk factor reduction measures to prevent injury from fall in the home  Outcome: Progressing  Goal: Use assistive devices by end of the shift  Outcome: Progressing  Goal: Pace activities to prevent fatigue by end of the shift  Outcome: Progressing     Problem: Pain  Goal: Turns in bed with improved pain control throughout the shift  Outcome: Progressing  Goal: Free from opioid side effects throughout the shift  Outcome: Progressing  Goal: Free from acute confusion related to pain meds throughout the shift  Outcome: Progressing

## 2024-09-27 NOTE — CARE PLAN
The patient's goals for the shift include      The clinical goals for the shift include Patient will remain HDS this shift

## 2024-09-27 NOTE — PROGRESS NOTES
[facility] Hospitalist Progress Note       [phone]: Please page me for patient care issues.  [phone]: Please page night hospitalist for any issues.     Neisha Graham  :  1960(64 y.o.)  MRN:  [id number]  PCP: Micheal Gonsalez DO      Principal Problem:    ALBERTINA (acute kidney injury) (CMS-[facility])  Active Problems:    Generalized weakness    Hyperkalemia      Assessment and Plan:     Neisha Graham is a 64 y.o. female with PMH BLE lymphadema with nonhealing wounds, aortic stenosis, and A.Fib who was admitted with BLE cellulitis and non-healing wounds.     #B/L LE cellulitis (MRSA) w/ chronic non healing wounds  #Pseudomonas bacteremia,   -etiology unclear but suspect  ie nephrolithiasis w/ spontaneous resolution  #ALBERTINA, resolved  #Hyperkalemia, resolved s/p lokemia  #Acute on chronic hyponatremia of unclear etiology, near resolution  #Constipation  #Positive blood culture (Aerococcus urinae and Corynebacterium), likely contamination  #Chronic b/l LE lymphedema  #Paroxysmal Afib  #Mod-severe aortic stenosis  #COPD w/o exacerbation  #BE  #Tobacco abuse  #Chronic pain on chronic prescription of opiates  #Class III obesity with serious co morbidities, BMI 53.85 kg/m².  -on daptomycin->dapto/cefepime w/ plans to DC home on 10 day course of cipro  -awaiting repeat (24) Bcx  -PRN IVF  -free water restriction, liberalize salt intake, check urine lytes  -hold home lisinopril due to ALBERTINA  -permissive HTN, caution with nephrotoxins  -resume rest of home meds as indicated  -outpatient wound clinic referral placed  - recs appreciated: ID, podiatry    Disposition:await test results, await consultant recommendations, await clinical improvement, and anticipate discharge  vs     DVT Prophylaxis: full anticoagulation coumadin    Code status: Full Code  Diet: Adult diet Regular; 1800 mL fluid    Level of MDM:  High    I personally examined the patient and I personally reviewed chart, data, labs radiology  reports    Family Communication  Number :   Name of Designated Family Representative:       Total time spent: 35 minutes, of total time providing counseling or in coordination of care. Total time on this day of visit includes record and documentation review before and after visit including documentation and time not explicitly included on EMR time stamp      Subjective:   Interval History:  HPI  The patient complains of b/l LE cellulitis  The patient feels their symptoms areimproving  no events or new concerns    Scheduled Meds:cefepime, 2 g, intravenous, q12h  [Held by provider] dilTIAZem CD, 180 mg, oral, Daily  DULoxetine, 20 mg, oral, Daily  eucerin, , Topical, BID  influenza, 0.5 mL, intramuscular, During hospitalization  tiotropium, 2 puff, inhalation, Daily   And  fluticasone furoate-vilanteroL, 1 puff, inhalation, Daily  hydroxyurea, 1,000 mg, oral, Daily  nicotine, 1 patch, transdermal, Daily  nystatin, , Topical, BID  polyethylene glycol, 17 g, oral, BID  pramipexole, 1 mg, oral, BID  pregabalin, 25 mg, oral, BID  sennosides-docusate sodium, 2 tablet, oral, BID  sodium hypochlorite, , irrigation, Daily  warfarin, 3 mg, oral, Once per day on Monday Wednesday Friday  [Held by provider] warfarin, 7.5 mg, oral, Daily  white petrolatum, , Topical, BID  zinc oxide, 1 Application, Topical, TID      Continuous Infusions:     PRN Meds:PRN medications: acetaminophen **OR** [DISCONTINUED] acetaminophen **OR** [DISCONTINUED] acetaminophen, albuterol, cyclobenzaprine, hydrOXYzine HCL, naloxone, ondansetron **OR** ondansetron, oxyCODONE, oxyCODONE    Review of Systems   All other systems reviewed and are negative.    Interval Pertinent History:  Social History     Tobacco Use    Smoking status: Some Days     Types: Cigarettes    Smokeless tobacco: Never   Substance Use Topics    Alcohol use: Not on file         Objective:   Patient Vitals for the past 24 hrs:   BP Temp Temp src Pulse Resp SpO2   09/27/24 0500 111/72  36.7 °C (98.1 °F) Temporal 100 16 100 %   24 1957 129/72 36.5 °C (97.7 °F) Temporal 109 18 96 %   24 1315 119/77 36.8 °C (98.2 °F) Temporal (!) 111 20 95 %       Average, Min, and Max for last 24 hours Vitals:  TEMPERATURE:  Temp  Av.7 °C (98 °F)  Min: 36.5 °C (97.7 °F)  Max: 36.8 °C (98.2 °F)    RESPIRATIONS RANGE: Resp  Av  Min: 16  Max: 20    PULSE RANGE: Pulse  Av.7  Min: 100  Max: 111    BLOOD PRESSURE RANGE:  Systolic (24hrs), Av , Min:111 , Max:129   ; Diastolic (24hrs), Av, Min:72, Max:77      PULSE OXIMETRY RANGE: SpO2  Av %  Min: 95 %  Max: 100 %  Body mass index is 53.85 kg/m².    I/O last 3 completed shifts:  In: 940 (6.6 mL/kg) [P.O.:840; IV Piggyback:100]  Out: 1550 (10.9 mL/kg) [Urine:1550 (0.3 mL/kg/hr)]  Weight: 142.3 kg   Weight change:      Physical Exam  Vitals and nursing note reviewed.   Constitutional:       Appearance: Normal appearance. She is morbidly obese. She is ill-appearing.   HENT:      Head: Normocephalic and atraumatic.      Right Ear: External ear normal.      Left Ear: External ear normal.      Nose: Nose normal.      Mouth/Throat:      Mouth: Mucous membranes are moist.   Eyes:      General: No scleral icterus.        Right eye: No discharge.         Left eye: No discharge.      Extraocular Movements: Extraocular movements intact.      Conjunctiva/sclera: Conjunctivae normal.      Pupils: Pupils are equal, round, and reactive to light.   Cardiovascular:      Rate and Rhythm: Normal rate and regular rhythm.   Pulmonary:      Effort: Pulmonary effort is normal.      Breath sounds: Normal breath sounds.   Abdominal:      General: Abdomen is flat. Bowel sounds are normal.      Palpations: Abdomen is soft.   Musculoskeletal:         General: Normal range of motion.      Right lower leg: Edema present.      Left lower leg: Edema present.   Skin:     General: Skin is warm and dry.      Capillary Refill: Capillary refill takes less than 2  seconds.      Findings: Wound present.   Neurological:      General: No focal deficit present.   Psychiatric:         Mood and Affect: Mood normal.         Thought Content: Thought content normal.         Judgment: Judgment normal.             Lab Results   Component Value Date     09/27/2024    K 4.8 09/27/2024     (H) 09/27/2024    CO2 23 09/27/2024    BUN 29 (H) 09/27/2024    CREATININE 0.83 09/27/2024    GLUCOSE 74 09/27/2024    CALCIUM 7.7 (L) 09/27/2024    PROT 5.5 (L) 09/23/2024    BILITOT 0.6 09/23/2024    ALKPHOS 247 (H) 09/23/2024    AST 25 09/23/2024    ALT 14 09/23/2024       Lab Results   Component Value Date    WBC 5.6 09/27/2024    HGB 11.2 (L) 09/27/2024    HCT 34.9 (L) 09/27/2024     (H) 09/27/2024     09/27/2024    LYMPHOPCT 13.4 09/27/2024    RBC 3.31 (L) 09/27/2024    MCH 33.8 09/27/2024    MCHC 32.1 09/27/2024    RDW 15.5 (H) 09/27/2024    CRP 16.36 (H) 09/16/2024       Lab Results   Component Value Date    TSH 2.16 11/14/2022     Lab Results   Component Value Date    LACTATE 2.1 (H) 09/23/2024    TROPONINI 0.13 (HH) 02/04/2021    BNP 87 09/15/2024    INR 3.4 (H) 09/27/2024       IMAGES:  Encounter Date: 09/15/24   ECG 12 lead   Result Value    Ventricular Rate 140    Atrial Rate 140    ND Interval 148    QRS Duration 78    QT Interval 264    QTC Calculation(Bazett) 403    P Axis 69    R Axis 64    T Axis 70    QRS Count 23    Q Onset 214    P Onset 140    P Offset 190    T Offset 346    QTC Fredericia 350    Narrative    Sinus tachycardia  Otherwise normal ECG  When compared with ECG of 16-SEP-2024 08:10, (unconfirmed)  No significant change was found     XR chest 1 view    Result Date: 9/23/2024  Impression: No acute abnormalities   Signed by: Danielle Acosta 9/23/2024 8:14 PM Dictation workstation:   [id number]    Transthoracic Echo (TTE) Complete    Result Date: 9/18/2024   [facility], [address] [place] [address]               Tel  [phone] and Fax [phone] TRANSTHORACIC ECHOCARDIOGRAM REPORT  Patient Name:      LION PEREZ      Reading Physician:    [id number] Judson Chaudhry MD Study Date:        9/18/2024            Ordering Provider:    [id number] BROOKE LY MRN/PID:           [id number]             Fellow: Accession#:        [id number]         Nurse: Date of Birth/Age: 1960 / 64 years Sonographer:          Sarahi Weston RDCS Gender:            F                    Additional Staff: Height:            162.56 cm            Admit Date:           9/15/2024 Weight:            141.97 kg            Admission Status:     Inpatient -                                                               Routine BSA / BMI:         2.37 m2 / 53.73      Encounter#:           [id number]                    kg/m2 Blood Pressure:    157/83 mmHg          Department Location:  [facility]                                                               Invasive Study Type:    TRANSTHORACIC ECHO (TTE) COMPLETE Diagnosis/ICD: Unspecified atrial fibrillation-I48.91 Indication:    Atrial Fibrillation CPT Code:      Echo Complete w Full [id number] Patient History: Smoker:            Current. Pertinent History: COPD, HTN and Hyperlipidemia. Known MS and AS. Study Detail: The following Echo studies were performed: 2D, M-Mode, Doppler and               color flow. Technically challenging study due to patient lying in               supine position and body habitus. The patient was asleep.  PHYSICIAN INTERPRETATION: Left Ventricle: The left ventricular systolic function is normal, with a visually estimated ejection fraction of 60-65%. There are no regional left ventricular wall motion abnormalities. The left ventricular cavity size is normal. Spectral Doppler shows an impaired relaxation pattern of left ventricular diastolic filling.  Left Atrium: The left atrium is mildly dilated. Right Ventricle: The right ventricle is normal in size. There is normal right ventricular global systolic function. Right Atrium: The right atrium is mildly dilated. Aortic Valve: The aortic valve is trileaflet. There is evidence of severe aortic valve stenosis. The aortic valve dimensionless index is 0.37. There is trace to mild aortic valve regurgitation. The peak instantaneous gradient of the aortic valve is 57.5 mmHg. The mean gradient of the aortic valve is 37.1 mmHg. JADYN 0.82cm2 by planimetry. Mitral Valve: The mitral valve is normal in structure. There is evidence of mild mitral valve stenosis. The doppler estimated mean and peak diastolic pressure gradients are 5.9 mmHg and 13.2 mmHg respectively. There is severe mitral annular calcification. There is mild mitral valve regurgitation. Tricuspid Valve: The tricuspid valve is structurally normal. There is mild tricuspid regurgitation. Pulmonic Valve: The pulmonic valve is structurally normal. There is no indication of pulmonic valve regurgitation. Pericardium: There is no pericardial effusion noted. Aorta: The aortic root is normal. Pulmonary Artery: The tricuspid regurgitant velocity is 2.96 m/s, and with an estimated right atrial pressure of 3 mmHg, the estimated pulmonary artery pressure is mildly elevated with the RVSP at 37.9 mmHg. Systemic Veins: The inferior vena cava appears normal in size.  CONCLUSIONS:  1. The left ventricular systolic function is normal, with a visually estimated ejection fraction of 60-65%.  2. Spectral Doppler shows an impaired relaxation pattern of left ventricular diastolic filling.  3. There is normal right ventricular global systolic function.  4. The left atrium is mildly dilated.  5. There is severe mitral annular calcification.  6. Severe aortic valve stenosis. QUANTITATIVE DATA SUMMARY:  2D MEASUREMENTS:          Normal Ranges: IVSd:            1.16 cm  (0.6-1.1cm) LVPWd:            1.12 cm  (0.6-1.1cm) LVIDd:           4.70 cm  (3.9-5.9cm) LVIDs:           3.07 cm LV Mass Index:   83 g/m2 LVEDV Index:     48 ml/m2 LV % FS          34.7 %  LA VOLUME:                    Normal Ranges: LA Vol A4C:        69.9 ml    (22+/-6mL/m2) LA Vol A2C:        73.7 ml LA Vol BP:         72.3 ml LA Vol Index A4C:  29.5 ml/m2 LA Vol Index A2C:  31.1 ml/m2 LA Vol Index BP:   30.6 ml/m2 LA Area A4C:       23.3 cm2 LA Area A2C:       24.1 cm2 LA Major Axis A4C: 6.6 cm LA Major Axis A2C: 6.7 cm LA Volume Index:   30.6 ml/m2 LA Vol A4C:        66.3 ml LA Vol A2C:        71.0 ml LA Vol Index BSA:  29.0 ml/m2  RA VOLUME BY A/L METHOD:          Normal Ranges: RA Area A4C:             19.6 cm2  M-MODE MEASUREMENTS:         Normal Ranges: Ao Root:             3.20 cm (2.0-3.7cm) LAs:                 4.48 cm (2.7-4.0cm)  AORTA MEASUREMENTS:         Normal Ranges: Ao Sinus, d:        3.40 cm (2.1-3.5cm) Asc Ao, d:          3.50 cm (2.1-3.4cm)  LV SYSTOLIC FUNCTION BY 2D PLANIMETRY (MOD):                      Normal Ranges: EF-A4C View:    65 % (>=55%) EF-A2C View:    65 % EF-Biplane:     65 % EF-Visual:      63 % LV EF Reported: 63 %  LV DIASTOLIC FUNCTION:             Normal Ranges: MV Peak E:             0.85 m/s    (0.7-1.2 m/s) MV Peak A:             1.51 m/s    (0.42-0.7 m/s) E/A Ratio:             0.56        (1.0-2.2) MV e'                  0.080 m/s   (>8.0) MV lateral e'          0.09 m/s MV medial e'           0.07 m/s MV A Dur:              165.56 msec E/e' Ratio:            10.62       (<8.0) PulmV Sys Rodriguez:         46.51 cm/s PulmV Cazares Rodriguez:        28.62 cm/s PulmV S/D Rodriguez:         1.62 PulmV A Revs Rodriguez:      24.80 cm/s PulmV A Revs Dur:      93.24 msec  MITRAL VALVE:           Normal Ranges: MV Vmax:      1.82 m/s  (<=1.3m/s) MV peak P.2 mmHg (<5mmHg) MV mean P.9 mmHg  (<2mmHg) MV VTI:       38.89 cm  (10-13cm) MV DT:        127 msec  (150-240msec)  AORTIC VALVE:                      Normal  Ranges: AoV Vmax:                3.79 m/s  (<=1.7m/s) AoV Peak P.5 mmHg (<20mmHg) AoV Mean P.1 mmHg (1.7-11.5mmHg) LVOT Max Rodriguez:            1.32 m/s  (<=1.1m/s) AoV VTI:                 74.00 cm  (18-25cm) LVOT VTI:                27.60 cm LVOT Diameter:           2.01 cm   (1.8-2.4cm) AoV Area, VTI:           1.18 cm2  (2.5-5.5cm2) AoV Area,Vmax:           1.11 cm2  (2.5-4.5cm2) AoV Area, planim:        0.83 cm2  (2.5-4.5cm2) AoV Dimensionless Index: 0.37  RIGHT VENTRICLE: RV Basal 3.30 cm RV Mid   2.40 cm RV Major 8.0 cm TAPSE:   23.0 mm RV s'    0.18 m/s  TRICUSPID VALVE/RVSP:          Normal Ranges: Peak TR Velocity:     2.96 m/s RV Syst Pressure:     38 mmHg  (< 30mmHg) IVC Diam:             2.17 cm  PULMONIC VALVE:           Normal Ranges: PV Max Rodriguez:     1.6 m/s   (0.6-0.9m/s) PV Max PG:      10.7 mmHg  Pulmonary Veins: PulmV A Revs Dur: 93.24 msec PulmV A Revs Rodriguez: 24.80 cm/s PulmV Cazares Rodriguez:   28.62 cm/s PulmV S/D Rodriguez:    1.62 PulmV Sys Rodriguez:    46.51 cm/s  AORTA: Asc Ao Diam 3.53 cm  [id number] Judson Chaudhry MD Electronically signed on 2024 at 1:44:02 PM  ** Final **    === 09/15/24 ===    XR CHEST 1 VIEW    - Impression -  No acute abnormalities    Signed by: Danielle Acosta 2024 8:14 PM  Dictation workstation:   [id number]  === 09/15/24 ===    CT CHEST ABDOMEN PELVIS WO CONTRAST    - Impression -  CHEST  1.  No evidence of acute abnormality in the chest. Mild upper lobe  predominant centrilobular emphysema. Enlarged main pulmonary trunk  measuring up to 3.5 cm in caliber suggestive of pulmonary arterial  hypertension.  2. At least moderate aortic valve atherosclerotic calcifications.  Clinical correlation for aortic stenosis is recommended.    ABDOMEN - PELVIS  1.  No acute abnormality in the abdomen/pelvis. Hepatomegaly.      MACRO:  None    Signed by: Wilmar Bower 2024 1:55 AM  Dictation workstation:   [id number]  === 09/15/24 ===    XR CHEST 1 VIEW    -  Impression -  No acute abnormalities    Signed by: Danielle Acosta 9/23/2024 8:14 PM  Dictation workstation:   [id number]  === 02/15/17 ===    MRI LUMBAR SPINE WO CONTRAST    - Impression -  *Progressive spondylolisthesis and central disc herniation at L3/L4  THIS EXAMINATION WAS INTERPRETED AT [facility]    Additional results of the last 24 hours have been reviewed.    Dictated using [employer] Version 2.4  Proof read however unrecognized voice recognition errors may have occurred     Electronically signed by Anay Chris DO on 09/27/24 at 8:24 AM

## 2024-09-27 NOTE — PROGRESS NOTES
09/27/24 1000   Discharge Planning   Living Arrangements Alone   Support Systems Children;Spouse/significant other   Assistance Needed A&Ox3, mobilizes with a rollator or wheelchair, has walk in shower, does not drive (transport thru insurance provide a ride), room air (does have o2 set up at home but doesn't use), no CPAP/BIPAP, active with [facility] for SN, PCP Dr. Micheal Gonsalez   Type of Residence Private residence   Number of Stairs to Enter Residence 0   Number of Stairs Within Residence 0   Do you have animals or pets at home? No   Home or Post Acute Services In home services   Type of Home Care Services Home nursing visits   Expected Discharge Disposition Home Health  (PT recommending low frequency skilled services. Discussed with patient preference is to return home alone with resumed [facility] upon d/c. Referral sent in [facility]. Patient will need transport arranged on d/c. May need ambulance transport.)   Does the patient need discharge transport arranged? Yes   RoundTrip coordination needed? Yes   Has discharge transport been arranged? No

## 2024-09-27 NOTE — PROGRESS NOTES
Neisha Graham is a 64 y.o. female on day 11 of admission presenting with ALBERTINA (acute kidney injury) (CMS-[facility]).    Subjective   Interval History: no fever, uncomfortable in her new bed       Review of Systems    Objective   Range of Vitals (last 24 hours)  Heart Rate:  [100-111]   Temp:  [36.5 °C (97.7 °F)-36.8 °C (98.2 °F)]   Resp:  [16-20]   BP: (111-129)/(72-77)   SpO2:  [95 %-100 %]   Daily Weight  09/16/24 : 142 kg (313 lb 11.4 oz)    Body mass index is 53.85 kg/m².    Physical Exam  Constitutional:       Appearance: Normal appearance.   HENT:      Head: Normocephalic and atraumatic.      Mouth/Throat:      Mouth: Mucous membranes are moist.      Pharynx: Oropharynx is clear.   Eyes:      Pupils: Pupils are equal, round, and reactive to light.   Cardiovascular:      Rate and Rhythm: Normal rate and regular rhythm.      Heart sounds: Normal heart sounds.   Pulmonary:      Effort: Pulmonary effort is normal.      Breath sounds: Normal breath sounds.   Abdominal:      General: Abdomen is flat. Bowel sounds are normal.      Palpations: Abdomen is soft.   Musculoskeletal:      Cervical back: Normal range of motion.      Comments: Stasis / wounds   Neurological:      Mental Status: She is alert.         Antibiotics  cefepime - 2 gram/100 mL    Relevant Results  Labs  Results from last 72 hours   Lab Units 09/27/24  0648 09/26/24  0514 09/25/24  0804   WBC AUTO x10*3/uL 5.6 12.0* 17.1*   HEMOGLOBIN g/dL 11.2* 11.9* 11.8*   HEMATOCRIT % 34.9* 38.1 35.5*   PLATELETS AUTO x10*3/uL 350 368 288   NEUTROS PCT AUTO % 74.9 86.9 84.0   LYMPHS PCT AUTO % 13.4 7.9 8.0   MONOS PCT AUTO % 7.5 2.6 4.0   EOS PCT AUTO % 2.9 1.3 0.5     Results from last 72 hours   Lab Units 09/27/24  0648 09/26/24  0514 09/25/24  0755   SODIUM mmol/L 136 131* 128*   POTASSIUM mmol/L 4.8 4.5 4.9   CHLORIDE mmol/L 109* 104 102   CO2 mmol/L 23 21 18*   BUN mg/dL 29* 40* 41*   CREATININE mg/dL 0.83 1.04 1.22*   GLUCOSE mg/dL 74 84 91   CALCIUM mg/dL 7.7*  8.1* 7.4*   ANION GAP mmol/L 9* 11 13   EGFR mL/min/1.73m*2 78 60* 49*   PHOSPHORUS mg/dL 2.8 3.6 3.8     Results from last 72 hours   Lab Units 09/27/24  0648 09/26/24  0514 09/25/24  0755   ALBUMIN g/dL 2.2* 2.6* 2.2*       Estimated Creatinine Clearance: 95.6 mL/min (by C-G formula based on SCr of 0.83 mg/dL).  C-Reactive Protein   Date Value Ref Range Status   09/16/2024 16.36 (H) <1.00 mg/dL Final   08/12/2024 7.94 (H) <1.00 mg/dL Final     CRP   Date Value Ref Range Status   06/19/2020 1.84 (A) mg/dL Final     Comment:     REF VALUE  < 1.00       Microbiology  Susceptibility data from last 14 days.  Collected Specimen Info Organism Cefepime Ceftazidime Ciprofloxacin Clindamycin Erythromycin Gentamicin Levofloxacin Oxacillin Piperacillin/Tazobactam Tetracycline Tobramycin Trimethoprim/Sulfamethoxazole Vancomycin   09/23/24 Blood culture from Peripheral Venipuncture Pseudomonas  S  S  S    S  S   S   S  S    09/15/24 Tissue/Biopsy from Buttock Methicillin Resistant Staphylococcus aureus (MRSA)     R  R    R   R   S  S   09/15/24 Blood culture from Peripheral Venipuncture Aerococcus urinae                  Corynebacterium                Imaging  Reviewed        Assessment/Plan   Fever, the temp is down, BC with aerococcus / Corynebacterium, likely a contaminant, had fever yesterday, BC with Pseudomonas, the repeat BC is pending, ct no hydro  Legs stasis / wounds / likely cellulitis, the culture with MRSA  Encephalopathy, likely metabolic     Recommendations :  Continue Cefepime   Discussed with the medical team     I spent minutes in the professional and overall care of this patient.      Gorge Myers MD

## 2024-09-27 NOTE — PROGRESS NOTES
Physical Therapy    Physical Therapy Treatment    Patient Name: Neisha Graham  MRN: [id number]  Department: [address]  Room: [address] [place] [address]  Today's Date: 9/27/2024  Time Calculation  Start Time: 1400  Stop Time: 1430  Time Calculation (min): 30 min         Assessment/Plan   PT Assessment  PT Assessment Results: Decreased strength, Decreased endurance, Impaired balance, Decreased mobility  Rehab Prognosis: Good  Barriers to Discharge: Pain control  End of Session Communication: Bedside nurse (Patient requesting tubi sleeve for B feet and breathing tx; RN aware)  Assessment Comment: Patient fatigues easily which increases falls risk, Recommend 24 hr S/Low intensity f/u.  End of Session Patient Position: Bed, 3 rail up, Alarm on (all needs within reach)  PT Plan  Inpatient/Swing Bed or Outpatient: Inpatient  PT Plan  Treatment/Interventions: Bed mobility, Transfer training, Gait training, Strengthening, Endurance training  PT Plan: Ongoing PT  PT Frequency: 3 times per week  PT Discharge Recommendations: Low intensity level of continued care  Equipment Recommended upon Discharge: Wheeled walker, Wheelchair (owns)  PT Recommended Transfer Status: Assist x1  PT - OK to Discharge: Yes      General Visit Information:   PT  Visit  Response to Previous Treatment: Patient with no complaints from previous session.  General  Reason for Referral: Impaired functional mobility; ALBERTINA, B LE cellulitis  Referred By: Faiza Chino  Past Medical History Relevant to Rehab: Essential thrombocytosis.  Central venous sinuses thrombosis.  Tobacco use disorder.  Lymphedema.  COPD  Hypertension.  Morbid obesity.  Anxiety disorder.  Sleep apnea-does not wear CPAP or BiPAP or oxygen  Restless leg syndrome.  Hyperlipidemia.  Chronic low back pain.  Osteoarthritis.  Iron deficiency anemia.  Depression  Asthma  Hepatitis B  IBS  Family/Caregiver Present: No  Prior to Session Communication: Bedside nurse  Patient Position Received: Bed, 3 rail up, Alarm  "on  General Comment: AXOX3, morbidly obese with bilat LE Lymadema, bariatric bed with air mattress, IV, externa catheter, bilat LE dressings D&I, on contact isolation for \"MRSA in wounds\" stated LAURIE Park .    Subjective   Precautions:  Precautions  Medical Precautions: Fall precautions (Purewick, IV, tele)    Vital Signs (Past 2hrs)        Date/Time Vitals Session Patient Position Pulse Resp SpO2 BP MAP (mmHg)    09/27/24 1442 --  --  102  18  97 %  138/78  98                         Objective   Pain:  Pain Assessment  Pain Assessment: 0-10  0-10 (Numeric) Pain Score: 6 (Bilat LE's, nursing aware and states \"I have given her pain meds not too long ago\")  Pain Interventions: Repositioned, Back rub, Emotional support  Cognition:  Cognition  Orientation Level: Oriented X4  Coordination:  Movements are Fluid and Coordinated: Yes  Postural Control:  Postural Control  Postural Control: Within Functional Limits  Static Sitting Balance  Static Sitting-Balance Support: No upper extremity supported, Feet supported  Static Sitting-Level of Assistance: Independent  Static Standing Balance  Static Standing-Balance Support: Bilateral upper extremity supported  Static Standing-Level of Assistance: Close supervision  Extremity/Trunk Assessments:    Activity Tolerance:  Activity Tolerance  Endurance: Tolerates 10 - 20 min exercise with multiple rests  Treatments:  Therapeutic Exercise  Therapeutic Exercise Performed: Yes  Therapeutic Exercise Activity 1: long sitting ankle pumps B x 20, quad sets x5 each secondary c/o bilat knee pain \"my knees are bone on bone\" stated patient         Therapeutic Activity  Therapeutic Activity Performed: Yes              Bed Mobility  Bed Mobility: Yes  Bed Mobility 1  Bed Mobility 1: Supine to sitting  Level of Assistance 1: Modified independent  Bed Mobility 2  Bed Mobility  2: Sitting to supine  Level of Assistance 2: Moderate assistance, Maximum assistance, Minimal verbal cues, Minimal tactile " cues (B LE)    Ambulation/Gait Training  Ambulation/Gait Training Performed: Yes  Ambulation/Gait Training 1  Surface 1: Level tile  Device 1: Standard walker (recommend bariatric FWW which is NA, bariatric SW only walker available.)  Assistance 1: Close supervision, Contact guard, Minimal verbal cues  Quality of Gait 1: Wide base of support, Forward flexed posture, Inconsistent stride length (took 3 breaks during gait to lean onto forearms onto walker to rest .)  Comments/Distance (ft) 1: 20ft x1  Transfers  Transfer: Yes  Transfer 1  Transfer From 1: Sit to, Stand to  Transfer to 1: Sit, Stand  Technique 1: Sit to stand, Stand to sit  Transfer Device 1: Walker  Transfer Level of Assistance 1: Close supervision                     Other Activity  Other Activity Performed: Yes  Other Activity 1: educatin on mobility goals, C&DB to avoid complications from immobility    Outcome Measures:  [facility] Basic Mobility  Turning from your back to your side while in a flat bed without using bedrails: None  Moving from lying on your back to sitting on the side of a flat bed without using bedrails: None  Moving to and from bed to chair (including a wheelchair): A little  Standing up from a chair using your arms (e.g. wheelchair or bedside chair): A little  To walk in hospital room: A little  Climbing 3-5 steps with railing: A little  Basic Mobility - Total Score: 20    Education Documentation  Handouts, taught by Carito Leigh PTA at 9/27/2024  3:21 PM.  Learner: Patient  Readiness: Acceptance  Method: Explanation  Response: Verbalizes Understanding    Precautions, taught by Carito Leigh PTA at 9/27/2024  3:21 PM.  Learner: Patient  Readiness: Acceptance  Method: Explanation  Response: Verbalizes Understanding    Home Exercise Program, taught by Carito Leigh PTA at 9/27/2024  3:21 PM.  Learner: Patient  Readiness: Acceptance  Method: Explanation  Response: Verbalizes Understanding    Body Mechanics, taught by Carito Leigh  PTA at 9/27/2024  3:21 PM.  Learner: Patient  Readiness: Acceptance  Method: Explanation  Response: Verbalizes Understanding    Mobility Training, taught by Carito Leigh PTA at 9/27/2024  3:21 PM.  Learner: Patient  Readiness: Acceptance  Method: Explanation  Response: Verbalizes Understanding    Education Comments  No comments found.        OP EDUCATION:       Encounter Problems       Encounter Problems (Active)       Balance       STG - Maintains dynamic standing balance with upper extremity support x 5' with dual task supervision  (Progressing)       Start:  09/17/24    Expected End:  10/01/24               Mobility       STG - Patient will ambulate with 2WW 50' mod I  (Progressing)       Start:  09/17/24    Expected End:  10/01/24               Pain - Adult             Encounter Problems (Resolved)       PT Transfers       STG - Patient will transfer sit to and from stand mod I  (Met)       Start:  09/17/24    Expected End:  10/01/24    Resolved:  09/19/24

## 2024-09-28 VITALS
OXYGEN SATURATION: 97 % | SYSTOLIC BLOOD PRESSURE: 135 MMHG | TEMPERATURE: 97.5 F | HEIGHT: 64 IN | BODY MASS INDEX: 50.02 KG/M2 | HEART RATE: 92 BPM | RESPIRATION RATE: 16 BRPM | WEIGHT: 293 LBS | DIASTOLIC BLOOD PRESSURE: 81 MMHG

## 2024-09-28 LAB
ALBUMIN SERPL BCP-MCNC: 2.3 G/DL (ref 3.4–5)
ANION GAP SERPL CALC-SCNC: 11 MMOL/L (ref 10–20)
BACTERIA BLD AEROBE CULT: ABNORMAL
BACTERIA BLD CULT: ABNORMAL
BASOPHILS # BLD AUTO: 0.02 X10*3/UL (ref 0–0.1)
BASOPHILS NFR BLD AUTO: 0.4 %
BUN SERPL-MCNC: 21 MG/DL (ref 6–23)
CALCIUM SERPL-MCNC: 7.6 MG/DL (ref 8.6–10.3)
CHLORIDE SERPL-SCNC: 109 MMOL/L (ref 98–107)
CO2 SERPL-SCNC: 22 MMOL/L (ref 21–32)
CREAT SERPL-MCNC: 0.72 MG/DL (ref 0.5–1.05)
EGFRCR SERPLBLD CKD-EPI 2021: >90 ML/MIN/1.73M*2
EOSINOPHIL # BLD AUTO: 0.12 X10*3/UL (ref 0–0.7)
EOSINOPHIL NFR BLD AUTO: 2.7 %
ERYTHROCYTE [DISTWIDTH] IN BLOOD BY AUTOMATED COUNT: 16.1 % (ref 11.5–14.5)
GLUCOSE SERPL-MCNC: 93 MG/DL (ref 74–99)
GRAM STN SPEC: ABNORMAL
HCT VFR BLD AUTO: 36.1 % (ref 36–46)
HGB BLD-MCNC: 11.5 G/DL (ref 12–16)
IMM GRANULOCYTES # BLD AUTO: 0.05 X10*3/UL (ref 0–0.7)
IMM GRANULOCYTES NFR BLD AUTO: 1.1 % (ref 0–0.9)
INR PPP: 2.7 (ref 0.9–1.1)
LABORATORY COMMENT REPORT: ABNORMAL
LYMPHOCYTES # BLD AUTO: 0.73 X10*3/UL (ref 1.2–4.8)
LYMPHOCYTES NFR BLD AUTO: 16.3 %
MAGNESIUM SERPL-MCNC: 1.87 MG/DL (ref 1.6–2.4)
MCH RBC QN AUTO: 34.2 PG (ref 26–34)
MCHC RBC AUTO-ENTMCNC: 31.9 G/DL (ref 32–36)
MCV RBC AUTO: 107 FL (ref 80–100)
MONOCYTES # BLD AUTO: 0.58 X10*3/UL (ref 0.1–1)
MONOCYTES NFR BLD AUTO: 12.9 %
NEUTROPHILS # BLD AUTO: 2.99 X10*3/UL (ref 1.2–7.7)
NEUTROPHILS NFR BLD AUTO: 66.6 %
NRBC BLD-RTO: 0.4 /100 WBCS (ref 0–0)
PHOSPHATE SERPL-MCNC: 2.6 MG/DL (ref 2.5–4.9)
PLATELET # BLD AUTO: 341 X10*3/UL (ref 150–450)
POTASSIUM SERPL-SCNC: 4.6 MMOL/L (ref 3.5–5.3)
PROTHROMBIN TIME: 31.2 SECONDS (ref 9.8–12.8)
RBC # BLD AUTO: 3.36 X10*6/UL (ref 4–5.2)
SODIUM SERPL-SCNC: 137 MMOL/L (ref 136–145)
WBC # BLD AUTO: 4.5 X10*3/UL (ref 4.4–11.3)

## 2024-09-28 PROCEDURE — 36415 COLL VENOUS BLD VENIPUNCTURE: CPT | Performed by: INTERNAL MEDICINE

## 2024-09-28 PROCEDURE — 85025 COMPLETE CBC W/AUTO DIFF WBC: CPT | Performed by: INTERNAL MEDICINE

## 2024-09-28 PROCEDURE — S4991 NICOTINE PATCH NONLEGEND: HCPCS | Performed by: EMERGENCY MEDICINE

## 2024-09-28 PROCEDURE — 2500000002 HC RX 250 W HCPCS SELF ADMINISTERED DRUGS (ALT 637 FOR MEDICARE OP, ALT 636 FOR OP/ED): Performed by: EMERGENCY MEDICINE

## 2024-09-28 PROCEDURE — 85610 PROTHROMBIN TIME: CPT | Performed by: INTERNAL MEDICINE

## 2024-09-28 PROCEDURE — 2500000004 HC RX 250 GENERAL PHARMACY W/ HCPCS (ALT 636 FOR OP/ED): Performed by: NURSE PRACTITIONER

## 2024-09-28 PROCEDURE — 80069 RENAL FUNCTION PANEL: CPT | Performed by: INTERNAL MEDICINE

## 2024-09-28 PROCEDURE — 2500000004 HC RX 250 GENERAL PHARMACY W/ HCPCS (ALT 636 FOR OP/ED): Mod: JZ | Performed by: INTERNAL MEDICINE

## 2024-09-28 PROCEDURE — 99239 HOSP IP/OBS DSCHRG MGMT >30: CPT | Performed by: INTERNAL MEDICINE

## 2024-09-28 PROCEDURE — 83735 ASSAY OF MAGNESIUM: CPT | Performed by: INTERNAL MEDICINE

## 2024-09-28 PROCEDURE — 2500000001 HC RX 250 WO HCPCS SELF ADMINISTERED DRUGS (ALT 637 FOR MEDICARE OP): Performed by: NURSE PRACTITIONER

## 2024-09-28 ASSESSMENT — PAIN DESCRIPTION - LOCATION
LOCATION: LEG

## 2024-09-28 ASSESSMENT — PAIN DESCRIPTION - ORIENTATION
ORIENTATION: RIGHT;LEFT
ORIENTATION: RIGHT;LEFT

## 2024-09-28 ASSESSMENT — PAIN SCALES - GENERAL
PAINLEVEL_OUTOF10: 4
PAINLEVEL_OUTOF10: 4
PAINLEVEL_OUTOF10: 7

## 2024-09-28 ASSESSMENT — COGNITIVE AND FUNCTIONAL STATUS - GENERAL
HELP NEEDED FOR BATHING: A LOT
DAILY ACTIVITIY SCORE: 17
CLIMB 3 TO 5 STEPS WITH RAILING: TOTAL
STANDING UP FROM CHAIR USING ARMS: A LITTLE
PERSONAL GROOMING: A LITTLE
TURNING FROM BACK TO SIDE WHILE IN FLAT BAD: A LITTLE
DRESSING REGULAR UPPER BODY CLOTHING: A LITTLE
MOVING TO AND FROM BED TO CHAIR: A LITTLE
WALKING IN HOSPITAL ROOM: A LOT
MOVING FROM LYING ON BACK TO SITTING ON SIDE OF FLAT BED WITH BEDRAILS: A LITTLE
TOILETING: A LITTLE
DRESSING REGULAR LOWER BODY CLOTHING: A LOT
MOBILITY SCORE: 15

## 2024-09-28 NOTE — CARE PLAN
Problem: Skin  Goal: Decreased wound size/increased tissue granulation at next dressing change  Outcome: Progressing      The clinical goals for the shift include Patient will utilize the call light when needing assistance to maintain safety

## 2024-09-28 NOTE — PROGRESS NOTES
09/28/24 1229   Discharge Planning   Expected Discharge Disposition Home Health  (dc 9/28, [facility] and [facility] attached in [facility]. Pt states she needs a stretcher as she will need help getting into her home.)   Does the patient need discharge transport arranged? Yes   RoundTrip coordination needed? Yes   Has discharge transport been arranged? Yes

## 2024-09-28 NOTE — DISCHARGE SUMMARY
[facility] Hospitalist  Discharge Summary with Discharge Day Progress Note and Transition Note                 Neisha Graham                  : 1960                      MRN:  [id number]     ADMIT DATE: 9/15/2024            DISCHARGE DATE:  2024     PRIMARYCARE PHYSICIAN:  Micheal Gonsalez DO     VISIT STATUS: Admission     CODE STATUS:  Full Code     DISCHARGE DIAGNOSES:    Principal Problem:    ALBERTINA (acute kidney injury) (CMS-HCC)  Active Problems:    Generalized weakness    Hyperkalemia       HOSPITAL COURSE:  Neisha Graham is a 64 y.o. female with PMH BLE lymphadema with nonhealing wounds, aortic stenosis, and A.Fib who was admitted with BLE cellulitis and non-healing wounds. Hospital stay was prolonged to sepsis due to pseudomonas BSI with unclear etiology but suspect  ie nephrolithiasis w/ spontaneous resolution. Patient's clinical picture improved and was later discharged home once medically optimized with instructions to followup with wound care clinic.    #Pseudomonas bacteremia,   -etiology unclear but suspect  ie nephrolithiasis w/ spontaneous resolution  #B/L LE cellulitis (MRSA) w/ chronic non healing wounds  -etiology unclear but suspect  ie nephrolithiasis w/ spontaneous resolution   #ALBERTINA, resolved  #Hyperkalemia, resolved s/p lokemia  #Acute on chronic hyponatremia of unclear etiology, near resolution  #Constipation  #Positive blood culture (Aerococcus urinae and Corynebacterium), likely contamination  #Chronic b/l LE lymphedema  #Paroxysmal Afib  #Mod-severe aortic stenosis  #COPD w/o exacerbation  #BE  #Tobacco abuse  #Chronic pain on chronic prescription of opiates  #Class III obesity with serious co morbidities, BMI 53.85 kg/m².    PROCEDURES:  none  CONSULTANTS:  ID, podiatry    COMPLEXITY OF FOLLOW UP:  [] Moderate Complexity: follow up within 7-14 calendar days (12780)  [x]Severe Complexity: follow up within 7 calendar days (82370)     FOLLOW UP TESTING, PENDING RESULTS  ORREFERRALS AT TRANSITIONAL CARE VISIT:   [x]Yes  F/U with PCP  Outpatient clionic   [] No    DAY OF DISCHARGE:  Review of Systems   All other systems reviewed and are negative.       Patient Vitals for the past 24 hrs:   BP Temp Temp src Pulse Resp SpO2   24 0500 135/81 36.4 °C (97.5 °F) Temporal 92 16 97 %   24 2026 108/80 36.4 °C (97.5 °F) Temporal 109 15 98 %   24 1442 138/78 36.1 °C (97 °F) Temporal 102 18 97 %        Average, Min, and Max forlast 24 hours Vitals:  TEMPERATURE:  Temp  Av.3 °C (97.3 °F)  Min: 36.1 °C (97 °F)  Max: 36.4 °C (97.5 °F)     RESPIRATIONS RANGE: Resp  Av.3  Min: 15  Max: 18     PULSE RANGE: Pulse  Av  Min: 92  Max: 109     BLOOD PRESSURE RANGE:  Systolic (24hrs), Av , Min:108 , Max:138   ; Diastolic (24hrs), Av, Min:78, Max:81       PULSE OXIMETRYRANGE: SpO2  Av.3 %  Min: 97 %  Max: 98 %  Body mass index is 53.85 kg/m².     I/O last 3 completed shifts:  In: 1240 (8.7 mL/kg) [P.O.:840; IV Piggyback:400]  Out: 1900 (13.4 mL/kg) [Urine:1900 (0.4 mL/kg/hr)]  Weight: 142.3 kg      Physical Exam  Vitals and nursing note reviewed.   Constitutional:       Appearance: Normal appearance. She is morbidly obese. She is ill-appearing.   HENT:      Head: Normocephalic and atraumatic.      Right Ear: External ear normal.      Left Ear: External ear normal.      Nose: Nose normal.      Mouth/Throat:      Mouth: Mucous membranes are moist.   Eyes:      General: No scleral icterus.        Right eye: No discharge.         Left eye: No discharge.      Extraocular Movements: Extraocular movements intact.      Conjunctiva/sclera: Conjunctivae normal.      Pupils: Pupils are equal, round, and reactive to light.   Cardiovascular:      Rate and Rhythm: Normal rate and regular rhythm.   Pulmonary:      Effort: Pulmonary effort is normal.      Breath sounds: Normal breath sounds.   Abdominal:      General: Abdomen is flat. Bowel sounds are normal.      Palpations:  Abdomen is soft.   Musculoskeletal:         General: Normal range of motion.      Right lower leg: Edema present.      Left lower leg: Edema present.   Skin:     General: Skin is warm and dry.      Capillary Refill: Capillary refill takes less than 2 seconds.      Findings: Wound present.   Neurological:      General: No focal deficit present.   Psychiatric:         Mood and Affect: Mood normal.         Thought Content: Thought content normal.         Judgment: Judgment normal.           DISCHARGE MEDICATIONS:     Significant Medication Changes:         Your medication list        START taking these medications        Instructions Last Dose Given Next Dose Due   Boudreauxs Butt Paste 40 % ointment ointment  Generic drug: zinc oxide      Apply 1 Application topically 3 times a day. Apply to groin, buttocks after nystatin       ciprofloxacin 500 mg tablet  Commonly known as: Cipro      Take 1 tablet (500 mg) by mouth 2 times a day for 10 days. Do not fill before September 28, 2024.       nystatin cream  Commonly known as: Mycostatin      Apply topically 2 times a day. Apply to abdominal pannus, groin, buttocks       sennosides-docusate sodium 8.6-50 mg tablet  Commonly known as: Rebecca-Colace      Take 2 tablets by mouth 2 times a day.       white petrolatum 41 % ointment ointment  Commonly known as: Aquaphor      Apply 1 Application topically 2 times a day. Apply to legs and abdomen              CHANGE how you take these medications        Instructions Last Dose Given Next Dose Due   oxyCODONE 10 mg immediate release tablet  Commonly known as: Roxicodone  What changed: Another medication with the same name was removed. Continue taking this medication, and follow the directions you see here.      Take 1 tablet (10 mg) by mouth every 4 hours if needed for severe pain (7 - 10) for up to 28 days. Do not fill before September 24, 2024.              CONTINUE taking these medications        Instructions Last Dose Given Next  Dose Due   albuterol 90 mcg/actuation inhaler      INHALE 2 PUFFS BY MOUTH AND INTO THE LUNGS EVERY 4 HOURS IF NEEDED FOR WHEEZING       ammonium lactate 12 % cream  Commonly known as: Amlactin           clotrimazole 1 % cream  Commonly known as: Lotrimin      Apply topically 2 times a day. apply to affected area       cyclobenzaprine 10 mg tablet  Commonly known as: Flexeril      Take 1 tablet (10 mg) by mouth 2 times a day as needed for muscle spasms.       DULoxetine 20 mg DR capsule  Commonly known as: Cymbalta      TAKE 1 CAPSULE BY MOUTH ONCE DAILY       hydroxyurea 500 mg capsule  Commonly known as: Hydrea      Take 2 capsules (1,000 mg total) by mouth once daily.       hydrOXYzine HCL 50 mg tablet  Commonly known as: Atarax      Take 1 tablet (50 mg) by mouth every 8 hours if needed for anxiety.       lidocaine 5 % gel      Apply 1 inch topically 3 times a day as needed (apply to affected area).       lisinopril 20 mg tablet           naloxone 4 mg/0.1 mL nasal spray  Commonly known as: Narcan      Administer 1 spray (4 mg) into affected nostril(s) if needed for opioid reversal or respiratory depression.       pramipexole 1 mg tablet  Commonly known as: Mirapex           pregabalin 50 mg capsule  Commonly known as: Lyrica      Take 1 capsule (50 mg) by mouth 3 times a day.       ProSource No Carb 15-60 gram-kcal/30 mL liquid  Generic drug: amino acids-protein hydrolys           Tiadylt  mg 24 hr capsule  Generic drug: dilTIAZem ER      TAKE 1 CAPSULE BY MOUTH EVERY DAY       Trelegy Ellipta 200-62.5-25 mcg blister with device  Generic drug: fluticasone-umeclidin-vilanter      INHALE 1 PUFF BY MOUTH AND IN TO LUNGS ONCE DAILY       triamcinolone 0.1 % cream  Commonly known as: Kenalog           warfarin 7.5 mg tablet  Commonly known as: Coumadin           warfarin 7.5 mg tablet  Commonly known as: Coumadin      Take 1 tablet (7.5 mg) by mouth 4 times a week. On Sunday, Monday, Wednesday, Friday               STOP taking these medications      doxycycline 100 mg capsule  Commonly known as: Vibramycin                  Where to Get Your Medications        These medications were sent to [facility]  [id number], [facility] [id number]      Hours: 9 AM to 5 PM Mon-Fri Phone: [phone]   Boudreauxs Butt Paste 40 % ointment ointment  ciprofloxacin 500 mg tablet  nystatin cream  sennosides-docusate sodium 8.6-50 mg tablet  white petrolatum 41 % ointment ointment            DIET: regular      DISPOSITION:  Home with [facility]     Follow up with Micheal Gonsalez DO  .       DISCHARGE TIME: > 30 minutes    Electronically signed by Anay Chris DO on 09/28/24 at 11:13 AM    Dictated using [employer] Version 2.4  Proof read however unrecognized voice recognition errors may have occurred

## 2024-09-28 NOTE — PROGRESS NOTES
Neisha Graham is a 64 y.o. female on day 12 of admission presenting with ALBERTINA (acute kidney injury) (CMS-[facility]).    Subjective   Interval History: no fever, no new complaints      Review of Systems    Objective   Range of Vitals (last 24 hours)  Heart Rate:  []   Temp:  [36.1 °C (97 °F)-36.4 °C (97.5 °F)]   Resp:  [15-18]   BP: (108-138)/(78-81)   SpO2:  [97 %-98 %]   Daily Weight  09/16/24 : 142 kg (313 lb 11.4 oz)    Body mass index is 53.85 kg/m².    Physical Exam  Constitutional:       Appearance: Normal appearance.   HENT:      Head: Normocephalic and atraumatic.      Mouth/Throat:      Mouth: Mucous membranes are moist.      Pharynx: Oropharynx is clear.   Eyes:      Pupils: Pupils are equal, round, and reactive to light.   Cardiovascular:      Rate and Rhythm: Normal rate and regular rhythm.      Heart sounds: Normal heart sounds.   Pulmonary:      Effort: Pulmonary effort is normal.      Breath sounds: Normal breath sounds.   Abdominal:      General: Abdomen is flat. Bowel sounds are normal.      Palpations: Abdomen is soft.   Musculoskeletal:      Cervical back: Normal range of motion.      Comments: Stasis / wounds   Neurological:      Mental Status: She is alert.         Antibiotics  cefepime - 2 gram/100 mL  ciprofloxacin - 500 mg    Relevant Results  Labs  Results from last 72 hours   Lab Units 09/28/24  0740 09/27/24  0648 09/26/24  0514   WBC AUTO x10*3/uL 4.5 5.6 12.0*   HEMOGLOBIN g/dL 11.5* 11.2* 11.9*   HEMATOCRIT % 36.1 34.9* 38.1   PLATELETS AUTO x10*3/uL 341 350 368   NEUTROS PCT AUTO % 66.6 74.9 86.9   LYMPHS PCT AUTO % 16.3 13.4 7.9   MONOS PCT AUTO % 12.9 7.5 2.6   EOS PCT AUTO % 2.7 2.9 1.3     Results from last 72 hours   Lab Units 09/28/24  0650 09/27/24  0648 09/26/24  0514   SODIUM mmol/L 137 136 131*   POTASSIUM mmol/L 4.6 4.8 4.5   CHLORIDE mmol/L 109* 109* 104   CO2 mmol/L 22 23 21   BUN mg/dL 21 29* 40*   CREATININE mg/dL 0.72 0.83 1.04   GLUCOSE mg/dL 93 74 84   CALCIUM mg/dL  7.6* 7.7* 8.1*   ANION GAP mmol/L 11 9* 11   EGFR mL/min/1.73m*2 >90 78 60*   PHOSPHORUS mg/dL 2.6 2.8 3.6     Results from last 72 hours   Lab Units 09/28/24  0650 09/27/24  0648 09/26/24  0514   ALBUMIN g/dL 2.3* 2.2* 2.6*       Estimated Creatinine Clearance: 110.2 mL/min (by C-G formula based on SCr of 0.72 mg/dL).  C-Reactive Protein   Date Value Ref Range Status   09/16/2024 16.36 (H) <1.00 mg/dL Final   08/12/2024 7.94 (H) <1.00 mg/dL Final     CRP   Date Value Ref Range Status   06/19/2020 1.84 (A) mg/dL Final     Comment:     REF VALUE  < 1.00       Microbiology  Susceptibility data from last 14 days.  Collected Specimen Info Organism Cefepime Ceftazidime Ciprofloxacin Clindamycin Erythromycin Gentamicin Levofloxacin Oxacillin Piperacillin/Tazobactam Tetracycline Tobramycin Trimethoprim/Sulfamethoxazole Vancomycin   09/23/24 Blood culture from Peripheral Venipuncture Pseudomonas  S  S  S    S  S   S   S  S    09/15/24 Tissue/Biopsy from Buttock Methicillin Resistant Staphylococcus aureus (MRSA)     R  R    R   R   S  S   09/15/24 Blood culture from Peripheral Venipuncture Aerococcus urinae                  Corynebacterium                Imaging  Reviewed        Assessment/Plan   Fever, the temp is down, BC with aerococcus / Corynebacterium, likely a contaminant, had fever yesterday, BC with Pseudomonas, the repeat BC is negative, ct no hydro  Legs stasis / wounds / likely cellulitis, the culture with MRSA  Encephalopathy, likely metabolic     Recommendations :  Continue Cefepime, plan on oral Cipro  x 10 days  Discussed with the medical team     I spent minutes in the professional and overall care of this patient.      Gorge Myers MD

## 2024-09-29 LAB
BACTERIA BLD CULT: NORMAL
BACTERIA BLD CULT: NORMAL

## 2024-09-30 ENCOUNTER — PATIENT OUTREACH (OUTPATIENT)
Dept: PRIMARY CARE | Facility: CLINIC | Age: 64
End: 2024-09-30
Payer: COMMERCIAL

## 2024-09-30 LAB
BACTERIA BLD CULT: NORMAL
BACTERIA BLD CULT: NORMAL

## 2024-09-30 NOTE — PROGRESS NOTES
Discharge Facility: [facility]  Discharge Diagnosis: ALBERTINA (acute kidney injury), Generalized weakness, Hyperkalemia  Admission Date: 9/16/2024  Discharge Date: 9/28/2024    PCP Appointment Date: Message sent to office to schedule  Specialist Appointment Date: 10/9/2024 1:30 PM Luna Angela CNP Wound Care,  11/12/2024 1:15 PM Neisha Ernst CNP Pain Management  Hospital Encounter and Summary Linked: Yes    Two attempts were made to reach patient within two business days after discharge. Voicemail left with contact information for patient to call back with any non-emergent questions or concerns.

## 2024-10-01 NOTE — SIGNIFICANT EVENT
Follow Up Phone Call    Outgoing phone call    Spoke to: Neisha Graham Relationship:self   Phone number: [phone]      Outcome: contacted patient/ family   Chief Complaint   Patient presents with    Weakness, Gen          Diagnosis:Not applicable    States she is feeling better. No further questions or concerns.

## 2024-10-02 NOTE — TELEPHONE ENCOUNTER
Per R. Deitweiler:  pt was called and declined to schedule presently and states she will call back and reschedule when she is able to come to appt.

## 2024-10-04 DIAGNOSIS — I74.9 CHRONIC THROMBOEMBOLIC DISEASE (MULTI): ICD-10-CM

## 2024-10-04 RX ORDER — WARFARIN 7.5 MG/1
7.5 TABLET ORAL
Qty: 30 TABLET | Refills: 3 | Status: SHIPPED | OUTPATIENT
Start: 2024-10-05 | End: 2024-11-04

## 2024-10-07 LAB
ATRIAL RATE: 140 BPM
P AXIS: 69 DEGREES
P OFFSET: 190 MS
P ONSET: 140 MS
PR INTERVAL: 148 MS
Q ONSET: 214 MS
QRS COUNT: 23 BEATS
QRS DURATION: 78 MS
QT INTERVAL: 264 MS
QTC CALCULATION(BAZETT): 403 MS
QTC FREDERICIA: 350 MS
R AXIS: 64 DEGREES
T AXIS: 70 DEGREES
T OFFSET: 346 MS
VENTRICULAR RATE: 140 BPM

## 2024-10-09 ENCOUNTER — APPOINTMENT (OUTPATIENT)
Dept: WOUND CARE | Facility: HOSPITAL | Age: 64
End: 2024-10-09
Payer: COMMERCIAL

## 2024-10-09 ENCOUNTER — APPOINTMENT (OUTPATIENT)
Dept: WOUND CARE | Facility: HOSPITAL | Age: 64
End: 2024-10-09
Payer: MEDICARE

## 2024-10-13 DIAGNOSIS — F33.1 MDD (MAJOR DEPRESSIVE DISORDER), RECURRENT EPISODE, MODERATE: ICD-10-CM

## 2024-10-13 DIAGNOSIS — F41.1 GENERALIZED ANXIETY DISORDER: ICD-10-CM

## 2024-10-14 RX ORDER — DULOXETIN HYDROCHLORIDE 20 MG/1
20 CAPSULE, DELAYED RELEASE ORAL DAILY
Qty: 90 CAPSULE | Refills: 1 | Status: SHIPPED | OUTPATIENT
Start: 2024-10-14

## 2024-10-16 ENCOUNTER — PATIENT OUTREACH (OUTPATIENT)
Dept: PRIMARY CARE | Facility: CLINIC | Age: 64
End: 2024-10-16
Payer: COMMERCIAL

## 2024-10-16 NOTE — PROGRESS NOTES
Unable to reach patient for call back after recent hospitalization. LVM with call back number for patient to call if needed.

## 2024-10-22 DIAGNOSIS — J43.2 CENTRILOBULAR EMPHYSEMA (MULTI): ICD-10-CM

## 2024-10-22 RX ORDER — ALBUTEROL SULFATE 90 UG/1
2 INHALANT RESPIRATORY (INHALATION) EVERY 4 HOURS PRN
Qty: 8 G | Refills: 3 | Status: SHIPPED | OUTPATIENT
Start: 2024-10-22

## 2024-10-23 DIAGNOSIS — K04.7 DENTAL INFECTION: Primary | ICD-10-CM

## 2024-10-23 RX ORDER — AMOXICILLIN 500 MG/1
500 CAPSULE ORAL EVERY 8 HOURS SCHEDULED
Qty: 30 CAPSULE | Refills: 0 | Status: SHIPPED | OUTPATIENT
Start: 2024-10-23 | End: 2024-11-02

## 2024-10-28 ENCOUNTER — TELEPHONE (OUTPATIENT)
Dept: PAIN MEDICINE | Facility: CLINIC | Age: 64
End: 2024-10-28
Payer: MEDICARE

## 2024-10-28 DIAGNOSIS — M54.16 CHRONIC LUMBAR RADICULOPATHY: ICD-10-CM

## 2024-10-28 DIAGNOSIS — M48.062 NEUROGENIC CLAUDICATION DUE TO LUMBAR SPINAL STENOSIS: ICD-10-CM

## 2024-10-28 DIAGNOSIS — M17.12 PRIMARY OSTEOARTHRITIS OF LEFT KNEE: ICD-10-CM

## 2024-10-28 DIAGNOSIS — M79.18 MYOFASCIAL PAIN: ICD-10-CM

## 2024-10-28 DIAGNOSIS — M17.11 PRIMARY OSTEOARTHRITIS OF RIGHT KNEE: ICD-10-CM

## 2024-10-28 RX ORDER — OXYCODONE HYDROCHLORIDE 10 MG/1
10 TABLET ORAL EVERY 4 HOURS PRN
Qty: 168 TABLET | Refills: 0 | Status: SHIPPED | OUTPATIENT
Start: 2024-10-28 | End: 2024-11-25

## 2024-10-30 ENCOUNTER — OFFICE VISIT (OUTPATIENT)
Dept: WOUND CARE | Facility: HOSPITAL | Age: 64
End: 2024-10-30
Payer: MEDICARE

## 2024-10-30 PROCEDURE — 11045 DBRDMT SUBQ TISS EACH ADDL: CPT | Mod: RT,LT

## 2024-10-30 PROCEDURE — 11042 DBRDMT SUBQ TIS 1ST 20SQCM/<: CPT | Mod: RT,XS

## 2024-11-06 ENCOUNTER — OFFICE VISIT (OUTPATIENT)
Dept: WOUND CARE | Facility: HOSPITAL | Age: 64
End: 2024-11-06
Payer: MEDICARE

## 2024-11-06 DIAGNOSIS — L97.212 NON-PRESSURE CHRONIC ULCER OF RIGHT CALF WITH FAT LAYER EXPOSED (MULTI): ICD-10-CM

## 2024-11-06 DIAGNOSIS — L97.222 NON-PRESSURE CHRONIC ULCER OF LEFT CALF WITH FAT LAYER EXPOSED (MULTI): Primary | ICD-10-CM

## 2024-11-06 PROCEDURE — 11042 DBRDMT SUBQ TIS 1ST 20SQCM/<: CPT | Mod: LT

## 2024-11-06 PROCEDURE — 11045 DBRDMT SUBQ TISS EACH ADDL: CPT | Mod: RT

## 2024-11-06 RX ORDER — GENTAMICIN SULFATE 1 MG/G
OINTMENT TOPICAL DAILY
Qty: 30 G | Refills: 3 | Status: SHIPPED | OUTPATIENT
Start: 2024-11-06 | End: 2024-11-16

## 2024-11-06 RX ORDER — TRIAMCINOLONE ACETONIDE 1 MG/G
CREAM TOPICAL
Qty: 30 G | Refills: 3 | Status: SHIPPED | OUTPATIENT
Start: 2024-11-06 | End: 2025-01-05

## 2024-11-12 ENCOUNTER — OFFICE VISIT (OUTPATIENT)
Dept: PAIN MEDICINE | Facility: CLINIC | Age: 64
End: 2024-11-12
Payer: MEDICARE

## 2024-11-12 VITALS
DIASTOLIC BLOOD PRESSURE: 79 MMHG | RESPIRATION RATE: 18 BRPM | SYSTOLIC BLOOD PRESSURE: 137 MMHG | HEART RATE: 101 BPM | OXYGEN SATURATION: 95 %

## 2024-11-12 DIAGNOSIS — M54.16 CHRONIC LUMBAR RADICULOPATHY: ICD-10-CM

## 2024-11-12 DIAGNOSIS — M17.12 PRIMARY OSTEOARTHRITIS OF LEFT KNEE: ICD-10-CM

## 2024-11-12 DIAGNOSIS — L03.90 WOUND CELLULITIS: ICD-10-CM

## 2024-11-12 DIAGNOSIS — M79.18 MYOFASCIAL PAIN: ICD-10-CM

## 2024-11-12 DIAGNOSIS — M48.062 NEUROGENIC CLAUDICATION DUE TO LUMBAR SPINAL STENOSIS: ICD-10-CM

## 2024-11-12 DIAGNOSIS — M51.370 DEGENERATION OF INTERVERTEBRAL DISC OF LUMBOSACRAL REGION WITH DISCOGENIC BACK PAIN: ICD-10-CM

## 2024-11-12 DIAGNOSIS — M17.11 PRIMARY OSTEOARTHRITIS OF RIGHT KNEE: ICD-10-CM

## 2024-11-12 DIAGNOSIS — M54.16 CHRONIC LUMBAR RADICULOPATHY: Primary | ICD-10-CM

## 2024-11-12 DIAGNOSIS — M48.061 SPINAL STENOSIS, LUMBAR REGION, WITHOUT NEUROGENIC CLAUDICATION: ICD-10-CM

## 2024-11-12 PROCEDURE — 1125F AMNT PAIN NOTED PAIN PRSNT: CPT | Performed by: NURSE PRACTITIONER

## 2024-11-12 PROCEDURE — 99213 OFFICE O/P EST LOW 20 MIN: CPT | Performed by: NURSE PRACTITIONER

## 2024-11-12 PROCEDURE — 3078F DIAST BP <80 MM HG: CPT | Performed by: NURSE PRACTITIONER

## 2024-11-12 PROCEDURE — 4004F PT TOBACCO SCREEN RCVD TLK: CPT | Performed by: NURSE PRACTITIONER

## 2024-11-12 PROCEDURE — 3075F SYST BP GE 130 - 139MM HG: CPT | Performed by: NURSE PRACTITIONER

## 2024-11-12 PROCEDURE — 1159F MED LIST DOCD IN RCRD: CPT | Performed by: NURSE PRACTITIONER

## 2024-11-12 PROCEDURE — 1160F RVW MEDS BY RX/DR IN RCRD: CPT | Performed by: NURSE PRACTITIONER

## 2024-11-12 RX ORDER — OXYCODONE HYDROCHLORIDE 10 MG/1
10 TABLET ORAL EVERY 4 HOURS PRN
Qty: 168 TABLET | Refills: 0 | Status: SHIPPED | OUTPATIENT
Start: 2025-01-20 | End: 2025-02-17

## 2024-11-12 RX ORDER — OXYCODONE HYDROCHLORIDE 10 MG/1
10 TABLET ORAL EVERY 4 HOURS PRN
Qty: 168 TABLET | Refills: 0 | Status: SHIPPED | OUTPATIENT
Start: 2024-12-23 | End: 2025-01-20

## 2024-11-12 RX ORDER — PREGABALIN 50 MG/1
50 CAPSULE ORAL 3 TIMES DAILY
Qty: 180 CAPSULE | Refills: 2 | Status: SHIPPED | OUTPATIENT
Start: 2024-11-12 | End: 2025-05-11

## 2024-11-12 RX ORDER — OXYCODONE HYDROCHLORIDE 10 MG/1
10 TABLET ORAL EVERY 4 HOURS PRN
Qty: 168 TABLET | Refills: 0 | Status: SHIPPED | OUTPATIENT
Start: 2024-11-25 | End: 2024-12-23

## 2024-11-12 ASSESSMENT — ENCOUNTER SYMPTOMS
CARDIOVASCULAR NEGATIVE: 1
BACK PAIN: 1
DIZZINESS: 0
TREMORS: 0
HEMATOLOGIC/LYMPHATIC NEGATIVE: 1
CONSTITUTIONAL NEGATIVE: 1
EYES NEGATIVE: 1
SPEECH DIFFICULTY: 0
LOSS OF SENSATION IN FEET: 0
OCCASIONAL FEELINGS OF UNSTEADINESS: 1
WOUND: 1
NUMBNESS: 1
RESPIRATORY NEGATIVE: 1
GASTROINTESTINAL NEGATIVE: 1
LIGHT-HEADEDNESS: 0
SEIZURES: 0
NECK STIFFNESS: 0
PAIN: 1
PSYCHIATRIC NEGATIVE: 1
MYALGIAS: 1
FACIAL ASYMMETRY: 0
HEADACHES: 0
NECK PAIN: 0
JOINT SWELLING: 1
ALLERGIC/IMMUNOLOGIC NEGATIVE: 1
ENDOCRINE NEGATIVE: 1
WEAKNESS: 1
ARTHRALGIAS: 1

## 2024-11-12 ASSESSMENT — PAIN SCALES - GENERAL
PAINLEVEL_OUTOF10: 6
PAINLEVEL_OUTOF10: 6

## 2024-11-12 ASSESSMENT — PAIN DESCRIPTION - DESCRIPTORS: DESCRIPTORS: DULL;ACHING;STABBING

## 2024-11-12 ASSESSMENT — PAIN - FUNCTIONAL ASSESSMENT: PAIN_FUNCTIONAL_ASSESSMENT: 0-10

## 2024-11-12 NOTE — TELEPHONE ENCOUNTER
Pharmacy said the gel is not covered and we should try the ointment either 5% or 4%.  Resent to pharmacy.

## 2024-11-12 NOTE — PROGRESS NOTES
Patient ID: Neisha Graham is a 64 y.o. female who presents for chronic pain management of low back pain and knee joint pain from arthritis.    Med Refill  Associated symptoms include arthralgias, joint swelling, myalgias, numbness, a rash and weakness. Pertinent negatives include no headaches or neck pain.   Pain  Associated symptoms include joint swelling, a rash and weakness. Pertinent negatives include no headaches.   Back Pain  Associated symptoms include numbness and weakness. Pertinent negatives include no headaches.   Knee Pain   Associated symptoms include numbness.       Neisha follows up for refill of medications and interval reevaluation of her low back pain from lumbar stenosis, disc herniation and radiculitis of the lower extremities. Neisha also follows up for hip and knee pain from arthritis     Percocet 10 mg every 4 hours up to 6 per daily as needed and Flexeril 10 mg once to twice daily as needed help to decrease pain and improve function up to 40 %. Average pain score with medication is 6 out of 10. Senokot or other laxative as needed to help avoid constipation. Is able to manage her ADLs with improved function from medications. She reports of having a below average quality of family and social life with current condition and treatment.     Neisha is also taking pregabalin 50 mg 3 times daily.  Feels foggy mindedness, fatigue from the medication.  At 1 time this medication was much higher at the therapeutic dose but these side effects persisted and we decreased to its current dose.  Will try d.a.w. of pregabalin to see if this resolves the negative side effects.    She is also with history of taking gabapentin and Topamax and again with these neuromodulating medications after taking them for so long between 6 months to a year starts to have negative side effects of these 2 such as cloudy mindedness, sleepiness and short-term memory loss issues.  Once these were discontinued the side effects  resolved.    Did go to the ED last office visit from September 15 through September 28 for acute kidney injury, hyperkalemia, lower extremity ulcers and wounds with cellulitis.  Reports that he antibiotic gave her the acute kidney injury.  This condition has also resolved.     Neisha is of polypharmacy with Flexeril, Xanax and hydroxyzine. To address Flexeril she takes this very as needed and not twice daily. I do not prescribe the Xanax or the hydroxyzine. She understands to space her pain medication with these other medications as mentioned above by 2 hours to avoid sedation, respiratory depression, risk of coma and death. She does have a Narcan on status sent 2024.     Toxicology consistent March 7, 2024.  Annual controlled substance agreement and opioid risk tool are completed and scanned into the chart March 7, 2024.     Neisha has chronic knee pain from osteoarthritis. Pain is increased with weightbearing activities. Right knee has more pain and decreased range of motion compared to the left knee. She cannot have surgery because of her BMI greater than 40. She does have instability in the knees especially with weightbearing activity in standing and walking. Reports that the right knee can give out on her with prolonged standing. Has difficulty getting in and because of weakness of the knee and has decreased range of motion. Has tried hinged bracing to the knees. Bracing feels too heavy and hard to get around. She no longer wears them.     History of intra-articular knee joint injection steroid therapy has helped in the past from orthopedic surgeon. She does have several of these per year.     She currently gets around in her Hoveround. Feels much more secure then utilizing a walker at home. No falls since last office visit.          For continuity:   Given the patient's report of reduced pain and improved functional ability without adverse effects, it is reasonable to treat with narcotic medications. The terms of  the opioid agreement as well as the potential risks and adverse effects of the patient's medication regimen were discussed in detail. This includes if applicable due to dosage of medication permission to discuss and coordinate care with other treatment providers relevant to the patients condition. The patient verbalized understanding.      Risks and side effects of chronic opioid therapy including but not limited to tolerance, dependence, constipation, hyperalgesia, cognitive side effects, addiction and possible death due to overuse and or misuse were discussed. I also discussed that such medications when co-administered with other sedative agents including but not limited to alcohol, benzodiazepines, sedative hypnotics and illegal drugs could pose life threatening consequences including death. I also explained the impact that the administration of such medication has on a patient with obstructive sleep apnea and continued recommendations for use of apnea devices if ordered are prescribed by other physicians. In order to effectively and safely treat the pain, I also emphasized the importance of compliance with the treatment plan, as well as compliance with the terms of the opioid agreement, which was reviewed in detail. I explained the importance of being responsible with the medications and to take these only as prescribed, never in excess and never for reasons other than pain reduction. The patient was counseled on keeping the medications safe and locked away from children and other adults as well as disposal methods and options. The patient understood the risks and instructions.      I also discussed with the patient in detail that based on the clinical response to the opioid medications and improvements of activities of daily living, sleep, and work performance in light of compliance with the treatment plan we can continue this form of therapy for the above chronic pain. The goal and rationale used for current  treatment with chronic opioid medication is to control the pain and alleviate disability induced by the chronic pain condition noted above after failures of other non-opioid and nonpharmacological modalities to treat the chronic pain and the symptoms associated with have failed. The patient understood the goals in terms of the above treatment plan and had no further questions prior to leaving the office today.      Of note, the above-mentioned diagnoses/conditions and expected fluctuating nature of pain, and pain characteristic changes may lead to prolonged functional impairment requiring frequent and multiple reassessments with continued high level medical decision making. As noted, medication and medication management may require opiate therapy in excess of a routine less than 30 day medication requirement. The patient may require daily opiate therapy necessitating month-long prescription medication as noted above in order to perform activities of daily living and achieve acceptable quality of life with respect to their chronic pain condition for the foreseeable future. We monitor our patient's carefully through drug monitoring, medication counts, urine drug testing specific to their medication as well as a myriad of other substances and with frequent follow-ups with interval reassement of the chronic pain condition, its pathophysiology and prognosis.      The level of clinical decision making at this office visit is high due to high risks and complications including mortality and morbidity related to acute and chronic pain with respects to life, bodily function, and treatment. Risks and clinical decisions with respect to under treatment, failure to maintain adequate treatment, and/or overtreatment complications and outcomes were discussed with the patient with respect to their chronic pain conditions, interventional therapies, as well as the use of various medications including possible controlled/dangerous  medications. The amount and complexity of reviewed data at this in subsequent office visits is high given patient's fluctuating clinical presentation, laboratory and radiographic reports, prescription monitoring program data, and medication history as well as other relevant data as noted above. Pertinent negatives and positives data was used in consideration for the above-mentioned high complexity.       Given the patient's total MED, general use of daily opiates, or other coadministered medications in various classes the patient was offered a prescription for Narcan. I instructed the patient that it is important that patient fill this medication in order to demonstrate understanding of the gravity of possible side effects including respiratory depression and risk of overdose of this opiate load or medication combination. As such patient will be required to bring Narcan prescription to follow-up appointments as part of compliance with continued opiate care.      With respect to opiate induced constipation I discussed multiple ways to combat this problem including staying hydrated and taking over-the-counter medications such as Dulcolax, Miralax and Senna. If these treatments are not effective we could consider such medications as Amitiza, Linzess and Movantik.      Disclaimer: This note was transcribed using an audio transcription device. As such, minor errors may be present with regard to spelling, punctuation, and inadvertent word insertion. Please disregard such errors.       Review of Systems   Constitutional: Negative.    HENT: Negative.     Eyes: Negative.    Respiratory: Negative.     Cardiovascular: Negative.    Gastrointestinal: Negative.    Endocrine: Negative.    Genitourinary: Negative.    Musculoskeletal:  Positive for arthralgias, back pain, gait problem, joint swelling and myalgias. Negative for neck pain and neck stiffness.   Skin:  Positive for rash and wound.        Treated for fungal skin  infection with Diflucan weekly for 3 weeks.    Follows wound clinic for lower leg wounds.   Allergic/Immunologic: Negative.    Neurological:  Positive for weakness and numbness. Negative for dizziness, tremors, seizures, syncope, facial asymmetry, speech difficulty, light-headedness and headaches.   Hematological: Negative.    Psychiatric/Behavioral: Negative.         Physical Exam  Vitals and nursing note reviewed.   Constitutional:       Appearance: Normal appearance.   HENT:      Head: Normocephalic and atraumatic.   Eyes:      Conjunctiva/sclera: Conjunctivae normal.   Cardiovascular:      Rate and Rhythm: Normal rate and regular rhythm.      Pulses: Normal pulses.      Heart sounds: Murmur heard.      Comments: Systolic murmur right upper sternal border 2-3 out of 6.  Pulmonary:      Effort: Pulmonary effort is normal. No respiratory distress.      Breath sounds: Rhonchi present.      Comments: Harsh cough clears rhonchi posterior bases.  Fair air exchange.  Abdominal:      General: Abdomen is flat. Bowel sounds are normal.      Palpations: Abdomen is soft.   Musculoskeletal:      Right lower leg: Edema present.      Left lower leg: Edema present.      Comments: Lymphedema lower extremities.  Lower extremities consistent  with venous stasis changes of swelling, hyperpigmentation dry scaly skin.  Noted no open wounds or sores to lower extremities.    Active range of motion of right and left knees limited secondary to pain and stiffness with extension and flexion.    Active range of motion of lumbar spine admitted secondary to pain with extension but not with flexion.    Presents in electric chair.   Skin:     General: Skin is warm and dry.      Capillary Refill: Capillary refill takes 2 to 3 seconds.      Findings: Rash present.      Comments: Changes consistent with intertrigo they are widespread to trunk, skin folds, arms and hands including fissuring on the hands.   Neurological:      Mental Status: She is  alert and oriented to person, place, and time.      Cranial Nerves: No cranial nerve deficit.      Sensory: No sensory deficit.      Motor: Weakness present.      Gait: Gait abnormal.   Psychiatric:         Behavior: Behavior normal.       Results/Data  Xray Bilateral Knee, 1 or 2 views 17May2022 08:52AM Vannessa Martinez   ORDER REVISED TO A BILATERAL KNEE; 1 OR 2 VIEWS BY RADIOLOGIST; Original Order Number: [id number]      Test Name Result Flag Reference   Xray Bilateral Knee, 1 or 2 views (Report)       FINAL REPORT  Interpreted by: SCHOENBERGER, JOSEPH, A, MD   05/19/22 11:29  MRN: [id number]  Patient Name: LION PEREZ     STUDY:  BILATERAL KNEE; 1 OR 2 VIEWS; ; 5/17/2022 8:52 am     INDICATION:  AP WB bilat in one shot (orthoball in plane w/ bone), PA 30 degree  flex WB bilat in one shot (no orthoball), LAT (orthoball in plane w/  bone), merchant view 30 degree flex bilat in one shot (no orthoball)  M25.561: Bilateral knee pain M25.562:.     COMPARISON:  None.     ACCESSION NUMBER(S):  [id number]     ORDERING CLINICIAN:  VANNESSA MARTINEZ     FINDINGS:  There is advanced tricompartmental degenerative change in the left  knee with bone-on-bone appearance of both the medial tibiofemoral and  lateral tibiofemoral compartments. There is slight lateral  subluxation of the tibia in like relation to the femur. There is no  fracture or dislocation.     There is advanced tricompartmental degenerative change in the right  knee. Bone-on-bone appearance of both the medial tibiofemoral and  lateral tibiofemoral compartments. Lateral subluxation of the tibia  in relation to the femoral condyles. No acute fracture or dislocation.     IMPRESSION:  Advanced degenerative change in both knees.        Electronically signed by: SCHOENBERGER, JOSEPH  05/19/22 11:29      CT L Spine without Contrast 03Feb2021 11:49AM Non Ambulatory, Provider   Ordering Provider: LION ALMANZA [id number]      Test Name Result Flag Reference   CT L  Spine without Contrast (Report)       Interpreted by: MADELAINE LANDERS  02/03/21 12:10  MRN: [id number]  Patient Name: LION PEREZ     STUDY:  CT L-SPINE WO CONTRAST 2/3/2021 11:49 am     INDICATION:  fall , severe back pain     COMPARISON:  None.     ACCESSION NUMBER(S):  [id number]     ORDERING CLINICIAN:  LION ALMANZA     TECHNIQUE:  Axial CT images of the lumbar spine are obtained. Axial, coronal and  sagittal reconstructions are provided for review.     FINDINGS:  There is disc space narrowing, vacuum disc phenomenon and moderate  anterior osteophytic spurring at T12-L1.  There is moderate anterior osteophytic spurring at L1-2.  There is mild anterior osteophytic spurring at L2-3 through L5-S1.     Alignment: There is 5 mm anterior subluxation of L4 with respect to  L5.  There is a mild left convex lumbar scoliosis.     Vertebrae/Disc Spaces:  The vertebral body heights are intact. The  disc spaces are preserved.     Lower Thoracic Spine: There is no significant central canal stenosis  in the included lower thoracic region.     T12-L1: There is no significant central canal stenosis.     L1-2: There is no significant central canal or neural foraminal  stenosis.     L2-3: There is mild facet joint hypertrophy. There is no significant  central canal or neural foraminal stenosis.     L3-4: There is moderate facet joint hypertrophy. There is no  significant central canal or neural foraminal stenosis.     L4-5: There is moderate facet joint hypertrophy. There is no  significant central canal or neural foraminal stenosis.     L5-S1: There is mild facet joint hypertrophy. There is no  significant central canal stenosis.  There is a small right lateral disc bulge or herniation. This causes  no compression on the exiting right L4 nerve root.     Prevertebral/Paraspinal Soft Tissues: The prevertebral and paraspinal  soft tissues are unremarkable.     There is atherosclerotic calcification of the thoracic  aorta.     IMPRESSION:  1. No acute bony abnormality.  2. Degenerative change.  3. No central canal stenosis or neural foraminal compromise at any  level.  4. Small right lateral disc bulge or herniation causing no  compression on the exiting right L4 nerve root.     Electronically signed by: MADELAINE LANDERS  02/03/21 12:10      MRI L Spine without Contrast [id number] 05:47PM Karla Artis   [Feb 28, 2017 11:15AM Chetan Chauhan]  AMA Intake Activity Log Entry by Chetan Chauhan ([email or web address]) on 2/28/2017 11:10 AM  Status Change: To Closed - Confirmed-Appt Past   [Feb 13, 2017 4:00PM Karla Artis]  Reason: Unspecified for MRI L Spine without Contrast      Test Name Result Flag Reference   MRI L Spine without Contrast (Report)       Interpreted by: ALEAH GLOVER  02/16/17 07:03  MRN: [id number]  Patient Name: LION PEREZ     STUDY:  MRI L-SPINE WO; 2/15/2017 5:47 pm  MRI L-SPINE WO; 2/15/2017 5:47 pm     INDICATION:  Signs/Symptoms: Acute on chronic low back pain; increased low back  pain.  Signs/Symptoms: Acute on chronic low back pain; increased low back  pain. RIGHT ONE     COMPARISON:  1/2016.     ACCESSION NUMBER(S):  [id number]     ORDERING CLINICIAN:  KARLA BARBOUR     TECHNIQUE:  The lumbar spine was studied in the sagital, axial and coronal planes  utiliing T1 and T2 weighted images.     FINDINGS:     The marrow signal and vertebral body height are normal. The conus and  sacrum are normal.  Images at each interspace reveal the following:  L1/L2  There is normal alignment and vertebral body height. The disc space  is normal. There is no evidence of canal or foraminal narrowing.  There is no evidence of bulging or herniated disc.  L2/L3  There is normal alignment and vertebral body height. The disc space  is normal. There is no evidence of canal or foraminal narrowing.  There is no evidence of bulging or herniated disc.  L3/L4  Slight progression of spondylolisthesis since the  previous exam.  Interval development of focal herniated fragment in the midline  measuring approximately 4 mm in size on parasagittal T2 weighted  image 7/14. Flattening of the thecal sac which measures 9 mm in AP  dimension in the midline. Focal narrowing of the left lateral recess  and neural foramen.  L4/L5  Trace spondylolisthesis and facet hypertrophy. Bilateral facet  hypertrophy with retained fluid in the facet joint bilaterally as  well as re-demonstration of a small synovial cyst which narrows the  right lateral recess on axial T2 weighted image 34/48. No evidence of  disc herniation or measurable canal stenosis  L5/S1  Mild facet hypertrophy without canal or foraminal narrowing     IMPRESSION:  *Progressive spondylolisthesis and central disc herniation at L3/L4  THIS EXAMINATION WAS INTERPRETED AT [facility] – [place]  Transcribed by: [email or web address], User  Electronically signed by: Serjio, User  02/16/17 07:03      Xray Knee 1 or 2 View [id number] 12:56PM Junaid Pettit   [Dec 16, 2015 12:47PM Junaid Pettit]  Reason: Unspecified for Xray Knee 1 or 2 View      Test Name Result Flag Reference   Xray Knee 1 or 2 View (Report)       Interpreted by: MARILYN COLE  12/16/15 14:53  STUDY: Bilateral knee dated 12/16/2015 12:56 PM.     INDICATION: Pain.     COMPARISON: None.     ACCESSION NUMBER(S): [id number], [id number].     ORDERING CLINICIAN: JUNAID PETTIT, [phone].     TECHNIQUE: 2 views of the bilateral knee.     FINDINGS: No fracture or dislocation evident. No joint effusion   evident. There is moderate bilateral patellofemoral and lateral   femorotibial and severe bilateral medial femorotibial joint space   degenerative change with note of a degree of lateral shift being of   the tibias with respect to the femurs greater on the right as   compared to the left. The soft tissues are grossly unremarkable.     IMPRESSION:     Degenerative change as discussed above without osseous injury evident.  Transcribed by:  YuMe  Electronically signed by: YuMe  12/16/15 14:53      Neisha was seen today for back pain and knee pain.  Diagnoses and all orders for this visit:  Wound cellulitis  -     Referral to Wound Clinic  Chronic lumbar radiculopathy  -     oxyCODONE (Roxicodone) 10 mg immediate release tablet; Take 1 tablet (10 mg) by mouth every 4 hours if needed for severe pain (7 - 10) for up to 28 days. Do not fill before November 25, 2024.  -     Lyrica 50 mg capsule; Take 1 capsule (50 mg) by mouth 3 times a day.  -     lidocaine 5 % gel; Apply 1 inch topically 3 times a day as needed (apply to affected area).  -     oxyCODONE (Roxicodone) 10 mg immediate release tablet; Take 1 tablet (10 mg) by mouth every 4 hours if needed for severe pain (7 - 10) for up to 28 days. Do not fill before December 23, 2024.  -     oxyCODONE (Roxicodone) 10 mg immediate release tablet; Take 1 tablet (10 mg) by mouth every 4 hours if needed for severe pain (7 - 10) for up to 28 days. Do not fill before January 20, 2025.  Neurogenic claudication due to lumbar spinal stenosis  -     oxyCODONE (Roxicodone) 10 mg immediate release tablet; Take 1 tablet (10 mg) by mouth every 4 hours if needed for severe pain (7 - 10) for up to 28 days. Do not fill before November 25, 2024.  -     lidocaine 5 % gel; Apply 1 inch topically 3 times a day as needed (apply to affected area).  -     oxyCODONE (Roxicodone) 10 mg immediate release tablet; Take 1 tablet (10 mg) by mouth every 4 hours if needed for severe pain (7 - 10) for up to 28 days. Do not fill before December 23, 2024.  -     oxyCODONE (Roxicodone) 10 mg immediate release tablet; Take 1 tablet (10 mg) by mouth every 4 hours if needed for severe pain (7 - 10) for up to 28 days. Do not fill before January 20, 2025.  Primary osteoarthritis of left knee  -     oxyCODONE (Roxicodone) 10 mg immediate release tablet; Take 1 tablet (10 mg) by mouth every 4 hours if needed for  severe pain (7 - 10) for up to 28 days. Do not fill before November 25, 2024.  -     lidocaine 5 % gel; Apply 1 inch topically 3 times a day as needed (apply to affected area).  -     oxyCODONE (Roxicodone) 10 mg immediate release tablet; Take 1 tablet (10 mg) by mouth every 4 hours if needed for severe pain (7 - 10) for up to 28 days. Do not fill before December 23, 2024.  -     oxyCODONE (Roxicodone) 10 mg immediate release tablet; Take 1 tablet (10 mg) by mouth every 4 hours if needed for severe pain (7 - 10) for up to 28 days. Do not fill before January 20, 2025.  Primary osteoarthritis of right knee  -     oxyCODONE (Roxicodone) 10 mg immediate release tablet; Take 1 tablet (10 mg) by mouth every 4 hours if needed for severe pain (7 - 10) for up to 28 days. Do not fill before November 25, 2024.  -     lidocaine 5 % gel; Apply 1 inch topically 3 times a day as needed (apply to affected area).  -     oxyCODONE (Roxicodone) 10 mg immediate release tablet; Take 1 tablet (10 mg) by mouth every 4 hours if needed for severe pain (7 - 10) for up to 28 days. Do not fill before December 23, 2024.  -     oxyCODONE (Roxicodone) 10 mg immediate release tablet; Take 1 tablet (10 mg) by mouth every 4 hours if needed for severe pain (7 - 10) for up to 28 days. Do not fill before January 20, 2025.  Myofascial pain  -     oxyCODONE (Roxicodone) 10 mg immediate release tablet; Take 1 tablet (10 mg) by mouth every 4 hours if needed for severe pain (7 - 10) for up to 28 days. Do not fill before November 25, 2024.  -     lidocaine 5 % gel; Apply 1 inch topically 3 times a day as needed (apply to affected area).  -     oxyCODONE (Roxicodone) 10 mg immediate release tablet; Take 1 tablet (10 mg) by mouth every 4 hours if needed for severe pain (7 - 10) for up to 28 days. Do not fill before December 23, 2024.  -     oxyCODONE (Roxicodone) 10 mg immediate release tablet; Take 1 tablet (10 mg) by mouth every 4 hours if needed for severe  pain (7 - 10) for up to 28 days. Do not fill before January 20, 2025.     Follow up in 12 weeks.

## 2024-11-13 ENCOUNTER — APPOINTMENT (OUTPATIENT)
Dept: WOUND CARE | Facility: HOSPITAL | Age: 64
End: 2024-11-13
Payer: MEDICARE

## 2024-11-13 DIAGNOSIS — J01.80 ACUTE NON-RECURRENT SINUSITIS OF OTHER SINUS: Primary | ICD-10-CM

## 2024-11-13 RX ORDER — AMOXICILLIN AND CLAVULANATE POTASSIUM 875; 125 MG/1; MG/1
875 TABLET, FILM COATED ORAL 2 TIMES DAILY
Qty: 20 TABLET | Refills: 0 | Status: SHIPPED | OUTPATIENT
Start: 2024-11-13 | End: 2024-11-23

## 2024-11-13 RX ORDER — LIDOCAINE 50 MG/G
OINTMENT TOPICAL AS NEEDED
Qty: 35 G | Refills: 5 | Status: SHIPPED | OUTPATIENT
Start: 2024-11-13 | End: 2025-11-13

## 2024-11-18 ENCOUNTER — APPOINTMENT (OUTPATIENT)
Dept: HEMATOLOGY/ONCOLOGY | Facility: CLINIC | Age: 64
End: 2024-11-18
Payer: MEDICARE

## 2024-11-18 DIAGNOSIS — D50.0 IRON DEFICIENCY ANEMIA DUE TO CHRONIC BLOOD LOSS: Primary | ICD-10-CM

## 2024-11-20 ENCOUNTER — OFFICE VISIT (OUTPATIENT)
Dept: WOUND CARE | Facility: HOSPITAL | Age: 64
End: 2024-11-20
Payer: MEDICARE

## 2024-11-20 PROCEDURE — 11045 DBRDMT SUBQ TISS EACH ADDL: CPT | Mod: RT

## 2024-11-20 PROCEDURE — 11042 DBRDMT SUBQ TIS 1ST 20SQCM/<: CPT | Mod: LT

## 2024-11-23 DIAGNOSIS — L30.9 DERMATITIS: ICD-10-CM

## 2024-11-23 DIAGNOSIS — L30.4 INTERTRIGO: ICD-10-CM

## 2024-11-25 DIAGNOSIS — I74.9 CHRONIC THROMBOEMBOLIC DISEASE (MULTI): ICD-10-CM

## 2024-11-25 RX ORDER — WARFARIN 7.5 MG/1
TABLET ORAL
Qty: 90 TABLET | Refills: 3 | Status: SHIPPED | OUTPATIENT
Start: 2024-11-25

## 2024-11-25 RX ORDER — NYSTATIN 100000 U/G
CREAM TOPICAL
Qty: 30 G | Refills: 2 | Status: SHIPPED | OUTPATIENT
Start: 2024-11-25

## 2024-11-27 ENCOUNTER — OFFICE VISIT (OUTPATIENT)
Dept: WOUND CARE | Facility: HOSPITAL | Age: 64
End: 2024-11-27
Payer: MEDICARE

## 2024-11-27 DIAGNOSIS — J01.80 ACUTE NON-RECURRENT SINUSITIS OF OTHER SINUS: ICD-10-CM

## 2024-11-27 DIAGNOSIS — L97.222 NON-PRESSURE CHRONIC ULCER OF LEFT CALF WITH FAT LAYER EXPOSED (MULTI): ICD-10-CM

## 2024-11-27 DIAGNOSIS — L97.212 NON-PRESSURE CHRONIC ULCER OF RIGHT CALF WITH FAT LAYER EXPOSED (MULTI): ICD-10-CM

## 2024-11-27 PROCEDURE — 11042 DBRDMT SUBQ TIS 1ST 20SQCM/<: CPT | Mod: LT

## 2024-11-27 PROCEDURE — 11045 DBRDMT SUBQ TISS EACH ADDL: CPT | Mod: RT

## 2024-11-29 RX ORDER — TRIAMCINOLONE ACETONIDE 1 MG/G
CREAM TOPICAL
Qty: 453.6 G | Refills: 3 | Status: SHIPPED | OUTPATIENT
Start: 2024-11-29 | End: 2025-01-28

## 2024-11-29 RX ORDER — GENTAMICIN SULFATE 1 MG/G
OINTMENT TOPICAL
Qty: 30 G | Refills: 1 | Status: SHIPPED | OUTPATIENT
Start: 2024-11-29

## 2024-11-29 RX ORDER — AMOXICILLIN AND CLAVULANATE POTASSIUM 875; 125 MG/1; MG/1
875 TABLET, FILM COATED ORAL 2 TIMES DAILY
Qty: 20 TABLET | Refills: 0 | Status: SHIPPED | OUTPATIENT
Start: 2024-11-29 | End: 2024-12-09

## 2024-12-03 LAB
ATRIAL RATE: 105 BPM
P AXIS: 61 DEGREES
P OFFSET: 188 MS
P ONSET: 137 MS
PR INTERVAL: 162 MS
Q ONSET: 218 MS
QRS COUNT: 17 BEATS
QRS DURATION: 86 MS
QT INTERVAL: 338 MS
QTC CALCULATION(BAZETT): 446 MS
QTC FREDERICIA: 407 MS
R AXIS: 20 DEGREES
T AXIS: 60 DEGREES
T OFFSET: 387 MS
VENTRICULAR RATE: 105 BPM

## 2024-12-04 ENCOUNTER — APPOINTMENT (OUTPATIENT)
Dept: WOUND CARE | Facility: HOSPITAL | Age: 64
End: 2024-12-04
Payer: MEDICARE

## 2024-12-05 ENCOUNTER — TELEPHONE (OUTPATIENT)
Dept: PRIMARY CARE | Facility: CLINIC | Age: 64
End: 2024-12-05

## 2024-12-05 ENCOUNTER — TELEMEDICINE (OUTPATIENT)
Dept: PRIMARY CARE | Facility: CLINIC | Age: 64
End: 2024-12-05
Payer: MEDICARE

## 2024-12-05 DIAGNOSIS — J43.2 CENTRILOBULAR EMPHYSEMA (MULTI): Primary | ICD-10-CM

## 2024-12-05 PROCEDURE — 99441 PR PHYS/QHP TELEPHONE EVALUATION 5-10 MIN: CPT | Performed by: FAMILY MEDICINE

## 2024-12-05 RX ORDER — METHYLPREDNISOLONE 4 MG/1
TABLET ORAL
Qty: 21 TABLET | Refills: 0 | Status: SHIPPED | OUTPATIENT
Start: 2024-12-05 | End: 2024-12-11

## 2024-12-05 RX ORDER — ALBUTEROL SULFATE 0.83 MG/ML
2.5 SOLUTION RESPIRATORY (INHALATION) 4 TIMES DAILY PRN
Qty: 75 ML | Refills: 11 | Status: SHIPPED | OUTPATIENT
Start: 2024-12-05 | End: 2024-12-05 | Stop reason: CLARIF

## 2024-12-05 RX ORDER — IPRATROPIUM BROMIDE AND ALBUTEROL SULFATE 2.5; .5 MG/3ML; MG/3ML
3 SOLUTION RESPIRATORY (INHALATION) 4 TIMES DAILY PRN
Qty: 360 ML | Refills: 11 | Status: SHIPPED | OUTPATIENT
Start: 2024-12-05 | End: 2025-12-05

## 2024-12-05 NOTE — PROGRESS NOTES
Subjective   Patient ID: Neisha Graham is a 64 y.o. female who presents for Shortness of Breath.  HPI  Sick for a few weeks  Has improved since has been on augmentin but still getting SOB when stands up or does much  Some cough and wheezing  No fever, chills  Some runny/stuffy nose  Feeling better but still feels crummy  No HA, ear pain, ST, n/v, diarrhea, abdominal pain, rashes    Current Outpatient Medications:     albuterol 2.5 mg /3 mL (0.083 %) nebulizer solution, Take 3 mL (2.5 mg) by nebulization 4 times a day as needed for wheezing or shortness of breath., Disp: 75 mL, Rfl: 11    albuterol 90 mcg/actuation inhaler, INHALE 2 PUFFS BY MOUTH AND INTO THE LUNGS EVERY 4 HOURS IF NEEDED FOR WHEEZING, Disp: 8 g, Rfl: 3    amino acids-protein hydrolys (ProSource No Carb) 15-60 gram-kcal/30 mL liquid, Take by mouth., Disp: , Rfl:     ammonium lactate (Amlactin) 12 % cream, Apply 1 Film topically 2 times a day as needed., Disp: , Rfl:     amoxicillin-pot clavulanate (Augmentin) 875-125 mg tablet, Take 1 tablet (875 mg) by mouth 2 times a day for 10 days., Disp: 20 tablet, Rfl: 0    clotrimazole (Lotrimin) 1 % cream, Apply topically 2 times a day. apply to affected area, Disp: 60 g, Rfl: 1    cyclobenzaprine (Flexeril) 10 mg tablet, Take 1 tablet (10 mg) by mouth 2 times a day as needed for muscle spasms., Disp: 30 tablet, Rfl: 2    DULoxetine (Cymbalta) 20 mg DR capsule, TAKE 1 CAPSULE BY MOUTH ONCE DAILY, Disp: 90 capsule, Rfl: 1    gentamicin (Garamycin) 0.1 % ointment, Apply to wounds with dressing changes, Disp: 30 g, Rfl: 1    hydroxyurea (Hydrea) 500 mg capsule, Take 2 capsules (1,000 mg total) by mouth once daily., Disp: 180 capsule, Rfl: 3    hydrOXYzine HCL (Atarax) 50 mg tablet, Take 1 tablet (50 mg) by mouth every 8 hours if needed for anxiety., Disp: 30 tablet, Rfl: 3    lidocaine (Xylocaine) 5 % ointment, Apply topically if needed for mild pain (1 - 3). Apply up to three times a day as needed for pain.,  Disp: 35 g, Rfl: 5    lidocaine 5 % gel, Apply 1 inch topically 3 times a day as needed (apply to affected area)., Disp: 100 g, Rfl: 2    lisinopril 20 mg tablet, Take 1 tablet (20 mg) by mouth once daily., Disp: , Rfl:     Lyrica 50 mg capsule, Take 1 capsule (50 mg) by mouth 3 times a day., Disp: 180 capsule, Rfl: 2    methylPREDNISolone (Medrol Dospak) 4 mg tablets, Take as directed on package., Disp: 21 tablet, Rfl: 0    naloxone (Narcan) 4 mg/0.1 mL nasal spray, Administer 1 spray (4 mg) into affected nostril(s) if needed for opioid reversal or respiratory depression., Disp: 2 each, Rfl: 0    nystatin (Mycostatin) cream, APPLY TO AFFECTED AREA TWO TO THREE TIMES A DAY, Disp: 30 g, Rfl: 2    oxyCODONE (Roxicodone) 10 mg immediate release tablet, Take 1 tablet (10 mg) by mouth every 4 hours if needed for severe pain (7 - 10) for up to 28 days. Do not fill before November 25, 2024., Disp: 168 tablet, Rfl: 0    [START ON 12/23/2024] oxyCODONE (Roxicodone) 10 mg immediate release tablet, Take 1 tablet (10 mg) by mouth every 4 hours if needed for severe pain (7 - 10) for up to 28 days. Do not fill before December 23, 2024., Disp: 168 tablet, Rfl: 0    [START ON 1/20/2025] oxyCODONE (Roxicodone) 10 mg immediate release tablet, Take 1 tablet (10 mg) by mouth every 4 hours if needed for severe pain (7 - 10) for up to 28 days. Do not fill before January 20, 2025., Disp: 168 tablet, Rfl: 0    pramipexole (Mirapex) 1 mg tablet, Take 1 tablet (1 mg) by mouth 2 times a day., Disp: , Rfl:     sennosides-docusate sodium (Rebecca-Colace) 8.6-50 mg tablet, Take 2 tablets by mouth 2 times a day., Disp: 120 tablet, Rfl: 0    Tiadylt  mg 24 hr capsule, TAKE 1 CAPSULE BY MOUTH EVERY DAY, Disp: 90 capsule, Rfl: 1    Trelegy Ellipta 200-62.5-25 mcg blister with device, INHALE 1 PUFF BY MOUTH AND IN TO LUNGS ONCE DAILY, Disp: 180 each, Rfl: 1    triamcinolone (Kenalog) 0.1 % cream, APPLY SPARINGLY AND MASSAGE IN ONCE A DAY, Disp: ,  Rfl:     triamcinolone (Kenalog) 0.1 % cream, Apply to affected area with dressing changes, Disp: 453.6 g, Rfl: 3    warfarin (Coumadin) 7.5 mg tablet, Take one po every day, Disp: 90 tablet, Rfl: 3    white petrolatum (Aquaphor) 41 % ointment ointment, Apply 1 Application topically 2 times a day. Apply to legs and abdomen, Disp: 50 g, Rfl: 0    zinc oxide 40 % ointment ointment, Apply 1 Application topically 3 times a day. Apply to groin, buttocks after nystatin, Disp: 57 g, Rfl: 1   Past Surgical History:   Procedure Laterality Date    KNEE SURGERY  09/19/2013    Knee Surgery Right    OTHER SURGICAL HISTORY  09/19/2013    Direct Laryngoscopy With Foreign Body Removal    OTHER SURGICAL HISTORY  09/19/2013    Laminectomy With Drainage Of Intramedullary Cyst    OTHER SURGICAL HISTORY  09/27/2013    Ovarian Cystectomy    TONSILLECTOMY  09/19/2013    Tonsillectomy    TUBAL LIGATION  09/19/2013    Tubal Ligation      Past Medical History:   Diagnosis Date    Acute bronchitis due to other specified organisms 10/14/2019    Acute bronchitis due to other specified organisms    Acute viral conjunctivitis of left eye 03/10/2023    Alkaline phosphatase elevation 08/17/2023    Anemia 03/10/2023    Bone marrow disorder 08/17/2023    Cellulitis of left lower limb 04/08/2021    Cellulitis of left lower leg    Chronic obstructive pulmonary disease with (acute) exacerbation (Multi) 01/09/2019    COPD exacerbation    Chronic sinusitis, unspecified     Sinusitis    COVID-19 11/28/2022    COVID-19    Daily headache 08/17/2023    Elevated bilirubin 08/17/2023    Elevated transaminase level 08/17/2023    Fever 08/17/2023    Fever 08/17/2023    Heart murmur 03/10/2023    Hemoptysis 03/10/2023    Hyperkalemia 03/10/2023    Malaise and fatigue 03/10/2023    Morbid (severe) obesity due to excess calories (Multi)     Morbid obesity    Other conditions influencing health status     History Of ___ Previous Pregnancies    Other conditions  influencing health status 03/12/2020    Arthritis    Other conditions influencing health status 03/06/2017    History of cough    Other conditions influencing health status     Menstruation    Other conditions influencing health status     Osteoarthritis    Other conditions influencing health status     Pulmonary Disease    Pain in unspecified ankle and joints of unspecified foot 06/24/2016    Ankle joint pain    Pain in unspecified foot 12/08/2015    Foot pain    Pain in unspecified knee     Joint pain, knee    Palpitations 03/10/2023    Personal history of diseases of the skin and subcutaneous tissue     History of cellulitis    Personal history of other diseases of the female genital tract     Vaginal delivery    Personal history of other diseases of the nervous system and sense organs 09/12/2019    History of conjunctivitis    Personal history of other diseases of the nervous system and sense organs 09/17/2015    History of blurred vision    Personal history of other diseases of the respiratory system     History of pleurisy    Personal history of other diseases of the respiratory system 10/17/2016    History of bronchitis    Personal history of other diseases of the respiratory system 11/29/2017    History of acute bronchitis    Personal history of other diseases of the respiratory system 11/16/2016    History of acute pharyngitis    Personal history of other drug therapy 10/14/2016    History of influenza vaccination    Personal history of other infectious and parasitic diseases     History of hepatitis    Personal history of other infectious and parasitic diseases 07/22/2020    History of candidiasis of mouth    Personal history of other medical treatment     History of mammogram    Personal history of other specified conditions     History of shortness of breath    Personal history of other specified conditions 01/28/2015    History of shortness of breath    Personal history of other specified conditions      "History of abnormal Pap smear    Personal history of pneumonia (recurrent)     History of pneumonia    Pneumonia, unspecified organism 01/11/2018    Community acquired pneumonia    Postmenopausal bleeding 09/30/2014    Postmenopausal bleeding    Right knee pain 03/10/2023    Shortness of breath 03/10/2023    Sinus tachycardia 03/10/2023    Submandibular sialolithiasis 03/10/2023    Unilateral primary osteoarthritis, unspecified knee 02/03/2017    Osteoarthritis, localized, knee    Unspecified acute conjunctivitis, bilateral 08/20/2020    Acute bacterial conjunctivitis of both eyes     Social History     Tobacco Use    Smoking status: Some Days     Types: Cigarettes    Smokeless tobacco: Never      No family history on file.   Review of Systems    Objective   There were no vitals taken for this visit.   Physical Exam    Assessment/Plan   Problem List Items Addressed This Visit       COPD (chronic obstructive pulmonary disease) (Multi) - Primary   Finish Augmentin  Added Medrol Dose Pack and Albuterol Nebs  Fluids, rest, OTC cold meds    I discussed with the patient the potential benefits and risks of the use of telephone or video-conferencing that differ from in-person services (e.g., limits to patient confidentiality, limitations on the provider’s ability to observe the patient, limitations on the diagnostic tools available). I explained to the patient that I may determine at any point that telehealth services are not appropriate based on the patient’s circumstances and either party may therefore end the service to schedule an alternative in-person service or contact 911 to address a medical emergency. With the understanding of these risks, benefits and alternatives, the patient agreed to use the telephone or video-conferencing platform selected for this virtual session and further the patient confirmed his/her understanding that the services do not guarantee a specific outcome or recovery.  \"Spent 5 minutes with " "patient on phone discussing health concerns.\"      Patient understands and agrees with treatment plan    Micheal Gonsalez, DO   "

## 2024-12-06 DIAGNOSIS — J43.2 CENTRILOBULAR EMPHYSEMA (MULTI): Primary | ICD-10-CM

## 2024-12-09 ENCOUNTER — TELEPHONE (OUTPATIENT)
Dept: PRIMARY CARE | Facility: CLINIC | Age: 64
End: 2024-12-09
Payer: MEDICARE

## 2024-12-09 DIAGNOSIS — J43.2 CENTRILOBULAR EMPHYSEMA (MULTI): Primary | ICD-10-CM

## 2024-12-09 NOTE — TELEPHONE ENCOUNTER
Rn nemo from [facility] tenders calling regarding bairon, you put her on a medrol dose pac finished it not any better o2 [id number] just sitting she does have 2 doses of pcn left   She will be at the wound clinic tomorrow get a chest x-ray ,[facility] and approve the tubing for a her nebulizer.   Can you please return her call at [phone]

## 2024-12-11 ENCOUNTER — OFFICE VISIT (OUTPATIENT)
Dept: WOUND CARE | Facility: HOSPITAL | Age: 64
End: 2024-12-11
Payer: MEDICARE

## 2024-12-11 ENCOUNTER — APPOINTMENT (OUTPATIENT)
Dept: RADIOLOGY | Facility: HOSPITAL | Age: 64
DRG: 291 | End: 2024-12-11
Payer: MEDICARE

## 2024-12-11 ENCOUNTER — HOSPITAL ENCOUNTER (INPATIENT)
Facility: HOSPITAL | Age: 64
DRG: 291 | End: 2024-12-11
Attending: EMERGENCY MEDICINE | Admitting: STUDENT IN AN ORGANIZED HEALTH CARE EDUCATION/TRAINING PROGRAM
Payer: MEDICARE

## 2024-12-11 ENCOUNTER — APPOINTMENT (OUTPATIENT)
Dept: CARDIOLOGY | Facility: HOSPITAL | Age: 64
DRG: 291 | End: 2024-12-11
Payer: MEDICARE

## 2024-12-11 DIAGNOSIS — I50.9 ACUTE ON CHRONIC CONGESTIVE HEART FAILURE, UNSPECIFIED HEART FAILURE TYPE: Primary | ICD-10-CM

## 2024-12-11 DIAGNOSIS — J96.01 ACUTE RESPIRATORY FAILURE WITH HYPOXIA (MULTI): ICD-10-CM

## 2024-12-11 DIAGNOSIS — I35.0 AORTIC VALVE STENOSIS, ETIOLOGY OF CARDIAC VALVE DISEASE UNSPECIFIED: ICD-10-CM

## 2024-12-11 LAB
ALBUMIN SERPL BCP-MCNC: 3.7 G/DL (ref 3.4–5)
ALP SERPL-CCNC: 164 U/L (ref 33–136)
ALT SERPL W P-5'-P-CCNC: 23 U/L (ref 7–45)
ANION GAP BLDV CALCULATED.4IONS-SCNC: 3 MMOL/L (ref 10–25)
ANION GAP SERPL CALC-SCNC: 11 MMOL/L (ref 10–20)
APPEARANCE UR: CLEAR
AST SERPL W P-5'-P-CCNC: 22 U/L (ref 9–39)
BACTERIA #/AREA URNS AUTO: ABNORMAL /HPF
BASE EXCESS BLDV CALC-SCNC: 8.6 MMOL/L (ref -2–3)
BASOPHILS # BLD AUTO: 0.01 X10*3/UL (ref 0–0.1)
BASOPHILS NFR BLD AUTO: 0.1 %
BILIRUB SERPL-MCNC: 0.5 MG/DL (ref 0–1.2)
BILIRUB UR STRIP.AUTO-MCNC: NEGATIVE MG/DL
BNP SERPL-MCNC: 984 PG/ML (ref 0–99)
BODY TEMPERATURE: ABNORMAL
BUN SERPL-MCNC: 21 MG/DL (ref 6–23)
CA-I BLDV-SCNC: 1.24 MMOL/L (ref 1.1–1.33)
CALCIUM SERPL-MCNC: 9 MG/DL (ref 8.6–10.3)
CARDIAC TROPONIN I PNL SERPL HS: 26 NG/L (ref 0–13)
CARDIAC TROPONIN I PNL SERPL HS: 28 NG/L (ref 0–13)
CHLORIDE BLDV-SCNC: 103 MMOL/L (ref 98–107)
CHLORIDE SERPL-SCNC: 100 MMOL/L (ref 98–107)
CHOLEST SERPL-MCNC: 174 MG/DL (ref 0–199)
CHOLESTEROL/HDL RATIO: 2.7
CO2 SERPL-SCNC: 33 MMOL/L (ref 21–32)
COLOR UR: YELLOW
CREAT SERPL-MCNC: 0.78 MG/DL (ref 0.5–1.05)
CRP SERPL-MCNC: 0.72 MG/DL
EGFRCR SERPLBLD CKD-EPI 2021: 84 ML/MIN/1.73M*2
EOSINOPHIL # BLD AUTO: 0.03 X10*3/UL (ref 0–0.7)
EOSINOPHIL NFR BLD AUTO: 0.2 %
ERYTHROCYTE [DISTWIDTH] IN BLOOD BY AUTOMATED COUNT: 14.3 % (ref 11.5–14.5)
ERYTHROCYTE [SEDIMENTATION RATE] IN BLOOD BY WESTERGREN METHOD: 35 MM/H (ref 0–30)
FLUAV RNA RESP QL NAA+PROBE: NOT DETECTED
FLUBV RNA RESP QL NAA+PROBE: NOT DETECTED
GLUCOSE BLDV-MCNC: 139 MG/DL (ref 74–99)
GLUCOSE SERPL-MCNC: 131 MG/DL (ref 74–99)
GLUCOSE UR STRIP.AUTO-MCNC: NORMAL MG/DL
HCO3 BLDV-SCNC: 35.3 MMOL/L (ref 22–26)
HCT VFR BLD AUTO: 40.5 % (ref 36–46)
HCT VFR BLD EST: 39 % (ref 36–46)
HDLC SERPL-MCNC: 65.6 MG/DL
HGB BLD-MCNC: 12.2 G/DL (ref 12–16)
HGB BLDV-MCNC: 13 G/DL (ref 12–16)
IMM GRANULOCYTES # BLD AUTO: 0.07 X10*3/UL (ref 0–0.7)
IMM GRANULOCYTES NFR BLD AUTO: 0.5 % (ref 0–0.9)
INHALED O2 CONCENTRATION: 0 %
INR PPP: 3.7 (ref 0.9–1.1)
KETONES UR STRIP.AUTO-MCNC: NEGATIVE MG/DL
LACTATE BLDV-SCNC: 1.2 MMOL/L (ref 0.4–2)
LACTATE SERPL-SCNC: 0.8 MMOL/L (ref 0.4–2)
LDLC SERPL CALC-MCNC: 87 MG/DL
LEUKOCYTE ESTERASE UR QL STRIP.AUTO: ABNORMAL
LYMPHOCYTES # BLD AUTO: 1.07 X10*3/UL (ref 1.2–4.8)
LYMPHOCYTES NFR BLD AUTO: 8.3 %
MAGNESIUM SERPL-MCNC: 1.96 MG/DL (ref 1.6–2.4)
MCH RBC QN AUTO: 33.1 PG (ref 26–34)
MCHC RBC AUTO-ENTMCNC: 30.1 G/DL (ref 32–36)
MCV RBC AUTO: 110 FL (ref 80–100)
MONOCYTES # BLD AUTO: 1.17 X10*3/UL (ref 0.1–1)
MONOCYTES NFR BLD AUTO: 9 %
MUCOUS THREADS #/AREA URNS AUTO: ABNORMAL /LPF
NEUTROPHILS # BLD AUTO: 10.58 X10*3/UL (ref 1.2–7.7)
NEUTROPHILS NFR BLD AUTO: 81.9 %
NITRITE UR QL STRIP.AUTO: NEGATIVE
NON HDL CHOLESTEROL: 108 MG/DL (ref 0–149)
NRBC BLD-RTO: 0.3 /100 WBCS (ref 0–0)
OXYHGB MFR BLDV: 72.7 % (ref 45–75)
PCO2 BLDV: 57 MM HG (ref 41–51)
PH BLDV: 7.4 PH (ref 7.33–7.43)
PH UR STRIP.AUTO: 5.5 [PH]
PLATELET # BLD AUTO: 704 X10*3/UL (ref 150–450)
PO2 BLDV: 44 MM HG (ref 35–45)
POTASSIUM BLDV-SCNC: 4.5 MMOL/L (ref 3.5–5.3)
POTASSIUM SERPL-SCNC: 4.1 MMOL/L (ref 3.5–5.3)
PROT SERPL-MCNC: 6.9 G/DL (ref 6.4–8.2)
PROT UR STRIP.AUTO-MCNC: ABNORMAL MG/DL
PROTHROMBIN TIME: 42.2 SECONDS (ref 9.8–12.8)
RBC # BLD AUTO: 3.69 X10*6/UL (ref 4–5.2)
RBC # UR STRIP.AUTO: NEGATIVE /UL
RBC #/AREA URNS AUTO: ABNORMAL /HPF
RSV RNA RESP QL NAA+PROBE: NOT DETECTED
SAO2 % BLDV: 79 % (ref 45–75)
SARS-COV-2 RNA RESP QL NAA+PROBE: NOT DETECTED
SODIUM BLDV-SCNC: 137 MMOL/L (ref 136–145)
SODIUM SERPL-SCNC: 140 MMOL/L (ref 136–145)
SP GR UR STRIP.AUTO: 1.03
SQUAMOUS #/AREA URNS AUTO: ABNORMAL /HPF
T4 FREE SERPL-MCNC: 1.14 NG/DL (ref 0.61–1.12)
TRIGL SERPL-MCNC: 106 MG/DL (ref 0–149)
TSH SERPL-ACNC: 4.43 MIU/L (ref 0.44–3.98)
UROBILINOGEN UR STRIP.AUTO-MCNC: NORMAL MG/DL
VLDL: 21 MG/DL (ref 0–40)
WBC # BLD AUTO: 12.9 X10*3/UL (ref 4.4–11.3)
WBC #/AREA URNS AUTO: ABNORMAL /HPF

## 2024-12-11 PROCEDURE — 84443 ASSAY THYROID STIM HORMONE: CPT

## 2024-12-11 PROCEDURE — 99285 EMERGENCY DEPT VISIT HI MDM: CPT | Mod: 25 | Performed by: EMERGENCY MEDICINE

## 2024-12-11 PROCEDURE — 83880 ASSAY OF NATRIURETIC PEPTIDE: CPT | Performed by: HEALTH CARE PROVIDER

## 2024-12-11 PROCEDURE — 87634 RSV DNA/RNA AMP PROBE: CPT

## 2024-12-11 PROCEDURE — 99212 OFFICE O/P EST SF 10 MIN: CPT

## 2024-12-11 PROCEDURE — 87086 URINE CULTURE/COLONY COUNT: CPT | Mod: GEALAB | Performed by: HEALTH CARE PROVIDER

## 2024-12-11 PROCEDURE — 36415 COLL VENOUS BLD VENIPUNCTURE: CPT

## 2024-12-11 PROCEDURE — 2500000002 HC RX 250 W HCPCS SELF ADMINISTERED DRUGS (ALT 637 FOR MEDICARE OP, ALT 636 FOR OP/ED): Performed by: HEALTH CARE PROVIDER

## 2024-12-11 PROCEDURE — 96374 THER/PROPH/DIAG INJ IV PUSH: CPT

## 2024-12-11 PROCEDURE — 84439 ASSAY OF FREE THYROXINE: CPT

## 2024-12-11 PROCEDURE — 71045 X-RAY EXAM CHEST 1 VIEW: CPT

## 2024-12-11 PROCEDURE — 2500000001 HC RX 250 WO HCPCS SELF ADMINISTERED DRUGS (ALT 637 FOR MEDICARE OP): Performed by: INTERNAL MEDICINE

## 2024-12-11 PROCEDURE — 83036 HEMOGLOBIN GLYCOSYLATED A1C: CPT | Mod: GEALAB

## 2024-12-11 PROCEDURE — 2500000002 HC RX 250 W HCPCS SELF ADMINISTERED DRUGS (ALT 637 FOR MEDICARE OP, ALT 636 FOR OP/ED): Performed by: INTERNAL MEDICINE

## 2024-12-11 PROCEDURE — 81001 URINALYSIS AUTO W/SCOPE: CPT | Performed by: HEALTH CARE PROVIDER

## 2024-12-11 PROCEDURE — 36415 COLL VENOUS BLD VENIPUNCTURE: CPT | Performed by: HEALTH CARE PROVIDER

## 2024-12-11 PROCEDURE — 86140 C-REACTIVE PROTEIN: CPT

## 2024-12-11 PROCEDURE — 93005 ELECTROCARDIOGRAM TRACING: CPT

## 2024-12-11 PROCEDURE — 87636 SARSCOV2 & INF A&B AMP PRB: CPT | Performed by: HEALTH CARE PROVIDER

## 2024-12-11 PROCEDURE — 83605 ASSAY OF LACTIC ACID: CPT | Performed by: HEALTH CARE PROVIDER

## 2024-12-11 PROCEDURE — 84132 ASSAY OF SERUM POTASSIUM: CPT | Performed by: HEALTH CARE PROVIDER

## 2024-12-11 PROCEDURE — 1200000002 HC GENERAL ROOM WITH TELEMETRY DAILY

## 2024-12-11 PROCEDURE — 85025 COMPLETE CBC W/AUTO DIFF WBC: CPT | Performed by: HEALTH CARE PROVIDER

## 2024-12-11 PROCEDURE — 85652 RBC SED RATE AUTOMATED: CPT

## 2024-12-11 PROCEDURE — 84484 ASSAY OF TROPONIN QUANT: CPT | Performed by: HEALTH CARE PROVIDER

## 2024-12-11 PROCEDURE — 2500000004 HC RX 250 GENERAL PHARMACY W/ HCPCS (ALT 636 FOR OP/ED): Performed by: HEALTH CARE PROVIDER

## 2024-12-11 PROCEDURE — 94640 AIRWAY INHALATION TREATMENT: CPT | Mod: 59

## 2024-12-11 PROCEDURE — 85610 PROTHROMBIN TIME: CPT | Performed by: HEALTH CARE PROVIDER

## 2024-12-11 PROCEDURE — 84484 ASSAY OF TROPONIN QUANT: CPT

## 2024-12-11 PROCEDURE — S4991 NICOTINE PATCH NONLEGEND: HCPCS | Performed by: INTERNAL MEDICINE

## 2024-12-11 PROCEDURE — 83718 ASSAY OF LIPOPROTEIN: CPT

## 2024-12-11 PROCEDURE — 83735 ASSAY OF MAGNESIUM: CPT | Performed by: HEALTH CARE PROVIDER

## 2024-12-11 PROCEDURE — 71045 X-RAY EXAM CHEST 1 VIEW: CPT | Mod: FOREIGN READ | Performed by: RADIOLOGY

## 2024-12-11 RX ORDER — AMMONIUM LACTATE 12 G/100G
CREAM TOPICAL 2 TIMES DAILY PRN
Status: DISCONTINUED | OUTPATIENT
Start: 2024-12-11 | End: 2024-12-11

## 2024-12-11 RX ORDER — LOSARTAN POTASSIUM 50 MG/1
25 TABLET ORAL DAILY
Status: DISCONTINUED | OUTPATIENT
Start: 2024-12-12 | End: 2024-12-15

## 2024-12-11 RX ORDER — ACETAMINOPHEN 650 MG/1
650 SUPPOSITORY RECTAL EVERY 6 HOURS PRN
Status: DISCONTINUED | OUTPATIENT
Start: 2024-12-11 | End: 2024-12-11

## 2024-12-11 RX ORDER — ACETAMINOPHEN 650 MG/1
650 SUPPOSITORY RECTAL EVERY 6 HOURS PRN
Status: DISCONTINUED | OUTPATIENT
Start: 2024-12-11 | End: 2024-12-16 | Stop reason: HOSPADM

## 2024-12-11 RX ORDER — HYDROXYUREA 500 MG/1
1000 CAPSULE ORAL DAILY
Status: DISCONTINUED | OUTPATIENT
Start: 2024-12-12 | End: 2024-12-16 | Stop reason: HOSPADM

## 2024-12-11 RX ORDER — DULOXETIN HYDROCHLORIDE 20 MG/1
20 CAPSULE, DELAYED RELEASE ORAL DAILY
Status: DISCONTINUED | OUTPATIENT
Start: 2024-12-12 | End: 2024-12-16 | Stop reason: HOSPADM

## 2024-12-11 RX ORDER — ACETAMINOPHEN 325 MG/1
650 TABLET ORAL EVERY 6 HOURS PRN
Status: DISCONTINUED | OUTPATIENT
Start: 2024-12-11 | End: 2024-12-16 | Stop reason: HOSPADM

## 2024-12-11 RX ORDER — EAR PLUGS
1 EACH OTIC (EAR) 3 TIMES DAILY
Status: DISCONTINUED | OUTPATIENT
Start: 2024-12-11 | End: 2024-12-16 | Stop reason: HOSPADM

## 2024-12-11 RX ORDER — ACETAMINOPHEN 160 MG/5ML
650 SOLUTION ORAL EVERY 6 HOURS PRN
Status: DISCONTINUED | OUTPATIENT
Start: 2024-12-11 | End: 2024-12-16 | Stop reason: HOSPADM

## 2024-12-11 RX ORDER — NICOTINE 7MG/24HR
1 PATCH, TRANSDERMAL 24 HOURS TRANSDERMAL DAILY
Status: DISCONTINUED | OUTPATIENT
Start: 2025-01-23 | End: 2024-12-11

## 2024-12-11 RX ORDER — ACETAMINOPHEN 325 MG/1
650 TABLET ORAL EVERY 6 HOURS PRN
Status: DISCONTINUED | OUTPATIENT
Start: 2024-12-11 | End: 2024-12-11

## 2024-12-11 RX ORDER — IPRATROPIUM BROMIDE AND ALBUTEROL SULFATE 2.5; .5 MG/3ML; MG/3ML
3 SOLUTION RESPIRATORY (INHALATION) EVERY 2 HOUR PRN
Status: DISCONTINUED | OUTPATIENT
Start: 2024-12-11 | End: 2024-12-16 | Stop reason: HOSPADM

## 2024-12-11 RX ORDER — AMOXICILLIN 250 MG
2 CAPSULE ORAL 2 TIMES DAILY
Status: DISCONTINUED | OUTPATIENT
Start: 2024-12-11 | End: 2024-12-16 | Stop reason: HOSPADM

## 2024-12-11 RX ORDER — OXYCODONE HYDROCHLORIDE 5 MG/1
5 TABLET ORAL EVERY 4 HOURS PRN
Status: DISCONTINUED | OUTPATIENT
Start: 2024-12-11 | End: 2024-12-16 | Stop reason: HOSPADM

## 2024-12-11 RX ORDER — BUDESONIDE 0.5 MG/2ML
0.5 INHALANT ORAL
Status: DISCONTINUED | OUTPATIENT
Start: 2024-12-11 | End: 2024-12-16 | Stop reason: HOSPADM

## 2024-12-11 RX ORDER — DILTIAZEM HYDROCHLORIDE 180 MG/1
180 CAPSULE, COATED, EXTENDED RELEASE ORAL DAILY
Status: DISCONTINUED | OUTPATIENT
Start: 2024-12-12 | End: 2024-12-14

## 2024-12-11 RX ORDER — AMMONIUM LACTATE 12 G/100G
LOTION TOPICAL 2 TIMES DAILY PRN
Status: DISCONTINUED | OUTPATIENT
Start: 2024-12-11 | End: 2024-12-13

## 2024-12-11 RX ORDER — IPRATROPIUM BROMIDE AND ALBUTEROL SULFATE 2.5; .5 MG/3ML; MG/3ML
3 SOLUTION RESPIRATORY (INHALATION) 4 TIMES DAILY PRN
Status: DISCONTINUED | OUTPATIENT
Start: 2024-12-11 | End: 2024-12-11

## 2024-12-11 RX ORDER — FORMOTEROL FUMARATE DIHYDRATE 20 UG/2ML
20 SOLUTION RESPIRATORY (INHALATION)
Status: DISCONTINUED | OUTPATIENT
Start: 2024-12-11 | End: 2024-12-16 | Stop reason: HOSPADM

## 2024-12-11 RX ORDER — OXYCODONE HYDROCHLORIDE 10 MG/1
10 TABLET ORAL EVERY 4 HOURS PRN
Status: DISCONTINUED | OUTPATIENT
Start: 2024-12-11 | End: 2024-12-16 | Stop reason: HOSPADM

## 2024-12-11 RX ORDER — PRAMIPEXOLE DIHYDROCHLORIDE 1 MG/1
1 TABLET ORAL 2 TIMES DAILY
Status: DISCONTINUED | OUTPATIENT
Start: 2024-12-11 | End: 2024-12-16 | Stop reason: HOSPADM

## 2024-12-11 RX ORDER — FUROSEMIDE 10 MG/ML
40 INJECTION INTRAMUSCULAR; INTRAVENOUS ONCE
Status: COMPLETED | OUTPATIENT
Start: 2024-12-11 | End: 2024-12-11

## 2024-12-11 RX ORDER — IBUPROFEN 200 MG
1 TABLET ORAL DAILY
Status: DISCONTINUED | OUTPATIENT
Start: 2024-12-12 | End: 2024-12-11

## 2024-12-11 RX ORDER — PETROLATUM 420 MG/G
1 OINTMENT TOPICAL 2 TIMES DAILY
Status: DISCONTINUED | OUTPATIENT
Start: 2024-12-11 | End: 2024-12-13

## 2024-12-11 RX ORDER — WARFARIN SODIUM 5 MG/1
7.5 TABLET ORAL DAILY
Status: DISCONTINUED | OUTPATIENT
Start: 2024-12-12 | End: 2024-12-16 | Stop reason: HOSPADM

## 2024-12-11 RX ORDER — NYSTATIN 100000 [USP'U]/G
1 POWDER TOPICAL 2 TIMES DAILY
Status: DISCONTINUED | OUTPATIENT
Start: 2024-12-11 | End: 2024-12-16 | Stop reason: HOSPADM

## 2024-12-11 RX ORDER — ACETAMINOPHEN 160 MG/5ML
650 SOLUTION ORAL EVERY 6 HOURS PRN
Status: DISCONTINUED | OUTPATIENT
Start: 2024-12-11 | End: 2024-12-11

## 2024-12-11 RX ORDER — FLUTICASONE FUROATE AND VILANTEROL 200; 25 UG/1; UG/1
1 POWDER RESPIRATORY (INHALATION)
Status: DISCONTINUED | OUTPATIENT
Start: 2024-12-12 | End: 2024-12-11 | Stop reason: CLARIF

## 2024-12-11 RX ORDER — IBUPROFEN 200 MG
1 TABLET ORAL DAILY
Status: DISCONTINUED | OUTPATIENT
Start: 2024-12-11 | End: 2024-12-16 | Stop reason: HOSPADM

## 2024-12-11 RX ORDER — IPRATROPIUM BROMIDE AND ALBUTEROL SULFATE 2.5; .5 MG/3ML; MG/3ML
3 SOLUTION RESPIRATORY (INHALATION) ONCE
Status: COMPLETED | OUTPATIENT
Start: 2024-12-11 | End: 2024-12-11

## 2024-12-11 RX ORDER — HYDROXYZINE HYDROCHLORIDE 25 MG/1
50 TABLET, FILM COATED ORAL EVERY 8 HOURS PRN
Status: DISCONTINUED | OUTPATIENT
Start: 2024-12-11 | End: 2024-12-16 | Stop reason: HOSPADM

## 2024-12-11 RX ORDER — FUROSEMIDE 10 MG/ML
40 INJECTION INTRAMUSCULAR; INTRAVENOUS DAILY
Status: DISCONTINUED | OUTPATIENT
Start: 2024-12-12 | End: 2024-12-12

## 2024-12-11 RX ORDER — ALBUTEROL SULFATE 0.83 MG/ML
2.5 SOLUTION RESPIRATORY (INHALATION) ONCE
Status: COMPLETED | OUTPATIENT
Start: 2024-12-11 | End: 2024-12-11

## 2024-12-11 RX ORDER — POLYETHYLENE GLYCOL 3350 17 G/17G
17 POWDER, FOR SOLUTION ORAL DAILY
Status: DISCONTINUED | OUTPATIENT
Start: 2024-12-12 | End: 2024-12-16 | Stop reason: HOSPADM

## 2024-12-11 RX ADMIN — ALBUTEROL SULFATE 2.5 MG: 2.5 SOLUTION RESPIRATORY (INHALATION) at 17:29

## 2024-12-11 RX ADMIN — FUROSEMIDE 40 MG: 10 INJECTION, SOLUTION INTRAMUSCULAR; INTRAVENOUS at 17:56

## 2024-12-11 RX ADMIN — NICOTINE 1 PATCH: 14 PATCH, EXTENDED RELEASE TRANSDERMAL at 21:44

## 2024-12-11 RX ADMIN — IPRATROPIUM BROMIDE AND ALBUTEROL SULFATE 3 ML: 2.5; .5 SOLUTION RESPIRATORY (INHALATION) at 17:02

## 2024-12-11 RX ADMIN — OXYCODONE HYDROCHLORIDE 10 MG: 10 TABLET ORAL at 21:44

## 2024-12-11 SDOH — SOCIAL STABILITY: SOCIAL INSECURITY: ARE YOU OR HAVE YOU BEEN THREATENED OR ABUSED PHYSICALLY, EMOTIONALLY, OR SEXUALLY BY ANYONE?: NO

## 2024-12-11 SDOH — SOCIAL STABILITY: SOCIAL INSECURITY: HAVE YOU HAD ANY THOUGHTS OF HARMING ANYONE ELSE?: NO

## 2024-12-11 SDOH — SOCIAL STABILITY: SOCIAL INSECURITY: DOES ANYONE TRY TO KEEP YOU FROM HAVING/CONTACTING OTHER FRIENDS OR DOING THINGS OUTSIDE YOUR HOME?: NO

## 2024-12-11 SDOH — SOCIAL STABILITY: SOCIAL INSECURITY: ARE THERE ANY APPARENT SIGNS OF INJURIES/BEHAVIORS THAT COULD BE RELATED TO ABUSE/NEGLECT?: NO

## 2024-12-11 SDOH — SOCIAL STABILITY: SOCIAL INSECURITY: DO YOU FEEL UNSAFE GOING BACK TO THE PLACE WHERE YOU ARE LIVING?: NO

## 2024-12-11 SDOH — SOCIAL STABILITY: SOCIAL INSECURITY: DO YOU FEEL ANYONE HAS EXPLOITED OR TAKEN ADVANTAGE OF YOU FINANCIALLY OR OF YOUR PERSONAL PROPERTY?: NO

## 2024-12-11 SDOH — SOCIAL STABILITY: SOCIAL INSECURITY: HAVE YOU HAD THOUGHTS OF HARMING ANYONE ELSE?: NO

## 2024-12-11 SDOH — SOCIAL STABILITY: SOCIAL INSECURITY: ABUSE: ADULT

## 2024-12-11 SDOH — SOCIAL STABILITY: SOCIAL INSECURITY: HAS ANYONE EVER THREATENED TO HURT YOUR FAMILY OR YOUR PETS?: NO

## 2024-12-11 SDOH — SOCIAL STABILITY: SOCIAL INSECURITY: WERE YOU ABLE TO COMPLETE ALL THE BEHAVIORAL HEALTH SCREENINGS?: YES

## 2024-12-11 ASSESSMENT — LIFESTYLE VARIABLES
AUDIT-C TOTAL SCORE: 0
PRESCIPTION_ABUSE_PAST_12_MONTHS: NO
HOW MANY STANDARD DRINKS CONTAINING ALCOHOL DO YOU HAVE ON A TYPICAL DAY: PATIENT DOES NOT DRINK
EVER HAD A DRINK FIRST THING IN THE MORNING TO STEADY YOUR NERVES TO GET RID OF A HANGOVER: NO
SKIP TO QUESTIONS 9-10: 1
HOW OFTEN DO YOU HAVE 6 OR MORE DRINKS ON ONE OCCASION: NEVER
AUDIT-C TOTAL SCORE: 0
HAVE PEOPLE ANNOYED YOU BY CRITICIZING YOUR DRINKING: NO
SUBSTANCE_ABUSE_PAST_12_MONTHS: NO
EVER FELT BAD OR GUILTY ABOUT YOUR DRINKING: NO
HAVE YOU EVER FELT YOU SHOULD CUT DOWN ON YOUR DRINKING: NO
TOTAL SCORE: 0
HOW OFTEN DO YOU HAVE A DRINK CONTAINING ALCOHOL: NEVER

## 2024-12-11 ASSESSMENT — COGNITIVE AND FUNCTIONAL STATUS - GENERAL
DRESSING REGULAR LOWER BODY CLOTHING: A LITTLE
DAILY ACTIVITIY SCORE: 23
STANDING UP FROM CHAIR USING ARMS: A LITTLE
MOVING FROM LYING ON BACK TO SITTING ON SIDE OF FLAT BED WITH BEDRAILS: A LOT
PATIENT BASELINE BEDBOUND: NO
WALKING IN HOSPITAL ROOM: A LITTLE
CLIMB 3 TO 5 STEPS WITH RAILING: TOTAL
MOVING TO AND FROM BED TO CHAIR: A LOT
MOBILITY SCORE: 13
TURNING FROM BACK TO SIDE WHILE IN FLAT BAD: A LOT

## 2024-12-11 ASSESSMENT — ENCOUNTER SYMPTOMS
CONSTIPATION: 1
SHORTNESS OF BREATH: 1
CONFUSION: 0
FATIGUE: 1
PALPITATIONS: 1
CHILLS: 0
DIARRHEA: 0
HEADACHES: 0
WHEEZING: 1
FEVER: 0

## 2024-12-11 ASSESSMENT — ACTIVITIES OF DAILY LIVING (ADL)
JUDGMENT_ADEQUATE_SAFELY_COMPLETE_DAILY_ACTIVITIES: YES
FEEDING YOURSELF: INDEPENDENT
DRESSING YOURSELF: INDEPENDENT
HEARING - RIGHT EAR: FUNCTIONAL
GROOMING: INDEPENDENT
TOILETING: INDEPENDENT
BATHING: INDEPENDENT
ASSISTIVE_DEVICE: WHEELCHAIR;WALKER
WALKS IN HOME: NEEDS ASSISTANCE
ADEQUATE_TO_COMPLETE_ADL: YES
PATIENT'S MEMORY ADEQUATE TO SAFELY COMPLETE DAILY ACTIVITIES?: YES
LACK_OF_TRANSPORTATION: NO
HEARING - LEFT EAR: FUNCTIONAL

## 2024-12-11 ASSESSMENT — PAIN - FUNCTIONAL ASSESSMENT
PAIN_FUNCTIONAL_ASSESSMENT: 0-10
PAIN_FUNCTIONAL_ASSESSMENT: 0-10

## 2024-12-11 ASSESSMENT — PATIENT HEALTH QUESTIONNAIRE - PHQ9
SUM OF ALL RESPONSES TO PHQ9 QUESTIONS 1 & 2: 4
1. LITTLE INTEREST OR PLEASURE IN DOING THINGS: MORE THAN HALF THE DAYS
2. FEELING DOWN, DEPRESSED OR HOPELESS: MORE THAN HALF THE DAYS

## 2024-12-11 ASSESSMENT — PAIN SCALES - GENERAL
PAINLEVEL_OUTOF10: 4
PAINLEVEL_OUTOF10: 0 - NO PAIN

## 2024-12-11 NOTE — PROGRESS NOTES
Pharmacy Medication History Review    Neisha Graham is a 64 y.o. female admitted for Acute on chronic congestive heart failure, unspecified heart failure type. Pharmacy reviewed the patient's [id number] medications and allergies for accuracy.    The list below reflectives the updated PTA list. Please review each medication in order reconciliation for additional clarification and justification.  Prior to Admission Medications   Prescriptions Last Dose Informant Patient Reported? Taking?   DULoxetine (Cymbalta) 20 mg DR capsule 12/11/2024 Morning Self Yes Yes   Sig: TAKE 1 CAPSULE BY MOUTH ONCE DAILY   Lyrica 50 mg capsule Not Taking Self No No   Sig: Take 1 capsule (50 mg) by mouth 3 times a day.   Patient not taking: Reported on 12/11/2024   Tiadylt  mg 24 hr capsule 12/11/2024 Morning Self Yes Yes   Sig: TAKE 1 CAPSULE BY MOUTH EVERY DAY   Trelegy Ellipta 200-62.5-25 mcg blister with device 12/11/2024 Morning Self Yes Yes   Sig: INHALE 1 PUFF BY MOUTH AND IN TO LUNGS ONCE DAILY   albuterol 90 mcg/actuation inhaler 12/11/2024 Morning Self Yes Yes   Sig: INHALE 2 PUFFS BY MOUTH AND INTO THE LUNGS EVERY 4 HOURS IF NEEDED FOR WHEEZING   amino acids-protein hydrolys (ProSource No Carb) 15-60 gram-kcal/30 mL liquid  Self Yes Yes   Sig: Take by mouth.   ammonium lactate (Amlactin) 12 % cream 12/11/2024 Morning Self Yes Yes   Sig: Apply 1 Film topically 2 times a day as needed.   amoxicillin-pot clavulanate (Augmentin) 875-125 mg tablet Not Taking  No No   Sig: Take 1 tablet (875 mg) by mouth 2 times a day for 10 days.   Patient not taking: Reported on 12/11/2024   clotrimazole (Lotrimin) 1 % cream 12/11/2024 Morning Self Yes Yes   Sig: Apply topically 2 times a day. apply to affected area   cyclobenzaprine (Flexeril) 10 mg tablet Unknown  Yes Yes   Sig: Take 1 tablet (10 mg) by mouth 2 times a day as needed for muscle spasms.   gentamicin (Garamycin) 0.1 % ointment Unknown Self Yes Yes   Sig: Apply to  wounds with dressing changes   hydrOXYzine HCL (Atarax) 50 mg tablet Unknown Self Yes Yes   Sig: Take 1 tablet (50 mg) by mouth every 8 hours if needed for anxiety.   hydroxyurea (Hydrea) 500 mg capsule 12/10/2024 Evening Self Yes Yes   Sig: Take 2 capsules (1,000 mg total) by mouth once daily.   ipratropium-albuteroL (Duo-Neb) 0.5-2.5 mg/3 mL nebulizer solution 12/11/2024 Morning Self Yes Yes   Sig: Take 3 mL by nebulization 4 times a day as needed for wheezing or shortness of breath.   lidocaine (Xylocaine) 5 % ointment Unknown Self Yes Yes   Sig: Apply topically if needed for mild pain (1 - 3). Apply up to three times a day as needed for pain.   lidocaine 5 % gel Unknown Self Yes Yes   Sig: Apply 1 inch topically 3 times a day as needed (apply to affected area).   methylPREDNISolone (Medrol Dospak) 4 mg tablets 12/11/2024 Morning Self No No   Sig: Take as directed on package.   naloxone (Narcan) 4 mg/0.1 mL nasal spray  Self No No   Sig: Administer 1 spray (4 mg) into affected nostril(s) if needed for opioid reversal or respiratory depression.   nebulizer accessories kit  Self No No   Sig: UAD   nystatin (Mycostatin) cream 12/11/2024 Morning Self Yes Yes   Sig: APPLY TO AFFECTED AREA TWO TO THREE TIMES A DAY   oxyCODONE (Roxicodone) 10 mg immediate release tablet 12/11/2024 at  2:00 PM Self Yes Yes   Sig: Take 1 tablet (10 mg) by mouth every 4 hours if needed for severe pain (7 - 10) for up to 28 days. Do not fill before November 25, 2024.   oxyCODONE (Roxicodone) 10 mg immediate release tablet  Self No No   Sig: Take 1 tablet (10 mg) by mouth every 4 hours if needed for severe pain (7 - 10) for up to 28 days. Do not fill before December 23, 2024.   oxyCODONE (Roxicodone) 10 mg immediate release tablet  Self No No   Sig: Take 1 tablet (10 mg) by mouth every 4 hours if needed for severe pain (7 - 10) for up to 28 days. Do not fill before January 20, 2025.   pramipexole (Mirapex) 1 mg tablet 12/11/2024 Morning Self  Yes Yes   Sig: Take 1 tablet (1 mg) by mouth 2 times a day.   sennosides-docusate sodium (Rebecca-Colace) 8.6-50 mg tablet Not Taking Self No No   Sig: Take 2 tablets by mouth 2 times a day.   Patient not taking: Reported on 12/11/2024   triamcinolone (Kenalog) 0.1 % cream Unknown Self Yes Yes   Sig: APPLY SPARINGLY AND MASSAGE IN ONCE A DAY   triamcinolone (Kenalog) 0.1 % cream Unknown Self No No   Sig: Apply to affected area with dressing changes   warfarin (Coumadin) 7.5 mg tablet 12/10/2024 Evening Self Yes Yes   Sig: Take one po every day   white petrolatum (Aquaphor) 41 % ointment ointment Unknown Self Yes Yes   Sig: Apply 1 Application topically 2 times a day. Apply to legs and abdomen   zinc oxide 40 % ointment ointment Unknown Self Yes Yes   Sig: Apply 1 Application topically 3 times a day. Apply to groin, buttocks after nystatin      Facility-Administered Medications: None           The list below reflectives the updated allergy list. Please review each documented allergy for additional clarification and justification.  Allergies  Reviewed by Sasha Cardenas RN on 12/11/2024        Severity Reactions Comments    Vancomycin High Other     Macrobid [nitrofurantoin Monohyd/m-cryst] Not Specified Dizziness     Sulfamethoxazole-trimethoprim Not Specified Unknown             Below are additional concerns with the patient's PTA list.      Carito Paris

## 2024-12-11 NOTE — ED TRIAGE NOTES
Pt presented to the ED with c/o SOB. Pt states this started last week and today at her wound care appointment they felt SOB when trying to get into the bed. Pt denies any CP at this time. Pt does not wear O2 during the day but only at night. Pt 93% on RA. Pt put on 2L.

## 2024-12-11 NOTE — ED PROVIDER NOTES
HPI   Chief Complaint   Patient presents with    Shortness of Breath       CC: sob  HPI:   64-year-old obese female presents ED complaining of increased shortness of breath.  Patient notes history of current tobacco use, COPD, chronic lymphedema, O2 dependent as needed, history of heart failure with preserved EF, aortic stenosis, patient is following with wound care at this time for chronic wound in the lower extremities bilaterally, patient reports feeling that she has increased edema lower extremities, patient is using her home nebulizer intermittently.  Patient denies having any fever, chills denies any hemoptysis hematemesis hematochezia or melanotic stools.    Additional Limitations to History:   External Records Reviewed: I reviewed recent and relevant outside records including   History Obtained From:     Past Medical History: Per HPI  Medications: Reviewed in EMR and with patient  Allergies:  Reviewed in EMR  Past Surgical History:   Social History:     ------------------------------------------------------------------------------------------------------  Physical Exam:  --Vital signs reviewed in nursing triage note, EMR flow sheets, and at patient's bedside  GEN:  A&Ox3, no acute distress, appears comfortable.  Conversational and appropriate.  No confusion or gross mental status changes.  EYES: EOMI, non-injected sclera.  ENT: Moist mucous membranes, no apparent injuries or lesions.   CARDIO: Normal rate and regular rhythm. No murmurs, rubs, or gallops.  2+ equal pulses of the distal extremities.   PULM: Bilateral wheezing, coarse crackles. Good symmetric chest expansion.  GI: Soft, non-tender, non-distended. No rebound tenderness or guarding.  SKIN: Warm and dry, no rashes or lesions.  MSK: ROM intact the extremities without contractures.   EXT: Chronic lower extremity lymphedema  NEURO: Cranial nerves II-XII grossly intact. Sensation to light touch intact and equal bilaterally in upper and lower  extremities.  Symmetric 5/5 strength in upper and lower extremities.  PSYCH: Appropriate mood and behavior, converses and responds appropriately during exam.  -------------------------------------------------------------------------------------------------------        Differential Diagnoses Considered:   Chronic Medical Conditions Significantly Affecting Care:   Diagnostic testing considered: [PERC, D-Dimer, PECARN, etc.]    - EKG interpreted by myself normal sinus rhythm ventricular rate 93 GA interval 174 normal QRS duration no prolonged QT/QTc no obvious ST elevation, depression or acute ischemic findings.  - I independently interpreted: [CXR, CT, POCUS, etc. including your interpretation]  - Labs notable for     Escalation of Care: Appropriate for   Social Determinants of Health Significantly Affecting Care: [Homelessness, lacking transportation, uninsured, unable to afford medications]  Prescription Drug Consideration: [Antibiotics, antivirals, pain medications, etc.]  Discussion of Management with Other Providers:  I discussed the patient/results with: [admitting team, consultant, radiologist, social work, EPAT, case management, PT/OT, RT, PCP, etc.]      Ayo Larios PA-C              Patient History   Past Medical History:   Diagnosis Date    Acute bronchitis due to other specified organisms 10/14/2019    Acute bronchitis due to other specified organisms    Acute viral conjunctivitis of left eye 03/10/2023    Alkaline phosphatase elevation 08/17/2023    Anemia 03/10/2023    Bone marrow disorder 08/17/2023    Cellulitis of left lower limb 04/08/2021    Cellulitis of left lower leg    Chronic obstructive pulmonary disease with (acute) exacerbation (Multi) 01/09/2019    COPD exacerbation    Chronic sinusitis, unspecified     Sinusitis    COVID-19 11/28/2022    COVID-19    Daily headache 08/17/2023    Elevated bilirubin 08/17/2023    Elevated transaminase level 08/17/2023    Fever 08/17/2023    Fever  08/17/2023    Heart murmur 03/10/2023    Hemoptysis 03/10/2023    Hyperkalemia 03/10/2023    Malaise and fatigue 03/10/2023    Morbid (severe) obesity due to excess calories (Multi)     Morbid obesity    Other conditions influencing health status     History Of ___ Previous Pregnancies    Other conditions influencing health status 03/12/2020    Arthritis    Other conditions influencing health status 03/06/2017    History of cough    Other conditions influencing health status     Menstruation    Other conditions influencing health status     Osteoarthritis    Other conditions influencing health status     Pulmonary Disease    Pain in unspecified ankle and joints of unspecified foot 06/24/2016    Ankle joint pain    Pain in unspecified foot 12/08/2015    Foot pain    Pain in unspecified knee     Joint pain, knee    Palpitations 03/10/2023    Personal history of diseases of the skin and subcutaneous tissue     History of cellulitis    Personal history of other diseases of the female genital tract     Vaginal delivery    Personal history of other diseases of the nervous system and sense organs 09/12/2019    History of conjunctivitis    Personal history of other diseases of the nervous system and sense organs 09/17/2015    History of blurred vision    Personal history of other diseases of the respiratory system     History of pleurisy    Personal history of other diseases of the respiratory system 10/17/2016    History of bronchitis    Personal history of other diseases of the respiratory system 11/29/2017    History of acute bronchitis    Personal history of other diseases of the respiratory system 11/16/2016    History of acute pharyngitis    Personal history of other drug therapy 10/14/2016    History of influenza vaccination    Personal history of other infectious and parasitic diseases     History of hepatitis    Personal history of other infectious and parasitic diseases 07/22/2020    History of candidiasis of  mouth    Personal history of other medical treatment     History of mammogram    Personal history of other specified conditions     History of shortness of breath    Personal history of other specified conditions 01/28/2015    History of shortness of breath    Personal history of other specified conditions     History of abnormal Pap smear    Personal history of pneumonia (recurrent)     History of pneumonia    Pneumonia, unspecified organism 01/11/2018    Community acquired pneumonia    Postmenopausal bleeding 09/30/2014    Postmenopausal bleeding    Right knee pain 03/10/2023    Shortness of breath 03/10/2023    Sinus tachycardia 03/10/2023    Submandibular sialolithiasis 03/10/2023    Unilateral primary osteoarthritis, unspecified knee 02/03/2017    Osteoarthritis, localized, knee    Unspecified acute conjunctivitis, bilateral 08/20/2020    Acute bacterial conjunctivitis of both eyes     Past Surgical History:   Procedure Laterality Date    KNEE SURGERY  09/19/2013    Knee Surgery Right    OTHER SURGICAL HISTORY  09/19/2013    Direct Laryngoscopy With Foreign Body Removal    OTHER SURGICAL HISTORY  09/19/2013    Laminectomy With Drainage Of Intramedullary Cyst    OTHER SURGICAL HISTORY  09/27/2013    Ovarian Cystectomy    TONSILLECTOMY  09/19/2013    Tonsillectomy    TUBAL LIGATION  09/19/2013    Tubal Ligation     No family history on file.  Social History     Tobacco Use    Smoking status: Some Days     Types: Cigarettes    Smokeless tobacco: Never   Substance Use Topics    Alcohol use: Not on file    Drug use: Not on file       Physical Exam   ED Triage Vitals [12/11/24 1559]   Temperature Heart Rate Respirations BP   36.2 °C (97.2 °F) (!) 102 20 (!) 164/97      Pulse Ox Temp Source Heart Rate Source Patient Position   (!) 93 % Temporal Monitor Sitting      BP Location FiO2 (%)     -- --       Physical Exam      ED Course & MDM   ED Course as of 12/13/24 0823   Wed Dec 11, 2024   1808 INR(!): 3.7 [TP]   1808  WBC(!): 12.9 [TP]      ED Course User Index  [TP] Neisha PANDEY Tamie,          Diagnoses as of 12/13/24 0823   Acute on chronic congestive heart failure, unspecified heart failure type   Acute respiratory failure with hypoxia (Multi)                 No data recorded     [place] Coma Scale Score: 15 (12/11/24 1659 : Matilde Gilmore, LAURIE)                           Medical Decision Making  64-year-old obese female with acute on chronic respiratory failure likely in the setting of acute on chronic congestive heart failure patient is stable on 2 L via nasal cannula, she does have elevated BNP, imaging showing acute pulmonary venous hypertension interstitial pulmonary edema there is low suspicion for acute coronary event or pulmonary emboli, electrolytes appear unremarkable she was given DuoNeb albuterol nebulizer and 40 mg Lasix, walking pulse ox, patient desatted around 87 to 89% on the 2 L, she will be placed in telemetry observation at this time for further evaluation        Procedure  Procedures     Ayo Larios PA-C  12/11/24 1705       Ayo Larios PA-C  12/13/24 [id number]

## 2024-12-12 ENCOUNTER — APPOINTMENT (OUTPATIENT)
Dept: CARDIOLOGY | Facility: HOSPITAL | Age: 64
DRG: 291 | End: 2024-12-12
Payer: MEDICARE

## 2024-12-12 LAB
ALBUMIN SERPL BCP-MCNC: 3.3 G/DL (ref 3.4–5)
ANION GAP SERPL CALC-SCNC: 12 MMOL/L (ref 10–20)
AORTIC VALVE MEAN GRADIENT: 41 MMHG
AORTIC VALVE PEAK VELOCITY: 4.03 M/S
AV PEAK GRADIENT: 65 MMHG
AVA (PEAK VEL): 0.91 CM2
AVA (VTI): 0.88 CM2
BUN SERPL-MCNC: 20 MG/DL (ref 6–23)
CALCIUM SERPL-MCNC: 8.7 MG/DL (ref 8.6–10.3)
CHLORIDE SERPL-SCNC: 101 MMOL/L (ref 98–107)
CO2 SERPL-SCNC: 34 MMOL/L (ref 21–32)
CREAT SERPL-MCNC: 0.78 MG/DL (ref 0.5–1.05)
EGFRCR SERPLBLD CKD-EPI 2021: 84 ML/MIN/1.73M*2
EJECTION FRACTION APICAL 4 CHAMBER: 57.8
EJECTION FRACTION: 63 %
ERYTHROCYTE [DISTWIDTH] IN BLOOD BY AUTOMATED COUNT: 14.3 % (ref 11.5–14.5)
EST. AVERAGE GLUCOSE BLD GHB EST-MCNC: 111 MG/DL
GGT SERPL-CCNC: 133 U/L (ref 5–55)
GLUCOSE SERPL-MCNC: 109 MG/DL (ref 74–99)
HBA1C MFR BLD: 5.5 %
HCT VFR BLD AUTO: 37.9 % (ref 36–46)
HGB BLD-MCNC: 11.7 G/DL (ref 12–16)
HOLD SPECIMEN: NORMAL
INR PPP: 3.1 (ref 0.9–1.1)
LEFT ATRIUM VOLUME AREA LENGTH INDEX BSA: 34.4 ML/M2
LEFT VENTRICLE INTERNAL DIMENSION DIASTOLE: 5.12 CM (ref 3.5–6)
LEFT VENTRICULAR OUTFLOW TRACT DIAMETER: 2 CM
MAGNESIUM SERPL-MCNC: 1.83 MG/DL (ref 1.6–2.4)
MCH RBC QN AUTO: 33.3 PG (ref 26–34)
MCHC RBC AUTO-ENTMCNC: 30.9 G/DL (ref 32–36)
MCV RBC AUTO: 108 FL (ref 80–100)
NRBC BLD-RTO: 0.4 /100 WBCS (ref 0–0)
PHOSPHATE SERPL-MCNC: 3.5 MG/DL (ref 2.5–4.9)
PLATELET # BLD AUTO: 584 X10*3/UL (ref 150–450)
POTASSIUM SERPL-SCNC: 3.8 MMOL/L (ref 3.5–5.3)
PROCALCITONIN SERPL-MCNC: 0.39 NG/ML
PROTHROMBIN TIME: 35.2 SECONDS (ref 9.8–12.8)
RBC # BLD AUTO: 3.51 X10*6/UL (ref 4–5.2)
RIGHT VENTRICLE FREE WALL PEAK S': 18.8 CM/S
RIGHT VENTRICLE PEAK SYSTOLIC PRESSURE: 52 MMHG
SODIUM SERPL-SCNC: 143 MMOL/L (ref 136–145)
TRICUSPID ANNULAR PLANE SYSTOLIC EXCURSION: 2.3 CM
WBC # BLD AUTO: 9.3 X10*3/UL (ref 4.4–11.3)

## 2024-12-12 PROCEDURE — 2500000005 HC RX 250 GENERAL PHARMACY W/O HCPCS

## 2024-12-12 PROCEDURE — 94760 N-INVAS EAR/PLS OXIMETRY 1: CPT

## 2024-12-12 PROCEDURE — 2500000004 HC RX 250 GENERAL PHARMACY W/ HCPCS (ALT 636 FOR OP/ED): Performed by: NURSE PRACTITIONER

## 2024-12-12 PROCEDURE — 94664 DEMO&/EVAL PT USE INHALER: CPT

## 2024-12-12 PROCEDURE — 84145 PROCALCITONIN (PCT): CPT | Mod: GEALAB

## 2024-12-12 PROCEDURE — 2500000002 HC RX 250 W HCPCS SELF ADMINISTERED DRUGS (ALT 637 FOR MEDICARE OP, ALT 636 FOR OP/ED): Performed by: STUDENT IN AN ORGANIZED HEALTH CARE EDUCATION/TRAINING PROGRAM

## 2024-12-12 PROCEDURE — 94640 AIRWAY INHALATION TREATMENT: CPT

## 2024-12-12 PROCEDURE — 2500000002 HC RX 250 W HCPCS SELF ADMINISTERED DRUGS (ALT 637 FOR MEDICARE OP, ALT 636 FOR OP/ED)

## 2024-12-12 PROCEDURE — 9420000001 HC RT PATIENT EDUCATION 5 MIN

## 2024-12-12 PROCEDURE — 82977 ASSAY OF GGT: CPT

## 2024-12-12 PROCEDURE — 85610 PROTHROMBIN TIME: CPT | Performed by: STUDENT IN AN ORGANIZED HEALTH CARE EDUCATION/TRAINING PROGRAM

## 2024-12-12 PROCEDURE — 93306 TTE W/DOPPLER COMPLETE: CPT | Performed by: INTERNAL MEDICINE

## 2024-12-12 PROCEDURE — 83735 ASSAY OF MAGNESIUM: CPT

## 2024-12-12 PROCEDURE — 36415 COLL VENOUS BLD VENIPUNCTURE: CPT | Performed by: STUDENT IN AN ORGANIZED HEALTH CARE EDUCATION/TRAINING PROGRAM

## 2024-12-12 PROCEDURE — 36415 COLL VENOUS BLD VENIPUNCTURE: CPT

## 2024-12-12 PROCEDURE — 80069 RENAL FUNCTION PANEL: CPT

## 2024-12-12 PROCEDURE — 2500000002 HC RX 250 W HCPCS SELF ADMINISTERED DRUGS (ALT 637 FOR MEDICARE OP, ALT 636 FOR OP/ED): Performed by: NURSE PRACTITIONER

## 2024-12-12 PROCEDURE — 93306 TTE W/DOPPLER COMPLETE: CPT

## 2024-12-12 PROCEDURE — 1200000002 HC GENERAL ROOM WITH TELEMETRY DAILY

## 2024-12-12 PROCEDURE — 99233 SBSQ HOSP IP/OBS HIGH 50: CPT | Performed by: STUDENT IN AN ORGANIZED HEALTH CARE EDUCATION/TRAINING PROGRAM

## 2024-12-12 PROCEDURE — 2500000004 HC RX 250 GENERAL PHARMACY W/ HCPCS (ALT 636 FOR OP/ED)

## 2024-12-12 PROCEDURE — 2500000001 HC RX 250 WO HCPCS SELF ADMINISTERED DRUGS (ALT 637 FOR MEDICARE OP)

## 2024-12-12 PROCEDURE — 94667 MNPJ CHEST WALL 1ST: CPT

## 2024-12-12 PROCEDURE — 94669 MECHANICAL CHEST WALL OSCILL: CPT

## 2024-12-12 PROCEDURE — 85027 COMPLETE CBC AUTOMATED: CPT

## 2024-12-12 PROCEDURE — 2500000001 HC RX 250 WO HCPCS SELF ADMINISTERED DRUGS (ALT 637 FOR MEDICARE OP): Performed by: INTERNAL MEDICINE

## 2024-12-12 RX ORDER — FUROSEMIDE 10 MG/ML
40 INJECTION INTRAMUSCULAR; INTRAVENOUS EVERY 8 HOURS
Status: DISCONTINUED | OUTPATIENT
Start: 2024-12-12 | End: 2024-12-15

## 2024-12-12 RX ORDER — FUROSEMIDE 10 MG/ML
40 INJECTION INTRAMUSCULAR; INTRAVENOUS EVERY 12 HOURS
Status: DISCONTINUED | OUTPATIENT
Start: 2024-12-12 | End: 2024-12-12

## 2024-12-12 RX ORDER — SPIRONOLACTONE 25 MG/1
25 TABLET ORAL DAILY
Status: DISCONTINUED | OUTPATIENT
Start: 2024-12-12 | End: 2024-12-16 | Stop reason: HOSPADM

## 2024-12-12 RX ADMIN — HYDROXYUREA 1000 MG: 500 CAPSULE ORAL at 09:12

## 2024-12-12 RX ADMIN — PRAMIPEXOLE DIHYDROCHLORIDE 1 MG: 1 TABLET ORAL at 00:37

## 2024-12-12 RX ADMIN — HYDROXYZINE HYDROCHLORIDE 50 MG: 25 TABLET ORAL at 00:58

## 2024-12-12 RX ADMIN — IPRATROPIUM BROMIDE AND ALBUTEROL SULFATE 3 ML: 2.5; .5 SOLUTION RESPIRATORY (INHALATION) at 00:19

## 2024-12-12 RX ADMIN — PRAMIPEXOLE DIHYDROCHLORIDE 1 MG: 1 TABLET ORAL at 21:04

## 2024-12-12 RX ADMIN — Medication: at 09:12

## 2024-12-12 RX ADMIN — Medication 1 APPLICATION: at 09:12

## 2024-12-12 RX ADMIN — NYSTATIN 1 APPLICATION: 100000 POWDER TOPICAL at 09:12

## 2024-12-12 RX ADMIN — OXYCODONE HYDROCHLORIDE 10 MG: 10 TABLET ORAL at 21:03

## 2024-12-12 RX ADMIN — WARFARIN SODIUM 7.5 MG: 5 TABLET ORAL at 17:47

## 2024-12-12 RX ADMIN — Medication 1 APPLICATION: at 14:54

## 2024-12-12 RX ADMIN — FUROSEMIDE 40 MG: 10 INJECTION, SOLUTION INTRAMUSCULAR; INTRAVENOUS at 09:20

## 2024-12-12 RX ADMIN — Medication 2 L/MIN: at 19:50

## 2024-12-12 RX ADMIN — IPRATROPIUM BROMIDE AND ALBUTEROL SULFATE 3 ML: 2.5; .5 SOLUTION RESPIRATORY (INHALATION) at 07:49

## 2024-12-12 RX ADMIN — NYSTATIN 1 APPLICATION: 100000 POWDER TOPICAL at 21:01

## 2024-12-12 RX ADMIN — Medication 1 APPLICATION: at 21:01

## 2024-12-12 RX ADMIN — SPIRONOLACTONE 25 MG: 25 TABLET ORAL at 16:15

## 2024-12-12 RX ADMIN — FUROSEMIDE 40 MG: 10 INJECTION, SOLUTION INTRAMUSCULAR; INTRAVENOUS at 17:47

## 2024-12-12 RX ADMIN — FORMOTEROL FUMARATE DIHYDRATE 20 MCG: 20 SOLUTION RESPIRATORY (INHALATION) at 07:49

## 2024-12-12 RX ADMIN — DULOXETINE HYDROCHLORIDE 20 MG: 20 CAPSULE, DELAYED RELEASE ORAL at 09:12

## 2024-12-12 RX ADMIN — LOSARTAN POTASSIUM 25 MG: 50 TABLET, FILM COATED ORAL at 09:13

## 2024-12-12 RX ADMIN — OXYCODONE HYDROCHLORIDE 10 MG: 10 TABLET ORAL at 16:16

## 2024-12-12 RX ADMIN — Medication 1 APPLICATION: at 21:06

## 2024-12-12 RX ADMIN — BUDESONIDE 0.5 MG: 0.5 INHALANT ORAL at 07:49

## 2024-12-12 RX ADMIN — FORMOTEROL FUMARATE DIHYDRATE 20 MCG: 20 SOLUTION RESPIRATORY (INHALATION) at 19:50

## 2024-12-12 RX ADMIN — PRAMIPEXOLE DIHYDROCHLORIDE 1 MG: 1 TABLET ORAL at 09:12

## 2024-12-12 RX ADMIN — OXYCODONE HYDROCHLORIDE 10 MG: 10 TABLET ORAL at 09:11

## 2024-12-12 RX ADMIN — BUDESONIDE 0.5 MG: 0.5 INHALANT ORAL at 19:50

## 2024-12-12 SDOH — SOCIAL STABILITY: SOCIAL INSECURITY: WITHIN THE LAST YEAR, HAVE YOU BEEN AFRAID OF YOUR PARTNER OR EX-PARTNER?: NO

## 2024-12-12 SDOH — HEALTH STABILITY: MENTAL HEALTH
DO YOU FEEL STRESS - TENSE, RESTLESS, NERVOUS, OR ANXIOUS, OR UNABLE TO SLEEP AT NIGHT BECAUSE YOUR MIND IS TROUBLED ALL THE TIME - THESE DAYS?: TO SOME EXTENT

## 2024-12-12 SDOH — ECONOMIC STABILITY: FOOD INSECURITY: WITHIN THE PAST 12 MONTHS, YOU WORRIED THAT YOUR FOOD WOULD RUN OUT BEFORE YOU GOT THE MONEY TO BUY MORE.: NEVER TRUE

## 2024-12-12 SDOH — ECONOMIC STABILITY: INCOME INSECURITY: IN THE PAST 12 MONTHS HAS THE ELECTRIC, GAS, OIL, OR WATER COMPANY THREATENED TO SHUT OFF SERVICES IN YOUR HOME?: NO

## 2024-12-12 SDOH — ECONOMIC STABILITY: FOOD INSECURITY: WITHIN THE PAST 12 MONTHS, THE FOOD YOU BOUGHT JUST DIDN'T LAST AND YOU DIDN'T HAVE MONEY TO GET MORE.: NEVER TRUE

## 2024-12-12 SDOH — SOCIAL STABILITY: SOCIAL NETWORK: HOW OFTEN DO YOU GET TOGETHER WITH FRIENDS OR RELATIVES?: ONCE A WEEK

## 2024-12-12 SDOH — SOCIAL STABILITY: SOCIAL NETWORK: IN A TYPICAL WEEK, HOW MANY TIMES DO YOU TALK ON THE PHONE WITH FAMILY, FRIENDS, OR NEIGHBORS?: THREE TIMES A WEEK

## 2024-12-12 SDOH — SOCIAL STABILITY: SOCIAL INSECURITY: WITHIN THE LAST YEAR, HAVE YOU BEEN HUMILIATED OR EMOTIONALLY ABUSED IN OTHER WAYS BY YOUR PARTNER OR EX-PARTNER?: NO

## 2024-12-12 SDOH — SOCIAL STABILITY: SOCIAL INSECURITY
WITHIN THE LAST YEAR, HAVE YOU BEEN RAPED OR FORCED TO HAVE ANY KIND OF SEXUAL ACTIVITY BY YOUR PARTNER OR EX-PARTNER?: NO

## 2024-12-12 SDOH — HEALTH STABILITY: PHYSICAL HEALTH
HOW OFTEN DO YOU NEED TO HAVE SOMEONE HELP YOU WHEN YOU READ INSTRUCTIONS, PAMPHLETS, OR OTHER WRITTEN MATERIAL FROM YOUR DOCTOR OR PHARMACY?: NEVER

## 2024-12-12 SDOH — HEALTH STABILITY: PHYSICAL HEALTH: ON AVERAGE, HOW MANY DAYS PER WEEK DO YOU ENGAGE IN MODERATE TO STRENUOUS EXERCISE (LIKE A BRISK WALK)?: 0 DAYS

## 2024-12-12 SDOH — SOCIAL STABILITY: SOCIAL INSECURITY
WITHIN THE LAST YEAR, HAVE YOU BEEN KICKED, HIT, SLAPPED, OR OTHERWISE PHYSICALLY HURT BY YOUR PARTNER OR EX-PARTNER?: NO

## 2024-12-12 SDOH — HEALTH STABILITY: PHYSICAL HEALTH: ON AVERAGE, HOW MANY MINUTES DO YOU ENGAGE IN EXERCISE AT THIS LEVEL?: 0 MIN

## 2024-12-12 ASSESSMENT — COGNITIVE AND FUNCTIONAL STATUS - GENERAL
STANDING UP FROM CHAIR USING ARMS: A LITTLE
MOVING FROM LYING ON BACK TO SITTING ON SIDE OF FLAT BED WITH BEDRAILS: A LOT
TURNING FROM BACK TO SIDE WHILE IN FLAT BAD: A LOT
WALKING IN HOSPITAL ROOM: A LITTLE
HELP NEEDED FOR BATHING: A LOT
MOVING TO AND FROM BED TO CHAIR: A LOT
DAILY ACTIVITIY SCORE: 18
DRESSING REGULAR LOWER BODY CLOTHING: A LOT
DRESSING REGULAR UPPER BODY CLOTHING: A LITTLE
CLIMB 3 TO 5 STEPS WITH RAILING: TOTAL
MOBILITY SCORE: 13
TOILETING: A LITTLE

## 2024-12-12 ASSESSMENT — PAIN SCALES - GENERAL
PAINLEVEL_OUTOF10: 3
PAINLEVEL_OUTOF10: 7
PAINLEVEL_OUTOF10: 7

## 2024-12-12 ASSESSMENT — PAIN DESCRIPTION - ORIENTATION: ORIENTATION: LOWER

## 2024-12-12 ASSESSMENT — ACTIVITIES OF DAILY LIVING (ADL): LACK_OF_TRANSPORTATION: NO

## 2024-12-12 ASSESSMENT — PAIN DESCRIPTION - LOCATION
LOCATION: KNEE
LOCATION: KNEE

## 2024-12-12 ASSESSMENT — PAIN - FUNCTIONAL ASSESSMENT: PAIN_FUNCTIONAL_ASSESSMENT: 0-10

## 2024-12-12 NOTE — CONSULTS
Consults  History Of Present Illness:    Neisha Graham is a 64 y.o. 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  Denies chest pain.  Denies syncope, but does report intermittent lightheadedness with standing.  Has chronic BLE lymphedema, however, does note increased swelling over the past few months.  States she is not taking lasix due to prior ALBERTINA while she was on lasix with an antibiotic to treat cellulitis.       Last Recorded Vitals:  Vitals:    12/11/24 2016 12/12/24 0019 12/12/24 0440 12/12/24 0911   BP: 148/81  135/78 131/80   BP Location: Right arm  Right arm Left arm   Patient Position: Lying  Lying Sitting   Pulse: 98  105 102   Resp: 18  19    Temp: 36.5 °C (97.7 °F)  36.3 °C (97.3 °F)    TempSrc: Temporal  Temporal    SpO2: 90% 91% 93% 92%   Weight:       Height:           Last Labs:  CBC - 12/12/2024:  5:55 AM  9.3 11.7 584    37.9      CMP - 12/12/2024:  5:55 AM  8.7 6.9 22 --- 0.5   3.5 3.3 23 164      PTT - 9/16/2024: 12:32 AM  3.1   35.2 57     Troponin I, High Sensitivity   Date/Time Value Ref Range Status   12/11/2024 10:19 PM 26 (H) 0 - 13 ng/L Final   12/11/2024 04:51 PM 28 (H) 0 - 13 ng/L Final   09/16/2024 07:07 AM 21 (H) 0 - 13 ng/L Final     BNP   Date/Time Value Ref Range Status   12/11/2024 04:51  (H) 0 - 99 pg/mL Final   09/15/2024 11:42 PM 87 0 - 99 pg/mL Final     Hemoglobin A1C   Date/Time Value Ref Range Status   12/11/2024 04:51 PM 5.5 See comment % Final   08/13/2024 07:35 AM 5.9 (H) see below % Final     LDL Calculated   Date/Time Value Ref Range Status   12/11/2024 04:51 PM 87 <=99 mg/dL Final     Comment:                                 Near   Borderline      AGE      Desirable  Optimal    High     High     Very High     0-19 Y     0 -  109     ---    110-129   >/= 130     ----    20-24 Y     0 - 119     ---    120-159   >/= 160     ----      >24 Y     0 -  99   100-129  130-159   160-189     >/=190       VLDL   Date/Time Value Ref Range Status   12/11/2024 04:51 PM 21 0 - 40 mg/dL Final   11/14/2022 11:01 AM 40 0 - 40 mg/dL Final   09/27/2020 12:15 AM 21 0 - 40 mg/dL Final   06/19/2020 03:03 PM 16 0 - 40 mg/dL Final      Last I/O:  I/O last 3 completed shifts:  In: 300 (2 mL/kg) [P.O.:300]  Out: 1900 (12.5 mL/kg) [Urine:1900 (0.3 mL/kg/hr)]  Weight: 152 kg     Past Cardiology Tests (Last 3 Years):  EKG:  ECG 12/11/2024: NCSR, HR 93bpm     Echo:  Transthoracic Echo (TTE) Complete 09/18/2024  CONCLUSIONS:   1. The left ventricular systolic function is normal, with a visually estimated ejection fraction of 60-65%.   2. Spectral Doppler shows an impaired relaxation pattern of left ventricular diastolic filling.   3. There is normal right ventricular global systolic function.   4. The left atrium is mildly dilated.   5. There is severe mitral annular calcification.   6. Severe aortic valve stenosis.    2/5/2021 Echo:  CONCLUSIONS:   1. The left ventricular systolic function is normal with a 55-60% estimated ejection fraction.   2. Spectral Doppler shows a pseudonormal pattern of left ventricular diastolic filling.   3. There is mild to moderate mitral valve stenosis.   4. There is moderate mitral annular calcification.   5. Mild to moderate mitral valve regurgitation.   6. Moderate aortic valve stenosis.   7. Moderately elevated pulmonary artery pressure.   8. There is mild tricuspid and pulmonic regurgitation.   9. The estimated pulmonary artery pressure is moderately elevated with the RVSP at 58.8 mmHg.       Past Medical History:  She has a past medical history of Acute bronchitis due to other specified organisms (10/14/2019), Acute viral conjunctivitis of left eye (03/10/2023), Alkaline phosphatase elevation (08/17/2023), Anemia (03/10/2023), Bone  marrow disorder (08/17/2023), Cellulitis of left lower limb (04/08/2021), Chronic obstructive pulmonary disease with (acute) exacerbation (Multi) (01/09/2019), Chronic sinusitis, unspecified, COVID-19 (11/28/2022), Daily headache (08/17/2023), Elevated bilirubin (08/17/2023), Elevated transaminase level (08/17/2023), Fever (08/17/2023), Fever (08/17/2023), Heart murmur (03/10/2023), Hemoptysis (03/10/2023), Hyperkalemia (03/10/2023), Malaise and fatigue (03/10/2023), Morbid (severe) obesity due to excess calories (Multi), Other conditions influencing health status, Other conditions influencing health status (03/12/2020), Other conditions influencing health status (03/06/2017), Other conditions influencing health status, Other conditions influencing health status, Other conditions influencing health status, Pain in unspecified ankle and joints of unspecified foot (06/24/2016), Pain in unspecified foot (12/08/2015), Pain in unspecified knee, Palpitations (03/10/2023), Personal history of diseases of the skin and subcutaneous tissue, Personal history of other diseases of the female genital tract, Personal history of other diseases of the nervous system and sense organs (09/12/2019), Personal history of other diseases of the nervous system and sense organs (09/17/2015), Personal history of other diseases of the respiratory system, Personal history of other diseases of the respiratory system (10/17/2016), Personal history of other diseases of the respiratory system (11/29/2017), Personal history of other diseases of the respiratory system (11/16/2016), Personal history of other drug therapy (10/14/2016), Personal history of other infectious and parasitic diseases, Personal history of other infectious and parasitic diseases (07/22/2020), Personal history of other medical treatment, Personal history of other specified conditions, Personal history of other specified conditions (01/28/2015), Personal history of other  specified conditions, Personal history of pneumonia (recurrent), Pneumonia, unspecified organism (01/11/2018), Postmenopausal bleeding (09/30/2014), Right knee pain (03/10/2023), Shortness of breath (03/10/2023), Sinus tachycardia (03/10/2023), Submandibular sialolithiasis (03/10/2023), Unilateral primary osteoarthritis, unspecified knee (02/03/2017), and Unspecified acute conjunctivitis, bilateral (08/20/2020).    Past Surgical History:  She has a past surgical history that includes Other surgical history (09/19/2013); Other surgical history (09/19/2013); Tonsillectomy (09/19/2013); Knee surgery (09/19/2013); Tubal ligation (09/19/2013); and Other surgical history (09/27/2013).      Social History:  She reports that she has been smoking cigarettes. She has never used smokeless tobacco. No history on file for alcohol use and drug use.    Family History:  No family history on file.     Allergies:  Vancomycin, Macrobid [nitrofurantoin monohyd/m-cryst], and Sulfamethoxazole-trimethoprim    Inpatient Medications:  Scheduled medications   Medication Dose Route Frequency    budesonide  0.5 mg nebulization BID    [Held by provider] dilTIAZem ER  180 mg oral Daily    DULoxetine  20 mg oral Daily    formoterol  20 mcg nebulization BID    furosemide  40 mg intravenous q12h    hydroxyurea  1,000 mg oral Daily    losartan  25 mg oral Daily    nicotine  1 patch transdermal Daily    nystatin  1 Application Topical BID    perflutren lipid microspheres  0.5-10 mL of dilution intravenous Once in imaging    perflutren protein A microsphere  0.5 mL intravenous Once in imaging    polyethylene glycol  17 g oral Daily    pramipexole  1 mg oral BID    sennosides-docusate sodium  2 tablet oral BID    sulfur hexafluoride microsphr  2 mL intravenous Once in imaging    tiotropium  2 puff inhalation Daily    warfarin  7.5 mg oral Daily    white petrolatum  1 Application Topical BID    zinc oxide  1 Application Topical TID     PRN medications    Medication    acetaminophen    Or    acetaminophen    Or    acetaminophen    ammonium lactate    hydrOXYzine HCL    ipratropium-albuteroL    oxyCODONE    Or    oxyCODONE    oxygen     Continuous Medications   Medication Dose Last Rate     Outpatient Medications:  Current Outpatient Medications   Medication Instructions    albuterol 90 mcg/actuation inhaler 2 puffs, inhalation, Every 4 hours PRN    amino acids-protein hydrolys (ProSource No Carb) 15-60 gram-kcal/30 mL liquid oral    ammonium lactate (Amlactin) 12 % cream 1 Film, 2 times daily PRN    clotrimazole (Lotrimin) 1 % cream Topical, 2 times daily, apply to affected area    cyclobenzaprine (FLEXERIL) 10 mg, oral, 2 times daily PRN    DULoxetine (CYMBALTA) 20 mg, oral, Daily    gentamicin (Garamycin) 0.1 % ointment Apply to wounds with dressing changes    hydroxyurea (HYDREA) 1,000 mg, oral, Daily    hydrOXYzine HCL (ATARAX) 50 mg, oral, Every 8 hours PRN    ipratropium-albuteroL (Duo-Neb) 0.5-2.5 mg/3 mL nebulizer solution 3 mL, nebulization, 4 times daily PRN    lidocaine (Xylocaine) 5 % ointment Topical, As needed, Apply up to three times a day as needed for pain.    lidocaine 5 % gel 1 inch, topical (top), 3 times daily PRN    Lyrica 50 mg, oral, 3 times daily    naloxone (NARCAN) 4 mg, nasal, As needed    nebulizer accessories kit UAD    nystatin (Mycostatin) cream APPLY TO AFFECTED AREA TWO TO THREE TIMES A DAY    oxyCODONE (ROXICODONE) 10 mg, oral, Every 4 hours PRN    [START ON 12/23/2024] oxyCODONE (ROXICODONE) 10 mg, oral, Every 4 hours PRN    [START ON 1/20/2025] oxyCODONE (ROXICODONE) 10 mg, oral, Every 4 hours PRN    pramipexole (MIRAPEX) 1 mg, oral, 2 times daily    sennosides-docusate sodium (Rebecca-Colace) 8.6-50 mg tablet 2 tablets, oral, 2 times daily    Tiadylt  mg, oral, Daily    Trelegy Ellipta 200-62.5-25 mcg blister with device INHALE 1 PUFF BY MOUTH AND IN TO LUNGS ONCE DAILY    triamcinolone (Kenalog) 0.1 % cream APPLY SPARINGLY  AND MASSAGE IN ONCE A DAY    triamcinolone (Kenalog) 0.1 % cream Apply to affected area with dressing changes    warfarin (Coumadin) 7.5 mg tablet Take one po every day    white petrolatum (Aquaphor) 41 % ointment ointment 1 Application, Topical, 2 times daily, Apply to legs and abdomen    zinc oxide 40 % ointment ointment 1 Application, Topical, 3 times daily, Apply to groin, buttocks after nystatin       Physical Exam:  Constitutional: Pleasant, Awake/Alert/Oriented to person place and time.   Head: Atraumatic, Normocephalic  Eyes: EOMI. MICHELE  Neck: +JVD  Cardiovascular: Regular rate and rhythm, S1, S2. 3/6 murmur RUSB   Respiratory: Crackles posteriorly at bases   Abdomen: Nontender, Obese. Bowel sounds appreciated  Musculoskeletal: ROM intact. Muscle strength grossly intact upper and lower extremities 5/5.   Neurological: CNII-XII intact. Sensation grossly intact  Extremities: BLE lymphedema, 4+ pitting edema with weeping vascular ulcers   Psychiatric: Appropriate mood and affect       Assessment/Plan   Very pleasant  64 y.o. 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      Assessment:  Acute on chronic hypoxic respiratory failure  Acute decompensated diastolic HF  H/o moderate-severe aortic stenosis   Hypertension    Plan:  -Increase lasix to 40mg IV TID-- consider starting lasix drip, however, patient states she has not tolerated in the past due to significant leg cramping.   -Add spironolactone 25mg daily  -Agree with adding losartan 25mg daily for BP control   -Diltiazem held per primary team-- suspect due to ADHF-- will look to resume if EF is >40%, otherwise will switch to low dose metoprolol   -Recommend obtaining an echocardiogram for assessment of mechanical and  structural function      Peripheral IV 12/12/24 22 G Distal;Right Forearm (Active)   Site Assessment Clean;Dry;Intact 12/12/24 0900   Dressing Status Clean;Dry;Occlusive 12/12/24 0900   Number of days: 0       Code Status:  Full Code      LAKEISHA Mackey-CNP  Seen, discussed and examined with CARL Braxton, above is representative of my medical decision making.    Judson Chaudhry MD

## 2024-12-12 NOTE — H&P
Internal Medicine - History and Physical Note      Patient: Neisha Graham, Age: 64 y.o., SEX: female , MRN:[id number], [facility]OM[facility],  Code: Full Code   Admitted On: 12/11/2024   Admitting Dx: No admission diagnoses are documented for this encounter.  PCP: Micheal Gonsalez DO        Attending: Neisha Olguin*        Chief Complaint   Patient: Neisha Graham is a 64 y.o. female who presented to the hospital for   Chief Complaint   Patient presents with    Shortness of Breath       HPI   Neisha Graham is a 64 y.o. 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  According to the patient she was discharged from the hospital at the end of the September.  Since then her shortness of breath has been getting progressively worse.  Patient states that she has been taking small breaths and has she has difficulty catching breath.  Patient states wound care today severely tachypneic out of breath and had to sit up. She Usually uses motorized wheelchair to ambulate and sleeps in a recliner.  She states that moving from wheelchair to the toilet worsens her breath.  She states that she also tries to cough to facilitate her breath but it does not help.  In regards to systemic signs, patient states that she had 102 °F fever few weeks ago.  She was given antibiotics on felt better.  Since then she has had no fevers, chills, headaches.  She has had palpitations and tachycardia.  There has been an episode at home where her heart rate was  in 160s.  She denies any chest pain, chest tightness.  She otherwise endorses she has not been urinating well.  She goes multiple times in the day to urinate but not enough volume comes out when she pees.    Of note, patient was previously admitted from 09/16/2024 till 09/28/2024.  She was found to have bilateral lower extremity cellulitis and nonhealing wounds.  Hospital stay was prolonged due to sepsis due to Pseudomonas BSI etiology unclear but suspect  ie nephrolithiasis w/ spontaneous resolution.  She also had ALBERTINA.  Her lisinopril was discontinued at discharge and it was recommended she stop taking hydrochlorothiazide her diuretic.  Patient states that she has not taken her lisinopril and diuretic in a while    ROS  Review of Systems   Constitutional:  Positive for fatigue. Negative for chills and fever.   Respiratory:  Positive for shortness of breath and wheezing.    Cardiovascular:  Positive for palpitations and leg swelling. Negative for chest pain.   Gastrointestinal:  Positive for constipation. Negative for diarrhea.   Genitourinary:         Decreased urinary output   Neurological:  Negative for headaches.   Psychiatric/Behavioral:  Negative for behavioral problems and confusion.       12 Point ROS negative unless otherwise specified above.     ED COURSE:   ER course for patient was significant for being hypertensive at 164/97 mmHg, heart rate 102.  Patient was initially satting appropriately on room air but then desatted to 88% on room air.  Patient was placed on 2 LPM.  Labs are significant for leukocytosis 12.9 with absolute neutrophil count 10.58.  CMP with elevated alk phos 164, BNP elevated at 984, troponin 5 8.  PT/INR 42.2/3.7.  UA done 5015+1 bacteria.  Imaging done showed acute pulmonary and cardiomegaly.  Patient was given albuterol nebulization, IV Lasix 40 mg once.  Patient was subsequently admitted to the floors to get further management    Past Medical History: severe aortic stenosis,  nonrheumatic mitral valve stenosis, chronic essential thrombocytosis with history of cavernous sinus thrombosis and chronic thromboembolic disease (on warfarin), HTN, HLD, chronic lymphedema, HFpEF (EF 60 to 65% from 09/18/2024), neurogenic claudication due to lumbar spinal stenosis and radiculopathy, COPD, BE, restless leg syndrome, MDD/MANOLO  Past Surgical History: Right knee surgery, lumbar laminectomy, ovarian cystectomy, tonsillectomy, tubal ligation, right eye cataract extraction  Allergies: Vancomycin, Macrobid, Bactrim  Family History: Significant for cancer, diabetes, hypertension and stroke    Social History:  - Coming from home, lives in an apartment all by herself  - Tobacco: 5 to 10 cigarettes/day  - Alcohol: Denies  - Illicit Drug: Denies    HealthCare Providers:  - PCP: Micheal Gonsalez,    - Currently patient does not see a cardiologist.  Patient states that she saw Dr. Shipley long time ago.  She would like to establish care with a new cardiologist    Code Status: Full Code     Emergency contact: Extended Emergency Contact Information  Primary Emergency Contact: Belle Chauhan  Home Phone: [phone]  Relation: Sibling  Secondary Emergency Contact: NICOLAS PEREZ  Mobile Phone: [phone]  Relation: Daughter     Past Medical History     Past Medical History:   Diagnosis Date    Acute bronchitis due to other specified organisms 10/14/2019    Acute bronchitis due to other specified organisms    Acute viral conjunctivitis of left eye 03/10/2023    Alkaline phosphatase elevation 08/17/2023    Anemia 03/10/2023    Bone marrow disorder 08/17/2023    Cellulitis of left lower limb 04/08/2021    Cellulitis of left lower leg    Chronic obstructive pulmonary disease with (acute) exacerbation (Multi) 01/09/2019    COPD exacerbation    Chronic sinusitis, unspecified     Sinusitis    COVID-19 11/28/2022    COVID-19    Daily headache 08/17/2023    Elevated bilirubin 08/17/2023    Elevated transaminase level  08/17/2023    Fever 08/17/2023    Fever 08/17/2023    Heart murmur 03/10/2023    Hemoptysis 03/10/2023    Hyperkalemia 03/10/2023    Malaise and fatigue 03/10/2023    Morbid (severe) obesity due to excess calories (Multi)     Morbid obesity    Other conditions influencing health status     History Of ___ Previous Pregnancies    Other conditions influencing health status 03/12/2020    Arthritis    Other conditions influencing health status 03/06/2017    History of cough    Other conditions influencing health status     Menstruation    Other conditions influencing health status     Osteoarthritis    Other conditions influencing health status     Pulmonary Disease    Pain in unspecified ankle and joints of unspecified foot 06/24/2016    Ankle joint pain    Pain in unspecified foot 12/08/2015    Foot pain    Pain in unspecified knee     Joint pain, knee    Palpitations 03/10/2023    Personal history of diseases of the skin and subcutaneous tissue     History of cellulitis    Personal history of other diseases of the female genital tract     Vaginal delivery    Personal history of other diseases of the nervous system and sense organs 09/12/2019    History of conjunctivitis    Personal history of other diseases of the nervous system and sense organs 09/17/2015    History of blurred vision    Personal history of other diseases of the respiratory system     History of pleurisy    Personal history of other diseases of the respiratory system 10/17/2016    History of bronchitis    Personal history of other diseases of the respiratory system 11/29/2017    History of acute bronchitis    Personal history of other diseases of the respiratory system 11/16/2016    History of acute pharyngitis    Personal history of other drug therapy 10/14/2016    History of influenza vaccination    Personal history of other infectious and parasitic diseases     History of hepatitis    Personal history of other infectious and parasitic diseases  07/22/2020    History of candidiasis of mouth    Personal history of other medical treatment     History of mammogram    Personal history of other specified conditions     History of shortness of breath    Personal history of other specified conditions 01/28/2015    History of shortness of breath    Personal history of other specified conditions     History of abnormal Pap smear    Personal history of pneumonia (recurrent)     History of pneumonia    Pneumonia, unspecified organism 01/11/2018    Community acquired pneumonia    Postmenopausal bleeding 09/30/2014    Postmenopausal bleeding    Right knee pain 03/10/2023    Shortness of breath 03/10/2023    Sinus tachycardia 03/10/2023    Submandibular sialolithiasis 03/10/2023    Unilateral primary osteoarthritis, unspecified knee 02/03/2017    Osteoarthritis, localized, knee    Unspecified acute conjunctivitis, bilateral 08/20/2020    Acute bacterial conjunctivitis of both eyes        Surgical History     Past Surgical History:   Procedure Laterality Date    KNEE SURGERY  09/19/2013    Knee Surgery Right    OTHER SURGICAL HISTORY  09/19/2013    Direct Laryngoscopy With Foreign Body Removal    OTHER SURGICAL HISTORY  09/19/2013    Laminectomy With Drainage Of Intramedullary Cyst    OTHER SURGICAL HISTORY  09/27/2013    Ovarian Cystectomy    TONSILLECTOMY  09/19/2013    Tonsillectomy    TUBAL LIGATION  09/19/2013    Tubal Ligation       Family History   No family history on file.    Social History     Social History     Socioeconomic History    Marital status: Single     Spouse name: Not on file    Number of children: Not on file    Years of education: Not on file    Highest education level: Not on file   Occupational History    Not on file   Tobacco Use    Smoking status: Some Days     Types: Cigarettes    Smokeless tobacco: Never   Substance and Sexual Activity    Alcohol use: Not on file    Drug use: Not on file    Sexual activity: Not on file   Other Topics Concern     Not on file   Social History Narrative    Not on file     Social Drivers of Health     Financial Resource Strain: Low Risk  (9/16/2024)    Overall Financial Resource Strain (CARDIA)     Difficulty of Paying Living Expenses: Not hard at all   Food Insecurity: Not on file   Transportation Needs: No Transportation Needs (9/16/2024)    PRAPARE - Transportation     Lack of Transportation (Medical): No     Lack of Transportation (Non-Medical): No   Physical Activity: Not on file   Stress: Not on file   Social Connections: Not on file   Intimate Partner Violence: Not on file   Housing Stability: Low Risk  (9/16/2024)    Housing Stability Vital Sign     Unable to Pay for Housing in the Last Year: No     Number of Times Moved in the Last Year: 0     Homeless in the Last Year: No       Tobacco Use: High Risk (12/11/2024)    Patient History     Smoking Tobacco Use: Some Days     Smokeless Tobacco Use: Never     Passive Exposure: Not on file        Social History     Substance and Sexual Activity   Alcohol Use None        Allergies     Allergies   Allergen Reactions    Vancomycin Other    Macrobid [Nitrofurantoin Monohyd/M-Cryst] Dizziness    Sulfamethoxazole-Trimethoprim Unknown          Meds    Scheduled medications    Continuous medications    PRN medications       Objective    Physical Exam  Constitutional:       Appearance: She is obese.      Comments: Wheelchair-bound,   Cardiovascular:      Rate and Rhythm: Tachycardia present.      Heart sounds: Murmur heard.      No friction rub. No gallop.   Pulmonary:      Effort: Respiratory distress present.      Breath sounds: Wheezing present.   Abdominal:      General: Abdomen is flat. Bowel sounds are normal.      Palpations: Abdomen is soft. There is no mass.      Tenderness: There is no abdominal tenderness. There is no guarding.   Musculoskeletal:         General: Swelling present. No tenderness.      Cervical back: Normal range of motion.      Right lower leg: Edema  "present.      Left lower leg: Edema present.   Neurological:      General: No focal deficit present.      Mental Status: She is alert and oriented to person, place, and time.   Psychiatric:         Mood and Affect: Mood normal.         Behavior: Behavior normal.          Visit Vitals  BP (!) 170/140   Pulse 97   Temp 36.2 °C (97.2 °F) (Temporal)   Resp 19   Ht 1.626 m (5' 4\")   Wt (!) 152 kg (335 lb)   SpO2 98%   BMI 57.50 kg/m²   Smoking Status Some Days   BSA 2.62 m²        No intake or output data in the 24 hours ending 12/11/24 1940          Labs:   Results from last 72 hours   Lab Units 12/11/24  1651   SODIUM mmol/L 140   POTASSIUM mmol/L 4.1   CHLORIDE mmol/L 100   CO2 mmol/L 33*   BUN mg/dL 21   CREATININE mg/dL 0.78   GLUCOSE mg/dL 131*   CALCIUM mg/dL 9.0   ANION GAP mmol/L 11   EGFR mL/min/1.73m*2 84      Results from last 72 hours   Lab Units 12/11/24  1651   WBC AUTO x10*3/uL 12.9*   HEMOGLOBIN g/dL 12.2   HEMATOCRIT % 40.5   PLATELETS AUTO x10*3/uL 704*   NEUTROS PCT AUTO % 81.9   LYMPHS PCT AUTO % 8.3   MONOS PCT AUTO % 9.0   EOS PCT AUTO % 0.2      Lab Results   Component Value Date    CALCIUM 9.0 12/11/2024    PHOS 2.6 09/28/2024      Lab Results   Component Value Date    CRP 16.36 (H) 09/16/2024      Micro/ID:   Susceptibility data from last 90 days.  Collected Specimen Info Organism Cefepime Ceftazidime Ciprofloxacin Clindamycin Erythromycin Gentamicin Levofloxacin Oxacillin Piperacillin/Tazobactam Tetracycline Tobramycin Trimethoprim/Sulfamethoxazole Vancomycin   09/23/24 Blood culture from Peripheral Venipuncture Pseudomonas  S  S  S    S  S   S   S  S    09/15/24 Tissue/Biopsy from Buttock Methicillin Resistant Staphylococcus aureus (MRSA)     R  R    R   R   S  S   09/15/24 Blood culture from Peripheral Venipuncture Aerococcus urinae                  Corynebacterium                  Lab Results   Component Value Date    URINECULTURE No significant growth 09/16/2024    BLOODCULT No growth at 4 " days -  FINAL REPORT 09/26/2024    BLOODCULT No growth at 4 days -  FINAL REPORT 09/26/2024     Images    XR chest 1 view  Narrative: STUDY:  Chest Radiograph;  12/11/2024.  INDICATION:  64-year-old female with shortness of breath.   COMPARISON:  CXR: 09/23/2024, 02/03/2021, 10/17/2020, 09/27/2020.  ACCESSION NUMBER(S):  [id number]  ORDERING CLINICIAN:  ALEXANDRA PRATER  TECHNIQUE:    Chest, frontal; 12/11/2024, 1652 hrs.   FINDINGS:  CARDIOMEDIASTINAL SILHOUETTE:  Cardiomegaly.  PULMONARY VASCULATURE:  Pulmonary venous vessels are mahmood, less well-defined, typical for  pulmonary venous vascular congestion.  LUNGS:  No alveolar consolidation, however there is bilateral  perihilar/basilar confluent interstitial consolidation.  PLEURA/HEMIDIAPHRAGM:  No pneumothorax, no juxtapleural mass.   No hemidiaphragm elevation.  CHEST:  Frontal shows lungs adequately inflated, some central bronchovascular  crowding.  -=-  ABDOMEN:  Upper abdomen unremarkable.  SKELETON:  No fracture, no osseous lytic/blastic lesion.  Impression: 1. Acute pulmonary venous hypertension, manifesting as cardiogenic  interstitial pulmonary edema.  2. Chronic cardiomegaly.     Signed by Ino Fairbanks MD       Encounter Date: 09/15/24   ECG 12 lead   Result Value    Ventricular Rate 140    Atrial Rate 140    UT Interval 148    QRS Duration 78    QT Interval 264    QTC Calculation(Bazett) 403    P Axis 69    R Axis 64    T Axis 70    QRS Count 23    Q Onset 214    P Onset 140    P Offset 190    T Offset 346    QTC [place] 350    Narrative    Sinus tachycardia  Otherwise normal ECG  When compared with ECG of 16-SEP-2024 08:10, (unconfirmed)  No significant change was found  Confirmed by Adan Beckett ([id number]) on 10/7/2024 6:57:00 AM      Encounter Date: 09/15/24   ECG 12 lead   Result Value    Ventricular Rate 140    Atrial Rate 140    UT Interval 148    QRS Duration 78    QT Interval 264    QTC Calculation(Bazett) 403    P [place] 69    R  Axis 64    T Axis 70    QRS Count 23    Q Onset 214    P Onset 140    P Offset 190    T Offset 346    QTC Fredericia 350    Narrative    Sinus tachycardia  Otherwise normal ECG  When compared with ECG of 16-SEP-2024 08:10, (unconfirmed)  No significant change was found  Confirmed by Adan Beckett ([id number]) on 10/7/2024 6:57:00 AM     Assessment and Plan    Neisha Graham is a 64 y.o. female admitted on 12/11/2024 for shortness of breath.  Has a past medical history significant for severe aortic stenosis, nonrheumatic mitral valve stenosis, chronic essential thrombocytosis with history of cavernous sinus thrombosis and chronic thromboembolic disease (on warfarin), HTN, HLD, chronic lymphedema, HFpEF (EF 60 to 65% from 09/18/2024), neurogenic claudication due to lumbar spinal stenosis and radiculopathy, COPD, BE, restless leg syndrome, MDD/MANOLO.  Currently being managed for acute decompensated heart failure.  Cardiology consulted    ACUTE MEDICAL ISSUES:  # Acute decompensated heart failure  # h/o HFpEF  # Elevated troponin  # HTN  - Symptoms/Signs of HF: Dyspnea with minimal exertion, orthopnea.  Exam significant for expiratory wheezes and bilateral lower extremity edema.  : : NYHA class III  - Labs significant for elevated BNP and Trop I  - Last known Echo from 09/18/2024 with Ejection Fraction 60 to 65%, spectral droppler shows impaired relaxation pattern of left ventricular diastolic filling, normal right ventricular global systolic function, left atrium mildly dilated, severe mitral annular calcification, severe aortic valve stenosis  - Medication Therapy: Will start patient on losartan 25 mg every day (anticipate creatinine to rise by 30%).  Introduce GDMT as appropriate  - Diuresis with IV Lasix 40 mg every day to target a net goal diuresis of 2L  - Will hold diltiazem  mg in the setting of acute heart failure  [ ] Repeat TTE limited; ordered  - Supportive:  telemetry monitoring, daily weights,  Strict I/O, Cardiac diet (low Na) with 1500 cc fluid restriction  - Cardiology consulted to assist with medication optimization.  Heart failure navigator consulted    # Severe aortic valve stenosis  - Cardiology consulted, appreciate recommendations  - Outpatient follow-up vs. Transfer to [facility] for TAVR eval    # Leukocytosis  # Bacteriuria  - Labs significant for elevated leukocyte count  - Flu A, B, COVID-19 PCR negative  - UA significant for 25 leukocyte esterase, +1 bacteria, 50 proteins.  Pending culture and sensitivity  - Given that patient is asymptomatic, will not start antibiotics  [ ] Ordered ESR, CRP, [facility]  - Can place patient on antibiotics as appropriate    CHRONIC MEDICAL ISSUES:  # MDD/MANOLO: Cymbalta 20 mg every day, hydroxyzine 50 mg every 8 hours as needed  # Chronic essential thrombocytosis with history of cavernous sinus thrombosis: Continue hydroxyurea 1000 mg every day, continue warfarin 7.5 mg every day  # COPD: Switched home Trelegy to formetrol and Spiriva due to formulary availability. DuoNebs nebulizations as needed  # Intertrigo: Nystatin powder twice a day  # Bilateral lower extremity lymphedema and wounds: Wound care consult placed.  Aquaphor ointment, and ammonium lactate  # Nicotine use disorder: On nicotine replacement therapy  # Bowel regimen: MiraLAX 17 g every day, senna docusate 8.6-50 mg 2 tablets twice a day  # Restless leg syndrome: On pramipexole 1 mg twice    Fluids: 1500 ml fluid restriction  Electrolytes: Replete to keep K >4, Mg >2  Nutrition: Adult cardiac diet  Antimicrobials: None currently  DVT ppx: With home warfarin  GI ppx: None  Catheter: External female catheter  Lines: 20-gauge PIV in left antecubital  Supplemental Oxygen: On room air  Emergency Contact: Extended Emergency Contact Information  Primary Emergency Contact: Belle Chauhan  Home Phone: [phone]  Relation: Sibling  Secondary Emergency Contact: NICOLAS PEREZ  Mobile Phone:  [phone]  Relation: Daughter   Code: Full Code     Disposition: Admitted to inpatient.  ELOS greater than 48 hours    Charlie Bennett MD  Internal Medicine, PGY- 2  12/11/24 at 7:40 PM

## 2024-12-12 NOTE — PROGRESS NOTES
12/12/24 1404   Discharge Planning   Living Arrangements Alone   Support Systems Children;Family members   Assistance Needed A&Ox3, mobilizes with motorized wheelchair, transfers with a walker, has walk in shower, does not drive (receives transport through insurance [id number]), is on room air during the day and wears O2 at 2LPM via NC through [employer], does not use CPAP/BIPAP, [facility] 2X per week for wound care. Patient's PCP is Dr. Micheal Gonsalez   Type of Residence Private residence   Number of Stairs to Enter Residence 0   Number of Stairs Within Residence 0   Do you have animals or pets at home? No   Who is requesting discharge planning? Provider   Home or Post Acute Services In home services   Type of Home Care Services Home nursing visits   Expected Discharge Disposition Home Health   Does the patient need discharge transport arranged? Yes   RoundTrip coordination needed? Yes   Has discharge transport been arranged? No   Financial Resource Strain   How hard is it for you to pay for the very basics like food, housing, medical care, and heating? Not hard   Housing Stability   In the last 12 months, was there a time when you were not able to pay the mortgage or rent on time? N   Transportation Needs   In the past 12 months, has lack of transportation kept you from medical appointments or from getting medications? no   In the past 12 months, has lack of transportation kept you from meetings, work, or from getting things needed for daily living? No   Stroke Family Assessment   Stroke Family Assessment Needed No   Intensity of Service   Intensity of Service 0-30 min

## 2024-12-12 NOTE — NURSING NOTE
2015-patient arrived to [address] [place] [address] from emergency room. I assumed patient care. Patient says she does not want to wear the hospital socks, and she can only sleep in the chair, not in the hospital bed.    2127-I sent a secure chat to the Jayy Rebolledo that the pt was requesting a nicotine patch and her 10 mg Oxycodone. He put in the order.

## 2024-12-12 NOTE — NURSING NOTE
1600: Assumed care of pt. Pt sitting up in bed watching tv. RLE is covered with dressing, small amount of drainage noted on dsg. LLE is open to air. Pt declines  wound care at this time.

## 2024-12-12 NOTE — PROGRESS NOTES
Neisha Graham is a 64 y.o. female on day 1 of admission presenting with Acute on chronic congestive heart failure, unspecified heart failure type.    Subjective   Pt is feeling better. She continues to desaturate to 88-89% on RA. She is requiring 2L NC       Objective     Physical Exam  Vitals and nursing note reviewed.   Constitutional:       General: She is not in acute distress.     Appearance: She is not ill-appearing or toxic-appearing.   HENT:      Head: Normocephalic and atraumatic.      Nose: Nose normal.      Mouth/Throat:      Mouth: Mucous membranes are moist.   Eyes:      Extraocular Movements: Extraocular movements intact.      Conjunctiva/sclera: Conjunctivae normal.      Pupils: Pupils are equal, round, and reactive to light.   Cardiovascular:      Rate and Rhythm: Normal rate and regular rhythm.      Heart sounds: Murmur heard.      No gallop.   Pulmonary:      Effort: No respiratory distress.      Breath sounds: Rhonchi present. No wheezing or rales.      Comments: Decreased breath sounds at bilateral lung bases  Abdominal:      General: Abdomen is flat. Bowel sounds are normal. There is no distension.      Palpations: Abdomen is soft. There is no mass.      Tenderness: There is no abdominal tenderness.   Musculoskeletal:         General: Swelling present. No tenderness. Normal range of motion.      Cervical back: Normal range of motion and neck supple.      Right lower leg: Edema present.      Left lower leg: Edema present.   Skin:     General: Skin is warm and dry.   Neurological:      Mental Status: She is alert and oriented to person, place, and time.      Motor: Weakness present.   Psychiatric:         Mood and Affect: Mood normal.         Behavior: Behavior normal.         Thought Content: Thought content normal.         Judgment: Judgment normal.         Last Recorded Vitals:  /80 (BP Location: Left arm, Patient Position: Sitting)   Pulse 102   Temp 36.3 °C (97.3 °F) (Temporal)    "Resp 19   Ht 1.626 m (5' 4\")   Wt (!) 152 kg (335 lb)   SpO2 92%   BMI 57.50 kg/m²      Scheduled medications:  budesonide, 0.5 mg, nebulization, BID  [Held by provider] dilTIAZem ER, 180 mg, oral, Daily  DULoxetine, 20 mg, oral, Daily  formoterol, 20 mcg, nebulization, BID  furosemide, 40 mg, intravenous, Daily  hydroxyurea, 1,000 mg, oral, Daily  losartan, 25 mg, oral, Daily  nicotine, 1 patch, transdermal, Daily  nystatin, 1 Application, Topical, BID  perflutren lipid microspheres, 0.5-10 mL of dilution, intravenous, Once in imaging  perflutren protein A microsphere, 0.5 mL, intravenous, Once in imaging  polyethylene glycol, 17 g, oral, Daily  pramipexole, 1 mg, oral, BID  sennosides-docusate sodium, 2 tablet, oral, BID  sulfur hexafluoride microsphr, 2 mL, intravenous, Once in imaging  tiotropium, 2 puff, inhalation, Daily  warfarin, 7.5 mg, oral, Daily  white petrolatum, 1 Application, Topical, BID  zinc oxide, 1 Application, Topical, TID      Continuous medications:     PRN medications:  PRN medications: acetaminophen **OR** acetaminophen **OR** acetaminophen, ammonium lactate, hydrOXYzine HCL, ipratropium-albuteroL, oxyCODONE **OR** oxyCODONE, oxygen     Relevant Results:  Results for orders placed or performed during the hospital encounter of 12/11/24 (from the past 24 hours)   Sars-CoV-2 PCR   Result Value Ref Range    Coronavirus 2019, PCR Not Detected Not Detected   Influenza A, and B PCR   Result Value Ref Range    Flu A Result Not Detected Not Detected    Flu B Result Not Detected Not Detected   RSV PCR   Result Value Ref Range    RSV PCR Not Detected Not Detected   CBC and Auto Differential   Result Value Ref Range    WBC 12.9 (H) 4.4 - 11.3 x10*3/uL    nRBC 0.3 (H) 0.0 - 0.0 /100 WBCs    RBC 3.69 (L) 4.00 - 5.20 x10*6/uL    Hemoglobin 12.2 12.0 - 16.0 g/dL    Hematocrit 40.5 36.0 - 46.0 %     (H) 80 - 100 fL    MCH 33.1 26.0 - 34.0 pg    MCHC 30.1 (L) 32.0 - 36.0 g/dL    RDW 14.3 11.5 - " 14.5 %    Platelets 704 (H) 150 - 450 x10*3/uL    Neutrophils % 81.9 40.0 - 80.0 %    Immature Granulocytes %, Automated 0.5 0.0 - 0.9 %    Lymphocytes % 8.3 13.0 - 44.0 %    Monocytes % 9.0 2.0 - 10.0 %    Eosinophils % 0.2 0.0 - 6.0 %    Basophils % 0.1 0.0 - 2.0 %    Neutrophils Absolute 10.58 (H) 1.20 - 7.70 x10*3/uL    Immature Granulocytes Absolute, Automated 0.07 0.00 - 0.70 x10*3/uL    Lymphocytes Absolute 1.07 (L) 1.20 - 4.80 x10*3/uL    Monocytes Absolute 1.17 (H) 0.10 - 1.00 x10*3/uL    Eosinophils Absolute 0.03 0.00 - 0.70 x10*3/uL    Basophils Absolute 0.01 0.00 - 0.10 x10*3/uL   Magnesium   Result Value Ref Range    Magnesium 1.96 1.60 - 2.40 mg/dL   Comprehensive metabolic panel   Result Value Ref Range    Glucose 131 (H) 74 - 99 mg/dL    Sodium 140 136 - 145 mmol/L    Potassium 4.1 3.5 - 5.3 mmol/L    Chloride 100 98 - 107 mmol/L    Bicarbonate 33 (H) 21 - 32 mmol/L    Anion Gap 11 10 - 20 mmol/L    Urea Nitrogen 21 6 - 23 mg/dL    Creatinine 0.78 0.50 - 1.05 mg/dL    eGFR 84 >60 mL/min/1.73m*2    Calcium 9.0 8.6 - 10.3 mg/dL    Albumin 3.7 3.4 - 5.0 g/dL    Alkaline Phosphatase 164 (H) 33 - 136 U/L    Total Protein 6.9 6.4 - 8.2 g/dL    AST 22 9 - 39 U/L    Bilirubin, Total 0.5 0.0 - 1.2 mg/dL    ALT 23 7 - 45 U/L   Lactate   Result Value Ref Range    Lactate 0.8 0.4 - 2.0 mmol/L   Protime-INR   Result Value Ref Range    Protime 42.2 (H) 9.8 - 12.8 seconds    INR 3.7 (H) 0.9 - 1.1   Troponin I, High Sensitivity   Result Value Ref Range    Troponin I, High Sensitivity 28 (H) 0 - 13 ng/L   B-Type Natriuretic Peptide   Result Value Ref Range     (H) 0 - 99 pg/mL   Blood Gas Venous Full Panel   Result Value Ref Range    POCT pH, Venous 7.40 7.33 - 7.43 pH    POCT pCO2, Venous 57 (H) 41 - 51 mm Hg    POCT pO2, Venous 44 35 - 45 mm Hg    POCT SO2, Venous 79 (H) 45 - 75 %    POCT Oxy Hemoglobin, Venous 72.7 45.0 - 75.0 %    POCT Hematocrit Calculated, Venous 39.0 36.0 - 46.0 %    POCT Sodium, Venous  137 136 - 145 mmol/L    POCT Potassium, Venous 4.5 3.5 - 5.3 mmol/L    POCT Chloride, Venous 103 98 - 107 mmol/L    POCT Ionized Calicum, Venous 1.24 1.10 - 1.33 mmol/L    POCT Glucose, Venous 139 (H) 74 - 99 mg/dL    POCT Lactate, Venous 1.2 0.4 - 2.0 mmol/L    POCT Base Excess, Venous 8.6 (H) -2.0 - 3.0 mmol/L    POCT HCO3 Calculated, Venous 35.3 (H) 22.0 - 26.0 mmol/L    POCT Hemoglobin, Venous 13.0 12.0 - 16.0 g/dL    POCT Anion Gap, Venous 3.0 (L) 10.0 - 25.0 mmol/L    Patient Temperature      FiO2 0 %   Urinalysis with Reflex Culture and Microscopic   Result Value Ref Range    Color, Urine Yellow Light-Yellow, Yellow, Dark-Yellow    Appearance, Urine Clear Clear    Specific Gravity, Urine 1.027 1.005 - 1.035    pH, Urine 5.5 5.0, 5.5, 6.0, 6.5, 7.0, 7.5, 8.0    Protein, Urine 50 (1+) (A) NEGATIVE, 10 (TRACE), 20 (TRACE) mg/dL    Glucose, Urine Normal Normal mg/dL    Blood, Urine NEGATIVE NEGATIVE    Ketones, Urine NEGATIVE NEGATIVE mg/dL    Bilirubin, Urine NEGATIVE NEGATIVE    Urobilinogen, Urine Normal Normal mg/dL    Nitrite, Urine NEGATIVE NEGATIVE    Leukocyte Esterase, Urine 25 Erika/µL (A) NEGATIVE   Extra Urine Gray Tube   Result Value Ref Range    Extra Tube Hold for add-ons.    Microscopic Only, Urine   Result Value Ref Range    WBC, Urine 1-5 1-5, NONE /HPF    RBC, Urine 3-5 NONE, 1-2, 3-5 /HPF    Squamous Epithelial Cells, Urine 1-9 (SPARSE) Reference range not established. /HPF    Bacteria, Urine 1+ (A) NONE SEEN /HPF    Mucus, Urine FEW Reference range not established. /LPF   Lipid Panel   Result Value Ref Range    Cholesterol 174 0 - 199 mg/dL    HDL-Cholesterol 65.6 mg/dL    Cholesterol/HDL Ratio 2.7     LDL Calculated 87 <=99 mg/dL    VLDL 21 0 - 40 mg/dL    Triglycerides 106 0 - 149 mg/dL    Non HDL Cholesterol 108 0 - 149 mg/dL   Hemoglobin A1C   Result Value Ref Range    Hemoglobin A1C 5.5 See comment %    Estimated Average Glucose 111 Not Established mg/dL   TSH with reflex to Free T4 if  abnormal   Result Value Ref Range    Thyroid Stimulating Hormone 4.43 (H) 0.44 - 3.98 mIU/L   Sedimentation Rate   Result Value Ref Range    Sedimentation Rate 35 (H) 0 - 30 mm/h   C-Reactive Protein   Result Value Ref Range    C-Reactive Protein 0.72 <1.00 mg/dL   Thyroxine, Free   Result Value Ref Range    Thyroxine, Free 1.14 (H) 0.61 - 1.12 ng/dL   ECG 12 lead   Result Value Ref Range    Ventricular Rate 93 BPM    Atrial Rate 93 BPM    AK Interval 174 ms    QRS Duration 96 ms    QT Interval 380 ms    QTC Calculation(Bazett) 472 ms    P Axis 49 degrees    R Axis 70 degrees    T Axis 62 degrees    QRS Count 16 beats    Q Onset 218 ms    P Onset 131 ms    P Offset 201 ms    T Offset 408 ms    QTC Fredericia 440 ms   Troponin I, High Sensitivity   Result Value Ref Range    Troponin I, High Sensitivity 26 (H) 0 - 13 ng/L   CBC   Result Value Ref Range    WBC 9.3 4.4 - 11.3 x10*3/uL    nRBC 0.4 (H) 0.0 - 0.0 /100 WBCs    RBC 3.51 (L) 4.00 - 5.20 x10*6/uL    Hemoglobin 11.7 (L) 12.0 - 16.0 g/dL    Hematocrit 37.9 36.0 - 46.0 %     (H) 80 - 100 fL    MCH 33.3 26.0 - 34.0 pg    MCHC 30.9 (L) 32.0 - 36.0 g/dL    RDW 14.3 11.5 - 14.5 %    Platelets 584 (H) 150 - 450 x10*3/uL   Renal Function Panel   Result Value Ref Range    Glucose 109 (H) 74 - 99 mg/dL    Sodium 143 136 - 145 mmol/L    Potassium 3.8 3.5 - 5.3 mmol/L    Chloride 101 98 - 107 mmol/L    Bicarbonate 34 (H) 21 - 32 mmol/L    Anion Gap 12 10 - 20 mmol/L    Urea Nitrogen 20 6 - 23 mg/dL    Creatinine 0.78 0.50 - 1.05 mg/dL    eGFR 84 >60 mL/min/1.73m*2    Calcium 8.7 8.6 - 10.3 mg/dL    Phosphorus 3.5 2.5 - 4.9 mg/dL    Albumin 3.3 (L) 3.4 - 5.0 g/dL   Magnesium   Result Value Ref Range    Magnesium 1.83 1.60 - 2.40 mg/dL   Gamma-Glutamyl Transferase   Result Value Ref Range     (H) 5 - 55 U/L     *Note: Due to a large number of results and/or encounters for the requested time period, some results have not been displayed. A complete set of results  can be found in Results Review.       ECG 12 lead    Result Date: 12/12/2024  Normal sinus rhythm Biatrial enlargement Incomplete right bundle branch block Abnormal ECG When compared with ECG of 23-SEP-2024 16:54, Vent. rate has decreased BY  47 BPM Incomplete right bundle branch block is now Present    XR chest 1 view    Result Date: 12/11/2024  STUDY: Chest Radiograph;  12/11/2024. INDICATION: 64-year-old female with shortness of breath. COMPARISON: CXR: 09/23/2024, 02/03/2021, 10/17/2020, 09/27/2020. ACCESSION NUMBER(S): [id number] ORDERING CLINICIAN: ALEXANDRA PRATER TECHNIQUE:  Chest, frontal; 12/11/2024, 1652 hrs. FINDINGS: CARDIOMEDIASTINAL SILHOUETTE: Cardiomegaly. PULMONARY VASCULATURE: Pulmonary venous vessels are mahmood, less well-defined, typical for pulmonary venous vascular congestion. LUNGS: No alveolar consolidation, however there is bilateral perihilar/basilar confluent interstitial consolidation. PLEURA/HEMIDIAPHRAGM: No pneumothorax, no juxtapleural mass. No hemidiaphragm elevation. CHEST: Frontal shows lungs adequately inflated, some central bronchovascular crowding. -=- ABDOMEN: Upper abdomen unremarkable. SKELETON: No fracture, no osseous lytic/blastic lesion.    1. Acute pulmonary venous hypertension, manifesting as cardiogenic interstitial pulmonary edema. 2. Chronic cardiomegaly.  Signed by Ino Fairbanks MD                    Assessment/Plan   Assessment & Plan  Acute on chronic congestive heart failure, unspecified heart failure type    Acute hypoxic respiratory failure 2/2 HFpEF exacerbation  Hx COPD not in exacerbation  - on 2L NC  - cardio consulted  - IV lasix  - hold cardizem  - duoneb  - spiriva    Leukocytosis  - monitor    Intertrigo  - nystatin    Normocytic anemia  - monitor    HTN  - hold cardizem  - losartan    Chronic constipation  - bowel regimen    Chronic thrombocytosis  - hydroxyurea    Cavernous sinus thrombosis with supratherapeutic INR  - hold coumadin  - daily  INR    Severe aortic valve stenosis  - cardio consulted    Nicotine abuse  - nicotine patch    RLS  - pramipexole    Depression/anxiety  - cymbalta  - hydroxyzine PRN    Dispo: monitor clinically          Diana Tinsley MD  Hospitalist

## 2024-12-13 ENCOUNTER — APPOINTMENT (OUTPATIENT)
Dept: CARDIOLOGY | Facility: HOSPITAL | Age: 64
DRG: 291 | End: 2024-12-13
Payer: MEDICARE

## 2024-12-13 LAB
ALBUMIN SERPL BCP-MCNC: 3.3 G/DL (ref 3.4–5)
ALP SERPL-CCNC: 131 U/L (ref 33–136)
ALT SERPL W P-5'-P-CCNC: 16 U/L (ref 7–45)
ANION GAP SERPL CALC-SCNC: 11 MMOL/L (ref 10–20)
AST SERPL W P-5'-P-CCNC: 14 U/L (ref 9–39)
ATRIAL RATE: 104 BPM
BACTERIA UR CULT: NORMAL
BILIRUB SERPL-MCNC: 0.6 MG/DL (ref 0–1.2)
BUN SERPL-MCNC: 18 MG/DL (ref 6–23)
CALCIUM SERPL-MCNC: 8.5 MG/DL (ref 8.6–10.3)
CHLORIDE SERPL-SCNC: 95 MMOL/L (ref 98–107)
CO2 SERPL-SCNC: 38 MMOL/L (ref 21–32)
CREAT SERPL-MCNC: 0.8 MG/DL (ref 0.5–1.05)
EGFRCR SERPLBLD CKD-EPI 2021: 82 ML/MIN/1.73M*2
ERYTHROCYTE [DISTWIDTH] IN BLOOD BY AUTOMATED COUNT: 13.9 % (ref 11.5–14.5)
GLUCOSE SERPL-MCNC: 102 MG/DL (ref 74–99)
HCT VFR BLD AUTO: 37.8 % (ref 36–46)
HGB BLD-MCNC: 11.6 G/DL (ref 12–16)
INR PPP: 2.5 (ref 0.9–1.1)
MAGNESIUM SERPL-MCNC: 1.66 MG/DL (ref 1.6–2.4)
MCH RBC QN AUTO: 33.4 PG (ref 26–34)
MCHC RBC AUTO-ENTMCNC: 30.7 G/DL (ref 32–36)
MCV RBC AUTO: 109 FL (ref 80–100)
NRBC BLD-RTO: 0.4 /100 WBCS (ref 0–0)
P AXIS: 67 DEGREES
P OFFSET: 191 MS
P ONSET: 136 MS
PLATELET # BLD AUTO: 568 X10*3/UL (ref 150–450)
POTASSIUM SERPL-SCNC: 3.6 MMOL/L (ref 3.5–5.3)
PR INTERVAL: 148 MS
PROT SERPL-MCNC: 5.9 G/DL (ref 6.4–8.2)
PROTHROMBIN TIME: 28 SECONDS (ref 9.8–12.8)
Q ONSET: 210 MS
QRS COUNT: 18 BEATS
QRS DURATION: 90 MS
QT INTERVAL: 376 MS
QTC CALCULATION(BAZETT): 494 MS
QTC FREDERICIA: 451 MS
R AXIS: 76 DEGREES
RBC # BLD AUTO: 3.47 X10*6/UL (ref 4–5.2)
SODIUM SERPL-SCNC: 140 MMOL/L (ref 136–145)
T AXIS: 79 DEGREES
T OFFSET: 398 MS
VENTRICULAR RATE: 104 BPM
WBC # BLD AUTO: 7.6 X10*3/UL (ref 4.4–11.3)

## 2024-12-13 PROCEDURE — 2500000002 HC RX 250 W HCPCS SELF ADMINISTERED DRUGS (ALT 637 FOR MEDICARE OP, ALT 636 FOR OP/ED): Performed by: NURSE PRACTITIONER

## 2024-12-13 PROCEDURE — 83735 ASSAY OF MAGNESIUM: CPT

## 2024-12-13 PROCEDURE — 93005 ELECTROCARDIOGRAM TRACING: CPT

## 2024-12-13 PROCEDURE — 2500000002 HC RX 250 W HCPCS SELF ADMINISTERED DRUGS (ALT 637 FOR MEDICARE OP, ALT 636 FOR OP/ED): Performed by: INTERNAL MEDICINE

## 2024-12-13 PROCEDURE — 2500000005 HC RX 250 GENERAL PHARMACY W/O HCPCS

## 2024-12-13 PROCEDURE — 99233 SBSQ HOSP IP/OBS HIGH 50: CPT | Performed by: INTERNAL MEDICINE

## 2024-12-13 PROCEDURE — 85610 PROTHROMBIN TIME: CPT | Performed by: STUDENT IN AN ORGANIZED HEALTH CARE EDUCATION/TRAINING PROGRAM

## 2024-12-13 PROCEDURE — 1200000002 HC GENERAL ROOM WITH TELEMETRY DAILY

## 2024-12-13 PROCEDURE — 85027 COMPLETE CBC AUTOMATED: CPT

## 2024-12-13 PROCEDURE — 2500000004 HC RX 250 GENERAL PHARMACY W/ HCPCS (ALT 636 FOR OP/ED): Performed by: STUDENT IN AN ORGANIZED HEALTH CARE EDUCATION/TRAINING PROGRAM

## 2024-12-13 PROCEDURE — 94668 MNPJ CHEST WALL SBSQ: CPT

## 2024-12-13 PROCEDURE — 2500000004 HC RX 250 GENERAL PHARMACY W/ HCPCS (ALT 636 FOR OP/ED)

## 2024-12-13 PROCEDURE — 99233 SBSQ HOSP IP/OBS HIGH 50: CPT | Performed by: STUDENT IN AN ORGANIZED HEALTH CARE EDUCATION/TRAINING PROGRAM

## 2024-12-13 PROCEDURE — 2500000004 HC RX 250 GENERAL PHARMACY W/ HCPCS (ALT 636 FOR OP/ED): Performed by: NURSE PRACTITIONER

## 2024-12-13 PROCEDURE — 94640 AIRWAY INHALATION TREATMENT: CPT

## 2024-12-13 PROCEDURE — 94760 N-INVAS EAR/PLS OXIMETRY 1: CPT

## 2024-12-13 PROCEDURE — 36415 COLL VENOUS BLD VENIPUNCTURE: CPT

## 2024-12-13 PROCEDURE — 80053 COMPREHEN METABOLIC PANEL: CPT | Performed by: STUDENT IN AN ORGANIZED HEALTH CARE EDUCATION/TRAINING PROGRAM

## 2024-12-13 PROCEDURE — S4991 NICOTINE PATCH NONLEGEND: HCPCS | Performed by: INTERNAL MEDICINE

## 2024-12-13 PROCEDURE — 2500000001 HC RX 250 WO HCPCS SELF ADMINISTERED DRUGS (ALT 637 FOR MEDICARE OP)

## 2024-12-13 PROCEDURE — 2500000002 HC RX 250 W HCPCS SELF ADMINISTERED DRUGS (ALT 637 FOR MEDICARE OP, ALT 636 FOR OP/ED)

## 2024-12-13 PROCEDURE — 2500000001 HC RX 250 WO HCPCS SELF ADMINISTERED DRUGS (ALT 637 FOR MEDICARE OP): Performed by: INTERNAL MEDICINE

## 2024-12-13 RX ORDER — IPRATROPIUM BROMIDE AND ALBUTEROL SULFATE 2.5; .5 MG/3ML; MG/3ML
3 SOLUTION RESPIRATORY (INHALATION)
Status: DISCONTINUED | OUTPATIENT
Start: 2024-12-14 | End: 2024-12-16 | Stop reason: HOSPADM

## 2024-12-13 RX ORDER — METOPROLOL TARTRATE 1 MG/ML
5 INJECTION, SOLUTION INTRAVENOUS ONCE
Status: COMPLETED | OUTPATIENT
Start: 2024-12-13 | End: 2024-12-13

## 2024-12-13 RX ADMIN — LOSARTAN POTASSIUM 25 MG: 50 TABLET, FILM COATED ORAL at 07:52

## 2024-12-13 RX ADMIN — PRAMIPEXOLE DIHYDROCHLORIDE 1 MG: 1 TABLET ORAL at 21:16

## 2024-12-13 RX ADMIN — NICOTINE 1 PATCH: 14 PATCH, EXTENDED RELEASE TRANSDERMAL at 07:52

## 2024-12-13 RX ADMIN — SPIRONOLACTONE 25 MG: 25 TABLET ORAL at 07:52

## 2024-12-13 RX ADMIN — Medication 2 L/MIN: at 07:55

## 2024-12-13 RX ADMIN — METOPROLOL TARTRATE 5 MG: 5 INJECTION INTRAVENOUS at 14:24

## 2024-12-13 RX ADMIN — Medication 1 L/MIN: at 19:42

## 2024-12-13 RX ADMIN — FUROSEMIDE 40 MG: 10 INJECTION, SOLUTION INTRAMUSCULAR; INTRAVENOUS at 17:41

## 2024-12-13 RX ADMIN — OXYCODONE HYDROCHLORIDE 10 MG: 10 TABLET ORAL at 14:34

## 2024-12-13 RX ADMIN — NYSTATIN 1 APPLICATION: 100000 POWDER TOPICAL at 21:18

## 2024-12-13 RX ADMIN — BUDESONIDE 0.5 MG: 0.5 INHALANT ORAL at 07:55

## 2024-12-13 RX ADMIN — FUROSEMIDE 40 MG: 10 INJECTION, SOLUTION INTRAMUSCULAR; INTRAVENOUS at 09:41

## 2024-12-13 RX ADMIN — OXYCODONE HYDROCHLORIDE 10 MG: 10 TABLET ORAL at 18:56

## 2024-12-13 RX ADMIN — SENNOSIDES AND DOCUSATE SODIUM 2 TABLET: 50; 8.6 TABLET ORAL at 21:16

## 2024-12-13 RX ADMIN — FORMOTEROL FUMARATE DIHYDRATE 20 MCG: 20 SOLUTION RESPIRATORY (INHALATION) at 07:55

## 2024-12-13 RX ADMIN — Medication 1 APPLICATION: at 21:17

## 2024-12-13 RX ADMIN — DULOXETINE HYDROCHLORIDE 20 MG: 20 CAPSULE, DELAYED RELEASE ORAL at 07:52

## 2024-12-13 RX ADMIN — BUDESONIDE 0.5 MG: 0.5 INHALANT ORAL at 19:42

## 2024-12-13 RX ADMIN — HYDROXYUREA 1000 MG: 500 CAPSULE ORAL at 07:52

## 2024-12-13 RX ADMIN — TIOTROPIUM BROMIDE INHALATION SPRAY 2 PUFF: 3.12 SPRAY, METERED RESPIRATORY (INHALATION) at 07:52

## 2024-12-13 RX ADMIN — FORMOTEROL FUMARATE DIHYDRATE 20 MCG: 20 SOLUTION RESPIRATORY (INHALATION) at 19:43

## 2024-12-13 RX ADMIN — OXYCODONE HYDROCHLORIDE 10 MG: 10 TABLET ORAL at 04:11

## 2024-12-13 RX ADMIN — PRAMIPEXOLE DIHYDROCHLORIDE 1 MG: 1 TABLET ORAL at 07:52

## 2024-12-13 RX ADMIN — WARFARIN SODIUM 7.5 MG: 5 TABLET ORAL at 17:41

## 2024-12-13 RX ADMIN — FUROSEMIDE 40 MG: 10 INJECTION, SOLUTION INTRAMUSCULAR; INTRAVENOUS at 02:52

## 2024-12-13 ASSESSMENT — PAIN SCALES - GENERAL
PAINLEVEL_OUTOF10: 2
PAINLEVEL_OUTOF10: 3
PAINLEVEL_OUTOF10: 7
PAINLEVEL_OUTOF10: 8
PAINLEVEL_OUTOF10: 0 - NO PAIN
PAINLEVEL_OUTOF10: 9
PAINLEVEL_OUTOF10: 0 - NO PAIN

## 2024-12-13 ASSESSMENT — COGNITIVE AND FUNCTIONAL STATUS - GENERAL
DAILY ACTIVITIY SCORE: 19
MOVING TO AND FROM BED TO CHAIR: A LITTLE
CLIMB 3 TO 5 STEPS WITH RAILING: TOTAL
DRESSING REGULAR LOWER BODY CLOTHING: A LOT
DRESSING REGULAR LOWER BODY CLOTHING: A LOT
MOBILITY SCORE: 18
DRESSING REGULAR LOWER BODY CLOTHING: A LOT
STANDING UP FROM CHAIR USING ARMS: A LITTLE
WALKING IN HOSPITAL ROOM: A LITTLE
DRESSING REGULAR UPPER BODY CLOTHING: A LITTLE
HELP NEEDED FOR BATHING: A LOT
HELP NEEDED FOR BATHING: A LOT
DAILY ACTIVITIY SCORE: 18
MOBILITY SCORE: 18
DAILY ACTIVITIY SCORE: 19
WALKING IN HOSPITAL ROOM: A LITTLE
MOVING TO AND FROM BED TO CHAIR: A LITTLE
TOILETING: A LITTLE
MOVING FROM LYING ON BACK TO SITTING ON SIDE OF FLAT BED WITH BEDRAILS: A LOT
STANDING UP FROM CHAIR USING ARMS: A LITTLE
CLIMB 3 TO 5 STEPS WITH RAILING: TOTAL
TURNING FROM BACK TO SIDE WHILE IN FLAT BAD: A LOT
MOBILITY SCORE: 13
HELP NEEDED FOR BATHING: A LOT
TOILETING: A LITTLE
STANDING UP FROM CHAIR USING ARMS: A LITTLE
TOILETING: A LITTLE
MOVING TO AND FROM BED TO CHAIR: A LOT
WALKING IN HOSPITAL ROOM: A LITTLE
CLIMB 3 TO 5 STEPS WITH RAILING: TOTAL

## 2024-12-13 ASSESSMENT — PAIN - FUNCTIONAL ASSESSMENT: PAIN_FUNCTIONAL_ASSESSMENT: 0-10

## 2024-12-13 ASSESSMENT — PAIN DESCRIPTION - LOCATION
LOCATION: LEG
LOCATION: LEG

## 2024-12-13 ASSESSMENT — ACTIVITIES OF DAILY LIVING (ADL): LACK_OF_TRANSPORTATION: NO

## 2024-12-13 NOTE — PROGRESS NOTES
"Neisha Graham is a 64 y.o. female on day 2 of admission presenting with Acute on chronic congestive heart failure, unspecified heart failure type.    Subjective   Pt is feeling better. She continues to require 1.5L-2L NC       Objective     Physical Exam  Vitals and nursing note reviewed.   Constitutional:       General: She is not in acute distress.     Appearance: She is not ill-appearing or toxic-appearing.   HENT:      Head: Normocephalic and atraumatic.      Nose: Nose normal.      Mouth/Throat:      Mouth: Mucous membranes are moist.   Eyes:      Extraocular Movements: Extraocular movements intact.      Conjunctiva/sclera: Conjunctivae normal.      Pupils: Pupils are equal, round, and reactive to light.   Cardiovascular:      Rate and Rhythm: Normal rate and regular rhythm.      Heart sounds: Murmur heard.      No gallop.   Pulmonary:      Effort: No respiratory distress.      Breath sounds: Rhonchi present. No wheezing or rales.      Comments: Decreased breath sounds at bilateral lung bases  Abdominal:      General: Abdomen is flat. Bowel sounds are normal. There is no distension.      Palpations: Abdomen is soft. There is no mass.      Tenderness: There is no abdominal tenderness.   Musculoskeletal:         General: Swelling present. No tenderness. Normal range of motion.      Cervical back: Normal range of motion and neck supple.      Right lower leg: Edema present.      Left lower leg: Edema present.   Skin:     General: Skin is warm and dry.   Neurological:      Mental Status: She is alert and oriented to person, place, and time.      Motor: Weakness present.   Psychiatric:         Mood and Affect: Mood normal.         Behavior: Behavior normal.         Thought Content: Thought content normal.         Judgment: Judgment normal.         Last Recorded Vitals:  /85 (BP Location: Left arm, Patient Position: Sitting)   Pulse 104   Temp 36.8 °C (98.2 °F) (Temporal)   Resp 18   Ht 1.626 m (5' 4\")   " Wt (!) 152 kg (335 lb 12.2 oz)   SpO2 96%   BMI 57.63 kg/m²      Scheduled medications:  budesonide, 0.5 mg, nebulization, BID  [Held by provider] dilTIAZem ER, 180 mg, oral, Daily  DULoxetine, 20 mg, oral, Daily  formoterol, 20 mcg, nebulization, BID  furosemide, 40 mg, intravenous, q8h  hydroxyurea, 1,000 mg, oral, Daily  losartan, 25 mg, oral, Daily  nicotine, 1 patch, transdermal, Daily  nystatin, 1 Application, Topical, BID  perflutren lipid microspheres, 0.5-10 mL of dilution, intravenous, Once in imaging  perflutren protein A microsphere, 0.5 mL, intravenous, Once in imaging  polyethylene glycol, 17 g, oral, Daily  pramipexole, 1 mg, oral, BID  sennosides-docusate sodium, 2 tablet, oral, BID  spironolactone, 25 mg, oral, Daily  sulfur hexafluoride microsphr, 2 mL, intravenous, Once in imaging  tiotropium, 2 puff, inhalation, Daily  warfarin, 7.5 mg, oral, Daily  white petrolatum, 1 Application, Topical, BID  zinc oxide, 1 Application, Topical, TID      Continuous medications:     PRN medications:  PRN medications: acetaminophen **OR** acetaminophen **OR** acetaminophen, ammonium lactate, hydrOXYzine HCL, ipratropium-albuteroL, oxyCODONE **OR** oxyCODONE, oxygen     Relevant Results:  Results for orders placed or performed during the hospital encounter of 12/11/24 (from the past 24 hours)   Protime-INR   Result Value Ref Range    Protime 35.2 (H) 9.8 - 12.8 seconds    INR 3.1 (H) 0.9 - 1.1   Transthoracic Echo (TTE) Complete   Result Value Ref Range    AV pk jaime 4.03 m/s    AV mn grad 41 mmHg    LVOT diam 2.00 cm    LA vol index A/L 34.4 ml/m2    Tricuspid annular plane systolic excursion 2.3 cm    LV EF 63 %    RV free wall pk S' 18.80 cm/s    LVIDd 5.12 cm    RVSP 52.0 mmHg    Aortic Valve Area by Continuity of VTI 0.88 cm2    Aortic Valve Area by Continuity of Peak Velocity 0.91 cm2    AV pk grad 65 mmHg    LV A4C EF 57.8    CBC   Result Value Ref Range    WBC 7.6 4.4 - 11.3 x10*3/uL    nRBC 0.4 (H) 0.0 -  0.0 /100 WBCs    RBC 3.47 (L) 4.00 - 5.20 x10*6/uL    Hemoglobin 11.6 (L) 12.0 - 16.0 g/dL    Hematocrit 37.8 36.0 - 46.0 %     (H) 80 - 100 fL    MCH 33.4 26.0 - 34.0 pg    MCHC 30.7 (L) 32.0 - 36.0 g/dL    RDW 13.9 11.5 - 14.5 %    Platelets 568 (H) 150 - 450 x10*3/uL   Magnesium   Result Value Ref Range    Magnesium 1.66 1.60 - 2.40 mg/dL   Comprehensive Metabolic Panel   Result Value Ref Range    Glucose 102 (H) 74 - 99 mg/dL    Sodium 140 136 - 145 mmol/L    Potassium 3.6 3.5 - 5.3 mmol/L    Chloride 95 (L) 98 - 107 mmol/L    Bicarbonate 38 (H) 21 - 32 mmol/L    Anion Gap 11 10 - 20 mmol/L    Urea Nitrogen 18 6 - 23 mg/dL    Creatinine 0.80 0.50 - 1.05 mg/dL    eGFR 82 >60 mL/min/1.73m*2    Calcium 8.5 (L) 8.6 - 10.3 mg/dL    Albumin 3.3 (L) 3.4 - 5.0 g/dL    Alkaline Phosphatase 131 33 - 136 U/L    Total Protein 5.9 (L) 6.4 - 8.2 g/dL    AST 14 9 - 39 U/L    Bilirubin, Total 0.6 0.0 - 1.2 mg/dL    ALT 16 7 - 45 U/L   Protime-INR   Result Value Ref Range    Protime 28.0 (H) 9.8 - 12.8 seconds    INR 2.5 (H) 0.9 - 1.1     *Note: Due to a large number of results and/or encounters for the requested time period, some results have not been displayed. A complete set of results can be found in Results Review.       Transthoracic Echo (TTE) Complete    Result Date: 12/12/2024   [facility], [id number] [place] [address]               Tel [phone] and Fax [phone] TRANSTHORACIC ECHOCARDIOGRAM REPORT  Patient Name:       LION AURA PEREZ     Reading Physician:    [id number] Judson Chaudhry MD Study Date:         12/12/2024          Ordering Provider:    [id number] PAULINA FLOREZ MRN/PID:            [id number]            Fellow: Accession#:         [id number]        Nurse: Date of Birth/Age:  1960 / 64      Sonographer:          Pricila parker                                      RDCS Gender assigned at  F                   Additional Staff: Birth: Height:             162.56 cm           Admit Date:           12/11/2024 Weight:             151.96 kg           Admission Status:     Inpatient -                                                               Routine BSA / BMI:          2.44 m2 / 57.50     Encounter#:           [id number]                     kg/m2 Blood Pressure:     135/78 mmHg         Department Location:  [facility] Non                                                               Invasive Study Type:    TRANSTHORACIC ECHO (TTE) COMPLETE Diagnosis/ICD: Heart failure, unspecified-I50.9; Nonrheumatic aortic (valve)                stenosis-I35.0 Indication:    Congestive Heart Failure, AS CPT Code:      Echo Complete w Full [id number] Patient History: Valve Disorders:   Mitral Stenosis. Pertinent History: Severe AS, COPD and Dyspnea. Study Detail: The following Echo studies were performed: 2D, M-Mode, Doppler and               color flow. Technically challenging study due to high fowlers               position due to shortness of breath. The patient was awake.  PHYSICIAN INTERPRETATION: Left Ventricle: The left ventricular systolic function is normal, with a visually estimated ejection fraction of 60-65%. There is moderate concentric left ventricular hypertrophy. There are no regional left ventricular wall motion abnormalities. The left ventricular cavity size is normal. There is moderately increased septal and moderately increased posterior left ventricular wall thickness. Spectral Doppler shows a Grade I (impaired relaxation pattern) of left ventricular diastolic filling with normal left atrial filling pressure. The ratio of pulmonic flow to systemic flow (Qp/Qs ratio) is 1.10. Left Atrium: The left atrium is mildly dilated. Right Ventricle: The right ventricle is moderately enlarged. There is reduced right ventricular systolic function.  Right Atrium: The right atrium is mildly dilated. Aortic Valve: The aortic valve is trileaflet. There is evidence of moderate to severe aortic valve stenosis. The aortic valve dimensionless index is 0.28. There is no evidence of aortic valve regurgitation. The peak instantaneous gradient of the aortic valve is 65 mmHg. The mean gradient of the aortic valve is 41 mmHg. Mitral Valve: The mitral valve is normal in structure. There is evidence of moderate mitral valve stenosis. The doppler estimated mean and peak diastolic pressure gradients are 7 mmHg and 12 mmHg respectively. There is moderate mitral annular calcification. The peak instantaneous gradient of the mitral valve is 12 mmHg. There is mild mitral valve regurgitation. The mitral regurgitant orifice area is 14 mm2. The mitral regurgitant volume is 23.05 ml. Tricuspid Valve: The tricuspid valve is structurally normal. There is mild tricuspid regurgitation. Pulmonic Valve: The pulmonic valve is structurally normal. There is no indication of pulmonic valve regurgitation. Pericardium: There is no pericardial effusion noted. Aorta: The aortic root is normal. Pulmonary Artery: The tricuspid regurgitant velocity is 3.24 m/s, and with an estimated right atrial pressure of 10 mmHg, the estimated pulmonary artery pressure is moderately elevated with the RVSP at 52.0 mmHg. Systemic Veins: The inferior vena cava appears normal in size.  CONCLUSIONS:  1. The left ventricular systolic function is normal, with a visually estimated ejection fraction of 60-65%.  2. Spectral Doppler shows a Grade I (impaired relaxation pattern) of left ventricular diastolic filling with normal left atrial filling pressure.  3. There is moderately increased septal and moderately increased posterior left ventricular wall thickness.  4. There is moderate concentric left ventricular hypertrophy.  5. There is reduced right ventricular systolic function.  6. Moderately enlarged right ventricle.  7.  The left atrium is mildly dilated.  8. There is moderate mitral annular calcification.  9. There is moderate mitral valve stenosis. 10. Moderate to severe aortic valve stenosis. 11. Moderately elevated pulmonary artery pressure. 12. There is mild mitral and tricuspid regurgitation. QUANTITATIVE DATA SUMMARY:  2D MEASUREMENTS:          Normal Ranges: Ao Root d:       2.90 cm  (2.0-3.7cm) IVSd:            1.38 cm  (0.6-1.1cm) LVPWd:           1.37 cm  (0.6-1.1cm) LVIDd:           5.12 cm  (3.9-5.9cm) LVIDs:           3.86 cm LV Mass Index:   121 g/m2 LVEDV Index:     47 ml/m2 LV % FS          24.6 %  LA VOLUME:                    Normal Ranges: LA Vol A4C:        82.5 ml    (22+/-6mL/m2) LA Vol A2C:        76.2 ml LA Vol BP:         83.8 ml LA Vol Index A4C:  33.9ml/m2 LA Vol Index A2C:  31.3 ml/m2 LA Vol Index BP:   34.4 ml/m2 LA Area A4C:       25.4 cm2 LA Area A2C:       23.1 cm2 LA Major Axis A4C: 6.6 cm LA Major Axis A2C: 6.0 cm LA Volume Index:   32.1 ml/m2 LA Vol A4C:        75.0 ml LA Vol A2C:        73.6 ml LA Vol Index BSA:  30.5 ml/m2  RA VOLUME BY A/L METHOD:          Normal Ranges: RA Area A4C:             25.1 cm2  M-MODE MEASUREMENTS:         Normal Ranges: Ao Root:             3.30 cm (2.0-3.7cm) AoV Exc:             1.20 cm (1.5-2.5cm) LAs:                 4.80 cm (2.7-4.0cm)  AORTA MEASUREMENTS:         Normal Ranges: AoV Exc:            1.20 cm (1.5-2.5cm) Ao Sinus, d:        2.90 cm (2.1-3.5cm) Asc Ao, d:          3.50 cm (2.1-3.4cm)  LV SYSTOLIC FUNCTION BY 2D PLANIMETRY (MOD):                      Normal Ranges: EF-A4C View:    58 % (>=55%) EF-A2C View:    63 % EF-Biplane:     60 % EF-Visual:      63 % LV EF Reported: 63 %  LV DIASTOLIC FUNCTION:            Normal Ranges: MV e'                  0.118 m/s  (>8.0) MV lateral e'          0.15 m/s MV medial e'           0.09 m/s PulmV Sys Rodriguez:         46.60 cm/s PulmV Cazares Rodriguez:        40.50 cm/s PulmV S/D Rodriguez:         1.20 PulmV A Revs Rodriguez:       30.90 cm/s PulmV A Revs Dur:      90.00 msec  MITRAL VALVE:           Normal Ranges: MV Vmax:      1.76 m/s  (<=1.3m/s) MV peak P.4 mmHg (<5mmHg) MV mean P.0 mmHg  (<2mmHg)  MITRAL INSUFFICIENCY:             Normal Ranges: PISA Radius:          0.6 cm MR VTI:               164.00 cm MR Vmax:              565.00 cm/s MR Alias Rodriguez:         35.1 cm/s MR Volume:            23.05 ml MR Flow Rt:           79.39 ml/s MR EROA:              14 mm2  AORTIC VALVE:                      Normal Ranges: AoV Vmax:                4.03 m/s  (<=1.7m/s) AoV Peak P.0 mmHg (<20mmHg) AoV Mean P.0 mmHg (1.7-11.5mmHg) LVOT Max Rodriguez:            1.17 m/s  (<=1.1m/s) AoV VTI:                 88.30 cm  (18-25cm) LVOT VTI:                24.60 cm LVOT Diameter:           2.00 cm   (1.8-2.4cm) AoV Area, VTI:           0.88 cm2  (2.5-5.5cm2) AoV Area,Vmax:           0.91 cm2  (2.5-4.5cm2) AoV Dimensionless Index: 0.28  RIGHT VENTRICLE: RV Basal 5.19 cm RV Mid   4.00 cm RV Major 10.8 cm TAPSE:   23.1 mm RV s'    0.19 m/s  TRICUSPID VALVE/RVSP:          Normal Ranges: Peak TR Velocity:     3.24 m/s RV Syst Pressure:     52 mmHg  (< 30mmHg) IVC Diam:             2.10 cm  PULMONIC VALVE:              Normal Ranges: RVOT Vmax:         0.73 m/s  (0.6-0.9m/s) RVOT diam: systole 2.40 cm   (1.7-2.3cm) PV Max Rodriguez:        2.0 m/s   (0.6-0.9m/s) PV Max PG:         15.6 mmHg PV Mean P.0 mmHg PV VTI:            46.40 cm  Pulmonary Veins: PulmV A Revs Dur: 90.00 msec PulmV A Revs Rodriguez: 30.90 cm/s PulmV Cazares Rodriguez:   40.50 cm/s PulmV S/D Rodriguez:    1.20 PulmV Sys Rodriguez:    46.60 cm/s  [id number] Judson Chaudhry MD Electronically signed on 2024 at 4:27:24 PM  ** Final **     ECG 12 lead    Result Date: 2024  Normal sinus rhythm Biatrial enlargement Incomplete right bundle branch block Abnormal ECG When compared with ECG of 23-SEP-2024 16:54, Vent. rate has decreased BY  47 BPM Incomplete right bundle branch  block is now Present    XR chest 1 view    Result Date: 12/11/2024  STUDY: Chest Radiograph;  12/11/2024. INDICATION: 64-year-old female with shortness of breath. COMPARISON: CXR: 09/23/2024, 02/03/2021, 10/17/2020, 09/27/2020. ACCESSION NUMBER(S): [id number] ORDERING CLINICIAN: ALEXANDRA PRATER TECHNIQUE:  Chest, frontal; 12/11/2024, 1652 hrs. FINDINGS: CARDIOMEDIASTINAL SILHOUETTE: Cardiomegaly. PULMONARY VASCULATURE: Pulmonary venous vessels are mahmood, less well-defined, typical for pulmonary venous vascular congestion. LUNGS: No alveolar consolidation, however there is bilateral perihilar/basilar confluent interstitial consolidation. PLEURA/HEMIDIAPHRAGM: No pneumothorax, no juxtapleural mass. No hemidiaphragm elevation. CHEST: Frontal shows lungs adequately inflated, some central bronchovascular crowding. -=- ABDOMEN: Upper abdomen unremarkable. SKELETON: No fracture, no osseous lytic/blastic lesion.    1. Acute pulmonary venous hypertension, manifesting as cardiogenic interstitial pulmonary edema. 2. Chronic cardiomegaly.  Signed by Ino Fairbanks MD                    Assessment/Plan   Assessment & Plan  Acute on chronic congestive heart failure, unspecified heart failure type    Acute hypoxic respiratory failure 2/2 HFpEF exacerbation  Hx COPD not in exacerbation  - on 2L NC  - cardio follwing  - IV lasix 40mg TID  - hold cardizem  - duoneb  - spiriva    Leukocytosis  resolved    Intertrigo  - nystatin    Normocytic anemia  - monitor    HTN  - hold cardizem  - losartan    Chronic constipation  - bowel regimen    Chronic thrombocytosis  - hydroxyurea    Cavernous sinus thrombosis with INR now within goal  - resume coumadin  - daily INR    Severe aortic valve stenosis  - cardio following    Nicotine abuse  - nicotine patch    RLS  - pramipexole    Depression/anxiety  - cymbalta  - hydroxyzine PRN    Dispo: monitor clinically, continue diuresis, wean O2         Diana Tinsley MD  Hospitalist

## 2024-12-13 NOTE — PROGRESS NOTES
12/13/24 0842   Discharge Planning   Living Arrangements Alone   Support Systems Children;Family members   Assistance Needed A&Ox3, mobilizes with motorized wheelchair, transfers with a walker, has walk in shower, does not drive (receives transport through insurance [id number]), is on room air during the day and wears O2 at 2LPM via NC through [employer]-CURRENTLY 2L NC, does not use CPAP/BIPAP, active with [facility] for SN 2X per week for wound care. Patient's PCP is Dr. Micheal Gonsalez   Type of Residence Private residence   Number of Stairs to Enter Residence 0   Number of Stairs Within Residence 0   Do you have animals or pets at home? No   Who is requesting discharge planning? Provider   Home or Post Acute Services In home services   Type of Home Care Services Home nursing visits   Expected Discharge Disposition Home Health   Does the patient need discharge transport arranged? Yes   RoundTrip coordination needed? Yes   Has discharge transport been arranged? No   Financial Resource Strain   How hard is it for you to pay for the very basics like food, housing, medical care, and heating? Not hard   Housing Stability   In the last 12 months, was there a time when you were not able to pay the mortgage or rent on time? N   Transportation Needs   In the past 12 months, has lack of transportation kept you from medical appointments or from getting medications? no   In the past 12 months, has lack of transportation kept you from meetings, work, or from getting things needed for daily living? No

## 2024-12-13 NOTE — NURSING NOTE
0800: Pt eating breakfast in bed. States no needs at this time. Pt's HR periodically in 150s-160 for few seconds at a time. At these times pt feels like heart is racing. Hospitalist and cardiology notified.    1139: Hospitalist and cardiology messaged that pt continues to have HR in 160s periodically. No new orders.    1340: Sent tele strip to hospitalist and card. Pt sustaining in 160s. Chest pressure. EKG obtained. Awaiting response from card.    1415: Cardiology at bedside. IV metoprolol pushed. .

## 2024-12-13 NOTE — NURSING NOTE
Attempted to see patient, patient to testing/ ECHO.  Discussed wound care with nursing, reviewed Wound Center recommendations, photo images.  Orders obtained for wound care today until assessed tomorrow.

## 2024-12-13 NOTE — PROGRESS NOTES
Subjective Data:  Went into atrial fibrillation this afternoon, feels palpitations.         Objective Data:  Last Recorded Vitals:  Vitals:    12/13/24 0434 12/13/24 0547 12/13/24 0755 12/13/24 1337   BP: 125/85   (!) 134/101   BP Location: Left arm      Patient Position: Sitting      Pulse: 104   (!) 170   Resp: 18      Temp: 36.8 °C (98.2 °F)   37 °C (98.6 °F)   TempSrc: Temporal      SpO2: 93%  96% 94%   Weight:  (!) 152 kg (335 lb 12.2 oz)     Height:           Last Labs:  CBC - 12/13/2024:  6:43 AM  7.6 11.6 568    37.8      CMP - 12/13/2024:  6:43 AM  8.5 5.9 14 --- 0.6   3.5 3.3 16 131      PTT - 9/16/2024: 12:32 AM  2.5   28.0 57     TROPHS   Date/Time Value Ref Range Status   12/11/2024 10:19 PM 26 0 - 13 ng/L Final   12/11/2024 04:51 PM 28 0 - 13 ng/L Final   09/16/2024 07:07 AM 21 0 - 13 ng/L Final     BNP   Date/Time Value Ref Range Status   12/11/2024 04:51  0 - 99 pg/mL Final   09/15/2024 11:42 PM 87 0 - 99 pg/mL Final     HGBA1C   Date/Time Value Ref Range Status   12/11/2024 04:51 PM 5.5 See comment % Final   08/13/2024 07:35 AM 5.9 see below % Final     LDLCALC   Date/Time Value Ref Range Status   12/11/2024 04:51 PM 87 <=99 mg/dL Final     Comment:                                 Near   Borderline      AGE      Desirable  Optimal    High     High     Very High     0-19 Y     0 - 109     ---    110-129   >/= 130     ----    20-24 Y     0 - 119     ---    120-159   >/= 160     ----      >24 Y     0 -  99   100-129  130-159   160-189     >/=190       VLDL   Date/Time Value Ref Range Status   12/11/2024 04:51 PM 21 0 - 40 mg/dL Final   11/14/2022 11:01 AM 40 0 - 40 mg/dL Final   09/27/2020 12:15 AM 21 0 - 40 mg/dL Final   06/19/2020 03:03 PM 16 0 - 40 mg/dL Final      Last I/O:  I/O last 3 completed shifts:  In: 1170 (7.7 mL/kg) [P.O.:1170]  Out: 9650 (63.4 mL/kg) [Urine:9650 (1.8 mL/kg/hr)]  Weight: 152.3 kg     Past Cardiology Tests (Last 3 Years):  Echo from December 12, 2024:   1. The left  ventricular systolic function is normal, with a visually estimated ejection fraction of 60-65%.   2. Spectral Doppler shows a Grade I (impaired relaxation pattern) of left ventricular diastolic filling with normal left atrial filling pressure.   3. There is moderately increased septal and moderately increased posterior left ventricular wall thickness.   4. There is moderate concentric left ventricular hypertrophy.   5. There is reduced right ventricular systolic function.   6. Moderately enlarged right ventricle.   7. The left atrium is mildly dilated.   8. There is moderate mitral annular calcification.   9. There is moderate mitral valve stenosis.  10. Moderate to severe aortic valve stenosis.  11. Moderately elevated pulmonary artery pressure.  12. There is mild mitral and tricuspid regurgitation.      Inpatient Medications:  Scheduled medications   Medication Dose Route Frequency    budesonide  0.5 mg nebulization BID    [Held by provider] dilTIAZem ER  180 mg oral Daily    DULoxetine  20 mg oral Daily    eucerin   Topical Daily    formoterol  20 mcg nebulization BID    furosemide  40 mg intravenous q8h    hydroxyurea  1,000 mg oral Daily    losartan  25 mg oral Daily    nicotine  1 patch transdermal Daily    nystatin  1 Application Topical BID    perflutren lipid microspheres  0.5-10 mL of dilution intravenous Once in imaging    perflutren protein A microsphere  0.5 mL intravenous Once in imaging    polyethylene glycol  17 g oral Daily    pramipexole  1 mg oral BID    sennosides-docusate sodium  2 tablet oral BID    spironolactone  25 mg oral Daily    sulfur hexafluoride microsphr  2 mL intravenous Once in imaging    tiotropium  2 puff inhalation Daily    warfarin  7.5 mg oral Daily    zinc oxide  1 Application Topical TID     PRN medications   Medication    acetaminophen    Or    acetaminophen    Or    acetaminophen    hydrOXYzine HCL    ipratropium-albuteroL    oxyCODONE    Or    oxyCODONE    oxygen      Continuous Medications   Medication Dose Last Rate       Physical Exam:  Constitutional:       Appearance: Healthy appearance. Not in distress.   Neck:      Vascular: No JVR. JVD normal.   Pulmonary:      Effort: Pulmonary effort is normal.      Breath sounds: Normal breath sounds. No wheezing. No rhonchi. No rales.   Chest:      Chest wall: Not tender to palpatation.   Cardiovascular:      Tachycardia present. Irregularly irregular rhythm.      Murmurs: There is no murmur.   Edema:     Thigh: bilateral 3+ edema of the thigh.     Pretibial: bilateral 3+ edema of the pretibial area.     Ankle: bilateral 3+ edema of the ankle.     Feet: bilateral 3+ edema of the feet.  Abdominal:      General: Bowel sounds are normal.      Palpations: Abdomen is soft.      Tenderness: There is no abdominal tenderness.   Musculoskeletal: Normal range of motion. Skin:     General: Skin is warm and dry.   Neurological:      General: No focal deficit present.      Mental Status: Alert and oriented to person, place and time.           Assessment/Plan   Very pleasant  64 y.o. 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       Assessment:  Acute on chronic hypoxic respiratory failure  Acute decompensated diastolic HF  H/o moderate-severe aortic stenosis   Hypertension  A-fib with RVR     Plan:  -She went into atrial fibrillation this afternoon and we gave 5 mg of IV metoprolol, back in sinus rhythm  -Resume diltiazem 180 mg daily (held on admit in the setting of heart failure, however echo showed preserved ejection fraction  -Continue current losartan, spironolactone, Lasix, warfarin    Peripheral IV 12/12/24 22 G Distal;Right Forearm (Active)   Site Assessment Clean;Dry;Intact 12/12/24 2100   Dressing  Status Clean;Dry;Occlusive 12/12/24 2100   Number of days: 1       Code Status:  Full Code          Judson Chaudhry MD

## 2024-12-14 LAB
ALBUMIN SERPL BCP-MCNC: 3.2 G/DL (ref 3.4–5)
ALP SERPL-CCNC: 124 U/L (ref 33–136)
ALT SERPL W P-5'-P-CCNC: 14 U/L (ref 7–45)
ANION GAP SERPL CALC-SCNC: 14 MMOL/L (ref 10–20)
AST SERPL W P-5'-P-CCNC: 13 U/L (ref 9–39)
BILIRUB SERPL-MCNC: 0.8 MG/DL (ref 0–1.2)
BUN SERPL-MCNC: 16 MG/DL (ref 6–23)
CALCIUM SERPL-MCNC: 8.7 MG/DL (ref 8.6–10.3)
CHLORIDE SERPL-SCNC: 94 MMOL/L (ref 98–107)
CO2 SERPL-SCNC: 39 MMOL/L (ref 21–32)
CREAT SERPL-MCNC: 0.83 MG/DL (ref 0.5–1.05)
EGFRCR SERPLBLD CKD-EPI 2021: 78 ML/MIN/1.73M*2
ERYTHROCYTE [DISTWIDTH] IN BLOOD BY AUTOMATED COUNT: 13.7 % (ref 11.5–14.5)
GLUCOSE SERPL-MCNC: 111 MG/DL (ref 74–99)
HCT VFR BLD AUTO: 41.6 % (ref 36–46)
HGB BLD-MCNC: 12.5 G/DL (ref 12–16)
INR PPP: 2.2 (ref 0.9–1.1)
MAGNESIUM SERPL-MCNC: 1.55 MG/DL (ref 1.6–2.4)
MCH RBC QN AUTO: 33.2 PG (ref 26–34)
MCHC RBC AUTO-ENTMCNC: 30 G/DL (ref 32–36)
MCV RBC AUTO: 110 FL (ref 80–100)
NRBC BLD-RTO: 0 /100 WBCS (ref 0–0)
PLATELET # BLD AUTO: 563 X10*3/UL (ref 150–450)
POTASSIUM SERPL-SCNC: 3.5 MMOL/L (ref 3.5–5.3)
PROT SERPL-MCNC: 5.8 G/DL (ref 6.4–8.2)
PROTHROMBIN TIME: 24.7 SECONDS (ref 9.8–12.8)
RBC # BLD AUTO: 3.77 X10*6/UL (ref 4–5.2)
SODIUM SERPL-SCNC: 143 MMOL/L (ref 136–145)
WBC # BLD AUTO: 7 X10*3/UL (ref 4.4–11.3)

## 2024-12-14 PROCEDURE — 2500000002 HC RX 250 W HCPCS SELF ADMINISTERED DRUGS (ALT 637 FOR MEDICARE OP, ALT 636 FOR OP/ED): Performed by: STUDENT IN AN ORGANIZED HEALTH CARE EDUCATION/TRAINING PROGRAM

## 2024-12-14 PROCEDURE — 9420000001 HC RT PATIENT EDUCATION 5 MIN

## 2024-12-14 PROCEDURE — 2500000004 HC RX 250 GENERAL PHARMACY W/ HCPCS (ALT 636 FOR OP/ED)

## 2024-12-14 PROCEDURE — 2500000002 HC RX 250 W HCPCS SELF ADMINISTERED DRUGS (ALT 637 FOR MEDICARE OP, ALT 636 FOR OP/ED)

## 2024-12-14 PROCEDURE — 2500000002 HC RX 250 W HCPCS SELF ADMINISTERED DRUGS (ALT 637 FOR MEDICARE OP, ALT 636 FOR OP/ED): Performed by: NURSE PRACTITIONER

## 2024-12-14 PROCEDURE — 85610 PROTHROMBIN TIME: CPT | Performed by: STUDENT IN AN ORGANIZED HEALTH CARE EDUCATION/TRAINING PROGRAM

## 2024-12-14 PROCEDURE — 94640 AIRWAY INHALATION TREATMENT: CPT

## 2024-12-14 PROCEDURE — 99232 SBSQ HOSP IP/OBS MODERATE 35: CPT | Performed by: STUDENT IN AN ORGANIZED HEALTH CARE EDUCATION/TRAINING PROGRAM

## 2024-12-14 PROCEDURE — 83735 ASSAY OF MAGNESIUM: CPT

## 2024-12-14 PROCEDURE — 94760 N-INVAS EAR/PLS OXIMETRY 1: CPT

## 2024-12-14 PROCEDURE — 1200000002 HC GENERAL ROOM WITH TELEMETRY DAILY

## 2024-12-14 PROCEDURE — 2500000001 HC RX 250 WO HCPCS SELF ADMINISTERED DRUGS (ALT 637 FOR MEDICARE OP): Performed by: INTERNAL MEDICINE

## 2024-12-14 PROCEDURE — 94668 MNPJ CHEST WALL SBSQ: CPT

## 2024-12-14 PROCEDURE — 94669 MECHANICAL CHEST WALL OSCILL: CPT

## 2024-12-14 PROCEDURE — 99232 SBSQ HOSP IP/OBS MODERATE 35: CPT | Performed by: NURSE PRACTITIONER

## 2024-12-14 PROCEDURE — 85027 COMPLETE CBC AUTOMATED: CPT

## 2024-12-14 PROCEDURE — 36415 COLL VENOUS BLD VENIPUNCTURE: CPT | Performed by: STUDENT IN AN ORGANIZED HEALTH CARE EDUCATION/TRAINING PROGRAM

## 2024-12-14 PROCEDURE — 2500000005 HC RX 250 GENERAL PHARMACY W/O HCPCS

## 2024-12-14 PROCEDURE — 80053 COMPREHEN METABOLIC PANEL: CPT | Performed by: STUDENT IN AN ORGANIZED HEALTH CARE EDUCATION/TRAINING PROGRAM

## 2024-12-14 PROCEDURE — 2500000004 HC RX 250 GENERAL PHARMACY W/ HCPCS (ALT 636 FOR OP/ED): Performed by: STUDENT IN AN ORGANIZED HEALTH CARE EDUCATION/TRAINING PROGRAM

## 2024-12-14 PROCEDURE — 2500000001 HC RX 250 WO HCPCS SELF ADMINISTERED DRUGS (ALT 637 FOR MEDICARE OP)

## 2024-12-14 PROCEDURE — 2500000002 HC RX 250 W HCPCS SELF ADMINISTERED DRUGS (ALT 637 FOR MEDICARE OP, ALT 636 FOR OP/ED): Performed by: INTERNAL MEDICINE

## 2024-12-14 PROCEDURE — 2500000004 HC RX 250 GENERAL PHARMACY W/ HCPCS (ALT 636 FOR OP/ED): Performed by: NURSE PRACTITIONER

## 2024-12-14 PROCEDURE — S4991 NICOTINE PATCH NONLEGEND: HCPCS | Performed by: INTERNAL MEDICINE

## 2024-12-14 RX ORDER — METOPROLOL TARTRATE 1 MG/ML
5 INJECTION, SOLUTION INTRAVENOUS ONCE
Status: COMPLETED | OUTPATIENT
Start: 2024-12-14 | End: 2024-12-14

## 2024-12-14 RX ORDER — DILTIAZEM HYDROCHLORIDE 120 MG/1
360 CAPSULE, COATED, EXTENDED RELEASE ORAL DAILY
Status: DISCONTINUED | OUTPATIENT
Start: 2024-12-15 | End: 2024-12-16 | Stop reason: HOSPADM

## 2024-12-14 RX ADMIN — Medication 1 APPLICATION: at 21:43

## 2024-12-14 RX ADMIN — LOSARTAN POTASSIUM 25 MG: 50 TABLET, FILM COATED ORAL at 08:39

## 2024-12-14 RX ADMIN — FORMOTEROL FUMARATE DIHYDRATE 20 MCG: 20 SOLUTION RESPIRATORY (INHALATION) at 08:03

## 2024-12-14 RX ADMIN — IPRATROPIUM BROMIDE AND ALBUTEROL SULFATE 3 ML: 2.5; .5 SOLUTION RESPIRATORY (INHALATION) at 20:25

## 2024-12-14 RX ADMIN — PRAMIPEXOLE DIHYDROCHLORIDE 1 MG: 1 TABLET ORAL at 08:40

## 2024-12-14 RX ADMIN — FUROSEMIDE 40 MG: 10 INJECTION, SOLUTION INTRAMUSCULAR; INTRAVENOUS at 11:02

## 2024-12-14 RX ADMIN — SPIRONOLACTONE 25 MG: 25 TABLET ORAL at 08:38

## 2024-12-14 RX ADMIN — Medication 1 APPLICATION: at 08:40

## 2024-12-14 RX ADMIN — NICOTINE 1 PATCH: 14 PATCH, EXTENDED RELEASE TRANSDERMAL at 08:39

## 2024-12-14 RX ADMIN — DILTIAZEM HYDROCHLORIDE 180 MG: 180 CAPSULE, COATED, EXTENDED RELEASE ORAL at 08:40

## 2024-12-14 RX ADMIN — TIOTROPIUM BROMIDE INHALATION SPRAY 2 PUFF: 3.12 SPRAY, METERED RESPIRATORY (INHALATION) at 08:39

## 2024-12-14 RX ADMIN — OXYCODONE HYDROCHLORIDE 10 MG: 10 TABLET ORAL at 17:30

## 2024-12-14 RX ADMIN — OXYCODONE HYDROCHLORIDE 10 MG: 10 TABLET ORAL at 08:38

## 2024-12-14 RX ADMIN — HYDROXYUREA 1000 MG: 500 CAPSULE ORAL at 08:40

## 2024-12-14 RX ADMIN — Medication 2 L/MIN: at 14:20

## 2024-12-14 RX ADMIN — DULOXETINE HYDROCHLORIDE 20 MG: 20 CAPSULE, DELAYED RELEASE ORAL at 08:39

## 2024-12-14 RX ADMIN — OXYCODONE HYDROCHLORIDE 10 MG: 10 TABLET ORAL at 03:02

## 2024-12-14 RX ADMIN — BUDESONIDE 0.5 MG: 0.5 INHALANT ORAL at 20:24

## 2024-12-14 RX ADMIN — OXYCODONE HYDROCHLORIDE 10 MG: 10 TABLET ORAL at 14:07

## 2024-12-14 RX ADMIN — BUDESONIDE 0.5 MG: 0.5 INHALANT ORAL at 08:01

## 2024-12-14 RX ADMIN — PRAMIPEXOLE DIHYDROCHLORIDE 1 MG: 1 TABLET ORAL at 21:43

## 2024-12-14 RX ADMIN — Medication 2 L/MIN: at 20:24

## 2024-12-14 RX ADMIN — FORMOTEROL FUMARATE DIHYDRATE 20 MCG: 20 SOLUTION RESPIRATORY (INHALATION) at 20:25

## 2024-12-14 RX ADMIN — FUROSEMIDE 40 MG: 10 INJECTION, SOLUTION INTRAMUSCULAR; INTRAVENOUS at 17:30

## 2024-12-14 RX ADMIN — OXYCODONE HYDROCHLORIDE 10 MG: 10 TABLET ORAL at 21:42

## 2024-12-14 RX ADMIN — METOPROLOL TARTRATE 5 MG: 5 INJECTION INTRAVENOUS at 11:45

## 2024-12-14 RX ADMIN — IPRATROPIUM BROMIDE AND ALBUTEROL SULFATE 3 ML: 2.5; .5 SOLUTION RESPIRATORY (INHALATION) at 14:18

## 2024-12-14 RX ADMIN — NYSTATIN 1 APPLICATION: 100000 POWDER TOPICAL at 08:40

## 2024-12-14 RX ADMIN — FUROSEMIDE 40 MG: 10 INJECTION, SOLUTION INTRAMUSCULAR; INTRAVENOUS at 03:10

## 2024-12-14 RX ADMIN — WARFARIN SODIUM 7.5 MG: 5 TABLET ORAL at 17:30

## 2024-12-14 RX ADMIN — IPRATROPIUM BROMIDE AND ALBUTEROL SULFATE 3 ML: 2.5; .5 SOLUTION RESPIRATORY (INHALATION) at 08:02

## 2024-12-14 ASSESSMENT — COGNITIVE AND FUNCTIONAL STATUS - GENERAL
DRESSING REGULAR LOWER BODY CLOTHING: A LOT
STANDING UP FROM CHAIR USING ARMS: A LITTLE
CLIMB 3 TO 5 STEPS WITH RAILING: A LOT
MOBILITY SCORE: 20
DRESSING REGULAR LOWER BODY CLOTHING: A LOT
CLIMB 3 TO 5 STEPS WITH RAILING: A LOT
DAILY ACTIVITIY SCORE: 20
MOBILITY SCORE: 20
STANDING UP FROM CHAIR USING ARMS: A LITTLE
HELP NEEDED FOR BATHING: A LOT
WALKING IN HOSPITAL ROOM: A LITTLE
HELP NEEDED FOR BATHING: A LITTLE
DAILY ACTIVITIY SCORE: 20
WALKING IN HOSPITAL ROOM: A LITTLE
DRESSING REGULAR UPPER BODY CLOTHING: A LITTLE

## 2024-12-14 ASSESSMENT — PAIN SCALES - GENERAL
PAINLEVEL_OUTOF10: 2
PAINLEVEL_OUTOF10: 7
PAINLEVEL_OUTOF10: 7
PAINLEVEL_OUTOF10: 5 - MODERATE PAIN
PAINLEVEL_OUTOF10: 7
PAINLEVEL_OUTOF10: 5 - MODERATE PAIN
PAINLEVEL_OUTOF10: 7
PAINLEVEL_OUTOF10: 2
PAINLEVEL_OUTOF10: 8

## 2024-12-14 ASSESSMENT — PAIN DESCRIPTION - LOCATION
LOCATION: BACK
LOCATION: BACK

## 2024-12-14 ASSESSMENT — PAIN - FUNCTIONAL ASSESSMENT
PAIN_FUNCTIONAL_ASSESSMENT: 0-10

## 2024-12-14 NOTE — PROGRESS NOTES
Subjective Data:  Remains in NSR overnight with few short bursts of pSVT lasting <10 seconds and asymptomatic          Objective Data:  Last Recorded Vitals:  Vitals:    12/13/24 1942 12/13/24 2017 12/14/24 0309 12/14/24 0433   BP:  109/70 136/86 171/82   BP Location:  Left arm  Left arm   Patient Position:  Sitting  Lying   Pulse:  96 94 98   Resp:  20  19   Temp:  36.7 °C (98.1 °F) 36.6 °C (97.9 °F) 36.8 °C (98.2 °F)   TempSrc:  Temporal Temporal Temporal   SpO2: 92% 95% 92% 93%   Weight:   145 kg (319 lb 14.2 oz)    Height:           Last Labs:  CBC - 12/14/2024:  6:28 AM  7.0 12.5 563    41.6      CMP - 12/14/2024:  6:28 AM  8.7 5.8 13 --- 0.8   3.5 3.2 14 124      PTT - 9/16/2024: 12:32 AM  2.2   24.7 57     TROPHS   Date/Time Value Ref Range Status   12/11/2024 10:19 PM 26 0 - 13 ng/L Final   12/11/2024 04:51 PM 28 0 - 13 ng/L Final   09/16/2024 07:07 AM 21 0 - 13 ng/L Final     BNP   Date/Time Value Ref Range Status   12/11/2024 04:51  0 - 99 pg/mL Final   09/15/2024 11:42 PM 87 0 - 99 pg/mL Final     HGBA1C   Date/Time Value Ref Range Status   12/11/2024 04:51 PM 5.5 See comment % Final   08/13/2024 07:35 AM 5.9 see below % Final     LDLCALC   Date/Time Value Ref Range Status   12/11/2024 04:51 PM 87 <=99 mg/dL Final     Comment:                                 Near   Borderline      AGE      Desirable  Optimal    High     High     Very High     0-19 Y     0 - 109     ---    110-129   >/= 130     ----    20-24 Y     0 - 119     ---    120-159   >/= 160     ----      >24 Y     0 -  99   100-129  130-159   160-189     >/=190       VLDL   Date/Time Value Ref Range Status   12/11/2024 04:51 PM 21 0 - 40 mg/dL Final   11/14/2022 11:01 AM 40 0 - 40 mg/dL Final   09/27/2020 12:15 AM 21 0 - 40 mg/dL Final   06/19/2020 03:03 PM 16 0 - 40 mg/dL Final      Last I/O:  I/O last 3 completed shifts:  In: 1180 (8.1 mL/kg) [P.O.:1180]  Out: 28452 (77.2 mL/kg) [Urine:94896 (2.1 mL/kg/hr)]  Weight: 145.1 kg     Past  Cardiology Tests (Last 3 Years):  Echo:  Transthoracic Echo (TTE) Complete 12/12/2024  CONCLUSIONS:   1. The left ventricular systolic function is normal, with a visually estimated ejection fraction of 60-65%.   2. Spectral Doppler shows a Grade I (impaired relaxation pattern) of left ventricular diastolic filling with normal left atrial filling pressure.   3. There is moderately increased septal and moderately increased posterior left ventricular wall thickness.   4. There is moderate concentric left ventricular hypertrophy.   5. There is reduced right ventricular systolic function.   6. Moderately enlarged right ventricle.   7. The left atrium is mildly dilated.   8. There is moderate mitral annular calcification.   9. There is moderate mitral valve stenosis.  10. Moderate to severe aortic valve stenosis.  11. Moderately elevated pulmonary artery pressure.  12. There is mild mitral and tricuspid regurgitation.    Transthoracic Echo (TTE) Complete 09/18/2024  CONCLUSIONS:   1. The left ventricular systolic function is normal, with a visually estimated ejection fraction of 60-65%.   2. Spectral Doppler shows an impaired relaxation pattern of left ventricular diastolic filling.   3. There is normal right ventricular global systolic function.   4. The left atrium is mildly dilated.   5. There is severe mitral annular calcification.   6. Severe aortic valve stenosis.    Inpatient Medications:  Scheduled medications   Medication Dose Route Frequency    budesonide  0.5 mg nebulization BID    dilTIAZem CD  180 mg oral Daily    DULoxetine  20 mg oral Daily    eucerin   Topical Daily    formoterol  20 mcg nebulization BID    furosemide  40 mg intravenous q8h    hydroxyurea  1,000 mg oral Daily    ipratropium-albuteroL  3 mL nebulization TID    losartan  25 mg oral Daily    nicotine  1 patch transdermal Daily    nystatin  1 Application Topical BID    perflutren lipid microspheres  0.5-10 mL of dilution intravenous Once in  imaging    perflutren protein A microsphere  0.5 mL intravenous Once in imaging    polyethylene glycol  17 g oral Daily    pramipexole  1 mg oral BID    sennosides-docusate sodium  2 tablet oral BID    spironolactone  25 mg oral Daily    sulfur hexafluoride microsphr  2 mL intravenous Once in imaging    tiotropium  2 puff inhalation Daily    warfarin  7.5 mg oral Daily    zinc oxide  1 Application Topical TID     PRN medications   Medication    acetaminophen    Or    acetaminophen    Or    acetaminophen    hydrOXYzine HCL    ipratropium-albuteroL    oxyCODONE    Or    oxyCODONE    oxygen     Continuous Medications   Medication Dose Last Rate       Physical Exam:  Constitutional: Pleasant, Awake/Alert/Oriented to person place and time.   Head: Atraumatic, Normocephalic  Eyes: EOMI. MICHELE  Neck: +JVD  Cardiovascular: Regular rate and rhythm, S1, S2. 3/6 murmur RUSB   Respiratory: Crackles noted at bases posteriorly   Abdomen: Soft, Obese. Bowel sounds appreciated  Musculoskeletal: ROM intact. Muscle strength grossly intact upper and lower extremities 5/5.   Neurological: CNII-XII intact. Sensation grossly intact  Extremities: Chronic BLE lymphedema, 1+ pitting edema BLE  Psychiatric: Appropriate mood and affect       Assessment/Plan   Very pleasant  64 y.o. 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       Assessment:  Acute on chronic hypoxic respiratory failure  Acute decompensated diastolic HF  H/o moderate-severe aortic stenosis   Hypertension  Newly identified a.fib RVR-- back in NSR    Plan:  -Continue lasix 40mg IV TID-- likely transition to PO lasix tomorrow  -Continue spironolactone 25mg daily, diltiazem 180mg daily, and losartan 25mg daily   -Continue warfarin for goal INR  2-3  -Strict I/O, monitor renal function and electrolytes     Peripheral IV 12/12/24 22 G Distal;Right Forearm (Active)   Site Assessment Clean;Dry;Intact 12/13/24 2100   Dressing Status Clean;Dry 12/13/24 2100   Number of days: 2       Code Status:  Full Code      LAKEISHA Mackey-CNP

## 2024-12-14 NOTE — CARE PLAN
Problem: Pain - Adult  Goal: Verbalizes/displays adequate comfort level or baseline comfort level  Outcome: Progressing     Problem: Safety - Adult  Goal: Free from fall injury  Outcome: Progressing     Problem: Discharge Planning  Goal: Discharge to home or other facility with appropriate resources  Outcome: Progressing     Problem: Chronic Conditions and Co-morbidities  Goal: Patient's chronic conditions and co-morbidity symptoms are monitored and maintained or improved  Outcome: Progressing     Problem: Skin  Goal: Promote skin healing  Outcome: Progressing   The patient's goals for the shift include      The clinical goals for the shift include patient will have one meal in the chair today    Patient had lunch in the chair

## 2024-12-14 NOTE — CONSULTS
"  Wound Care Consult     Visit Date: 12/13/2024      Patient Name: Neisha Graham         MRN: [id number]           YOB: 1960     Reason for Consult: BLE wounds        Wound History: Chronic lymphedema, chronic leg wounds [facility], complete medical history seen in H&P.     Pertinent Labs:   Albumin   Date Value Ref Range Status   12/13/2024 3.3 (L) 3.4 - 5.0 g/dL Final       Wound Assessment:  Wound 08/13/24 Venous Ulcer Tibial Dorsal;Right (Active)   Wound Image   12/12/24 0008   Site Assessment Edema;Red;Swelling;White;Painful 12/12/24 2015   Drainage Description Yellow 12/12/24 2015   Drainage Amount Small 12/12/24 2015   Dressing ABD 12/12/24 2015   Dressing Changed New 12/12/24 2015   Dressing Status Clean;Dry 12/11/24 2100       Wound 08/13/24 Venous Ulcer Tibial Dorsal;Left (Active)   Wound Image   12/12/24 0006   Site Assessment Edema;Painful;Red;White 12/12/24 2015   Drainage Description Yellow 12/12/24 2015   Drainage Amount Small 12/12/24 2015   Dressing ABD;Other (Comment) 12/12/24 2015   Dressing Changed Changed 12/12/24 2015   Dressing Status Old drainage 12/12/24 1600       Wound 09/16/24 Venous Ulcer Buttocks (Active)       Wound 09/25/24 Buttock Left (Active)       Wound Team Summary Assessment: 64 year old female seen in bed, sitting up, admitted with \"heart trouble\" per patient.  Patient states, \"since taking so much lasix, I can breathe better, my belly is softer, my legs are softer, bet I lost 20 pounds\".    BLE wounds appear less angry than admission photographs.  -- Left calf 7 x 5 cm irregular shaped full thickness wound, pink and appears to be veil of adipose tissue, scant serous drainage.  -- Right lateral calf (2) 1 X 1 cm full thickness wound, pink and appears to be veil of adipose tissue.  -- BLE Thick yellow scale, dry skin.  -- Patient refused buttock assessment, stated, \"no wounds\".     Wound Team Plan: Goal to contain drainage, protect, moisturize.  -- " "Collagen not available from patient, Aquacel, ABD, Kerlix and Ace for gentle compression toes to 2\" below knees.  -- Eucerin to moisturize intact BLE.  -- Recommend offloading heels, patient refused rt \"strain on knees. Also refused elevation on pillows with support of knees.  Educated on PI prevention.    Please message with questions.       Smiley Worthington RN, [facility]  12/13/2024  7:45 PM        "

## 2024-12-14 NOTE — PROGRESS NOTES
Neisha Graham is a 64 y.o. female on day 3 of admission presenting with Acute on chronic congestive heart failure, unspecified heart failure type.    Subjective   Pt is feeling better. She continues to require 2L NC. She desaturated to 87-89% on RA. She had episode of a fib rvr yesterday and required lopressor.        Objective     Physical Exam  Vitals and nursing note reviewed.   Constitutional:       General: She is not in acute distress.     Appearance: She is not ill-appearing or toxic-appearing.   HENT:      Head: Normocephalic and atraumatic.      Nose: Nose normal.      Mouth/Throat:      Mouth: Mucous membranes are moist.   Eyes:      Extraocular Movements: Extraocular movements intact.      Conjunctiva/sclera: Conjunctivae normal.      Pupils: Pupils are equal, round, and reactive to light.   Cardiovascular:      Rate and Rhythm: Normal rate and regular rhythm.      Heart sounds: Murmur heard.      No gallop.   Pulmonary:      Effort: No respiratory distress.      Breath sounds: Rhonchi present. No wheezing or rales.      Comments: Decreased breath sounds at bilateral lung bases  Abdominal:      General: Abdomen is flat. Bowel sounds are normal. There is no distension.      Palpations: Abdomen is soft. There is no mass.      Tenderness: There is no abdominal tenderness.   Musculoskeletal:         General: Swelling present. No tenderness. Normal range of motion.      Cervical back: Normal range of motion and neck supple.      Right lower leg: Edema present.      Left lower leg: Edema present.   Skin:     General: Skin is warm and dry.   Neurological:      Mental Status: She is alert and oriented to person, place, and time.      Motor: Weakness present.   Psychiatric:         Mood and Affect: Mood normal.         Behavior: Behavior normal.         Thought Content: Thought content normal.         Judgment: Judgment normal.         Last Recorded Vitals:  /72   Pulse 100   Temp 36.8 °C (98.2 °F)  "(Temporal)   Resp 20   Ht 1.626 m (5' 4\")   Wt 145 kg (319 lb 14.2 oz)   SpO2 90%   BMI 54.91 kg/m²      Scheduled medications:  budesonide, 0.5 mg, nebulization, BID  dilTIAZem CD, 180 mg, oral, Daily  DULoxetine, 20 mg, oral, Daily  eucerin, , Topical, Daily  formoterol, 20 mcg, nebulization, BID  furosemide, 40 mg, intravenous, q8h  hydroxyurea, 1,000 mg, oral, Daily  ipratropium-albuteroL, 3 mL, nebulization, TID  losartan, 25 mg, oral, Daily  nicotine, 1 patch, transdermal, Daily  nystatin, 1 Application, Topical, BID  perflutren lipid microspheres, 0.5-10 mL of dilution, intravenous, Once in imaging  perflutren protein A microsphere, 0.5 mL, intravenous, Once in imaging  polyethylene glycol, 17 g, oral, Daily  pramipexole, 1 mg, oral, BID  sennosides-docusate sodium, 2 tablet, oral, BID  spironolactone, 25 mg, oral, Daily  sulfur hexafluoride microsphr, 2 mL, intravenous, Once in imaging  tiotropium, 2 puff, inhalation, Daily  warfarin, 7.5 mg, oral, Daily  zinc oxide, 1 Application, Topical, TID      Continuous medications:     PRN medications:  PRN medications: acetaminophen **OR** acetaminophen **OR** acetaminophen, hydrOXYzine HCL, ipratropium-albuteroL, oxyCODONE **OR** oxyCODONE, oxygen     Relevant Results:  Results for orders placed or performed during the hospital encounter of 12/11/24 (from the past 24 hours)   Electrocardiogram, 12-lead PRN ACS symtpoms   Result Value Ref Range    Ventricular Rate 104 BPM    Atrial Rate 104 BPM    VT Interval 148 ms    QRS Duration 90 ms    QT Interval 376 ms    QTC Calculation(Bazett) 494 ms    P Axis 67 degrees    R Axis 76 degrees    T Axis 79 degrees    QRS Count 18 beats    Q Onset 210 ms    P Onset 136 ms    P Offset 191 ms    T Offset 398 ms    QTC Fredericia 451 ms   CBC   Result Value Ref Range    WBC 7.0 4.4 - 11.3 x10*3/uL    nRBC 0.0 0.0 - 0.0 /100 WBCs    RBC 3.77 (L) 4.00 - 5.20 x10*6/uL    Hemoglobin 12.5 12.0 - 16.0 g/dL    Hematocrit 41.6 36.0 " - 46.0 %     (H) 80 - 100 fL    MCH 33.2 26.0 - 34.0 pg    MCHC 30.0 (L) 32.0 - 36.0 g/dL    RDW 13.7 11.5 - 14.5 %    Platelets 563 (H) 150 - 450 x10*3/uL   Magnesium   Result Value Ref Range    Magnesium 1.55 (L) 1.60 - 2.40 mg/dL   Comprehensive Metabolic Panel   Result Value Ref Range    Glucose 111 (H) 74 - 99 mg/dL    Sodium 143 136 - 145 mmol/L    Potassium 3.5 3.5 - 5.3 mmol/L    Chloride 94 (L) 98 - 107 mmol/L    Bicarbonate 39 (H) 21 - 32 mmol/L    Anion Gap 14 10 - 20 mmol/L    Urea Nitrogen 16 6 - 23 mg/dL    Creatinine 0.83 0.50 - 1.05 mg/dL    eGFR 78 >60 mL/min/1.73m*2    Calcium 8.7 8.6 - 10.3 mg/dL    Albumin 3.2 (L) 3.4 - 5.0 g/dL    Alkaline Phosphatase 124 33 - 136 U/L    Total Protein 5.8 (L) 6.4 - 8.2 g/dL    AST 13 9 - 39 U/L    Bilirubin, Total 0.8 0.0 - 1.2 mg/dL    ALT 14 7 - 45 U/L   Protime-INR   Result Value Ref Range    Protime 24.7 (H) 9.8 - 12.8 seconds    INR 2.2 (H) 0.9 - 1.1     *Note: Due to a large number of results and/or encounters for the requested time period, some results have not been displayed. A complete set of results can be found in Results Review.       Electrocardiogram, 12-lead PRN ACS symtpoms    Result Date: 12/13/2024  Sinus tachycardia with Premature supraventricular complexes Otherwise normal ECG When compared with ECG of 11-DEC-2024 16:58, (unconfirmed) Premature supraventricular complexes are now Present Incomplete right bundle branch block is no longer Present    Transthoracic Echo (TTE) Complete    Result Date: 12/12/2024   [facility], [id number] [address]               Tel [phone] and Fax [phone] TRANSTHORACIC ECHOCARDIOGRAM REPORT  Patient Name:       LION CHAVARRIA ANA     Reading Physician:    [id number] Judson Chaudhry MD Study Date:         12/12/2024          Ordering Provider:    [id number] PAULINA CHAMBERLAIN                                                                GAUTAM MRN/PID:            [id number]            Fellow: Accession#:         [id number]        Nurse: Date of Birth/Age:  1960 / 64      Sonographer:          Pricila parker RDCS Gender assigned at  F                   Additional Staff: Birth: Height:             162.56 cm           Admit Date:           12/11/2024 Weight:             151.96 kg           Admission Status:     Inpatient -                                                               Routine BSA / BMI:          2.44 m2 / 57.50     Encounter#:           [id number]                     kg/m2 Blood Pressure:     135/78 mmHg         Department Location:  [facility] Non                                                               Invasive Study Type:    TRANSTHORACIC ECHO (TTE) COMPLETE Diagnosis/ICD: Heart failure, unspecified-I50.9; Nonrheumatic aortic (valve)                stenosis-I35.0 Indication:    Congestive Heart Failure, AS CPT Code:      Echo Complete w Full Doppler-22580 Patient History: Valve Disorders:   Mitral Stenosis. Pertinent History: Severe AS, COPD and Dyspnea. Study Detail: The following Echo studies were performed: 2D, M-Mode, Doppler and               color flow. Technically challenging study due to high fowlers               position due to shortness of breath. The patient was awake.  PHYSICIAN INTERPRETATION: Left Ventricle: The left ventricular systolic function is normal, with a visually estimated ejection fraction of 60-65%. There is moderate concentric left ventricular hypertrophy. There are no regional left ventricular wall motion abnormalities. The left ventricular cavity size is normal. There is moderately increased septal and moderately increased posterior left ventricular wall thickness. Spectral Doppler shows a Grade I (impaired relaxation pattern) of left ventricular diastolic filling with normal left atrial filling pressure. The ratio of pulmonic  flow to systemic flow (Qp/Qs ratio) is 1.10. Left Atrium: The left atrium is mildly dilated. Right Ventricle: The right ventricle is moderately enlarged. There is reduced right ventricular systolic function. Right Atrium: The right atrium is mildly dilated. Aortic Valve: The aortic valve is trileaflet. There is evidence of moderate to severe aortic valve stenosis. The aortic valve dimensionless index is 0.28. There is no evidence of aortic valve regurgitation. The peak instantaneous gradient of the aortic valve is 65 mmHg. The mean gradient of the aortic valve is 41 mmHg. Mitral Valve: The mitral valve is normal in structure. There is evidence of moderate mitral valve stenosis. The doppler estimated mean and peak diastolic pressure gradients are 7 mmHg and 12 mmHg respectively. There is moderate mitral annular calcification. The peak instantaneous gradient of the mitral valve is 12 mmHg. There is mild mitral valve regurgitation. The mitral regurgitant orifice area is 14 mm2. The mitral regurgitant volume is 23.05 ml. Tricuspid Valve: The tricuspid valve is structurally normal. There is mild tricuspid regurgitation. Pulmonic Valve: The pulmonic valve is structurally normal. There is no indication of pulmonic valve regurgitation. Pericardium: There is no pericardial effusion noted. Aorta: The aortic root is normal. Pulmonary Artery: The tricuspid regurgitant velocity is 3.24 m/s, and with an estimated right atrial pressure of 10 mmHg, the estimated pulmonary artery pressure is moderately elevated with the RVSP at 52.0 mmHg. Systemic Veins: The inferior vena cava appears normal in size.  CONCLUSIONS:  1. The left ventricular systolic function is normal, with a visually estimated ejection fraction of 60-65%.  2. Spectral Doppler shows a Grade I (impaired relaxation pattern) of left ventricular diastolic filling with normal left atrial filling pressure.  3. There is moderately increased septal and moderately increased  posterior left ventricular wall thickness.  4. There is moderate concentric left ventricular hypertrophy.  5. There is reduced right ventricular systolic function.  6. Moderately enlarged right ventricle.  7. The left atrium is mildly dilated.  8. There is moderate mitral annular calcification.  9. There is moderate mitral valve stenosis. 10. Moderate to severe aortic valve stenosis. 11. Moderately elevated pulmonary artery pressure. 12. There is mild mitral and tricuspid regurgitation. QUANTITATIVE DATA SUMMARY:  2D MEASUREMENTS:          Normal Ranges: Ao Root d:       2.90 cm  (2.0-3.7cm) IVSd:            1.38 cm  (0.6-1.1cm) LVPWd:           1.37 cm  (0.6-1.1cm) LVIDd:           5.12 cm  (3.9-5.9cm) LVIDs:           3.86 cm LV Mass Index:   121 g/m2 LVEDV Index:     47 ml/m2 LV % FS          24.6 %  LA VOLUME:                    Normal Ranges: LA Vol A4C:        82.5 ml    (22+/-6mL/m2) LA Vol A2C:        76.2 ml LA Vol BP:         83.8 ml LA Vol Index A4C:  33.9ml/m2 LA Vol Index A2C:  31.3 ml/m2 LA Vol Index BP:   34.4 ml/m2 LA Area A4C:       25.4 cm2 LA Area A2C:       23.1 cm2 LA Major Axis A4C: 6.6 cm LA Major Axis A2C: 6.0 cm LA Volume Index:   32.1 ml/m2 LA Vol A4C:        75.0 ml LA Vol A2C:        73.6 ml LA Vol Index BSA:  30.5 ml/m2  RA VOLUME BY A/L METHOD:          Normal Ranges: RA Area A4C:             25.1 cm2  M-MODE MEASUREMENTS:         Normal Ranges: Ao Root:             3.30 cm (2.0-3.7cm) AoV Exc:             1.20 cm (1.5-2.5cm) LAs:                 4.80 cm (2.7-4.0cm)  AORTA MEASUREMENTS:         Normal Ranges: AoV Exc:            1.20 cm (1.5-2.5cm) Ao Sinus, d:        2.90 cm (2.1-3.5cm) Asc Ao, d:          3.50 cm (2.1-3.4cm)  LV SYSTOLIC FUNCTION BY 2D PLANIMETRY (MOD):                      Normal Ranges: EF-A4C View:    58 % (>=55%) EF-A2C View:    63 % EF-Biplane:     60 % EF-Visual:      63 % LV EF Reported: 63 %  LV DIASTOLIC FUNCTION:            Normal Ranges: MV e'                   0.118 m/s  (>8.0) MV lateral e'          0.15 m/s MV medial e'           0.09 m/s PulmV Sys Rodriguez:         46.60 cm/s PulmV Cazares Rodriguez:        40.50 cm/s PulmV S/D Rodriguez:         1.20 PulmV A Revs Rodriguez:      30.90 cm/s PulmV A Revs Dur:      90.00 msec  MITRAL VALVE:           Normal Ranges: MV Vmax:      1.76 m/s  (<=1.3m/s) MV peak P.4 mmHg (<5mmHg) MV mean P.0 mmHg  (<2mmHg)  MITRAL INSUFFICIENCY:             Normal Ranges: PISA Radius:          0.6 cm MR VTI:               164.00 cm MR Vmax:              565.00 cm/s MR Alias Rodriguez:         35.1 cm/s MR Volume:            23.05 ml MR Flow Rt:           79.39 ml/s MR EROA:              14 mm2  AORTIC VALVE:                      Normal Ranges: AoV Vmax:                4.03 m/s  (<=1.7m/s) AoV Peak P.0 mmHg (<20mmHg) AoV Mean P.0 mmHg (1.7-11.5mmHg) LVOT Max Rodriguez:            1.17 m/s  (<=1.1m/s) AoV VTI:                 88.30 cm  (18-25cm) LVOT VTI:                24.60 cm LVOT Diameter:           2.00 cm   (1.8-2.4cm) AoV Area, VTI:           0.88 cm2  (2.5-5.5cm2) AoV Area,Vmax:           0.91 cm2  (2.5-4.5cm2) AoV Dimensionless Index: 0.28  RIGHT VENTRICLE: RV Basal 5.19 cm RV Mid   4.00 cm RV Major 10.8 cm TAPSE:   23.1 mm RV s'    0.19 m/s  TRICUSPID VALVE/RVSP:          Normal Ranges: Peak TR Velocity:     3.24 m/s RV Syst Pressure:     52 mmHg  (< 30mmHg) IVC Diam:             2.10 cm  PULMONIC VALVE:              Normal Ranges: RVOT Vmax:         0.73 m/s  (0.6-0.9m/s) RVOT diam: systole 2.40 cm   (1.7-2.3cm) PV Max Rodriguez:        2.0 m/s   (0.6-0.9m/s) PV Max PG:         15.6 mmHg PV Mean P.0 mmHg PV VTI:            46.40 cm  Pulmonary Veins: PulmV A Revs Dur: 90.00 msec PulmV A Revs Rodriguez: 30.90 cm/s PulmV Cazares Rodriguez:   40.50 cm/s PulmV S/D Rodriguez:    1.20 PulmV Sys Rodriguez:    46.60 cm/s  [id number] Judson Chaudhry MD Electronically signed on 2024 at 4:27:24 PM  ** Final **                     Assessment/Plan    Assessment & Plan  Acute on chronic congestive heart failure, unspecified heart failure type    Acute hypoxic respiratory failure 2/2 HFpEF exacerbation  Hx COPD not in exacerbation  - on 2L NC  - cardio follwing  - IV lasix 40mg TID  - resume cardizem  - duoneb  - spiriva  - aldactone    A fib RVR now back in NSR  - sp lopressor  - monitor    Leukocytosis  resolved    Intertrigo  - nystatin    Normocytic anemia  - monitor    HTN  - resume cardizem  - losartan    Chronic constipation  - bowel regimen    Chronic thrombocytosis  - hydroxyurea    Cavernous sinus thrombosis with INR now within goal  - resumed coumadin  - daily INR    Severe aortic valve stenosis  - cardio following    Nicotine abuse  - nicotine patch    RLS  - pramipexole    Depression/anxiety  - cymbalta  - hydroxyzine PRN    Dispo: monitor clinically, continue diuresis, wean O2         Diana Tinsley MD  Hospitalist

## 2024-12-14 NOTE — CONSULTS
Patients name:         Room #  Do you have any home inhalers?    yes  Do you get relief when using it?     Yes/no  Spacer education and have them teach back yes  If using home inhaler do you rinse your mouth? yes  Any barriers? no  When were you diagnosed with COPD? yes  Any previous PFTs?   If so, when?   explain the importance of a PFT  Do you have a pulmonary Dr.? no  Name:  Phone number:  Date of last appt:  Pulmonary cards given  Do you currently smoke  yes     Quit date or planning to quit?  yes  How long have you smoked for? 50years  PPD?  3 cigrattes  Smoking education given and class information given  yes  Get orders for nicotine supplement ys have a patch  Do you have a Primary Dr.? y  Name: Sunshine min  Phone number: [phone]  Date of last appt: 12/9/24  Do you have any home O2  yes    What company?               [employer]  How much O2 at home? 2lpm  Last time 6mwt was done? N/a  Educate on acceptable O2 levels and the importance of monitoring O2 at home. Complete home O2 evaluation if needed.  This RT to see patient for COPD consult. The patient was given a COPD booklet with educational materials regarding pulmonary issues. S

## 2024-12-15 VITALS
SYSTOLIC BLOOD PRESSURE: 134 MMHG | WEIGHT: 293 LBS | DIASTOLIC BLOOD PRESSURE: 83 MMHG | HEIGHT: 64 IN | OXYGEN SATURATION: 100 % | HEART RATE: 87 BPM | BODY MASS INDEX: 50.02 KG/M2 | RESPIRATION RATE: 18 BRPM | TEMPERATURE: 98.8 F

## 2024-12-15 LAB
ALBUMIN SERPL BCP-MCNC: 3.5 G/DL (ref 3.4–5)
ALP SERPL-CCNC: 138 U/L (ref 33–136)
ALT SERPL W P-5'-P-CCNC: 12 U/L (ref 7–45)
ANION GAP SERPL CALC-SCNC: 16 MMOL/L (ref 10–20)
AST SERPL W P-5'-P-CCNC: 13 U/L (ref 9–39)
ATRIAL RATE: 93 BPM
BILIRUB SERPL-MCNC: 0.8 MG/DL (ref 0–1.2)
BUN SERPL-MCNC: 18 MG/DL (ref 6–23)
CALCIUM SERPL-MCNC: 8.8 MG/DL (ref 8.6–10.3)
CHLORIDE SERPL-SCNC: 91 MMOL/L (ref 98–107)
CO2 SERPL-SCNC: 36 MMOL/L (ref 21–32)
CREAT SERPL-MCNC: 0.94 MG/DL (ref 0.5–1.05)
EGFRCR SERPLBLD CKD-EPI 2021: 67 ML/MIN/1.73M*2
ERYTHROCYTE [DISTWIDTH] IN BLOOD BY AUTOMATED COUNT: 13.9 % (ref 11.5–14.5)
GLUCOSE SERPL-MCNC: 111 MG/DL (ref 74–99)
HCT VFR BLD AUTO: 44.2 % (ref 36–46)
HGB BLD-MCNC: 13.6 G/DL (ref 12–16)
INR PPP: 2.3 (ref 0.9–1.1)
MAGNESIUM SERPL-MCNC: 1.65 MG/DL (ref 1.6–2.4)
MCH RBC QN AUTO: 32.8 PG (ref 26–34)
MCHC RBC AUTO-ENTMCNC: 30.8 G/DL (ref 32–36)
MCV RBC AUTO: 107 FL (ref 80–100)
NRBC BLD-RTO: 0 /100 WBCS (ref 0–0)
P AXIS: 49 DEGREES
P OFFSET: 201 MS
P ONSET: 131 MS
PLATELET # BLD AUTO: 598 X10*3/UL (ref 150–450)
POTASSIUM SERPL-SCNC: 3.5 MMOL/L (ref 3.5–5.3)
PR INTERVAL: 174 MS
PROT SERPL-MCNC: 6.3 G/DL (ref 6.4–8.2)
PROTHROMBIN TIME: 25.6 SECONDS (ref 9.8–12.8)
Q ONSET: 218 MS
QRS COUNT: 16 BEATS
QRS DURATION: 96 MS
QT INTERVAL: 380 MS
QTC CALCULATION(BAZETT): 472 MS
QTC FREDERICIA: 440 MS
R AXIS: 70 DEGREES
RBC # BLD AUTO: 4.15 X10*6/UL (ref 4–5.2)
SODIUM SERPL-SCNC: 139 MMOL/L (ref 136–145)
T AXIS: 62 DEGREES
T OFFSET: 408 MS
VENTRICULAR RATE: 93 BPM
WBC # BLD AUTO: 8 X10*3/UL (ref 4.4–11.3)

## 2024-12-15 PROCEDURE — 99232 SBSQ HOSP IP/OBS MODERATE 35: CPT | Performed by: NURSE PRACTITIONER

## 2024-12-15 PROCEDURE — 83735 ASSAY OF MAGNESIUM: CPT

## 2024-12-15 PROCEDURE — 2500000002 HC RX 250 W HCPCS SELF ADMINISTERED DRUGS (ALT 637 FOR MEDICARE OP, ALT 636 FOR OP/ED): Performed by: NURSE PRACTITIONER

## 2024-12-15 PROCEDURE — 85027 COMPLETE CBC AUTOMATED: CPT

## 2024-12-15 PROCEDURE — 2500000001 HC RX 250 WO HCPCS SELF ADMINISTERED DRUGS (ALT 637 FOR MEDICARE OP): Performed by: STUDENT IN AN ORGANIZED HEALTH CARE EDUCATION/TRAINING PROGRAM

## 2024-12-15 PROCEDURE — 2500000001 HC RX 250 WO HCPCS SELF ADMINISTERED DRUGS (ALT 637 FOR MEDICARE OP): Performed by: INTERNAL MEDICINE

## 2024-12-15 PROCEDURE — 94668 MNPJ CHEST WALL SBSQ: CPT

## 2024-12-15 PROCEDURE — 94760 N-INVAS EAR/PLS OXIMETRY 1: CPT

## 2024-12-15 PROCEDURE — 94640 AIRWAY INHALATION TREATMENT: CPT

## 2024-12-15 PROCEDURE — 2500000002 HC RX 250 W HCPCS SELF ADMINISTERED DRUGS (ALT 637 FOR MEDICARE OP, ALT 636 FOR OP/ED)

## 2024-12-15 PROCEDURE — 2500000005 HC RX 250 GENERAL PHARMACY W/O HCPCS

## 2024-12-15 PROCEDURE — 2500000002 HC RX 250 W HCPCS SELF ADMINISTERED DRUGS (ALT 637 FOR MEDICARE OP, ALT 636 FOR OP/ED): Performed by: STUDENT IN AN ORGANIZED HEALTH CARE EDUCATION/TRAINING PROGRAM

## 2024-12-15 PROCEDURE — 2500000002 HC RX 250 W HCPCS SELF ADMINISTERED DRUGS (ALT 637 FOR MEDICARE OP, ALT 636 FOR OP/ED): Performed by: INTERNAL MEDICINE

## 2024-12-15 PROCEDURE — 82565 ASSAY OF CREATININE: CPT | Performed by: STUDENT IN AN ORGANIZED HEALTH CARE EDUCATION/TRAINING PROGRAM

## 2024-12-15 PROCEDURE — 2500000001 HC RX 250 WO HCPCS SELF ADMINISTERED DRUGS (ALT 637 FOR MEDICARE OP)

## 2024-12-15 PROCEDURE — S4991 NICOTINE PATCH NONLEGEND: HCPCS | Performed by: INTERNAL MEDICINE

## 2024-12-15 PROCEDURE — 99232 SBSQ HOSP IP/OBS MODERATE 35: CPT | Performed by: STUDENT IN AN ORGANIZED HEALTH CARE EDUCATION/TRAINING PROGRAM

## 2024-12-15 PROCEDURE — 36415 COLL VENOUS BLD VENIPUNCTURE: CPT

## 2024-12-15 PROCEDURE — 1200000002 HC GENERAL ROOM WITH TELEMETRY DAILY

## 2024-12-15 PROCEDURE — 85610 PROTHROMBIN TIME: CPT | Performed by: STUDENT IN AN ORGANIZED HEALTH CARE EDUCATION/TRAINING PROGRAM

## 2024-12-15 PROCEDURE — 2500000004 HC RX 250 GENERAL PHARMACY W/ HCPCS (ALT 636 FOR OP/ED): Performed by: NURSE PRACTITIONER

## 2024-12-15 PROCEDURE — 2500000004 HC RX 250 GENERAL PHARMACY W/ HCPCS (ALT 636 FOR OP/ED)

## 2024-12-15 RX ORDER — FUROSEMIDE 40 MG/1
40 TABLET ORAL DAILY
Status: DISCONTINUED | OUTPATIENT
Start: 2024-12-15 | End: 2024-12-16 | Stop reason: HOSPADM

## 2024-12-15 RX ADMIN — Medication 2 L/MIN: at 20:49

## 2024-12-15 RX ADMIN — WARFARIN SODIUM 7.5 MG: 5 TABLET ORAL at 18:13

## 2024-12-15 RX ADMIN — OXYCODONE HYDROCHLORIDE 10 MG: 10 TABLET ORAL at 14:23

## 2024-12-15 RX ADMIN — SENNOSIDES AND DOCUSATE SODIUM 2 TABLET: 50; 8.6 TABLET ORAL at 20:20

## 2024-12-15 RX ADMIN — IPRATROPIUM BROMIDE AND ALBUTEROL SULFATE 3 ML: 2.5; .5 SOLUTION RESPIRATORY (INHALATION) at 14:36

## 2024-12-15 RX ADMIN — OXYCODONE HYDROCHLORIDE 10 MG: 10 TABLET ORAL at 18:13

## 2024-12-15 RX ADMIN — TIOTROPIUM BROMIDE INHALATION SPRAY 2 PUFF: 3.12 SPRAY, METERED RESPIRATORY (INHALATION) at 06:00

## 2024-12-15 RX ADMIN — OXYCODONE HYDROCHLORIDE 10 MG: 10 TABLET ORAL at 10:03

## 2024-12-15 RX ADMIN — NICOTINE 1 PATCH: 14 PATCH, EXTENDED RELEASE TRANSDERMAL at 10:03

## 2024-12-15 RX ADMIN — OXYCODONE HYDROCHLORIDE 10 MG: 10 TABLET ORAL at 05:21

## 2024-12-15 RX ADMIN — SALINE NASAL SPRAY 1 SPRAY: 1.5 SOLUTION NASAL at 14:24

## 2024-12-15 RX ADMIN — IPRATROPIUM BROMIDE AND ALBUTEROL SULFATE 3 ML: 2.5; .5 SOLUTION RESPIRATORY (INHALATION) at 08:18

## 2024-12-15 RX ADMIN — FORMOTEROL FUMARATE DIHYDRATE 20 MCG: 20 SOLUTION RESPIRATORY (INHALATION) at 08:18

## 2024-12-15 RX ADMIN — IPRATROPIUM BROMIDE AND ALBUTEROL SULFATE 3 ML: 2.5; .5 SOLUTION RESPIRATORY (INHALATION) at 20:49

## 2024-12-15 RX ADMIN — Medication 2 L/MIN: at 08:18

## 2024-12-15 RX ADMIN — DILTIAZEM HYDROCHLORIDE 360 MG: 120 CAPSULE, COATED, EXTENDED RELEASE ORAL at 10:03

## 2024-12-15 RX ADMIN — OXYCODONE HYDROCHLORIDE 10 MG: 10 TABLET ORAL at 22:13

## 2024-12-15 RX ADMIN — DULOXETINE HYDROCHLORIDE 20 MG: 20 CAPSULE, DELAYED RELEASE ORAL at 10:03

## 2024-12-15 RX ADMIN — BUDESONIDE 0.5 MG: 0.5 INHALANT ORAL at 08:18

## 2024-12-15 RX ADMIN — HYDROXYUREA 1000 MG: 500 CAPSULE ORAL at 10:04

## 2024-12-15 RX ADMIN — HYDROXYZINE HYDROCHLORIDE 50 MG: 25 TABLET ORAL at 20:20

## 2024-12-15 RX ADMIN — FUROSEMIDE 40 MG: 40 TABLET ORAL at 10:04

## 2024-12-15 RX ADMIN — PRAMIPEXOLE DIHYDROCHLORIDE 1 MG: 1 TABLET ORAL at 20:20

## 2024-12-15 RX ADMIN — SPIRONOLACTONE 25 MG: 25 TABLET ORAL at 10:03

## 2024-12-15 RX ADMIN — OXYCODONE HYDROCHLORIDE 10 MG: 10 TABLET ORAL at 01:28

## 2024-12-15 RX ADMIN — FUROSEMIDE 40 MG: 10 INJECTION, SOLUTION INTRAMUSCULAR; INTRAVENOUS at 01:28

## 2024-12-15 RX ADMIN — PRAMIPEXOLE DIHYDROCHLORIDE 1 MG: 1 TABLET ORAL at 10:03

## 2024-12-15 ASSESSMENT — COGNITIVE AND FUNCTIONAL STATUS - GENERAL
DRESSING REGULAR LOWER BODY CLOTHING: A LOT
DAILY ACTIVITIY SCORE: 20
WALKING IN HOSPITAL ROOM: A LITTLE
STANDING UP FROM CHAIR USING ARMS: A LITTLE
STANDING UP FROM CHAIR USING ARMS: A LITTLE
DRESSING REGULAR LOWER BODY CLOTHING: A LOT
DRESSING REGULAR UPPER BODY CLOTHING: A LITTLE
DRESSING REGULAR UPPER BODY CLOTHING: A LITTLE
MOVING TO AND FROM BED TO CHAIR: A LITTLE
MOBILITY SCORE: 20
WALKING IN HOSPITAL ROOM: A LITTLE
CLIMB 3 TO 5 STEPS WITH RAILING: A LOT
DAILY ACTIVITIY SCORE: 20
HELP NEEDED FOR BATHING: A LITTLE
MOBILITY SCORE: 19
CLIMB 3 TO 5 STEPS WITH RAILING: A LOT
HELP NEEDED FOR BATHING: A LITTLE

## 2024-12-15 ASSESSMENT — PAIN - FUNCTIONAL ASSESSMENT
PAIN_FUNCTIONAL_ASSESSMENT: 0-10

## 2024-12-15 ASSESSMENT — PAIN SCALES - GENERAL
PAINLEVEL_OUTOF10: 8
PAINLEVEL_OUTOF10: 7
PAINLEVEL_OUTOF10: 7
PAINLEVEL_OUTOF10: 5 - MODERATE PAIN
PAINLEVEL_OUTOF10: 8
PAINLEVEL_OUTOF10: 2
PAINLEVEL_OUTOF10: 7
PAINLEVEL_OUTOF10: 4
PAINLEVEL_OUTOF10: 2
PAINLEVEL_OUTOF10: 8
PAINLEVEL_OUTOF10: 5 - MODERATE PAIN

## 2024-12-15 ASSESSMENT — PAIN DESCRIPTION - LOCATION
LOCATION: BACK

## 2024-12-15 NOTE — PROGRESS NOTES
"Neisha Graham is a 64 y.o. female on day 4 of admission presenting with Acute on chronic congestive heart failure, unspecified heart failure type.    Subjective   Pt is feeling better. She had episode of tachycardia yesterday and required 1x lopressor.        Objective     Physical Exam  Vitals and nursing note reviewed.   Constitutional:       General: She is not in acute distress.     Appearance: She is not ill-appearing or toxic-appearing.   HENT:      Head: Normocephalic and atraumatic.      Nose: Nose normal.      Mouth/Throat:      Mouth: Mucous membranes are moist.   Eyes:      Extraocular Movements: Extraocular movements intact.      Conjunctiva/sclera: Conjunctivae normal.      Pupils: Pupils are equal, round, and reactive to light.   Cardiovascular:      Rate and Rhythm: Normal rate and regular rhythm.      Heart sounds: Murmur heard.      No gallop.   Pulmonary:      Effort: No respiratory distress.      Breath sounds: Rhonchi present. No wheezing or rales.      Comments: Decreased breath sounds at bilateral lung bases  Abdominal:      General: Abdomen is flat. Bowel sounds are normal. There is no distension.      Palpations: Abdomen is soft. There is no mass.      Tenderness: There is no abdominal tenderness.   Musculoskeletal:         General: Swelling present. No tenderness. Normal range of motion.      Cervical back: Normal range of motion and neck supple.      Right lower leg: Edema present.      Left lower leg: Edema present.   Skin:     General: Skin is warm and dry.   Neurological:      Mental Status: She is alert and oriented to person, place, and time.      Motor: Weakness present.   Psychiatric:         Mood and Affect: Mood normal.         Behavior: Behavior normal.         Thought Content: Thought content normal.         Judgment: Judgment normal.         Last Recorded Vitals:  /69   Pulse 86   Temp 36.8 °C (98.2 °F) (Temporal)   Resp 20   Ht 1.626 m (5' 4\")   Wt 142 kg (312 lb " 6.3 oz)   SpO2 95%   BMI 53.62 kg/m²      Scheduled medications:  budesonide, 0.5 mg, nebulization, BID  dilTIAZem CD, 360 mg, oral, Daily  DULoxetine, 20 mg, oral, Daily  eucerin, , Topical, Daily  formoterol, 20 mcg, nebulization, BID  furosemide, 40 mg, oral, Daily  hydroxyurea, 1,000 mg, oral, Daily  ipratropium-albuteroL, 3 mL, nebulization, TID  nicotine, 1 patch, transdermal, Daily  nystatin, 1 Application, Topical, BID  perflutren lipid microspheres, 0.5-10 mL of dilution, intravenous, Once in imaging  perflutren protein A microsphere, 0.5 mL, intravenous, Once in imaging  polyethylene glycol, 17 g, oral, Daily  pramipexole, 1 mg, oral, BID  sennosides-docusate sodium, 2 tablet, oral, BID  spironolactone, 25 mg, oral, Daily  sulfur hexafluoride microsphr, 2 mL, intravenous, Once in imaging  tiotropium, 2 puff, inhalation, Daily  warfarin, 7.5 mg, oral, Daily  zinc oxide, 1 Application, Topical, TID      Continuous medications:     PRN medications:  PRN medications: acetaminophen **OR** acetaminophen **OR** acetaminophen, hydrOXYzine HCL, ipratropium-albuteroL, oxyCODONE **OR** oxyCODONE, oxygen, sodium chloride     Relevant Results:  Results for orders placed or performed during the hospital encounter of 12/11/24 (from the past 24 hours)   CBC   Result Value Ref Range    WBC 8.0 4.4 - 11.3 x10*3/uL    nRBC 0.0 0.0 - 0.0 /100 WBCs    RBC 4.15 4.00 - 5.20 x10*6/uL    Hemoglobin 13.6 12.0 - 16.0 g/dL    Hematocrit 44.2 36.0 - 46.0 %     (H) 80 - 100 fL    MCH 32.8 26.0 - 34.0 pg    MCHC 30.8 (L) 32.0 - 36.0 g/dL    RDW 13.9 11.5 - 14.5 %    Platelets 598 (H) 150 - 450 x10*3/uL   Magnesium   Result Value Ref Range    Magnesium 1.65 1.60 - 2.40 mg/dL   Comprehensive Metabolic Panel   Result Value Ref Range    Glucose 111 (H) 74 - 99 mg/dL    Sodium 139 136 - 145 mmol/L    Potassium 3.5 3.5 - 5.3 mmol/L    Chloride 91 (L) 98 - 107 mmol/L    Bicarbonate 36 (H) 21 - 32 mmol/L    Anion Gap 16 10 - 20 mmol/L     Urea Nitrogen 18 6 - 23 mg/dL    Creatinine 0.94 0.50 - 1.05 mg/dL    eGFR 67 >60 mL/min/1.73m*2    Calcium 8.8 8.6 - 10.3 mg/dL    Albumin 3.5 3.4 - 5.0 g/dL    Alkaline Phosphatase 138 (H) 33 - 136 U/L    Total Protein 6.3 (L) 6.4 - 8.2 g/dL    AST 13 9 - 39 U/L    Bilirubin, Total 0.8 0.0 - 1.2 mg/dL    ALT 12 7 - 45 U/L   Protime-INR   Result Value Ref Range    Protime 25.6 (H) 9.8 - 12.8 seconds    INR 2.3 (H) 0.9 - 1.1     *Note: Due to a large number of results and/or encounters for the requested time period, some results have not been displayed. A complete set of results can be found in Results Review.       Electrocardiogram, 12-lead PRN ACS symtpoms    Result Date: 12/13/2024  Sinus tachycardia with Premature supraventricular complexes Otherwise normal ECG When compared with ECG of 11-DEC-2024 16:58, (unconfirmed) Premature supraventricular complexes are now Present Incomplete right bundle branch block is no longer Present                    Assessment/Plan   Assessment & Plan  Acute on chronic congestive heart failure, unspecified heart failure type    Acute hypoxic respiratory failure 2/2 HFpEF exacerbation  Hx COPD not in exacerbation  - on 2L NC  - cardio follwing  - change to PO lasix  - resume cardizem  - duoneb  - spiriva  - aldactone    A fib RVR now back in NSR  - sp lopressor  - increased cardizem  - monitor    Leukocytosis  resolved    Intertrigo  - nystatin    Normocytic anemia  - monitor    HTN  - increased cardizem  - losartan    Chronic constipation  - bowel regimen    Chronic thrombocytosis  - hydroxyurea    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

## 2024-12-15 NOTE — PROGRESS NOTES
Subjective Data:  SVT yesterday morning at 11:20am with 's-- event lasted 35 minutes and resolved after receiving IV lopressor. Patient felt heart racing palpitations with mild chest pressure and SOB during event.  Symptoms resolved when back in NSR.         Objective Data:  Last Recorded Vitals:  Vitals:    12/14/24 1729 12/14/24 2024 12/14/24 2102 12/15/24 0506   BP: 123/78  133/75 140/84   BP Location:   Left arm Left arm   Patient Position:   Sitting Sitting   Pulse: 93  92 95   Resp:   18 20   Temp:   36.9 °C (98.4 °F) 36.8 °C (98.2 °F)   TempSrc:   Temporal Temporal   SpO2:  93% 95% 95%   Weight:    142 kg (312 lb 6.3 oz)   Height:           Last Labs:  CBC - 12/15/2024:  5:31 AM  8.0 13.6 598    44.2      CMP - 12/15/2024:  5:31 AM  8.8 6.3 13 --- 0.8   3.5 3.5 12 138      PTT - 9/16/2024: 12:32 AM  2.3   25.6 57     TROPHS   Date/Time Value Ref Range Status   12/11/2024 10:19 PM 26 0 - 13 ng/L Final   12/11/2024 04:51 PM 28 0 - 13 ng/L Final   09/16/2024 07:07 AM 21 0 - 13 ng/L Final     BNP   Date/Time Value Ref Range Status   12/11/2024 04:51  0 - 99 pg/mL Final   09/15/2024 11:42 PM 87 0 - 99 pg/mL Final     HGBA1C   Date/Time Value Ref Range Status   12/11/2024 04:51 PM 5.5 See comment % Final   08/13/2024 07:35 AM 5.9 see below % Final     LDLCALC   Date/Time Value Ref Range Status   12/11/2024 04:51 PM 87 <=99 mg/dL Final     Comment:                                 Near   Borderline      AGE      Desirable  Optimal    High     High     Very High     0-19 Y     0 - 109     ---    110-129   >/= 130     ----    20-24 Y     0 - 119     ---    120-159   >/= 160     ----      >24 Y     0 -  99   100-129  130-159   160-189     >/=190       VLDL   Date/Time Value Ref Range Status   12/11/2024 04:51 PM 21 0 - 40 mg/dL Final   11/14/2022 11:01 AM 40 0 - 40 mg/dL Final   09/27/2020 12:15 AM 21 0 - 40 mg/dL Final   06/19/2020 03:03 PM 16 0 - 40 mg/dL Final      Last I/O:  I/O last 3 completed  shifts:  In: 1760 (12.4 mL/kg) [P.O.:1760]  Out: 6550 (46.2 mL/kg) [Urine:6550 (1.3 mL/kg/hr)]  Weight: 141.7 kg     Past Cardiology Tests (Last 3 Years):  Echo:  Transthoracic Echo (TTE) Complete 12/12/2024  CONCLUSIONS:   1. The left ventricular systolic function is normal, with a visually estimated ejection fraction of 60-65%.   2. Spectral Doppler shows a Grade I (impaired relaxation pattern) of left ventricular diastolic filling with normal left atrial filling pressure.   3. There is moderately increased septal and moderately increased posterior left ventricular wall thickness.   4. There is moderate concentric left ventricular hypertrophy.   5. There is reduced right ventricular systolic function.   6. Moderately enlarged right ventricle.   7. The left atrium is mildly dilated.   8. There is moderate mitral annular calcification.   9. There is moderate mitral valve stenosis.  10. Moderate to severe aortic valve stenosis.  11. Moderately elevated pulmonary artery pressure.  12. There is mild mitral and tricuspid regurgitation.     Transthoracic Echo (TTE) Complete 09/18/2024  CONCLUSIONS:   1. The left ventricular systolic function is normal, with a visually estimated ejection fraction of 60-65%.   2. Spectral Doppler shows an impaired relaxation pattern of left ventricular diastolic filling.   3. There is normal right ventricular global systolic function.   4. The left atrium is mildly dilated.   5. There is severe mitral annular calcification.   6. Severe aortic valve stenosis.      Inpatient Medications:  Scheduled medications   Medication Dose Route Frequency    budesonide  0.5 mg nebulization BID    dilTIAZem CD  360 mg oral Daily    DULoxetine  20 mg oral Daily    eucerin   Topical Daily    formoterol  20 mcg nebulization BID    furosemide  40 mg intravenous q8h    hydroxyurea  1,000 mg oral Daily    ipratropium-albuteroL  3 mL nebulization TID    losartan  25 mg oral Daily    nicotine  1 patch  transdermal Daily    nystatin  1 Application Topical BID    perflutren lipid microspheres  0.5-10 mL of dilution intravenous Once in imaging    perflutren protein A microsphere  0.5 mL intravenous Once in imaging    polyethylene glycol  17 g oral Daily    pramipexole  1 mg oral BID    sennosides-docusate sodium  2 tablet oral BID    spironolactone  25 mg oral Daily    sulfur hexafluoride microsphr  2 mL intravenous Once in imaging    tiotropium  2 puff inhalation Daily    warfarin  7.5 mg oral Daily    zinc oxide  1 Application Topical TID     PRN medications   Medication    acetaminophen    Or    acetaminophen    Or    acetaminophen    hydrOXYzine HCL    ipratropium-albuteroL    oxyCODONE    Or    oxyCODONE    oxygen     Continuous Medications   Medication Dose Last Rate       Physical Exam:  Constitutional: Pleasant, Awake/Alert/Oriented to person place and time.   Head: Atraumatic, Normocephalic  Eyes: EOMI. MICHELE  Neck: -JVD  Cardiovascular: Regular rate and rhythm, S1, S2. 3/6 murmur RUSB   Respiratory: Lungs clear to auscultate   Abdomen: Soft, Obese. Bowel sounds appreciated  Musculoskeletal: ROM intact. Muscle strength grossly intact upper and lower extremities 5/5.   Neurological: CNII-XII intact. Sensation grossly intact  Extremities: Chronic BLE lymphedema, trace pitting edema BLE  Psychiatric: Appropriate mood and affect     Assessment/Plan   Very pleasant  64 y.o. 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       Assessment:  Acute on chronic hypoxic respiratory failure  Acute decompensated diastolic HF  H/o moderate-severe aortic stenosis   Hypertension  Newly identified a.fib RVR-- back in NSR     Plan:  -Transition to PO lasix 40mg daily   -Continue spironolactone  25mg daily  -Increase diltiazem to 360mg daily  -Stop losartan to avoid hypotension; no clear indication as EF is preserved  -Continue warfarin for goal INR 2-3  -Strict I/O, monitor renal function and electrolytes   -Recommend monitoring overnight for recurrent arrhythmia after CCB dose adjustment.   -Will arrange for cardiology and EP follow up     Peripheral IV 12/12/24 22 G Distal;Right Forearm (Active)   Site Assessment Clean;Dry 12/15/24 0727   Dressing Status Dry;Clean 12/15/24 0727   Number of days: 3       Code Status:  Full Code      LAKEISHA Mackey-CNP

## 2024-12-16 ENCOUNTER — DOCUMENTATION (OUTPATIENT)
Dept: HOME HEALTH SERVICES | Facility: HOME HEALTH | Age: 64
End: 2024-12-16
Payer: MEDICARE

## 2024-12-16 VITALS
BODY MASS INDEX: 50.02 KG/M2 | TEMPERATURE: 97.2 F | RESPIRATION RATE: 20 BRPM | OXYGEN SATURATION: 94 % | HEIGHT: 64 IN | SYSTOLIC BLOOD PRESSURE: 124 MMHG | HEART RATE: 89 BPM | DIASTOLIC BLOOD PRESSURE: 74 MMHG | WEIGHT: 293 LBS

## 2024-12-16 LAB
ALBUMIN SERPL BCP-MCNC: 3.5 G/DL (ref 3.4–5)
ALP SERPL-CCNC: 117 U/L (ref 33–136)
ALT SERPL W P-5'-P-CCNC: 10 U/L (ref 7–45)
ANION GAP SERPL CALC-SCNC: 10 MMOL/L (ref 10–20)
AST SERPL W P-5'-P-CCNC: 11 U/L (ref 9–39)
BILIRUB SERPL-MCNC: 0.6 MG/DL (ref 0–1.2)
BUN SERPL-MCNC: 19 MG/DL (ref 6–23)
CALCIUM SERPL-MCNC: 8.8 MG/DL (ref 8.6–10.3)
CHLORIDE SERPL-SCNC: 95 MMOL/L (ref 98–107)
CO2 SERPL-SCNC: 37 MMOL/L (ref 21–32)
CREAT SERPL-MCNC: 0.83 MG/DL (ref 0.5–1.05)
EGFRCR SERPLBLD CKD-EPI 2021: 78 ML/MIN/1.73M*2
ERYTHROCYTE [DISTWIDTH] IN BLOOD BY AUTOMATED COUNT: 13.5 % (ref 11.5–14.5)
GLUCOSE SERPL-MCNC: 116 MG/DL (ref 74–99)
HCT VFR BLD AUTO: 40.6 % (ref 36–46)
HGB BLD-MCNC: 13 G/DL (ref 12–16)
INR PPP: 2.7 (ref 0.9–1.1)
MAGNESIUM SERPL-MCNC: 1.82 MG/DL (ref 1.6–2.4)
MCH RBC QN AUTO: 33.6 PG (ref 26–34)
MCHC RBC AUTO-ENTMCNC: 32 G/DL (ref 32–36)
MCV RBC AUTO: 105 FL (ref 80–100)
NRBC BLD-RTO: 0 /100 WBCS (ref 0–0)
PLATELET # BLD AUTO: 541 X10*3/UL (ref 150–450)
POTASSIUM SERPL-SCNC: 3.4 MMOL/L (ref 3.5–5.3)
PROT SERPL-MCNC: 6.3 G/DL (ref 6.4–8.2)
PROTHROMBIN TIME: 30.8 SECONDS (ref 9.8–12.8)
RBC # BLD AUTO: 3.87 X10*6/UL (ref 4–5.2)
SODIUM SERPL-SCNC: 139 MMOL/L (ref 136–145)
WBC # BLD AUTO: 7.6 X10*3/UL (ref 4.4–11.3)

## 2024-12-16 PROCEDURE — 2500000004 HC RX 250 GENERAL PHARMACY W/ HCPCS (ALT 636 FOR OP/ED)

## 2024-12-16 PROCEDURE — 94668 MNPJ CHEST WALL SBSQ: CPT

## 2024-12-16 PROCEDURE — 85027 COMPLETE CBC AUTOMATED: CPT

## 2024-12-16 PROCEDURE — 83735 ASSAY OF MAGNESIUM: CPT

## 2024-12-16 PROCEDURE — 2500000002 HC RX 250 W HCPCS SELF ADMINISTERED DRUGS (ALT 637 FOR MEDICARE OP, ALT 636 FOR OP/ED): Performed by: NURSE PRACTITIONER

## 2024-12-16 PROCEDURE — 2500000001 HC RX 250 WO HCPCS SELF ADMINISTERED DRUGS (ALT 637 FOR MEDICARE OP): Performed by: INTERNAL MEDICINE

## 2024-12-16 PROCEDURE — 85610 PROTHROMBIN TIME: CPT | Performed by: STUDENT IN AN ORGANIZED HEALTH CARE EDUCATION/TRAINING PROGRAM

## 2024-12-16 PROCEDURE — 99239 HOSP IP/OBS DSCHRG MGMT >30: CPT | Performed by: STUDENT IN AN ORGANIZED HEALTH CARE EDUCATION/TRAINING PROGRAM

## 2024-12-16 PROCEDURE — 36415 COLL VENOUS BLD VENIPUNCTURE: CPT

## 2024-12-16 PROCEDURE — 2500000002 HC RX 250 W HCPCS SELF ADMINISTERED DRUGS (ALT 637 FOR MEDICARE OP, ALT 636 FOR OP/ED): Performed by: INTERNAL MEDICINE

## 2024-12-16 PROCEDURE — S4991 NICOTINE PATCH NONLEGEND: HCPCS | Performed by: INTERNAL MEDICINE

## 2024-12-16 PROCEDURE — 99232 SBSQ HOSP IP/OBS MODERATE 35: CPT | Performed by: STUDENT IN AN ORGANIZED HEALTH CARE EDUCATION/TRAINING PROGRAM

## 2024-12-16 PROCEDURE — 2500000002 HC RX 250 W HCPCS SELF ADMINISTERED DRUGS (ALT 637 FOR MEDICARE OP, ALT 636 FOR OP/ED)

## 2024-12-16 PROCEDURE — 94760 N-INVAS EAR/PLS OXIMETRY 1: CPT

## 2024-12-16 PROCEDURE — 80053 COMPREHEN METABOLIC PANEL: CPT | Performed by: STUDENT IN AN ORGANIZED HEALTH CARE EDUCATION/TRAINING PROGRAM

## 2024-12-16 PROCEDURE — 2500000001 HC RX 250 WO HCPCS SELF ADMINISTERED DRUGS (ALT 637 FOR MEDICARE OP): Performed by: STUDENT IN AN ORGANIZED HEALTH CARE EDUCATION/TRAINING PROGRAM

## 2024-12-16 PROCEDURE — 94640 AIRWAY INHALATION TREATMENT: CPT

## 2024-12-16 PROCEDURE — 2500000001 HC RX 250 WO HCPCS SELF ADMINISTERED DRUGS (ALT 637 FOR MEDICARE OP)

## 2024-12-16 PROCEDURE — 2500000002 HC RX 250 W HCPCS SELF ADMINISTERED DRUGS (ALT 637 FOR MEDICARE OP, ALT 636 FOR OP/ED): Performed by: STUDENT IN AN ORGANIZED HEALTH CARE EDUCATION/TRAINING PROGRAM

## 2024-12-16 RX ORDER — SPIRONOLACTONE 25 MG/1
25 TABLET ORAL DAILY
Qty: 30 TABLET | Refills: 0 | Status: SHIPPED | OUTPATIENT
Start: 2024-12-16 | End: 2025-01-08 | Stop reason: SDUPTHER

## 2024-12-16 RX ORDER — FUROSEMIDE 40 MG/1
40 TABLET ORAL DAILY
Qty: 30 TABLET | Refills: 0 | Status: SHIPPED | OUTPATIENT
Start: 2024-12-16 | End: 2024-12-16

## 2024-12-16 RX ORDER — SPIRONOLACTONE 25 MG/1
25 TABLET ORAL DAILY
Qty: 30 TABLET | Refills: 0 | Status: SHIPPED | OUTPATIENT
Start: 2024-12-16 | End: 2024-12-16

## 2024-12-16 RX ORDER — DILTIAZEM HYDROCHLORIDE 360 MG/1
360 CAPSULE, EXTENDED RELEASE ORAL DAILY
Qty: 30 CAPSULE | Refills: 0 | Status: SHIPPED | OUTPATIENT
Start: 2024-12-16 | End: 2024-12-16

## 2024-12-16 RX ORDER — DILTIAZEM HYDROCHLORIDE 360 MG/1
360 CAPSULE, EXTENDED RELEASE ORAL DAILY
Qty: 30 CAPSULE | Refills: 0 | Status: SHIPPED | OUTPATIENT
Start: 2024-12-16 | End: 2025-01-15

## 2024-12-16 RX ORDER — FUROSEMIDE 40 MG/1
40 TABLET ORAL DAILY
Qty: 30 TABLET | Refills: 0 | Status: SHIPPED | OUTPATIENT
Start: 2024-12-16 | End: 2024-12-31 | Stop reason: ALTCHOICE

## 2024-12-16 RX ADMIN — IPRATROPIUM BROMIDE AND ALBUTEROL SULFATE 3 ML: 2.5; .5 SOLUTION RESPIRATORY (INHALATION) at 10:51

## 2024-12-16 RX ADMIN — SPIRONOLACTONE 25 MG: 25 TABLET ORAL at 09:10

## 2024-12-16 RX ADMIN — FUROSEMIDE 40 MG: 40 TABLET ORAL at 09:10

## 2024-12-16 RX ADMIN — OXYCODONE HYDROCHLORIDE 10 MG: 10 TABLET ORAL at 11:09

## 2024-12-16 RX ADMIN — BUDESONIDE 0.5 MG: 0.5 INHALANT ORAL at 10:51

## 2024-12-16 RX ADMIN — DULOXETINE HYDROCHLORIDE 20 MG: 20 CAPSULE, DELAYED RELEASE ORAL at 09:10

## 2024-12-16 RX ADMIN — SKIN PROTECTANT: 33 OINTMENT TOPICAL at 09:12

## 2024-12-16 RX ADMIN — DILTIAZEM HYDROCHLORIDE 360 MG: 120 CAPSULE, COATED, EXTENDED RELEASE ORAL at 09:08

## 2024-12-16 RX ADMIN — FORMOTEROL FUMARATE DIHYDRATE 20 MCG: 20 SOLUTION RESPIRATORY (INHALATION) at 10:51

## 2024-12-16 RX ADMIN — NYSTATIN 1 APPLICATION: 100000 POWDER TOPICAL at 09:12

## 2024-12-16 RX ADMIN — OXYCODONE HYDROCHLORIDE 10 MG: 10 TABLET ORAL at 06:02

## 2024-12-16 RX ADMIN — NICOTINE 1 PATCH: 14 PATCH, EXTENDED RELEASE TRANSDERMAL at 09:14

## 2024-12-16 RX ADMIN — TIOTROPIUM BROMIDE INHALATION SPRAY 2 PUFF: 3.12 SPRAY, METERED RESPIRATORY (INHALATION) at 06:05

## 2024-12-16 RX ADMIN — Medication 1 APPLICATION: at 09:11

## 2024-12-16 RX ADMIN — HYDROXYUREA 1000 MG: 500 CAPSULE ORAL at 09:10

## 2024-12-16 ASSESSMENT — COGNITIVE AND FUNCTIONAL STATUS - GENERAL
STANDING UP FROM CHAIR USING ARMS: A LITTLE
DAILY ACTIVITIY SCORE: 20
MOVING TO AND FROM BED TO CHAIR: A LITTLE
DRESSING REGULAR UPPER BODY CLOTHING: A LITTLE
DRESSING REGULAR LOWER BODY CLOTHING: A LOT
HELP NEEDED FOR BATHING: A LITTLE
MOBILITY SCORE: 19
WALKING IN HOSPITAL ROOM: A LITTLE
CLIMB 3 TO 5 STEPS WITH RAILING: A LOT

## 2024-12-16 ASSESSMENT — PAIN SCALES - GENERAL
PAINLEVEL_OUTOF10: 8
PAINLEVEL_OUTOF10: 7
PAINLEVEL_OUTOF10: 8
PAINLEVEL_OUTOF10: 3
PAINLEVEL_OUTOF10: 0 - NO PAIN

## 2024-12-16 ASSESSMENT — PAIN - FUNCTIONAL ASSESSMENT
PAIN_FUNCTIONAL_ASSESSMENT: 0-10

## 2024-12-16 ASSESSMENT — PAIN SCALES - WONG BAKER: WONGBAKER_NUMERICALRESPONSE: HURTS LITTLE BIT

## 2024-12-16 ASSESSMENT — PAIN DESCRIPTION - ORIENTATION: ORIENTATION: LOWER

## 2024-12-16 ASSESSMENT — PAIN DESCRIPTION - LOCATION: LOCATION: BACK

## 2024-12-16 NOTE — PROGRESS NOTES
12/16/24 0955   Discharge Planning   Living Arrangements Alone   Support Systems Children;Family members   Assistance Needed A&Ox3, mobilizes with motorized wheelchair, transfers with a walker, has walk in shower, does not drive (receives transport through insurance [id number]), is on room air during the day and wears O2 at 2LPM via NC through [employer]-CURRENTLY 2L NC, does not use CPAP/BIPAP, active with [facility] for SN 2X per week for wound care. Patient's PCP is Dr. Micheal Gonsalez   Type of Residence Private residence   Number of Stairs to Enter Residence 0   Number of Stairs Within Residence 0   Do you have animals or pets at home? No   Who is requesting discharge planning? Provider   Home or Post Acute Services In home services   Type of Home Care Services Home nursing visits   Expected Discharge Disposition Home Health   Does the patient need discharge transport arranged? Yes   RoundTrip coordination needed? Yes   Has discharge transport been arranged? No

## 2024-12-16 NOTE — DISCHARGE SUMMARY
Discharge Diagnosis  Acute on chronic congestive heart failure, unspecified heart failure type    Issues Requiring Follow-Up  Post hospital follow up    Discharge Meds     Medication List      START taking these medications     furosemide 40 mg tablet; Commonly known as: Lasix; Take 1 tablet (40 mg)   by mouth once daily.   sennosides-docusate sodium 8.6-50 mg tablet; Commonly known as:   Rebecca-Colace; Take 2 tablets by mouth 2 times a day.   spironolactone 25 mg tablet; Commonly known as: Aldactone; Take 1 tablet   (25 mg) by mouth once daily.     CHANGE how you take these medications     dilTIAZem  mg 24 hr capsule; Commonly known as: Tiazac; Take 1   capsule (360 mg) by mouth once daily.; What changed: medication strength,   how much to take     CONTINUE taking these medications     albuterol 90 mcg/actuation inhaler; INHALE 2 PUFFS BY MOUTH AND INTO THE   LUNGS EVERY 4 HOURS IF NEEDED FOR WHEEZING   ammonium lactate 12 % cream; Commonly known as: Amlactin   Boudreauxs Butt Paste 40 % ointment ointment; Generic drug: zinc oxide;   Apply 1 Application topically 3 times a day. Apply to groin, buttocks   after nystatin   clotrimazole 1 % cream; Commonly known as: Lotrimin; Apply topically 2   times a day. apply to affected area   cyclobenzaprine 10 mg tablet; Commonly known as: Flexeril; Take 1 tablet   (10 mg) by mouth 2 times a day as needed for muscle spasms.   DULoxetine 20 mg DR capsule; Commonly known as: Cymbalta; TAKE 1 CAPSULE   BY MOUTH ONCE DAILY   gentamicin 0.1 % ointment; Commonly known as: Garamycin; Apply to wounds   with dressing changes   hydroxyurea 500 mg capsule; Commonly known as: Hydrea; Take 2 capsules   (1,000 mg total) by mouth once daily.   hydrOXYzine HCL 50 mg tablet; Commonly known as: Atarax; Take 1 tablet   (50 mg) by mouth every 8 hours if needed for anxiety.   ipratropium-albuteroL 0.5-2.5 mg/3 mL nebulizer solution; Commonly known   as: Duo-Neb; Take 3 mL by nebulization 4  times a day as needed for   wheezing or shortness of breath.   * lidocaine 5 % gel; Apply 1 inch topically 3 times a day as needed   (apply to affected area).   * lidocaine 5 % ointment; Commonly known as: Xylocaine; Apply topically   if needed for mild pain (1 - 3). Apply up to three times a day as needed   for pain.   naloxone 4 mg/0.1 mL nasal spray; Commonly known as: Narcan; Administer   1 spray (4 mg) into affected nostril(s) if needed for opioid reversal or   respiratory depression.   nebulizer accessories kit; UAD   nystatin cream; Commonly known as: Mycostatin; APPLY TO AFFECTED AREA   TWO TO THREE TIMES A DAY   * oxyCODONE 10 mg immediate release tablet; Commonly known as:   Roxicodone; Take 1 tablet (10 mg) by mouth every 4 hours if needed for   severe pain (7 - 10) for up to 28 days. Do not fill before November 25, 2024.   * oxyCODONE 10 mg immediate release tablet; Commonly known as:   Roxicodone; Take 1 tablet (10 mg) by mouth every 4 hours if needed for   severe pain (7 - 10) for up to 28 days. Do not fill before December 23, 2024.; Start taking on: December 23, 2024   * oxyCODONE 10 mg immediate release tablet; Commonly known as:   Roxicodone; Take 1 tablet (10 mg) by mouth every 4 hours if needed for   severe pain (7 - 10) for up to 28 days. Do not fill before January 20, 2025.; Start taking on: January 20, 2025   pramipexole 1 mg tablet; Commonly known as: Mirapex   ProSource No Carb 15-60 gram-kcal/30 mL liquid; Generic drug: amino   acids-protein hydrolys   Trelegy Ellipta 200-62.5-25 mcg blister with device; Generic drug:   fluticasone-umeclidin-vilanter; INHALE 1 PUFF BY MOUTH AND IN TO LUNGS   ONCE DAILY   * triamcinolone 0.1 % cream; Commonly known as: Kenalog   * triamcinolone 0.1 % cream; Commonly known as: Kenalog; Apply to   affected area with dressing changes   warfarin 7.5 mg tablet; Commonly known as: Coumadin; Take one po every   day   white petrolatum 41 % ointment ointment;  Commonly known as: Aquaphor;   Apply 1 Application topically 2 times a day. Apply to legs and abdomen  * This list has 7 medication(s) that are the same as other medications   prescribed for you. Read the directions carefully, and ask your doctor or   other care provider to review them with you.     STOP taking these medications     amoxicillin-pot clavulanate 875-125 mg tablet; Commonly known as:   Augmentin   Lyrica 50 mg capsule; Generic drug: pregabalin   methylPREDNISolone 4 mg tablets; Commonly known as: Medrol Dospak       Test Results Pending At Discharge  Pending Labs       No current pending labs.            Hospital Course   Neisha Graham is a 64 y.o. year old female  patient with past medical history significant for severe aortic stenosis, nonrheumatic mitral valve stenosis, chronic essential thrombocytosis with history of cavernous sinus thrombosis and chronic thromboembolic disease (on warfarin), HTN, HLD, chronic lymphedema, HFpEF (EF 60 to 65% from 09/18/2024), neurogenic claudication due to lumbar spinal stenosis and radiculopathy, COPD, BE, restless leg syndrome, MDD/MANOLO who came in for SOB. She is treated for  Acute hypoxic respiratory failure 2/2 HFpEF exacerbation  Hx COPD not in exacerbation  - on 2L NC  - cardio follwing  - change to PO lasix  - resume cardizem  - duoneb  - spiriva  - aldactone     A fib RVR now back in NSR  - sp lopressor  - increased cardizem  - monitor     Leukocytosis  resolved     Intertrigo  - nystatin     Normocytic anemia  - monitor    HTN  - increased cardizem  - losartan     Chronic constipation  - bowel regimen     Chronic thrombocytosis  - hydroxyurea     Cavernous sinus thrombosis with INR now within goal  - resumed coumadin  - daily INR     Severe aortic valve stenosis  - cardio following     Nicotine abuse  - nicotine patch     RLS  - pramipexole     Depression/anxiety  - cymbalta  - hydroxyzine PRN    Pt is hemodynamically stable for discharge at this time  with close outpatient follow ups. She is set up with [facility].     The patient was discharged in satisfactory condition.   Medications and side effect profile reviewed with patient.  More than 30 minutes were spent in coordinating patient discharge       Pertinent Physical Exam At Time of Discharge  Physical Exam  Vitals and nursing note reviewed.   Constitutional:       General: She is not in acute distress.     Appearance: She is not ill-appearing or toxic-appearing.   HENT:      Head: Normocephalic and atraumatic.      Nose: Nose normal.      Mouth/Throat:      Mouth: Mucous membranes are moist.   Eyes:      Extraocular Movements: Extraocular movements intact.      Conjunctiva/sclera: Conjunctivae normal.      Pupils: Pupils are equal, round, and reactive to light.   Cardiovascular:      Rate and Rhythm: Normal rate and regular rhythm.      Heart sounds: Murmur heard.      No gallop.   Pulmonary:      Effort: No respiratory distress.      Breath sounds: Rhonchi present. No wheezing or rales.      Comments: Decreased breath sounds at bilateral lung bases  Abdominal:      General: Abdomen is flat. Bowel sounds are normal. There is no distension.      Palpations: Abdomen is soft. There is no mass.      Tenderness: There is no abdominal tenderness.   Musculoskeletal:         General: Swelling present. No tenderness. Normal range of motion.      Cervical back: Normal range of motion and neck supple.      Right lower leg: Edema present.      Left lower leg: Edema present.   Skin:     General: Skin is warm and dry.   Neurological:      Mental Status: She is alert and oriented to person, place, and time.      Motor: Weakness present.   Psychiatric:         Mood and Affect: Mood normal.         Behavior: Behavior normal.         Thought Content: Thought content normal.         Judgment: Judgment normal.     Outpatient Follow-Up  Future Appointments   Date Time Provider Department Center   12/17/2024  9:00 AM Wen Reese PA-C  [id number]   12/31/2024 11:00 AM CAMDEN Mackey [facility]   1/9/2025  3:00 PM Magdy Anton MD [email or web address]   2/12/2025  1:30 PM CAMDEN Lo [facility]   3/20/2025 11:30 AM CAMDEN Mackey [email or web address] [facility]         Diana Tinsley MD  Hospitalist

## 2024-12-16 NOTE — PROGRESS NOTES
Subjective Data:  No further arrhythmias, remains in NSR. Denies CP, palpitations.     Objective Data:  Last Recorded Vitals:  Vitals:    12/15/24 2049 12/15/24 2105 12/16/24 0602 12/16/24 0904   BP:  134/83 135/76 124/74   BP Location:  Left arm Left arm Left arm   Patient Position:  Lying Lying Sitting   Pulse:  87 88 89   Resp:  18 22 20   Temp:  37.1 °C (98.8 °F) 36.8 °C (98.2 °F) 36.2 °C (97.2 °F)   TempSrc:  Temporal Temporal    SpO2: 94% 100% 96% 98%   Weight:       Height:           Last Labs:  CBC - 12/16/2024:  6:03 AM  7.6 13.0 541    40.6      CMP - 12/16/2024:  6:03 AM  8.8 6.3 11 --- 0.6   3.5 3.5 10 117      PTT - 9/16/2024: 12:32 AM  2.7   30.8 57     TROPHS   Date/Time Value Ref Range Status   12/11/2024 10:19 PM 26 0 - 13 ng/L Final   12/11/2024 04:51 PM 28 0 - 13 ng/L Final   09/16/2024 07:07 AM 21 0 - 13 ng/L Final     BNP   Date/Time Value Ref Range Status   12/11/2024 04:51  0 - 99 pg/mL Final   09/15/2024 11:42 PM 87 0 - 99 pg/mL Final     HGBA1C   Date/Time Value Ref Range Status   12/11/2024 04:51 PM 5.5 See comment % Final   08/13/2024 07:35 AM 5.9 see below % Final     LDLCALC   Date/Time Value Ref Range Status   12/11/2024 04:51 PM 87 <=99 mg/dL Final     Comment:                                 Near   Borderline      AGE      Desirable  Optimal    High     High     Very High     0-19 Y     0 - 109     ---    110-129   >/= 130     ----    20-24 Y     0 - 119     ---    120-159   >/= 160     ----      >24 Y     0 -  99   100-129  130-159   160-189     >/=190       VLDL   Date/Time Value Ref Range Status   12/11/2024 04:51 PM 21 0 - 40 mg/dL Final   11/14/2022 11:01 AM 40 0 - 40 mg/dL Final   09/27/2020 12:15 AM 21 0 - 40 mg/dL Final   06/19/2020 03:03 PM 16 0 - 40 mg/dL Final      Last I/O:  I/O last 3 completed shifts:  In: 1570 (11.1 mL/kg) [P.O.:1570]  Out: 2750 (19.4 mL/kg) [Urine:2750 (0.5 mL/kg/hr)]  Weight: 141.7 kg     Past Cardiology Tests (Last 3  Years):  Echo:  Transthoracic Echo (TTE) Complete 12/12/2024  CONCLUSIONS:   1. The left ventricular systolic function is normal, with a visually estimated ejection fraction of 60-65%.   2. Spectral Doppler shows a Grade I (impaired relaxation pattern) of left ventricular diastolic filling with normal left atrial filling pressure.   3. There is moderately increased septal and moderately increased posterior left ventricular wall thickness.   4. There is moderate concentric left ventricular hypertrophy.   5. There is reduced right ventricular systolic function.   6. Moderately enlarged right ventricle.   7. The left atrium is mildly dilated.   8. There is moderate mitral annular calcification.   9. There is moderate mitral valve stenosis.  10. Moderate to severe aortic valve stenosis.  11. Moderately elevated pulmonary artery pressure.  12. There is mild mitral and tricuspid regurgitation.     Transthoracic Echo (TTE) Complete 09/18/2024  CONCLUSIONS:   1. The left ventricular systolic function is normal, with a visually estimated ejection fraction of 60-65%.   2. Spectral Doppler shows an impaired relaxation pattern of left ventricular diastolic filling.   3. There is normal right ventricular global systolic function.   4. The left atrium is mildly dilated.   5. There is severe mitral annular calcification.   6. Severe aortic valve stenosis.    Ejection Fractions:  EF   Date/Time Value Ref Range Status   12/12/2024 03:41 PM 63 %    09/18/2024 12:16 PM 63 %        Inpatient Medications:  Scheduled medications   Medication Dose Route Frequency    budesonide  0.5 mg nebulization BID    dilTIAZem CD  360 mg oral Daily    DULoxetine  20 mg oral Daily    eucerin   Topical Daily    formoterol  20 mcg nebulization BID    furosemide  40 mg oral Daily    hydroxyurea  1,000 mg oral Daily    ipratropium-albuteroL  3 mL nebulization TID    nicotine  1 patch transdermal Daily    nystatin  1 Application Topical BID    perflutren  lipid microspheres  0.5-10 mL of dilution intravenous Once in imaging    perflutren protein A microsphere  0.5 mL intravenous Once in imaging    polyethylene glycol  17 g oral Daily    pramipexole  1 mg oral BID    sennosides-docusate sodium  2 tablet oral BID    spironolactone  25 mg oral Daily    sulfur hexafluoride microsphr  2 mL intravenous Once in imaging    tiotropium  2 puff inhalation Daily    warfarin  7.5 mg oral Daily    zinc oxide  1 Application Topical TID     PRN medications   Medication    acetaminophen    Or    acetaminophen    Or    acetaminophen    hydrOXYzine HCL    ipratropium-albuteroL    oxyCODONE    Or    oxyCODONE    oxygen    sodium chloride     Continuous Medications   Medication Dose Last Rate       Physical Exam  Vitals reviewed.   Constitutional:       Appearance: Normal appearance. She is obese.      Interventions: Nasal cannula in place.   HENT:      Head: Normocephalic and atraumatic.   Neck:      Vascular: No carotid bruit or JVD.   Cardiovascular:      Rate and Rhythm: Normal rate and regular rhythm.      Pulses: Normal pulses.      Heart sounds: Murmur heard.      Systolic murmur is present with a grade of 3/6.   Pulmonary:      Effort: Pulmonary effort is normal.      Breath sounds: Wheezing present.   Chest:      Chest wall: No tenderness.   Abdominal:      General: Abdomen is flat. Bowel sounds are normal.      Palpations: Abdomen is soft.   Musculoskeletal:      Right lower leg: Edema (chronic) present.      Left lower leg: Edema (chronic) present.   Skin:     General: Skin is warm and dry.      Findings: Wound (BLE) present.   Neurological:      General: No focal deficit present.      Mental Status: She is alert and oriented to person, place, and time. Mental status is at baseline.   Psychiatric:         Mood and Affect: Mood normal.         Behavior: Behavior normal.            Assessment/Plan   Assessment  64 y.o. 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       Acute on chronic hypoxic respiratory failure  Acute decompensated diastolic HF  H/o moderate-severe aortic stenosis   Hypertension  Newly identified paroxysmal a.fib RVR-- back in NSR     Plan  -Continue Lasix 40 mg daily, spironolactone 25mg daily  -Continue diltiazem at increased dose -  360mg daily  -Continue warfarin for goal INR 2-3  -OK for discharge - Follow up with cardiology 12/31/2024 and EP 1/9/2025     Peripheral IV 12/12/24 22 G Distal;Right Forearm (Active)   Site Assessment Clean;Dry;Intact 12/15/24 2024   Dressing Status Dry 12/15/24 2024   Number of days: 4       Code Status:  Full Code    I spent minutes in the professional and overall care of this patient.        Rolando Yun, APRN-CNP

## 2024-12-16 NOTE — CARE PLAN
The patient's goals for the shift include  get some rest and DC in the morning     The clinical goals for the shift include pt will report any pain

## 2024-12-17 ENCOUNTER — TELEMEDICINE (OUTPATIENT)
Dept: HEMATOLOGY/ONCOLOGY | Facility: CLINIC | Age: 64
End: 2024-12-17
Payer: MEDICARE

## 2024-12-17 ENCOUNTER — PATIENT OUTREACH (OUTPATIENT)
Dept: PRIMARY CARE | Facility: CLINIC | Age: 64
End: 2024-12-17

## 2024-12-17 DIAGNOSIS — D50.9 IRON DEFICIENCY ANEMIA, UNSPECIFIED IRON DEFICIENCY ANEMIA TYPE: ICD-10-CM

## 2024-12-17 DIAGNOSIS — D47.3 ESSENTIAL THROMBOCYTOSIS: ICD-10-CM

## 2024-12-17 PROCEDURE — 1111F DSCHRG MED/CURRENT MED MERGE: CPT | Performed by: PHYSICIAN ASSISTANT

## 2024-12-17 PROCEDURE — 99214 OFFICE O/P EST MOD 30 MIN: CPT | Performed by: PHYSICIAN ASSISTANT

## 2024-12-17 NOTE — PROGRESS NOTES
Visit Type: Benign Heme Follow-up, Virtual Visit:  A Virtual visit (auido only) between the patient (at the originating site) and the provider (at the distant site) was utilized to provide this telehealth service.   Verbal Consent for Encounter: Verbal consent was requested and obtained from patient, or from parent/guardian if minor, on this date for a telehealth visit.     Patient Visit Information:   Visit Type: Follow Up Visit     Cancer History:   Treatment Synopsis:    Essential thrombocytosis, on Coumadin, aspirin, and hydroxyurea. 2. History of central venous sinus thromboses. 3. Tobacco abuse.     History of Present Illness:   S/P admission in 9/2024 for bacteremia and cellulitis and most recently last week for acute hypoxic respiratory failure 2/2 HFpEF.     Patient presents for follow up. She continues on Hydrea 2 tabs daily, tolerating well.  CPAP machine not working so she hasn't been using it. Is now on 2 diuretics and reports feeling better.  Otherwise doing ok and denies any other complaints at this time.    Review of Systems:    All of the systems have been reviewed and are negative for complaints except what is stated in the HPI and/or past medical history.    Allergies and Intolerances:  Allergies   Allergen Reactions    Vancomycin Other    Macrobid [Nitrofurantoin Monohyd/M-Cryst] Dizziness    Sulfamethoxazole-Trimethoprim Unknown     Outpatient medications:  Current Outpatient Medications on File Prior to Visit   Medication Sig Dispense Refill    albuterol 90 mcg/actuation inhaler INHALE 2 PUFFS BY MOUTH AND INTO THE LUNGS EVERY 4 HOURS IF NEEDED FOR WHEEZING 8 g 3    amino acids-protein hydrolys (ProSource No Carb) 15-60 gram-kcal/30 mL liquid Take by mouth.      ammonium lactate (Amlactin) 12 % cream Apply 1 Film topically 2 times a day as needed.      clotrimazole (Lotrimin) 1 % cream Apply topically 2 times a day. apply to affected area 60 g 1    cyclobenzaprine (Flexeril) 10 mg tablet Take 1  tablet (10 mg) by mouth 2 times a day as needed for muscle spasms. 30 tablet 2    dilTIAZem ER (Tiazac) 360 mg 24 hr capsule Take 1 capsule (360 mg) by mouth once daily. 30 capsule 0    DULoxetine (Cymbalta) 20 mg DR capsule TAKE 1 CAPSULE BY MOUTH ONCE DAILY 90 capsule 1    furosemide (Lasix) 40 mg tablet Take 1 tablet (40 mg) by mouth once daily. 30 tablet 0    gentamicin (Garamycin) 0.1 % ointment Apply to wounds with dressing changes 30 g 1    hydroxyurea (Hydrea) 500 mg capsule Take 2 capsules (1,000 mg total) by mouth once daily. 180 capsule 3    hydrOXYzine HCL (Atarax) 50 mg tablet Take 1 tablet (50 mg) by mouth every 8 hours if needed for anxiety. 30 tablet 3    ipratropium-albuteroL (Duo-Neb) 0.5-2.5 mg/3 mL nebulizer solution Take 3 mL by nebulization 4 times a day as needed for wheezing or shortness of breath. 360 mL 11    lidocaine (Xylocaine) 5 % ointment Apply topically if needed for mild pain (1 - 3). Apply up to three times a day as needed for pain. 35 g 5    lidocaine 5 % gel Apply 1 inch topically 3 times a day as needed (apply to affected area). 100 g 2    naloxone (Narcan) 4 mg/0.1 mL nasal spray Administer 1 spray (4 mg) into affected nostril(s) if needed for opioid reversal or respiratory depression. 2 each 0    nebulizer accessories kit UAD 1 kit 3    nystatin (Mycostatin) cream APPLY TO AFFECTED AREA TWO TO THREE TIMES A DAY 30 g 2    oxyCODONE (Roxicodone) 10 mg immediate release tablet Take 1 tablet (10 mg) by mouth every 4 hours if needed for severe pain (7 - 10) for up to 28 days. Do not fill before November 25, 2024. 168 tablet 0    [START ON 12/23/2024] oxyCODONE (Roxicodone) 10 mg immediate release tablet Take 1 tablet (10 mg) by mouth every 4 hours if needed for severe pain (7 - 10) for up to 28 days. Do not fill before December 23, 2024. 168 tablet 0    [START ON 1/20/2025] oxyCODONE (Roxicodone) 10 mg immediate release tablet Take 1 tablet (10 mg) by mouth every 4 hours if needed  for severe pain (7 - 10) for up to 28 days. Do not fill before January 20, 2025. 168 tablet 0    pramipexole (Mirapex) 1 mg tablet Take 1 tablet (1 mg) by mouth 2 times a day.      sennosides-docusate sodium (Rebecca-Colace) 8.6-50 mg tablet Take 2 tablets by mouth 2 times a day. 120 tablet 0    spironolactone (Aldactone) 25 mg tablet Take 1 tablet (25 mg) by mouth once daily. 30 tablet 0    Trelegy Ellipta 200-62.5-25 mcg blister with device INHALE 1 PUFF BY MOUTH AND IN TO LUNGS ONCE DAILY 180 each 1    triamcinolone (Kenalog) 0.1 % cream APPLY SPARINGLY AND MASSAGE IN ONCE A DAY      triamcinolone (Kenalog) 0.1 % cream Apply to affected area with dressing changes 453.6 g 3    warfarin (Coumadin) 7.5 mg tablet Take one po every day 90 tablet 3    white petrolatum (Aquaphor) 41 % ointment ointment Apply 1 Application topically 2 times a day. Apply to legs and abdomen 50 g 0    zinc oxide 40 % ointment ointment Apply 1 Application topically 3 times a day. Apply to groin, buttocks after nystatin 57 g 1    [DISCONTINUED] amoxicillin-pot clavulanate (Augmentin) 875-125 mg tablet Take 1 tablet (875 mg) by mouth 2 times a day for 10 days. (Patient not taking: Reported on 12/11/2024) 20 tablet 0    [DISCONTINUED] dilTIAZem CD (Tiazac) 360 mg 24 hr capsule Take 1 capsule (360 mg) by mouth once daily. 30 capsule 0    [DISCONTINUED] dilTIAZem ER (Tiazac) 360 mg 24 hr capsule Take 1 capsule (360 mg) by mouth once daily. 30 capsule 0    [DISCONTINUED] dilTIAZem ER (Tiazac) 360 mg 24 hr capsule Take 1 capsule (360 mg) by mouth once daily. 30 capsule 0    [DISCONTINUED] dilTIAZem ER (Tiazac) 360 mg 24 hr capsule Take 1 capsule (360 mg) by mouth once daily. 30 capsule 0    [DISCONTINUED] dilTIAZem ER (Tiazac) 360 mg 24 hr capsule Take 1 capsule (360 mg) by mouth once daily. 30 capsule 0    [DISCONTINUED] furosemide (Lasix) 40 mg tablet Take 1 tablet (40 mg) by mouth once daily. 30 tablet 0    [DISCONTINUED] furosemide (Lasix) 40 mg  tablet Take 1 tablet (40 mg) by mouth once daily. 30 tablet 0    [DISCONTINUED] furosemide (Lasix) 40 mg tablet Take 1 tablet (40 mg) by mouth once daily. 30 tablet 0    [DISCONTINUED] furosemide (Lasix) 40 mg tablet Take 1 tablet (40 mg) by mouth once daily. 30 tablet 0    [DISCONTINUED] lisinopril 20 mg tablet Take 1 tablet (20 mg) by mouth once daily.      [DISCONTINUED] Lyrica 50 mg capsule Take 1 capsule (50 mg) by mouth 3 times a day. (Patient not taking: Reported on 12/11/2024) 180 capsule 2    [DISCONTINUED] methylPREDNISolone (Medrol Dospak) 4 mg tablets Take as directed on package. 21 tablet 0    [DISCONTINUED] spironolactone (Aldactone) 25 mg tablet Take 1 tablet (25 mg) by mouth once daily. 30 tablet 0    [DISCONTINUED] Tiadylt  mg 24 hr capsule TAKE 1 CAPSULE BY MOUTH EVERY DAY 90 capsule 1     Current Facility-Administered Medications on File Prior to Visit   Medication Dose Route Frequency Provider Last Rate Last Admin    [DISCONTINUED] acetaminophen (Tylenol) oral liquid 650 mg  650 mg nasogastric tube q6h PRN Jayy Eastman MD        [DISCONTINUED] acetaminophen (Tylenol) suppository 650 mg  650 mg rectal q6h PRN Jayy Eastman MD        [DISCONTINUED] acetaminophen (Tylenol) tablet 650 mg  650 mg oral q6h PRN Jayy Eastman MD        [DISCONTINUED] budesonide (Pulmicort) 0.5 mg/2 mL nebulizer solution 0.5 mg  0.5 mg nebulization BID Charlie Bennett MD   0.5 mg at 12/16/24 1051    [DISCONTINUED] dilTIAZem CD (Cardizem CD) 24 hr capsule 360 mg  360 mg oral Daily Diana Tinsley MD   360 mg at 12/16/24 0908    [DISCONTINUED] DULoxetine (Cymbalta) DR capsule 20 mg  20 mg oral Daily Charlie Bennett MD   20 mg at 12/16/24 0910    [DISCONTINUED] eucerin cream   Topical Daily Diana Tinsley MD   Given at 12/16/24 0912    [DISCONTINUED] formoterol (Perforomist) 20 mcg/2 mL nebulizer solution 20 mcg  20 mcg nebulization BID Charlie Bennett MD   20 mcg at 12/16/24 1051    [DISCONTINUED] furosemide  (Lasix) tablet 40 mg  40 mg oral Daily Diana Tinsley MD   40 mg at 12/16/24 0910    [DISCONTINUED] hydroxyurea (Hydrea) capsule 1,000 mg  1,000 mg oral Daily Charlie Bennett MD   1,000 mg at 12/16/24 0910    [DISCONTINUED] hydrOXYzine HCL (Atarax) tablet 50 mg  50 mg oral q8h PRN Charlie Bennett MD   50 mg at 12/15/24 2020    [DISCONTINUED] ipratropium-albuteroL (Duo-Neb) 0.5-2.5 mg/3 mL nebulizer solution 3 mL  3 mL nebulization q2h PRN Diana Tinsley MD   3 mL at 12/12/24 0749    [DISCONTINUED] ipratropium-albuteroL (Duo-Neb) 0.5-2.5 mg/3 mL nebulizer solution 3 mL  3 mL nebulization TID Diana Tinsley MD   3 mL at 12/16/24 1051    [DISCONTINUED] nicotine (Nicoderm CQ) 14 mg/24 hr patch 1 patch  1 patch transdermal Daily Jayy Eastman MD   1 patch at 12/16/24 0914    [DISCONTINUED] nystatin (Mycostatin) 100,000 unit/gram powder 1 Application  1 Application Topical BID Charlie Bennett MD   1 Application at 12/16/24 0912    [DISCONTINUED] oxyCODONE (Roxicodone) immediate release tablet 10 mg  10 mg oral q4h PRN Jayy Eastman MD   10 mg at 12/16/24 1109    [DISCONTINUED] oxyCODONE (Roxicodone) immediate release tablet 5 mg  5 mg oral q4h PRN Jayy Eastman MD        [DISCONTINUED] oxygen (O2) therapy   inhalation Continuous PRN - O2/gases Charlie Bennett MD   2 L/min at 12/15/24 2049    [DISCONTINUED] perflutren lipid microspheres (Definity) injection 0.5-10 mL of dilution  0.5-10 mL of dilution intravenous Once in imaging Charlie Bennett MD        [DISCONTINUED] perflutren protein A microsphere (Optison) injection 0.5 mL  0.5 mL intravenous Once in imaging Charlie Benentt MD        [DISCONTINUED] polyethylene glycol (Glycolax, Miralax) packet 17 g  17 g oral Daily Charlie Bennett MD        [DISCONTINUED] pramipexole (Mirapex) tablet 1 mg  1 mg oral BID Charlie Bennett MD   1 mg at 12/15/24 2020    [DISCONTINUED] sennosides-docusate sodium (Rebecca-Colace) 8.6-50 mg per tablet 2 tablet  2 tablet oral BID Charlie  "MD Donald   2 tablet at 12/15/24 2020    [DISCONTINUED] sodium chloride (Ocean) 0.65 % nasal spray 1 spray  1 spray Each Nostril 4x daily PRN Diana Tinsley MD   1 spray at 12/15/24 1424    [DISCONTINUED] spironolactone (Aldactone) tablet 25 mg  25 mg oral Daily LAKEISHA Mackey-CNP   25 mg at 12/16/24 0910    [DISCONTINUED] sulfur hexafluoride microsphr (Lumason) injection 24.28 mg  2 mL intravenous Once in imaging Charlie Bennett MD        [DISCONTINUED] tiotropium (Spiriva Respimat) 2.5 mcg/actuation inhaler 2 puff  2 puff inhalation Daily Charlie Bennett MD   2 puff at 12/16/24 0605    [DISCONTINUED] warfarin (Coumadin) tablet 7.5 mg  7.5 mg oral Daily Charlie Bennett MD   7.5 mg at 12/15/24 1813    [DISCONTINUED] zinc oxide 40 % ointment 1 Application  1 Application Topical TID Charlie Bennett MD   1 Application at 12/16/24 0911     Physical Exam:   Deferred due to telehealth     Labs:  Lab Results   Component Value Date    WBC 7.6 12/16/2024    NEUTROABS 10.58 (H) 12/11/2024    IGABSOL 0.07 12/11/2024    LYMPHSABS 1.07 (L) 12/11/2024    MONOSABS 1.17 (H) 12/11/2024    EOSABS 0.03 12/11/2024    BASOSABS 0.01 12/11/2024    RBC 3.87 (L) 12/16/2024     (H) 12/16/2024    MCHC 32.0 12/16/2024    HGB 13.0 12/16/2024    HCT 40.6 12/16/2024     (H) 12/16/2024     No results found for: \"RETICCTPCT\"   Lab Results   Component Value Date    CREATININE 0.83 12/16/2024    BUN 19 12/16/2024    EGFR 78 12/16/2024     12/16/2024    K 3.4 (L) 12/16/2024    CL 95 (L) 12/16/2024    CO2 37 (H) 12/16/2024      Lab Results   Component Value Date    ALT 10 12/16/2024    AST 11 12/16/2024     (H) 12/12/2024    ALKPHOS 117 12/16/2024    BILITOT 0.6 12/16/2024      Lab Results   Component Value Date    TSH 4.43 (H) 12/11/2024     Lab Results   Component Value Date    TSH 4.43 (H) 12/11/2024     Lab Results   Component Value Date    IRON 83 01/04/2024    TIBC 344 01/04/2024    FERRITIN 140 01/04/2024      Lab " Results   Component Value Date    AUWWGKFL87 655 01/04/2024      Lab Results   Component Value Date    FOLATE >24.0 11/14/2022     Lab Results   Component Value Date    ROLLY Positive (A) 09/21/2024    RF 10 06/19/2020    SEDRATE 35 (H) 12/11/2024      Lab Results   Component Value Date    CRP 0.72 12/11/2024      Lab Results   Component Value Date     09/27/2020     Assessment:    ET, diagnosed via [facility] 30+ years ago, on hydrea for many years.     CMP w/elevated T bili and LFTs--now improved. Off Statin and On Cholestyramine. F/U w/PCP    currently on hydrea 2 tabs daily. Continue current dose and schedule, plts w/good response    Polycythemia,-stable, improving with trying to quit smoking and encourage to get new CPAP machine.    Due for c-scope 12/2024.    CT chest with 10 mm RUL nodularity;  mot noted on CT from 9/2024; repeat in 1 year.     COPD/sleep apnea on CPAP and O2 at home.     continue warfarin for DVT.    H/O hep B, hep panel negative    RTC in 4 months     Patient verbalized understanding, and all her questions were answered to her satisfaction    30 min spent with patient greater than 50 % of which was spent in consultation and coordination of care.

## 2024-12-17 NOTE — PROGRESS NOTES
Discharge Facility: [facility]   Discharge Diagnosis: Acute on chronic congestive heart failure, unspecified heart failure type; Acute respiratory failure with hypoxia   Admission Date: 12/11/2024   Discharge Date: 12/16/2024     PCP Appointment Date: TBD-office to arrange appt with PCP as needed. Patient prefers virtual appt if possible  Specialist Appointment Date:    Follow up with CAMDEN Lo (Pain Medicine)  Hospital Encounter and Summary Linked: Yes  See discharge assessment below for further details  Engagement  Call Start Time: 1209 (12/17/2024 12:13 PM)    Medications  Medications reviewed with patient/caregiver?: Yes (new meds/changes discussed with patient) (12/17/2024 12:13 PM)  Is the patient having any side effects they believe may be caused by any medication additions or changes?: No (12/17/2024 12:13 PM)  Does the patient have all medications ordered at discharge?: Yes (12/17/2024 12:13 PM)  Care Management Interventions: No intervention needed (12/17/2024 12:13 PM)  Prescription Comments: see med list (furosemide; senna; aldactone; cardizem) (12/17/2024 12:13 PM)  Is the patient taking all medications as directed (includes completed medication regime)?: Yes (12/17/2024 12:13 PM)  Care Management Interventions: Provided patient education (12/17/2024 12:13 PM)  Medication Comments: Patient instructed to take and record daily weights. Verbalized understanding (12/17/2024 12:13 PM)    Appointments  Does the patient have a primary care provider?: Yes (12/17/2024 12:13 PM)  Care Management Interventions: Educated patient on importance of making appointment; Advised patient to make appointment (12/17/2024 12:13 PM)  Has the patient kept scheduled appointments due by today?: Yes (12/17/2024 12:13 PM)  Care Management Interventions: Advised to schedule with specialist (12/17/2024 12:13 PM)    Self Management  What is the home health agency?: [facility] (12/17/2024 12:13 PM)  Has home  "health visited the patient within 72 hours of discharge?: Not applicable (<72 hrs-has number for C in case she needs to contact them first) (12/17/2024 12:13 PM)  What Durable Medical Equipment (DME) was ordered?: wheelchair (12/17/2024 12:13 PM)  Has all Durable Medical Equipment (DME) been delivered?: Yes (12/17/2024 12:13 PM)    Patient Teaching  Does the patient have access to their discharge instructions?: Yes (12/17/2024 12:13 PM)  Care Management Interventions: Reviewed instructions with patient (12/17/2024 12:13 PM)  What is the patient's perception of their health status since discharge?: Improving (12/17/2024 12:13 PM)  Is the patient/caregiver able to teach back the hierarchy of who to call/visit for symptoms/problems? PCP, Specialist, Home Health nurse, Urgent Care, ED, 911: Yes (12/17/2024 12:13 PM)  Patient/Caregiver Education Comments: Successful transition of care outreach with the patient. The patient reports doing well at home since discharge. New meds/changes were reviewed with the patient during outreach. The patient denies CP/SOB during outreach. States she is weighing self daily but thinks she needs a \"wider scale\" as it is hard to stand on the scale with her lymphedema present in her legs. States she does live alone but has daughter and neighbors to come help her when needed. Arranges tranport to come when she has doctor visits but states this can be a hassle and prefers virtual appts when possible. Patient denies further discharge questions/concerns/needs during outreach call. Emphasized that follow-up appts are needed after discharge with PCP and reviewed needed follow-ups with any specialties to assess response to treatment from hospitalization. The patient is aware of my availability for non-emergent concerns. Contact information was provided to the patient. (12/17/2024 12:13 PM)        "

## 2024-12-18 NOTE — SIGNIFICANT EVENT
Follow Up Phone Call    Outgoing phone call    Spoke to: Neisha Graham Relationship:self   Phone number: [phone]      Outcome: contacted patient/ family   Chief Complaint   Patient presents with    Shortness of Breath          Diagnosis:Not applicable    States she is feeling better. Has not heard from [facility]. This nurse called [facility]. They states they will resume care. No further questions or concerns.

## 2024-12-21 LAB
ATRIAL RATE: 104 BPM
P AXIS: 67 DEGREES
P OFFSET: 191 MS
P ONSET: 136 MS
PR INTERVAL: 148 MS
Q ONSET: 210 MS
QRS COUNT: 18 BEATS
QRS DURATION: 90 MS
QT INTERVAL: 376 MS
QTC CALCULATION(BAZETT): 494 MS
QTC FREDERICIA: 451 MS
R AXIS: 76 DEGREES
T AXIS: 79 DEGREES
T OFFSET: 398 MS
VENTRICULAR RATE: 104 BPM

## 2024-12-23 ENCOUNTER — APPOINTMENT (OUTPATIENT)
Dept: PRIMARY CARE | Facility: CLINIC | Age: 64
End: 2024-12-23
Payer: MEDICARE

## 2024-12-23 DIAGNOSIS — J01.80 ACUTE NON-RECURRENT SINUSITIS OF OTHER SINUS: Primary | ICD-10-CM

## 2024-12-23 RX ORDER — AMOXICILLIN AND CLAVULANATE POTASSIUM 875; 125 MG/1; MG/1
875 TABLET, FILM COATED ORAL 2 TIMES DAILY
Qty: 20 TABLET | Refills: 0 | Status: SHIPPED | OUTPATIENT
Start: 2024-12-23 | End: 2025-01-02

## 2024-12-26 ENCOUNTER — APPOINTMENT (OUTPATIENT)
Dept: PRIMARY CARE | Facility: CLINIC | Age: 64
End: 2024-12-26
Payer: MEDICARE

## 2024-12-26 VITALS
BODY MASS INDEX: 56.56 KG/M2 | HEART RATE: 80 BPM | WEIGHT: 293 LBS | OXYGEN SATURATION: 93 % | SYSTOLIC BLOOD PRESSURE: 154 MMHG | DIASTOLIC BLOOD PRESSURE: 85 MMHG

## 2024-12-26 DIAGNOSIS — R23.8 SKIN IRRITATION: ICD-10-CM

## 2024-12-26 DIAGNOSIS — Z79.01 ANTICOAGULATED ON COUMADIN: ICD-10-CM

## 2024-12-26 DIAGNOSIS — E87.6 HYPOKALEMIA: ICD-10-CM

## 2024-12-26 DIAGNOSIS — I48.92 ATRIAL FIB/FLUTTER, TRANSIENT (MULTI): ICD-10-CM

## 2024-12-26 DIAGNOSIS — I48.91 ATRIAL FIB/FLUTTER, TRANSIENT (MULTI): ICD-10-CM

## 2024-12-26 DIAGNOSIS — Z09 HOSPITAL DISCHARGE FOLLOW-UP: Primary | ICD-10-CM

## 2024-12-26 DIAGNOSIS — R30.0 DYSURIA: ICD-10-CM

## 2024-12-26 DIAGNOSIS — L30.4 INTERTRIGO: ICD-10-CM

## 2024-12-26 DIAGNOSIS — I50.9 ACUTE ON CHRONIC CONGESTIVE HEART FAILURE, UNSPECIFIED HEART FAILURE TYPE: ICD-10-CM

## 2024-12-26 LAB
ALBUMIN SERPL BCP-MCNC: 3.9 G/DL (ref 3.4–5)
ALP SERPL-CCNC: 158 U/L (ref 33–136)
ALT SERPL W P-5'-P-CCNC: 12 U/L (ref 7–45)
ANION GAP SERPL CALC-SCNC: 17 MMOL/L (ref 10–20)
AST SERPL W P-5'-P-CCNC: 14 U/L (ref 9–39)
BILIRUB SERPL-MCNC: 0.4 MG/DL (ref 0–1.2)
BUN SERPL-MCNC: 19 MG/DL (ref 6–23)
CALCIUM SERPL-MCNC: 8.8 MG/DL (ref 8.6–10.3)
CHLORIDE SERPL-SCNC: 97 MMOL/L (ref 98–107)
CO2 SERPL-SCNC: 33 MMOL/L (ref 21–32)
CREAT SERPL-MCNC: 1.01 MG/DL (ref 0.5–1.05)
EGFRCR SERPLBLD CKD-EPI 2021: 62 ML/MIN/1.73M*2
GLUCOSE SERPL-MCNC: 118 MG/DL (ref 74–99)
POC INR: 3.4 (ref 0.9–1.1)
POTASSIUM SERPL-SCNC: 4.5 MMOL/L (ref 3.5–5.3)
PROT SERPL-MCNC: 6.6 G/DL (ref 6.4–8.2)
SODIUM SERPL-SCNC: 142 MMOL/L (ref 136–145)

## 2024-12-26 PROCEDURE — 3077F SYST BP >= 140 MM HG: CPT

## 2024-12-26 PROCEDURE — 1160F RVW MEDS BY RX/DR IN RCRD: CPT

## 2024-12-26 PROCEDURE — 4004F PT TOBACCO SCREEN RCVD TLK: CPT

## 2024-12-26 PROCEDURE — 3079F DIAST BP 80-89 MM HG: CPT

## 2024-12-26 PROCEDURE — 80053 COMPREHEN METABOLIC PANEL: CPT

## 2024-12-26 PROCEDURE — 99495 TRANSJ CARE MGMT MOD F2F 14D: CPT

## 2024-12-26 PROCEDURE — 1159F MED LIST DOCD IN RCRD: CPT

## 2024-12-26 PROCEDURE — 1111F DSCHRG MED/CURRENT MED MERGE: CPT

## 2024-12-26 PROCEDURE — 85610 PROTHROMBIN TIME: CPT

## 2024-12-26 RX ORDER — KETOCONAZOLE 20 MG/G
CREAM TOPICAL 2 TIMES DAILY
Qty: 30 G | Refills: 0 | Status: SHIPPED | OUTPATIENT
Start: 2024-12-26 | End: 2025-01-23

## 2024-12-26 RX ORDER — TRIAMCINOLONE ACETONIDE 1 MG/G
CREAM TOPICAL 2 TIMES DAILY
Qty: 30 G | Refills: 0 | Status: SHIPPED | OUTPATIENT
Start: 2024-12-26

## 2024-12-26 NOTE — PROGRESS NOTES
"Patient: Neisha Graham  : 1960  PCP: Micheal Gonsalez DO  MRN: [id number]  Program: Transitional Care Management  Status: Enrolled  Effective Dates: 2024 - present  Responsible Staff: Juan Jose Crespo RN  Social Drivers to be Addressed: Physical Activity         Neisha Graham is a 64 y.o. female presenting today for follow-up after being discharged from the hospital 10 days ago. The main problem requiring admission was acute on chronic CHF. The discharge summary and/or Transitional Care Management documentation was reviewed. Medication reconciliation was performed as indicated via the \"Haroldo as Reviewed\" timestamp.     Neisha Graham was contacted by Transitional Care Management services two days after her discharge. This encounter and supporting documentation was reviewed.    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     She was admitted at [facility] from 24-24, she was very SOB which is what sent her to the ER.   She states that while in the hospital they took off 40lbs of water, 16lbs a day.  She states she feels good today, SOB is much better.  She states she has not been measuring liquids since being home and knows she needs to.  She states she was getting bad thirst with eating spicy foods and thinks she was drinking more water than she was supposed to, she has gained 6lbs back since being discharged (10 days).    While in the hospital she was found to have newly identified paroxysmal a.fib RVR, she was given IV metoprolol, and was back in sinus rhythm.   She has follow up appointments set up with cardiology.    She is concerned about a fungal " infection on her bottom and down the backs of her legs.  The skin is peeling, itchy, big pieces of skin coming off.  She states this has happened before and has turned into bed sores in the past.    She is also getting some pain with urination.  Saturday had a fever (12/21/24), she sent Dr. Gonsalez a message which she states she does not remember sending.  She has been on augmentin since 12/23/24. She does feel that her symptoms are better than what they were.    She is taking Coumadin, a whole pill 6 days then a half a pill on the 7th day.  INR check today.    Review of Systems  10 point review of systems performed and is negative except as noted in the HPI.      /85   Pulse 80   Wt 149 kg (329 lb 8 oz)   SpO2 93%   BMI 56.56 kg/m²     Physical Exam  Vitals reviewed.   Constitutional:       General: She is not in acute distress.     Appearance: She is not ill-appearing.      Comments: Seated in motorized wheel chair.   HENT:      Head: Normocephalic and atraumatic.   Eyes:      Extraocular Movements: Extraocular movements intact.      Conjunctiva/sclera: Conjunctivae normal.   Cardiovascular:      Rate and Rhythm: Normal rate and regular rhythm.      Heart sounds: Murmur heard.   Pulmonary:      Effort: Pulmonary effort is normal.      Breath sounds: Decreased breath sounds present. No rales.   Musculoskeletal:         General: Swelling (lymphedema b/l lower extremities) present.   Skin:     Comments: On buttocks and b/l posterior thighs there is dry flaking skin that is mildly erythematous.    Neurological:      Mental Status: She is alert and oriented to person, place, and time.   Psychiatric:         Mood and Affect: Mood normal.         Behavior: Behavior normal.         The complexity of medical decision making for this patient's transitional care is moderate.    Assessment/Plan   Assessment & Plan  Skin irritation  Will send in triamcinolone, she will let me know if this does not help  Orders:     triamcinolone (Kenalog) 0.1 % cream; Apply topically 2 times a day. Apply to affected area 1-2 times daily as needed. Avoid face and groin.    Dysuria  Check urine, urine culture to see if infection has resolved  She is currently on augmentin   Orders:    POCT UA Automated manually resulted    Urine Culture    Anticoagulated on Coumadin  HX of cavernous sinus thrombosis   INR today is 3.4, discussed compared to last check from 12/16/24 where INR was 2.7, she is a little too elevated but still normal  1 week follow up INR  Continue coumadin a pill 6 days a week, half a pill on the 7th day   Discussed with Dr. Weller   Orders:    POCT INR manually resulted    Intertrigo  Use ketoconazole, again patient to let me know if not improving   Orders:    ketoconazole (NIZOral) 2 % cream; Apply topically 2 times a day for 28 days.    Hypokalemia  Recheck CMP  Orders:    Comprehensive Metabolic Panel    Acute on chronic congestive heart failure, unspecified heart failure type  I do recommend she restrict her fluids and continue to check her weight   She has follow up with Cardiology as well   Continue lasix 40mg, spironolactone 25mg       Atrial fib/flutter, transient (Multi)  Diltiazem 360mg  Has appointment scheduled with        Hospital discharge follow-up           Discussed at visit any disease processes that were of concern as well as the risks, benefits and instructions on any new medication provided. Patient (and/or caretaker of patient if present) stated all questions were answered, and they voiced understanding of instructions.

## 2024-12-26 NOTE — PATIENT INSTRUCTIONS
Start ketoconazole twice a day (Antifungal)    Try the triamcinolone also twice a day on the areas of irritation from the stickers     Recheck INR in 1 week

## 2024-12-27 ENCOUNTER — PATIENT OUTREACH (OUTPATIENT)
Dept: PRIMARY CARE | Facility: CLINIC | Age: 64
End: 2024-12-27
Payer: MEDICARE

## 2024-12-27 NOTE — PROGRESS NOTES
Call regarding appt. with PCP on 12/26/2024 after hospitalization.  At time of outreach call the patient feels as if their condition has (improved) since last visit. States she plans to have someone  her antifungal cream today and will begin to use. Patient denies SOB/CP during outreach call.  Reviewed the PCP appointment with the pt and addressed any questions or concerns.

## 2024-12-29 NOTE — ASSESSMENT & PLAN NOTE
I do recommend she restrict her fluids and continue to check her weight   She has follow up with Cardiology as well   Continue lasix 40mg, spironolactone 25mg

## 2024-12-30 ENCOUNTER — LAB (OUTPATIENT)
Dept: LAB | Facility: LAB | Age: 64
End: 2024-12-30
Payer: MEDICARE

## 2024-12-30 ENCOUNTER — TELEPHONE (OUTPATIENT)
Dept: CARDIOLOGY | Facility: HOSPITAL | Age: 64
End: 2024-12-30
Payer: MEDICARE

## 2024-12-30 DIAGNOSIS — R30.0 DYSURIA: ICD-10-CM

## 2024-12-30 LAB
APPEARANCE UR: ABNORMAL
BILIRUB UR STRIP.AUTO-MCNC: NEGATIVE MG/DL
COLOR UR: YELLOW
GLUCOSE UR STRIP.AUTO-MCNC: NORMAL MG/DL
KETONES UR STRIP.AUTO-MCNC: NEGATIVE MG/DL
LEUKOCYTE ESTERASE UR QL STRIP.AUTO: ABNORMAL
MUCOUS THREADS #/AREA URNS AUTO: ABNORMAL /LPF
NITRITE UR QL STRIP.AUTO: NEGATIVE
PH UR STRIP.AUTO: 6 [PH]
PROT UR STRIP.AUTO-MCNC: ABNORMAL MG/DL
RBC # UR STRIP.AUTO: NEGATIVE /UL
RBC #/AREA URNS AUTO: ABNORMAL /HPF
SP GR UR STRIP.AUTO: 1.02
SQUAMOUS #/AREA URNS AUTO: ABNORMAL /HPF
UROBILINOGEN UR STRIP.AUTO-MCNC: NORMAL MG/DL
WBC #/AREA URNS AUTO: ABNORMAL /HPF

## 2024-12-30 PROCEDURE — 87086 URINE CULTURE/COLONY COUNT: CPT

## 2024-12-30 PROCEDURE — 81001 URINALYSIS AUTO W/SCOPE: CPT

## 2024-12-31 ENCOUNTER — OFFICE VISIT (OUTPATIENT)
Dept: CARDIOLOGY | Facility: HOSPITAL | Age: 64
End: 2024-12-31
Payer: MEDICARE

## 2024-12-31 VITALS
BODY MASS INDEX: 56.3 KG/M2 | OXYGEN SATURATION: 94 % | SYSTOLIC BLOOD PRESSURE: 128 MMHG | HEART RATE: 88 BPM | DIASTOLIC BLOOD PRESSURE: 72 MMHG | WEIGHT: 293 LBS

## 2024-12-31 DIAGNOSIS — I35.0 AORTIC STENOSIS, MODERATE: Primary | ICD-10-CM

## 2024-12-31 LAB — BACTERIA UR CULT: NORMAL

## 2024-12-31 PROCEDURE — 99214 OFFICE O/P EST MOD 30 MIN: CPT | Performed by: NURSE PRACTITIONER

## 2024-12-31 PROCEDURE — 1159F MED LIST DOCD IN RCRD: CPT | Performed by: NURSE PRACTITIONER

## 2024-12-31 PROCEDURE — 3074F SYST BP LT 130 MM HG: CPT | Performed by: NURSE PRACTITIONER

## 2024-12-31 PROCEDURE — 3078F DIAST BP <80 MM HG: CPT | Performed by: NURSE PRACTITIONER

## 2024-12-31 PROCEDURE — 1111F DSCHRG MED/CURRENT MED MERGE: CPT | Performed by: NURSE PRACTITIONER

## 2024-12-31 RX ORDER — TORSEMIDE 20 MG/1
40 TABLET ORAL DAILY
Qty: 180 TABLET | Refills: 3 | Status: SHIPPED | OUTPATIENT
Start: 2024-12-31 | End: 2025-12-31

## 2024-12-31 NOTE — PROGRESS NOTES
Chief Complaint:   Hospital follow up      History Of Present Illness:    Neisha Graham is a 64 y.o. female from home with h/o morbid obesity, lymphedema, recurrent cellulitis, COPD on 2L at bedtime, HFpEF, moderate MS, moderate to severe aortic stenosis (echo 12/2024), BE, RLS, cavernous sinus thrombus on warfarin presenting today for follow up s/p recent hospitalization for acute decompensated diastolic HF as well as newly identified paroxysmal a.fib. She responded well to IV lasix drip (weights inaccurate from admission, however, UO documented at Net -16.3L) with improved SOB.  She did go into a.fib during admission with symptoms of heart racing palpitations.  Diltiazem was initially just restarted, then uptitrated from 120mg daily to 360mg daily.  She has had very short events of heart racing palpitations since discharge, but nothing sustaining.  Since being home, she has weighed herself daily with weight gain from 317lbs to 330lbs as of yesterday with recurrent dyspnea on exertion and abdominal bloating.  She did contact the office yesterday to report weight gain and advised to increase her lasix from 40mg daily to BID dosing.  She has lost 2lbs today with increased lasix dosing.       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), Cellulitis of left lower limb (04/08/2021), Chronic obstructive pulmonary disease with (acute) exacerbation (Multi) (01/09/2019), Chronic sinusitis, unspecified, COVID-19 (11/28/2022), Daily headache (08/17/2023), Elevated bilirubin (08/17/2023), Elevated transaminase level (08/17/2023), Fever (08/17/2023), Fever (08/17/2023), Heart murmur  (03/10/2023), Hemoptysis (03/10/2023), Hyperkalemia (03/10/2023), Malaise and fatigue (03/10/2023), Morbid (severe) obesity due to excess calories (Multi), Other conditions influencing health status, Other conditions influencing health status (03/12/2020), Other conditions influencing health status (03/06/2017), Other conditions influencing health status, Other conditions influencing health status, Other conditions influencing health status, Pain in unspecified ankle and joints of unspecified foot (06/24/2016), Pain in unspecified foot (12/08/2015), Pain in unspecified knee, Palpitations (03/10/2023), Personal history of diseases of the skin and subcutaneous tissue, Personal history of other diseases of the female genital tract, Personal history of other diseases of the nervous system and sense organs (09/12/2019), Personal history of other diseases of the nervous system and sense organs (09/17/2015), Personal history of other diseases of the respiratory system, Personal history of other diseases of the respiratory system (10/17/2016), Personal history of other diseases of the respiratory system (11/29/2017), Personal history of other diseases of the respiratory system (11/16/2016), Personal history of other drug therapy (10/14/2016), Personal history of other infectious and parasitic diseases, Personal history of other infectious and parasitic diseases (07/22/2020), Personal history of other medical treatment, Personal history of other specified conditions, Personal history of other specified conditions (01/28/2015), Personal history of other specified conditions, Personal history of pneumonia (recurrent), Pneumonia, unspecified organism (01/11/2018), Postmenopausal bleeding (09/30/2014), Right knee pain (03/10/2023), Shortness of breath (03/10/2023), Sinus tachycardia (03/10/2023), Submandibular sialolithiasis (03/10/2023), Unilateral primary osteoarthritis, unspecified knee (02/03/2017), and Unspecified acute  conjunctivitis, bilateral (08/20/2020).    Past Surgical History:  She has a past surgical history that includes Other surgical history (09/19/2013); Other surgical history (09/19/2013); Tonsillectomy (09/19/2013); Knee surgery (09/19/2013); Tubal ligation (09/19/2013); and Other surgical history (09/27/2013).      Social History:  She reports that she has been smoking cigarettes. She has never used smokeless tobacco. She reports that she does not drink alcohol and does not use drugs.    Family History:  No family history on file.     Allergies:  Vancomycin, Macrobid [nitrofurantoin monohyd/m-cryst], and Sulfamethoxazole-trimethoprim    Outpatient Medications:  Current Outpatient Medications   Medication Instructions    albuterol 90 mcg/actuation inhaler 2 puffs, inhalation, Every 4 hours PRN    amino acids-protein hydrolys (ProSource No Carb) 15-60 gram-kcal/30 mL liquid Take by mouth.    ammonium lactate (Amlactin) 12 % cream 1 Film, 2 times daily PRN    amoxicillin-pot clavulanate (Augmentin) 875-125 mg tablet 875 mg, oral, 2 times daily    cyclobenzaprine (FLEXERIL) 10 mg, oral, 2 times daily PRN    dilTIAZem ER (TIAZAC) 360 mg, oral, Daily    DULoxetine (CYMBALTA) 20 mg, oral, Daily    gentamicin (Garamycin) 0.1 % ointment Apply to wounds with dressing changes    hydroxyurea (HYDREA) 1,000 mg, oral, Daily    hydrOXYzine HCL (ATARAX) 50 mg, oral, Every 8 hours PRN    ipratropium-albuteroL (Duo-Neb) 0.5-2.5 mg/3 mL nebulizer solution 3 mL, nebulization, 4 times daily PRN    ketoconazole (NIZOral) 2 % cream Topical, 2 times daily    lidocaine (Xylocaine) 5 % ointment Topical, As needed, Apply up to three times a day as needed for pain.    lidocaine 5 % gel 1 inch, topical (top), 3 times daily PRN    naloxone (NARCAN) 4 mg, nasal, As needed    nebulizer accessories kit UAD    nystatin (Mycostatin) cream APPLY TO AFFECTED AREA TWO TO THREE TIMES A DAY    oxyCODONE (ROXICODONE) 10 mg, oral, Every 4 hours PRN     oxyCODONE (ROXICODONE) 10 mg, oral, Every 4 hours PRN    [START ON 1/20/2025] oxyCODONE (ROXICODONE) 10 mg, oral, Every 4 hours PRN    pramipexole (MIRAPEX) 1 mg, 2 times daily    sennosides-docusate sodium (Rebecca-Colace) 8.6-50 mg tablet 2 tablets, oral, 2 times daily    spironolactone (ALDACTONE) 25 mg, oral, Daily    torsemide (DEMADEX) 40 mg, oral, Daily    Trelegy Ellipta 200-62.5-25 mcg blister with device INHALE 1 PUFF BY MOUTH AND IN TO LUNGS ONCE DAILY    triamcinolone (Kenalog) 0.1 % cream APPLY SPARINGLY AND MASSAGE IN ONCE A DAY    triamcinolone (Kenalog) 0.1 % cream Apply to affected area with dressing changes    triamcinolone (Kenalog) 0.1 % cream Topical, 2 times daily, Apply to affected area 1-2 times daily as needed. Avoid face and groin.    warfarin (Coumadin) 7.5 mg tablet Take one po every day    white petrolatum (Aquaphor) 41 % ointment ointment 1 Application, Topical, 2 times daily, Apply to legs and abdomen    zinc oxide 40 % ointment ointment 1 Application, Topical, 3 times daily, Apply to groin, buttocks after nystatin       Physical Exam:  Constitutional: Pleasant, Awake/Alert/Oriented to person place and time.   Head: Atraumatic, Normocephalic  Eyes: EOMI. MICHELE  Neck: No JVD  Cardiovascular: Regular rate and rhythm, S1, S2. 4/6 murmur RUSB   Respiratory: Clear to auscultation bilaterally. No wheezing, rales or rhonchi.   Abdomen: Soft, Nontender, obese. Bowel sounds appreciated  Musculoskeletal: ROM intact. Muscle strength grossly intact upper and lower extremities 5/5.   Neurological: CNII-XII intact. Sensation grossly intact  Extremities: chronic lymphedema BLE, trace-1+ pitting edema BLE   Psychiatric: Appropriate mood and affect       Last Labs:  CBC -  Lab Results   Component Value Date    WBC 7.6 12/16/2024    HGB 13.0 12/16/2024    HCT 40.6 12/16/2024     (H) 12/16/2024     (H) 12/16/2024       CMP -  Lab Results   Component Value Date    CALCIUM 8.8 12/26/2024    PHOS  3.5 12/12/2024    PROT 6.6 12/26/2024    ALBUMIN 3.9 12/26/2024    AST 14 12/26/2024    ALT 12 12/26/2024    ALKPHOS 158 (H) 12/26/2024    BILITOT 0.4 12/26/2024       LIPID PANEL -   Lab Results   Component Value Date    CHOL 174 12/11/2024    TRIG 106 12/11/2024    HDL 65.6 12/11/2024    CHHDL 2.7 12/11/2024    LDLF 96 11/14/2022    VLDL 21 12/11/2024    NHDL 108 12/11/2024       RENAL FUNCTION PANEL -   Lab Results   Component Value Date    GLUCOSE 118 (H) 12/26/2024     12/26/2024    K 4.5 12/26/2024    CL 97 (L) 12/26/2024    CO2 33 (H) 12/26/2024    ANIONGAP 17 12/26/2024    BUN 19 12/26/2024    CREATININE 1.01 12/26/2024    CALCIUM 8.8 12/26/2024    PHOS 3.5 12/12/2024    ALBUMIN 3.9 12/26/2024        Lab Results   Component Value Date     (H) 12/11/2024    HGBA1C 5.5 12/11/2024       Last Cardiology Tests:  Echo:  Transthoracic Echo (TTE) Complete 12/12/2024  CONCLUSIONS:   1. The left ventricular systolic function is normal, with a visually estimated ejection fraction of 60-65%.   2. Spectral Doppler shows a Grade I (impaired relaxation pattern) of left ventricular diastolic filling with normal left atrial filling pressure.   3. There is moderately increased septal and moderately increased posterior left ventricular wall thickness.   4. There is moderate concentric left ventricular hypertrophy.   5. There is reduced right ventricular systolic function.   6. Moderately enlarged right ventricle.   7. The left atrium is mildly dilated.   8. There is moderate mitral annular calcification.   9. There is moderate mitral valve stenosis.  10. Moderate to severe aortic valve stenosis. (4.03, 65/41, 0.88, DI 0.28)   11. Moderately elevated pulmonary artery pressure.  12. There is mild mitral and tricuspid regurgitation.    9/18/2024  CONCLUSIONS:   1. The left ventricular systolic function is normal, with a visually estimated ejection fraction of 60-65%.   2. Spectral Doppler shows an impaired relaxation  pattern of left ventricular diastolic filling.   3. There is normal right ventricular global systolic function.   4. The left atrium is mildly dilated.   5. There is severe mitral annular calcification.   6. Severe aortic valve stenosis.      Lab review: I have personally reviewed the laboratory result(s)   Diagnostic review: I have personally reviewed the result(s) of the Echocardiogram     Assessment/Plan   Very pleasant  64 y.o. female from home with h/o morbid obesity, lymphedema, recurrent cellulitis, COPD on 2L at bedtime, HFpEF, moderate MS, moderate to severe aortic stenosis (echo 12/2024), BE, RLS, cavernous sinus thrombus on warfarin presenting today for follow up s/p recent hospitalization for acute decompensated diastolic HF as well as newly identified paroxysmal a.fib. Weight is up 8lbs since discharge as of yesterday and down 2lbs with increased frequency of lasix.     Plan:  -Continue lasix 40mg BID until medication is complete, then will switch to torsemide 40mg daily  -Continue spironolactone 25mg daily, diltiazem 360mg daily, and warfarin for goal INR 2-3  -Follow up in 3 months-- will plan for echo in 6 months for surveillance of mitral/aortic stenosis  (June 2024-- will schedule at next follow up). Consider referring to structural heart team to discuss candidacy for TAVR.       LAKEISHA Mackey-CNP

## 2025-01-08 DIAGNOSIS — I50.9 ACUTE ON CHRONIC CONGESTIVE HEART FAILURE, UNSPECIFIED HEART FAILURE TYPE: ICD-10-CM

## 2025-01-08 RX ORDER — SPIRONOLACTONE 25 MG/1
25 TABLET ORAL DAILY
Qty: 90 TABLET | Refills: 1 | Status: SHIPPED | OUTPATIENT
Start: 2025-01-08

## 2025-01-09 ENCOUNTER — APPOINTMENT (OUTPATIENT)
Dept: CARDIOLOGY | Facility: HOSPITAL | Age: 65
End: 2025-01-09
Payer: MEDICARE

## 2025-01-13 DIAGNOSIS — I50.9 ACUTE ON CHRONIC CONGESTIVE HEART FAILURE, UNSPECIFIED HEART FAILURE TYPE: ICD-10-CM

## 2025-01-13 RX ORDER — DILTIAZEM HYDROCHLORIDE 360 MG/1
360 CAPSULE, EXTENDED RELEASE ORAL DAILY
Qty: 90 CAPSULE | Refills: 3 | Status: SHIPPED | OUTPATIENT
Start: 2025-01-13 | End: 2026-01-13

## 2025-01-15 ENCOUNTER — OFFICE VISIT (OUTPATIENT)
Dept: WOUND CARE | Facility: HOSPITAL | Age: 65
End: 2025-01-15
Payer: COMMERCIAL

## 2025-01-15 PROCEDURE — 11042 DBRDMT SUBQ TIS 1ST 20SQCM/<: CPT | Mod: 52

## 2025-01-15 PROCEDURE — 11045 DBRDMT SUBQ TISS EACH ADDL: CPT | Mod: 52

## 2025-01-22 ENCOUNTER — APPOINTMENT (OUTPATIENT)
Dept: WOUND CARE | Facility: HOSPITAL | Age: 65
End: 2025-01-22
Payer: MEDICARE

## 2025-01-29 ENCOUNTER — OFFICE VISIT (OUTPATIENT)
Dept: WOUND CARE | Facility: HOSPITAL | Age: 65
End: 2025-01-29
Payer: MEDICARE

## 2025-01-29 PROCEDURE — 11042 DBRDMT SUBQ TIS 1ST 20SQCM/<: CPT | Mod: 50

## 2025-01-29 PROCEDURE — 11045 DBRDMT SUBQ TISS EACH ADDL: CPT | Mod: 50

## 2025-02-05 ENCOUNTER — APPOINTMENT (OUTPATIENT)
Dept: WOUND CARE | Facility: HOSPITAL | Age: 65
End: 2025-02-05
Payer: COMMERCIAL

## 2025-02-09 DIAGNOSIS — F33.1 MDD (MAJOR DEPRESSIVE DISORDER), RECURRENT EPISODE, MODERATE: ICD-10-CM

## 2025-02-09 DIAGNOSIS — F41.1 GENERALIZED ANXIETY DISORDER: ICD-10-CM

## 2025-02-10 DIAGNOSIS — G25.81 RESTLESS LEGS SYNDROME (RLS): Primary | ICD-10-CM

## 2025-02-10 RX ORDER — DULOXETIN HYDROCHLORIDE 20 MG/1
20 CAPSULE, DELAYED RELEASE ORAL DAILY
Qty: 90 CAPSULE | Refills: 1 | Status: SHIPPED | OUTPATIENT
Start: 2025-02-10

## 2025-02-10 RX ORDER — PRAMIPEXOLE DIHYDROCHLORIDE 1 MG/1
1 TABLET ORAL 2 TIMES DAILY
Qty: 180 TABLET | Refills: 3 | Status: SHIPPED | OUTPATIENT
Start: 2025-02-10 | End: 2026-02-10

## 2025-02-11 ENCOUNTER — PATIENT OUTREACH (OUTPATIENT)
Dept: PRIMARY CARE | Facility: CLINIC | Age: 65
End: 2025-02-11

## 2025-02-11 ENCOUNTER — OFFICE VISIT (OUTPATIENT)
Dept: PAIN MEDICINE | Facility: CLINIC | Age: 65
End: 2025-02-11
Payer: MEDICARE

## 2025-02-11 VITALS — DIASTOLIC BLOOD PRESSURE: 87 MMHG | SYSTOLIC BLOOD PRESSURE: 160 MMHG | RESPIRATION RATE: 20 BRPM | HEART RATE: 68 BPM

## 2025-02-11 DIAGNOSIS — Z79.891 ENCOUNTER FOR LONG-TERM USE OF OPIATE ANALGESIC: ICD-10-CM

## 2025-02-11 DIAGNOSIS — M54.16 CHRONIC LUMBAR RADICULOPATHY: ICD-10-CM

## 2025-02-11 DIAGNOSIS — M48.062 NEUROGENIC CLAUDICATION DUE TO LUMBAR SPINAL STENOSIS: ICD-10-CM

## 2025-02-11 DIAGNOSIS — M17.11 PRIMARY OSTEOARTHRITIS OF RIGHT KNEE: ICD-10-CM

## 2025-02-11 DIAGNOSIS — M17.12 PRIMARY OSTEOARTHRITIS OF LEFT KNEE: ICD-10-CM

## 2025-02-11 DIAGNOSIS — M79.18 MYOFASCIAL PAIN: ICD-10-CM

## 2025-02-11 PROCEDURE — 3077F SYST BP >= 140 MM HG: CPT | Performed by: ANESTHESIOLOGY

## 2025-02-11 PROCEDURE — 3079F DIAST BP 80-89 MM HG: CPT | Performed by: ANESTHESIOLOGY

## 2025-02-11 PROCEDURE — 99213 OFFICE O/P EST LOW 20 MIN: CPT | Performed by: ANESTHESIOLOGY

## 2025-02-11 PROCEDURE — 1159F MED LIST DOCD IN RCRD: CPT | Performed by: ANESTHESIOLOGY

## 2025-02-11 PROCEDURE — 1125F AMNT PAIN NOTED PAIN PRSNT: CPT | Performed by: ANESTHESIOLOGY

## 2025-02-11 RX ORDER — CYCLOBENZAPRINE HCL 10 MG
10 TABLET ORAL 2 TIMES DAILY PRN
Qty: 30 TABLET | Refills: 2 | Status: SHIPPED | OUTPATIENT
Start: 2025-02-11 | End: 2025-05-12

## 2025-02-11 RX ORDER — NALOXONE HYDROCHLORIDE 4 MG/.1ML
1 SPRAY NASAL AS NEEDED
Qty: 2 EACH | Refills: 0 | Status: SHIPPED | OUTPATIENT
Start: 2025-02-11

## 2025-02-11 RX ORDER — OXYCODONE HYDROCHLORIDE 10 MG/1
10 TABLET ORAL EVERY 4 HOURS PRN
Qty: 168 TABLET | Refills: 0 | Status: SHIPPED | OUTPATIENT
Start: 2025-04-14 | End: 2025-05-12

## 2025-02-11 RX ORDER — OXYCODONE HYDROCHLORIDE 10 MG/1
10 TABLET ORAL EVERY 4 HOURS PRN
Qty: 168 TABLET | Refills: 0 | Status: SHIPPED | OUTPATIENT
Start: 2025-03-17 | End: 2025-04-14

## 2025-02-11 RX ORDER — OXYCODONE HYDROCHLORIDE 10 MG/1
10 TABLET ORAL EVERY 4 HOURS PRN
Qty: 168 TABLET | Refills: 0 | Status: SHIPPED | OUTPATIENT
Start: 2025-02-17 | End: 2025-03-17

## 2025-02-11 ASSESSMENT — PAIN - FUNCTIONAL ASSESSMENT: PAIN_FUNCTIONAL_ASSESSMENT: 0-10

## 2025-02-11 ASSESSMENT — PAIN DESCRIPTION - DESCRIPTORS: DESCRIPTORS: ACHING

## 2025-02-11 ASSESSMENT — PAIN SCALES - GENERAL: PAINLEVEL_OUTOF10: 6

## 2025-02-11 NOTE — PROGRESS NOTES
History Of Present Illness  Neisha Graham is a 64 y.o. female presenting with morbid obesity bilateral lower extremity lymphedema complaining of low back pain and bilateral knee pain. Patient is currently maintained on oxycodone 10 mg every 4 hours and Flexeril 10 mg twice daily. Patient reports 60 to 70% relief.   Patient reports that she was admitted in December for congestive heart failure.  She also developed stasis ulcers in her lower extremity.    Today patient reports that her congestive heart failure is significantly better and she has no new complaints.  Patient is presenting to our clinic for refill for medications  Patient is not receiving any interventions.  Patient is able to perform her daily activities.     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), Cellulitis of left lower limb (04/08/2021), Chronic obstructive pulmonary disease with (acute) exacerbation (Multi) (01/09/2019), Chronic sinusitis, unspecified, COVID-19 (11/28/2022), Daily headache (08/17/2023), Elevated bilirubin (08/17/2023), Elevated transaminase level (08/17/2023), Fever (08/17/2023), Fever (08/17/2023), Heart murmur (03/10/2023), Hemoptysis (03/10/2023), Hyperkalemia (03/10/2023), Malaise and fatigue (03/10/2023), Morbid (severe) obesity due to excess calories (Multi), Other conditions influencing health status, Other conditions influencing health status (03/12/2020), Other conditions influencing health status (03/06/2017), Other conditions influencing health status, Other conditions influencing health status, Other conditions influencing health status, Pain in unspecified ankle and joints of unspecified foot (06/24/2016), Pain in unspecified foot (12/08/2015), Pain in unspecified knee, Palpitations  (03/10/2023), Personal history of diseases of the skin and subcutaneous tissue, Personal history of other diseases of the female genital tract, Personal history of other diseases of the nervous system and sense organs (09/12/2019), Personal history of other diseases of the nervous system and sense organs (09/17/2015), Personal history of other diseases of the respiratory system, Personal history of other diseases of the respiratory system (10/17/2016), Personal history of other diseases of the respiratory system (11/29/2017), Personal history of other diseases of the respiratory system (11/16/2016), Personal history of other drug therapy (10/14/2016), Personal history of other infectious and parasitic diseases, Personal history of other infectious and parasitic diseases (07/22/2020), Personal history of other medical treatment, Personal history of other specified conditions, Personal history of other specified conditions (01/28/2015), Personal history of other specified conditions, Personal history of pneumonia (recurrent), Pneumonia, unspecified organism (01/11/2018), Postmenopausal bleeding (09/30/2014), Right knee pain (03/10/2023), Shortness of breath (03/10/2023), Sinus tachycardia (03/10/2023), Submandibular sialolithiasis (03/10/2023), Unilateral primary osteoarthritis, unspecified knee (02/03/2017), and Unspecified acute conjunctivitis, bilateral (08/20/2020).    Surgical History  She has a past surgical history that includes Other surgical history (09/19/2013); Other surgical history (09/19/2013); Tonsillectomy (09/19/2013); Knee surgery (09/19/2013); Tubal ligation (09/19/2013); and Other surgical history (09/27/2013).     Social History  She reports that she has been smoking cigarettes. She has never used smokeless tobacco. She reports that she does not drink alcohol and does not use drugs.    Family History  No family history on file.     Allergies  Vancomycin, Macrobid [nitrofurantoin monohyd/m-cryst],  and Sulfamethoxazole-trimethoprim    Review of systems:   13-point review of systems done and negative except as noted in HPI      Physical Exam   Vital signs: Reviewed  Limited data physical exams due to patient being wheelchair-bound    MSK:   No asymmetry or masses noted of the musculature.   Examination of the muscles/joints/bones show normal range of motion.  The patient is mildly tender to palpation in the cervical and lumbar paraspinal musculature as well as the medial lateral joint lines of both knees.  Lower extremity: Presence of lymphedema on bilateral lower extremities with open wounds on the left shin.    Sensation:   Sensation intact to pin prick in the bilateral upper extremities.  Sensation intact to pin prick in the bilateral lower extremities.     Provocative tests: Negative Andrea sign bilaterally, 2+ patellar reflexes bilaterally.    Psychiatric: Mood and affect are normal.  Last Recorded Vitals  /87   Pulse 68   Resp 20     Relevant Results            Last OARRS Review: No data recorded    I have personally reviewed the OARRS report for Neisha Ericty I have considered the risks of abuse, dependence, addiction and diversion  Overdoses score is 120.  Morphine milligram equivalent is 90/day     Assessment/Plan   Diagnoses and all orders for this visit:  Chronic lumbar radiculopathy  Neurogenic claudication due to lumbar spinal stenosis  Primary osteoarthritis of left knee  Primary osteoarthritis of right knee  Myofascial pain  Encounter for long-term use of opiate analgesic      Plan  Continue current pain medications       Kiko Davila MD

## 2025-02-12 ENCOUNTER — APPOINTMENT (OUTPATIENT)
Dept: WOUND CARE | Facility: HOSPITAL | Age: 65
End: 2025-02-12
Payer: COMMERCIAL

## 2025-02-12 ENCOUNTER — APPOINTMENT (OUTPATIENT)
Dept: PAIN MEDICINE | Facility: CLINIC | Age: 65
End: 2025-02-12
Payer: MEDICARE

## 2025-02-13 ENCOUNTER — TELEPHONE (OUTPATIENT)
Dept: PRIMARY CARE | Facility: CLINIC | Age: 65
End: 2025-02-13
Payer: COMMERCIAL

## 2025-02-13 DIAGNOSIS — B35.1 ONYCHOMYCOSIS: Primary | ICD-10-CM

## 2025-02-13 RX ORDER — CICLOPIROX 80 MG/ML
SOLUTION TOPICAL NIGHTLY
Qty: 6.6 ML | Refills: 11 | Status: SHIPPED | OUTPATIENT
Start: 2025-02-13

## 2025-02-18 DIAGNOSIS — J43.2 CENTRILOBULAR EMPHYSEMA (MULTI): ICD-10-CM

## 2025-02-18 RX ORDER — FLUTICASONE FUROATE, UMECLIDINIUM BROMIDE AND VILANTEROL TRIFENATATE 200; 62.5; 25 UG/1; UG/1; UG/1
POWDER RESPIRATORY (INHALATION)
Qty: 180 EACH | Refills: 1 | Status: SHIPPED | OUTPATIENT
Start: 2025-02-18

## 2025-02-19 ENCOUNTER — APPOINTMENT (OUTPATIENT)
Dept: WOUND CARE | Facility: HOSPITAL | Age: 65
End: 2025-02-19
Payer: COMMERCIAL

## 2025-02-24 ENCOUNTER — TELEPHONE (OUTPATIENT)
Dept: PAIN MEDICINE | Facility: CLINIC | Age: 65
End: 2025-02-24
Payer: MEDICARE

## 2025-02-24 NOTE — TELEPHONE ENCOUNTER
Dr Davila reviewed inconsistent toxicology. Patient counseled, She reports she may have used an old prescription of codeine because her chronic pain medication was not covering pain. She verbalized understanding of risks,including resp depression and death. Per Dr ORONA,will  repeat tox at next visit

## 2025-02-26 ENCOUNTER — OFFICE VISIT (OUTPATIENT)
Dept: WOUND CARE | Facility: HOSPITAL | Age: 65
End: 2025-02-26
Payer: MEDICARE

## 2025-02-26 PROCEDURE — 11042 DBRDMT SUBQ TIS 1ST 20SQCM/<: CPT | Mod: 50

## 2025-02-28 DIAGNOSIS — B37.0 THRUSH: Primary | ICD-10-CM

## 2025-02-28 RX ORDER — CLOTRIMAZOLE 10 MG/1
10 LOZENGE ORAL
Qty: 50 TROCHE | Refills: 0 | Status: SHIPPED | OUTPATIENT
Start: 2025-02-28 | End: 2025-03-10

## 2025-03-05 ENCOUNTER — OFFICE VISIT (OUTPATIENT)
Dept: WOUND CARE | Facility: HOSPITAL | Age: 65
End: 2025-03-05
Payer: MEDICARE

## 2025-03-05 PROCEDURE — 11045 DBRDMT SUBQ TISS EACH ADDL: CPT | Mod: LT,RT

## 2025-03-05 PROCEDURE — 11042 DBRDMT SUBQ TIS 1ST 20SQCM/<: CPT | Mod: LT,RT

## 2025-03-10 DIAGNOSIS — J01.80 ACUTE NON-RECURRENT SINUSITIS OF OTHER SINUS: ICD-10-CM

## 2025-03-10 RX ORDER — AMOXICILLIN AND CLAVULANATE POTASSIUM 875; 125 MG/1; MG/1
875 TABLET, FILM COATED ORAL 2 TIMES DAILY
Qty: 20 TABLET | Refills: 0 | Status: SHIPPED | OUTPATIENT
Start: 2025-03-10 | End: 2025-03-20

## 2025-03-12 ENCOUNTER — APPOINTMENT (OUTPATIENT)
Dept: WOUND CARE | Facility: HOSPITAL | Age: 65
End: 2025-03-12
Payer: MEDICARE

## 2025-03-14 ENCOUNTER — PATIENT OUTREACH (OUTPATIENT)
Dept: PRIMARY CARE | Facility: CLINIC | Age: 65
End: 2025-03-14
Payer: MEDICARE

## 2025-03-14 NOTE — PROGRESS NOTES
"Final call. Called and spoke with patient to address any questions or concerns regarding hospitalization.   Patient reports their condition has (improved). Patient states she thinks she just passed another kidney stone last weekend. States she got the antibiotic from PCP and was taking this thinking her cellulitis was coming back but now believes the symptoms were all leading up to the kidney stone. States she will be sending the paperwork for PCP to fill out for the walk in shower for her soon. She has c/o bilateral knee pain but states \"there's nothing no one can do for me for that. I already know it\".  Patient encouraged to keep my contact information in case any needs arise.     "

## 2025-03-15 DIAGNOSIS — L30.4 INTERTRIGO: ICD-10-CM

## 2025-03-18 RX ORDER — KETOCONAZOLE 20 MG/G
CREAM TOPICAL
Qty: 30 G | Refills: 0 | Status: SHIPPED | OUTPATIENT
Start: 2025-03-18

## 2025-03-19 ENCOUNTER — APPOINTMENT (OUTPATIENT)
Dept: WOUND CARE | Facility: HOSPITAL | Age: 65
End: 2025-03-19
Payer: MEDICARE

## 2025-03-19 PROCEDURE — 11042 DBRDMT SUBQ TIS 1ST 20SQCM/<: CPT | Mod: LT,RT

## 2025-03-19 PROCEDURE — 11045 DBRDMT SUBQ TISS EACH ADDL: CPT | Mod: LT,RT

## 2025-03-20 ENCOUNTER — APPOINTMENT (OUTPATIENT)
Dept: CARDIOLOGY | Facility: HOSPITAL | Age: 65
End: 2025-03-20
Payer: MEDICARE

## 2025-03-26 ENCOUNTER — APPOINTMENT (OUTPATIENT)
Dept: WOUND CARE | Facility: HOSPITAL | Age: 65
End: 2025-03-26
Payer: MEDICARE

## 2025-03-27 ENCOUNTER — OFFICE VISIT (OUTPATIENT)
Dept: CARDIOLOGY | Facility: HOSPITAL | Age: 65
End: 2025-03-27
Payer: MEDICARE

## 2025-03-27 VITALS
OXYGEN SATURATION: 91 % | BODY MASS INDEX: 60.08 KG/M2 | WEIGHT: 293 LBS | DIASTOLIC BLOOD PRESSURE: 79 MMHG | SYSTOLIC BLOOD PRESSURE: 131 MMHG | HEART RATE: 88 BPM

## 2025-03-27 DIAGNOSIS — I35.0 AORTIC STENOSIS, MODERATE: ICD-10-CM

## 2025-03-27 DIAGNOSIS — I50.9 ACUTE ON CHRONIC CONGESTIVE HEART FAILURE, UNSPECIFIED HEART FAILURE TYPE: ICD-10-CM

## 2025-03-27 PROCEDURE — 3075F SYST BP GE 130 - 139MM HG: CPT | Performed by: NURSE PRACTITIONER

## 2025-03-27 PROCEDURE — 99214 OFFICE O/P EST MOD 30 MIN: CPT | Performed by: NURSE PRACTITIONER

## 2025-03-27 PROCEDURE — 3078F DIAST BP <80 MM HG: CPT | Performed by: NURSE PRACTITIONER

## 2025-03-27 RX ORDER — TORSEMIDE 20 MG/1
40 TABLET ORAL 2 TIMES DAILY
Qty: 360 TABLET | Refills: 3 | Status: SHIPPED | OUTPATIENT
Start: 2025-03-27 | End: 2026-03-27

## 2025-03-27 RX ORDER — SPIRONOLACTONE 50 MG/1
50 TABLET, FILM COATED ORAL DAILY
Qty: 90 TABLET | Refills: 3 | Status: SHIPPED | OUTPATIENT
Start: 2025-03-27 | End: 2026-03-27

## 2025-03-27 NOTE — PROGRESS NOTES
Chief Complaint:   Follow up      History Of Present Illness:    Neisha Graham is a 64 y.o. 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  Weight is up 22lbs since December office visit and up 38lbs since last hospitalization. Reports compliance with torsemide and spironolactone-- has taken additional dose of torsemide occasionally as well. Has significant increase in BLE edema making it challenging for her to walk.  Denies chest pain or pressure. Reports SOB with minimal exertion.  Has intermittent palpitations as well.      Last Recorded Vitals:  Vitals:    03/27/25 1256   BP: 131/79   Pulse: 88   SpO2: 91%   Weight: (!) 159 kg (350 lb)       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), Cellulitis of left lower limb (04/08/2021), Chronic obstructive pulmonary disease with (acute) exacerbation (Multi) (01/09/2019), Chronic sinusitis, unspecified, COVID-19 (11/28/2022), Daily headache (08/17/2023), Elevated bilirubin (08/17/2023), Elevated transaminase level (08/17/2023), Fever (08/17/2023), Fever (08/17/2023), Heart murmur (03/10/2023), Hemoptysis (03/10/2023), Hyperkalemia (03/10/2023), Malaise and fatigue (03/10/2023), Morbid (severe) obesity due to excess calories (Multi), Other conditions influencing health status, Other conditions influencing health status (03/12/2020), Other conditions influencing health status (03/06/2017), Other conditions influencing health status, Other conditions influencing health status, Other conditions influencing  health status, Pain in unspecified ankle and joints of unspecified foot (06/24/2016), Pain in unspecified foot (12/08/2015), Pain in unspecified knee, Palpitations (03/10/2023), Personal history of diseases of the skin and subcutaneous tissue, Personal history of other diseases of the female genital tract, Personal history of other diseases of the nervous system and sense organs (09/12/2019), Personal history of other diseases of the nervous system and sense organs (09/17/2015), Personal history of other diseases of the respiratory system, Personal history of other diseases of the respiratory system (10/17/2016), Personal history of other diseases of the respiratory system (11/29/2017), Personal history of other diseases of the respiratory system (11/16/2016), Personal history of other drug therapy (10/14/2016), Personal history of other infectious and parasitic diseases, Personal history of other infectious and parasitic diseases (07/22/2020), Personal history of other medical treatment, Personal history of other specified conditions, Personal history of other specified conditions (01/28/2015), Personal history of other specified conditions, Personal history of pneumonia (recurrent), Pneumonia, unspecified organism (01/11/2018), Postmenopausal bleeding (09/30/2014), Right knee pain (03/10/2023), Shortness of breath (03/10/2023), Sinus tachycardia (03/10/2023), Submandibular sialolithiasis (03/10/2023), Unilateral primary osteoarthritis, unspecified knee (02/03/2017), and Unspecified acute conjunctivitis, bilateral (08/20/2020).    Past Surgical History:  She has a past surgical history that includes Other surgical history (09/19/2013); Other surgical history (09/19/2013); Tonsillectomy (09/19/2013); Knee surgery (09/19/2013); Tubal ligation (09/19/2013); and Other surgical history (09/27/2013).      Social History:  She reports that she has been smoking cigarettes. She has never used smokeless tobacco. She reports  that she does not drink alcohol and does not use drugs.    Family History:  No family history on file.     Allergies:  Vancomycin, Macrobid [nitrofurantoin monohyd/m-cryst], and Sulfamethoxazole-trimethoprim    Outpatient Medications:  Current Outpatient Medications   Medication Instructions    albuterol 90 mcg/actuation inhaler 2 puffs, inhalation, Every 4 hours PRN    amino acids-protein hydrolys (ProSource No Carb) 15-60 gram-kcal/30 mL liquid Take by mouth.    ammonium lactate (Amlactin) 12 % cream 1 Film, 2 times daily PRN    ciclopirox (Penlac) 8 % solution Topical, Nightly    cyclobenzaprine (FLEXERIL) 10 mg, oral, 2 times daily PRN    dilTIAZem ER (TIAZAC) 360 mg, oral, Daily    DULoxetine (CYMBALTA) 20 mg, oral, Daily    gentamicin (Garamycin) 0.1 % ointment Apply to wounds with dressing changes    hydroxyurea (HYDREA) 1,000 mg, oral, Daily    hydrOXYzine HCL (ATARAX) 50 mg, oral, Every 8 hours PRN    ipratropium-albuteroL (Duo-Neb) 0.5-2.5 mg/3 mL nebulizer solution 3 mL, nebulization, 4 times daily PRN    ketoconazole (NIZOral) 2 % cream APPLY TOPICALLY TWICE A DAY FOR 28 DAYS    lidocaine (Xylocaine) 5 % ointment Topical, As needed, Apply up to three times a day as needed for pain.    lidocaine 5 % gel 1 inch, topical (top), 3 times daily PRN    naloxone (NARCAN) 4 mg, nasal, As needed    nebulizer accessories kit UAD    nystatin (Mycostatin) cream APPLY TO AFFECTED AREA TWO TO THREE TIMES A DAY    [START ON 4/14/2025] oxyCODONE (ROXICODONE) 10 mg, oral, Every 4 hours PRN    oxyCODONE (ROXICODONE) 10 mg, oral, Every 4 hours PRN    oxyCODONE (ROXICODONE) 10 mg, oral, Every 4 hours PRN    pramipexole (MIRAPEX) 1 mg, oral, 2 times daily    sennosides-docusate sodium (Rebecca-Colace) 8.6-50 mg tablet 2 tablets, oral, 2 times daily    spironolactone (ALDACTONE) 50 mg, oral, Daily    torsemide (DEMADEX) 40 mg, oral, 2 times daily    Trelegy Ellipta 200-62.5-25 mcg blister with device INHALE 1 PUFF BY MOUTH AND IN  TO LUNGS ONCE DAILY    triamcinolone (Kenalog) 0.1 % cream APPLY SPARINGLY AND MASSAGE IN ONCE A DAY    triamcinolone (Kenalog) 0.1 % cream Topical, 2 times daily, Apply to affected area 1-2 times daily as needed. Avoid face and groin.    warfarin (Coumadin) 7.5 mg tablet Take one po every day    white petrolatum (Aquaphor) 41 % ointment ointment 1 Application, Topical, 2 times daily, Apply to legs and abdomen    zinc oxide 40 % ointment ointment 1 Application, Topical, 3 times daily, Apply to groin, buttocks after nystatin       Physical Exam:  Constitutional: Pleasant, Awake/Alert/Oriented to person place and time. No distress  Head: Atraumatic, Normocephalic  Eyes: EOMI. MICHELE  Neck: + JVD  Cardiovascular: Regular rate and rhythm, S1, S2. 4/6 murmur RUSB   Respiratory: Crackles noted posteriorly at bases   Abdomen: Nontender, Obese. Bowel sounds appreciated  Musculoskeletal: ROM intact. Muscle strength grossly intact upper and lower extremities 5/5.   Neurological: CNII-XII intact. Sensation grossly intact  Extremities: BLE lymphedema with 4+ pitting edema BLE   Psychiatric: Appropriate mood and affect       Last Labs:  CBC -  Lab Results   Component Value Date    WBC 7.6 12/16/2024    HGB 13.0 12/16/2024    HCT 40.6 12/16/2024     (H) 12/16/2024     (H) 12/16/2024       CMP -  Lab Results   Component Value Date    CALCIUM 8.8 12/26/2024    PHOS 3.5 12/12/2024    PROT 6.6 12/26/2024    ALBUMIN 3.9 12/26/2024    AST 14 12/26/2024    ALT 12 12/26/2024    ALKPHOS 158 (H) 12/26/2024    BILITOT 0.4 12/26/2024       LIPID PANEL -   Lab Results   Component Value Date    CHOL 174 12/11/2024    TRIG 106 12/11/2024    HDL 65.6 12/11/2024    CHHDL 2.7 12/11/2024    LDLF 96 11/14/2022    VLDL 21 12/11/2024    NHDL 108 12/11/2024       RENAL FUNCTION PANEL -   Lab Results   Component Value Date    GLUCOSE 118 (H) 12/26/2024     12/26/2024    K 4.5 12/26/2024    CL 97 (L) 12/26/2024    CO2 33 (H) 12/26/2024     ANIONGAP 17 12/26/2024    BUN 19 12/26/2024    CREATININE 1.01 12/26/2024    CALCIUM 8.8 12/26/2024    PHOS 3.5 12/12/2024    ALBUMIN 3.9 12/26/2024        Lab Results   Component Value Date     (H) 12/11/2024    HGBA1C 5.5 12/11/2024       Last Cardiology Tests:  Echo:  Transthoracic Echo (TTE) Complete 12/12/2024  CONCLUSIONS:   1. The left ventricular systolic function is normal, with a visually estimated ejection fraction of 60-65%.   2. Spectral Doppler shows a Grade I (impaired relaxation pattern) of left ventricular diastolic filling with normal left atrial filling pressure.   3. There is moderately increased septal and moderately increased posterior left ventricular wall thickness.   4. There is moderate concentric left ventricular hypertrophy.   5. There is reduced right ventricular systolic function.   6. Moderately enlarged right ventricle.   7. The left atrium is mildly dilated.   8. There is moderate mitral annular calcification.   9. There is moderate mitral valve stenosis.  10. Moderate to severe aortic valve stenosis. (4.03, 65/41, 0.88, DI 0.28)   11. Moderately elevated pulmonary artery pressure.  12. There is mild mitral and tricuspid regurgitation.     9/18/2024  CONCLUSIONS:   1. The left ventricular systolic function is normal, with a visually estimated ejection fraction of 60-65%.   2. Spectral Doppler shows an impaired relaxation pattern of left ventricular diastolic filling.   3. There is normal right ventricular global systolic function.   4. The left atrium is mildly dilated.   5. There is severe mitral annular calcification.   6. Severe aortic valve stenosis.       Lab review: I have personally reviewed the laboratory result(s)   Diagnostic review: I have personally reviewed the result(s) of the Echocardiogram    Assessment/Plan   Very pleasant 64 y.o. 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     Plan:  -Increase spironolactone to 50mg daily  -Increase torsemide to 40mg BID  -Continue diltiazem 240mg daily   -Continue warfarin for goal INR 2-3  -Will obtain a repeat echo in 1 month with follow up to discuss results as well as for reassessment of symptoms with diuresis.   -May need to consider diuretic clinic (patient limited in mobility and transportation) vs. hospitalization for IV diuresis       LAKEISHA Mackey-CNP

## 2025-04-01 ENCOUNTER — TELEMEDICINE (OUTPATIENT)
Dept: PRIMARY CARE | Facility: CLINIC | Age: 65
End: 2025-04-01
Payer: MEDICARE

## 2025-04-01 DIAGNOSIS — N30.00 ACUTE CYSTITIS WITHOUT HEMATURIA: Primary | ICD-10-CM

## 2025-04-01 PROCEDURE — 99213 OFFICE O/P EST LOW 20 MIN: CPT | Performed by: FAMILY MEDICINE

## 2025-04-01 PROCEDURE — 1159F MED LIST DOCD IN RCRD: CPT | Performed by: FAMILY MEDICINE

## 2025-04-01 PROCEDURE — 1160F RVW MEDS BY RX/DR IN RCRD: CPT | Performed by: FAMILY MEDICINE

## 2025-04-01 RX ORDER — AMOXICILLIN AND CLAVULANATE POTASSIUM 875; 125 MG/1; MG/1
875 TABLET, FILM COATED ORAL 2 TIMES DAILY
Qty: 20 TABLET | Refills: 0 | Status: SHIPPED | OUTPATIENT
Start: 2025-04-01 | End: 2025-04-11

## 2025-04-01 ASSESSMENT — ENCOUNTER SYMPTOMS
DIARRHEA: 0
SORE THROAT: 0
HEADACHES: 0
DYSURIA: 0
FEVER: 1
NAUSEA: 0
WHEEZING: 0
ABDOMINAL PAIN: 0
COUGH: 0
VOMITING: 0

## 2025-04-01 NOTE — PROGRESS NOTES
Subjective   Patient ID: Neisha Graham is a 64 y.o. female who presents for Fever.  Fever   This is a recurrent problem. The current episode started yesterday. The problem occurs constantly. The problem has been gradually worsening. The maximum temperature noted was 101 to 101.9 F. The temperature was taken using an oral thermometer. Associated symptoms include muscle aches. Pertinent negatives include no abdominal pain, chest pain, congestion, coughing, diarrhea, ear pain, headaches, nausea, rash, sleepiness, sore throat, urinary pain, vomiting or wheezing.   Risk factors: hx of cancer and immunosuppression    Risk factors: no contaminated food, no contaminated water, no occupational exposure, no recent sickness, no recent travel and no sick contacts      Answers submitted by the patient for this visit:  Fever Questionnaire (Submitted on 4/1/2025)  Chief Complaint: Fever  Chronicity: recurrent  Onset: yesterday  contaminated food: No  contaminated water: No  history of cancer: Yes  occupational exposure: No  recent travel: No  immunosuppression: Yes  recent illness: No  sick contacts: No  Frequency: constantly  Progression since onset: gradually worsening  Max temp prior to arrival: 101 to 101.9 F  Temperature source: an oral thermometer  abdominal pain: No  chest pain: No  congestion: No  cough: No  diarrhea: No  ear pain: No  headaches: No  muscle aches: Yes  nausea: No  rash: No  sleepiness: No  sore throat: No  urinary pain: No  vomiting: No  wheezing: No    Started with lower left back pain  Has fever  No hematuria  No dysuria  No n/v, diarrhea  Some loss of appetite  No abdominal pain  No vaginal discharge      Current Outpatient Medications:     albuterol 90 mcg/actuation inhaler, INHALE 2 PUFFS BY MOUTH AND INTO THE LUNGS EVERY 4 HOURS IF NEEDED FOR WHEEZING, Disp: 8 g, Rfl: 3    amino acids-protein hydrolys (ProSource No Carb) 15-60 gram-kcal/30 mL liquid, Take by mouth., Disp: , Rfl:     ammonium  lactate (Amlactin) 12 % cream, Apply 1 Film topically 2 times a day as needed., Disp: , Rfl:     amoxicillin-pot clavulanate (Augmentin) 875-125 mg tablet, Take 1 tablet (875 mg) by mouth 2 times a day for 10 days., Disp: 20 tablet, Rfl: 0    ciclopirox (Penlac) 8 % solution, Apply topically once daily at bedtime., Disp: 6.6 mL, Rfl: 11    cyclobenzaprine (Flexeril) 10 mg tablet, Take 1 tablet (10 mg) by mouth 2 times a day as needed for muscle spasms., Disp: 30 tablet, Rfl: 2    dilTIAZem ER (Tiazac) 360 mg 24 hr capsule, Take 1 capsule (360 mg) by mouth once daily., Disp: 90 capsule, Rfl: 3    DULoxetine (Cymbalta) 20 mg DR capsule, TAKE 1 CAPSULE BY MOUTH ONCE DAILY, Disp: 90 capsule, Rfl: 1    gentamicin (Garamycin) 0.1 % ointment, Apply to wounds with dressing changes, Disp: 30 g, Rfl: 1    hydroxyurea (Hydrea) 500 mg capsule, Take 2 capsules (1,000 mg total) by mouth once daily., Disp: 180 capsule, Rfl: 3    hydrOXYzine HCL (Atarax) 50 mg tablet, Take 1 tablet (50 mg) by mouth every 8 hours if needed for anxiety., Disp: 30 tablet, Rfl: 3    ipratropium-albuteroL (Duo-Neb) 0.5-2.5 mg/3 mL nebulizer solution, Take 3 mL by nebulization 4 times a day as needed for wheezing or shortness of breath., Disp: 360 mL, Rfl: 11    ketoconazole (NIZOral) 2 % cream, APPLY TOPICALLY TWICE A DAY FOR 28 DAYS, Disp: 30 g, Rfl: 0    lidocaine (Xylocaine) 5 % ointment, Apply topically if needed for mild pain (1 - 3). Apply up to three times a day as needed for pain., Disp: 35 g, Rfl: 5    lidocaine 5 % gel, Apply 1 inch topically 3 times a day as needed (apply to affected area)., Disp: 100 g, Rfl: 2    naloxone (Narcan) 4 mg/0.1 mL nasal spray, Administer 1 spray (4 mg) into affected nostril(s) if needed for opioid reversal or respiratory depression., Disp: 2 each, Rfl: 0    nebulizer accessories kit, UAD, Disp: 1 kit, Rfl: 3    nystatin (Mycostatin) cream, APPLY TO AFFECTED AREA TWO TO THREE TIMES A DAY, Disp: 30 g, Rfl: 2     [START ON 4/14/2025] oxyCODONE (Roxicodone) 10 mg immediate release tablet, Take 1 tablet (10 mg) by mouth every 4 hours if needed for severe pain (7 - 10) for up to 28 days. Do not fill before April 14, 2025., Disp: 168 tablet, Rfl: 0    oxyCODONE (Roxicodone) 10 mg immediate release tablet, Take 1 tablet (10 mg) by mouth every 4 hours if needed for severe pain (7 - 10) for up to 28 days. Do not fill before March 17, 2025., Disp: 168 tablet, Rfl: 0    oxyCODONE (Roxicodone) 10 mg immediate release tablet, Take 1 tablet (10 mg) by mouth every 4 hours if needed for severe pain (7 - 10) for up to 28 days. Do not fill before February 17, 2025., Disp: 168 tablet, Rfl: 0    pramipexole (Mirapex) 1 mg tablet, Take 1 tablet (1 mg) by mouth 2 times a day., Disp: 180 tablet, Rfl: 3    sennosides-docusate sodium (Rebecca-Colace) 8.6-50 mg tablet, Take 2 tablets by mouth 2 times a day., Disp: 120 tablet, Rfl: 0    spironolactone (Aldactone) 50 mg tablet, Take 1 tablet (50 mg) by mouth once daily., Disp: 90 tablet, Rfl: 3    torsemide (Demadex) 20 mg tablet, Take 2 tablets (40 mg) by mouth 2 times a day., Disp: 360 tablet, Rfl: 3    Trelegy Ellipta 200-62.5-25 mcg blister with device, INHALE 1 PUFF BY MOUTH AND IN TO LUNGS ONCE DAILY, Disp: 180 each, Rfl: 1    triamcinolone (Kenalog) 0.1 % cream, APPLY SPARINGLY AND MASSAGE IN ONCE A DAY, Disp: , Rfl:     triamcinolone (Kenalog) 0.1 % cream, Apply topically 2 times a day. Apply to affected area 1-2 times daily as needed. Avoid face and groin., Disp: 30 g, Rfl: 0    warfarin (Coumadin) 7.5 mg tablet, Take one po every day, Disp: 90 tablet, Rfl: 3    white petrolatum (Aquaphor) 41 % ointment ointment, Apply 1 Application topically 2 times a day. Apply to legs and abdomen, Disp: 50 g, Rfl: 0    zinc oxide 40 % ointment ointment, Apply 1 Application topically 3 times a day. Apply to groin, buttocks after nystatin, Disp: 57 g, Rfl: 1   Past Surgical History:   Procedure Laterality Date     KNEE SURGERY  09/19/2013    Knee Surgery Right    OTHER SURGICAL HISTORY  09/19/2013    Direct Laryngoscopy With Foreign Body Removal    OTHER SURGICAL HISTORY  09/19/2013    Laminectomy With Drainage Of Intramedullary Cyst    OTHER SURGICAL HISTORY  09/27/2013    Ovarian Cystectomy    TONSILLECTOMY  09/19/2013    Tonsillectomy    TUBAL LIGATION  09/19/2013    Tubal Ligation      Past Medical History:   Diagnosis Date    Acute bronchitis due to other specified organisms 10/14/2019    Acute bronchitis due to other specified organisms    Acute viral conjunctivitis of left eye 03/10/2023    Alkaline phosphatase elevation 08/17/2023    Anemia 03/10/2023    Bone marrow disorder 08/17/2023    Cellulitis of left lower limb 04/08/2021    Cellulitis of left lower leg    Chronic obstructive pulmonary disease with (acute) exacerbation (Multi) 01/09/2019    COPD exacerbation    Chronic sinusitis, unspecified     Sinusitis    COVID-19 11/28/2022    COVID-19    Daily headache 08/17/2023    Elevated bilirubin 08/17/2023    Elevated transaminase level 08/17/2023    Fever 08/17/2023    Fever 08/17/2023    Heart murmur 03/10/2023    Hemoptysis 03/10/2023    Hyperkalemia 03/10/2023    Malaise and fatigue 03/10/2023    Morbid (severe) obesity due to excess calories (Multi)     Morbid obesity    Other conditions influencing health status     History Of ___ Previous Pregnancies    Other conditions influencing health status 03/12/2020    Arthritis    Other conditions influencing health status 03/06/2017    History of cough    Other conditions influencing health status     Menstruation    Other conditions influencing health status     Osteoarthritis    Other conditions influencing health status     Pulmonary Disease    Pain in unspecified ankle and joints of unspecified foot 06/24/2016    Ankle joint pain    Pain in unspecified foot 12/08/2015    Foot pain    Pain in unspecified knee     Joint pain, knee    Palpitations 03/10/2023     Personal history of diseases of the skin and subcutaneous tissue     History of cellulitis    Personal history of other diseases of the female genital tract     Vaginal delivery    Personal history of other diseases of the nervous system and sense organs 09/12/2019    History of conjunctivitis    Personal history of other diseases of the nervous system and sense organs 09/17/2015    History of blurred vision    Personal history of other diseases of the respiratory system     History of pleurisy    Personal history of other diseases of the respiratory system 10/17/2016    History of bronchitis    Personal history of other diseases of the respiratory system 11/29/2017    History of acute bronchitis    Personal history of other diseases of the respiratory system 11/16/2016    History of acute pharyngitis    Personal history of other drug therapy 10/14/2016    History of influenza vaccination    Personal history of other infectious and parasitic diseases     History of hepatitis    Personal history of other infectious and parasitic diseases 07/22/2020    History of candidiasis of mouth    Personal history of other medical treatment     History of mammogram    Personal history of other specified conditions     History of shortness of breath    Personal history of other specified conditions 01/28/2015    History of shortness of breath    Personal history of other specified conditions     History of abnormal Pap smear    Personal history of pneumonia (recurrent)     History of pneumonia    Pneumonia, unspecified organism 01/11/2018    Community acquired pneumonia    Postmenopausal bleeding 09/30/2014    Postmenopausal bleeding    Right knee pain 03/10/2023    Shortness of breath 03/10/2023    Sinus tachycardia 03/10/2023    Submandibular sialolithiasis 03/10/2023    Unilateral primary osteoarthritis, unspecified knee 02/03/2017    Osteoarthritis, localized, knee    Unspecified acute conjunctivitis, bilateral 08/20/2020     Acute bacterial conjunctivitis of both eyes     Social History     Tobacco Use    Smoking status: Some Days     Types: Cigarettes    Smokeless tobacco: Never   Substance Use Topics    Alcohol use: Never    Drug use: Never      No family history on file.   Review of Systems   Constitutional:  Positive for fever.   HENT:  Negative for congestion, ear pain and sore throat.    Respiratory:  Negative for cough and wheezing.    Cardiovascular:  Negative for chest pain.   Gastrointestinal:  Negative for abdominal pain, diarrhea, nausea and vomiting.   Genitourinary:  Negative for dysuria.   Skin:  Negative for rash.   Neurological:  Negative for headaches.       Objective   There were no vitals taken for this visit.   Physical Exam    Assessment/Plan   Problem List Items Addressed This Visit    None  Visit Diagnoses       Acute cystitis without hematuria    -  Primary    Relevant Medications    amoxicillin-pot clavulanate (Augmentin) 875-125 mg tablet        Fluids    Told parent/patient that if no improvement in 2-3 days then please call. If worsens or new symptoms occur then please call when this occurs. If worsening then go to ER immediately    Needs an in office visit    I discussed with the patient the potential benefits and risks of the use of telephone or video-conferencing that differ from in-person services (e.g., limits to patient confidentiality, limitations on the provider’s ability to observe the patient, limitations on the diagnostic tools available). I explained to the patient that I may determine at any point that telehealth services are not appropriate based on the patient’s circumstances and either party may therefore end the service to schedule an alternative in-person service or contact 911 to address a medical emergency. With the understanding of these risks, benefits and alternatives, the patient agreed to use the telephone or video-conferencing platform selected for this virtual session and further  "the patient confirmed his/her understanding that the services do not guarantee a specific outcome or recovery.  \"Spent 10 minutes with patient on phone discussing health concerns.\"    Virtual or Telephone Consent    While technically available, the patient was unable or unwilling to consent to connect via audio/video telehealth technology; therefore, I performed this visit using a real-time audio only connection between Neisha Graham & Micheal Gonsalez DO.  Verbal consent was requested and obtained from Neisha Graham on this date, 04/01/25 for a telehealth visit and the patient's location was confirmed at the time of the visit.        Patient understands and agrees with treatment plan    Micheal Gonsalez DO     "

## 2025-04-02 ENCOUNTER — APPOINTMENT (OUTPATIENT)
Dept: WOUND CARE | Facility: HOSPITAL | Age: 65
End: 2025-04-02
Payer: MEDICARE

## 2025-04-02 PROCEDURE — 11045 DBRDMT SUBQ TISS EACH ADDL: CPT | Mod: LT,RT

## 2025-04-02 PROCEDURE — 11042 DBRDMT SUBQ TIS 1ST 20SQCM/<: CPT | Mod: LT,RT

## 2025-04-09 ENCOUNTER — APPOINTMENT (OUTPATIENT)
Dept: WOUND CARE | Facility: HOSPITAL | Age: 65
End: 2025-04-09
Payer: MEDICARE

## 2025-04-16 ENCOUNTER — APPOINTMENT (OUTPATIENT)
Dept: WOUND CARE | Facility: HOSPITAL | Age: 65
End: 2025-04-16
Payer: MEDICARE

## 2025-04-23 ENCOUNTER — APPOINTMENT (OUTPATIENT)
Dept: HEMATOLOGY/ONCOLOGY | Facility: CLINIC | Age: 65
End: 2025-04-23
Payer: MEDICARE

## 2025-04-23 ENCOUNTER — APPOINTMENT (OUTPATIENT)
Dept: WOUND CARE | Facility: HOSPITAL | Age: 65
End: 2025-04-23
Payer: MEDICARE

## 2025-04-28 ENCOUNTER — TELEMEDICINE (OUTPATIENT)
Dept: PRIMARY CARE | Facility: CLINIC | Age: 65
End: 2025-04-28
Payer: MEDICARE

## 2025-04-28 DIAGNOSIS — F41.1 GENERALIZED ANXIETY DISORDER: ICD-10-CM

## 2025-04-28 DIAGNOSIS — F33.1 MDD (MAJOR DEPRESSIVE DISORDER), RECURRENT EPISODE, MODERATE: Primary | ICD-10-CM

## 2025-04-28 DIAGNOSIS — F33.1 MODERATE EPISODE OF RECURRENT MAJOR DEPRESSIVE DISORDER: ICD-10-CM

## 2025-04-28 DIAGNOSIS — F41.1 GENERALIZED ANXIETY DISORDER: Primary | ICD-10-CM

## 2025-04-28 PROCEDURE — 1160F RVW MEDS BY RX/DR IN RCRD: CPT | Performed by: FAMILY MEDICINE

## 2025-04-28 PROCEDURE — 99213 OFFICE O/P EST LOW 20 MIN: CPT | Performed by: FAMILY MEDICINE

## 2025-04-28 PROCEDURE — 1159F MED LIST DOCD IN RCRD: CPT | Performed by: FAMILY MEDICINE

## 2025-04-28 RX ORDER — VENLAFAXINE HYDROCHLORIDE 75 MG/1
75 CAPSULE, EXTENDED RELEASE ORAL DAILY
Qty: 30 CAPSULE | Refills: 5 | Status: SHIPPED | OUTPATIENT
Start: 2025-04-28 | End: 2025-10-25

## 2025-04-28 NOTE — PROGRESS NOTES
Subjective   Patient ID: Neisha Graham is a 65 y.o. female who presents for Depression.  HPI  Having a lot of depression  No hopeless, worthless  No SI/HI  Appetite okay  Concentration okay  Poor energy   Does not want to leave the house  Minimal anxiety    No CP, SOB, palpitations    Current Medications[1]   Surgical History[2]   Medical History[3]  Social History[4]   Family History[5]   Review of Systems    Objective   There were no vitals taken for this visit.   Physical Exam    Assessment/Plan   Problem List Items Addressed This Visit       MDD (major depressive disorder), recurrent episode, moderate - Primary    Generalized anxiety disorder   Venlafaxine 75 mg  Consider counseling    Virtual or Telephone Consent    While technically available, the patient was unable or unwilling to consent to connect via audio/video telehealth technology; therefore, I performed this visit using a real-time audio only connection between Neisha Graham & Micheal Gonsalez DO.  Verbal consent was requested and obtained from Neisha Graham on this date, 04/28/25 for a telehealth visit and the patient's location was confirmed at the time of the visit.    I discussed with the patient the potential benefits and risks of the use of telephone or video-conferencing that differ from in-person services (e.g., limits to patient confidentiality, limitations on the provider’s ability to observe the patient, limitations on the diagnostic tools available). I explained to the patient that I may determine at any point that telehealth services are not appropriate based on the patient’s circumstances and either party may therefore end the service to schedule an alternative in-person service or contact 911 to address a medical emergency. With the understanding of these risks, benefits and alternatives, the patient agreed to use the telephone or video-conferencing platform selected for this virtual session and further the patient confirmed  "his/her understanding that the services do not guarantee a specific outcome or recovery.  \"Spent 5 minutes with patient on phone discussing health concerns.\"        Patient understands and agrees with treatment plan    Micheal Gonsalez DO          [1]   Current Outpatient Medications:     albuterol 90 mcg/actuation inhaler, INHALE 2 PUFFS BY MOUTH AND INTO THE LUNGS EVERY 4 HOURS IF NEEDED FOR WHEEZING, Disp: 8 g, Rfl: 3    amino acids-protein hydrolys (ProSource No Carb) 15-60 gram-kcal/30 mL liquid, Take by mouth., Disp: , Rfl:     ammonium lactate (Amlactin) 12 % cream, Apply 1 Film topically 2 times a day as needed., Disp: , Rfl:     ciclopirox (Penlac) 8 % solution, Apply topically once daily at bedtime., Disp: 6.6 mL, Rfl: 11    cyclobenzaprine (Flexeril) 10 mg tablet, Take 1 tablet (10 mg) by mouth 2 times a day as needed for muscle spasms., Disp: 30 tablet, Rfl: 2    dilTIAZem ER (Tiazac) 360 mg 24 hr capsule, Take 1 capsule (360 mg) by mouth once daily., Disp: 90 capsule, Rfl: 3    gentamicin (Garamycin) 0.1 % ointment, Apply to wounds with dressing changes, Disp: 30 g, Rfl: 1    hydroxyurea (Hydrea) 500 mg capsule, Take 2 capsules (1,000 mg total) by mouth once daily., Disp: 180 capsule, Rfl: 3    hydrOXYzine HCL (Atarax) 50 mg tablet, Take 1 tablet (50 mg) by mouth every 8 hours if needed for anxiety., Disp: 30 tablet, Rfl: 3    ipratropium-albuteroL (Duo-Neb) 0.5-2.5 mg/3 mL nebulizer solution, Take 3 mL by nebulization 4 times a day as needed for wheezing or shortness of breath., Disp: 360 mL, Rfl: 11    ketoconazole (NIZOral) 2 % cream, APPLY TOPICALLY TWICE A DAY FOR 28 DAYS, Disp: 30 g, Rfl: 0    lidocaine (Xylocaine) 5 % ointment, Apply topically if needed for mild pain (1 - 3). Apply up to three times a day as needed for pain., Disp: 35 g, Rfl: 5    lidocaine 5 % gel, Apply 1 inch topically 3 times a day as needed (apply to affected area)., Disp: 100 g, Rfl: 2    naloxone (Narcan) 4 mg/0.1 mL " nasal spray, Administer 1 spray (4 mg) into affected nostril(s) if needed for opioid reversal or respiratory depression., Disp: 2 each, Rfl: 0    nebulizer accessories kit, UAD, Disp: 1 kit, Rfl: 3    nystatin (Mycostatin) cream, APPLY TO AFFECTED AREA TWO TO THREE TIMES A DAY, Disp: 30 g, Rfl: 2    oxyCODONE (Roxicodone) 10 mg immediate release tablet, Take 1 tablet (10 mg) by mouth every 4 hours if needed for severe pain (7 - 10) for up to 28 days. Do not fill before April 14, 2025., Disp: 168 tablet, Rfl: 0    oxyCODONE (Roxicodone) 10 mg immediate release tablet, Take 1 tablet (10 mg) by mouth every 4 hours if needed for severe pain (7 - 10) for up to 28 days. Do not fill before March 17, 2025., Disp: 168 tablet, Rfl: 0    oxyCODONE (Roxicodone) 10 mg immediate release tablet, Take 1 tablet (10 mg) by mouth every 4 hours if needed for severe pain (7 - 10) for up to 28 days. Do not fill before February 17, 2025., Disp: 168 tablet, Rfl: 0    pramipexole (Mirapex) 1 mg tablet, Take 1 tablet (1 mg) by mouth 2 times a day., Disp: 180 tablet, Rfl: 3    sennosides-docusate sodium (Rebecca-Colace) 8.6-50 mg tablet, Take 2 tablets by mouth 2 times a day., Disp: 120 tablet, Rfl: 0    spironolactone (Aldactone) 50 mg tablet, Take 1 tablet (50 mg) by mouth once daily., Disp: 90 tablet, Rfl: 3    torsemide (Demadex) 20 mg tablet, Take 2 tablets (40 mg) by mouth 2 times a day., Disp: 360 tablet, Rfl: 3    Trelegy Ellipta 200-62.5-25 mcg blister with device, INHALE 1 PUFF BY MOUTH AND IN TO LUNGS ONCE DAILY, Disp: 180 each, Rfl: 1    triamcinolone (Kenalog) 0.1 % cream, APPLY SPARINGLY AND MASSAGE IN ONCE A DAY, Disp: , Rfl:     triamcinolone (Kenalog) 0.1 % cream, Apply topically 2 times a day. Apply to affected area 1-2 times daily as needed. Avoid face and groin., Disp: 30 g, Rfl: 0    venlafaxine XR (Effexor-XR) 75 mg 24 hr capsule, Take 1 capsule (75 mg) by mouth once daily. Take with food., Disp: 30 capsule, Rfl: 5     warfarin (Coumadin) 7.5 mg tablet, Take one po every day, Disp: 90 tablet, Rfl: 3    white petrolatum (Aquaphor) 41 % ointment ointment, Apply 1 Application topically 2 times a day. Apply to legs and abdomen, Disp: 50 g, Rfl: 0    zinc oxide 40 % ointment ointment, Apply 1 Application topically 3 times a day. Apply to groin, buttocks after nystatin, Disp: 57 g, Rfl: 1  [2]   Past Surgical History:  Procedure Laterality Date    KNEE SURGERY  09/19/2013    Knee Surgery Right    OTHER SURGICAL HISTORY  09/19/2013    Direct Laryngoscopy With Foreign Body Removal    OTHER SURGICAL HISTORY  09/19/2013    Laminectomy With Drainage Of Intramedullary Cyst    OTHER SURGICAL HISTORY  09/27/2013    Ovarian Cystectomy    TONSILLECTOMY  09/19/2013    Tonsillectomy    TUBAL LIGATION  09/19/2013    Tubal Ligation   [3]   Past Medical History:  Diagnosis Date    Acute bronchitis due to other specified organisms 10/14/2019    Acute bronchitis due to other specified organisms    Acute viral conjunctivitis of left eye 03/10/2023    Alkaline phosphatase elevation 08/17/2023    Anemia 03/10/2023    Bone marrow disorder 08/17/2023    Cellulitis of left lower limb 04/08/2021    Cellulitis of left lower leg    Chronic obstructive pulmonary disease with (acute) exacerbation (Multi) 01/09/2019    COPD exacerbation    Chronic sinusitis, unspecified     Sinusitis    COVID-19 11/28/2022    COVID-19    Daily headache 08/17/2023    Elevated bilirubin 08/17/2023    Elevated transaminase level 08/17/2023    Fever 08/17/2023    Fever 08/17/2023    Heart murmur 03/10/2023    Hemoptysis 03/10/2023    Hyperkalemia 03/10/2023    Malaise and fatigue 03/10/2023    Morbid (severe) obesity due to excess calories (Multi)     Morbid obesity    Other conditions influencing health status     History Of ___ Previous Pregnancies    Other conditions influencing health status 03/12/2020    Arthritis    Other conditions influencing health status 03/06/2017    History  of cough    Other conditions influencing health status     Menstruation    Other conditions influencing health status     Osteoarthritis    Other conditions influencing health status     Pulmonary Disease    Pain in unspecified ankle and joints of unspecified foot 06/24/2016    Ankle joint pain    Pain in unspecified foot 12/08/2015    Foot pain    Pain in unspecified knee     Joint pain, knee    Palpitations 03/10/2023    Personal history of diseases of the skin and subcutaneous tissue     History of cellulitis    Personal history of other diseases of the female genital tract     Vaginal delivery    Personal history of other diseases of the nervous system and sense organs 09/12/2019    History of conjunctivitis    Personal history of other diseases of the nervous system and sense organs 09/17/2015    History of blurred vision    Personal history of other diseases of the respiratory system     History of pleurisy    Personal history of other diseases of the respiratory system 10/17/2016    History of bronchitis    Personal history of other diseases of the respiratory system 11/29/2017    History of acute bronchitis    Personal history of other diseases of the respiratory system 11/16/2016    History of acute pharyngitis    Personal history of other drug therapy 10/14/2016    History of influenza vaccination    Personal history of other infectious and parasitic diseases     History of hepatitis    Personal history of other infectious and parasitic diseases 07/22/2020    History of candidiasis of mouth    Personal history of other medical treatment     History of mammogram    Personal history of other specified conditions     History of shortness of breath    Personal history of other specified conditions 01/28/2015    History of shortness of breath    Personal history of other specified conditions     History of abnormal Pap smear    Personal history of pneumonia (recurrent)     History of pneumonia    Pneumonia,  unspecified organism 01/11/2018    Community acquired pneumonia    Postmenopausal bleeding 09/30/2014    Postmenopausal bleeding    Right knee pain 03/10/2023    Shortness of breath 03/10/2023    Sinus tachycardia 03/10/2023    Submandibular sialolithiasis 03/10/2023    Unilateral primary osteoarthritis, unspecified knee 02/03/2017    Osteoarthritis, localized, knee    Unspecified acute conjunctivitis, bilateral 08/20/2020    Acute bacterial conjunctivitis of both eyes   [4]   Social History  Tobacco Use    Smoking status: Some Days     Types: Cigarettes    Smokeless tobacco: Never   Substance Use Topics    Alcohol use: Never    Drug use: Never   [5] No family history on file.

## 2025-04-30 ENCOUNTER — OFFICE VISIT (OUTPATIENT)
Dept: WOUND CARE | Facility: HOSPITAL | Age: 65
End: 2025-04-30
Payer: MEDICARE

## 2025-04-30 PROCEDURE — 11042 DBRDMT SUBQ TIS 1ST 20SQCM/<: CPT | Mod: 50

## 2025-04-30 PROCEDURE — 11045 DBRDMT SUBQ TISS EACH ADDL: CPT | Mod: 50,59

## 2025-05-07 ENCOUNTER — PREP FOR PROCEDURE (OUTPATIENT)
Dept: WOUND CARE | Facility: HOSPITAL | Age: 65
End: 2025-05-07

## 2025-05-07 ENCOUNTER — OFFICE VISIT (OUTPATIENT)
Dept: WOUND CARE | Facility: HOSPITAL | Age: 65
End: 2025-05-07
Payer: MEDICARE

## 2025-05-07 PROCEDURE — 11042 DBRDMT SUBQ TIS 1ST 20SQCM/<: CPT

## 2025-05-08 ENCOUNTER — APPOINTMENT (OUTPATIENT)
Dept: CARDIOLOGY | Facility: HOSPITAL | Age: 65
End: 2025-05-08
Payer: MEDICARE

## 2025-05-12 ENCOUNTER — OFFICE VISIT (OUTPATIENT)
Dept: PAIN MEDICINE | Facility: CLINIC | Age: 65
End: 2025-05-12
Payer: MEDICARE

## 2025-05-12 VITALS
RESPIRATION RATE: 18 BRPM | HEART RATE: 63 BPM | OXYGEN SATURATION: 94 % | SYSTOLIC BLOOD PRESSURE: 126 MMHG | DIASTOLIC BLOOD PRESSURE: 80 MMHG

## 2025-05-12 DIAGNOSIS — M48.062 NEUROGENIC CLAUDICATION DUE TO LUMBAR SPINAL STENOSIS: ICD-10-CM

## 2025-05-12 DIAGNOSIS — M17.11 PRIMARY OSTEOARTHRITIS OF RIGHT KNEE: ICD-10-CM

## 2025-05-12 DIAGNOSIS — M17.12 PRIMARY OSTEOARTHRITIS OF LEFT KNEE: ICD-10-CM

## 2025-05-12 DIAGNOSIS — M54.16 CHRONIC LUMBAR RADICULOPATHY: ICD-10-CM

## 2025-05-12 DIAGNOSIS — M79.18 MYOFASCIAL PAIN: ICD-10-CM

## 2025-05-12 PROCEDURE — 1125F AMNT PAIN NOTED PAIN PRSNT: CPT | Performed by: ANESTHESIOLOGY

## 2025-05-12 PROCEDURE — 99213 OFFICE O/P EST LOW 20 MIN: CPT | Performed by: ANESTHESIOLOGY

## 2025-05-12 PROCEDURE — 1159F MED LIST DOCD IN RCRD: CPT | Performed by: ANESTHESIOLOGY

## 2025-05-12 PROCEDURE — 3074F SYST BP LT 130 MM HG: CPT | Performed by: ANESTHESIOLOGY

## 2025-05-12 PROCEDURE — 3079F DIAST BP 80-89 MM HG: CPT | Performed by: ANESTHESIOLOGY

## 2025-05-12 PROCEDURE — G2211 COMPLEX E/M VISIT ADD ON: HCPCS | Performed by: ANESTHESIOLOGY

## 2025-05-12 RX ORDER — OXYCODONE HYDROCHLORIDE 10 MG/1
10 TABLET ORAL EVERY 4 HOURS PRN
Qty: 168 TABLET | Refills: 0 | Status: SHIPPED | OUTPATIENT
Start: 2025-06-09 | End: 2025-07-07

## 2025-05-12 RX ORDER — CYCLOBENZAPRINE HCL 10 MG
10 TABLET ORAL 2 TIMES DAILY PRN
Qty: 30 TABLET | Refills: 2 | Status: SHIPPED | OUTPATIENT
Start: 2025-05-12 | End: 2025-08-10

## 2025-05-12 RX ORDER — OXYCODONE HYDROCHLORIDE 10 MG/1
10 TABLET ORAL EVERY 4 HOURS PRN
Qty: 168 TABLET | Refills: 0 | Status: SHIPPED | OUTPATIENT
Start: 2025-05-12 | End: 2025-06-09

## 2025-05-12 RX ORDER — OXYCODONE HYDROCHLORIDE 10 MG/1
10 TABLET ORAL EVERY 4 HOURS PRN
Qty: 168 TABLET | Refills: 0 | Status: SHIPPED | OUTPATIENT
Start: 2025-07-07 | End: 2025-08-04

## 2025-05-12 ASSESSMENT — ENCOUNTER SYMPTOMS
DEPRESSION: 1
LOSS OF SENSATION IN FEET: 0
OCCASIONAL FEELINGS OF UNSTEADINESS: 1

## 2025-05-12 ASSESSMENT — PAIN - FUNCTIONAL ASSESSMENT: PAIN_FUNCTIONAL_ASSESSMENT: 0-10

## 2025-05-12 ASSESSMENT — PAIN DESCRIPTION - DESCRIPTORS: DESCRIPTORS: ACHING;BURNING

## 2025-05-12 ASSESSMENT — PAIN SCALES - GENERAL: PAINLEVEL_OUTOF10: 6

## 2025-05-12 NOTE — PROGRESS NOTES
Subjective   Patient ID: Neisha Graham is a 64 y.o. female with PMH of morbid obesity and bilateral lower extremity lymphedema complaining of low back pain and bilateral knee pain. She states pain is localized to lower back without radiation recently. Patient is currently maintained on oxycodone 10 mg every 4 hours and Flexeril 10 mg twice daily. Patient does not endorse any new symptoms, has some new ulcers on her leg that she sees wound care for and is improving.   Patient is presenting to our clinic for refill for medications  Patient is not receiving any interventions.   States she needs a valve replacement.          Review of Systems   13-point ROS done and negative except for HPI.     Current Outpatient Medications   Medication Instructions    albuterol 90 mcg/actuation inhaler 2 puffs, inhalation, Every 4 hours PRN    amino acids-protein hydrolys (ProSource No Carb) 15-60 gram-kcal/30 mL liquid Take by mouth.    ammonium lactate (Amlactin) 12 % cream 1 Film, 2 times daily PRN    ciclopirox (Penlac) 8 % solution Topical, Nightly    cyclobenzaprine (FLEXERIL) 10 mg, oral, 2 times daily PRN    dilTIAZem ER (TIAZAC) 360 mg, oral, Daily    gentamicin (Garamycin) 0.1 % ointment Apply to wounds with dressing changes    hydrOXYzine HCL (ATARAX) 50 mg, oral, Every 8 hours PRN    ipratropium-albuteroL (Duo-Neb) 0.5-2.5 mg/3 mL nebulizer solution 3 mL, nebulization, 4 times daily PRN    ketoconazole (NIZOral) 2 % cream APPLY TOPICALLY TWICE A DAY FOR 28 DAYS    lidocaine (Xylocaine) 5 % ointment Topical, As needed, Apply up to three times a day as needed for pain.    lidocaine 5 % gel 1 inch, topical (top), 3 times daily PRN    naloxone (NARCAN) 4 mg, nasal, As needed    nebulizer accessories kit UAD    nystatin (Mycostatin) cream APPLY TO AFFECTED AREA TWO TO THREE TIMES A DAY    oxyCODONE (ROXICODONE) 10 mg, oral, Every 4 hours PRN    oxyCODONE (ROXICODONE) 10 mg, oral, Every 4 hours PRN    oxyCODONE (ROXICODONE) 10  mg, oral, Every 4 hours PRN    pramipexole (MIRAPEX) 1 mg, oral, 2 times daily    sennosides-docusate sodium (Rebecca-Colace) 8.6-50 mg tablet 2 tablets, oral, 2 times daily    spironolactone (ALDACTONE) 50 mg, oral, Daily    torsemide (DEMADEX) 40 mg, oral, 2 times daily    Trelegy Ellipta 200-62.5-25 mcg blister with device INHALE 1 PUFF BY MOUTH AND IN TO LUNGS ONCE DAILY    triamcinolone (Kenalog) 0.1 % cream APPLY SPARINGLY AND MASSAGE IN ONCE A DAY    triamcinolone (Kenalog) 0.1 % cream Topical, 2 times daily, Apply to affected area 1-2 times daily as needed. Avoid face and groin.    venlafaxine XR (EFFEXOR-XR) 75 mg, oral, Daily, Take with food.    warfarin (Coumadin) 7.5 mg tablet Take one po every day    white petrolatum (Aquaphor) 41 % ointment ointment 1 Application, Topical, 2 times daily, Apply to legs and abdomen    zinc oxide 40 % ointment ointment 1 Application, Topical, 3 times daily, Apply to groin, buttocks after nystatin       Medical History[1]     Surgical History[2]     Family History[3]     RX Allergies[4]     Objective     Vitals:    05/12/25 1328   BP: 126/80   Pulse: 63   Resp: 18   SpO2: 94%        Physical Exam  General: NAD, well groomed, well nourished  Eyes: Non-icteric sclera, EOMI  Ears, Nose, Mouth, and Throat: External ears and nose appear to be without deformity or rash. No lesions or masses noted. Hearing is grossly intact.   Neck: Trachea midline  Respiratory: Nonlabored breathing   Cardiovascular: +3 peripheral edema   Skin: bandaged right leg given wound.     Physical exam limited due to patient being wheelchair bound.   Examination of the muscles/joints/bones show normal range of motion.  The patient is mildly tender to palpation in the cervical and lumbar paraspinal musculature as well as the medial lateral joint lines of both knees.  Lower extremity: Presence of lymphedema on bilateral lower extremities with open wounds on the left shin.  Psychiatric: Alert, orientation to  person, place, and time. Cooperative.    Imaging personally reviewed and independently interpreted:     XR 5/17/2022  FINDINGS:  There is advanced tricompartmental degenerative change in the left  knee with bone-on-bone appearance of both the medial tibiofemoral and  lateral tibiofemoral compartments. There is slight lateral  subluxation of the tibia in like relation to the femur. There is no  fracture or dislocation.     There is advanced tricompartmental degenerative change in the right  knee. Bone-on-bone appearance of both the medial tibiofemoral and  lateral tibiofemoral compartments. Lateral subluxation of the tibia  in relation to the femoral condyles. No acute fracture or dislocation.     IMPRESSION:  Advanced degenerative change in both knees.    Assessment/Plan   Neisha Graham is a 64 y.o. female with PMH of morbid obesity and bilateral lower extremity lymphedema complaining of low back pain and bilateral knee pain. Consistent with lumbar radiculopathy and osteoarthritis on left and right knee. Patient is maintained on current medications.    Plan:  -Continue oxycodone 10mg q4 and flexeril 10mg BID  - Discussed possible genicular nerve block for patient given severe anterior knee pain. Patient will read on the procedure and let us know if she amenable      Follow up: In 3 months    The patient was invited to contact us back anytime with any questions or concerns and follow-up with us in the office as needed.     Diagnoses and all orders for this visit:  Chronic lumbar radiculopathy  Neurogenic claudication due to lumbar spinal stenosis  Primary osteoarthritis of left knee  Primary osteoarthritis of right knee  Myofascial pain      The patient was discussed and seen with Dr. Davila.  Waldo Longo MD           [1]   Past Medical History:  Diagnosis Date    Acute bronchitis due to other specified organisms 10/14/2019    Acute bronchitis due to other specified organisms    Acute viral conjunctivitis of  left eye 03/10/2023    Alkaline phosphatase elevation 08/17/2023    Anemia 03/10/2023    Bone marrow disorder 08/17/2023    Cellulitis of left lower limb 04/08/2021    Cellulitis of left lower leg    Chronic obstructive pulmonary disease with (acute) exacerbation (Multi) 01/09/2019    COPD exacerbation    Chronic sinusitis, unspecified     Sinusitis    COVID-19 11/28/2022    COVID-19    Daily headache 08/17/2023    Elevated bilirubin 08/17/2023    Elevated transaminase level 08/17/2023    Fever 08/17/2023    Fever 08/17/2023    Heart murmur 03/10/2023    Hemoptysis 03/10/2023    Hyperkalemia 03/10/2023    Malaise and fatigue 03/10/2023    Morbid (severe) obesity due to excess calories (Multi)     Morbid obesity    Other conditions influencing health status     History Of ___ Previous Pregnancies    Other conditions influencing health status 03/12/2020    Arthritis    Other conditions influencing health status 03/06/2017    History of cough    Other conditions influencing health status     Menstruation    Other conditions influencing health status     Osteoarthritis    Other conditions influencing health status     Pulmonary Disease    Pain in unspecified ankle and joints of unspecified foot 06/24/2016    Ankle joint pain    Pain in unspecified foot 12/08/2015    Foot pain    Pain in unspecified knee     Joint pain, knee    Palpitations 03/10/2023    Personal history of diseases of the skin and subcutaneous tissue     History of cellulitis    Personal history of other diseases of the female genital tract     Vaginal delivery    Personal history of other diseases of the nervous system and sense organs 09/12/2019    History of conjunctivitis    Personal history of other diseases of the nervous system and sense organs 09/17/2015    History of blurred vision    Personal history of other diseases of the respiratory system     History of pleurisy    Personal history of other diseases of the respiratory system 10/17/2016     History of bronchitis    Personal history of other diseases of the respiratory system 11/29/2017    History of acute bronchitis    Personal history of other diseases of the respiratory system 11/16/2016    History of acute pharyngitis    Personal history of other drug therapy 10/14/2016    History of influenza vaccination    Personal history of other infectious and parasitic diseases     History of hepatitis    Personal history of other infectious and parasitic diseases 07/22/2020    History of candidiasis of mouth    Personal history of other medical treatment     History of mammogram    Personal history of other specified conditions     History of shortness of breath    Personal history of other specified conditions 01/28/2015    History of shortness of breath    Personal history of other specified conditions     History of abnormal Pap smear    Personal history of pneumonia (recurrent)     History of pneumonia    Pneumonia, unspecified organism 01/11/2018    Community acquired pneumonia    Postmenopausal bleeding 09/30/2014    Postmenopausal bleeding    Right knee pain 03/10/2023    Shortness of breath 03/10/2023    Sinus tachycardia 03/10/2023    Submandibular sialolithiasis 03/10/2023    Unilateral primary osteoarthritis, unspecified knee 02/03/2017    Osteoarthritis, localized, knee    Unspecified acute conjunctivitis, bilateral 08/20/2020    Acute bacterial conjunctivitis of both eyes   [2]   Past Surgical History:  Procedure Laterality Date    KNEE SURGERY  09/19/2013    Knee Surgery Right    OTHER SURGICAL HISTORY  09/19/2013    Direct Laryngoscopy With Foreign Body Removal    OTHER SURGICAL HISTORY  09/19/2013    Laminectomy With Drainage Of Intramedullary Cyst    OTHER SURGICAL HISTORY  09/27/2013    Ovarian Cystectomy    TONSILLECTOMY  09/19/2013    Tonsillectomy    TUBAL LIGATION  09/19/2013    Tubal Ligation   [3] No family history on file.  [4]   Allergies  Allergen Reactions    Vancomycin Other     Macrobid [Nitrofurantoin Monohyd/M-Cryst] Dizziness    Sulfamethoxazole-Trimethoprim Unknown

## 2025-05-13 DIAGNOSIS — J43.2 CENTRILOBULAR EMPHYSEMA (MULTI): ICD-10-CM

## 2025-05-13 RX ORDER — IPRATROPIUM BROMIDE AND ALBUTEROL SULFATE 2.5; .5 MG/3ML; MG/3ML
3 SOLUTION RESPIRATORY (INHALATION) 4 TIMES DAILY PRN
Qty: 360 ML | Refills: 11 | Status: SHIPPED | OUTPATIENT
Start: 2025-05-13 | End: 2026-05-13

## 2025-05-14 ENCOUNTER — APPOINTMENT (OUTPATIENT)
Dept: WOUND CARE | Facility: HOSPITAL | Age: 65
End: 2025-05-14
Payer: MEDICARE

## 2025-05-15 DIAGNOSIS — B37.0 THRUSH: ICD-10-CM

## 2025-05-15 RX ORDER — CLOTRIMAZOLE 10 MG/1
10 LOZENGE ORAL
Qty: 50 TROCHE | Refills: 0 | Status: SHIPPED | OUTPATIENT
Start: 2025-05-15 | End: 2025-05-25

## 2025-05-19 ENCOUNTER — TELEMEDICINE (OUTPATIENT)
Dept: PRIMARY CARE | Facility: CLINIC | Age: 65
End: 2025-05-19
Payer: MEDICARE

## 2025-05-19 ENCOUNTER — TELEPHONE (OUTPATIENT)
Dept: PRIMARY CARE | Facility: CLINIC | Age: 65
End: 2025-05-19

## 2025-05-19 DIAGNOSIS — J43.2 CENTRILOBULAR EMPHYSEMA (MULTI): ICD-10-CM

## 2025-05-19 DIAGNOSIS — J18.9 PNEUMONIA DUE TO INFECTIOUS ORGANISM, UNSPECIFIED LATERALITY, UNSPECIFIED PART OF LUNG: Primary | ICD-10-CM

## 2025-05-19 PROCEDURE — 99213 OFFICE O/P EST LOW 20 MIN: CPT | Performed by: FAMILY MEDICINE

## 2025-05-19 RX ORDER — IPRATROPIUM BROMIDE AND ALBUTEROL SULFATE 2.5; .5 MG/3ML; MG/3ML
3 SOLUTION RESPIRATORY (INHALATION) 4 TIMES DAILY PRN
Qty: 360 ML | Refills: 11 | Status: SHIPPED | OUTPATIENT
Start: 2025-05-19 | End: 2026-05-19

## 2025-05-19 RX ORDER — AMOXICILLIN AND CLAVULANATE POTASSIUM 875; 125 MG/1; MG/1
875 TABLET, FILM COATED ORAL 2 TIMES DAILY
Qty: 20 TABLET | Refills: 0 | Status: SHIPPED | OUTPATIENT
Start: 2025-05-19 | End: 2025-05-30 | Stop reason: ALTCHOICE

## 2025-05-20 NOTE — PROGRESS NOTES
Subjective   Patient ID: Neisha Graham is a 65 y.o. female who presents for Fever.  HPI    Fever for a few days  Some stuffy nose  Productive cough  + SOB  No CP, ear pain, ST, HA  No n/v, diarrhea, abdominal pain, rashes    Current Medications[1]   Surgical History[2]   Medical History[3]  Social History[4]   Family History[5]   Review of Systems    Objective   There were no vitals taken for this visit.   Physical Exam  Physical Exam         Assessment/Plan   Problem List Items Addressed This Visit    None  Visit Diagnoses         Pneumonia due to infectious organism, unspecified laterality, unspecified part of lung    -  Primary        Augmentin  CXTR  Fluids  Albuterol HFA prn    Told parent/patient that if no improvement in 2-3 days then please call. If worsens or new symptoms occur then please call when this occurs. If worsening then go to ER immediately    Virtual or Telephone Consent    While technically available, the patient was unable or unwilling to consent to connect via audio/video telehealth technology; therefore, I performed this visit using a real-time audio only connection between Neisha Graham & Micheal Gonsalez DO.  Verbal consent was requested and obtained from Neisha Graham on this date, 05/19/25 for a telehealth visit and the patient's location was confirmed at the time of the visit.    I discussed with the patient the potential benefits and risks of the use of telephone or video-conferencing that differ from in-person services (e.g., limits to patient confidentiality, limitations on the provider’s ability to observe the patient, limitations on the diagnostic tools available). I explained to the patient that I may determine at any point that telehealth services are not appropriate based on the patient’s circumstances and either party may therefore end the service to schedule an alternative in-person service or contact 911 to address a medical emergency. With the understanding of these  "risks, benefits and alternatives, the patient agreed to use the telephone or video-conferencing platform selected for this virtual session and further the patient confirmed his/her understanding that the services do not guarantee a specific outcome or recovery.  \"Spent 5 minutes with patient on phone discussing health concerns.\"        Patient understands and agrees with treatment plan        Micheal Gonsalez DO        [1]   Current Outpatient Medications:     albuterol 90 mcg/actuation inhaler, INHALE 2 PUFFS BY MOUTH AND INTO THE LUNGS EVERY 4 HOURS IF NEEDED FOR WHEEZING, Disp: 8 g, Rfl: 3    amino acids-protein hydrolys (ProSource No Carb) 15-60 gram-kcal/30 mL liquid, Take by mouth., Disp: , Rfl:     ammonium lactate (Amlactin) 12 % cream, Apply 1 Film topically 2 times a day as needed., Disp: , Rfl:     amoxicillin-clavulanate (Augmentin) 875-125 mg tablet, Take 1 tablet (875 mg) by mouth 2 times a day for 10 days., Disp: 20 tablet, Rfl: 0    ciclopirox (Penlac) 8 % solution, Apply topically once daily at bedtime., Disp: 6.6 mL, Rfl: 11    clotrimazole (Mycelex) 10 mg michael, Take 1 tablet (10 mg) by mouth 5 times a day for 10 days., Disp: 50 Michael, Rfl: 0    cyclobenzaprine (Flexeril) 10 mg tablet, Take 1 tablet (10 mg) by mouth 2 times a day as needed for muscle spasms., Disp: 30 tablet, Rfl: 2    dilTIAZem ER (Tiazac) 360 mg 24 hr capsule, Take 1 capsule (360 mg) by mouth once daily., Disp: 90 capsule, Rfl: 3    gentamicin (Garamycin) 0.1 % ointment, Apply to wounds with dressing changes, Disp: 30 g, Rfl: 1    hydrOXYzine HCL (Atarax) 50 mg tablet, Take 1 tablet (50 mg) by mouth every 8 hours if needed for anxiety., Disp: 30 tablet, Rfl: 3    ipratropium-albuteroL (Duo-Neb) 0.5-2.5 mg/3 mL nebulizer solution, Take 3 mL by nebulization 4 times a day as needed for wheezing or shortness of breath., Disp: 360 mL, Rfl: 11    ketoconazole (NIZOral) 2 % cream, APPLY TOPICALLY TWICE A DAY FOR 28 DAYS, Disp: 30 g, " Rfl: 0    lidocaine (Xylocaine) 5 % ointment, Apply topically if needed for mild pain (1 - 3). Apply up to three times a day as needed for pain., Disp: 35 g, Rfl: 5    lidocaine 5 % gel, Apply 1 inch topically 3 times a day as needed (apply to affected area)., Disp: 100 g, Rfl: 2    naloxone (Narcan) 4 mg/0.1 mL nasal spray, Administer 1 spray (4 mg) into affected nostril(s) if needed for opioid reversal or respiratory depression., Disp: 2 each, Rfl: 0    nebulizer accessories kit, UAD, Disp: 1 kit, Rfl: 3    nystatin (Mycostatin) cream, APPLY TO AFFECTED AREA TWO TO THREE TIMES A DAY, Disp: 30 g, Rfl: 2    oxyCODONE (Roxicodone) 10 mg immediate release tablet, Take 1 tablet (10 mg) by mouth every 4 hours if needed for severe pain (7 - 10) for up to 28 days., Disp: 168 tablet, Rfl: 0    [START ON 6/9/2025] oxyCODONE (Roxicodone) 10 mg immediate release tablet, Take 1 tablet (10 mg) by mouth every 4 hours if needed for severe pain (7 - 10) for up to 28 days. Do not fill before June 9, 2025., Disp: 168 tablet, Rfl: 0    [START ON 7/7/2025] oxyCODONE (Roxicodone) 10 mg immediate release tablet, Take 1 tablet (10 mg) by mouth every 4 hours if needed for severe pain (7 - 10) for up to 28 days. Do not fill before July 7, 2025., Disp: 168 tablet, Rfl: 0    pramipexole (Mirapex) 1 mg tablet, Take 1 tablet (1 mg) by mouth 2 times a day., Disp: 180 tablet, Rfl: 3    sennosides-docusate sodium (Rebecca-Colace) 8.6-50 mg tablet, Take 2 tablets by mouth 2 times a day., Disp: 120 tablet, Rfl: 0    spironolactone (Aldactone) 50 mg tablet, Take 1 tablet (50 mg) by mouth once daily., Disp: 90 tablet, Rfl: 3    torsemide (Demadex) 20 mg tablet, Take 2 tablets (40 mg) by mouth 2 times a day., Disp: 360 tablet, Rfl: 3    Trelegy Ellipta 200-62.5-25 mcg blister with device, INHALE 1 PUFF BY MOUTH AND IN TO LUNGS ONCE DAILY, Disp: 180 each, Rfl: 1    triamcinolone (Kenalog) 0.1 % cream, APPLY SPARINGLY AND MASSAGE IN ONCE A DAY, Disp: ,  Rfl:     triamcinolone (Kenalog) 0.1 % cream, Apply topically 2 times a day. Apply to affected area 1-2 times daily as needed. Avoid face and groin., Disp: 30 g, Rfl: 0    venlafaxine XR (Effexor-XR) 75 mg 24 hr capsule, Take 1 capsule (75 mg) by mouth once daily. Take with food., Disp: 30 capsule, Rfl: 5    warfarin (Coumadin) 7.5 mg tablet, Take one po every day, Disp: 90 tablet, Rfl: 3    white petrolatum (Aquaphor) 41 % ointment ointment, Apply 1 Application topically 2 times a day. Apply to legs and abdomen, Disp: 50 g, Rfl: 0    zinc oxide 40 % ointment ointment, Apply 1 Application topically 3 times a day. Apply to groin, buttocks after nystatin, Disp: 57 g, Rfl: 1  [2]   Past Surgical History:  Procedure Laterality Date    KNEE SURGERY  09/19/2013    Knee Surgery Right    OTHER SURGICAL HISTORY  09/19/2013    Direct Laryngoscopy With Foreign Body Removal    OTHER SURGICAL HISTORY  09/19/2013    Laminectomy With Drainage Of Intramedullary Cyst    OTHER SURGICAL HISTORY  09/27/2013    Ovarian Cystectomy    TONSILLECTOMY  09/19/2013    Tonsillectomy    TUBAL LIGATION  09/19/2013    Tubal Ligation   [3]   Past Medical History:  Diagnosis Date    Acute bronchitis due to other specified organisms 10/14/2019    Acute bronchitis due to other specified organisms    Acute viral conjunctivitis of left eye 03/10/2023    Alkaline phosphatase elevation 08/17/2023    Anemia 03/10/2023    Bone marrow disorder 08/17/2023    Cellulitis of left lower limb 04/08/2021    Cellulitis of left lower leg    Chronic obstructive pulmonary disease with (acute) exacerbation (Multi) 01/09/2019    COPD exacerbation    Chronic sinusitis, unspecified     Sinusitis    COVID-19 11/28/2022    COVID-19    Daily headache 08/17/2023    Elevated bilirubin 08/17/2023    Elevated transaminase level 08/17/2023    Fever 08/17/2023    Fever 08/17/2023    Heart murmur 03/10/2023    Hemoptysis 03/10/2023    Hyperkalemia 03/10/2023    Malaise and fatigue  03/10/2023    Morbid (severe) obesity due to excess calories (Multi)     Morbid obesity    Other conditions influencing health status     History Of ___ Previous Pregnancies    Other conditions influencing health status 03/12/2020    Arthritis    Other conditions influencing health status 03/06/2017    History of cough    Other conditions influencing health status     Menstruation    Other conditions influencing health status     Osteoarthritis    Other conditions influencing health status     Pulmonary Disease    Pain in unspecified ankle and joints of unspecified foot 06/24/2016    Ankle joint pain    Pain in unspecified foot 12/08/2015    Foot pain    Pain in unspecified knee     Joint pain, knee    Palpitations 03/10/2023    Personal history of diseases of the skin and subcutaneous tissue     History of cellulitis    Personal history of other diseases of the female genital tract     Vaginal delivery    Personal history of other diseases of the nervous system and sense organs 09/12/2019    History of conjunctivitis    Personal history of other diseases of the nervous system and sense organs 09/17/2015    History of blurred vision    Personal history of other diseases of the respiratory system     History of pleurisy    Personal history of other diseases of the respiratory system 10/17/2016    History of bronchitis    Personal history of other diseases of the respiratory system 11/29/2017    History of acute bronchitis    Personal history of other diseases of the respiratory system 11/16/2016    History of acute pharyngitis    Personal history of other drug therapy 10/14/2016    History of influenza vaccination    Personal history of other infectious and parasitic diseases     History of hepatitis    Personal history of other infectious and parasitic diseases 07/22/2020    History of candidiasis of mouth    Personal history of other medical treatment     History of mammogram    Personal history of other specified  conditions     History of shortness of breath    Personal history of other specified conditions 01/28/2015    History of shortness of breath    Personal history of other specified conditions     History of abnormal Pap smear    Personal history of pneumonia (recurrent)     History of pneumonia    Pneumonia, unspecified organism 01/11/2018    Community acquired pneumonia    Postmenopausal bleeding 09/30/2014    Postmenopausal bleeding    Right knee pain 03/10/2023    Shortness of breath 03/10/2023    Sinus tachycardia 03/10/2023    Submandibular sialolithiasis 03/10/2023    Unilateral primary osteoarthritis, unspecified knee 02/03/2017    Osteoarthritis, localized, knee    Unspecified acute conjunctivitis, bilateral 08/20/2020    Acute bacterial conjunctivitis of both eyes   [4]   Social History  Tobacco Use    Smoking status: Some Days     Types: Cigarettes    Smokeless tobacco: Never   Substance Use Topics    Alcohol use: Never    Drug use: Never   [5] No family history on file.

## 2025-05-21 ENCOUNTER — APPOINTMENT (OUTPATIENT)
Dept: WOUND CARE | Facility: HOSPITAL | Age: 65
End: 2025-05-21
Payer: MEDICARE

## 2025-05-27 DIAGNOSIS — J30.1 SEASONAL ALLERGIC RHINITIS DUE TO POLLEN: ICD-10-CM

## 2025-05-27 DIAGNOSIS — F33.1 MDD (MAJOR DEPRESSIVE DISORDER), RECURRENT EPISODE, MODERATE: ICD-10-CM

## 2025-05-27 DIAGNOSIS — J43.2 CENTRILOBULAR EMPHYSEMA (MULTI): ICD-10-CM

## 2025-05-27 DIAGNOSIS — F41.1 GENERALIZED ANXIETY DISORDER: Primary | ICD-10-CM

## 2025-05-27 RX ORDER — MOMETASONE FUROATE MONOHYDRATE 50 UG/1
2 SPRAY, METERED NASAL DAILY
Qty: 17 G | Refills: 11 | Status: SHIPPED | OUTPATIENT
Start: 2025-05-27 | End: 2025-05-30

## 2025-05-27 RX ORDER — VENLAFAXINE HYDROCHLORIDE 150 MG/1
150 CAPSULE, EXTENDED RELEASE ORAL DAILY
Qty: 90 CAPSULE | Refills: 3 | Status: SHIPPED | OUTPATIENT
Start: 2025-05-27 | End: 2026-05-27

## 2025-05-27 RX ORDER — FLUTICASONE FUROATE, UMECLIDINIUM BROMIDE AND VILANTEROL TRIFENATATE 200; 62.5; 25 UG/1; UG/1; UG/1
1 POWDER RESPIRATORY (INHALATION) DAILY
Qty: 180 EACH | Refills: 0 | Status: SHIPPED | OUTPATIENT
Start: 2025-05-27

## 2025-05-28 ENCOUNTER — APPOINTMENT (OUTPATIENT)
Dept: WOUND CARE | Facility: HOSPITAL | Age: 65
End: 2025-05-28
Payer: MEDICARE

## 2025-05-29 ENCOUNTER — TELEPHONE (OUTPATIENT)
Dept: PRIMARY CARE | Facility: CLINIC | Age: 65
End: 2025-05-29
Payer: MEDICARE

## 2025-05-29 NOTE — TELEPHONE ENCOUNTER
Rylee from [facility], asking if you have received and reviewed Neisha's chest xray from 5/23. Her cell is [phone]

## 2025-05-30 ENCOUNTER — TELEMEDICINE (OUTPATIENT)
Dept: PRIMARY CARE | Facility: CLINIC | Age: 65
End: 2025-05-30
Payer: MEDICARE

## 2025-05-30 DIAGNOSIS — J18.9 PNEUMONIA DUE TO INFECTIOUS ORGANISM, UNSPECIFIED LATERALITY, UNSPECIFIED PART OF LUNG: Primary | ICD-10-CM

## 2025-05-30 PROCEDURE — 1160F RVW MEDS BY RX/DR IN RCRD: CPT | Performed by: FAMILY MEDICINE

## 2025-05-30 PROCEDURE — 1159F MED LIST DOCD IN RCRD: CPT | Performed by: FAMILY MEDICINE

## 2025-05-30 PROCEDURE — 99213 OFFICE O/P EST LOW 20 MIN: CPT | Performed by: FAMILY MEDICINE

## 2025-05-30 RX ORDER — FLUTICASONE FUROATE 27.5 UG/1
1 SPRAY, METERED NASAL
Qty: 10 G | Refills: 11 | Status: SHIPPED | OUTPATIENT
Start: 2025-05-30 | End: 2026-05-30

## 2025-05-30 RX ORDER — DOXYCYCLINE 100 MG/1
100 CAPSULE ORAL 2 TIMES DAILY
Qty: 20 CAPSULE | Refills: 0 | Status: SHIPPED | OUTPATIENT
Start: 2025-05-30 | End: 2025-06-09

## 2025-05-30 NOTE — PROGRESS NOTES
Subjective   Patient ID: Neisha Graham is a 65 y.o. female who presents for Shortness of Breath.  HPI  History of Present Illness  The patient presents via virtual visit for evaluation of pneumonia.    She has recently completed a course of Augmentin but continues to experience dyspnea and productive cough, although with improved expectoration. She reports no febrile episodes or chills. However, she reports nasal congestion, which has been severe enough to impede her breathing. She has requested a prescription for Nasonex, as she is unable to afford the over-the-counter version. Additionally, she reports mild otalgia. She is not experiencing any gastrointestinal symptoms such as nausea, vomiting, or diarrhea.     She expresses uncertainty regarding the etiology of her respiratory distress, questioning whether it is cardiac or pulmonary in origin. She mentions that her heart is not functioning optimally and is retaining fluid. Despite attempts to limit her fluid intake, she finds it challenging to restrict herself to only 6 cups per day.    Current Medications[1]   Surgical History[2]   Medical History[3]  Social History[4]   Family History[5]   Review of Systems    Objective   There were no vitals taken for this visit.   Physical Exam  Physical Exam         Assessment/Plan   Problem List Items Addressed This Visit    None  Visit Diagnoses         Pneumonia due to infectious organism, unspecified laterality, unspecified part of lung    -  Primary    Relevant Medications    fluticasone (Flonase Sensimist) 27.5 mcg/actuation nasal spray    doxycycline (Vibramycin) 100 mg capsule        Fluids  OTC meds    Told parent/patient that if no improvement in 2-3 days then please call. If worsens or new symptoms occur then please call when this occurs. If worsening then go to ER immediately    Virtual or Telephone Consent    While technically available, the patient was unable or unwilling to consent to connect via audio/video  "telehealth technology; therefore, I performed this visit using a real-time audio only connection between Neishamirza Graham & Micheal Gonsalez DO.  Verbal consent was requested and obtained from Neisha CHAVARRIA Gladys on this date, 05/31/25 for a telehealth visit and the patient's location was confirmed at the time of the visit.    I discussed with the patient the potential benefits and risks of the use of telephone or video-conferencing that differ from in-person services (e.g., limits to patient confidentiality, limitations on the provider’s ability to observe the patient, limitations on the diagnostic tools available). I explained to the patient that I may determine at any point that telehealth services are not appropriate based on the patient’s circumstances and either party may therefore end the service to schedule an alternative in-person service or contact 911 to address a medical emergency. With the understanding of these risks, benefits and alternatives, the patient agreed to use the telephone or video-conferencing platform selected for this virtual session and further the patient confirmed his/her understanding that the services do not guarantee a specific outcome or recovery.  \"Spent 5 minutes with patient on phone discussing health concerns.\"    Assessment & Plan  1. Pneumonia.  - Persistent shortness of breath and productive cough despite completing Augmentin.  - Chest x-ray confirmed pneumonia; no evidence of heart failure.  - Discussion included symptoms, review of records, and consideration of additional antibiotic therapy.  - Prescription for doxycycline and Sensimist nasal spray sent to pharmacy for further treatment.       Patient understands and agrees with treatment plan    This medical note was created with the assistance of artificial intelligence (AI) for documentation purposes. The content has been reviewed and confirmed by the healthcare provider for accuracy and completeness. Patient consented to " the use of audio recording and use of AI during their visit.     Micheal Gonsalez DO          [1]   Current Outpatient Medications:     albuterol 90 mcg/actuation inhaler, INHALE 2 PUFFS BY MOUTH AND INTO THE LUNGS EVERY 4 HOURS IF NEEDED FOR WHEEZING, Disp: 8 g, Rfl: 3    amino acids-protein hydrolys (ProSource No Carb) 15-60 gram-kcal/30 mL liquid, Take by mouth., Disp: , Rfl:     ammonium lactate (Amlactin) 12 % cream, Apply 1 Film topically 2 times a day as needed., Disp: , Rfl:     ciclopirox (Penlac) 8 % solution, Apply topically once daily at bedtime., Disp: 6.6 mL, Rfl: 11    cyclobenzaprine (Flexeril) 10 mg tablet, Take 1 tablet (10 mg) by mouth 2 times a day as needed for muscle spasms., Disp: 30 tablet, Rfl: 2    dilTIAZem ER (Tiazac) 360 mg 24 hr capsule, Take 1 capsule (360 mg) by mouth once daily., Disp: 90 capsule, Rfl: 3    doxycycline (Vibramycin) 100 mg capsule, Take 1 capsule (100 mg) by mouth 2 times a day for 10 days., Disp: 20 capsule, Rfl: 0    fluticasone (Flonase Sensimist) 27.5 mcg/actuation nasal spray, Administer 1 spray into each nostril once daily., Disp: 10 g, Rfl: 11    gentamicin (Garamycin) 0.1 % ointment, Apply to wounds with dressing changes, Disp: 30 g, Rfl: 1    hydrOXYzine HCL (Atarax) 50 mg tablet, Take 1 tablet (50 mg) by mouth every 8 hours if needed for anxiety., Disp: 30 tablet, Rfl: 3    ipratropium-albuteroL (Duo-Neb) 0.5-2.5 mg/3 mL nebulizer solution, Take 3 mL by nebulization 4 times a day as needed for wheezing or shortness of breath., Disp: 360 mL, Rfl: 11    ketoconazole (NIZOral) 2 % cream, APPLY TOPICALLY TWICE A DAY FOR 28 DAYS, Disp: 30 g, Rfl: 0    lidocaine (Xylocaine) 5 % ointment, Apply topically if needed for mild pain (1 - 3). Apply up to three times a day as needed for pain., Disp: 35 g, Rfl: 5    lidocaine 5 % gel, Apply 1 inch topically 3 times a day as needed (apply to affected area)., Disp: 100 g, Rfl: 2    naloxone (Narcan) 4 mg/0.1 mL nasal spray,  Administer 1 spray (4 mg) into affected nostril(s) if needed for opioid reversal or respiratory depression., Disp: 2 each, Rfl: 0    nebulizer accessories kit, UAD, Disp: 1 kit, Rfl: 3    nystatin (Mycostatin) cream, APPLY TO AFFECTED AREA TWO TO THREE TIMES A DAY, Disp: 30 g, Rfl: 2    oxyCODONE (Roxicodone) 10 mg immediate release tablet, Take 1 tablet (10 mg) by mouth every 4 hours if needed for severe pain (7 - 10) for up to 28 days., Disp: 168 tablet, Rfl: 0    [START ON 6/9/2025] oxyCODONE (Roxicodone) 10 mg immediate release tablet, Take 1 tablet (10 mg) by mouth every 4 hours if needed for severe pain (7 - 10) for up to 28 days. Do not fill before June 9, 2025., Disp: 168 tablet, Rfl: 0    [START ON 7/7/2025] oxyCODONE (Roxicodone) 10 mg immediate release tablet, Take 1 tablet (10 mg) by mouth every 4 hours if needed for severe pain (7 - 10) for up to 28 days. Do not fill before July 7, 2025., Disp: 168 tablet, Rfl: 0    pramipexole (Mirapex) 1 mg tablet, Take 1 tablet (1 mg) by mouth 2 times a day., Disp: 180 tablet, Rfl: 3    sennosides-docusate sodium (Rebecca-Colace) 8.6-50 mg tablet, Take 2 tablets by mouth 2 times a day., Disp: 120 tablet, Rfl: 0    spironolactone (Aldactone) 50 mg tablet, Take 1 tablet (50 mg) by mouth once daily., Disp: 90 tablet, Rfl: 3    torsemide (Demadex) 20 mg tablet, Take 2 tablets (40 mg) by mouth 2 times a day., Disp: 360 tablet, Rfl: 3    Trelegy Ellipta 200-62.5-25 mcg blister with device, INHALE 1 PUFF BY MOUTH AND INTO THE LUNGS ONCE DAILY, Disp: 180 each, Rfl: 0    triamcinolone (Kenalog) 0.1 % cream, APPLY SPARINGLY AND MASSAGE IN ONCE A DAY, Disp: , Rfl:     triamcinolone (Kenalog) 0.1 % cream, Apply topically 2 times a day. Apply to affected area 1-2 times daily as needed. Avoid face and groin., Disp: 30 g, Rfl: 0    venlafaxine XR (Effexor-XR) 150 mg 24 hr capsule, Take 1 capsule (150 mg) by mouth once daily. Take with food., Disp: 90 capsule, Rfl: 3    warfarin  (Coumadin) 7.5 mg tablet, Take one po every day, Disp: 90 tablet, Rfl: 3    white petrolatum (Aquaphor) 41 % ointment ointment, Apply 1 Application topically 2 times a day. Apply to legs and abdomen, Disp: 50 g, Rfl: 0    zinc oxide 40 % ointment ointment, Apply 1 Application topically 3 times a day. Apply to groin, buttocks after nystatin, Disp: 57 g, Rfl: 1  [2]   Past Surgical History:  Procedure Laterality Date    KNEE SURGERY  09/19/2013    Knee Surgery Right    OTHER SURGICAL HISTORY  09/19/2013    Direct Laryngoscopy With Foreign Body Removal    OTHER SURGICAL HISTORY  09/19/2013    Laminectomy With Drainage Of Intramedullary Cyst    OTHER SURGICAL HISTORY  09/27/2013    Ovarian Cystectomy    TONSILLECTOMY  09/19/2013    Tonsillectomy    TUBAL LIGATION  09/19/2013    Tubal Ligation   [3]   Past Medical History:  Diagnosis Date    Acute bronchitis due to other specified organisms 10/14/2019    Acute bronchitis due to other specified organisms    Acute viral conjunctivitis of left eye 03/10/2023    Alkaline phosphatase elevation 08/17/2023    Anemia 03/10/2023    Bone marrow disorder 08/17/2023    Cellulitis of left lower limb 04/08/2021    Cellulitis of left lower leg    Chronic obstructive pulmonary disease with (acute) exacerbation (Multi) 01/09/2019    COPD exacerbation    Chronic sinusitis, unspecified     Sinusitis    COVID-19 11/28/2022    COVID-19    Daily headache 08/17/2023    Elevated bilirubin 08/17/2023    Elevated transaminase level 08/17/2023    Fever 08/17/2023    Fever 08/17/2023    Heart murmur 03/10/2023    Hemoptysis 03/10/2023    Hyperkalemia 03/10/2023    Malaise and fatigue 03/10/2023    Morbid (severe) obesity due to excess calories (Multi)     Morbid obesity    Other conditions influencing health status     History Of ___ Previous Pregnancies    Other conditions influencing health status 03/12/2020    Arthritis    Other conditions influencing health status 03/06/2017    History of cough     Other conditions influencing health status     Menstruation    Other conditions influencing health status     Osteoarthritis    Other conditions influencing health status     Pulmonary Disease    Pain in unspecified ankle and joints of unspecified foot 06/24/2016    Ankle joint pain    Pain in unspecified foot 12/08/2015    Foot pain    Pain in unspecified knee     Joint pain, knee    Palpitations 03/10/2023    Personal history of diseases of the skin and subcutaneous tissue     History of cellulitis    Personal history of other diseases of the female genital tract     Vaginal delivery    Personal history of other diseases of the nervous system and sense organs 09/12/2019    History of conjunctivitis    Personal history of other diseases of the nervous system and sense organs 09/17/2015    History of blurred vision    Personal history of other diseases of the respiratory system     History of pleurisy    Personal history of other diseases of the respiratory system 10/17/2016    History of bronchitis    Personal history of other diseases of the respiratory system 11/29/2017    History of acute bronchitis    Personal history of other diseases of the respiratory system 11/16/2016    History of acute pharyngitis    Personal history of other drug therapy 10/14/2016    History of influenza vaccination    Personal history of other infectious and parasitic diseases     History of hepatitis    Personal history of other infectious and parasitic diseases 07/22/2020    History of candidiasis of mouth    Personal history of other medical treatment     History of mammogram    Personal history of other specified conditions     History of shortness of breath    Personal history of other specified conditions 01/28/2015    History of shortness of breath    Personal history of other specified conditions     History of abnormal Pap smear    Personal history of pneumonia (recurrent)     History of pneumonia    Pneumonia, unspecified  organism 01/11/2018    Community acquired pneumonia    Postmenopausal bleeding 09/30/2014    Postmenopausal bleeding    Right knee pain 03/10/2023    Shortness of breath 03/10/2023    Sinus tachycardia 03/10/2023    Submandibular sialolithiasis 03/10/2023    Unilateral primary osteoarthritis, unspecified knee 02/03/2017    Osteoarthritis, localized, knee    Unspecified acute conjunctivitis, bilateral 08/20/2020    Acute bacterial conjunctivitis of both eyes   [4]   Social History  Tobacco Use    Smoking status: Some Days     Types: Cigarettes    Smokeless tobacco: Never   Substance Use Topics    Alcohol use: Never    Drug use: Never   [5] No family history on file.

## 2025-06-04 ENCOUNTER — APPOINTMENT (OUTPATIENT)
Dept: WOUND CARE | Facility: HOSPITAL | Age: 65
End: 2025-06-04
Payer: MEDICARE

## 2025-06-05 DIAGNOSIS — J18.9 PNEUMONIA DUE TO INFECTIOUS ORGANISM, UNSPECIFIED LATERALITY, UNSPECIFIED PART OF LUNG: Primary | ICD-10-CM

## 2025-06-05 RX ORDER — CEFDINIR 300 MG/1
300 CAPSULE ORAL 2 TIMES DAILY
Qty: 10 CAPSULE | Refills: 0 | Status: SHIPPED | OUTPATIENT
Start: 2025-06-05 | End: 2025-06-10

## 2025-06-11 ENCOUNTER — OFFICE VISIT (OUTPATIENT)
Dept: WOUND CARE | Facility: HOSPITAL | Age: 65
End: 2025-06-11
Payer: MEDICARE

## 2025-06-11 PROCEDURE — 11042 DBRDMT SUBQ TIS 1ST 20SQCM/<: CPT | Mod: LT

## 2025-06-17 DIAGNOSIS — F41.1 GENERALIZED ANXIETY DISORDER: ICD-10-CM

## 2025-06-17 DIAGNOSIS — F33.1 MDD (MAJOR DEPRESSIVE DISORDER), RECURRENT EPISODE, MODERATE: ICD-10-CM

## 2025-06-17 DIAGNOSIS — M17.12 PRIMARY OSTEOARTHRITIS OF LEFT KNEE: ICD-10-CM

## 2025-06-17 DIAGNOSIS — F41.0 PANIC DISORDER WITHOUT AGORAPHOBIA: Primary | ICD-10-CM

## 2025-06-17 RX ORDER — PREDNISONE 20 MG/1
TABLET ORAL
Qty: 10 TABLET | Refills: 0 | Status: ON HOLD | OUTPATIENT
Start: 2025-06-17 | End: 2025-06-20 | Stop reason: ENTERED-IN-ERROR

## 2025-06-17 RX ORDER — VENLAFAXINE HYDROCHLORIDE 75 MG/1
75 CAPSULE, EXTENDED RELEASE ORAL DAILY
Qty: 4 CAPSULE | Refills: 0 | Status: ON HOLD | OUTPATIENT
Start: 2025-06-17 | End: 2025-06-20 | Stop reason: ENTERED-IN-ERROR

## 2025-06-17 RX ORDER — DULOXETIN HYDROCHLORIDE 20 MG/1
20 CAPSULE, DELAYED RELEASE ORAL DAILY
Qty: 90 CAPSULE | Refills: 1 | Status: ON HOLD | OUTPATIENT
Start: 2025-06-17

## 2025-06-18 ENCOUNTER — APPOINTMENT (OUTPATIENT)
Dept: WOUND CARE | Facility: HOSPITAL | Age: 65
End: 2025-06-18
Payer: MEDICARE

## 2025-06-19 ENCOUNTER — APPOINTMENT (OUTPATIENT)
Dept: RADIOLOGY | Facility: HOSPITAL | Age: 65
DRG: 871 | End: 2025-06-19
Payer: MEDICARE

## 2025-06-19 ENCOUNTER — HOSPITAL ENCOUNTER (INPATIENT)
Facility: HOSPITAL | Age: 65
DRG: 871 | End: 2025-06-19
Attending: EMERGENCY MEDICINE | Admitting: INTERNAL MEDICINE
Payer: MEDICARE

## 2025-06-19 ENCOUNTER — APPOINTMENT (OUTPATIENT)
Dept: CARDIOLOGY | Facility: HOSPITAL | Age: 65
DRG: 871 | End: 2025-06-19
Payer: MEDICARE

## 2025-06-19 DIAGNOSIS — B95.1 BACTEREMIA DUE TO GROUP B STREPTOCOCCUS: ICD-10-CM

## 2025-06-19 DIAGNOSIS — I50.9 ACUTE ON CHRONIC CONGESTIVE HEART FAILURE, UNSPECIFIED HEART FAILURE TYPE: ICD-10-CM

## 2025-06-19 DIAGNOSIS — I35.0 AORTIC VALVE STENOSIS, ETIOLOGY OF CARDIAC VALVE DISEASE UNSPECIFIED: ICD-10-CM

## 2025-06-19 DIAGNOSIS — E66.813 CLASS 3 SEVERE OBESITY DUE TO EXCESS CALORIES WITH SERIOUS COMORBIDITY AND BODY MASS INDEX (BMI) OF 50.0 TO 59.9 IN ADULT: ICD-10-CM

## 2025-06-19 DIAGNOSIS — I35.0 AORTIC STENOSIS, SEVERE: ICD-10-CM

## 2025-06-19 DIAGNOSIS — I48.91 ATRIAL FIBRILLATION, UNSPECIFIED TYPE (MULTI): ICD-10-CM

## 2025-06-19 DIAGNOSIS — J43.2 CENTRILOBULAR EMPHYSEMA (MULTI): ICD-10-CM

## 2025-06-19 DIAGNOSIS — I50.41 ACUTE COMBINED SYSTOLIC (CONGESTIVE) AND DIASTOLIC (CONGESTIVE) HEART FAILURE: ICD-10-CM

## 2025-06-19 DIAGNOSIS — R78.81 BACTEREMIA DUE TO GROUP B STREPTOCOCCUS: ICD-10-CM

## 2025-06-19 DIAGNOSIS — R29.898 WEAKNESS OF BOTH LOWER EXTREMITIES: ICD-10-CM

## 2025-06-19 DIAGNOSIS — I35.0 SEVERE AORTIC STENOSIS: ICD-10-CM

## 2025-06-19 DIAGNOSIS — L03.119 CELLULITIS OF LOWER EXTREMITY, UNSPECIFIED LATERALITY: ICD-10-CM

## 2025-06-19 DIAGNOSIS — K02.9 DENTAL CARIES: ICD-10-CM

## 2025-06-19 DIAGNOSIS — I89.0 LYMPHEDEMA: ICD-10-CM

## 2025-06-19 DIAGNOSIS — J18.9 PNEUMONIA OF BOTH LOWER LOBES DUE TO INFECTIOUS ORGANISM: Primary | ICD-10-CM

## 2025-06-19 LAB
ALBUMIN SERPL BCP-MCNC: 4.1 G/DL (ref 3.4–5)
ALP SERPL-CCNC: 230 U/L (ref 33–136)
ALT SERPL W P-5'-P-CCNC: 17 U/L (ref 7–45)
ANION GAP BLDV CALCULATED.4IONS-SCNC: -2 MMOL/L (ref 10–25)
ANION GAP SERPL CALC-SCNC: 12 MMOL/L (ref 10–20)
APPEARANCE UR: CLEAR
APTT PPP: 40 SECONDS (ref 26–36)
AST SERPL W P-5'-P-CCNC: 28 U/L (ref 9–39)
BASE EXCESS BLDV CALC-SCNC: 8.7 MMOL/L (ref -2–3)
BASOPHILS # BLD AUTO: 0.07 X10*3/UL (ref 0–0.1)
BASOPHILS NFR BLD AUTO: 0.3 %
BILIRUB SERPL-MCNC: 0.8 MG/DL (ref 0–1.2)
BILIRUB UR STRIP.AUTO-MCNC: NEGATIVE MG/DL
BNP SERPL-MCNC: 786 PG/ML (ref 0–99)
BODY TEMPERATURE: ABNORMAL
BUN SERPL-MCNC: 23 MG/DL (ref 6–23)
CA-I BLDV-SCNC: 1.2 MMOL/L (ref 1.1–1.33)
CALCIUM SERPL-MCNC: 9.6 MG/DL (ref 8.6–10.3)
CARDIAC TROPONIN I PNL SERPL HS: 23 NG/L (ref 0–13)
CARDIAC TROPONIN I PNL SERPL HS: 29 NG/L (ref 0–13)
CHLORIDE BLDV-SCNC: 100 MMOL/L (ref 98–107)
CHLORIDE SERPL-SCNC: 95 MMOL/L (ref 98–107)
CO2 SERPL-SCNC: 31 MMOL/L (ref 21–32)
COLOR UR: YELLOW
CREAT SERPL-MCNC: 1.01 MG/DL (ref 0.5–1.05)
CRP SERPL-MCNC: 3.23 MG/DL
EGFRCR SERPLBLD CKD-EPI 2021: 62 ML/MIN/1.73M*2
EOSINOPHIL # BLD AUTO: 0.04 X10*3/UL (ref 0–0.7)
EOSINOPHIL NFR BLD AUTO: 0.1 %
ERYTHROCYTE [DISTWIDTH] IN BLOOD BY AUTOMATED COUNT: 16.7 % (ref 11.5–14.5)
ERYTHROCYTE [SEDIMENTATION RATE] IN BLOOD BY WESTERGREN METHOD: 54 MM/H (ref 0–30)
GLUCOSE BLDV-MCNC: 166 MG/DL (ref 74–99)
GLUCOSE SERPL-MCNC: 174 MG/DL (ref 74–99)
GLUCOSE UR STRIP.AUTO-MCNC: NORMAL MG/DL
HCO3 BLDV-SCNC: 35 MMOL/L (ref 22–26)
HCT VFR BLD AUTO: 42.1 % (ref 36–46)
HCT VFR BLD EST: 40 % (ref 36–46)
HGB BLD-MCNC: 13.2 G/DL (ref 12–16)
HGB BLDV-MCNC: 13.3 G/DL (ref 12–16)
HOLD SPECIMEN: NORMAL
IMM GRANULOCYTES # BLD AUTO: 0.23 X10*3/UL (ref 0–0.7)
IMM GRANULOCYTES NFR BLD AUTO: 0.8 % (ref 0–0.9)
INHALED O2 CONCENTRATION: 28 %
INR PPP: 2.4 (ref 0.9–1.1)
KETONES UR STRIP.AUTO-MCNC: NEGATIVE MG/DL
LACTATE BLDV-SCNC: 1.2 MMOL/L (ref 0.4–2)
LACTATE SERPL-SCNC: 1.9 MMOL/L (ref 0.4–2)
LEUKOCYTE ESTERASE UR QL STRIP.AUTO: NEGATIVE
LYMPHOCYTES # BLD AUTO: 0.47 X10*3/UL (ref 1.2–4.8)
LYMPHOCYTES NFR BLD AUTO: 1.7 %
MAGNESIUM SERPL-MCNC: 2.33 MG/DL (ref 1.6–2.4)
MCH RBC QN AUTO: 32 PG (ref 26–34)
MCHC RBC AUTO-ENTMCNC: 31.4 G/DL (ref 32–36)
MCV RBC AUTO: 102 FL (ref 80–100)
MONOCYTES # BLD AUTO: 1.21 X10*3/UL (ref 0.1–1)
MONOCYTES NFR BLD AUTO: 4.5 %
NEUTROPHILS # BLD AUTO: 25.17 X10*3/UL (ref 1.2–7.7)
NEUTROPHILS NFR BLD AUTO: 92.6 %
NITRITE UR QL STRIP.AUTO: NEGATIVE
NRBC BLD-RTO: 0 /100 WBCS (ref 0–0)
OXYHGB MFR BLDV: 85.8 % (ref 45–75)
PCO2 BLDV: 54 MM HG (ref 41–51)
PH BLDV: 7.42 PH (ref 7.33–7.43)
PH UR STRIP.AUTO: 5.5 [PH]
PLATELET # BLD AUTO: 598 X10*3/UL (ref 150–450)
PO2 BLDV: 55 MM HG (ref 35–45)
POTASSIUM BLDV-SCNC: 4.9 MMOL/L (ref 3.5–5.3)
POTASSIUM SERPL-SCNC: 5 MMOL/L (ref 3.5–5.3)
PROT SERPL-MCNC: 8 G/DL (ref 6.4–8.2)
PROT UR STRIP.AUTO-MCNC: ABNORMAL MG/DL
PROTHROMBIN TIME: 26.8 SECONDS (ref 9.8–12.4)
RBC # BLD AUTO: 4.12 X10*6/UL (ref 4–5.2)
RBC # UR STRIP.AUTO: NEGATIVE MG/DL
RBC #/AREA URNS AUTO: NORMAL /HPF
SAO2 % BLDV: 91 % (ref 45–75)
SODIUM BLDV-SCNC: 128 MMOL/L (ref 136–145)
SODIUM SERPL-SCNC: 133 MMOL/L (ref 136–145)
SP GR UR STRIP.AUTO: 1.02
SQUAMOUS #/AREA URNS AUTO: NORMAL /HPF
UROBILINOGEN UR STRIP.AUTO-MCNC: ABNORMAL MG/DL
WBC # BLD AUTO: 27.2 X10*3/UL (ref 4.4–11.3)
WBC #/AREA URNS AUTO: NORMAL /HPF

## 2025-06-19 PROCEDURE — 96375 TX/PRO/DX INJ NEW DRUG ADDON: CPT

## 2025-06-19 PROCEDURE — S4991 NICOTINE PATCH NONLEGEND: HCPCS

## 2025-06-19 PROCEDURE — 85610 PROTHROMBIN TIME: CPT

## 2025-06-19 PROCEDURE — 71046 X-RAY EXAM CHEST 2 VIEWS: CPT | Performed by: RADIOLOGY

## 2025-06-19 PROCEDURE — 71275 CT ANGIOGRAPHY CHEST: CPT | Performed by: RADIOLOGY

## 2025-06-19 PROCEDURE — 93005 ELECTROCARDIOGRAM TRACING: CPT

## 2025-06-19 PROCEDURE — 84145 PROCALCITONIN (PCT): CPT | Mod: GEALAB

## 2025-06-19 PROCEDURE — 85025 COMPLETE CBC W/AUTO DIFF WBC: CPT

## 2025-06-19 PROCEDURE — 2500000005 HC RX 250 GENERAL PHARMACY W/O HCPCS: Performed by: INTERNAL MEDICINE

## 2025-06-19 PROCEDURE — 2550000001 HC RX 255 CONTRASTS: Performed by: EMERGENCY MEDICINE

## 2025-06-19 PROCEDURE — 86140 C-REACTIVE PROTEIN: CPT

## 2025-06-19 PROCEDURE — 71046 X-RAY EXAM CHEST 2 VIEWS: CPT

## 2025-06-19 PROCEDURE — 96365 THER/PROPH/DIAG IV INF INIT: CPT

## 2025-06-19 PROCEDURE — 2500000004 HC RX 250 GENERAL PHARMACY W/ HCPCS (ALT 636 FOR OP/ED)

## 2025-06-19 PROCEDURE — 71275 CT ANGIOGRAPHY CHEST: CPT

## 2025-06-19 PROCEDURE — 84484 ASSAY OF TROPONIN QUANT: CPT

## 2025-06-19 PROCEDURE — 83880 ASSAY OF NATRIURETIC PEPTIDE: CPT

## 2025-06-19 PROCEDURE — 94667 MNPJ CHEST WALL 1ST: CPT

## 2025-06-19 PROCEDURE — 36415 COLL VENOUS BLD VENIPUNCTURE: CPT

## 2025-06-19 PROCEDURE — 81001 URINALYSIS AUTO W/SCOPE: CPT

## 2025-06-19 PROCEDURE — 99285 EMERGENCY DEPT VISIT HI MDM: CPT | Mod: 25 | Performed by: EMERGENCY MEDICINE

## 2025-06-19 PROCEDURE — 2500000002 HC RX 250 W HCPCS SELF ADMINISTERED DRUGS (ALT 637 FOR MEDICARE OP, ALT 636 FOR OP/ED)

## 2025-06-19 PROCEDURE — 1200000002 HC GENERAL ROOM WITH TELEMETRY DAILY

## 2025-06-19 PROCEDURE — 87040 BLOOD CULTURE FOR BACTERIA: CPT | Mod: GEALAB

## 2025-06-19 PROCEDURE — 2500000001 HC RX 250 WO HCPCS SELF ADMINISTERED DRUGS (ALT 637 FOR MEDICARE OP)

## 2025-06-19 PROCEDURE — 9420000001 HC RT PATIENT EDUCATION 5 MIN

## 2025-06-19 PROCEDURE — 83605 ASSAY OF LACTIC ACID: CPT

## 2025-06-19 PROCEDURE — 96366 THER/PROPH/DIAG IV INF ADDON: CPT

## 2025-06-19 PROCEDURE — 83735 ASSAY OF MAGNESIUM: CPT

## 2025-06-19 PROCEDURE — 2500000005 HC RX 250 GENERAL PHARMACY W/O HCPCS

## 2025-06-19 PROCEDURE — 80053 COMPREHEN METABOLIC PANEL: CPT

## 2025-06-19 PROCEDURE — 85652 RBC SED RATE AUTOMATED: CPT

## 2025-06-19 PROCEDURE — 94760 N-INVAS EAR/PLS OXIMETRY 1: CPT

## 2025-06-19 PROCEDURE — 96367 TX/PROPH/DG ADDL SEQ IV INF: CPT

## 2025-06-19 PROCEDURE — 84132 ASSAY OF SERUM POTASSIUM: CPT

## 2025-06-19 RX ORDER — HYDROXYUREA 500 MG/1
1000 CAPSULE ORAL DAILY
Status: DISCONTINUED | OUTPATIENT
Start: 2025-06-20 | End: 2025-06-28 | Stop reason: HOSPADM

## 2025-06-19 RX ORDER — TORSEMIDE 20 MG/1
40 TABLET ORAL 2 TIMES DAILY
Status: DISCONTINUED | OUTPATIENT
Start: 2025-06-19 | End: 2025-06-28 | Stop reason: HOSPADM

## 2025-06-19 RX ORDER — AZITHROMYCIN 500 MG/1
500 TABLET, FILM COATED ORAL EVERY 24 HOURS
Status: COMPLETED | OUTPATIENT
Start: 2025-06-20 | End: 2025-06-21

## 2025-06-19 RX ORDER — LINEZOLID 2 MG/ML
600 INJECTION, SOLUTION INTRAVENOUS ONCE
Status: COMPLETED | OUTPATIENT
Start: 2025-06-19 | End: 2025-06-19

## 2025-06-19 RX ORDER — FUROSEMIDE 10 MG/ML
40 INJECTION INTRAMUSCULAR; INTRAVENOUS ONCE
Status: COMPLETED | OUTPATIENT
Start: 2025-06-19 | End: 2025-06-19

## 2025-06-19 RX ORDER — AMMONIUM LACTATE 12 G/100G
LOTION TOPICAL 2 TIMES DAILY PRN
Status: DISCONTINUED | OUTPATIENT
Start: 2025-06-19 | End: 2025-06-27

## 2025-06-19 RX ORDER — HYDROXYZINE HYDROCHLORIDE 25 MG/1
50 TABLET, FILM COATED ORAL EVERY 8 HOURS PRN
Status: DISCONTINUED | OUTPATIENT
Start: 2025-06-19 | End: 2025-06-28 | Stop reason: HOSPADM

## 2025-06-19 RX ORDER — GUAIFENESIN 600 MG/1
600 TABLET, EXTENDED RELEASE ORAL 2 TIMES DAILY
Status: DISCONTINUED | OUTPATIENT
Start: 2025-06-19 | End: 2025-06-28 | Stop reason: HOSPADM

## 2025-06-19 RX ORDER — AMOXICILLIN 250 MG
2 CAPSULE ORAL 2 TIMES DAILY
Status: DISCONTINUED | OUTPATIENT
Start: 2025-06-19 | End: 2025-06-28 | Stop reason: HOSPADM

## 2025-06-19 RX ORDER — ACETAMINOPHEN 325 MG/1
975 TABLET ORAL EVERY 6 HOURS
Status: DISCONTINUED | OUTPATIENT
Start: 2025-06-19 | End: 2025-06-28 | Stop reason: HOSPADM

## 2025-06-19 RX ORDER — PRAMIPEXOLE DIHYDROCHLORIDE 1 MG/1
1 TABLET ORAL 2 TIMES DAILY
Status: DISCONTINUED | OUTPATIENT
Start: 2025-06-19 | End: 2025-06-28 | Stop reason: HOSPADM

## 2025-06-19 RX ORDER — IBUPROFEN 200 MG
1 TABLET ORAL DAILY
Status: DISCONTINUED | OUTPATIENT
Start: 2025-06-20 | End: 2025-06-28 | Stop reason: HOSPADM

## 2025-06-19 RX ORDER — NICOTINE 7MG/24HR
1 PATCH, TRANSDERMAL 24 HOURS TRANSDERMAL DAILY
Status: DISCONTINUED | OUTPATIENT
Start: 2025-08-14 | End: 2025-06-28 | Stop reason: HOSPADM

## 2025-06-19 RX ORDER — CICLOPIROX 80 MG/ML
SOLUTION TOPICAL NIGHTLY
Status: DISCONTINUED | OUTPATIENT
Start: 2025-06-19 | End: 2025-06-19

## 2025-06-19 RX ORDER — BUDESONIDE 0.5 MG/2ML
0.5 INHALANT ORAL
Status: DISCONTINUED | OUTPATIENT
Start: 2025-06-19 | End: 2025-06-19

## 2025-06-19 RX ORDER — TRIAMCINOLONE ACETONIDE 1 MG/G
CREAM TOPICAL 2 TIMES DAILY
Status: DISCONTINUED | OUTPATIENT
Start: 2025-06-19 | End: 2025-06-20

## 2025-06-19 RX ORDER — BUDESONIDE 0.5 MG/2ML
0.5 INHALANT ORAL
Status: DISCONTINUED | OUTPATIENT
Start: 2025-06-20 | End: 2025-06-28 | Stop reason: HOSPADM

## 2025-06-19 RX ORDER — ACETAMINOPHEN 325 MG/1
650 TABLET ORAL ONCE
Status: COMPLETED | OUTPATIENT
Start: 2025-06-19 | End: 2025-06-19

## 2025-06-19 RX ORDER — OXYCODONE HYDROCHLORIDE 10 MG/1
10 TABLET ORAL EVERY 6 HOURS PRN
Refills: 0 | Status: DISCONTINUED | OUTPATIENT
Start: 2025-06-19 | End: 2025-06-28 | Stop reason: HOSPADM

## 2025-06-19 RX ORDER — OXYCODONE HYDROCHLORIDE 5 MG/1
5 TABLET ORAL ONCE
Refills: 0 | Status: COMPLETED | OUTPATIENT
Start: 2025-06-19 | End: 2025-06-19

## 2025-06-19 RX ORDER — VENLAFAXINE HYDROCHLORIDE 75 MG/1
150 CAPSULE, EXTENDED RELEASE ORAL DAILY
Status: DISCONTINUED | OUTPATIENT
Start: 2025-06-20 | End: 2025-06-23

## 2025-06-19 RX ORDER — IBUPROFEN 200 MG
1 TABLET ORAL DAILY
Status: DISCONTINUED | OUTPATIENT
Start: 2025-07-31 | End: 2025-06-28 | Stop reason: HOSPADM

## 2025-06-19 RX ORDER — IBUPROFEN 200 MG
1 TABLET ORAL ONCE
Status: DISCONTINUED | OUTPATIENT
Start: 2025-06-19 | End: 2025-06-19 | Stop reason: SDUPTHER

## 2025-06-19 RX ORDER — IPRATROPIUM BROMIDE AND ALBUTEROL SULFATE 2.5; .5 MG/3ML; MG/3ML
3 SOLUTION RESPIRATORY (INHALATION)
Status: DISCONTINUED | OUTPATIENT
Start: 2025-06-20 | End: 2025-06-28 | Stop reason: HOSPADM

## 2025-06-19 RX ORDER — KETOCONAZOLE 20 MG/G
CREAM TOPICAL 2 TIMES DAILY
Status: DISCONTINUED | OUTPATIENT
Start: 2025-06-19 | End: 2025-06-28 | Stop reason: HOSPADM

## 2025-06-19 RX ORDER — EAR PLUGS
1 EACH OTIC (EAR) 3 TIMES DAILY
Status: DISCONTINUED | OUTPATIENT
Start: 2025-06-19 | End: 2025-06-28 | Stop reason: HOSPADM

## 2025-06-19 RX ORDER — TALC
3 POWDER (GRAM) TOPICAL NIGHTLY
Status: DISCONTINUED | OUTPATIENT
Start: 2025-06-19 | End: 2025-06-28 | Stop reason: HOSPADM

## 2025-06-19 RX ORDER — HYDROXYUREA 500 MG/1
2 CAPSULE ORAL DAILY
COMMUNITY

## 2025-06-19 RX ORDER — WARFARIN SODIUM 5 MG/1
7.5 TABLET ORAL DAILY
Status: DISCONTINUED | OUTPATIENT
Start: 2025-06-19 | End: 2025-06-28 | Stop reason: HOSPADM

## 2025-06-19 RX ORDER — IPRATROPIUM BROMIDE AND ALBUTEROL SULFATE 2.5; .5 MG/3ML; MG/3ML
3 SOLUTION RESPIRATORY (INHALATION) EVERY 2 HOUR PRN
Status: DISCONTINUED | OUTPATIENT
Start: 2025-06-19 | End: 2025-06-28 | Stop reason: HOSPADM

## 2025-06-19 RX ORDER — NYSTATIN 100000 U/G
CREAM TOPICAL 2 TIMES DAILY
Status: DISCONTINUED | OUTPATIENT
Start: 2025-06-19 | End: 2025-06-28 | Stop reason: HOSPADM

## 2025-06-19 RX ORDER — CYCLOBENZAPRINE HCL 10 MG
10 TABLET ORAL 2 TIMES DAILY PRN
Status: DISCONTINUED | OUTPATIENT
Start: 2025-06-19 | End: 2025-06-28 | Stop reason: HOSPADM

## 2025-06-19 RX ORDER — LINEZOLID 2 MG/ML
600 INJECTION, SOLUTION INTRAVENOUS EVERY 12 HOURS
Status: DISCONTINUED | OUTPATIENT
Start: 2025-06-20 | End: 2025-06-20

## 2025-06-19 RX ORDER — DILTIAZEM HYDROCHLORIDE 120 MG/1
360 CAPSULE, COATED, EXTENDED RELEASE ORAL DAILY
Status: DISCONTINUED | OUTPATIENT
Start: 2025-06-20 | End: 2025-06-28 | Stop reason: HOSPADM

## 2025-06-19 RX ORDER — IPRATROPIUM BROMIDE AND ALBUTEROL SULFATE 2.5; .5 MG/3ML; MG/3ML
3 SOLUTION RESPIRATORY (INHALATION)
Status: DISCONTINUED | OUTPATIENT
Start: 2025-06-20 | End: 2025-06-19

## 2025-06-19 RX ORDER — PREDNISONE 20 MG/1
40 TABLET ORAL DAILY
Status: COMPLETED | OUTPATIENT
Start: 2025-06-19 | End: 2025-06-23

## 2025-06-19 RX ORDER — IPRATROPIUM BROMIDE AND ALBUTEROL SULFATE 2.5; .5 MG/3ML; MG/3ML
3 SOLUTION RESPIRATORY (INHALATION)
Status: DISCONTINUED | OUTPATIENT
Start: 2025-06-19 | End: 2025-06-19

## 2025-06-19 RX ORDER — SPIRONOLACTONE 25 MG/1
50 TABLET ORAL DAILY
Status: DISCONTINUED | OUTPATIENT
Start: 2025-06-20 | End: 2025-06-28 | Stop reason: HOSPADM

## 2025-06-19 RX ORDER — OXYCODONE HYDROCHLORIDE 5 MG/1
10 TABLET ORAL ONCE
Refills: 0 | Status: COMPLETED | OUTPATIENT
Start: 2025-06-19 | End: 2025-06-19

## 2025-06-19 RX ORDER — IPRATROPIUM BROMIDE AND ALBUTEROL SULFATE 2.5; .5 MG/3ML; MG/3ML
3 SOLUTION RESPIRATORY (INHALATION)
Status: DISCONTINUED | OUTPATIENT
Start: 2025-06-19 | End: 2025-06-19 | Stop reason: SDUPTHER

## 2025-06-19 RX ORDER — PETROLATUM 420 MG/G
1 OINTMENT TOPICAL 2 TIMES DAILY
Status: DISCONTINUED | OUTPATIENT
Start: 2025-06-19 | End: 2025-06-28 | Stop reason: HOSPADM

## 2025-06-19 RX ORDER — WARFARIN SODIUM 5 MG/1
7.5 TABLET ORAL DAILY
Status: DISCONTINUED | OUTPATIENT
Start: 2025-06-20 | End: 2025-06-19

## 2025-06-19 RX ORDER — DULOXETIN HYDROCHLORIDE 20 MG/1
20 CAPSULE, DELAYED RELEASE ORAL DAILY
Status: DISCONTINUED | OUTPATIENT
Start: 2025-06-20 | End: 2025-06-28 | Stop reason: HOSPADM

## 2025-06-19 RX ORDER — OXYCODONE HYDROCHLORIDE 5 MG/1
5 TABLET ORAL EVERY 6 HOURS PRN
Refills: 0 | Status: DISCONTINUED | OUTPATIENT
Start: 2025-06-19 | End: 2025-06-28 | Stop reason: HOSPADM

## 2025-06-19 RX ORDER — LIDOCAINE 50 MG/G
OINTMENT TOPICAL 3 TIMES DAILY PRN
Status: DISCONTINUED | OUTPATIENT
Start: 2025-06-19 | End: 2025-06-28 | Stop reason: HOSPADM

## 2025-06-19 RX ADMIN — Medication 3 MG: at 21:10

## 2025-06-19 RX ADMIN — PRAMIPEXOLE DIHYDROCHLORIDE 1 MG: 1 TABLET ORAL at 23:11

## 2025-06-19 RX ADMIN — OXYCODONE HYDROCHLORIDE 5 MG: 5 TABLET ORAL at 14:00

## 2025-06-19 RX ADMIN — OXYCODONE HYDROCHLORIDE 5 MG: 5 TABLET ORAL at 15:44

## 2025-06-19 RX ADMIN — OXYCODONE HYDROCHLORIDE 10 MG: 5 TABLET ORAL at 19:02

## 2025-06-19 RX ADMIN — Medication 1 APPLICATION: at 23:14

## 2025-06-19 RX ADMIN — IOHEXOL 75 ML: 350 INJECTION, SOLUTION INTRAVENOUS at 17:04

## 2025-06-19 RX ADMIN — PREDNISONE 40 MG: 20 TABLET ORAL at 21:10

## 2025-06-19 RX ADMIN — FUROSEMIDE 40 MG: 10 INJECTION, SOLUTION INTRAMUSCULAR; INTRAVENOUS at 15:34

## 2025-06-19 RX ADMIN — NICOTINE 1 PATCH: 21 PATCH, EXTENDED RELEASE TRANSDERMAL at 15:20

## 2025-06-19 RX ADMIN — GUAIFENESIN 600 MG: 600 TABLET ORAL at 21:10

## 2025-06-19 RX ADMIN — PIPERACILLIN SODIUM AND TAZOBACTAM SODIUM 3.38 G: 3; .375 INJECTION, SOLUTION INTRAVENOUS at 21:10

## 2025-06-19 RX ADMIN — AZITHROMYCIN MONOHYDRATE 500 MG: 500 INJECTION, POWDER, LYOPHILIZED, FOR SOLUTION INTRAVENOUS at 14:52

## 2025-06-19 RX ADMIN — ACETAMINOPHEN 975 MG: 325 TABLET, FILM COATED ORAL at 21:10

## 2025-06-19 RX ADMIN — WARFARIN SODIUM 7.5 MG: 5 TABLET ORAL at 22:53

## 2025-06-19 RX ADMIN — Medication 2 L/MIN: at 22:48

## 2025-06-19 RX ADMIN — FUROSEMIDE 40 MG: 10 INJECTION, SOLUTION INTRAVENOUS at 22:53

## 2025-06-19 RX ADMIN — LINEZOLID 600 MG: 600 INJECTION, SOLUTION INTRAVENOUS at 16:27

## 2025-06-19 RX ADMIN — ACETAMINOPHEN 650 MG: 325 TABLET, FILM COATED ORAL at 14:00

## 2025-06-19 RX ADMIN — PIPERACILLIN SODIUM AND TAZOBACTAM SODIUM 4.5 G: 4; .5 INJECTION, SOLUTION INTRAVENOUS at 14:01

## 2025-06-19 SDOH — ECONOMIC STABILITY: FOOD INSECURITY: WITHIN THE PAST 12 MONTHS, YOU WORRIED THAT YOUR FOOD WOULD RUN OUT BEFORE YOU GOT THE MONEY TO BUY MORE.: NEVER TRUE

## 2025-06-19 SDOH — SOCIAL STABILITY: SOCIAL INSECURITY: DOES ANYONE TRY TO KEEP YOU FROM HAVING/CONTACTING OTHER FRIENDS OR DOING THINGS OUTSIDE YOUR HOME?: NO

## 2025-06-19 SDOH — SOCIAL STABILITY: SOCIAL INSECURITY: WITHIN THE LAST YEAR, HAVE YOU BEEN HUMILIATED OR EMOTIONALLY ABUSED IN OTHER WAYS BY YOUR PARTNER OR EX-PARTNER?: NO

## 2025-06-19 SDOH — SOCIAL STABILITY: SOCIAL INSECURITY: DO YOU FEEL UNSAFE GOING BACK TO THE PLACE WHERE YOU ARE LIVING?: NO

## 2025-06-19 SDOH — ECONOMIC STABILITY: INCOME INSECURITY: IN THE PAST 12 MONTHS HAS THE ELECTRIC, GAS, OIL, OR WATER COMPANY THREATENED TO SHUT OFF SERVICES IN YOUR HOME?: NO

## 2025-06-19 SDOH — SOCIAL STABILITY: SOCIAL INSECURITY: HAS ANYONE EVER THREATENED TO HURT YOUR FAMILY OR YOUR PETS?: NO

## 2025-06-19 SDOH — SOCIAL STABILITY: SOCIAL INSECURITY: WITHIN THE LAST YEAR, HAVE YOU BEEN AFRAID OF YOUR PARTNER OR EX-PARTNER?: NO

## 2025-06-19 SDOH — SOCIAL STABILITY: SOCIAL INSECURITY: HAVE YOU HAD ANY THOUGHTS OF HARMING ANYONE ELSE?: NO

## 2025-06-19 SDOH — ECONOMIC STABILITY: FOOD INSECURITY: WITHIN THE PAST 12 MONTHS, THE FOOD YOU BOUGHT JUST DIDN'T LAST AND YOU DIDN'T HAVE MONEY TO GET MORE.: NEVER TRUE

## 2025-06-19 SDOH — SOCIAL STABILITY: SOCIAL INSECURITY: WERE YOU ABLE TO COMPLETE ALL THE BEHAVIORAL HEALTH SCREENINGS?: YES

## 2025-06-19 SDOH — SOCIAL STABILITY: SOCIAL INSECURITY: ARE THERE ANY APPARENT SIGNS OF INJURIES/BEHAVIORS THAT COULD BE RELATED TO ABUSE/NEGLECT?: NO

## 2025-06-19 SDOH — SOCIAL STABILITY: SOCIAL INSECURITY: DO YOU FEEL ANYONE HAS EXPLOITED OR TAKEN ADVANTAGE OF YOU FINANCIALLY OR OF YOUR PERSONAL PROPERTY?: NO

## 2025-06-19 SDOH — SOCIAL STABILITY: SOCIAL INSECURITY: HAVE YOU HAD THOUGHTS OF HARMING ANYONE ELSE?: NO

## 2025-06-19 SDOH — SOCIAL STABILITY: SOCIAL INSECURITY: ABUSE: ADULT

## 2025-06-19 SDOH — SOCIAL STABILITY: SOCIAL INSECURITY: ARE YOU OR HAVE YOU BEEN THREATENED OR ABUSED PHYSICALLY, EMOTIONALLY, OR SEXUALLY BY ANYONE?: NO

## 2025-06-19 ASSESSMENT — ENCOUNTER SYMPTOMS
LIGHT-HEADEDNESS: 0
SORE THROAT: 0
DIFFICULTY URINATING: 0
CHILLS: 1
WHEEZING: 1
HEADACHES: 0
DIARRHEA: 0
VOMITING: 0
FEVER: 1
CONSTIPATION: 0
APPETITE CHANGE: 0
DIZZINESS: 0
NAUSEA: 0
DYSURIA: 0
SHORTNESS OF BREATH: 1
WOUND: 1
COUGH: 1

## 2025-06-19 ASSESSMENT — PAIN - FUNCTIONAL ASSESSMENT
PAIN_FUNCTIONAL_ASSESSMENT: 0-10
PAIN_FUNCTIONAL_ASSESSMENT: 0-10

## 2025-06-19 ASSESSMENT — COGNITIVE AND FUNCTIONAL STATUS - GENERAL
DRESSING REGULAR LOWER BODY CLOTHING: A LOT
DRESSING REGULAR UPPER BODY CLOTHING: A LOT
MOVING FROM LYING ON BACK TO SITTING ON SIDE OF FLAT BED WITH BEDRAILS: A LITTLE
WALKING IN HOSPITAL ROOM: A LOT
TOILETING: A LOT
CLIMB 3 TO 5 STEPS WITH RAILING: A LOT
MOBILITY SCORE: 13
TURNING FROM BACK TO SIDE WHILE IN FLAT BAD: A LOT
DAILY ACTIVITIY SCORE: 16
PATIENT BASELINE BEDBOUND: NO
MOVING TO AND FROM BED TO CHAIR: A LOT
STANDING UP FROM CHAIR USING ARMS: A LOT
HELP NEEDED FOR BATHING: A LOT

## 2025-06-19 ASSESSMENT — LIFESTYLE VARIABLES
EVER HAD A DRINK FIRST THING IN THE MORNING TO STEADY YOUR NERVES TO GET RID OF A HANGOVER: NO
HOW OFTEN DO YOU HAVE 6 OR MORE DRINKS ON ONE OCCASION: NEVER
AUDIT-C TOTAL SCORE: 0
SKIP TO QUESTIONS 9-10: 1
HAVE PEOPLE ANNOYED YOU BY CRITICIZING YOUR DRINKING: NO
HOW MANY STANDARD DRINKS CONTAINING ALCOHOL DO YOU HAVE ON A TYPICAL DAY: PATIENT DOES NOT DRINK
AUDIT-C TOTAL SCORE: 0
HAVE YOU EVER FELT YOU SHOULD CUT DOWN ON YOUR DRINKING: NO
EVER FELT BAD OR GUILTY ABOUT YOUR DRINKING: NO
TOTAL SCORE: 0
HOW OFTEN DO YOU HAVE A DRINK CONTAINING ALCOHOL: NEVER

## 2025-06-19 ASSESSMENT — PAIN DESCRIPTION - ORIENTATION: ORIENTATION: POSTERIOR

## 2025-06-19 ASSESSMENT — ACTIVITIES OF DAILY LIVING (ADL)
HEARING - LEFT EAR: FUNCTIONAL
PATIENT'S MEMORY ADEQUATE TO SAFELY COMPLETE DAILY ACTIVITIES?: YES
JUDGMENT_ADEQUATE_SAFELY_COMPLETE_DAILY_ACTIVITIES: YES
GROOMING: INDEPENDENT
HEARING - RIGHT EAR: FUNCTIONAL
FEEDING YOURSELF: INDEPENDENT
ADEQUATE_TO_COMPLETE_ADL: YES
BATHING: INDEPENDENT
WALKS IN HOME: INDEPENDENT
LACK_OF_TRANSPORTATION: NO
TOILETING: INDEPENDENT
LACK_OF_TRANSPORTATION: NO
DRESSING YOURSELF: INDEPENDENT

## 2025-06-19 ASSESSMENT — PAIN SCALES - GENERAL
PAINLEVEL_OUTOF10: 8
PAINLEVEL_OUTOF10: 5 - MODERATE PAIN
PAINLEVEL_OUTOF10: 4
PAINLEVEL_OUTOF10: 7

## 2025-06-19 ASSESSMENT — PATIENT HEALTH QUESTIONNAIRE - PHQ9
1. LITTLE INTEREST OR PLEASURE IN DOING THINGS: SEVERAL DAYS
SUM OF ALL RESPONSES TO PHQ9 QUESTIONS 1 & 2: 2
2. FEELING DOWN, DEPRESSED OR HOPELESS: SEVERAL DAYS

## 2025-06-19 ASSESSMENT — PAIN DESCRIPTION - LOCATION: LOCATION: NECK

## 2025-06-19 NOTE — PROGRESS NOTES
06/19/25 1422   Discharge Planning   Living Arrangements Alone   Support Systems Children;Family members   Assistance Needed A&OX4; independent with ADLs with walker and wheelchair; doesn't drive; 2L @ HS ([facility]?) currently 2L NC; PCP Dr Gonsalez; on Coumadin prior to hospitalization   Type of Residence Private residence   Number of Stairs to Enter Residence 0   Number of Stairs Within Residence 0   Do you have animals or pets at home? No   Who is requesting discharge planning? Provider   Expected Discharge Disposition Home Health  (Pt lives in handicapped apartment. Active [facility])   Does the patient need discharge transport arranged? Yes   RoundTrip coordination needed? Yes   Has discharge transport been arranged? No   Financial Resource Strain   How hard is it for you to pay for the very basics like food, housing, medical care, and heating? Not hard   Housing Stability   In the last 12 months, was there a time when you were not able to pay the mortgage or rent on time? N   In the past 12 months, how many times have you moved where you were living? 0   At any time in the past 12 months, were you homeless or living in a shelter (including now)? N   Transportation Needs   In the past 12 months, has lack of transportation kept you from medical appointments or from getting medications? no   In the past 12 months, has lack of transportation kept you from meetings, work, or from getting things needed for daily living? No   Intensity of Service   Intensity of Service 0-30 min     06/19/2025 1424pm  Spoke with patient bedside in ED

## 2025-06-19 NOTE — PROGRESS NOTES
Pharmacy Medication History Review    Neisha Graham is a 65 y.o. female admitted for No Principal Problem: There is no principal problem currently on the Problem List. Please update the Problem List and refresh.. Pharmacy reviewed the patient's pxpuz-ry-[id number] medications and allergies for accuracy.    The list below reflectives the updated PTA list. Please review each medication in order reconciliation for additional clarification and justification.  Prior to Admission Medications   Prescriptions Last Dose Informant Patient Reported? Taking?   DULoxetine (Cymbalta) 20 mg DR capsule   No No   Sig: Take 1 capsule (20 mg) by mouth once daily.   Trelegy Ellipta 200-62.5-25 mcg blister with device   No No   Sig: INHALE 1 PUFF BY MOUTH AND INTO THE LUNGS ONCE DAILY   albuterol 90 mcg/actuation inhaler  Self No No   Sig: INHALE 2 PUFFS BY MOUTH AND INTO THE LUNGS EVERY 4 HOURS IF NEEDED FOR WHEEZING   amino acids-protein hydrolys (ProSource No Carb) 15-60 gram-kcal/30 mL liquid  Self Yes No   Sig: Take by mouth.   ammonium lactate (Amlactin) 12 % cream  Self Yes No   Sig: Apply 1 Film topically 2 times a day as needed.   ciclopirox (Penlac) 8 % solution   No No   Sig: Apply topically once daily at bedtime.   cyclobenzaprine (Flexeril) 10 mg tablet   No No   Sig: Take 1 tablet (10 mg) by mouth 2 times a day as needed for muscle spasms.   dilTIAZem ER (Tiazac) 360 mg 24 hr capsule   No No   Sig: Take 1 capsule (360 mg) by mouth once daily.   fluticasone (Flonase Sensimist) 27.5 mcg/actuation nasal spray   No No   Sig: Administer 1 spray into each nostril once daily.   gentamicin (Garamycin) 0.1 % ointment  Self No No   Sig: Apply to wounds with dressing changes   hydrOXYzine HCL (Atarax) 50 mg tablet  Self No No   Sig: Take 1 tablet (50 mg) by mouth every 8 hours if needed for anxiety.   hydroxyurea (Hydrea) 500 mg capsule   Yes No   Sig: Take 2 capsules (1,000 mg total) by mouth once daily.  Take at the same time each  day.   ipratropium-albuteroL (Duo-Neb) 0.5-2.5 mg/3 mL nebulizer solution   No No   Sig: Take 3 mL by nebulization 4 times a day as needed for wheezing or shortness of breath.   ketoconazole (NIZOral) 2 % cream   No No   Sig: APPLY TOPICALLY TWICE A DAY FOR 28 DAYS   lidocaine (Xylocaine) 5 % ointment  Self No No   Sig: Apply topically if needed for mild pain (1 - 3). Apply up to three times a day as needed for pain.   lidocaine 5 % gel  Self No No   Sig: Apply 1 inch topically 3 times a day as needed (apply to affected area).   naloxone (Narcan) 4 mg/0.1 mL nasal spray   No No   Sig: Administer 1 spray (4 mg) into affected nostril(s) if needed for opioid reversal or respiratory depression.   nebulizer accessories kit  Self No No   Sig: UAD   nystatin (Mycostatin) cream  Self No No   Sig: APPLY TO AFFECTED AREA TWO TO THREE TIMES A DAY   oxyCODONE (Roxicodone) 10 mg immediate release tablet   No No   Sig: Take 1 tablet (10 mg) by mouth every 4 hours if needed for severe pain (7 - 10) for up to 28 days.   oxyCODONE (Roxicodone) 10 mg immediate release tablet Not Taking  No No   Sig: Take 1 tablet (10 mg) by mouth every 4 hours if needed for severe pain (7 - 10) for up to 28 days. Do not fill before June 9, 2025.   Patient not taking: Reported on 6/19/2025   oxyCODONE (Roxicodone) 10 mg immediate release tablet   No No   Sig: Take 1 tablet (10 mg) by mouth every 4 hours if needed for severe pain (7 - 10) for up to 28 days. Do not fill before July 7, 2025.   pramipexole (Mirapex) 1 mg tablet   No No   Sig: Take 1 tablet (1 mg) by mouth 2 times a day.   predniSONE (Deltasone) 20 mg tablet   No No   Sig: Take one tab po bid for 5 days   sennosides-docusate sodium (Rebecca-Colace) 8.6-50 mg tablet  Self No No   Sig: Take 2 tablets by mouth 2 times a day.   spironolactone (Aldactone) 50 mg tablet   No No   Sig: Take 1 tablet (50 mg) by mouth once daily.   torsemide (Demadex) 20 mg tablet   No No   Sig: Take 2 tablets (40 mg)  by mouth 2 times a day.   triamcinolone (Kenalog) 0.1 % cream  Self Yes No   Sig: APPLY SPARINGLY AND MASSAGE IN ONCE A DAY   triamcinolone (Kenalog) 0.1 % cream   No No   Sig: Apply topically 2 times a day. Apply to affected area 1-2 times daily as needed. Avoid face and groin.   venlafaxine XR (Effexor-XR) 75 mg 24 hr capsule   No No   Sig: Take 1 capsule (75 mg) by mouth once daily for 4 days.   warfarin (Coumadin) 7.5 mg tablet  Self No No   Sig: Take one po every day   white petrolatum (Aquaphor) 41 % ointment ointment  Self No No   Sig: Apply 1 Application topically 2 times a day. Apply to legs and abdomen   zinc oxide 40 % ointment ointment  Self No No   Sig: Apply 1 Application topically 3 times a day. Apply to groin, buttocks after nystatin      Facility-Administered Medications: None           The list below reflectives the updated allergy list. Please review each documented allergy for additional clarification and justification.  Allergies  Reviewed by Matilde Gilmore RN on 6/19/2025        Severity Reactions Comments    Vancomycin High Other Patient says it caused kidney failure and made her feel terrible     Doxycycline Not Specified GI Upset     Macrobid [nitrofurantoin Monohyd/m-cryst] Not Specified Dizziness     Sulfamethoxazole-trimethoprim Not Specified Unknown             Below are additional concerns with the patient's PTA list.    Patient refused the med rec due to discomfort. Med history was based on dispense report. Warfarin dosing was based on dispense report, no clinic note was found.    BESS MARTINEZ

## 2025-06-19 NOTE — ED PROVIDER NOTES
CC: Shortness of Breath     HPI:  Patient is a 65-year-old female with a past medical history of obesity, chronic bilateral lower extremity lymphedema, recurrent cellulitis, COPD on 2 L nasal cannula at bedtime, HFpEF, moderate mitral stenosis, moderate to severe aortic stenosis, paroxysmal A-fib and cavernous sinus thrombosis on warfarin, BE, and RLS who presented to the ED for dyspnea.  Patient noted to have dyspnea over the past couple weeks and worsening over the past couple days.  She has been also noting feeling feverish as well as having chills.  Patient is concerned that her pneumonia is not being properly treated.  Was initially treated on 5/19/2025 with Augmentin for pneumonia which was then switched to doxycycline on 5/30/2025.  Noted that she completed her course of doxycycline.  She is concerned that her left lower extremity may be infected.  Notes a chronic wound that has been seeping.  Notes that she was compliant with her home medications including her warfarin.  Patient noted to be hypoxic to the mid 80s on room air was placed on 2 L nasal cannula by nursing.  Denied trauma, falls, chest pain, headache, abdominal pain, neck pain, back pain, dysuria, and abnormal bowel movements.    Limitations to history: None  Independent historian(s): Patient  Records Reviewed: Recent available ED and inpatient notes reviewed in EMR.    PMHx/PSHx:  Per HPI.   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), Cellulitis of left lower limb (04/08/2021), Chronic obstructive pulmonary disease with (acute) exacerbation (Multi) (01/09/2019), Chronic sinusitis, unspecified, COVID-19 (11/28/2022), Daily headache (08/17/2023), Elevated bilirubin (08/17/2023), Elevated transaminase level (08/17/2023), Fever (08/17/2023), Fever  (08/17/2023), Heart murmur (03/10/2023), Hemoptysis (03/10/2023), Hyperkalemia (03/10/2023), Malaise and fatigue (03/10/2023), Morbid (severe) obesity due to excess calories (Multi), Other conditions influencing health status, Other conditions influencing health status (03/12/2020), Other conditions influencing health status (03/06/2017), Other conditions influencing health status, Other conditions influencing health status, Other conditions influencing health status, Pain in unspecified ankle and joints of unspecified foot (06/24/2016), Pain in unspecified foot (12/08/2015), Pain in unspecified knee, Palpitations (03/10/2023), Personal history of diseases of the skin and subcutaneous tissue, Personal history of other diseases of the female genital tract, Personal history of other diseases of the nervous system and sense organs (09/12/2019), Personal history of other diseases of the nervous system and sense organs (09/17/2015), Personal history of other diseases of the respiratory system, Personal history of other diseases of the respiratory system (10/17/2016), Personal history of other diseases of the respiratory system (11/29/2017), Personal history of other diseases of the respiratory system (11/16/2016), Personal history of other drug therapy (10/14/2016), Personal history of other infectious and parasitic diseases, Personal history of other infectious and parasitic diseases (07/22/2020), Personal history of other medical treatment, Personal history of other specified conditions, Personal history of other specified conditions (01/28/2015), Personal history of other specified conditions, Personal history of pneumonia (recurrent), Pneumonia, unspecified organism (01/11/2018), Postmenopausal bleeding (09/30/2014), Right knee pain (03/10/2023), Shortness of breath (03/10/2023), Sinus tachycardia (03/10/2023), Submandibular sialolithiasis (03/10/2023), Unilateral primary osteoarthritis, unspecified knee  (02/03/2017), and Unspecified acute conjunctivitis, bilateral (08/20/2020).  - has a past surgical history that includes Other surgical history (09/19/2013); Other surgical history (09/19/2013); Tonsillectomy (09/19/2013); Knee surgery (09/19/2013); Tubal ligation (09/19/2013); and Other surgical history (09/27/2013).    Medications:  Reviewed in EMR. See EMR for complete list of medications and doses.    Allergies:  Vancomycin, Doxycycline, Macrobid [nitrofurantoin monohyd/m-cryst], and Sulfamethoxazole-trimethoprim    Social History:  - Tobacco:  reports that she has been smoking cigarettes. She has never used smokeless tobacco.   - Alcohol:  reports no history of alcohol use.   - Illicit Drugs:  reports no history of drug use.     ROS:  Per HPI.       ???????????????????????????????????????????????????????????????  Triage Vitals:  T 37.2 °C (99 °F)  HR (!) 120  /83  RR 20  O2 94 %      Physical Exam  Vitals and nursing note reviewed.   Constitutional:       General: She is not in acute distress.  HENT:      Head: Normocephalic and atraumatic.      Nose: Nose normal.      Mouth/Throat:      Mouth: Mucous membranes are moist.   Eyes:      Conjunctiva/sclera: Conjunctivae normal.   Cardiovascular:      Rate and Rhythm: Normal rate and regular rhythm.      Pulses: Normal pulses.   Pulmonary:      Effort: Tachypnea and respiratory distress present.      Comments: Coarse breath sounds at the bases.  Abdominal:      Palpations: Abdomen is soft.      Tenderness: There is no abdominal tenderness.   Musculoskeletal:      Comments: Healing wound of the left lateral calf.  No active drainage, dark discoloration, or crepitus.   Skin:     General: Skin is warm.   Neurological:      General: No focal deficit present.      Mental Status: She is alert.         ???????????????????????????????????????????????????????????????  Labs:   Labs Reviewed - No data to display     Imaging:   No orders to display         MDM:  Patient is a 65-year-old female with a past medical history of obesity, chronic bilateral lower extremity lymphedema, recurrent cellulitis, COPD on 2 L nasal cannula at bedtime, HFpEF, moderate mitral stenosis, moderate to severe aortic stenosis, paroxysmal A-fib and cavernous sinus thrombosis on warfarin, BE, and RLS who presented to the ED for dyspnea.  Patient presented tachycardic and tachypneic.  Patient satting mid 90s on 2 L nasal cannula.  Physical exam finding significant for mild respiratory distress, coarse breath sounds at the bases, tachypnea, and 2 L nasal cannula in place.  With concern for sepsis, blood cultures obtained and patient initiated on broad-spectrum antibiotics with concern for sepsis secondary to pneumonia.  Patient's left lower extremity wound does not appear to be the site of patient's infectious symptoms.  Site is overall clean and dry.  No findings concerning for overlying cellulitis, abscess, or necrotizing infection.  Cardiac workup, UA, CXR, coags, and CT PE ordered.  Home analgesia ordered.  Please see ED course and disposition for remainder of care.    ED Course:  ED Course as of 06/20/25 1107   Thu Jun 19, 2025   1533 UA without signs concerning for UTI.  Lactate normal.  Troponins mildly elevated.  Low clinical concern for ACS.  BNP elevated at 786 with findings noted for fluid overload on CXR.  Patient initiated on Lasix.  Patient noted to be septic likely secondary pneumonia initiated broad-spectrum antibiotics.  Patient has leukocytosis on CBC as well as tachycardia.  Metabolic panel with hyperglycemia and uptrending alkaline phosphatase.  CXR findings concerning for fluid overload as well as pneumonia. [MH]   1550 INR within therapeutic range [MH]   1756 CT angio chest for pulmonary embolism  IMPRESSION:  1. No evidence of acute pulmonary embolism to  proximal segmental  level. Please note that, assessment of distal segmental and  subsegmental branches is limited  and small peripheral emboli are not  entirely excluded.  2. Minimal basilar atelectasis which is increased.  3. Dilated main pulmonary artery to 3.8 cm which is similar  correlate/concern for elevated right-sided filling pressures.  4. Atherosclerosis including extensive coronary artery calcifications.   []   2026 EKG: Rate is 113, sinus rhythm with PACs, normal axis, no interval prolongation, no st elevation or depression.  When compared to EKG on 12/13/2024 review of EKG does not show any signs of STEMI, complete heart block, asystole, V-fib. []      ED Course User Index  [] Wil Bergman MD         Diagnoses as of 06/20/25 1107   Pneumonia of both lower lobes due to infectious organism   Acute on chronic congestive heart failure, unspecified heart failure type       Social Determinants Limiting Care:  None identified    Disposition:  Discussed ED findings and admission with the patient.  Patient stated understanding and agreement with the plan.  All questions were answered.  Discussed patient presentation with admitting team.  Patient mated to medicine in stable condition.    Wil Bergman MD   Emergency Medicine PGY-3  [facility]    Comment: Please note this report has been produced using speech recognition software and may contain errors related to that system including errors in grammar, punctuation, and spelling as well as words and phrases that may be inappropriate.  If there are any questions or concerns please feel free to contact the dictating provider for clarification.    Procedures ? SmartLinks last updated 6/19/2025 12:45 PM        Wil Bergman MD  Resident  06/21/25 0045

## 2025-06-19 NOTE — H&P
Internal Medicine - History and Physical Note      Patient: Neisha Graham, Age: 65 y.o., SEX: female , MRN:[id number], ROOM:02/[facility],  Code: Full Code   Admitted On: 6/19/2025   Admitting Dx: No admission diagnoses are documented for this encounter.  PCP: Micheal Gonsalez DO        Attending: Venancio Thompson MD        Chief Complaint   Patient: Neisha Graham is a 65 y.o. female who presented to the hospital for   Chief Complaint   Patient presents with    Shortness of Breath       HPI   Neisha Graham is a 65 y.o. year old female  patient with with PMHx significant for paroxysmal atrial fibrillation on warfarin, chronic thromboembolic disease, essential thrombocytosis, moderate to severe aortic stenosis, moderate mitral stenosis, HFpEF (EF 60-65% 12/24), COPD on 2L NC nightly, pulmonary hypertension, lymphedema with chronic lower extremity wounds, chronic sacral wounds, HTN, HLD, anxiety, and depression who presented to [facility] on 6/19/25 with chief complaint of fever and chills with associated shortness of breath. Patient states that last night she began to notice subjective fever and chills that continued into this morning which was her main concern for presenting to the ER. She does note that she had noticed her L lower extremity wound was foul smelling last night and had soaked through her bandage faster than normal. Additionally, she notes increased swelling of her bilateral lower extremities up to her knees. States that she is supposed to take torsemide 40 mg BID at home but recently stopped taking the evening dose because she felt as though it was too much. Endorses good urine output with the torsemide once daily. Additionally, she notes swelling of a gland on her R jaw and neck for the past 2-3 months that is painful and keeps her awake at night. She was recently treated for pneumonia outpatient twice by her PCP, once with a 10-day course of Augmentin and once with a 5-day course of  "Cefdinir, at that time she states her breathing improved but the gland remained inflamed. She started feeling increasingly short of breath again recently and noes increase of her chronic cough with sputum changing from \"flesh\" color to a more green/white color. She denies chest pain, palpitations, nasal congestion, sore throat, abdominal pain, nausea, vomiting.    ROS  Review of Systems   Constitutional:  Positive for chills and fever. Negative for appetite change.   HENT:  Negative for congestion and sore throat.    Respiratory:  Positive for cough, shortness of breath and wheezing.    Cardiovascular:  Positive for leg swelling. Negative for chest pain.   Gastrointestinal:  Negative for constipation, diarrhea, nausea and vomiting.   Genitourinary:  Negative for difficulty urinating and dysuria.   Skin:  Positive for wound.   Neurological:  Negative for dizziness, light-headedness and headaches.        12 Point ROS negative unless otherwise specified above.     ED COURSE:   VS: Temperature 37.2, /83, heart rate 120, respiration 20, O2 sat 88% on room air -> 2L NC     Labs  - CBC: WBC 27.2, hemoglobin 13.2, hematocrit 42.1, , platelets 598  - BMP: Sodium 133, potassium 5, chloride 95, bicarb 31, BUN 23, creatinine 1.01, glucose 174  - M.33  - LFTs: Albumin 4.1, alk phos 230, AST 28, ALT 17, T. bili 0.8  - PT/INR: 26.8/2.4  - Lactate: 1.9  - Troponin: 29->23  - BNP: 786  - UA positive for: 2+ urobilinogen  - Blood cultures: Pending     Imaging:  EKG:  -Sinus tachycardia  -No acute ischemic changes noted  -Qtc 466    CXR:  1. Cardiomegaly with bibasilar questionable infiltrate and interstitial prominence diffusely. Consider CHF and pulmonary edema.    CT angio chest for PE:  1. No evidence of acute pulmonary embolism to  proximal segmental level. Please note that, assessment of distal segmental and  subsegmental branches is limited and small peripheral emboli are not entirely excluded.  2. Minimal " basilar atelectasis which is increased.  3. Dilated main pulmonary artery to 3.8 cm which is similar correlate/concern for elevated right-sided filling pressures.  4. Atherosclerosis including extensive coronary artery calcifications.    Interventions:   -Linezolid, Zosyn, and azithromycin  -40 mg IV Lasix  -Tylenol 650 mg   -Oxycodone 5 mg and 10 mg x 1 each   -Given concern for HFpEF exacerbation with severe valvular disease - ED did speak with cardiology, Dr. George prior to admission who stated okay for admission to [facility] for treatment and he would assess in the AM and determine if patient needed transfer for possible assessment for valve intervention.    Past Medical History: Per HPI  Past Surgical History: Knee surgery, laminectomy, tonsillectomy, tubal ligation  Allergies: Vancomycin, doxycycline, Macrobid, Bactrim   Family History: Non-contributory   Home Meds: Reviewed     Social History:  - Coming from home   - Tobacco: Current smoker - reports 10 cigarettes per day   - Alcohol: Denies  - Illicit Drug: Denies    HealthCare Providers:  - PCP: Micheal Gonsalez,      Code Status: Full Code     Emergency contact: Extended Emergency Contact Information  Primary Emergency Contact: Belle Chauhan  Mobile Phone: [phone]  Relation: Sibling  Secondary Emergency Contact: NICOLAS PEREZ  Mobile Phone: [phone]  Relation: Daughter     Past Medical History   Medical History[1]     Surgical History   Surgical History[2]    Family History   Family History[3]    Social History     Social History     Socioeconomic History    Marital status: Single     Spouse name: Not on file    Number of children: Not on file    Years of education: Not on file    Highest education level: Not on file   Occupational History    Not on file   Tobacco Use    Smoking status: Some Days     Types: Cigarettes    Smokeless tobacco: Never   Substance and Sexual Activity    Alcohol use: Never    Drug use: Never    Sexual activity: Not on  file   Other Topics Concern    Not on file   Social History Narrative    Not on file     Social Drivers of Health     Financial Resource Strain: Low Risk  (6/19/2025)    Overall Financial Resource Strain (CARDIA)     Difficulty of Paying Living Expenses: Not hard at all   Food Insecurity: No Food Insecurity (12/12/2024)    Hunger Vital Sign     Worried About Running Out of Food in the Last Year: Never true     Ran Out of Food in the Last Year: Never true   Transportation Needs: No Transportation Needs (6/19/2025)    PRAPARE - Transportation     Lack of Transportation (Medical): No     Lack of Transportation (Non-Medical): No   Physical Activity: Inactive (12/12/2024)    Exercise Vital Sign     Days of Exercise per Week: 0 days     Minutes of Exercise per Session: 0 min   Stress: Stress Concern Present (12/12/2024)    [place] [employer] - Occupational Stress Questionnaire     Feeling of Stress : To some extent   Social Connections: Unknown (12/12/2024)    Social Connection and Isolation Panel [NHANES]     Frequency of Communication with Friends and Family: Three times a week     Frequency of Social Gatherings with Friends and Family: Once a week     Attends Restoration Services: Not on file     Active Member of Clubs or Organizations: Not on file     Attends Club or Organization Meetings: Not on file     Marital Status: Not on file   Intimate Partner Violence: Not At Risk (12/12/2024)    Humiliation, Afraid, Rape, and Kick questionnaire     Fear of Current or Ex-Partner: No     Emotionally Abused: No     Physically Abused: No     Sexually Abused: No   Housing Stability: Low Risk  (6/19/2025)    Housing Stability Vital Sign     Unable to Pay for Housing in the Last Year: No     Number of Times Moved in the Last Year: 0     Homeless in the Last Year: No       Tobacco Use: High Risk (6/19/2025)    Patient History     Smoking Tobacco Use: Some Days     Smokeless Tobacco Use: Never     Passive  "Exposure: Not on file        Social History     Substance and Sexual Activity   Alcohol Use Never        Allergies   RX Allergies[4]       Meds    Scheduled medications  Scheduled Medications[5]  Continuous medications  Continuous Medications[6]  PRN medications  PRN Medications[7]     Objective    Physical Exam  Constitutional:       General: She is not in acute distress.     Appearance: She is obese.   HENT:      Head: Normocephalic and atraumatic.   Cardiovascular:      Rate and Rhythm: Normal rate and regular rhythm.   Pulmonary:      Effort: No respiratory distress.      Breath sounds: Wheezing and rales present.      Comments: Audible wheezing heard during conversation, no expiratory wheezes on auscultation of lungs  Crackles and coarse breath sounds heard throughout lung fields   Abdominal:      Palpations: Abdomen is soft.      Tenderness: There is no abdominal tenderness.   Musculoskeletal:         General: Swelling and tenderness present.      Right lower leg: Edema present.      Left lower leg: Edema present.   Skin:     General: Skin is warm and dry.      Findings: Erythema and lesion present.   Neurological:      General: No focal deficit present.      Mental Status: She is alert and oriented to person, place, and time.   Psychiatric:         Mood and Affect: Mood normal.         Behavior: Behavior normal.          Visit Vitals  /60   Pulse 84   Temp 36.8 °C (98.2 °F)   Resp (!) 21   Ht 1.626 m (5' 4\")   Wt (!) 154 kg (340 lb)   SpO2 94%   BMI 58.36 kg/m²   Smoking Status Some Days   BSA 2.64 m²          Intake/Output Summary (Last 24 hours) at 6/19/2025 1936  Last data filed at 6/19/2025 1625  Gross per 24 hour   Intake 250 ml   Output --   Net 250 ml             Labs:   Results from last 72 hours   Lab Units 06/19/25  1250   SODIUM mmol/L 133*   POTASSIUM mmol/L 5.0   CHLORIDE mmol/L 95*   CO2 mmol/L 31   BUN mg/dL 23   CREATININE mg/dL 1.01   GLUCOSE mg/dL 174*   CALCIUM mg/dL 9.6   ANION GAP " "mmol/L 12   EGFR mL/min/1.73m*2 62      Results from last 72 hours   Lab Units 06/19/25  1250   WBC AUTO x10*3/uL 27.2*   HEMOGLOBIN g/dL 13.2   HEMATOCRIT % 42.1   PLATELETS AUTO x10*3/uL 598*   NEUTROS PCT AUTO % 92.6   LYMPHS PCT AUTO % 1.7   MONOS PCT AUTO % 4.5   EOS PCT AUTO % 0.1      Lab Results   Component Value Date    CALCIUM 9.6 06/19/2025    PHOS 3.5 12/12/2024      Lab Results   Component Value Date    CRP 0.72 12/11/2024       [unfilled]     Micro/ID:   No results found for the last 90 days.    Lab Results   Component Value Date    URINECULTURE  12/30/2024     Clinically insignificant growth based on current clinical standards.    BLOODCULT Loaded on Instrument - Culture in progress 06/19/2025    BLOODCULT Loaded on Instrument - Culture in progress 06/19/2025                    No lab exists for component: \"AGALPCRNB\"       Images        No results found. However, due to the size of the patient record, not all encounters were searched. Please check Results Review for a complete set of results.   No results found. However, due to the size of the patient record, not all encounters were searched. Please check Results Review for a complete set of results.     [unfilled]     CT angio chest for pulmonary embolism  Result Date: 6/19/2025  1. No evidence of acute pulmonary embolism to  proximal segmental level. Please note that, assessment of distal segmental and subsegmental branches is limited and small peripheral emboli are not entirely excluded. 2. Minimal basilar atelectasis which is increased. 3. Dilated main pulmonary artery to 3.8 cm which is similar correlate/concern for elevated right-sided filling pressures. 4. Atherosclerosis including extensive coronary artery calcifications.         MACRO: None   Signed by: Andrew Bartlett 6/19/2025 5:48 PM Dictation workstation:   [id number]    XR chest 2 views  Result Date: 6/19/2025  1. Cardiomegaly with bibasilar questionable infiltrate and interstitial " prominence diffusely. Consider CHF and pulmonary edema. Short-term follow-up as clinically warranted.       MACRO: None   Signed by: Shekhar Loomis 6/19/2025 2:50 PM Dictation workstation:   [id number]        Assessment and Plan    Neisha Graham is a 65 y.o. female with PMHx significant for paroxysmal atrial fibrillation on warfarin, chronic thromboembolic disease, essential thrombocytosis, moderate to severe aortic stenosis, moderate mitral stenosis, HFpEF (EF 60-65% 12/24), COPD on 2L NC nightly, pulmonary hypertension, lymphedema with chronic lower extremity wounds, chronic sacral wounds, and HTN admitted on 6/19/2025 acute on chronic hypoxic respiratory failure 2/2 HFpEF and COPD exacerbation with concern for sepsis 2/2 infection of chronic wounds vs possible PNA.     ACUTE MEDICAL ISSUES:  #Acute on chronic HFpEF exacerbation   #Moderate to severe aortic stenosis   #Moderate mitral valve stenosis   ::TTE 12/2024 with EF 60-65% and Grade I LV impaired relaxation pattern and mod to severe aortic stenosis and mod mitral stenosis   ::Patient on torsemide 40 mg BID at home but recently stopped taking evening dose  ::BNP elevated at 786  ::CXR and CT chest concerning for pulmonary edema   ::S/p 40 mg IV Lasix in the ED  PLAN:  -Cardiology consulted, appreciate recs  -ED did speak with Dr. George who recommended admission at [facility] and would see patient in AM to determine if transfer needed for valve evaluation  -Additional 40 mg IV Lasix at admission  -TTE ordered and pending   -1800cc/day fluid restriction  -Monitor strict I/O    #Sepsis 2/2 wound infection vs possible PNA  #Leukocytosis   #Concern for possible PNA  #Chronic bilateral lower extremity and sacral wounds with concern for acute infection  #?Parotitis   ::WBC with significant elevation at 27.2  ::CXR and CT without distinct PNA, however, ER was concerned for possible PNA  ::Patient notes foul smell of L lower extremity wound x 2 days - with some surrounding  erythema  ::Hx of wound with MRSA 9/24 and pseudomonas bacteremia 9/24  ::Patient complaints of swollen/painful gland in R jaw and ability to express salivary stones from under tongue - minimal parotid swelling and tenderness on examination  PLAN:  -Continue Linezolid for MRSA coverage (allergy to vancomycin)  -Continue Zosyn for pseudomonas coverage and anaerobic coverage for possible parotitis   -Continue azithromycin for atypical coverage and COPD exacerbation  -ID consulted, appreciate recs  -Consider podiatry consult   -Wound care consulted, appreciate recs  -Blood cultures pending   -CRP and ESR ordered  -Urine strep and legionella ordered and pending   -Respiratory culture ordered and pending  -Procalcitonin ordered and pending   -Can consider R parotid gland ultrasound to further assess for possible parotitis if increased concern    #Acute on chronic hypoxic respiratory failure 2/2 CHF exacerbation vs COPD vs PNA  #COPD exacerbation  ::Patient with SOB and desaturation to 88%, requiring 2L NC, wears 2L NC at night only at baseline  ::Increased cough, sputum production, O2 requirement concerning for COPD exacerbation  PLAN:  -On 2L NC - wean to baseline 2L NC nightly as able   -PNA work up and treatment as noted above   -Prednisone 40 mg daily x 5 days for COPD exacerbation   -Mucinex 600 mg BID  -Continue home inhalers for COPD  -Duonebs scheduled and PRN  -Incentive spirometer and acapella ordered     #Tobacco use disorder  -Nicotine patch ordered     CHRONIC MEDICAL ISSUES:  #Paroxysmal atrial fibrillation - Diltiazem 360 mg daily, warfarin 7.5 mg daily (INR goal 2-3)  #HFpEF #HTN - Spironolactone 50 mg daily, HOLD home torsemide iso IV diuresis   #Essential thrombocytosis - Hydroxyurea 1000 mg daily  #Chronic back pain - Flexeril 10 mg BID PRN for muscle spasms, takes oxycodone 10 mg PRN at home for pain - inpatient pain regimen: scheduled Tylenol 975 mg q6 hours, oxycodone 5 and 10 mg q6 hours PRN for  moderate and severe pain, dilaudid 0.4 mg PRN for breakthrough pain  #Depression #Anxiety - Duloxetine 20 mg daily, hydroxyzine 50 mg q8 hours PRN for anxiety, venlafaxine 150 mg daily  #RLS - Pramipexole 1 mg BID    Fluids: Fluid restriction 1800cc daily   Electrolytes: Replete PRN  Nutrition: Cardiac diet   Antimicrobials: Linezolid, Zosyn, Azithromycin  DVT ppx: On warfarin   GI ppx: None  Catheter: None  Lines: PIV  Supplemental Oxygen: 2L NC   Emergency Contact: Extended Emergency Contact Information  Primary Emergency Contact: Belle Chauhan  Mobile Phone: [phone]  Relation: Sibling  Secondary Emergency Contact: NICOLAS PEREZ  Mobile Phone: [phone]  Relation: Daughter   Code: Full Code     Disposition: 65 year old female admitted with acute on chronic HRF iso HFpEF and COPD exacerbations with concern for sepsis 2/2 wound infections vs PNA. ELOS > 2 midnights.    Alyse Spann MD  Internal Medicine, PGY- 1  06/19/25 at 7:36 PM               [1]   Past Medical History:  Diagnosis Date    Acute bronchitis due to other specified organisms 10/14/2019    Acute bronchitis due to other specified organisms    Acute viral conjunctivitis of left eye 03/10/2023    Alkaline phosphatase elevation 08/17/2023    Anemia 03/10/2023    Bone marrow disorder 08/17/2023    Cellulitis of left lower limb 04/08/2021    Cellulitis of left lower leg    Chronic obstructive pulmonary disease with (acute) exacerbation (Multi) 01/09/2019    COPD exacerbation    Chronic sinusitis, unspecified     Sinusitis    COVID-19 11/28/2022    COVID-19    Daily headache 08/17/2023    Elevated bilirubin 08/17/2023    Elevated transaminase level 08/17/2023    Fever 08/17/2023    Fever 08/17/2023    Heart murmur 03/10/2023    Hemoptysis 03/10/2023    Hyperkalemia 03/10/2023    Malaise and fatigue 03/10/2023    Morbid (severe) obesity due to excess calories (Multi)     Morbid obesity    Other conditions influencing health status     History Of  ___ Previous Pregnancies    Other conditions influencing health status 03/12/2020    Arthritis    Other conditions influencing health status 03/06/2017    History of cough    Other conditions influencing health status     Menstruation    Other conditions influencing health status     Osteoarthritis    Other conditions influencing health status     Pulmonary Disease    Pain in unspecified ankle and joints of unspecified foot 06/24/2016    Ankle joint pain    Pain in unspecified foot 12/08/2015    Foot pain    Pain in unspecified knee     Joint pain, knee    Palpitations 03/10/2023    Personal history of diseases of the skin and subcutaneous tissue     History of cellulitis    Personal history of other diseases of the female genital tract     Vaginal delivery    Personal history of other diseases of the nervous system and sense organs 09/12/2019    History of conjunctivitis    Personal history of other diseases of the nervous system and sense organs 09/17/2015    History of blurred vision    Personal history of other diseases of the respiratory system     History of pleurisy    Personal history of other diseases of the respiratory system 10/17/2016    History of bronchitis    Personal history of other diseases of the respiratory system 11/29/2017    History of acute bronchitis    Personal history of other diseases of the respiratory system 11/16/2016    History of acute pharyngitis    Personal history of other drug therapy 10/14/2016    History of influenza vaccination    Personal history of other infectious and parasitic diseases     History of hepatitis    Personal history of other infectious and parasitic diseases 07/22/2020    History of candidiasis of mouth    Personal history of other medical treatment     History of mammogram    Personal history of other specified conditions     History of shortness of breath    Personal history of other specified conditions 01/28/2015    History of shortness of breath     Personal history of other specified conditions     History of abnormal Pap smear    Personal history of pneumonia (recurrent)     History of pneumonia    Pneumonia, unspecified organism 01/11/2018    Community acquired pneumonia    Postmenopausal bleeding 09/30/2014    Postmenopausal bleeding    Right knee pain 03/10/2023    Shortness of breath 03/10/2023    Sinus tachycardia 03/10/2023    Submandibular sialolithiasis 03/10/2023    Unilateral primary osteoarthritis, unspecified knee 02/03/2017    Osteoarthritis, localized, knee    Unspecified acute conjunctivitis, bilateral 08/20/2020    Acute bacterial conjunctivitis of both eyes   [2]   Past Surgical History:  Procedure Laterality Date    KNEE SURGERY  09/19/2013    Knee Surgery Right    OTHER SURGICAL HISTORY  09/19/2013    Direct Laryngoscopy With Foreign Body Removal    OTHER SURGICAL HISTORY  09/19/2013    Laminectomy With Drainage Of Intramedullary Cyst    OTHER SURGICAL HISTORY  09/27/2013    Ovarian Cystectomy    TONSILLECTOMY  09/19/2013    Tonsillectomy    TUBAL LIGATION  09/19/2013    Tubal Ligation   [3] No family history on file.  [4]   Allergies  Allergen Reactions    Vancomycin Other     Patient says it caused kidney failure and made her feel terrible     Doxycycline GI Upset    Macrobid [Nitrofurantoin Monohyd/M-Cryst] Dizziness    Sulfamethoxazole-Trimethoprim Unknown   [5] nicotine, 1 patch, transdermal, Once  [6]    [7]

## 2025-06-19 NOTE — Clinical Note
Closure device placed in the right radial artery. Site closed by radial compression system. Deployed By: Mauro Curtis

## 2025-06-20 ENCOUNTER — APPOINTMENT (OUTPATIENT)
Dept: CARDIOLOGY | Facility: HOSPITAL | Age: 65
DRG: 871 | End: 2025-06-20
Payer: MEDICARE

## 2025-06-20 LAB
ALBUMIN SERPL BCP-MCNC: 3.5 G/DL (ref 3.4–5)
ANION GAP SERPL CALC-SCNC: 12 MMOL/L (ref 10–20)
AORTIC VALVE MEAN GRADIENT: 53 MMHG
AORTIC VALVE PEAK VELOCITY: 4.36 M/S
APTT PPP: 40 SECONDS (ref 26–36)
ATRIAL RATE: 113 BPM
AV PEAK GRADIENT: 76 MMHG
AVA (PEAK VEL): 0.79 CM2
AVA (VTI): 0.68 CM2
BUN SERPL-MCNC: 26 MG/DL (ref 6–23)
CALCIUM SERPL-MCNC: 8.8 MG/DL (ref 8.6–10.3)
CHLORIDE SERPL-SCNC: 96 MMOL/L (ref 98–107)
CO2 SERPL-SCNC: 29 MMOL/L (ref 21–32)
CREAT SERPL-MCNC: 1.11 MG/DL (ref 0.5–1.05)
EGFRCR SERPLBLD CKD-EPI 2021: 55 ML/MIN/1.73M*2
EJECTION FRACTION APICAL 4 CHAMBER: 65
EJECTION FRACTION: 63 %
ERYTHROCYTE [DISTWIDTH] IN BLOOD BY AUTOMATED COUNT: 16.4 % (ref 11.5–14.5)
GLUCOSE SERPL-MCNC: 184 MG/DL (ref 74–99)
HCT VFR BLD AUTO: 37.4 % (ref 36–46)
HGB BLD-MCNC: 12.2 G/DL (ref 12–16)
INR PPP: 3.4 (ref 0.9–1.1)
LEFT ATRIUM VOLUME AREA LENGTH INDEX BSA: 50.6 ML/M2
LEFT VENTRICLE INTERNAL DIMENSION DIASTOLE: 4.77 CM (ref 3.5–6)
LEFT VENTRICULAR OUTFLOW TRACT DIAMETER: 2 CM
MAGNESIUM SERPL-MCNC: 2.15 MG/DL (ref 1.6–2.4)
MCH RBC QN AUTO: 31.8 PG (ref 26–34)
MCHC RBC AUTO-ENTMCNC: 32.6 G/DL (ref 32–36)
MCV RBC AUTO: 97 FL (ref 80–100)
MITRAL VALVE E/A RATIO: 1.14
NRBC BLD-RTO: 0 /100 WBCS (ref 0–0)
P AXIS: 62 DEGREES
P OFFSET: 195 MS
P ONSET: 143 MS
PHOSPHATE SERPL-MCNC: 3.8 MG/DL (ref 2.5–4.9)
PLATELET # BLD AUTO: 429 X10*3/UL (ref 150–450)
POTASSIUM SERPL-SCNC: 4.6 MMOL/L (ref 3.5–5.3)
PR INTERVAL: 158 MS
PROCALCITONIN SERPL-MCNC: 0.26 NG/ML
PROTHROMBIN TIME: 37.9 SECONDS (ref 9.8–12.4)
Q ONSET: 222 MS
QRS COUNT: 18 BEATS
QRS DURATION: 90 MS
QT INTERVAL: 340 MS
QTC CALCULATION(BAZETT): 466 MS
QTC FREDERICIA: 419 MS
R AXIS: 64 DEGREES
RBC # BLD AUTO: 3.84 X10*6/UL (ref 4–5.2)
RIGHT VENTRICLE FREE WALL PEAK S': 15 CM/S
RIGHT VENTRICLE PEAK SYSTOLIC PRESSURE: 48 MMHG
SODIUM SERPL-SCNC: 132 MMOL/L (ref 136–145)
T AXIS: 62 DEGREES
T OFFSET: 392 MS
TRICUSPID ANNULAR PLANE SYSTOLIC EXCURSION: 2.4 CM
VENTRICULAR RATE: 113 BPM
WBC # BLD AUTO: 20.4 X10*3/UL (ref 4.4–11.3)

## 2025-06-20 PROCEDURE — 94664 DEMO&/EVAL PT USE INHALER: CPT

## 2025-06-20 PROCEDURE — 2500000004 HC RX 250 GENERAL PHARMACY W/ HCPCS (ALT 636 FOR OP/ED)

## 2025-06-20 PROCEDURE — 36415 COLL VENOUS BLD VENIPUNCTURE: CPT

## 2025-06-20 PROCEDURE — 2500000001 HC RX 250 WO HCPCS SELF ADMINISTERED DRUGS (ALT 637 FOR MEDICARE OP): Performed by: INTERNAL MEDICINE

## 2025-06-20 PROCEDURE — 94669 MECHANICAL CHEST WALL OSCILL: CPT

## 2025-06-20 PROCEDURE — 93306 TTE W/DOPPLER COMPLETE: CPT

## 2025-06-20 PROCEDURE — 85610 PROTHROMBIN TIME: CPT

## 2025-06-20 PROCEDURE — 93306 TTE W/DOPPLER COMPLETE: CPT | Performed by: INTERNAL MEDICINE

## 2025-06-20 PROCEDURE — 2500000001 HC RX 250 WO HCPCS SELF ADMINISTERED DRUGS (ALT 637 FOR MEDICARE OP)

## 2025-06-20 PROCEDURE — 2500000002 HC RX 250 W HCPCS SELF ADMINISTERED DRUGS (ALT 637 FOR MEDICARE OP, ALT 636 FOR OP/ED): Performed by: INTERNAL MEDICINE

## 2025-06-20 PROCEDURE — 2500000005 HC RX 250 GENERAL PHARMACY W/O HCPCS

## 2025-06-20 PROCEDURE — 94640 AIRWAY INHALATION TREATMENT: CPT

## 2025-06-20 PROCEDURE — 85027 COMPLETE CBC AUTOMATED: CPT

## 2025-06-20 PROCEDURE — 2500000005 HC RX 250 GENERAL PHARMACY W/O HCPCS: Performed by: INTERNAL MEDICINE

## 2025-06-20 PROCEDURE — 2500000002 HC RX 250 W HCPCS SELF ADMINISTERED DRUGS (ALT 637 FOR MEDICARE OP, ALT 636 FOR OP/ED)

## 2025-06-20 PROCEDURE — 94760 N-INVAS EAR/PLS OXIMETRY 1: CPT

## 2025-06-20 PROCEDURE — 2500000004 HC RX 250 GENERAL PHARMACY W/ HCPCS (ALT 636 FOR OP/ED): Performed by: NURSE PRACTITIONER

## 2025-06-20 PROCEDURE — 80069 RENAL FUNCTION PANEL: CPT

## 2025-06-20 PROCEDURE — S4991 NICOTINE PATCH NONLEGEND: HCPCS

## 2025-06-20 PROCEDURE — 83735 ASSAY OF MAGNESIUM: CPT

## 2025-06-20 PROCEDURE — 99223 1ST HOSP IP/OBS HIGH 75: CPT

## 2025-06-20 PROCEDURE — 94668 MNPJ CHEST WALL SBSQ: CPT

## 2025-06-20 PROCEDURE — 1200000002 HC GENERAL ROOM WITH TELEMETRY DAILY

## 2025-06-20 PROCEDURE — 99223 1ST HOSP IP/OBS HIGH 75: CPT | Performed by: INTERNAL MEDICINE

## 2025-06-20 RX ORDER — ZINC SULFATE 50(220)MG
50 CAPSULE ORAL DAILY
Status: DISCONTINUED | OUTPATIENT
Start: 2025-06-20 | End: 2025-06-28 | Stop reason: HOSPADM

## 2025-06-20 RX ORDER — ASCORBIC ACID 500 MG
500 TABLET ORAL 2 TIMES DAILY
Status: COMPLETED | OUTPATIENT
Start: 2025-06-20 | End: 2025-06-27

## 2025-06-20 RX ORDER — MULTIVIT-MIN/IRON FUM/FOLIC AC 7.5 MG-4
1 TABLET ORAL DAILY
Status: DISCONTINUED | OUTPATIENT
Start: 2025-06-20 | End: 2025-06-28 | Stop reason: HOSPADM

## 2025-06-20 RX ORDER — TRIAMCINOLONE ACETONIDE 1 MG/G
CREAM TOPICAL 2 TIMES DAILY
Status: DISCONTINUED | OUTPATIENT
Start: 2025-06-20 | End: 2025-06-28 | Stop reason: HOSPADM

## 2025-06-20 RX ORDER — CEFTRIAXONE 2 G/50ML
2 INJECTION, SOLUTION INTRAVENOUS EVERY 24 HOURS
Status: DISCONTINUED | OUTPATIENT
Start: 2025-06-20 | End: 2025-06-28 | Stop reason: HOSPADM

## 2025-06-20 RX ADMIN — SENNOSIDES AND DOCUSATE SODIUM 2 TABLET: 50; 8.6 TABLET ORAL at 21:03

## 2025-06-20 RX ADMIN — Medication 1 APPLICATION: at 09:30

## 2025-06-20 RX ADMIN — ZINC SULFATE 220 MG (50 MG) CAPSULE 1 CAPSULE: CAPSULE at 21:03

## 2025-06-20 RX ADMIN — ACETAMINOPHEN 975 MG: 325 TABLET, FILM COATED ORAL at 09:24

## 2025-06-20 RX ADMIN — GUAIFENESIN 600 MG: 600 TABLET ORAL at 21:03

## 2025-06-20 RX ADMIN — NICOTINE 1 PATCH: 21 PATCH, EXTENDED RELEASE TRANSDERMAL at 09:25

## 2025-06-20 RX ADMIN — OXYCODONE HYDROCHLORIDE 10 MG: 10 TABLET ORAL at 16:43

## 2025-06-20 RX ADMIN — Medication 2 L/MIN: at 05:33

## 2025-06-20 RX ADMIN — PIPERACILLIN SODIUM AND TAZOBACTAM SODIUM 3.38 G: 3; .375 INJECTION, SOLUTION INTRAVENOUS at 09:25

## 2025-06-20 RX ADMIN — BUDESONIDE 0.5 MG: 0.5 INHALANT RESPIRATORY (INHALATION) at 06:05

## 2025-06-20 RX ADMIN — OXYCODONE HYDROCHLORIDE AND ACETAMINOPHEN 500 MG: 500 TABLET ORAL at 16:45

## 2025-06-20 RX ADMIN — Medication 1 APPLICATION: at 15:05

## 2025-06-20 RX ADMIN — SENNOSIDES AND DOCUSATE SODIUM 2 TABLET: 50; 8.6 TABLET ORAL at 09:26

## 2025-06-20 RX ADMIN — ACETAMINOPHEN 975 MG: 325 TABLET, FILM COATED ORAL at 21:03

## 2025-06-20 RX ADMIN — SPIRONOLACTONE 50 MG: 25 TABLET ORAL at 09:26

## 2025-06-20 RX ADMIN — IPRATROPIUM BROMIDE AND ALBUTEROL SULFATE 3 ML: 2.5; .5 SOLUTION RESPIRATORY (INHALATION) at 19:30

## 2025-06-20 RX ADMIN — Medication 1 APPLICATION: at 21:08

## 2025-06-20 RX ADMIN — PIPERACILLIN SODIUM AND TAZOBACTAM SODIUM 3.38 G: 3; .375 INJECTION, SOLUTION INTRAVENOUS at 02:47

## 2025-06-20 RX ADMIN — DULOXETINE 20 MG: 20 CAPSULE, DELAYED RELEASE ORAL at 09:25

## 2025-06-20 RX ADMIN — CEFTRIAXONE 2 G: 2 INJECTION, SOLUTION INTRAVENOUS at 13:46

## 2025-06-20 RX ADMIN — IPRATROPIUM BROMIDE AND ALBUTEROL SULFATE 3 ML: 2.5; .5 SOLUTION RESPIRATORY (INHALATION) at 14:59

## 2025-06-20 RX ADMIN — VENLAFAXINE HYDROCHLORIDE 150 MG: 75 CAPSULE, EXTENDED RELEASE ORAL at 09:26

## 2025-06-20 RX ADMIN — PRAMIPEXOLE DIHYDROCHLORIDE 1 MG: 1 TABLET ORAL at 09:25

## 2025-06-20 RX ADMIN — TRIAMCINOLONE ACETONIDE: 1 CREAM TOPICAL at 14:45

## 2025-06-20 RX ADMIN — Medication 1 TABLET: at 15:04

## 2025-06-20 RX ADMIN — PRAMIPEXOLE DIHYDROCHLORIDE 1 MG: 1 TABLET ORAL at 21:06

## 2025-06-20 RX ADMIN — PREDNISONE 40 MG: 20 TABLET ORAL at 09:26

## 2025-06-20 RX ADMIN — GUAIFENESIN 600 MG: 600 TABLET ORAL at 09:25

## 2025-06-20 RX ADMIN — LINEZOLID 600 MG: 600 INJECTION, SOLUTION INTRAVENOUS at 05:12

## 2025-06-20 RX ADMIN — HYDROXYUREA 1000 MG: 500 CAPSULE ORAL at 09:25

## 2025-06-20 RX ADMIN — OXYCODONE HYDROCHLORIDE 10 MG: 10 TABLET ORAL at 09:29

## 2025-06-20 RX ADMIN — BUDESONIDE 0.5 MG: 0.5 INHALANT RESPIRATORY (INHALATION) at 19:30

## 2025-06-20 RX ADMIN — DILTIAZEM HYDROCHLORIDE 360 MG: 120 CAPSULE, COATED, EXTENDED RELEASE ORAL at 09:25

## 2025-06-20 RX ADMIN — AZITHROMYCIN DIHYDRATE 500 MG: 500 TABLET ORAL at 09:24

## 2025-06-20 RX ADMIN — Medication 3 MG: at 21:04

## 2025-06-20 RX ADMIN — Medication 4 L/MIN: at 19:30

## 2025-06-20 RX ADMIN — FUROSEMIDE 10 MG/HR: 10 INJECTION, SOLUTION INTRAMUSCULAR; INTRAVENOUS at 12:49

## 2025-06-20 RX ADMIN — ACETAMINOPHEN 975 MG: 325 TABLET, FILM COATED ORAL at 15:04

## 2025-06-20 RX ADMIN — IPRATROPIUM BROMIDE AND ALBUTEROL SULFATE 3 ML: .5; 3 SOLUTION RESPIRATORY (INHALATION) at 05:33

## 2025-06-20 ASSESSMENT — COGNITIVE AND FUNCTIONAL STATUS - GENERAL
DRESSING REGULAR UPPER BODY CLOTHING: A LOT
TOILETING: A LOT
WALKING IN HOSPITAL ROOM: A LOT
MOBILITY SCORE: 13
TURNING FROM BACK TO SIDE WHILE IN FLAT BAD: A LOT
MOVING TO AND FROM BED TO CHAIR: A LOT
CLIMB 3 TO 5 STEPS WITH RAILING: A LOT
DAILY ACTIVITIY SCORE: 16
STANDING UP FROM CHAIR USING ARMS: A LOT
MOVING FROM LYING ON BACK TO SITTING ON SIDE OF FLAT BED WITH BEDRAILS: A LITTLE
HELP NEEDED FOR BATHING: A LOT
DRESSING REGULAR LOWER BODY CLOTHING: A LOT

## 2025-06-20 ASSESSMENT — PAIN DESCRIPTION - ORIENTATION
ORIENTATION: MID
ORIENTATION: MID

## 2025-06-20 ASSESSMENT — PAIN DESCRIPTION - DESCRIPTORS
DESCRIPTORS: ACHING
DESCRIPTORS: ACHING

## 2025-06-20 ASSESSMENT — PAIN SCALES - GENERAL
PAINLEVEL_OUTOF10: 8
PAINLEVEL_OUTOF10: 4
PAINLEVEL_OUTOF10: 0 - NO PAIN
PAINLEVEL_OUTOF10: 9
PAINLEVEL_OUTOF10: 6

## 2025-06-20 ASSESSMENT — PAIN - FUNCTIONAL ASSESSMENT
PAIN_FUNCTIONAL_ASSESSMENT: 0-10

## 2025-06-20 ASSESSMENT — PAIN DESCRIPTION - LOCATION
LOCATION: SACRUM
LOCATION: SACRUM

## 2025-06-20 ASSESSMENT — ACTIVITIES OF DAILY LIVING (ADL): LACK_OF_TRANSPORTATION: NO

## 2025-06-20 NOTE — PROGRESS NOTES
Pharmacy Medication History Review    Neisha Graham is a 65 y.o. female admitted for Pneumonia of both lower lobes due to infectious organism. Pharmacy reviewed the patient's nryfh-yl-rgvoispzi medications and allergies for accuracy.    The list below reflectives the updated PTA list. Please review each medication in order reconciliation for additional clarification and justification.  Medications Prior to Admission   Medication Sig Dispense Refill Last Dose/Taking    oxyCODONE (Roxicodone) 10 mg immediate release tablet Take 1 tablet (10 mg) by mouth every 4 hours if needed for severe pain (7 - 10) for up to 28 days. Do not fill before June 9, 2025. 168 tablet 0 Past Month    albuterol 90 mcg/actuation inhaler INHALE 2 PUFFS BY MOUTH AND INTO THE LUNGS EVERY 4 HOURS IF NEEDED FOR WHEEZING 8 g 3     ciclopirox (Penlac) 8 % solution Apply topically once daily at bedtime. 6.6 mL 11     cyclobenzaprine (Flexeril) 10 mg tablet Take 1 tablet (10 mg) by mouth 2 times a day as needed for muscle spasms. 30 tablet 2     dilTIAZem ER (Tiazac) 360 mg 24 hr capsule Take 1 capsule (360 mg) by mouth once daily. 90 capsule 3     DULoxetine (Cymbalta) 20 mg DR capsule Take 1 capsule (20 mg) by mouth once daily. 90 capsule 1     hydroxyurea (Hydrea) 500 mg capsule Take 2 capsules (1,000 mg total) by mouth once daily.  Take at the same time each day.       ipratropium-albuteroL (Duo-Neb) 0.5-2.5 mg/3 mL nebulizer solution Take 3 mL by nebulization 4 times a day as needed for wheezing or shortness of breath. 360 mL 11     ketoconazole (NIZOral) 2 % cream APPLY TOPICALLY TWICE A DAY FOR 28 DAYS 30 g 0     lidocaine (Xylocaine) 5 % ointment Apply topically if needed for mild pain (1 - 3). Apply up to three times a day as needed for pain. 35 g 5     lidocaine 5 % gel Apply 1 inch topically 3 times a day as needed (apply to affected area). 100 g 2     naloxone (Narcan) 4 mg/0.1 mL nasal spray Administer 1 spray (4 mg) into affected  nostril(s) if needed for opioid reversal or respiratory depression. 2 each 0     nebulizer accessories kit UAD 1 kit 3     nystatin (Mycostatin) cream APPLY TO AFFECTED AREA TWO TO THREE TIMES A DAY 30 g 2     [START ON 7/7/2025] oxyCODONE (Roxicodone) 10 mg immediate release tablet Take 1 tablet (10 mg) by mouth every 4 hours if needed for severe pain (7 - 10) for up to 28 days. Do not fill before July 7, 2025. 168 tablet 0     pramipexole (Mirapex) 1 mg tablet Take 1 tablet (1 mg) by mouth 2 times a day. 180 tablet 3     sennosides-docusate sodium (Rebecca-Colace) 8.6-50 mg tablet Take 2 tablets by mouth 2 times a day. 120 tablet 0     spironolactone (Aldactone) 50 mg tablet Take 1 tablet (50 mg) by mouth once daily. 90 tablet 3     torsemide (Demadex) 20 mg tablet Take 2 tablets (40 mg) by mouth 2 times a day. 360 tablet 3     Trelegy Ellipta 200-62.5-25 mcg blister with device INHALE 1 PUFF BY MOUTH AND INTO THE LUNGS ONCE DAILY 180 each 0     triamcinolone (Kenalog) 0.1 % cream Apply topically 2 times a day. Apply to affected area 1-2 times daily as needed. Avoid face and groin. 30 g 0     warfarin (Coumadin) 7.5 mg tablet Take one po every day 90 tablet 3         The list below reflectives the updated allergy list. Please review each documented allergy for additional clarification and justification.  Allergies  Reviewed by Mamta Castle RN on 6/19/2025        Severity Reactions Comments    Vancomycin High Other Patient says it caused hypotension and kidney failure and made her feel terrible     Doxycycline Not Specified GI Upset     Macrobid [nitrofurantoin Monohyd/m-cryst] Not Specified Dizziness     Sulfamethoxazole-trimethoprim Not Specified Unknown             Below are additional concerns with the patient's PTA list.  Confirmed medications with the patient who was a poor historian and not sure which creams she takes at home. Confirmed patient only takes 1 warfarin tablet a day and most recent fill was  7.5 mg.     Al Cottrell, RPh

## 2025-06-20 NOTE — PROGRESS NOTES
"  Internal Medicine - Daily Progress Note   Hospital Day: 1    Name: Neisha Graham   Age: 65 y.o.   Gender: Female  Room: 107/107-A        HPI   Neisha Graham is a 65 y.o. year old female  patient with with PMHx significant for paroxysmal atrial fibrillation on warfarin, chronic thromboembolic disease, essential thrombocytosis, moderate to severe aortic stenosis, moderate mitral stenosis, HFpEF (EF 60-65% 12/24), COPD on 2L NC nightly, pulmonary hypertension, lymphedema with chronic lower extremity wounds, chronic sacral wounds, HTN, HLD, anxiety, and depression who presented to [facility] on 6/19/25 with chief complaint of fever and chills with associated shortness of breath. Patient states that last night she began to notice subjective fever and chills that continued into this morning which was her main concern for presenting to the ER. She does note that she had noticed her L lower extremity wound was foul smelling last night and had soaked through her bandage faster than normal. Additionally, she notes increased swelling of her bilateral lower extremities up to her knees. States that she is supposed to take torsemide 40 mg BID at home but recently stopped taking the evening dose because she felt as though it was too much. Endorses good urine output with the torsemide once daily. Additionally, she notes swelling of a gland on her R jaw and neck for the past 2-3 months that is painful and keeps her awake at night. She was recently treated for pneumonia outpatient twice by her PCP, once with a 10-day course of Augmentin and once with a 5-day course of Cefdinir, at that time she states her breathing improved but the gland remained inflamed. She started feeling increasingly short of breath again recently and noes increase of her chronic cough with sputum changing from \"flesh\" color to a more green/white color. She denies chest pain, palpitations, nasal congestion, sore throat, abdominal pain, " "nausea, vomiting.       Subjective    Patient reports feeling mildly better, however still not at her baseline.  She reports persistent shortness of breath, cough, leg swelling, leg wound, sacral wounds.  Denies fevers, chills, nausea, vomiting, diarrhea, chest pain, abdominal pain.    We discussed the plan regarding cardiology, wound care, and antibiotics for her bacteremia.  Answered all questions.    Overnight Events: admitted    ROS: 12 points review of system is negative except as stated in the HPI above.     Objective    Vitals  Visit Vitals  /63 (BP Location: Right arm, Patient Position: Lying)   Pulse 78   Temp 36.3 °C (97.3 °F) (Temporal)   Resp 16   Ht 1.626 m (5' 4\")   Wt (!) 160 kg (353 lb 8 oz)   SpO2 90%   BMI 60.68 kg/m²   Smoking Status Some Days   BSA 2.69 m²       Physical Exam    Physical Exam  Constitutional:       General: She is not in acute distress.     Appearance: She is obese.   HENT:      Head: Normocephalic and atraumatic.      Mouth/Throat:      Mouth: Mucous membranes are moist.   Eyes:      Extraocular Movements: Extraocular movements intact.      Pupils: Pupils are equal, round, and reactive to light.   Cardiovascular:      Rate and Rhythm: Normal rate and regular rhythm.      Pulses: Normal pulses.      Heart sounds: Murmur heard.   Pulmonary:      Effort: Pulmonary effort is normal. No respiratory distress.      Breath sounds: Wheezing present.   Abdominal:      General: Bowel sounds are normal. There is no distension.      Palpations: Abdomen is soft.      Tenderness: There is no abdominal tenderness.   Musculoskeletal:      Comments: Chronic bilateral lower extremity swelling consistent with lymphedema, left lower extremity wound mid dorsal tibia with drainage (wrapped with gauze)   Skin:     Findings: Erythema (BLE) present.   Neurological:      General: No focal deficit present.      Mental Status: She is alert and oriented to person, place, and time.   Psychiatric:        " " Mood and Affect: Mood normal.           IOs    Intake/Output Summary (Last 24 hours) at 6/20/2025 0754  Last data filed at 6/20/2025 0742  Gross per 24 hour   Intake 1190 ml   Output --   Net 1190 ml       Labs:   Results from last 72 hours   Lab Units 06/19/25  1250   SODIUM mmol/L 133*   POTASSIUM mmol/L 5.0   CHLORIDE mmol/L 95*   CO2 mmol/L 31   BUN mg/dL 23   CREATININE mg/dL 1.01   GLUCOSE mg/dL 174*   CALCIUM mg/dL 9.6   ANION GAP mmol/L 12   EGFR mL/min/1.73m*2 62      Results from last 72 hours   Lab Units 06/20/25  0649 06/19/25  1250   WBC AUTO x10*3/uL 20.4* 27.2*   HEMOGLOBIN g/dL 12.2 13.2   HEMATOCRIT % 37.4 42.1   PLATELETS AUTO x10*3/uL 429 598*   NEUTROS PCT AUTO %  --  92.6   LYMPHS PCT AUTO %  --  1.7   MONOS PCT AUTO %  --  4.5   EOS PCT AUTO %  --  0.1      Lab Results   Component Value Date    CALCIUM 9.6 06/19/2025    PHOS 3.5 12/12/2024      Lab Results   Component Value Date    CRP 3.23 (H) 06/19/2025      [unfilled]     Micro/ID:   No results found for the last 90 days.                   No lab exists for component: \"AGALPCRNB\"   .ID  Lab Results   Component Value Date    URINECULTURE  12/30/2024     Clinically insignificant growth based on current clinical standards.    BLOODCULT  06/19/2025     Identification and susceptibility testing to follow    BLOODCULT  06/19/2025     Identification and susceptibility testing to follow       Images  CT angio chest for pulmonary embolism  Narrative: Interpreted By:  Andrew Bartlett,   STUDY:  CT ANGIO CHEST FOR PULMONARY EMBOLISM;  6/19/2025 5:00 pm      INDICATION:  Signs/Symptoms:Evaluate for PE, tachycardic with new oxygen  requirement.          COMPARISON:  August 7, 2019, and September 16, 2024 chest CT, September 24, 2024  CT abdomen and pelvis, June 19, 2025 chest radiographs      ACCESSION NUMBER(S):  [id number]      ORDERING CLINICIAN:  MARKY MORAN      TECHNIQUE:  Helical data acquisition of the chest was obtained after " intravenous  administration of 75 ML Omnipaque 350, as per PE protocol. Images  were reformatted in coronal and sagittal planes. Axial and coronal  maximum intensity projection (MIP) images were created and reviewed.      FINDINGS:  POTENTIAL LIMITATIONS OF THE STUDY: Image degradation related to  cardiac pulsation artifact, beam hardening artifact and quantum  mottle artifact.      HEART AND VESSELS:  No discrete filling defects within the main pulmonary artery or its  branches to  proximal segmental level. Please note that, assessment  of distal segmental and subsegmental branches is limited and small  peripheral emboli are not entirely excluded. The main pulmonary  artery is dilated measuring 3.8 cm (Series 401, Image 112) compared  with 3.8 cm (Series 201, Image 39) September 16, 2024      The thoracic aorta normal in course and caliber.Scattered  atherosclerosis thoracic aorta and branch vessels Severe coronary  artery calcifications are seen. Please note,the study is not  optimized for evaluation of coronary arteries.      Cardiac chambers appears similar including dystrophic mitral annular  calcifications and aortic valvular region calcifications.      There is no pericardial effusion seen.      MEDIASTINUM AND ELVIA, LOWER NECK AND AXILLA:  The visualized thyroid gland is within normal limits.  No evidence of thoracic lymphadenopathy by CT criteria.  Esophagus appears within normal limits as seen.      LUNGS AND AIRWAYS:  The trachea and central airways are patent. No endobronchial lesion  is seen.      Minimal increased basilar subsegmental atelectasis of indeterminate  cause.          UPPER ABDOMEN:  The visualized subdiaphragmatic structures demonstrate no acute  remarkable findings.              CHEST WALL AND OSSEOUS STRUCTURES:  Chest wall demonstrates no significant abnormality.  No acute osseous pathology.There are no suspicious osseous  lesions.Scattered degenerative changes.      Impression: 1. No  evidence of acute pulmonary embolism to  proximal segmental  level. Please note that, assessment of distal segmental and  subsegmental branches is limited and small peripheral emboli are not  entirely excluded.  2. Minimal basilar atelectasis which is increased.  3. Dilated main pulmonary artery to 3.8 cm which is similar  correlate/concern for elevated right-sided filling pressures.  4. Atherosclerosis including extensive coronary artery calcifications.                  MACRO:  None      Signed by: Andrew Bartlett 6/19/2025 5:48 PM  Dictation workstation:   [id number]  XR chest 2 views  Narrative: Interpreted By:  Shekhar Loomis,   STUDY:  XR CHEST 2 VIEWS;  6/19/2025 2:34 pm      INDICATION:  Signs/Symptoms:Dyspnea.          COMPARISON:  12/11/2024      ACCESSION NUMBER(S):  [id number]      ORDERING CLINICIAN:  MARKY MORAN      FINDINGS:                  CARDIOMEDIASTINAL SILHOUETTE:  Cardiomediastinal silhouette is prominent..      LUNGS:  Lungs are remarkable for interstitial prominence bilaterally.  Question bibasilar infiltrates.      ABDOMEN:  No remarkable upper abdominal findings.      BONES:  No acute osseous changes.      Impression: 1. Cardiomegaly with bibasilar questionable infiltrate and  interstitial prominence diffusely. Consider CHF and pulmonary edema.  Short-term follow-up as clinically warranted.              MACRO:  None      Signed by: Shekhar Loomis 6/19/2025 2:50 PM  Dictation workstation:   [id number]      Meds  Scheduled medications  Scheduled Medications[1]  Continuous medications  Continuous Medications[2]  PRN medications  PRN Medications[3]     Assessment and Plan    Neisha Graham is a 65 y.o. female with PMHx significant for paroxysmal atrial fibrillation (on warfarin), chronic thromboembolic disease, essential thrombocytosis, moderate to severe aortic stenosis, moderate mitral stenosis, HFpEF (EF 60-65% 12/24), COPD (on 2L NC nightly), pulmonary hypertension, lymphedema with  chronic lower extremity wounds, chronic sacral wounds, HTN, HLD admitted on 6/19/2025 acute on chronic hypoxic respiratory failure 2/2 HFpEF and COPD exacerbation with concern for sepsis 2/2 infection of chronic wounds.     Acute Medical Issues:   #Sepsis  #Bacteremia  #Leukocytosis   #Chronic bilateral lower extremity and sacral wounds with concern for acute infection  ::WBC with significant elevation at 27.2  ::CXR and CT without distinct PNA  ::Patient notes foul smell of L lower extremity wound x 2 days - with some surrounding erythema  ::Hx of wound with MRSA 9/24 and pseudomonas bacteremia 9/24  ::Patient complaints of swollen/painful gland in R jaw and ability to express salivary stones from under tongue - minimal parotid swelling and tenderness on examination  -Blood cultures positive for group B streptococcus  -Pro-[facility] 0.26, CRP 3.23, ESR 54  -Continue ceftriaxone, azithromycin  -ID consulted, agrees with plan to continue ceftriaxone  -Wound care following  -Can consider podiatry consult     #Acute on chronic hypoxic respiratory failure   #CHF exacerbation  #COPD exacerbation  ::TTE 12/2024 with EF 60-65%  ::Patient with SOB and desaturation to 88%, requiring 2L NC continuous (at baseline wears 2L NC at night and with transfers/exertion)  ::Patient on torsemide 40 mg BID at home but recently stopped taking evening dose  ::Increased cough, sputum production  ::BNP elevated at 786  ::CXR and CT chest concerning for pulmonary edema   ::s/p 80 mg IV Lasix in the ED  -On 2L NC, wean as tolerated back to baseline 2 L at night and with exertion  -Prednisone 40 mg daily x 5 days   -Azithromycin x 3 days  -Mucinex 600 mg BID  -Continue home inhalers  -Duonebs scheduled and PRN  -Incentive spirometer and acapella ordered   -Continue home spironolactone 50 mg daily  -Holding home torsemide, continue with IV diuresis  -TTE ordered and pending   -1800cc/day fluid restriction  -Monitor strict I/O  -Cardiology consulted:  Recommended Lasix drip at 10 mg/h    #Moderate to severe aortic stenosis   #Moderate mitral valve stenosis   ::TTE 12/2024 with EF 60-65% and Grade I LV impaired relaxation pattern and mod to severe aortic stenosis and mod mitral stenosis   ::ED did speak with Dr. George who [place] [facility] [place] would [place] in AM to determine if transfer needed for valve evaluation  -Cardiology consulted: Will likely need R+LHC once euvolemic to assess for aortic and mitral stenosis  -Will likely need outpatient referral to structural heart team to discuss TAVR candidacy    #Supratherapeutic INR  #Paroxysmal atrial fibrillation   :: INR 3.4 on admission  -Holding home warfarin (takes 7.5 mg daily, INR goal 2-3), will monitor  -Continue diltiazem 360 mg daily      Chronic Medical Issues:   #Essential thrombocytosis - Hydroxyurea 1000 mg daily  #Chronic back pain - Flexeril 10 mg BID PRN for muscle spasms, takes oxycodone 10 mg PRN at home for pain - inpatient pain regimen: scheduled Tylenol 975 mg q6 hours, oxycodone 5 and 10 mg q6 hours PRN for moderate and severe pain, dilaudid 0.4 mg PRN for breakthrough pain  #Chronic constipation 2/2 opioid use: Continue bowel regimen  #Depression, Anxiety - Duloxetine 20 mg daily, hydroxyzine 50 mg q8 hours PRN, venlafaxine 150 mg daily  #RLS - Pramipexole 1 mg BID  #Tobacco use disorder-Nicotine patch ordered, smoking cessation discussed  #HTN, HLD    Fluids: Fluid restriction 1800cc daily   Electrolytes: Replete as needed   Nutrition: Cardiac diet  GI ppx: None  DVT ppx: Warfarin (holding and monitoring INR)  Antibiotics: Ceftriaxone, azithromycin  Tubes/Lines/Drains: PIV  Oxygen: 2L NC    Code Status: Full Code   Emergency Contact: Extended Emergency Contact Information  Primary Emergency Contact: Belle Chauhan  Mobile Phone: [phone]  Relation: Sibling  Secondary Emergency Contact: NICOLAS PEREZ  Mobile Phone: [phone]  Relation: Daughter     Disposition: 65  y.o. female admitted for acute on chronic HRF iso HFpEF and COPD exacerbations with concern for sepsis 2/2 bacteremia. Estimated length of stay >48 hrs.         Violeta Hernandez DO   06/20/25                 [1] acetaminophen, 975 mg, oral, q6h  azithromycin, 500 mg, oral, q24h  budesonide, 0.5 mg, nebulization, BID   And  ipratropium-albuteroL, 3 mL, nebulization, TID  dilTIAZem ER, 360 mg, oral, Daily  DULoxetine, 20 mg, oral, Daily  guaiFENesin, 600 mg, oral, BID  hydroxyurea, 1,000 mg, oral, Daily  ketoconazole, , Topical, BID  linezolid, 600 mg, intravenous, q12h  melatonin, 3 mg, oral, Nightly  nicotine, 1 patch, transdermal, Daily   Followed by  [START ON 7/31/2025] nicotine, 1 patch, transdermal, Daily   Followed by  [START ON 8/14/2025] nicotine, 1 patch, transdermal, Daily  nystatin, , Topical, BID  piperacillin-tazobactam, 3.375 g, intravenous, q6h  pneumoc 20-eros conj-dip cr(PF), 0.5 mL, intramuscular, During hospitalization  pramipexole, 1 mg, oral, BID  predniSONE, 40 mg, oral, Daily  sennosides-docusate sodium, 2 tablet, oral, BID  spironolactone, 50 mg, oral, Daily  [Held by provider] torsemide, 40 mg, oral, BID  triamcinolone, , Topical, BID  venlafaxine XR, 150 mg, oral, Daily  warfarin, 7.5 mg, oral, Daily  white petrolatum, 1 Application, Topical, BID  zinc oxide, 1 Application, Topical, TID  [2]    [3] PRN medications: ammonium lactate, cyclobenzaprine, HYDROmorphone, hydrOXYzine HCL, ipratropium-albuteroL, lidocaine, oxyCODONE, oxyCODONE, oxygen

## 2025-06-20 NOTE — CONSULTS
Consults  Referred by CINTIA Thompson MD: Micheal Gonsalez,     Reason For Consult  Leukocytsis, pneumonia vs  cellulitis    History Of Present Illness  Neisha Graham is a 65 y.o. female, hx of obesity, hx of legs stasis / wounds / recurrent cellulitis, hx of HTN, hx of COPD, hx of BE, hx of A.S,,  she has been having intermittent fever, chills, increasing sob and cough for few weeks, she has received Augmentin and Cefdinir at home with partial improvement, she was readmitted for increasing drainage from the Lt leg wound with odor, fever, chills, exertional dyspnea, has productive cough, no wheezing, no chest pain, no emesis or diarrhea, no dysuria or hematuria, has Rt sided facial pain for few weeks she describes as gland pain, she was tachycardic, the WBC are up, the CT with atelectasis, the BC with g+ve cocci    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), Cellulitis of left lower limb (04/08/2021), Chronic obstructive pulmonary disease with (acute) exacerbation (Multi) (01/09/2019), Chronic sinusitis, unspecified, COVID-19 (11/28/2022), Daily headache (08/17/2023), Elevated bilirubin (08/17/2023), Elevated transaminase level (08/17/2023), Fever (08/17/2023), Fever (08/17/2023), Heart murmur (03/10/2023), Hemoptysis (03/10/2023), Hyperkalemia (03/10/2023), Malaise and fatigue (03/10/2023), Morbid (severe) obesity due to excess calories (Multi), Other conditions influencing health status, Other conditions influencing health status (03/12/2020), Other conditions influencing health status (03/06/2017), Other conditions influencing health status, Other conditions influencing health status, Other conditions influencing health status, Pain in unspecified ankle and joints of  unspecified foot (06/24/2016), Pain in unspecified foot (12/08/2015), Pain in unspecified knee, Palpitations (03/10/2023), Personal history of diseases of the skin and subcutaneous tissue, Personal history of other diseases of the female genital tract, Personal history of other diseases of the nervous system and sense organs (09/12/2019), Personal history of other diseases of the nervous system and sense organs (09/17/2015), Personal history of other diseases of the respiratory system, Personal history of other diseases of the respiratory system (10/17/2016), Personal history of other diseases of the respiratory system (11/29/2017), Personal history of other diseases of the respiratory system (11/16/2016), Personal history of other drug therapy (10/14/2016), Personal history of other infectious and parasitic diseases, Personal history of other infectious and parasitic diseases (07/22/2020), Personal history of other medical treatment, Personal history of other specified conditions, Personal history of other specified conditions (01/28/2015), Personal history of other specified conditions, Personal history of pneumonia (recurrent), Pneumonia, unspecified organism (01/11/2018), Postmenopausal bleeding (09/30/2014), Right knee pain (03/10/2023), Shortness of breath (03/10/2023), Sinus tachycardia (03/10/2023), Submandibular sialolithiasis (03/10/2023), Unilateral primary osteoarthritis, unspecified knee (02/03/2017), and Unspecified acute conjunctivitis, bilateral (08/20/2020).    Surgical History  She has a past surgical history that includes Other surgical history (09/19/2013); Other surgical history (09/19/2013); Tonsillectomy (09/19/2013); Knee surgery (09/19/2013); Tubal ligation (09/19/2013); and Other surgical history (09/27/2013).     Social History     Occupational History    Not on file   Tobacco Use    Smoking status: Some Days     Types: Cigarettes    Smokeless tobacco: Never   Substance and Sexual Activity     Alcohol use: Never    Drug use: Never    Sexual activity: Not on file     Travel History   Travel since 05/20/25    No documented travel since 05/20/25            Family History  No family history on file., no immunodeficiency  Allergies  Vancomycin, Doxycycline, Macrobid [nitrofurantoin monohyd/m-cryst], and Sulfamethoxazole-trimethoprim     Immunization History   Administered Date(s) Administered    Flu vaccine, quadrivalent, no egg protein, age 6 month or greater (FLUCELVAX) 11/15/2023    Flu vaccine, trivalent, preservative free, age 6 months and greater (Fluarix/Fluzone/Flulaval) 10/14/2016    Influenza, Unspecified 10/14/2016, 11/29/2017    Influenza, injectable, quadrivalent 11/29/2017    Moderna COVID-19 vaccine, 12 years and older (50mcg/0.5mL)(Spikevax) 11/15/2023    Moderna SARS-CoV-2 Vaccination 04/07/2021, 05/04/2021, 12/16/2021    Novel Influenza-H1N1-09, all formulations 01/13/2010    Tdap vaccine, age 7 year and older (BOOSTRIX, ADACEL) 10/14/2016   Pneumonia vaccine is up to date  Foreman fall risk 60, preventive protocol was implemented  Depression screen is negative  5 min smoking cessation was provided     Medications  Home medications:  Medications Prior to Admission   Medication Sig Dispense Refill Last Dose/Taking    albuterol 90 mcg/actuation inhaler INHALE 2 PUFFS BY MOUTH AND INTO THE LUNGS EVERY 4 HOURS IF NEEDED FOR WHEEZING 8 g 3     amino acids-protein hydrolys (ProSource No Carb) 15-60 gram-kcal/30 mL liquid Take by mouth.       ammonium lactate (Amlactin) 12 % cream Apply 1 Film topically 2 times a day as needed.       ciclopirox (Penlac) 8 % solution Apply topically once daily at bedtime. 6.6 mL 11     cyclobenzaprine (Flexeril) 10 mg tablet Take 1 tablet (10 mg) by mouth 2 times a day as needed for muscle spasms. 30 tablet 2     dilTIAZem ER (Tiazac) 360 mg 24 hr capsule Take 1 capsule (360 mg) by mouth once daily. 90 capsule 3     DULoxetine (Cymbalta) 20 mg DR capsule Take 1  capsule (20 mg) by mouth once daily. 90 capsule 1     fluticasone (Flonase Sensimist) 27.5 mcg/actuation nasal spray Administer 1 spray into each nostril once daily. 10 g 11     gentamicin (Garamycin) 0.1 % ointment Apply to wounds with dressing changes 30 g 1     hydroxyurea (Hydrea) 500 mg capsule Take 2 capsules (1,000 mg total) by mouth once daily.  Take at the same time each day.       hydrOXYzine HCL (Atarax) 50 mg tablet Take 1 tablet (50 mg) by mouth every 8 hours if needed for anxiety. 30 tablet 3     ipratropium-albuteroL (Duo-Neb) 0.5-2.5 mg/3 mL nebulizer solution Take 3 mL by nebulization 4 times a day as needed for wheezing or shortness of breath. 360 mL 11     ketoconazole (NIZOral) 2 % cream APPLY TOPICALLY TWICE A DAY FOR 28 DAYS 30 g 0     lidocaine (Xylocaine) 5 % ointment Apply topically if needed for mild pain (1 - 3). Apply up to three times a day as needed for pain. 35 g 5     lidocaine 5 % gel Apply 1 inch topically 3 times a day as needed (apply to affected area). 100 g 2     naloxone (Narcan) 4 mg/0.1 mL nasal spray Administer 1 spray (4 mg) into affected nostril(s) if needed for opioid reversal or respiratory depression. 2 each 0     nebulizer accessories kit UAD 1 kit 3     nystatin (Mycostatin) cream APPLY TO AFFECTED AREA TWO TO THREE TIMES A DAY 30 g 2     oxyCODONE (Roxicodone) 10 mg immediate release tablet Take 1 tablet (10 mg) by mouth every 4 hours if needed for severe pain (7 - 10) for up to 28 days. 168 tablet 0     oxyCODONE (Roxicodone) 10 mg immediate release tablet Take 1 tablet (10 mg) by mouth every 4 hours if needed for severe pain (7 - 10) for up to 28 days. Do not fill before June 9, 2025. (Patient not taking: Reported on 6/19/2025) 168 tablet 0 Not Taking    [START ON 7/7/2025] oxyCODONE (Roxicodone) 10 mg immediate release tablet Take 1 tablet (10 mg) by mouth every 4 hours if needed for severe pain (7 - 10) for up to 28 days. Do not fill before July 7, 2025. 168  tablet 0     pramipexole (Mirapex) 1 mg tablet Take 1 tablet (1 mg) by mouth 2 times a day. 180 tablet 3     predniSONE (Deltasone) 20 mg tablet Take one tab po bid for 5 days 10 tablet 0     sennosides-docusate sodium (Rebecca-Colace) 8.6-50 mg tablet Take 2 tablets by mouth 2 times a day. 120 tablet 0     spironolactone (Aldactone) 50 mg tablet Take 1 tablet (50 mg) by mouth once daily. 90 tablet 3     torsemide (Demadex) 20 mg tablet Take 2 tablets (40 mg) by mouth 2 times a day. 360 tablet 3     Trelegy Ellipta 200-62.5-25 mcg blister with device INHALE 1 PUFF BY MOUTH AND INTO THE LUNGS ONCE DAILY 180 each 0     triamcinolone (Kenalog) 0.1 % cream APPLY SPARINGLY AND MASSAGE IN ONCE A DAY       triamcinolone (Kenalog) 0.1 % cream Apply topically 2 times a day. Apply to affected area 1-2 times daily as needed. Avoid face and groin. 30 g 0     venlafaxine XR (Effexor-XR) 75 mg 24 hr capsule Take 1 capsule (75 mg) by mouth once daily for 4 days. 4 capsule 0     warfarin (Coumadin) 7.5 mg tablet Take one po every day 90 tablet 3     white petrolatum (Aquaphor) 41 % ointment ointment Apply 1 Application topically 2 times a day. Apply to legs and abdomen 50 g 0     zinc oxide 40 % ointment ointment Apply 1 Application topically 3 times a day. Apply to groin, buttocks after nystatin 57 g 1      Current medications:  Scheduled medications  acetaminophen, 975 mg, oral, q6h  azithromycin, 500 mg, oral, q24h  budesonide, 0.5 mg, nebulization, BID   And  ipratropium-albuteroL, 3 mL, nebulization, TID  dilTIAZem ER, 360 mg, oral, Daily  DULoxetine, 20 mg, oral, Daily  guaiFENesin, 600 mg, oral, BID  hydroxyurea, 1,000 mg, oral, Daily  ketoconazole, , Topical, BID  linezolid, 600 mg, intravenous, q12h  melatonin, 3 mg, oral, Nightly  nicotine, 1 patch, transdermal, Daily   Followed by  [START ON 7/31/2025] nicotine, 1 patch, transdermal, Daily   Followed by  [START ON 8/14/2025] nicotine, 1 patch, transdermal, Daily  nystatin,  ", Topical, BID  piperacillin-tazobactam, 3.375 g, intravenous, q6h  pneumoc 20-eros conj-dip cr(PF), 0.5 mL, intramuscular, During hospitalization  pramipexole, 1 mg, oral, BID  predniSONE, 40 mg, oral, Daily  sennosides-docusate sodium, 2 tablet, oral, BID  spironolactone, 50 mg, oral, Daily  [Held by provider] torsemide, 40 mg, oral, BID  triamcinolone, , Topical, BID  venlafaxine XR, 150 mg, oral, Daily  warfarin, 7.5 mg, oral, Daily  white petrolatum, 1 Application, Topical, BID  zinc oxide, 1 Application, Topical, TID      Continuous medications       PRN medications  PRN medications: ammonium lactate, cyclobenzaprine, HYDROmorphone, hydrOXYzine HCL, ipratropium-albuteroL, lidocaine, oxyCODONE, oxyCODONE, oxygen    Review of Systems   All other systems reviewed and are negative.       Objective  Range of Vitals (last 24 hours)  Heart Rate:  []   Temp:  [36 °C (96.8 °F)-37.2 °C (99 °F)]   Resp:  [16-21]   BP: (105-146)/(56-87)   Height:  [162.6 cm (5' 4\")]   Weight:  [154 kg (340 lb)-160 kg (353 lb 8 oz)]   SpO2:  [87 %-96 %]   Daily Weight  06/19/25 : (!) 160 kg (353 lb 8 oz)    Body mass index is 60.68 kg/m².   Nutritional consult    Physical Exam  Constitutional:       Appearance: Normal appearance.   HENT:      Head: Normocephalic and atraumatic.      Mouth/Throat:      Mouth: Mucous membranes are moist.      Pharynx: Oropharynx is clear.   Eyes:      Pupils: Pupils are equal, round, and reactive to light.   Cardiovascular:      Rate and Rhythm: Normal rate and regular rhythm.      Heart sounds: Normal heart sounds.   Pulmonary:      Effort: Pulmonary effort is normal.      Breath sounds: Normal breath sounds.   Abdominal:      General: Abdomen is flat. Bowel sounds are normal.      Palpations: Abdomen is soft.   Musculoskeletal:      Cervical back: Normal range of motion.      Comments: Bilateral legs stasis, superficial wounds, no purulence    Skin:     Comments: Buttock wounds   Neurological:      " "Mental Status: She is alert.          Relevant Results  Outside Hospital Results  reviewed  Labs  Results from last 72 hours   Lab Units 06/20/25  0649 06/19/25  1250   WBC AUTO x10*3/uL 20.4* 27.2*   HEMOGLOBIN g/dL 12.2 13.2   HEMATOCRIT % 37.4 42.1   PLATELETS AUTO x10*3/uL 429 598*   NEUTROS PCT AUTO %  --  92.6   LYMPHS PCT AUTO %  --  1.7   MONOS PCT AUTO %  --  4.5   EOS PCT AUTO %  --  0.1     Results from last 72 hours   Lab Units 06/20/25  0649 06/19/25  1250   SODIUM mmol/L 132* 133*   POTASSIUM mmol/L 4.6 5.0   CHLORIDE mmol/L 96* 95*   CO2 mmol/L 29 31   BUN mg/dL 26* 23   CREATININE mg/dL 1.11* 1.01   GLUCOSE mg/dL 184* 174*   CALCIUM mg/dL 8.8 9.6   ANION GAP mmol/L 12 12   EGFR mL/min/1.73m*2 55* 62   PHOSPHORUS mg/dL 3.8  --      Results from last 72 hours   Lab Units 06/20/25  0649 06/19/25  1250   ALK PHOS U/L  --  230*   BILIRUBIN TOTAL mg/dL  --  0.8   PROTEIN TOTAL g/dL  --  8.0   ALT U/L  --  17   AST U/L  --  28   ALBUMIN g/dL 3.5 4.1     Estimated Creatinine Clearance: 76.2 mL/min (A) (by C-G formula based on SCr of 1.11 mg/dL (H)).  C-Reactive Protein   Date Value Ref Range Status   06/19/2025 3.23 (H) <1.00 mg/dL Final   12/11/2024 0.72 <1.00 mg/dL Final   09/16/2024 16.36 (H) <1.00 mg/dL Final     Sedimentation Rate   Date Value Ref Range Status   06/19/2025 54 (H) 0 - 30 mm/h Final   12/11/2024 35 (H) 0 - 30 mm/h Final   09/15/2024 29 0 - 30 mm/h Final     No results found for: \"HIV1X2\", \"HIVCONF\", \"[id number]\"  Hepatitis C Ab   Date Value Ref Range Status   11/14/2022 NONREACTIVE NONREACTIVE Final     Comment:      Results from patients taking biotin supplements or receiving   high-dose biotin therapy should be interpreted with caution   due to possible interference with this test. Providers may    contact their local laboratory for further information.       Microbiology  Reviewed  Imaging  Reviewed      Assessment/Plan   Sepsis  Legs stasis / wounds / likely cellulitis  Respiratory " failure / COPD /  BE / atelectasis    Recommendations :  Rocephin  Keep the legs elevated  Wound care  Repeat the blood culture  Chest PT  Follow the inflammatory markers  May need a CT of facial bones if the facial pain does not improve    I spent minutes in the professional and overall care of this patient.  The cultures and susceptibilities were reviewed and discussed with the micro lab, the antibiotics stewardship guidelines were reviewed, the infection control protocols and recommendations were reviewed with the patient and the staff        Gorge Myers MD

## 2025-06-20 NOTE — H&P (VIEW-ONLY)
"Inpatient consult to Cardiology  Consult performed by: LAKEISHA Mackey-CNP  Consult ordered by: Venancio Thompson MD      History Of Present Illness:    Neisha Graham is a 65 y.o. 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  Symptoms have been ongoing for many months, however, seem to have become more severe over the past 2-3 weeks. She has difficulty weighing herself at home-- she is unsure of weight gain.  Denies chest pain. Does report intermittent palpitations.  She was treated for pneumonia in May with augmentin with no improvement in symptoms and then doxycycline.  Continues to have cough productive of yellow sputum as well.      Last Recorded Vitals:  Vitals:    06/19/25 2026 06/19/25 2040 06/20/25 0030 06/20/25 0805   BP:  113/69 115/63 132/68   BP Location:  Right arm Right arm Right arm   Patient Position:  Sitting Lying Lying   Pulse:  84 78 85   Resp:  18 16 17   Temp:  36.8 °C (98.2 °F) 36.3 °C (97.3 °F) 36 °C (96.8 °F)   TempSrc:  Temporal Temporal Temporal   SpO2:  93% 90% 92%   Weight: (!) 160 kg (353 lb 8 oz)      Height: 1.626 m (5' 4\")          Last Labs:  CBC - 6/20/2025:  6:49 AM  20.4 12.2 429    37.4      CMP - 6/20/2025:  6:49 AM  8.8 8.0 28 --- 0.8   3.8 3.5 17 230      PTT - 6/20/2025:  6:49 AM  3.4   37.9 40     Troponin I, High Sensitivity   Date/Time Value Ref Range Status   06/19/2025 01:57 PM 23 (H) 0 - 13 ng/L Final   06/19/2025 12:50 PM 29 (H) 0 - 13 ng/L Final   12/11/2024 10:19 PM 26 (H) 0 - 13 ng/L Final     BNP   Date/Time Value Ref Range Status   06/19/2025 12:50  (H) 0 - 99 pg/mL Final   12/11/2024 04:51  (H) 0 - 99 pg/mL Final     Hemoglobin A1C   Date/Time Value Ref Range Status "   12/11/2024 04:51 PM 5.5 See comment % Final   08/13/2024 07:35 AM 5.9 (H) see below % Final     LDL Calculated   Date/Time Value Ref Range Status   12/11/2024 04:51 PM 87 <=99 mg/dL Final     Comment:                                 Near   Borderline      AGE      Desirable  Optimal    High     High     Very High     0-19 Y     0 - 109     ---    110-129   >/= 130     ----    20-24 Y     0 - 119     ---    120-159   >/= 160     ----      >24 Y     0 -  99   100-129  130-159   160-189     >/=190       VLDL   Date/Time Value Ref Range Status   12/11/2024 04:51 PM 21 0 - 40 mg/dL Final   11/14/2022 11:01 AM 40 0 - 40 mg/dL Final   09/27/2020 12:15 AM 21 0 - 40 mg/dL Final   06/19/2020 03:03 PM 16 0 - 40 mg/dL Final      Last I/O:  I/O last 3 completed shifts:  In: 890 (5.6 mL/kg) [P.O.:240; IV Piggyback:650]  Out: - (0 mL/kg)   Weight: 160.3 kg     Past Cardiology Tests (Last 3 Years):  EKG:  ECG 12 lead 06/19/2025 (Preliminary)  ECG ST, HR 113bpm  TELE: NSR with pSVT-- longest lasting 6 seconds     Echo:  Transthoracic Echo (TTE) Complete 12/12/2024  CONCLUSIONS:   1. The left ventricular systolic function is normal, with a visually estimated ejection fraction of 60-65%.   2. Spectral Doppler shows a Grade I (impaired relaxation pattern) of left ventricular diastolic filling with normal left atrial filling pressure.   3. There is moderately increased septal and moderately increased posterior left ventricular wall thickness.   4. There is moderate concentric left ventricular hypertrophy.   5. There is reduced right ventricular systolic function.   6. Moderately enlarged right ventricle.   7. The left atrium is mildly dilated.   8. There is moderate mitral annular calcification.   9. There is moderate mitral valve stenosis.  10. Moderate to severe aortic valve stenosis. (4.03, 65/41, 0.88, DI 0.28)   11. Moderately elevated pulmonary artery pressure.  12. There is mild mitral and tricuspid regurgitation.      9/18/2024  CONCLUSIONS:   1. The left ventricular systolic function is normal, with a visually estimated ejection fraction of 60-65%.   2. Spectral Doppler shows an impaired relaxation pattern of left ventricular diastolic filling.   3. There is normal right ventricular global systolic function.   4. The left atrium is mildly dilated.   5. There is severe mitral annular calcification.   6. Severe aortic valve stenosis.         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), Cellulitis of left lower limb (04/08/2021), Chronic obstructive pulmonary disease with (acute) exacerbation (Multi) (01/09/2019), Chronic sinusitis, unspecified, COVID-19 (11/28/2022), Daily headache (08/17/2023), Elevated bilirubin (08/17/2023), Elevated transaminase level (08/17/2023), Fever (08/17/2023), Fever (08/17/2023), Heart murmur (03/10/2023), Hemoptysis (03/10/2023), Hyperkalemia (03/10/2023), Malaise and fatigue (03/10/2023), Morbid (severe) obesity due to excess calories (Multi), Other conditions influencing health status, Other conditions influencing health status (03/12/2020), Other conditions influencing health status (03/06/2017), Other conditions influencing health status, Other conditions influencing health status, Other conditions influencing health status, Pain in unspecified ankle and joints of unspecified foot (06/24/2016), Pain in unspecified foot (12/08/2015), Pain in unspecified knee, Palpitations (03/10/2023), Personal history of diseases of the skin and subcutaneous tissue, Personal history of other diseases of the female genital tract, Personal history of other diseases of the nervous system and sense organs (09/12/2019), Personal history of other diseases of the nervous system and sense  organs (09/17/2015), Personal history of other diseases of the respiratory system, Personal history of other diseases of the respiratory system (10/17/2016), Personal history of other diseases of the respiratory system (11/29/2017), Personal history of other diseases of the respiratory system (11/16/2016), Personal history of other drug therapy (10/14/2016), Personal history of other infectious and parasitic diseases, Personal history of other infectious and parasitic diseases (07/22/2020), Personal history of other medical treatment, Personal history of other specified conditions, Personal history of other specified conditions (01/28/2015), Personal history of other specified conditions, Personal history of pneumonia (recurrent), Pneumonia, unspecified organism (01/11/2018), Postmenopausal bleeding (09/30/2014), Right knee pain (03/10/2023), Shortness of breath (03/10/2023), Sinus tachycardia (03/10/2023), Submandibular sialolithiasis (03/10/2023), Unilateral primary osteoarthritis, unspecified knee (02/03/2017), and Unspecified acute conjunctivitis, bilateral (08/20/2020).    Past Surgical History:  She has a past surgical history that includes Other surgical history (09/19/2013); Other surgical history (09/19/2013); Tonsillectomy (09/19/2013); Knee surgery (09/19/2013); Tubal ligation (09/19/2013); and Other surgical history (09/27/2013).      Social History:  She reports that she has been smoking cigarettes. She has never used smokeless tobacco. She reports that she does not drink alcohol and does not use drugs.    Family History:  Family History[1]     Allergies:  Vancomycin, Doxycycline, Macrobid [nitrofurantoin monohyd/m-cryst], and Sulfamethoxazole-trimethoprim    Inpatient Medications:  Scheduled Medications[2]  PRN Medications[3]  Continuous Medications[4]  Outpatient Medications:  Current Outpatient Medications   Medication Instructions    albuterol 90 mcg/actuation inhaler 2 puffs, inhalation, Every 4  hours PRN    amino acids-protein hydrolys (ProSource No Carb) 15-60 gram-kcal/30 mL liquid Take by mouth.    ammonium lactate (Amlactin) 12 % cream 1 Film, 2 times daily PRN    ciclopirox (Penlac) 8 % solution Topical, Nightly    cyclobenzaprine (FLEXERIL) 10 mg, oral, 2 times daily PRN    dilTIAZem ER (TIAZAC) 360 mg, oral, Daily    DULoxetine (CYMBALTA) 20 mg, oral, Daily    fluticasone (Flonase Sensimist) 27.5 mcg/actuation nasal spray 1 spray, Each Nostril, Daily RT    gentamicin (Garamycin) 0.1 % ointment Apply to wounds with dressing changes    hydroxyurea (Hydrea) 500 mg capsule 2 capsules, oral, Daily, Take at the same time each day.    hydrOXYzine HCL (ATARAX) 50 mg, oral, Every 8 hours PRN    ipratropium-albuteroL (Duo-Neb) 0.5-2.5 mg/3 mL nebulizer solution 3 mL, nebulization, 4 times daily PRN    ketoconazole (NIZOral) 2 % cream APPLY TOPICALLY TWICE A DAY FOR 28 DAYS    lidocaine (Xylocaine) 5 % ointment Topical, As needed, Apply up to three times a day as needed for pain.    lidocaine 5 % gel 1 inch, topical (top), 3 times daily PRN    naloxone (NARCAN) 4 mg, nasal, As needed    nebulizer accessories kit UAD    nystatin (Mycostatin) cream APPLY TO AFFECTED AREA TWO TO THREE TIMES A DAY    oxyCODONE (ROXICODONE) 10 mg, oral, Every 4 hours PRN    oxyCODONE (ROXICODONE) 10 mg, oral, Every 4 hours PRN    [START ON 7/7/2025] oxyCODONE (ROXICODONE) 10 mg, oral, Every 4 hours PRN    pramipexole (MIRAPEX) 1 mg, oral, 2 times daily    predniSONE (Deltasone) 20 mg tablet Take one tab po bid for 5 days    sennosides-docusate sodium (Rebecca-Colace) 8.6-50 mg tablet 2 tablets, oral, 2 times daily    spironolactone (ALDACTONE) 50 mg, oral, Daily    torsemide (DEMADEX) 40 mg, oral, 2 times daily    Trelegy Ellipta 200-62.5-25 mcg blister with device 1 puff, inhalation, Daily    triamcinolone (Kenalog) 0.1 % cream APPLY SPARINGLY AND MASSAGE IN ONCE A DAY    triamcinolone (Kenalog) 0.1 % cream Topical, 2 times daily,  Apply to affected area 1-2 times daily as needed. Avoid face and groin.    venlafaxine XR (EFFEXOR-XR) 75 mg, oral, Daily    warfarin (Coumadin) 7.5 mg tablet Take one po every day    white petrolatum (Aquaphor) 41 % ointment ointment 1 Application, Topical, 2 times daily, Apply to legs and abdomen    zinc oxide 40 % ointment ointment 1 Application, Topical, 3 times daily, Apply to groin, buttocks after nystatin       Physical Exam:  Constitutional: Pleasant, Awake/Alert/Oriented to person place and time.   Head: Atraumatic, Normocephalic  Eyes: EOMI. MICHELE  Neck: + JVD  Cardiovascular: Regular rate and rhythm, S1, S2. 4/6 murmur RUSB and apex   Respiratory: Diminished breath sounds poseriory   Abdomen: Soft, Nontender, Obese. Bowel sounds appreciated  Musculoskeletal: ROM intact. Muscle strength grossly intact upper and lower extremities 5/5.   Neurological: CNII-XII intact. Sensation grossly intact  Extremities: Warm and dry. No acute rashes and lesions  Psychiatric: Appropriate mood and affect       Assessment/Plan    65 y.o. 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    Assessment:  Acute decompensated diastolic HF  Moderate to severe aortic stenosis  Moderate mitral stenosis  BLE cellulitis  Possible pneumonia  pSVT    Plan:  -Recommend obtaining an echocardiogram for reassessment of aortic and mitral valve stenosis, albeit, suspect aortic stenosis is severe.   -Recommend R+LHC after adequate diuresis-- likely Monday with outpatient referral to the structural heart team to discuss candidacy for TAVR   -Will start lasix drip 10mg/hr with 80mg IV bolus  -Continue spironolactone 50mg daily, diltiazem 360mg daily  -Continue warfarin for  goal INR 2-3   -Atbx per primary team/ID recommendations     Peripheral IV 06/19/25 20 G Left Antecubital (Active)   Site Assessment Clean;Dry;Intact 06/20/25 0900   Dressing Status Clean;Dry 06/20/25 0900   Number of days: 1       Code Status:  Full Code      Indy CHAMBERLAIN LAKEISHA Braxton-CNP         [1] No family history on file.  [2]   Scheduled medications   Medication Dose Route Frequency    acetaminophen  975 mg oral q6h    azithromycin  500 mg oral q24h    budesonide  0.5 mg nebulization BID    And    ipratropium-albuteroL  3 mL nebulization TID    cefTRIAXone  2 g intravenous q24h    dilTIAZem ER  360 mg oral Daily    DULoxetine  20 mg oral Daily    furosemide  80 mg intravenous Once    guaiFENesin  600 mg oral BID    hydroxyurea  1,000 mg oral Daily    ketoconazole   Topical BID    melatonin  3 mg oral Nightly    nicotine  1 patch transdermal Daily    Followed by    [START ON 7/31/2025] nicotine  1 patch transdermal Daily    Followed by    [START ON 8/14/2025] nicotine  1 patch transdermal Daily    nystatin   Topical BID    pneumoc 20-eros conj-dip cr(PF)  0.5 mL intramuscular During hospitalization    pramipexole  1 mg oral BID    predniSONE  40 mg oral Daily    sennosides-docusate sodium  2 tablet oral BID    spironolactone  50 mg oral Daily    [Held by provider] torsemide  40 mg oral BID    triamcinolone   Topical BID    venlafaxine XR  150 mg oral Daily    [Held by provider] warfarin  7.5 mg oral Daily    white petrolatum  1 Application Topical BID    zinc oxide  1 Application Topical TID   [3]   PRN medications   Medication    ammonium lactate    cyclobenzaprine    HYDROmorphone    hydrOXYzine HCL    ipratropium-albuteroL    lidocaine    oxyCODONE    oxyCODONE    oxygen   [4]   Continuous Medications   Medication Dose Last Rate    furosemide  10 mg/hr

## 2025-06-20 NOTE — CONSULTS
"Patient has Malnutrition Diagnosis:  (insufficient information)  Nutrition Assessment    Reason for Assessment: Admission nursing screening (MST=1, wounds; poor appetite; unsure of weight loss)    Patient is a 65 y.o. female presenting with: Pneumonia of both lower lobes due to infectious organism,   Pt presented to ED with SOB, fever, and chills  On ATB for wounds, PNA, and sepsis  Nutrition consulted for wounds and decreased appetite     Medical History[1]   Surgical History[2]   Nutrition History:  Food and Nutrient History: Pt reports a decreased appetite for awhile, noted pt ate 100% of breakfast per nursing documentation. Pt did not order lunch, which she typically does  not eat more than 2 meals a day at home. Pt receptive to take ONS and is asking about vitamins to help with wound healing.  Energy Intake: Good > 75 %  RX Allergies[3]     Anthropometrics:  Height: 162.6 cm (5' 4\")   Weight: (!) 160 kg (353 lb 8 oz)   BMI (Calculated): 60.65  IBW/kg (Dietitian Calculated): 54.5 kg  Percent of IBW: 294 %       Weight History:     Daily Weight  06/19/25 : (!) 160 kg (353 lb 8 oz)  03/27/25 : (!) 159 kg (350 lb)  12/31/24 : 149 kg (328 lb)  12/26/24 : 149 kg (329 lb 8 oz)  12/15/24 : 142 kg (312 lb 6.3 oz)  09/16/24 : 142 kg (313 lb 11.4 oz)  08/13/24 : (!) 152 kg (335 lb)  05/06/24 : (!) 155 kg (341 lb 4.4 oz)  01/04/24 : (!) 152 kg (335 lb 1.6 oz)  12/19/23 : 145 kg (320 lb)    Weight         6/19/2025  1231 6/19/2025 2026          Weight: 154 kg (340 lb) 160 kg (353 lb 8 oz)              Weight Change %:  Weight History / % Weight Change: weights fluctuate with fluid status changes; weight is currently up from previous admssions             Nutrition Focused Physical Exam Findings:    Subcutaneous Fat Loss  Defer Subcutaneous Fat Loss Assessment: Defer all (morbidy obesity; edema)     Edema  Edema: +2 mild  Edema Location: BLE  Physical Findings  Skin: Positive (L venous ulcer; L buttocks; abdominal " wound)  Digestive System Findings: Anorexia  Mouth Findings: Other (Comment) (none)    Nutrition Significant Labs:    Results from last 7 days   Lab Units 06/20/25  0649 06/19/25  1250   GLUCOSE mg/dL 184* 174*   SODIUM mmol/L 132* 133*   POTASSIUM mmol/L 4.6 5.0   CHLORIDE mmol/L 96* 95*   CO2 mmol/L 29 31   BUN mg/dL 26* 23   CREATININE mg/dL 1.11* 1.01   EGFR mL/min/1.73m*2 55* 62   CALCIUM mg/dL 8.8 9.6   PHOSPHORUS mg/dL 3.8  --    MAGNESIUM mg/dL 2.15 2.33     Lab Results   Component Value Date    HGBA1C 5.5 12/11/2024    HGBA1C 5.9 (H) 08/13/2024    HGBA1C 5.8 09/27/2020         Lab Results   Component Value Date    ALBUMIN 3.5 06/20/2025      Lab Results   Component Value Date    CRP 3.23 (H) 06/19/2025       Nutrition Specific Medications:   Scheduled medications:  Scheduled Medications[4]  Continuous medications:  Continuous Medications[5]  PRN medications:  PRN Medications[6]     Nursing Data Per flowsheet:   Stool Appearance: Hard (06/20/25 0512)  Gastrointestinal  Gastrointestinal (WDL): Exceptions to WDL  Abdomen Inspection: Rounded  Stool Appearance: Hard  Feeding assistance level:      Intake/Output Summary (Last 24 hours) at 6/20/2025 1419  Last data filed at 6/20/2025 0920  Gross per 24 hour   Intake 1190 ml   Output --   Net 1190 ml      0-10 (Numeric) Pain Score: 4   Dietary Orders (From admission, onward)       Start     Ordered    06/20/25 1359  Oral nutritional supplements  Until discontinued        Comments: 2 packets daily - mix with applesauce or beverage of pt choice   Question Answer Comment   Deliver with Breakfast no citrus flavor   Select supplement: Pro-Stat AWC        06/20/25 1359    06/20/25 1357  Oral nutritional supplements  Until discontinued        Comments: Mix with 4 oz water- fruit punch flavor   Question Answer Comment   Deliver with Breakfast fruit punch   Deliver with Dinner    Select supplement: Juan        06/20/25 1357    06/19/25 2115  Adult diet Cardiac, Regular; 70  gm fat; 2 - 3 grams Sodium; 1800 mL fluid  Diet effective now        Question Answer Comment   Diet type Cardiac    Diet type Regular    Fat restriction: 70 gm fat    Sodium restriction: 2 - 3 grams Sodium    Dietary fluid restriction / 24h: 1800 mL fluid        06/19/25 2114 06/19/25 2030  May Participate in Room Service  ( ROOM SERVICE MAY PARTICIPATE)  Once        Question:  .  Answer:  Yes    06/19/25 2029                     Estimated Needs:   Total Energy Estimated Needs in 24 hours (kCal):  (5750-7255)  Method for Estimating Needs: 30-35 kcal/kg/IBW  Total Protein Estimated Needs in 24 Hours (g):  (65-82)  Method for Estimating 24 Hour Protein Needs: 1.2-1.5 g/kg/IBW     Method for Estimating 24 Hour Fluid Needs: 1800 ml fluid restriction       Nutrition Diagnosis   Malnutrition Diagnosis  Patient has Malnutrition Diagnosis:  (insufficient information)    Nutrition Diagnosis  Patient has Nutrition Diagnosis: Yes (protein)  Diagnosis Status (1): New  Nutrition Diagnosis 1: Increased nutrient needs  Related to (1): multiple chronic wounds, PNA  As Evidenced by (1): increased nutrient demand for illness/healing  Additional Assessment Information (1): ONS added for additional kcal and protein       Nutrition Interventions/Recommendations   Nutrition Prescription:  diet, ONS    Nutrition Interventions:     Interventions: Bioactive substance management, Vitamin and mineral supplement therapy     Vitamin and Mineral Supplement Therapy: Vitamin C supplement therapy, Zinc supplement therapy, Multivitamin multimineral supplement therapy  Goal: Juan 1 packet 2 x/day= 180 kcal, 5 g protein; 1 packet prostat AWC 2 x/day- 202 kcal, 34 g protein  Bioactive Substance Management`: Food additives management    Coordination of Care: PANKAJ Alcantara; Dr. Hernandez, resident  Nutrition Education:   N/A  Recommendations:  Encourage oral intakes, monitor need for appetite stimulant  MVI with minerals daily  500 mg Vitamin C 2  x/day x 14 days  220 mg Zinc Sulfate daily x 14 days         Nutrition Monitoring and Evaluation   Food/Nutrient Related History Monitoring  Monitoring and Evaluation Plan: Intake / amount of food  Intake / Amount of food: Consumes at least 75% or more of meals/snacks/supplements    Anthropometric Measurements  Monitoring and Evaluation Plan: Body weight  Body Weight: Measured body weight  Criteria: Monitor    Biochemical Data, Medical Tests and Procedures  Monitoring and Evaluation Plan: Electrolyte/renal panel, Glucose/endocrine profile  Criteria: Monitor    Physical Exam Findings  Monitoring and Evaluation Plan: Skin  Skin Finding: Impaired wound healing - Skin to heal            Goal Status: New goal(s) identified    Time Spent (min): 60 minutes         [1]   Past Medical History:  Diagnosis Date    Acute bronchitis due to other specified organisms 10/14/2019    Acute bronchitis due to other specified organisms    Acute viral conjunctivitis of left eye 03/10/2023    Alkaline phosphatase elevation 08/17/2023    Anemia 03/10/2023    Bone marrow disorder 08/17/2023    Cellulitis of left lower limb 04/08/2021    Cellulitis of left lower leg    Chronic obstructive pulmonary disease with (acute) exacerbation (Multi) 01/09/2019    COPD exacerbation    Chronic sinusitis, unspecified     Sinusitis    COVID-19 11/28/2022    COVID-19    Daily headache 08/17/2023    Elevated bilirubin 08/17/2023    Elevated transaminase level 08/17/2023    Fever 08/17/2023    Fever 08/17/2023    Heart murmur 03/10/2023    Hemoptysis 03/10/2023    Hyperkalemia 03/10/2023    Malaise and fatigue 03/10/2023    Morbid (severe) obesity due to excess calories (Multi)     Morbid obesity    Other conditions influencing health status     History Of ___ Previous Pregnancies    Other conditions influencing health status 03/12/2020    Arthritis    Other conditions influencing health status 03/06/2017    History of cough    Other conditions influencing  health status     Menstruation    Other conditions influencing health status     Osteoarthritis    Other conditions influencing health status     Pulmonary Disease    Pain in unspecified ankle and joints of unspecified foot 06/24/2016    Ankle joint pain    Pain in unspecified foot 12/08/2015    Foot pain    Pain in unspecified knee     Joint pain, knee    Palpitations 03/10/2023    Personal history of diseases of the skin and subcutaneous tissue     History of cellulitis    Personal history of other diseases of the female genital tract     Vaginal delivery    Personal history of other diseases of the nervous system and sense organs 09/12/2019    History of conjunctivitis    Personal history of other diseases of the nervous system and sense organs 09/17/2015    History of blurred vision    Personal history of other diseases of the respiratory system     History of pleurisy    Personal history of other diseases of the respiratory system 10/17/2016    History of bronchitis    Personal history of other diseases of the respiratory system 11/29/2017    History of acute bronchitis    Personal history of other diseases of the respiratory system 11/16/2016    History of acute pharyngitis    Personal history of other drug therapy 10/14/2016    History of influenza vaccination    Personal history of other infectious and parasitic diseases     History of hepatitis    Personal history of other infectious and parasitic diseases 07/22/2020    History of candidiasis of mouth    Personal history of other medical treatment     History of mammogram    Personal history of other specified conditions     History of shortness of breath    Personal history of other specified conditions 01/28/2015    History of shortness of breath    Personal history of other specified conditions     History of abnormal Pap smear    Personal history of pneumonia (recurrent)     History of pneumonia    Pneumonia, unspecified organism 01/11/2018    [facility]  acquired pneumonia    Postmenopausal bleeding 09/30/2014    Postmenopausal bleeding    Right knee pain 03/10/2023    Shortness of breath 03/10/2023    Sinus tachycardia 03/10/2023    Submandibular sialolithiasis 03/10/2023    Unilateral primary osteoarthritis, unspecified knee 02/03/2017    Osteoarthritis, localized, knee    Unspecified acute conjunctivitis, bilateral 08/20/2020    Acute bacterial conjunctivitis of both eyes   [2]   Past Surgical History:  Procedure Laterality Date    KNEE SURGERY  09/19/2013    Knee Surgery Right    OTHER SURGICAL HISTORY  09/19/2013    Direct Laryngoscopy With Foreign Body Removal    OTHER SURGICAL HISTORY  09/19/2013    Laminectomy With Drainage Of Intramedullary Cyst    OTHER SURGICAL HISTORY  09/27/2013    Ovarian Cystectomy    TONSILLECTOMY  09/19/2013    Tonsillectomy    TUBAL LIGATION  09/19/2013    Tubal Ligation   [3]   Allergies  Allergen Reactions    Vancomycin Other     Patient says it caused hypotension and kidney failure and made her feel terrible     Doxycycline GI Upset    Macrobid [Nitrofurantoin Monohyd/M-Cryst] Dizziness    Sulfamethoxazole-Trimethoprim Unknown   [4] acetaminophen, 975 mg, oral, q6h  azithromycin, 500 mg, oral, q24h  budesonide, 0.5 mg, nebulization, BID   And  ipratropium-albuteroL, 3 mL, nebulization, TID  cefTRIAXone, 2 g, intravenous, q24h  dilTIAZem ER, 360 mg, oral, Daily  DULoxetine, 20 mg, oral, Daily  guaiFENesin, 600 mg, oral, BID  hydroxyurea, 1,000 mg, oral, Daily  ketoconazole, , Topical, BID  melatonin, 3 mg, oral, Nightly  nicotine, 1 patch, transdermal, Daily   Followed by  [START ON 7/31/2025] nicotine, 1 patch, transdermal, Daily   Followed by  [START ON 8/14/2025] nicotine, 1 patch, transdermal, Daily  nystatin, , Topical, BID  pneumoc 20-eros conj-dip cr(PF), 0.5 mL, intramuscular, During hospitalization  pramipexole, 1 mg, oral, BID  predniSONE, 40 mg, oral, Daily  sennosides-docusate sodium, 2 tablet, oral,  BID  spironolactone, 50 mg, oral, Daily  [Held by provider] torsemide, 40 mg, oral, BID  triamcinolone, , Topical, BID  venlafaxine XR, 150 mg, oral, Daily  [Held by provider] warfarin, 7.5 mg, oral, Daily  white petrolatum, 1 Application, Topical, BID  zinc oxide, 1 Application, Topical, TID     [5] furosemide, 10 mg/hr, Last Rate: 10 mg/hr (06/20/25 1249)     [6] PRN medications: ammonium lactate, cyclobenzaprine, HYDROmorphone, hydrOXYzine HCL, ipratropium-albuteroL, lidocaine, oxyCODONE, oxyCODONE, oxygen

## 2025-06-20 NOTE — CARE PLAN
The patient's goals for the shift include  decrease SOB and control pain    The clinical goals for the shift include to decrease SOB      Problem: Pain - Adult  Goal: Verbalizes/displays adequate comfort level or baseline comfort level  Outcome: Progressing     Problem: Safety - Adult  Goal: Free from fall injury  Outcome: Progressing     Problem: Discharge Planning  Goal: Discharge to home or other facility with appropriate resources  Outcome: Progressing     Problem: Chronic Conditions and Co-morbidities  Goal: Patient's chronic conditions and co-morbidity symptoms are monitored and maintained or improved  Outcome: Progressing     Problem: Nutrition  Goal: Nutrient intake appropriate for maintaining nutritional needs  Outcome: Progressing     Problem: Skin  Goal: Decreased wound size/increased tissue granulation at next dressing change  Outcome: Progressing  Goal: Participates in plan/prevention/treatment measures  Outcome: Progressing  Goal: Prevent/manage excess moisture  Outcome: Progressing  Goal: Prevent/minimize sheer/friction injuries  Outcome: Progressing  Flowsheets (Taken 6/20/2025 1527)  Prevent/minimize sheer/friction injuries:   Turn/reposition every 2 hours/use positioning/transfer devices   Increase activity/out of bed for meals  Goal: Promote/optimize nutrition  Outcome: Progressing  Goal: Promote skin healing  Outcome: Progressing     Problem: Fall/Injury  Goal: Not fall by end of shift  Outcome: Progressing  Goal: Be free from injury by end of the shift  Outcome: Progressing  Goal: Verbalize understanding of personal risk factors for fall in the hospital  Outcome: Progressing  Goal: Verbalize understanding of risk factor reduction measures to prevent injury from fall in the home  Outcome: Progressing  Goal: Use assistive devices by end of the shift  Outcome: Progressing  Goal: Pace activities to prevent fatigue by end of the shift  Outcome: Progressing     Problem: Pain  Goal: Takes deep breaths  with improved pain control throughout the shift  Outcome: Progressing  Goal: Turns in bed with improved pain control throughout the shift  Outcome: Progressing  Goal: Walks with improved pain control throughout the shift  Outcome: Progressing  Goal: Performs ADL's with improved pain control throughout shift  Outcome: Progressing  Goal: Participates in PT with improved pain control throughout the shift  Outcome: Progressing  Goal: Free from opioid side effects throughout the shift  Outcome: Progressing  Goal: Free from acute confusion related to pain meds throughout the shift  Outcome: Progressing

## 2025-06-20 NOTE — CARE PLAN
The patient's goals for the shift include  pain management    The clinical goals for the shift include to decrease SOB      Problem: Pain - Adult  Goal: Verbalizes/displays adequate comfort level or baseline comfort level  Outcome: Progressing     Problem: Safety - Adult  Goal: Free from fall injury  Outcome: Progressing     Problem: Skin  Goal: Decreased wound size/increased tissue granulation at next dressing change  Outcome: Progressing  Flowsheets (Taken 6/19/2025 2045)  Decreased wound size/increased tissue granulation at next dressing change:   Promote sleep for wound healing   Protective dressings over bony prominences     Problem: Skin  Goal: Participates in plan/prevention/treatment measures  Outcome: Progressing     Problem: Skin  Goal: Prevent/manage excess moisture  Outcome: Progressing

## 2025-06-20 NOTE — PROGRESS NOTES
06/20/25 1254   Discharge Planning   Living Arrangements Alone   Support Systems Children;Family members   Assistance Needed A&OX4; independent with ADLs with walker and wheelchair; doesn't drive; 2L @ HS. Currently 2L NC; PCP Dr Gonsalez; on Coumadin prior to hospitalization   Type of Residence Private residence   Number of Stairs to Enter Residence 0   Number of Stairs Within Residence 0   Do you have animals or pets at home? No   Who is requesting discharge planning? Provider   Expected Discharge Disposition Home Health  (Pt lives in handicapped apartment. Active [facility]. Referral sent 6/20/25)   Financial Resource Strain   How hard is it for you to pay for the very basics like food, housing, medical care, and heating? Not hard   Housing Stability   In the last 12 months, was there a time when you were not able to pay the mortgage or rent on time? N   In the past 12 months, how many times have you moved where you were living? 0   At any time in the past 12 months, were you homeless or living in a shelter (including now)? N   Transportation Needs   In the past 12 months, has lack of transportation kept you from medical appointments or from getting medications? no   In the past 12 months, has lack of transportation kept you from meetings, work, or from getting things needed for daily living? No   Stroke Family Assessment   Stroke Family Assessment Needed No   Intensity of Service   Intensity of Service 0-30 min

## 2025-06-20 NOTE — CONSULTS
"Wound Care Consult     Visit Date: 6/20/2025      Patient Name: Neisha Graham         MRN: [id number]             Reason for Consult:   Multiple wounds, present on admission     Wound History:   Patient reports has developed \"bed sores\" to buttocks as she has been inactive lately;  LLE wound chronic, long term      Assessment:  Alert, cooperative 64 y/o CF was admitted 6-19-25 with dyspnea, suspected bilateral PNA.  Neisha is a client at our [facility], under the care of Dr. Reddy and BUFFY Orozco, DANITZA.  She was last seen on 6-11-25 - their notes and plan of care was reviewed.  History + for chronic venous hypertension, lymphedema.  The LLE wound is 10.5 x 5.5 x 0.3 cm with dull pink shiny granular bed and scattered areas of adherent yellow slough, small yellow / pink drainage with subtle malodor only, edges open, shakila-wound intact but with extensive chronic skin changes - hyperkeratosis, thick, dry, leathery skin to BLE.  Patient worries as she feels there is great malodor, she was assured that no acute SOI seen and the [facility] plan includes an antibacterial dressing (Hydrofera).      Neisha also has generalized dull pink scaly skin to buttocks along with small, shallow right lower buttocks and right medial buttocks wound, each 1 x 1 x 0.2 cm with no active drainage and pink beds, likely related to chronic MASD though pressure may be a contributing factor.     The [facility] wound orders for all wounds were reviewed and consistent orders obtained, care provided. Lac Hydrin to BLE after cleansed and dried.     Neisha is using PureWick catheter, premier pro pads (insists on folded sheet over them); is able to turn / weight shift with little assist and was educated on the importance of doing so, verbalized understanding.  Skin is otherwise intact.     Neisha also reports her tongue on right side is \"raw\" from rubbing against a broken tooth, very sore especially at night - a small,shallow wound is seen.  Discussed with attending, " Oragel order obtained.              Wound Plan:   LLE - Triamcinolone, Lac Hydrin, Hydrofera, ABD, Kerlix and ace (Tubigrip is not available), change daily.  Right buttocks - Medihoney, Aquacel amd Telfa island dressing, change daily.  Pressure relief interventions, Oragel for tongue as needed to soothe.  Follow up at the [facility] Care Center.        Hilda Bowden RN, WCC, DWC  6/20/2025  3:10 PM

## 2025-06-21 LAB
ALBUMIN SERPL BCP-MCNC: 3.4 G/DL (ref 3.4–5)
ALBUMIN SERPL BCP-MCNC: 3.6 G/DL (ref 3.4–5)
ANION GAP SERPL CALC-SCNC: 12 MMOL/L (ref 10–20)
ANION GAP SERPL CALC-SCNC: 12 MMOL/L (ref 10–20)
APTT PPP: 41 SECONDS (ref 26–36)
BUN SERPL-MCNC: 30 MG/DL (ref 6–23)
BUN SERPL-MCNC: 32 MG/DL (ref 6–23)
CALCIUM SERPL-MCNC: 8.8 MG/DL (ref 8.6–10.3)
CALCIUM SERPL-MCNC: 9.1 MG/DL (ref 8.6–10.3)
CHLORIDE SERPL-SCNC: 96 MMOL/L (ref 98–107)
CHLORIDE SERPL-SCNC: 98 MMOL/L (ref 98–107)
CO2 SERPL-SCNC: 31 MMOL/L (ref 21–32)
CO2 SERPL-SCNC: 33 MMOL/L (ref 21–32)
CREAT SERPL-MCNC: 1.16 MG/DL (ref 0.5–1.05)
CREAT SERPL-MCNC: 1.18 MG/DL (ref 0.5–1.05)
EGFRCR SERPLBLD CKD-EPI 2021: 51 ML/MIN/1.73M*2
EGFRCR SERPLBLD CKD-EPI 2021: 52 ML/MIN/1.73M*2
ERYTHROCYTE [DISTWIDTH] IN BLOOD BY AUTOMATED COUNT: 17 % (ref 11.5–14.5)
GLUCOSE BLD MANUAL STRIP-MCNC: 138 MG/DL (ref 74–99)
GLUCOSE BLD MANUAL STRIP-MCNC: 150 MG/DL (ref 74–99)
GLUCOSE BLD MANUAL STRIP-MCNC: 191 MG/DL (ref 74–99)
GLUCOSE BLD MANUAL STRIP-MCNC: 258 MG/DL (ref 74–99)
GLUCOSE SERPL-MCNC: 133 MG/DL (ref 74–99)
GLUCOSE SERPL-MCNC: 262 MG/DL (ref 74–99)
HCT VFR BLD AUTO: 39.5 % (ref 36–46)
HGB BLD-MCNC: 11.8 G/DL (ref 12–16)
INR PPP: 4.1 (ref 0.9–1.1)
MAGNESIUM SERPL-MCNC: 2.18 MG/DL (ref 1.6–2.4)
MCH RBC QN AUTO: 31.6 PG (ref 26–34)
MCHC RBC AUTO-ENTMCNC: 29.9 G/DL (ref 32–36)
MCV RBC AUTO: 106 FL (ref 80–100)
NRBC BLD-RTO: 0 /100 WBCS (ref 0–0)
PHOSPHATE SERPL-MCNC: 3.9 MG/DL (ref 2.5–4.9)
PHOSPHATE SERPL-MCNC: 4.1 MG/DL (ref 2.5–4.9)
PLATELET # BLD AUTO: 481 X10*3/UL (ref 150–450)
POTASSIUM SERPL-SCNC: 4.1 MMOL/L (ref 3.5–5.3)
POTASSIUM SERPL-SCNC: 4.2 MMOL/L (ref 3.5–5.3)
PROTHROMBIN TIME: 45.3 SECONDS (ref 9.8–12.4)
RBC # BLD AUTO: 3.73 X10*6/UL (ref 4–5.2)
SODIUM SERPL-SCNC: 137 MMOL/L (ref 136–145)
SODIUM SERPL-SCNC: 137 MMOL/L (ref 136–145)
WBC # BLD AUTO: 15.3 X10*3/UL (ref 4.4–11.3)

## 2025-06-21 PROCEDURE — S4991 NICOTINE PATCH NONLEGEND: HCPCS

## 2025-06-21 PROCEDURE — 2500000001 HC RX 250 WO HCPCS SELF ADMINISTERED DRUGS (ALT 637 FOR MEDICARE OP)

## 2025-06-21 PROCEDURE — 2500000002 HC RX 250 W HCPCS SELF ADMINISTERED DRUGS (ALT 637 FOR MEDICARE OP, ALT 636 FOR OP/ED)

## 2025-06-21 PROCEDURE — 2500000004 HC RX 250 GENERAL PHARMACY W/ HCPCS (ALT 636 FOR OP/ED)

## 2025-06-21 PROCEDURE — 2500000002 HC RX 250 W HCPCS SELF ADMINISTERED DRUGS (ALT 637 FOR MEDICARE OP, ALT 636 FOR OP/ED): Performed by: INTERNAL MEDICINE

## 2025-06-21 PROCEDURE — 85027 COMPLETE CBC AUTOMATED: CPT

## 2025-06-21 PROCEDURE — 94668 MNPJ CHEST WALL SBSQ: CPT

## 2025-06-21 PROCEDURE — 85730 THROMBOPLASTIN TIME PARTIAL: CPT

## 2025-06-21 PROCEDURE — 80069 RENAL FUNCTION PANEL: CPT

## 2025-06-21 PROCEDURE — 2500000005 HC RX 250 GENERAL PHARMACY W/O HCPCS

## 2025-06-21 PROCEDURE — 82947 ASSAY GLUCOSE BLOOD QUANT: CPT

## 2025-06-21 PROCEDURE — 36415 COLL VENOUS BLD VENIPUNCTURE: CPT

## 2025-06-21 PROCEDURE — 2500000005 HC RX 250 GENERAL PHARMACY W/O HCPCS: Performed by: INTERNAL MEDICINE

## 2025-06-21 PROCEDURE — 99233 SBSQ HOSP IP/OBS HIGH 50: CPT

## 2025-06-21 PROCEDURE — 2500000004 HC RX 250 GENERAL PHARMACY W/ HCPCS (ALT 636 FOR OP/ED): Performed by: NURSE PRACTITIONER

## 2025-06-21 PROCEDURE — 94669 MECHANICAL CHEST WALL OSCILL: CPT

## 2025-06-21 PROCEDURE — 83735 ASSAY OF MAGNESIUM: CPT

## 2025-06-21 PROCEDURE — 94760 N-INVAS EAR/PLS OXIMETRY 1: CPT

## 2025-06-21 PROCEDURE — 87040 BLOOD CULTURE FOR BACTERIA: CPT | Mod: GEALAB

## 2025-06-21 PROCEDURE — 99233 SBSQ HOSP IP/OBS HIGH 50: CPT | Performed by: INTERNAL MEDICINE

## 2025-06-21 PROCEDURE — 94640 AIRWAY INHALATION TREATMENT: CPT

## 2025-06-21 PROCEDURE — 1200000002 HC GENERAL ROOM WITH TELEMETRY DAILY

## 2025-06-21 RX ORDER — INSULIN LISPRO 100 [IU]/ML
0-10 INJECTION, SOLUTION INTRAVENOUS; SUBCUTANEOUS
Status: DISCONTINUED | OUTPATIENT
Start: 2025-06-21 | End: 2025-06-21

## 2025-06-21 RX ORDER — INSULIN LISPRO 100 [IU]/ML
0-10 INJECTION, SOLUTION INTRAVENOUS; SUBCUTANEOUS
Status: DISCONTINUED | OUTPATIENT
Start: 2025-06-21 | End: 2025-06-28 | Stop reason: HOSPADM

## 2025-06-21 RX ORDER — DEXTROSE 50 % IN WATER (D50W) INTRAVENOUS SYRINGE
25
Status: DISCONTINUED | OUTPATIENT
Start: 2025-06-21 | End: 2025-06-28 | Stop reason: HOSPADM

## 2025-06-21 RX ORDER — DEXTROSE 50 % IN WATER (D50W) INTRAVENOUS SYRINGE
12.5
Status: DISCONTINUED | OUTPATIENT
Start: 2025-06-21 | End: 2025-06-28 | Stop reason: HOSPADM

## 2025-06-21 RX ADMIN — Medication 1 TABLET: at 08:49

## 2025-06-21 RX ADMIN — NYSTATIN CREAM: 100000 CREAM TOPICAL at 01:23

## 2025-06-21 RX ADMIN — BUDESONIDE 0.5 MG: 0.5 INHALANT RESPIRATORY (INHALATION) at 07:18

## 2025-06-21 RX ADMIN — DILTIAZEM HYDROCHLORIDE 360 MG: 120 CAPSULE, COATED, EXTENDED RELEASE ORAL at 08:52

## 2025-06-21 RX ADMIN — GUAIFENESIN 600 MG: 600 TABLET ORAL at 08:52

## 2025-06-21 RX ADMIN — Medication 4 L/MIN: at 07:48

## 2025-06-21 RX ADMIN — FUROSEMIDE 10 MG/HR: 10 INJECTION, SOLUTION INTRAMUSCULAR; INTRAVENOUS at 18:46

## 2025-06-21 RX ADMIN — PRAMIPEXOLE DIHYDROCHLORIDE 1 MG: 1 TABLET ORAL at 08:53

## 2025-06-21 RX ADMIN — INSULIN LISPRO 6 UNITS: 100 INJECTION, SOLUTION INTRAVENOUS; SUBCUTANEOUS at 20:21

## 2025-06-21 RX ADMIN — HYDROMORPHONE HYDROCHLORIDE 0.4 MG: 1 INJECTION, SOLUTION INTRAMUSCULAR; INTRAVENOUS; SUBCUTANEOUS at 13:58

## 2025-06-21 RX ADMIN — SPIRONOLACTONE 50 MG: 25 TABLET ORAL at 08:52

## 2025-06-21 RX ADMIN — Medication 1 APPLICATION: at 20:24

## 2025-06-21 RX ADMIN — PRAMIPEXOLE DIHYDROCHLORIDE 1 MG: 1 TABLET ORAL at 20:21

## 2025-06-21 RX ADMIN — IPRATROPIUM BROMIDE AND ALBUTEROL SULFATE 3 ML: 2.5; .5 SOLUTION RESPIRATORY (INHALATION) at 19:31

## 2025-06-21 RX ADMIN — Medication 1 APPLICATION: at 10:43

## 2025-06-21 RX ADMIN — PREDNISONE 40 MG: 20 TABLET ORAL at 08:52

## 2025-06-21 RX ADMIN — CEFTRIAXONE 2 G: 2 INJECTION, SOLUTION INTRAVENOUS at 10:40

## 2025-06-21 RX ADMIN — Medication 1 APPLICATION: at 16:00

## 2025-06-21 RX ADMIN — OXYCODONE HYDROCHLORIDE AND ACETAMINOPHEN 500 MG: 500 TABLET ORAL at 20:21

## 2025-06-21 RX ADMIN — OXYCODONE HYDROCHLORIDE 10 MG: 10 TABLET ORAL at 16:51

## 2025-06-21 RX ADMIN — ZINC SULFATE 220 MG (50 MG) CAPSULE 1 CAPSULE: CAPSULE at 10:41

## 2025-06-21 RX ADMIN — HYDROXYUREA 1000 MG: 500 CAPSULE ORAL at 08:53

## 2025-06-21 RX ADMIN — OXYCODONE HYDROCHLORIDE AND ACETAMINOPHEN 500 MG: 500 TABLET ORAL at 08:52

## 2025-06-21 RX ADMIN — NICOTINE 1 PATCH: 21 PATCH, EXTENDED RELEASE TRANSDERMAL at 08:54

## 2025-06-21 RX ADMIN — SENNOSIDES AND DOCUSATE SODIUM 2 TABLET: 50; 8.6 TABLET ORAL at 08:52

## 2025-06-21 RX ADMIN — OXYCODONE HYDROCHLORIDE 10 MG: 10 TABLET ORAL at 10:40

## 2025-06-21 RX ADMIN — Medication 3 MG: at 20:21

## 2025-06-21 RX ADMIN — AZITHROMYCIN DIHYDRATE 500 MG: 500 TABLET ORAL at 08:49

## 2025-06-21 RX ADMIN — ACETAMINOPHEN 975 MG: 325 TABLET, FILM COATED ORAL at 02:56

## 2025-06-21 RX ADMIN — IPRATROPIUM BROMIDE AND ALBUTEROL SULFATE 3 ML: 2.5; .5 SOLUTION RESPIRATORY (INHALATION) at 07:21

## 2025-06-21 RX ADMIN — SENNOSIDES AND DOCUSATE SODIUM 2 TABLET: 50; 8.6 TABLET ORAL at 20:21

## 2025-06-21 RX ADMIN — Medication: at 10:43

## 2025-06-21 RX ADMIN — IPRATROPIUM BROMIDE AND ALBUTEROL SULFATE 3 ML: 2.5; .5 SOLUTION RESPIRATORY (INHALATION) at 14:02

## 2025-06-21 RX ADMIN — Medication 4 L/MIN: at 19:31

## 2025-06-21 RX ADMIN — DULOXETINE 20 MG: 20 CAPSULE, DELAYED RELEASE ORAL at 08:53

## 2025-06-21 RX ADMIN — TRIAMCINOLONE ACETONIDE: 1 CREAM TOPICAL at 20:23

## 2025-06-21 RX ADMIN — ACETAMINOPHEN 975 MG: 325 TABLET, FILM COATED ORAL at 20:21

## 2025-06-21 RX ADMIN — TRIAMCINOLONE ACETONIDE: 1 CREAM TOPICAL at 10:43

## 2025-06-21 RX ADMIN — BUDESONIDE 0.5 MG: 0.5 INHALANT RESPIRATORY (INHALATION) at 19:31

## 2025-06-21 RX ADMIN — NYSTATIN CREAM: 100000 CREAM TOPICAL at 10:00

## 2025-06-21 RX ADMIN — GUAIFENESIN 600 MG: 600 TABLET ORAL at 20:23

## 2025-06-21 RX ADMIN — OXYCODONE HYDROCHLORIDE 10 MG: 10 TABLET ORAL at 04:12

## 2025-06-21 ASSESSMENT — PAIN SCALES - GENERAL
PAINLEVEL_OUTOF10: 0 - NO PAIN
PAINLEVEL_OUTOF10: 5 - MODERATE PAIN
PAINLEVEL_OUTOF10: 7
PAINLEVEL_OUTOF10: 4

## 2025-06-21 ASSESSMENT — COGNITIVE AND FUNCTIONAL STATUS - GENERAL
CLIMB 3 TO 5 STEPS WITH RAILING: A LOT
STANDING UP FROM CHAIR USING ARMS: A LITTLE
TOILETING: A LITTLE
DRESSING REGULAR UPPER BODY CLOTHING: A LITTLE
PERSONAL GROOMING: A LITTLE
MOBILITY SCORE: 17
TURNING FROM BACK TO SIDE WHILE IN FLAT BAD: A LITTLE
DAILY ACTIVITIY SCORE: 16
EATING MEALS: A LITTLE
HELP NEEDED FOR BATHING: A LOT
MOVING TO AND FROM BED TO CHAIR: A LITTLE
DRESSING REGULAR LOWER BODY CLOTHING: A LOT
MOVING FROM LYING ON BACK TO SITTING ON SIDE OF FLAT BED WITH BEDRAILS: A LITTLE
WALKING IN HOSPITAL ROOM: A LITTLE

## 2025-06-21 ASSESSMENT — PAIN - FUNCTIONAL ASSESSMENT
PAIN_FUNCTIONAL_ASSESSMENT: 0-10

## 2025-06-21 ASSESSMENT — PAIN DESCRIPTION - DESCRIPTORS
DESCRIPTORS: ACHING
DESCRIPTORS: ACHING

## 2025-06-21 ASSESSMENT — PAIN DESCRIPTION - LOCATION
LOCATION: LEG
LOCATION: BACK

## 2025-06-21 ASSESSMENT — PAIN DESCRIPTION - ORIENTATION: ORIENTATION: LEFT

## 2025-06-21 NOTE — PROGRESS NOTES
Neisha Graham is a 65 y.o. female on day 2 of admission presenting with Pneumonia of both lower lobes due to infectious organism.    Subjective   Interval History: no fever, no new complaints        Review of Systems    Objective   Range of Vitals (last 24 hours)  Heart Rate:  []   Temp:  [36.2 °C (97.2 °F)-36.9 °C (98.4 °F)]   Resp:  [15-18]   BP: (123-144)/(67-82)   SpO2:  [91 %-94 %]   Daily Weight  06/19/25 : (!) 160 kg (353 lb 8 oz)    Body mass index is 60.68 kg/m².    Physical Exam  Constitutional:       Appearance: Normal appearance.   HENT:      Head: Normocephalic and atraumatic.      Mouth/Throat:      Mouth: Mucous membranes are moist.      Pharynx: Oropharynx is clear.   Eyes:      Pupils: Pupils are equal, round, and reactive to light.   Cardiovascular:      Rate and Rhythm: Normal rate and regular rhythm.      Heart sounds: Normal heart sounds.   Pulmonary:      Effort: Pulmonary effort is normal.      Breath sounds: Normal breath sounds.   Abdominal:      General: Abdomen is flat. Bowel sounds are normal.      Palpations: Abdomen is soft.   Musculoskeletal:         General: Swelling present.      Cervical back: Normal range of motion.      Comments: Stasis / wounds   Neurological:      Mental Status: She is alert.         Antibiotics  cefTRIAXone - 2 gram/50 mL    Relevant Results  Labs  Results from last 72 hours   Lab Units 06/21/25  0649 06/20/25  0649 06/19/25  1250   WBC AUTO x10*3/uL 15.3* 20.4* 27.2*   HEMOGLOBIN g/dL 11.8* 12.2 13.2   HEMATOCRIT % 39.5 37.4 42.1   PLATELETS AUTO x10*3/uL 481* 429 598*   NEUTROS PCT AUTO %  --   --  92.6   LYMPHS PCT AUTO %  --   --  1.7   MONOS PCT AUTO %  --   --  4.5   EOS PCT AUTO %  --   --  0.1     Results from last 72 hours   Lab Units 06/21/25  0649 06/20/25  0649 06/19/25  1250   SODIUM mmol/L 137 132* 133*   POTASSIUM mmol/L 4.2 4.6 5.0   CHLORIDE mmol/L 98 96* 95*   CO2 mmol/L 31 29 31   BUN mg/dL 30* 26* 23   CREATININE mg/dL 1.18* 1.11*  1.01   GLUCOSE mg/dL 133* 184* 174*   CALCIUM mg/dL 8.8 8.8 9.6   ANION GAP mmol/L 12 12 12   EGFR mL/min/1.73m*2 51* 55* 62   PHOSPHORUS mg/dL 3.9 3.8  --      Results from last 72 hours   Lab Units 06/21/25  0649 06/20/25  0649 06/19/25  1250   ALK PHOS U/L  --   --  230*   BILIRUBIN TOTAL mg/dL  --   --  0.8   PROTEIN TOTAL g/dL  --   --  8.0   ALT U/L  --   --  17   AST U/L  --   --  28   ALBUMIN g/dL 3.4 3.5 4.1     Estimated Creatinine Clearance: 71.7 mL/min (A) (by C-G formula based on SCr of 1.18 mg/dL (H)).  C-Reactive Protein   Date Value Ref Range Status   06/19/2025 3.23 (H) <1.00 mg/dL Final   12/11/2024 0.72 <1.00 mg/dL Final   09/16/2024 16.36 (H) <1.00 mg/dL Final     Microbiology  Susceptibility data from last 14 days.  Collected Specimen Info Organism Clindamycin Erythromycin Penicillin Tetracycline   06/19/25 Blood culture from Peripheral Venipuncture Streptococcus agalactiae (Group B Streptococcus)       06/19/25 Blood culture from Peripheral Venipuncture Streptococcus agalactiae (Group B Streptococcus)  R  R  S  R   Imaging  Reviewed        Assessment/Plan   Sepsis, BC with gp B Strep, TTE reviewed  Legs stasis / wounds / likely cellulitis  Respiratory failure / COPD /  BE / atelectasis     Recommendations :  Continue Rocephin       I spent minutes in the professional and overall care of this patient.  The cultures and susceptibilities were reviewed and discussed with the micro lab, the antibiotics stewardship guidelines were reviewed, the infection control protocols and recommendations were reviewed with the patient and the staff                 Gorge Myers MD

## 2025-06-21 NOTE — PROGRESS NOTES
Subjective Data:  Doing well, reports that she feels better though still has a persistent cough.    Overnight Events:    No overnight events.     Objective Data:  Last Recorded Vitals:  Vitals:    06/21/25 0410 06/21/25 0657 06/21/25 0839 06/21/25 1136   BP: 144/81  140/82 126/70   BP Location: Right arm   Right arm   Patient Position: Lying      Pulse: 87 91 (!) 148 86   Resp: 16   17   Temp: 36.6 °C (97.9 °F)  36.8 °C (98.2 °F) 36.1 °C (97 °F)   TempSrc: Temporal      SpO2: 93% 92% 94% 95%   Weight:       Height:           Last Labs:  CBC - 6/21/2025:  6:49 AM  15.3 11.8 481    39.5      CMP - 6/21/2025:  6:49 AM  8.8 8.0 28 --- 0.8   3.9 3.4 17 230      PTT - 6/21/2025:  6:49 AM  4.1   45.3 41     TROPHS   Date/Time Value Ref Range Status   06/19/2025 01:57 PM 23 0 - 13 ng/L Final   06/19/2025 12:50 PM 29 0 - 13 ng/L Final   12/11/2024 10:19 PM 26 0 - 13 ng/L Final     BNP   Date/Time Value Ref Range Status   06/19/2025 12:50  0 - 99 pg/mL Final   12/11/2024 04:51  0 - 99 pg/mL Final     HGBA1C   Date/Time Value Ref Range Status   12/11/2024 04:51 PM 5.5 See comment % Final   08/13/2024 07:35 AM 5.9 see below % Final     LDLCALC   Date/Time Value Ref Range Status   12/11/2024 04:51 PM 87 <=99 mg/dL Final     Comment:                                 Near   Borderline      AGE      Desirable  Optimal    High     High     Very High     0-19 Y     0 - 109     ---    110-129   >/= 130     ----    20-24 Y     0 - 119     ---    120-159   >/= 160     ----      >24 Y     0 -  99   100-129  130-159   160-189     >/=190       VLDL   Date/Time Value Ref Range Status   12/11/2024 04:51 PM 21 0 - 40 mg/dL Final   11/14/2022 11:01 AM 40 0 - 40 mg/dL Final   09/27/2020 12:15 AM 21 0 - 40 mg/dL Final   06/19/2020 03:03 PM 16 0 - 40 mg/dL Final      Last I/O:  I/O last 3 completed shifts:  In: 1200.5 (7.5 mL/kg) [P.O.:480; I.V.:20.5 (0.1 mL/kg); IV Piggyback:700]  Out: 1900 (11.8 mL/kg) [Urine:1900 (0.3  mL/kg/hr)]  Weight: 160.3 kg     Past Cardiology Tests (Last 3 Years):  EKG:  ECG 12 lead 06/19/2025 (Preliminary)      Electrocardiogram, 12-lead PRN ACS symtpoms 12/13/2024      ECG 12 lead 12/11/2024      ECG 12 lead 09/23/2024      ECG 12 lead 09/16/2024      ECG 12 lead 09/15/2024    Echo:  Transthoracic Echo Complete 06/20/2025      Transthoracic Echo (TTE) Complete 12/12/2024      Transthoracic Echo (TTE) Complete 09/18/2024    Ejection Fractions:  EF   Date/Time Value Ref Range Status   06/20/2025 11:51 AM 63 %    12/12/2024 03:41 PM 63 %    09/18/2024 12:16 PM 63 %      Cath:  No results found for this or any previous visit from the past 1095 days.    Stress Test:  No results found for this or any previous visit from the past 1095 days.    Cardiac Imaging:  No results found for this or any previous visit from the past 1095 days.      Inpatient Medications:  Scheduled Medications[1]  PRN Medications[2]  Continuous Medications[3]    Physical Exam:  GENERAL APPEARANCE: Well developed, well nourished, in no acute distress.  CHEST: Symmetric and non-tender.  INTEGUMENT: Skin warm and dry, without gross excoriationis or lesions.  HEENT: No gross abnormalities of conjunctiva, teeth, gums, oral mucosa.  NECK: Supple, no bruit. Carotid upstrokes normal.  NEURO/PSHCY: Alert and oriented x3; appropriate behavior and responses and responses.  LUNGS: Coarse breath sounds  HEART:  Regular rate and rhythm.  Systolic ejection murmur.  JVD >> 10 cm water.    ABDOMEN: Soft, nontender, nondistended.  MUSCULOSKELETAL: Ambulatory with normal tandem gait.  EXTREMITIES: Warm with good color, no clubbing or cyanois. There is no edema noted.       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  Goal net negative 2 liters daily.    Once euvolemic, we  will proceed with right and left heart catheterization and ultimately will need outpatient referral to the structural heart team.    Peripheral IV 06/19/25 20 G Left Antecubital (Active)   Site Assessment Clean;Dry;Intact 06/20/25 2100   Dressing Status Clean;Dry 06/20/25 2100   Number of days: 2       Code Status:  Full Code    Victor Hugo George MD         [1]   Scheduled medications   Medication Dose Route Frequency    acetaminophen  975 mg oral q6h    ascorbic acid  500 mg oral BID    budesonide  0.5 mg nebulization BID    And    ipratropium-albuteroL  3 mL nebulization TID    cefTRIAXone  2 g intravenous q24h    dilTIAZem ER  360 mg oral Daily    DULoxetine  20 mg oral Daily    guaiFENesin  600 mg oral BID    hydroxyurea  1,000 mg oral Daily    insulin lispro  0-10 Units subcutaneous TID AC    ketoconazole   Topical BID    melatonin  3 mg oral Nightly    multivitamin with minerals  1 tablet oral Daily    nicotine  1 patch transdermal Daily    Followed by    [START ON 7/31/2025] nicotine  1 patch transdermal Daily    Followed by    [START ON 8/14/2025] nicotine  1 patch transdermal Daily    nystatin   Topical BID    pneumoc 20-eros conj-dip cr(PF)  0.5 mL intramuscular During hospitalization    pramipexole  1 mg oral BID    predniSONE  40 mg oral Daily    sennosides-docusate sodium  2 tablet oral BID    spironolactone  50 mg oral Daily    [Held by provider] torsemide  40 mg oral BID    triamcinolone   Topical BID    venlafaxine XR  150 mg oral Daily    [Held by provider] warfarin  7.5 mg oral Daily    white petrolatum  1 Application Topical BID    zinc oxide  1 Application Topical TID    zinc sulfate  50 mg of elemental zinc oral Daily   [2]   PRN medications   Medication    ammonium lactate    benzocaine    cyclobenzaprine    dextrose    dextrose    glucagon    glucagon    HYDROmorphone    hydrOXYzine HCL    ipratropium-albuteroL    lidocaine    oxyCODONE    oxyCODONE    oxygen   [3]   Continuous Medications    Medication Dose Last Rate    furosemide  10 mg/hr 10 mg/hr (06/21/25 0121)

## 2025-06-21 NOTE — HOSPITAL COURSE
"Neisha Graham is a 65 y.o. year old female patient with with PMHx significant for paroxysmal atrial fibrillation on warfarin, chronic thromboembolic disease, essential thrombocytosis, moderate to severe aortic stenosis, moderate mitral stenosis, HFpEF (EF 60-65% 12/24), COPD on 2L NC nightly, pulmonary hypertension, lymphedema with chronic lower extremity wounds, chronic sacral wounds, HTN, HLD, anxiety, and depression who presented to [facility] on 6/19/25 with chief complaint of fever and chills with associated shortness of breath. Patient states that last night she began to notice subjective fever and chills that continued into this morning which was her main concern for presenting to the ER. She does note that she had noticed her L lower extremity wound was foul smelling last night and had soaked through her bandage faster than normal. Additionally, she notes increased swelling of her bilateral lower extremities up to her knees. States that she is supposed to take torsemide 40 mg BID at home but recently stopped taking the evening dose because she felt as though it was too much. Endorses good urine output with the torsemide once daily. Additionally, she notes swelling of a gland on her R jaw and neck for the past 2-3 months that is painful and keeps her awake at night. She was recently treated for pneumonia outpatient twice by her PCP, once with a 10-day course of Augmentin and once with a 5-day course of Cefdinir, at that time she states her breathing improved but the gland remained inflamed. She started feeling increasingly short of breath again recently and noes increase of her chronic cough with sputum changing from \"flesh\" color to a more green/white color. She denies chest pain, palpitations, nasal congestion, sore throat, abdominal pain, nausea, vomiting.     In the ED, patient was septic with tachycardia and hypoxia requiring 2 L of nasal cannula.  Labs showed leukocytosis, mild " hyponatremia, hyperglycemia, elevated BNP.  Chest x-ray showed pulmonary edema.  She was started on linezolid, Zosyn, azithromycin, Lasix.  Cardiology was consulted who recommended admission for diuresis and assessment for valve intervention.    On the floor, blood cultures returned positive for GBS bacteremia.  Likely source was her left lower extremity or chronic sacral wounds.  Wound care and podiatry were consulted.  She was continued on ceftriaxone, azithromycin, prednisone, IV Lasix.  Cardiology was consulted who recommended Lasix drip at 10 mg/hr.  TTE showed EF 63%, severe aortic stenosis with peak gradient 76 mmHg, moderate mitral valve stenosis.

## 2025-06-21 NOTE — PROGRESS NOTES
"Internal Medicine - Daily Progress Note   Hospital Day: 2    Name: Neisha Graham   Age: 65 y.o.   Gender: Female  Room: 107/107-A        HPI   Neisha Graham is a 65 y.o. year old female  patient with with PMHx significant for paroxysmal atrial fibrillation on warfarin, chronic thromboembolic disease, essential thrombocytosis, moderate to severe aortic stenosis, moderate mitral stenosis, HFpEF (EF 60-65% 12/24), COPD on 2L NC nightly, pulmonary hypertension, lymphedema with chronic lower extremity wounds, chronic sacral wounds, HTN, HLD, anxiety, and depression who presented to [facility] on 6/19/25 with chief complaint of fever and chills with associated shortness of breath. Patient states that last night she began to notice subjective fever and chills that continued into this morning which was her main concern for presenting to the ER. She does note that she had noticed her L lower extremity wound was foul smelling last night and had soaked through her bandage faster than normal. Additionally, she notes increased swelling of her bilateral lower extremities up to her knees. States that she is supposed to take torsemide 40 mg BID at home but recently stopped taking the evening dose because she felt as though it was too much. Endorses good urine output with the torsemide once daily. Additionally, she notes swelling of a gland on her R jaw and neck for the past 2-3 months that is painful and keeps her awake at night. She was recently treated for pneumonia outpatient twice by her PCP, once with a 10-day course of Augmentin and once with a 5-day course of Cefdinir, at that time she states her breathing improved but the gland remained inflamed. She started feeling increasingly short of breath again recently and noes increase of her chronic cough with sputum changing from \"flesh\" color to a more green/white color. She denies chest pain, palpitations, nasal congestion, sore throat, abdominal pain, nausea, " "vomiting.       Subjective    On initial evaluation patient reported feeling not well because of her tachycardia.     On reevaluation following administration of her a.m. meds, patient's heart rate was back to normal sinus rhythm with a rate of upper 90s.  She stated she felt mildly improved from this morning, shortness of breath is overall improving from initial presentation. She denies chest pain, abdominal pain or fever, chills.    We discussed the treatment plan regarding her fluid overload and infection.  Also discussed hyperglycemia likely secondary to steroid use, patient agreeable to starting POCT glucose checks and sliding scale insulin as needed.    Overnight Events: admitted    ROS: 12 points review of system is negative except as stated in the HPI above.     Objective    Vitals  Visit Vitals  /81 (BP Location: Right arm, Patient Position: Lying)   Pulse 91   Temp 36.6 °C (97.9 °F) (Temporal)   Resp 16   Ht 1.626 m (5' 4\")   Wt (!) 160 kg (353 lb 8 oz)   SpO2 92%   BMI 60.68 kg/m²   Smoking Status Some Days   BSA 2.69 m²       Physical Exam    Physical Exam  Constitutional:       General: She is not in acute distress.     Appearance: She is obese.   HENT:      Head: Normocephalic and atraumatic.      Mouth/Throat:      Mouth: Mucous membranes are moist.   Eyes:      Extraocular Movements: Extraocular movements intact.      Pupils: Pupils are equal, round, and reactive to light.   Cardiovascular:      Rate and Rhythm: Normal rate and regular rhythm.      Pulses: Normal pulses.      Heart sounds: Murmur heard.   Pulmonary:      Effort: Pulmonary effort is normal. No respiratory distress.      Breath sounds: Wheezing present.   Abdominal:      General: Bowel sounds are normal. There is no distension.      Palpations: Abdomen is soft.      Tenderness: There is no abdominal tenderness.   Musculoskeletal:      Comments: Chronic bilateral lower extremity swelling consistent with lymphedema, left lower " "extremity wound mid dorsal tibia with drainage (wrapped with gauze)   Skin:     Findings: Erythema (BLE) present.   Neurological:      General: No focal deficit present.      Mental Status: She is alert and oriented to person, place, and time.   Psychiatric:         Mood and Affect: Mood normal.           IOs    Intake/Output Summary (Last 24 hours) at 6/21/2025 0721  Last data filed at 6/21/2025 0657  Gross per 24 hour   Intake 560.54 ml   Output 1900 ml   Net -1339.46 ml       Labs:   Results from last 72 hours   Lab Units 06/20/25  0649 06/19/25  1250   SODIUM mmol/L 132* 133*   POTASSIUM mmol/L 4.6 5.0   CHLORIDE mmol/L 96* 95*   CO2 mmol/L 29 31   BUN mg/dL 26* 23   CREATININE mg/dL 1.11* 1.01   GLUCOSE mg/dL 184* 174*   CALCIUM mg/dL 8.8 9.6   ANION GAP mmol/L 12 12   EGFR mL/min/1.73m*2 55* 62   PHOSPHORUS mg/dL 3.8  --       Results from last 72 hours   Lab Units 06/20/25  0649 06/19/25  1250   WBC AUTO x10*3/uL 20.4* 27.2*   HEMOGLOBIN g/dL 12.2 13.2   HEMATOCRIT % 37.4 42.1   PLATELETS AUTO x10*3/uL 429 598*   NEUTROS PCT AUTO %  --  92.6   LYMPHS PCT AUTO %  --  1.7   MONOS PCT AUTO %  --  4.5   EOS PCT AUTO %  --  0.1      Lab Results   Component Value Date    CALCIUM 8.8 06/20/2025    PHOS 3.8 06/20/2025      Lab Results   Component Value Date    CRP 3.23 (H) 06/19/2025      [unfilled]     Micro/ID:   Susceptibility data from last 90 days.  Collected Specimen Info Organism   06/19/25 Blood culture from Peripheral Venipuncture Streptococcus agalactiae (Group B Streptococcus)   06/19/25 Blood culture from Peripheral Venipuncture Streptococcus agalactiae (Group B Streptococcus)                    No lab exists for component: \"AGALPCRNB\"   .ID  Lab Results   Component Value Date    URINECULTURE  12/30/2024     Clinically insignificant growth based on current clinical standards.    BLOODCULT Streptococcus agalactiae (Group B Streptococcus) (A) 06/19/2025    BLOODCULT Streptococcus agalactiae (Group B " Streptococcus) (A) 06/19/2025       Images  Transthoracic Echo Complete     [facility], [id number] [address]                Tel [phone] and Fax [phone]    TRANSTHORACIC ECHOCARDIOGRAM REPORT       Patient Name:       LION PEREZ     Reading Physician:    [id number] Victor Hugo George MD  Study Date:         6/20/2025           Ordering Provider:    [id number] MARILYN BARROS  MRN/PID:            [id number]            Fellow:  Accession#:         [id number]        Nurse:  Date of Birth/Age:  1960 / 65      Sonographer:          Keesha parker                                     RDISABELLA  Gender assigned at  F                   Additional Staff:  Birth:  Height:             162.56 cm           Admit Date:           6/19/2025  Weight:             160.12 kg           Admission Status:     Inpatient -                                                                Routine  BSA / BMI:          2.49 m2 / 60.59     Encounter#:           [id number]                      kg/m2  Blood Pressure:     115/63 mmHg         Department Location:  [facility] Non                                                                Invasive    Study Type:    TRANSTHORACIC ECHO (TTE) COMPLETE  Diagnosis/ICD: Acute combined systolic (congestive) and diastolic (congestive)                 heart failure (CHF)-I50.41; Nonrheumatic aortic (valve)                 stenosis-I35.0  Indication:    Aortic stenosis, CHF  CPT Code:      Echo Complete w Full Doppler-02200    Patient History:  Pertinent History: A-Fib, Anticoagulation, CHF, Dyspnea, COPD, HTN and                     Hyperlipidemia. Severe AS, Mod MS, Pulm HTN, Elevated BNP,.    Study Detail: The following Echo studies were performed: 2D, M-Mode, Doppler and                color flow.       PHYSICIAN  INTERPRETATION:  Left Ventricle: Left ventricular ejection fraction is normal calculated by Arreguin's biplane at 63%. There is severe concentric left ventricular hypertrophy. There are no regional left ventricular wall motion abnormalities. The left ventricular cavity size is normal. There is severely increased septal and severely increased posterior left ventricular wall thickness. Spectral Doppler shows a Grade II (pseudonormal pattern) of left ventricular diastolic filling with an elevated left atrial pressure.  Left Atrium: The left atrial size is severely dilated.  Right Ventricle: The right ventricle is mildly enlarged. There is normal right ventricular global systolic function.  Right Atrium: The right atrium is moderately dilated.  Aortic Valve: The aortic valve is probably trileaflet. The aortic valve area by VTI is 0.68 cmï¿½ with a peak velocity of 4.36 m/s. The peak and mean gradients are 75 mmHg and 53 mmHg, respectively with a dimensionless index of 0.22. There is evidence of severe aortic valve stenosis. There is no evidence of aortic valve regurgitation.  Mitral Valve: The mitral valve is mildly thickened. The doppler estimated peak and mean diastolic pressure gradients are 12.4 mmHg and 7 mmHg, respectively. There is evidence of moderate mitral valve stenosis. There is moderate mitral annular calcification. The mean gradient of the mitral valve is 7 mmHg. There is mild to moderate mitral valve regurgitation. The E Vmax is 1.59 m/s.  Tricuspid Valve: The tricuspid valve is structurally normal. There is mild to moderate tricuspid regurgitation. The Doppler estimated right ventricular systolic pressure (RVSP) is moderately elevated at 60 mmHg.  Pulmonic Valve: The pulmonic valve is structurally normal. There is trace pulmonic valve regurgitation.  Pericardium: There is no pericardial effusion noted.  Aorta: The aortic root is abnormal. There is no dilatation of the ascending aorta. There is no  dilatation of the aortic root.  Systemic Veins: The inferior vena cava appears severely dilated, with IVC inspiratory collapse less than 50%.  In comparison to the previous echocardiogram(s): Compared with study dated 12/12/2024,.       CONCLUSIONS:   1. Left ventricular ejection fraction is normal calculated by Arreguin's biplane at 63%.   2. There is severely increased septal and severely increased posterior left ventricular wall thickness.   3. There is severe concentric left ventricular hypertrophy.   4. There is normal right ventricular global systolic function.   5. Mildly enlarged right ventricle.   6. The left atrial size is severely dilated.   7. The right atrium is moderately dilated.   8. Severe aortic valve stenosis. The peak and mean gradients are 76 mmHg and 53 mmHg respectively.   9. There is moderate mitral annular calcification.  10. There is moderate mitral valve stenosis with a doppler estimated mean diastolic gradient of 7 mmHg.  11. Mild to moderate mitral valve regurgitation.  12. Mild to moderate tricuspid regurgitation visualized.  13. The Doppler estimated RVSP is moderately elevated at 60 mmHg.  14. The inferior vena cava appears severely dilated, with IVC inspiratory collapse less than 50%.    QUANTITATIVE DATA SUMMARY:     2D MEASUREMENTS:          Normal Ranges:  IVSd:            1.62 cm  (0.6-1.1cm)  LVPWd:           1.75 cm  (0.6-1.1cm)  LVIDd:           4.77 cm  (3.9-5.9cm)  LVIDs:           3.08 cm  LV Mass Index:   144 g/m2  LVEDV Index:     40 ml/m2  LV % FS          35.3 %       LEFT ATRIUM:                  Normal Ranges:  LA Vol A4C:        123.0 ml   (22+/-6mL/m2)  LA Vol A2C:        123.1 ml  LA Vol BP:         125.9 ml  LA Vol Index A4C:  49.4ml/m2  LA Vol Index A2C:  49.4 ml/m2  LA Vol Index BP:   50.6 ml/m2  LA Area A4C:       30.9 cm2  LA Area A2C:       30.2 cm2  LA Major Axis A4C: 6.6 cm  LA Major Axis A2C: 6.3 cm  LA Volume Index:   50.4 ml/m2  LA Vol A4C:        116.9  ml  LA Vol A2C:        121.6 ml  LA Vol Index BSA:  47.9 ml/m2       RIGHT ATRIUM:          Normal Ranges:  RA Area A4C:  34.1 cm2       M-MODE MEASUREMENTS:         Normal Ranges:  Ao Root:             3.00 cm (2.0-3.7cm)  LAs:                 4.73 cm (2.7-4.0cm)       LV SYSTOLIC FUNCTION:                       Normal Ranges:  EF-A4C View:    65 % (>=55%)  EF-A2C View:    61 %  EF-Biplane:     63 %  LV EF Reported: 63 %       LV DIASTOLIC FUNCTION:           Normal Ranges:  MV Peak E:             1.59 m/s  (0.7-1.2 m/s)  MV Peak A:             1.39 m/s  (0.42-0.7 m/s)  E/A Ratio:             1.14      (1.0-2.2)  MV e'                  0.110 m/s (>8.0)  MV lateral e'          0.13 m/s  MV medial e'           0.09 m/s  E/e' Ratio:            14.43     (<8.0)       MITRAL VALVE:           Normal Ranges:  MV Vmax:      1.76 m/s  (<=1.3m/s)  MV peak P.4 mmHg (<5mmHg)  MV mean P.1 mmHg  (<2mmHg)  MV VTI:       41.10 cm  (10-13cm)  MV DT:        216 msec  (150-240msec)       MITRAL INSUFFICIENCY:             Normal Ranges:  MR VTI:               181.09 cm  MR Vmax:              597.53 cm/s       AORTIC VALVE:                      Normal Ranges:  AoV Vmax:                4.36 m/s  (<=1.7m/s)  AoV Peak P.0 mmHg (<20mmHg)  AoV Mean P.3 mmHg (1.7-11.5mmHg)  LVOT Max Rodriguez:            1.09 m/s  (<=1.1m/s)  AoV VTI:                 105.83 cm (18-25cm)  LVOT VTI:                22.85 cm  LVOT Diameter:           2.00 cm   (1.8-2.4cm)  AoV Area, VTI:           0.68 cm2  (2.5-5.5cm2)  AoV Area,Vmax:           0.79 cm2  (2.5-4.5cm2)  AoV Dimensionless Index: 0.22       RIGHT VENTRICLE:  RV Basal 4.70 cm  RV Mid   4.00 cm  RV Major 8.7 cm  TAPSE:   24.0 mm  RV s'    0.15 m/s       TRICUSPID VALVE/RVSP:          Normal Ranges:  Peak TR Velocity:     3.36 m/s  Est. RA Pressure:     15  RV Syst Pressure:     60       (< 30mmHg)  IVC Diam:             3.50 cm       PULMONIC VALVE:            Normal Ranges:  PV Max Rodriguze:     1.7 m/s   (0.6-0.9m/s)  PV Max P.1 mmHg       [id number] Victor Hugo George MD  Electronically signed on 2025 at 2:26:52 PM       ** Final **  ECG 12 lead  Sinus tachycardia with Premature atrial complexes  Possible Left atrial enlargement  Borderline ECG  When compared with ECG of 13-DEC-2024 13:54,  No significant change was found      Meds  Scheduled medications  Scheduled Medications[1]  Continuous medications  Continuous Medications[2]  PRN medications  PRN Medications[3]     Assessment and Plan    Neisha Graham is a 65 y.o. female with PMHx significant for paroxysmal atrial fibrillation (on warfarin), chronic thromboembolic disease, essential thrombocytosis, moderate to severe aortic stenosis, moderate mitral stenosis, HFpEF (EF 60-65% ), COPD (on 2L NC nightly), pulmonary hypertension, lymphedema with chronic lower extremity wounds, chronic sacral wounds, HTN, HLD admitted on 2025 acute on chronic hypoxic respiratory failure 2/2 HFpEF and COPD exacerbation with concern for sepsis 2/2 infection of chronic wounds.     Acute Medical Issues:   #Sepsis  #Bacteremia  #Leukocytosis, improving  #Chronic bilateral lower extremity and sacral wounds with concern for acute infection  ::WBC with significant elevation at 27.2  ::CXR and CT without distinct PNA  ::Patient notes foul smell of L lower extremity wound x 2 days - with some surrounding erythema  ::Hx of wound with MRSA  and pseudomonas bacteremia   ::Patient complaints of swollen/painful gland in R jaw and ability to express salivary stones from under tongue - minimal parotid swelling and tenderness on examination  -Blood cultures positive for group B streptococcus  -Pro-[facility] 0.26, CRP 3.23, ESR 54  -Continue ceftriaxone, azithromycin  -ID consulted, agrees with plan to continue ceftriaxone  -Wound care following  -Can consider podiatry consult  -Repeat blood cultures pending     #Acute on chronic hypoxic  respiratory failure   #CHF exacerbation  #COPD exacerbation  ::TTE 12/2024 with EF 60-65%  ::Patient with SOB and desaturation to 88%, requiring 2L NC continuous (at baseline wears 2L NC at night and with transfers/exertion)  ::Patient on torsemide 40 mg BID at home but recently stopped taking evening dose  ::Increased cough, sputum production  ::BNP elevated at 786  ::CXR and CT chest concerning for pulmonary edema   ::s/p 80 mg IV Lasix in the ED  -On 2L NC, wean as tolerated back to baseline 2 L at night and with exertion  -Prednisone 40 mg daily x 5 days   -Azithromycin x 3 days  -Mucinex 600 mg BID  -Continue home inhalers  -Duonebs scheduled and PRN  -Incentive spirometer and acapella ordered   -Continue home spironolactone 50 mg daily  -Holding home torsemide, continue with IV diuresis  -TTE: EF 63%, severely concentric left ventricular hypertrophy, severely dilated left atria, moderately dilated right atria, severe aortic stenosis with peak gradient 76 mmHg, moderate mitral valve stenosis  -1800cc/day fluid restriction  -Monitor strict I/O  -Cardiology consulted: Recommended Lasix drip at 10 mg/h    #Moderate to severe aortic stenosis   #Moderate mitral valve stenosis   ::TTE 12/2024 with EF 60-65% and Grade I LV impaired relaxation pattern and mod to severe aortic stenosis and mod mitral stenosis   ::ED did speak with Dr. George who recommended admission at [facility] and would see patient in AM to determine if transfer needed for valve evaluation  -Cardiology consulted: Once euvolemic, will plan for R+LHC once euvolemic to assess for aortic and mitral stenosis  -Will likely need outpatient referral to structural heart team to discuss TAVR candidacy    #Supratherapeutic INR  #Paroxysmal atrial fibrillation   :: INR 3.4 on admission  -Holding home warfarin (takes 7.5 mg daily, INR goal 2-3), will monitor  -Continue diltiazem 360 mg daily    #Hyperglycemia   ::suspect 2/2 steroid use  -monitor  -added SSI    Chronic  Medical Issues:   #Essential thrombocytosis - Hydroxyurea 1000 mg daily  #Chronic back pain - Flexeril 10 mg BID PRN for muscle spasms, takes oxycodone 10 mg PRN at home for pain - inpatient pain regimen: scheduled Tylenol 975 mg q6 hours, oxycodone 5 and 10 mg q6 hours PRN for moderate and severe pain, dilaudid 0.4 mg PRN for breakthrough pain  #Chronic constipation 2/2 opioid use: Continue bowel regimen  #Depression, Anxiety - Duloxetine 20 mg daily, hydroxyzine 50 mg q8 hours PRN, venlafaxine 150 mg daily  #RLS - Pramipexole 1 mg BID  #Tobacco use disorder-Nicotine patch ordered, smoking cessation discussed  #HTN, HLD    Fluids: Fluid restriction 1800cc daily   Electrolytes: Replete as needed   Nutrition: Cardiac diet  GI ppx: None  DVT ppx: Warfarin (holding and monitoring INR)  Antibiotics: Ceftriaxone, azithromycin (6/19 - 6/21)  Tubes/Lines/Drains: PIV  Oxygen: 2L NC    Code Status: Full Code   Emergency Contact: Extended Emergency Contact Information  Primary Emergency Contact: Belle Chauhan  Mobile Phone: [phone]  Relation: Sibling  Secondary Emergency Contact: ANANICOLAS  Mobile Phone: [phone]  Relation: Daughter     Disposition: 65 y.o. female admitted for acute on chronic HRF iso HFpEF and COPD exacerbations with concern for sepsis 2/2 bacteremia.  Pending cardiac cath once euvolemic.      Violeta Hernandez, DO   06/21/25                 [1] acetaminophen, 975 mg, oral, q6h  ascorbic acid, 500 mg, oral, BID  azithromycin, 500 mg, oral, q24h  budesonide, 0.5 mg, nebulization, BID   And  ipratropium-albuteroL, 3 mL, nebulization, TID  cefTRIAXone, 2 g, intravenous, q24h  dilTIAZem ER, 360 mg, oral, Daily  DULoxetine, 20 mg, oral, Daily  guaiFENesin, 600 mg, oral, BID  hydroxyurea, 1,000 mg, oral, Daily  ketoconazole, , Topical, BID  melatonin, 3 mg, oral, Nightly  multivitamin with minerals, 1 tablet, oral, Daily  nicotine, 1 patch, transdermal, Daily   Followed by  [START ON 7/31/2025]  nicotine, 1 patch, transdermal, Daily   Followed by  [START ON 8/14/2025] nicotine, 1 patch, transdermal, Daily  nystatin, , Topical, BID  pneumoc 20-eros conj-dip cr(PF), 0.5 mL, intramuscular, During hospitalization  pramipexole, 1 mg, oral, BID  predniSONE, 40 mg, oral, Daily  sennosides-docusate sodium, 2 tablet, oral, BID  spironolactone, 50 mg, oral, Daily  [Held by provider] torsemide, 40 mg, oral, BID  triamcinolone, , Topical, BID  venlafaxine XR, 150 mg, oral, Daily  [Held by provider] warfarin, 7.5 mg, oral, Daily  white petrolatum, 1 Application, Topical, BID  zinc oxide, 1 Application, Topical, TID  zinc sulfate, 50 mg of elemental zinc, oral, Daily     [2] furosemide, 10 mg/hr, Last Rate: 10 mg/hr (06/21/25 0121)     [3] PRN medications: ammonium lactate, benzocaine, cyclobenzaprine, HYDROmorphone, hydrOXYzine HCL, ipratropium-albuteroL, lidocaine, oxyCODONE, oxyCODONE, oxygen

## 2025-06-21 NOTE — CARE PLAN
The patient's goals for the shift include  comfort and rest    The clinical goals for the shift include Decreased SOB and pain control      Problem: Pain - Adult  Goal: Verbalizes/displays adequate comfort level or baseline comfort level  6/21/2025 0119 by Isabella Manuel RN  Outcome: Progressing  6/21/2025 0118 by Isabella Manuel RN  Outcome: Progressing     Problem: Chronic Conditions and Co-morbidities  Goal: Patient's chronic conditions and co-morbidity symptoms are monitored and maintained or improved  6/21/2025 0119 by Isabella Manuel RN  Outcome: Progressing  6/21/2025 0118 by Isabella Manuel RN  Outcome: Progressing

## 2025-06-21 NOTE — CARE PLAN
The patient's goals for the shift include  breath better, better pain levels    The clinical goals for the shift include support breathing, maintain oxygen saturation, out of bed as tolerated      Problem: Pain - Adult  Goal: Verbalizes/displays adequate comfort level or baseline comfort level  Outcome: Progressing     Problem: Safety - Adult  Goal: Free from fall injury  Outcome: Progressing     Problem: Discharge Planning  Goal: Discharge to home or other facility with appropriate resources  Outcome: Progressing     Problem: Chronic Conditions and Co-morbidities  Goal: Patient's chronic conditions and co-morbidity symptoms are monitored and maintained or improved  Outcome: Progressing

## 2025-06-21 NOTE — CONSULTS
Inpatient consult to Podiatry  Consult performed by: Venus Alexander DPM  Consult ordered by: Venancio Thompson MD  Reason for consult: Left lower leg ulcerations/lymphedema        Reason For Consult  Left lower leg ulcerations/lymphedema    History Of Present Illness  Neisha Graham is a 65 y.o. female presenting with progressive SOB and possible infected left lower leg wound. She is known to me from previous hospital admissions.   Has been going to [facility] for wound care.  Feels like left leg has been malodorous, even after dressing changes.  Has history of lymphedema.     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), Cellulitis of left lower limb (04/08/2021), Chronic obstructive pulmonary disease with (acute) exacerbation (Multi) (01/09/2019), Chronic sinusitis, unspecified, COVID-19 (11/28/2022), Daily headache (08/17/2023), Elevated bilirubin (08/17/2023), Elevated transaminase level (08/17/2023), Fever (08/17/2023), Fever (08/17/2023), Heart murmur (03/10/2023), Hemoptysis (03/10/2023), Hyperkalemia (03/10/2023), Malaise and fatigue (03/10/2023), Morbid (severe) obesity due to excess calories (Multi), Other conditions influencing health status, Other conditions influencing health status (03/12/2020), Other conditions influencing health status (03/06/2017), Other conditions influencing health status, Other conditions influencing health status, Other conditions influencing health status, Pain in unspecified ankle and joints of unspecified foot (06/24/2016), Pain in unspecified foot (12/08/2015), Pain in unspecified knee, Palpitations (03/10/2023), Personal history of diseases of the skin and subcutaneous tissue, Personal history of other diseases of the female genital  tract, Personal history of other diseases of the nervous system and sense organs (09/12/2019), Personal history of other diseases of the nervous system and sense organs (09/17/2015), Personal history of other diseases of the respiratory system, Personal history of other diseases of the respiratory system (10/17/2016), Personal history of other diseases of the respiratory system (11/29/2017), Personal history of other diseases of the respiratory system (11/16/2016), Personal history of other drug therapy (10/14/2016), Personal history of other infectious and parasitic diseases, Personal history of other infectious and parasitic diseases (07/22/2020), Personal history of other medical treatment, Personal history of other specified conditions, Personal history of other specified conditions (01/28/2015), Personal history of other specified conditions, Personal history of pneumonia (recurrent), Pneumonia, unspecified organism (01/11/2018), Postmenopausal bleeding (09/30/2014), Right knee pain (03/10/2023), Shortness of breath (03/10/2023), Sinus tachycardia (03/10/2023), Submandibular sialolithiasis (03/10/2023), Unilateral primary osteoarthritis, unspecified knee (02/03/2017), and Unspecified acute conjunctivitis, bilateral (08/20/2020).    Surgical History  She has a past surgical history that includes Other surgical history (09/19/2013); Other surgical history (09/19/2013); Tonsillectomy (09/19/2013); Knee surgery (09/19/2013); Tubal ligation (09/19/2013); and Other surgical history (09/27/2013).     Social History  She reports that she has been smoking cigarettes. She has never used smokeless tobacco. She reports that she does not drink alcohol and does not use drugs.    Family History  Family History[1]     Allergies  Vancomycin, Doxycycline, Macrobid [nitrofurantoin monohyd/m-cryst], and Sulfamethoxazole-trimethoprim    Review of Systems   B/L LE lymphedema     Physical Exam   Constitutional:       General: She is  "not in acute distress.     Appearance: She is obese.   HENT:      Head: Normocephalic and atraumatic.   Cardiovascular:      Rate and Rhythm: Normal rate and regular rhythm.   Pulmonary:      Effort: No respiratory distress.      Breath sounds: Wheezing and rales present.      Comments: Audible wheezing heard during conversation, no expiratory wheezes on auscultation of lungs  Crackles and coarse    breath sounds heard throughout lung fields   Abdominal:      Palpations: Abdomen is soft.      Tenderness: There is no abdominal tenderness.   Musculoskeletal:         General: Swelling and tenderness present.      Right lower leg: Edema present.      Left lower leg: Edema present.  Ulceration to lateral left calf with thin veil of slough and remainder granulation tissue;  11 x 6 x 0.2 cm. no malodor or periwound erythema;  stasis and lymphedema changes to B/L lower legs.    Skin:     General: Skin is warm and dry.      Findings: Erythema and lesion present.   Neurological:      General: No focal deficit present.      Mental Status: She is alert and oriented to person, place, and time.   Psychiatric:         Mood and Affect: Mood normal.         Behavior: Behavior normal.      Last Recorded Vitals  Blood pressure 140/82, pulse (!) 148, temperature 36.8 °C (98.2 °F), resp. rate 16, height 1.626 m (5' 4\"), weight (!) 160 kg (353 lb 8 oz), SpO2 94%.    Relevant Results  Results for orders placed or performed during the hospital encounter of 06/19/25 (from the past 24 hours)   Transthoracic Echo Complete   Result Value Ref Range    AV pk jaime 4.36 m/s    AV mn grad 53 mmHg    LVOT diam 2.00 cm    MV E/A ratio 1.14     LA vol index A/L 50.6 ml/m2    Tricuspid annular plane systolic excursion 2.4 cm    LV EF 63 %    RV free wall pk S' 15.00 cm/s    LVIDd 4.77 cm    RVSP 48 mmHg    Aortic Valve Area by Continuity of VTI 0.68 cm2    Aortic Valve Area by Continuity of Peak Velocity 0.79 cm2    AV pk grad 76 mmHg    LV A4C EF 65.0  "   Magnesium   Result Value Ref Range    Magnesium 2.18 1.60 - 2.40 mg/dL   Renal Function Panel   Result Value Ref Range    Glucose 133 (H) 74 - 99 mg/dL    Sodium 137 136 - 145 mmol/L    Potassium 4.2 3.5 - 5.3 mmol/L    Chloride 98 98 - 107 mmol/L    Bicarbonate 31 21 - 32 mmol/L    Anion Gap 12 10 - 20 mmol/L    Urea Nitrogen 30 (H) 6 - 23 mg/dL    Creatinine 1.18 (H) 0.50 - 1.05 mg/dL    eGFR 51 (L) >60 mL/min/1.73m*2    Calcium 8.8 8.6 - 10.3 mg/dL    Phosphorus 3.9 2.5 - 4.9 mg/dL    Albumin 3.4 3.4 - 5.0 g/dL   CBC   Result Value Ref Range    WBC 15.3 (H) 4.4 - 11.3 x10*3/uL    nRBC 0.0 0.0 - 0.0 /100 WBCs    RBC 3.73 (L) 4.00 - 5.20 x10*6/uL    Hemoglobin 11.8 (L) 12.0 - 16.0 g/dL    Hematocrit 39.5 36.0 - 46.0 %     (H) 80 - 100 fL    MCH 31.6 26.0 - 34.0 pg    MCHC 29.9 (L) 32.0 - 36.0 g/dL    RDW 17.0 (H) 11.5 - 14.5 %    Platelets 481 (H) 150 - 450 x10*3/uL   Coagulation Screen   Result Value Ref Range    Protime 45.3 (H) 9.8 - 12.4 seconds    INR 4.1 (H) 0.9 - 1.1    aPTT 41 (H) 26 - 36 seconds   POCT GLUCOSE   Result Value Ref Range    POCT Glucose 150 (H) 74 - 99 mg/dL   Transthoracic Echo Complete  Result Date: 6/20/2025   [facility], [address] [place] [address]               Tel [phone] and Fax [phone] TRANSTHORACIC ECHOCARDIOGRAM REPORT  Patient Name:       LION CHAVARRIA ANA     Reading Physician:    [id number] Victor Hugo George MD Study Date:         6/20/2025           Ordering Provider:    [id number] MARILYN BARROS MRN/PID:            [id number]            Fellow: Accession#:         [id number]        Nurse: Date of Birth/Age:  1960 / 65      Sonographer:          Keesha parker                                     RDCS Gender assigned at  F                   Additional Staff: Birth: Height:             162.56 cm            Admit Date:           6/19/2025 Weight:             160.12 kg           Admission Status:     Inpatient -                                                               Routine BSA / BMI:          2.49 m2 / 60.59     Encounter#:           [id number]                     kg/m2 Blood Pressure:     115/63 mmHg         Department Location:  [facility] Non                                                               Invasive Study Type:    TRANSTHORACIC ECHO (TTE) COMPLETE Diagnosis/ICD: Acute combined systolic (congestive) and diastolic (congestive)                heart failure (CHF)-I50.41; Nonrheumatic aortic (valve)                stenosis-I35.0 Indication:    Aortic stenosis, CHF CPT Code:      Echo Complete w Full Doppler-[id number] Patient History: Pertinent History: A-Fib, Anticoagulation, CHF, Dyspnea, COPD, HTN and                    Hyperlipidemia. Severe AS, Mod MS, Pulm HTN, Elevated BNP,. Study Detail: The following Echo studies were performed: 2D, M-Mode, Doppler and               color flow.  PHYSICIAN INTERPRETATION: Left Ventricle: Left ventricular ejection fraction is normal calculated by Arreguin's biplane at 63%. There is severe concentric left ventricular hypertrophy. There are no regional left ventricular wall motion abnormalities. The left ventricular cavity size is normal. There is severely increased septal and severely increased posterior left ventricular wall thickness. Spectral Doppler shows a Grade II (pseudonormal pattern) of left ventricular diastolic filling with an elevated left atrial pressure. Left Atrium: The left atrial size is severely dilated. Right Ventricle: The right ventricle is mildly enlarged. There is normal right ventricular global systolic function. Right Atrium: The right atrium is moderately dilated. Aortic Valve: The aortic valve is probably trileaflet. The aortic valve area by VTI is 0.68 cmï¿½ with a peak velocity of 4.36 m/s. The peak and mean gradients are 75 mmHg  and 53 mmHg, respectively with a dimensionless index of 0.22. There is evidence of severe aortic valve stenosis. There is no evidence of aortic valve regurgitation. Mitral Valve: The mitral valve is mildly thickened. The doppler estimated peak and mean diastolic pressure gradients are 12.4 mmHg and 7 mmHg, respectively. There is evidence of moderate mitral valve stenosis. There is moderate mitral annular calcification. The mean gradient of the mitral valve is 7 mmHg. There is mild to moderate mitral valve regurgitation. The E Vmax is 1.59 m/s. Tricuspid Valve: The tricuspid valve is structurally normal. There is mild to moderate tricuspid regurgitation. The Doppler estimated right ventricular systolic pressure (RVSP) is moderately elevated at 60 mmHg. Pulmonic Valve: The pulmonic valve is structurally normal. There is trace pulmonic valve regurgitation. Pericardium: There is no pericardial effusion noted. Aorta: The aortic root is abnormal. There is no dilatation of the ascending aorta. There is no dilatation of the aortic root. Systemic Veins: The inferior vena cava appears severely dilated, with IVC inspiratory collapse less than 50%. In comparison to the previous echocardiogram(s): Compared with study dated 12/12/2024,.  CONCLUSIONS:  1. Left ventricular ejection fraction is normal calculated by Arreguin's biplane at 63%.  2. There is severely increased septal and severely increased posterior left ventricular wall thickness.  3. There is severe concentric left ventricular hypertrophy.  4. There is normal right ventricular global systolic function.  5. Mildly enlarged right ventricle.  6. The left atrial size is severely dilated.  7. The right atrium is moderately dilated.  8. Severe aortic valve stenosis. The peak and mean gradients are 76 mmHg and 53 mmHg respectively.  9. There is moderate mitral annular calcification. 10. There is moderate mitral valve stenosis with a doppler estimated mean diastolic gradient  of 7 mmHg. 11. Mild to moderate mitral valve regurgitation. 12. Mild to moderate tricuspid regurgitation visualized. 13. The Doppler estimated RVSP is moderately elevated at 60 mmHg. 14. The inferior vena cava appears severely dilated, with IVC inspiratory collapse less than 50%. QUANTITATIVE DATA SUMMARY:  2D MEASUREMENTS:          Normal Ranges: IVSd:            1.62 cm  (0.6-1.1cm) LVPWd:           1.75 cm  (0.6-1.1cm) LVIDd:           4.77 cm  (3.9-5.9cm) LVIDs:           3.08 cm LV Mass Index:   144 g/m2 LVEDV Index:     40 ml/m2 LV % FS          35.3 %  LEFT ATRIUM:                  Normal Ranges: LA Vol A4C:        123.0 ml   (22+/-6mL/m2) LA Vol A2C:        123.1 ml LA Vol BP:         125.9 ml LA Vol Index A4C:  49.4ml/m2 LA Vol Index A2C:  49.4 ml/m2 LA Vol Index BP:   50.6 ml/m2 LA Area A4C:       30.9 cm2 LA Area A2C:       30.2 cm2 LA Major Axis A4C: 6.6 cm LA Major Axis A2C: 6.3 cm LA Volume Index:   50.4 ml/m2 LA Vol A4C:        116.9 ml LA Vol A2C:        121.6 ml LA Vol Index BSA:  47.9 ml/m2  RIGHT ATRIUM:          Normal Ranges: RA Area A4C:  34.1 cm2  M-MODE MEASUREMENTS:         Normal Ranges: Ao Root:             3.00 cm (2.0-3.7cm) LAs:                 4.73 cm (2.7-4.0cm)  LV SYSTOLIC FUNCTION:                      Normal Ranges: EF-A4C View:    65 % (>=55%) EF-A2C View:    61 % EF-Biplane:     63 % LV EF Reported: 63 %  LV DIASTOLIC FUNCTION:           Normal Ranges: MV Peak E:             1.59 m/s  (0.7-1.2 m/s) MV Peak A:             1.39 m/s  (0.42-0.7 m/s) E/A Ratio:             1.14      (1.0-2.2) MV e'                  0.110 m/s (>8.0) MV lateral e'          0.13 m/s MV medial e'           0.09 m/s E/e' Ratio:            14.43     (<8.0)  MITRAL VALVE:           Normal Ranges: MV Vmax:      1.76 m/s  (<=1.3m/s) MV peak P.4 mmHg (<5mmHg) MV mean P.1 mmHg  (<2mmHg) MV VTI:       41.10 cm  (10-13cm) MV DT:        216 msec  (150-240msec)  MITRAL INSUFFICIENCY:             Normal  Ranges: MR VTI:               181.09 cm MR Vmax:              597.53 cm/s  AORTIC VALVE:                      Normal Ranges: AoV Vmax:                4.36 m/s  (<=1.7m/s) AoV Peak P.0 mmHg (<20mmHg) AoV Mean P.3 mmHg (1.7-11.5mmHg) LVOT Max Rodriguez:            1.09 m/s  (<=1.1m/s) AoV VTI:                 105.83 cm (18-25cm) LVOT VTI:                22.85 cm LVOT Diameter:           2.00 cm   (1.8-2.4cm) AoV Area, VTI:           0.68 cm2  (2.5-5.5cm2) AoV Area,Vmax:           0.79 cm2  (2.5-4.5cm2) AoV Dimensionless Index: 0.22  RIGHT VENTRICLE: RV Basal 4.70 cm RV Mid   4.00 cm RV Major 8.7 cm TAPSE:   24.0 mm RV s'    0.15 m/s  TRICUSPID VALVE/RVSP:          Normal Ranges: Peak TR Velocity:     3.36 m/s Est. RA Pressure:     15 RV Syst Pressure:     60       (< 30mmHg) IVC Diam:             3.50 cm  PULMONIC VALVE:           Normal Ranges: PV Max Rodriguez:     1.7 m/s   (0.6-0.9m/s) PV Max P.1 mmHg  [id number] Victor Hugo George MD Electronically signed on 2025 at 2:26:52 PM  ** Final **     ECG 12 lead  Result Date: 2025  Sinus tachycardia with Premature atrial complexes Possible Left atrial enlargement Borderline ECG When compared with ECG of 13-DEC-2024 13:54, No significant change was found    CT angio chest for pulmonary embolism  Result Date: 2025  Interpreted By:  Andrew Bartlett, STUDY: CT ANGIO CHEST FOR PULMONARY EMBOLISM;  2025 5:00 pm   INDICATION: Signs/Symptoms:Evaluate for PE, tachycardic with new oxygen requirement.     COMPARISON: 2019, and 2024 chest CT, 2024 CT abdomen and pelvis, 2025 chest radiographs   ACCESSION NUMBER(S): [id number]   ORDERING CLINICIAN: MARKY MORAN   TECHNIQUE: Helical data acquisition of the chest was obtained after intravenous administration of 75 ML Omnipaque 350, as per PE protocol. Images were reformatted in coronal and sagittal planes. Axial and coronal maximum intensity  projection (MIP) images were created and reviewed.   FINDINGS: POTENTIAL LIMITATIONS OF THE STUDY: Image degradation related to cardiac pulsation artifact, beam hardening artifact and quantum mottle artifact.   HEART AND VESSELS: No discrete filling defects within the main pulmonary artery or its branches to  proximal segmental level. Please note that, assessment of distal segmental and subsegmental branches is limited and small peripheral emboli are not entirely excluded. The main pulmonary artery is dilated measuring 3.8 cm (Series 401, Image 112) compared with 3.8 cm (Series 201, Image 39) September 16, 2024   The thoracic aorta normal in course and caliber.Scattered atherosclerosis thoracic aorta and branch vessels Severe coronary artery calcifications are seen. Please note,the study is not optimized for evaluation of coronary arteries.   Cardiac chambers appears similar including dystrophic mitral annular calcifications and aortic valvular region calcifications.   There is no pericardial effusion seen.   MEDIASTINUM AND ELVIA, LOWER NECK AND AXILLA: The visualized thyroid gland is within normal limits. No evidence of thoracic lymphadenopathy by CT criteria. Esophagus appears within normal limits as seen.   LUNGS AND AIRWAYS: The trachea and central airways are patent. No endobronchial lesion is seen.   Minimal increased basilar subsegmental atelectasis of indeterminate cause.     UPPER ABDOMEN: The visualized subdiaphragmatic structures demonstrate no acute remarkable findings.       CHEST WALL AND OSSEOUS STRUCTURES: Chest wall demonstrates no significant abnormality. No acute osseous pathology.There are no suspicious osseous lesions.Scattered degenerative changes.       1. No evidence of acute pulmonary embolism to  proximal segmental level. Please note that, assessment of distal segmental and subsegmental branches is limited and small peripheral emboli are not entirely excluded. 2. Minimal basilar atelectasis  which is increased. 3. Dilated main pulmonary artery to 3.8 cm which is similar correlate/concern for elevated right-sided filling pressures. 4. Atherosclerosis including extensive coronary artery calcifications.         MACRO: None   Signed by: Andrew Bartlett 6/19/2025 5:48 PM Dictation workstation:   [id number]    XR chest 2 views  Result Date: 6/19/2025  Interpreted By:  Shekhar Loomis, STUDY: XR CHEST 2 VIEWS;  6/19/2025 2:34 pm   INDICATION: Signs/Symptoms:Dyspnea.     COMPARISON: 12/11/2024   ACCESSION NUMBER(S): [id number]   ORDERING CLINICIAN: MARKY MORAN   FINDINGS:         CARDIOMEDIASTINAL SILHOUETTE: Cardiomediastinal silhouette is prominent..   LUNGS: Lungs are remarkable for interstitial prominence bilaterally. Question bibasilar infiltrates.   ABDOMEN: No remarkable upper abdominal findings.   BONES: No acute osseous changes.       1. Cardiomegaly with bibasilar questionable infiltrate and interstitial prominence diffusely. Consider CHF and pulmonary edema. Short-term follow-up as clinically warranted.       MACRO: None   Signed by: Shekhar Loomis 6/19/2025 2:50 PM Dictation workstation:   [id number]  Susceptibility data from last 90 days.  Collected Specimen Info Organism Clindamycin Erythromycin Penicillin Tetracycline   06/19/25 Blood culture from Peripheral Venipuncture Streptococcus agalactiae (Group B Streptococcus)       06/19/25 Blood culture from Peripheral Venipuncture Streptococcus agalactiae (Group B Streptococcus)  R  R  S  R        Assessment/Plan      Left lateral calf ulceration/lymphedema  Does not appear infected.  Adaptic, aquacel, ABD, and ace bandage.  Keep legs elevated.  Change daily.    I spent 45 minutes in the professional and overall care of this patient.                 [1]  No family history on file.

## 2025-06-22 LAB
ALBUMIN SERPL BCP-MCNC: 3.4 G/DL (ref 3.4–5)
ALBUMIN SERPL BCP-MCNC: 3.7 G/DL (ref 3.4–5)
ANION GAP SERPL CALC-SCNC: 12 MMOL/L (ref 10–20)
ANION GAP SERPL CALC-SCNC: 14 MMOL/L (ref 10–20)
APTT PPP: 38 SECONDS (ref 26–36)
BACTERIA BLD AEROBE CULT: ABNORMAL
BACTERIA BLD AEROBE CULT: ABNORMAL
BACTERIA BLD CULT: ABNORMAL
BACTERIA BLD CULT: ABNORMAL
BACTERIA BLD CULT: NORMAL
BACTERIA BLD CULT: NORMAL
BUN SERPL-MCNC: 30 MG/DL (ref 6–23)
BUN SERPL-MCNC: 35 MG/DL (ref 6–23)
CALCIUM SERPL-MCNC: 8.9 MG/DL (ref 8.6–10.3)
CALCIUM SERPL-MCNC: 8.9 MG/DL (ref 8.6–10.3)
CHLORIDE SERPL-SCNC: 96 MMOL/L (ref 98–107)
CHLORIDE SERPL-SCNC: 96 MMOL/L (ref 98–107)
CO2 SERPL-SCNC: 35 MMOL/L (ref 21–32)
CO2 SERPL-SCNC: 35 MMOL/L (ref 21–32)
CREAT SERPL-MCNC: 1.05 MG/DL (ref 0.5–1.05)
CREAT SERPL-MCNC: 1.15 MG/DL (ref 0.5–1.05)
EGFRCR SERPLBLD CKD-EPI 2021: 53 ML/MIN/1.73M*2
EGFRCR SERPLBLD CKD-EPI 2021: 59 ML/MIN/1.73M*2
ERYTHROCYTE [DISTWIDTH] IN BLOOD BY AUTOMATED COUNT: 16.7 % (ref 11.5–14.5)
GLUCOSE BLD MANUAL STRIP-MCNC: 159 MG/DL (ref 74–99)
GLUCOSE BLD MANUAL STRIP-MCNC: 185 MG/DL (ref 74–99)
GLUCOSE BLD MANUAL STRIP-MCNC: 229 MG/DL (ref 74–99)
GLUCOSE BLD MANUAL STRIP-MCNC: 245 MG/DL (ref 74–99)
GLUCOSE SERPL-MCNC: 147 MG/DL (ref 74–99)
GLUCOSE SERPL-MCNC: 255 MG/DL (ref 74–99)
GRAM STN SPEC: ABNORMAL
GRAM STN SPEC: ABNORMAL
HCT VFR BLD AUTO: 39.9 % (ref 36–46)
HGB BLD-MCNC: 12.4 G/DL (ref 12–16)
INR PPP: 2.7 (ref 0.9–1.1)
MAGNESIUM SERPL-MCNC: 2.11 MG/DL (ref 1.6–2.4)
MCH RBC QN AUTO: 32.2 PG (ref 26–34)
MCHC RBC AUTO-ENTMCNC: 31.1 G/DL (ref 32–36)
MCV RBC AUTO: 104 FL (ref 80–100)
NRBC BLD-RTO: 0 /100 WBCS (ref 0–0)
PHOSPHATE SERPL-MCNC: 3.1 MG/DL (ref 2.5–4.9)
PHOSPHATE SERPL-MCNC: 3.6 MG/DL (ref 2.5–4.9)
PLATELET # BLD AUTO: 504 X10*3/UL (ref 150–450)
POTASSIUM SERPL-SCNC: 3.7 MMOL/L (ref 3.5–5.3)
POTASSIUM SERPL-SCNC: 3.9 MMOL/L (ref 3.5–5.3)
PROTHROMBIN TIME: 30.1 SECONDS (ref 9.8–12.4)
RBC # BLD AUTO: 3.85 X10*6/UL (ref 4–5.2)
SODIUM SERPL-SCNC: 139 MMOL/L (ref 136–145)
SODIUM SERPL-SCNC: 141 MMOL/L (ref 136–145)
WBC # BLD AUTO: 13.4 X10*3/UL (ref 4.4–11.3)

## 2025-06-22 PROCEDURE — 99233 SBSQ HOSP IP/OBS HIGH 50: CPT | Performed by: INTERNAL MEDICINE

## 2025-06-22 PROCEDURE — 2500000004 HC RX 250 GENERAL PHARMACY W/ HCPCS (ALT 636 FOR OP/ED)

## 2025-06-22 PROCEDURE — 80069 RENAL FUNCTION PANEL: CPT

## 2025-06-22 PROCEDURE — 94669 MECHANICAL CHEST WALL OSCILL: CPT

## 2025-06-22 PROCEDURE — 94760 N-INVAS EAR/PLS OXIMETRY 1: CPT

## 2025-06-22 PROCEDURE — 85610 PROTHROMBIN TIME: CPT

## 2025-06-22 PROCEDURE — 2500000004 HC RX 250 GENERAL PHARMACY W/ HCPCS (ALT 636 FOR OP/ED): Performed by: NURSE PRACTITIONER

## 2025-06-22 PROCEDURE — 85027 COMPLETE CBC AUTOMATED: CPT

## 2025-06-22 PROCEDURE — 83735 ASSAY OF MAGNESIUM: CPT

## 2025-06-22 PROCEDURE — 36415 COLL VENOUS BLD VENIPUNCTURE: CPT

## 2025-06-22 PROCEDURE — 2500000001 HC RX 250 WO HCPCS SELF ADMINISTERED DRUGS (ALT 637 FOR MEDICARE OP)

## 2025-06-22 PROCEDURE — 2500000005 HC RX 250 GENERAL PHARMACY W/O HCPCS

## 2025-06-22 PROCEDURE — 94640 AIRWAY INHALATION TREATMENT: CPT

## 2025-06-22 PROCEDURE — 2500000002 HC RX 250 W HCPCS SELF ADMINISTERED DRUGS (ALT 637 FOR MEDICARE OP, ALT 636 FOR OP/ED): Performed by: INTERNAL MEDICINE

## 2025-06-22 PROCEDURE — 94668 MNPJ CHEST WALL SBSQ: CPT

## 2025-06-22 PROCEDURE — 82947 ASSAY GLUCOSE BLOOD QUANT: CPT

## 2025-06-22 PROCEDURE — 2500000005 HC RX 250 GENERAL PHARMACY W/O HCPCS: Performed by: INTERNAL MEDICINE

## 2025-06-22 PROCEDURE — 1200000002 HC GENERAL ROOM WITH TELEMETRY DAILY

## 2025-06-22 PROCEDURE — S4991 NICOTINE PATCH NONLEGEND: HCPCS

## 2025-06-22 PROCEDURE — 99232 SBSQ HOSP IP/OBS MODERATE 35: CPT

## 2025-06-22 PROCEDURE — 2500000002 HC RX 250 W HCPCS SELF ADMINISTERED DRUGS (ALT 637 FOR MEDICARE OP, ALT 636 FOR OP/ED)

## 2025-06-22 RX ADMIN — OXYCODONE HYDROCHLORIDE 10 MG: 10 TABLET ORAL at 13:47

## 2025-06-22 RX ADMIN — INSULIN LISPRO 4 UNITS: 100 INJECTION, SOLUTION INTRAVENOUS; SUBCUTANEOUS at 20:02

## 2025-06-22 RX ADMIN — Medication 3 MG: at 20:01

## 2025-06-22 RX ADMIN — GUAIFENESIN 600 MG: 600 TABLET ORAL at 20:02

## 2025-06-22 RX ADMIN — OXYCODONE HYDROCHLORIDE AND ACETAMINOPHEN 500 MG: 500 TABLET ORAL at 08:54

## 2025-06-22 RX ADMIN — SPIRONOLACTONE 50 MG: 25 TABLET ORAL at 08:54

## 2025-06-22 RX ADMIN — PREDNISONE 40 MG: 20 TABLET ORAL at 08:54

## 2025-06-22 RX ADMIN — Medication 1 APPLICATION: at 16:31

## 2025-06-22 RX ADMIN — OXYCODONE HYDROCHLORIDE 10 MG: 10 TABLET ORAL at 20:01

## 2025-06-22 RX ADMIN — ACETAMINOPHEN 975 MG: 325 TABLET, FILM COATED ORAL at 18:17

## 2025-06-22 RX ADMIN — SENNOSIDES AND DOCUSATE SODIUM 2 TABLET: 50; 8.6 TABLET ORAL at 20:01

## 2025-06-22 RX ADMIN — IPRATROPIUM BROMIDE AND ALBUTEROL SULFATE 3 ML: 2.5; .5 SOLUTION RESPIRATORY (INHALATION) at 05:37

## 2025-06-22 RX ADMIN — TRIAMCINOLONE ACETONIDE: 1 CREAM TOPICAL at 16:31

## 2025-06-22 RX ADMIN — HYDROXYZINE HYDROCHLORIDE 50 MG: 25 TABLET, FILM COATED ORAL at 20:05

## 2025-06-22 RX ADMIN — OXYCODONE HYDROCHLORIDE 10 MG: 10 TABLET ORAL at 00:46

## 2025-06-22 RX ADMIN — INSULIN LISPRO 4 UNITS: 100 INJECTION, SOLUTION INTRAVENOUS; SUBCUTANEOUS at 16:30

## 2025-06-22 RX ADMIN — DILTIAZEM HYDROCHLORIDE 360 MG: 120 CAPSULE, COATED, EXTENDED RELEASE ORAL at 08:54

## 2025-06-22 RX ADMIN — NYSTATIN CREAM: 100000 CREAM TOPICAL at 17:26

## 2025-06-22 RX ADMIN — GUAIFENESIN 600 MG: 600 TABLET ORAL at 08:54

## 2025-06-22 RX ADMIN — OXYCODONE HYDROCHLORIDE 10 MG: 10 TABLET ORAL at 06:45

## 2025-06-22 RX ADMIN — BUDESONIDE 0.5 MG: 0.5 INHALANT RESPIRATORY (INHALATION) at 05:37

## 2025-06-22 RX ADMIN — ZINC SULFATE 220 MG (50 MG) CAPSULE 1 CAPSULE: CAPSULE at 08:53

## 2025-06-22 RX ADMIN — DULOXETINE 20 MG: 20 CAPSULE, DELAYED RELEASE ORAL at 08:54

## 2025-06-22 RX ADMIN — Medication 1 TABLET: at 08:54

## 2025-06-22 RX ADMIN — BUDESONIDE 0.5 MG: 0.5 INHALANT RESPIRATORY (INHALATION) at 19:30

## 2025-06-22 RX ADMIN — OXYCODONE HYDROCHLORIDE AND ACETAMINOPHEN 500 MG: 500 TABLET ORAL at 20:01

## 2025-06-22 RX ADMIN — KETOCONAZOLE: 20 CREAM TOPICAL at 17:26

## 2025-06-22 RX ADMIN — PRAMIPEXOLE DIHYDROCHLORIDE 1 MG: 1 TABLET ORAL at 18:18

## 2025-06-22 RX ADMIN — Medication 3 L/MIN: at 19:30

## 2025-06-22 RX ADMIN — CEFTRIAXONE 2 G: 2 INJECTION, SOLUTION INTRAVENOUS at 15:31

## 2025-06-22 RX ADMIN — HYDROXYUREA 1000 MG: 500 CAPSULE ORAL at 08:54

## 2025-06-22 RX ADMIN — PRAMIPEXOLE DIHYDROCHLORIDE 1 MG: 1 TABLET ORAL at 06:14

## 2025-06-22 RX ADMIN — ACETAMINOPHEN 975 MG: 325 TABLET, FILM COATED ORAL at 08:56

## 2025-06-22 RX ADMIN — Medication 4 L/MIN: at 05:37

## 2025-06-22 RX ADMIN — FUROSEMIDE 10 MG/HR: 10 INJECTION, SOLUTION INTRAMUSCULAR; INTRAVENOUS at 22:26

## 2025-06-22 RX ADMIN — IPRATROPIUM BROMIDE AND ALBUTEROL SULFATE 3 ML: 2.5; .5 SOLUTION RESPIRATORY (INHALATION) at 14:53

## 2025-06-22 RX ADMIN — Medication: at 16:32

## 2025-06-22 RX ADMIN — CYCLOBENZAPRINE 10 MG: 10 TABLET, FILM COATED ORAL at 00:46

## 2025-06-22 RX ADMIN — NICOTINE 1 PATCH: 21 PATCH, EXTENDED RELEASE TRANSDERMAL at 08:59

## 2025-06-22 RX ADMIN — IPRATROPIUM BROMIDE AND ALBUTEROL SULFATE 3 ML: 2.5; .5 SOLUTION RESPIRATORY (INHALATION) at 19:30

## 2025-06-22 ASSESSMENT — COGNITIVE AND FUNCTIONAL STATUS - GENERAL
HELP NEEDED FOR BATHING: A LOT
DRESSING REGULAR LOWER BODY CLOTHING: A LOT
MOBILITY SCORE: 16
HELP NEEDED FOR BATHING: A LOT
TOILETING: A LITTLE
DRESSING REGULAR UPPER BODY CLOTHING: A LITTLE
DAILY ACTIVITIY SCORE: 17
WALKING IN HOSPITAL ROOM: A LOT
DRESSING REGULAR LOWER BODY CLOTHING: A LOT
PERSONAL GROOMING: A LITTLE
MOBILITY SCORE: 16
MOVING FROM LYING ON BACK TO SITTING ON SIDE OF FLAT BED WITH BEDRAILS: A LITTLE
STANDING UP FROM CHAIR USING ARMS: A LITTLE
STANDING UP FROM CHAIR USING ARMS: A LITTLE
WALKING IN HOSPITAL ROOM: A LOT
CLIMB 3 TO 5 STEPS WITH RAILING: A LOT
MOVING FROM LYING ON BACK TO SITTING ON SIDE OF FLAT BED WITH BEDRAILS: A LITTLE
MOVING TO AND FROM BED TO CHAIR: A LITTLE
MOVING TO AND FROM BED TO CHAIR: A LITTLE
TOILETING: A LITTLE
DAILY ACTIVITIY SCORE: 17
PERSONAL GROOMING: A LITTLE
TURNING FROM BACK TO SIDE WHILE IN FLAT BAD: A LITTLE
TURNING FROM BACK TO SIDE WHILE IN FLAT BAD: A LITTLE
CLIMB 3 TO 5 STEPS WITH RAILING: A LOT
DRESSING REGULAR UPPER BODY CLOTHING: A LITTLE

## 2025-06-22 ASSESSMENT — PAIN DESCRIPTION - LOCATION
LOCATION: LEG

## 2025-06-22 ASSESSMENT — PAIN SCALES - GENERAL
PAINLEVEL_OUTOF10: 8
PAINLEVEL_OUTOF10: 2
PAINLEVEL_OUTOF10: 8
PAINLEVEL_OUTOF10: 2
PAINLEVEL_OUTOF10: 8
PAINLEVEL_OUTOF10: 8
PAINLEVEL_OUTOF10: 4

## 2025-06-22 ASSESSMENT — PAIN - FUNCTIONAL ASSESSMENT
PAIN_FUNCTIONAL_ASSESSMENT: 0-10

## 2025-06-22 ASSESSMENT — PAIN DESCRIPTION - ORIENTATION
ORIENTATION: RIGHT;LEFT
ORIENTATION: RIGHT;LEFT

## 2025-06-22 NOTE — PROGRESS NOTES
Neisha Graham is a 65 y.o. female on day 3 of admission presenting with Pneumonia of both lower lobes due to infectious organism.    Subjective   Interval History: no fever, no new complaints        Review of Systems    Objective   Range of Vitals (last 24 hours)  Heart Rate:  [82-92]   Temp:  [35.9 °C (96.6 °F)-37 °C (98.6 °F)]   Resp:  [16-18]   BP: (108-139)/(61-76)   SpO2:  [92 %-96 %]   Daily Weight  06/19/25 : (!) 160 kg (353 lb 8 oz)    Body mass index is 60.68 kg/m².    Physical Exam  Constitutional:       Appearance: Normal appearance.   HENT:      Head: Normocephalic and atraumatic.      Mouth/Throat:      Mouth: Mucous membranes are moist.      Pharynx: Oropharynx is clear.   Eyes:      Pupils: Pupils are equal, round, and reactive to light.   Cardiovascular:      Rate and Rhythm: Normal rate and regular rhythm.      Heart sounds: Normal heart sounds.   Pulmonary:      Effort: Pulmonary effort is normal.      Breath sounds: Normal breath sounds.   Abdominal:      General: Abdomen is flat. Bowel sounds are normal.      Palpations: Abdomen is soft.   Musculoskeletal:         General: Swelling present.      Cervical back: Normal range of motion.      Comments: Stasis / wounds   Neurological:      Mental Status: She is alert.         Antibiotics  cefTRIAXone - 2 gram/50 mL    Relevant Results  Labs  Results from last 72 hours   Lab Units 06/22/25  0643 06/21/25  0649 06/20/25  0649 06/19/25  1250   WBC AUTO x10*3/uL 13.4* 15.3* 20.4* 27.2*   HEMOGLOBIN g/dL 12.4 11.8* 12.2 13.2   HEMATOCRIT % 39.9 39.5 37.4 42.1   PLATELETS AUTO x10*3/uL 504* 481* 429 598*   NEUTROS PCT AUTO %  --   --   --  92.6   LYMPHS PCT AUTO %  --   --   --  1.7   MONOS PCT AUTO %  --   --   --  4.5   EOS PCT AUTO %  --   --   --  0.1     Results from last 72 hours   Lab Units 06/22/25  0643 06/21/25  1840 06/21/25  0649   SODIUM mmol/L 141 137 137   POTASSIUM mmol/L 3.7 4.1 4.2   CHLORIDE mmol/L 96* 96* 98   CO2 mmol/L 35* 33* 31    BUN mg/dL 30* 32* 30*   CREATININE mg/dL 1.05 1.16* 1.18*   GLUCOSE mg/dL 147* 262* 133*   CALCIUM mg/dL 8.9 9.1 8.8   ANION GAP mmol/L 14 12 12   EGFR mL/min/1.73m*2 59* 52* 51*   PHOSPHORUS mg/dL 3.6 4.1 3.9     Results from last 72 hours   Lab Units 06/22/25  0643 06/21/25  1840 06/21/25  0649 06/20/25  0649 06/19/25  1250   ALK PHOS U/L  --   --   --   --  230*   BILIRUBIN TOTAL mg/dL  --   --   --   --  0.8   PROTEIN TOTAL g/dL  --   --   --   --  8.0   ALT U/L  --   --   --   --  17   AST U/L  --   --   --   --  28   ALBUMIN g/dL 3.4 3.6 3.4   < > 4.1    < > = values in this interval not displayed.     Estimated Creatinine Clearance: 80.5 mL/min (by C-G formula based on SCr of 1.05 mg/dL).  C-Reactive Protein   Date Value Ref Range Status   06/19/2025 3.23 (H) <1.00 mg/dL Final   12/11/2024 0.72 <1.00 mg/dL Final   09/16/2024 16.36 (H) <1.00 mg/dL Final     Microbiology  Susceptibility data from last 14 days.  Collected Specimen Info Organism Clindamycin Erythromycin Penicillin Tetracycline   06/19/25 Blood culture from Peripheral Venipuncture Streptococcus agalactiae (Group B Streptococcus)       06/19/25 Blood culture from Peripheral Venipuncture Streptococcus agalactiae (Group B Streptococcus)  R  R  S  R   Imaging  Reviewed        Assessment/Plan   Sepsis, BC with gp B Strep, TTE reviewed  Legs stasis / wounds / likely cellulitis  Respiratory failure / COPD /  BE / atelectasis     Recommendations :  Continue Rocephin  Chest PT       I spent minutes in the professional and overall care of this patient.  The cultures and susceptibilities were reviewed and discussed with the micro lab, the antibiotics stewardship guidelines were reviewed, the infection control protocols and recommendations were reviewed with the patient and the staff                 Gorge Myers MD

## 2025-06-22 NOTE — PROGRESS NOTES
Subjective Data:  Feels much better today.    Overnight Events:    No overnight events.     Objective Data:  Last Recorded Vitals:  Vitals:    06/22/25 0040 06/22/25 0524 06/22/25 0537 06/22/25 0750   BP: 129/69 139/76  110/61   BP Location: Right arm Right arm  Right arm   Patient Position: Lying Lying     Pulse: 87 89  82   Resp: 18 17  17   Temp: 36.7 °C (98.1 °F) 36.7 °C (98.1 °F)  35.9 °C (96.6 °F)   TempSrc: Temporal Temporal     SpO2: 94% 92% 96% 94%   Weight:       Height:           Last Labs:  CBC - 6/22/2025:  6:43 AM  13.4 12.4 504    39.9      CMP - 6/22/2025:  6:43 AM  8.9 8.0 28 --- 0.8   3.6 3.4 17 230      PTT - 6/22/2025:  6:43 AM  2.7   30.1 38     TROPHS   Date/Time Value Ref Range Status   06/19/2025 01:57 PM 23 0 - 13 ng/L Final   06/19/2025 12:50 PM 29 0 - 13 ng/L Final   12/11/2024 10:19 PM 26 0 - 13 ng/L Final     BNP   Date/Time Value Ref Range Status   06/19/2025 12:50  0 - 99 pg/mL Final   12/11/2024 04:51  0 - 99 pg/mL Final     HGBA1C   Date/Time Value Ref Range Status   12/11/2024 04:51 PM 5.5 See comment % Final   08/13/2024 07:35 AM 5.9 see below % Final     LDLCALC   Date/Time Value Ref Range Status   12/11/2024 04:51 PM 87 <=99 mg/dL Final     Comment:                                 Near   Borderline      AGE      Desirable  Optimal    High     High     Very High     0-19 Y     0 - 109     ---    110-129   >/= 130     ----    20-24 Y     0 - 119     ---    120-159   >/= 160     ----      >24 Y     0 -  99   100-129  130-159   160-189     >/=190       VLDL   Date/Time Value Ref Range Status   12/11/2024 04:51 PM 21 0 - 40 mg/dL Final   11/14/2022 11:01 AM 40 0 - 40 mg/dL Final   09/27/2020 12:15 AM 21 0 - 40 mg/dL Final   06/19/2020 03:03 PM 16 0 - 40 mg/dL Final      Last I/O:  I/O last 3 completed shifts:  In: 755.6 (4.7 mL/kg) [P.O.:720; I.V.:35.6 (0.2 mL/kg)]  Out: 4850 (30.2 mL/kg) [Urine:4850 (0.8 mL/kg/hr)]  Weight: 160.3 kg     Past Cardiology Tests (Last 3  Years):  EKG:  ECG 12 lead 06/19/2025 (Preliminary)      Electrocardiogram, 12-lead PRN ACS symtpoms 12/13/2024      ECG 12 lead 12/11/2024      ECG 12 lead 09/23/2024      ECG 12 lead 09/16/2024      ECG 12 lead 09/15/2024    Echo:  Transthoracic Echo Complete 06/20/2025      Transthoracic Echo (TTE) Complete 12/12/2024      Transthoracic Echo (TTE) Complete 09/18/2024    Ejection Fractions:  EF   Date/Time Value Ref Range Status   06/20/2025 11:51 AM 63 %    12/12/2024 03:41 PM 63 %    09/18/2024 12:16 PM 63 %      Cath:  No results found for this or any previous visit from the past 1095 days.    Stress Test:  No results found for this or any previous visit from the past 1095 days.    Cardiac Imaging:  No results found for this or any previous visit from the past 1095 days.      Inpatient Medications:  Scheduled Medications[1]  PRN Medications[2]  Continuous Medications[3]    Physical Exam:  GENERAL APPEARANCE: Well developed, well nourished, in no acute distress.  CHEST: Symmetric and non-tender.  INTEGUMENT: Skin warm and dry, without gross excoriationis or lesions.  HEENT: No gross abnormalities of conjunctiva, teeth, gums, oral mucosa.  NECK: Supple, no bruit. Carotid upstrokes normal.  NEURO/PSHCY: Alert and oriented x3; appropriate behavior and responses and responses.  LUNGS: Coarse breath sounds  HEART:  Regular rate and rhythm.  Systolic ejection murmur.  JVD >> 10 cm water.    ABDOMEN: Soft, nontender, nondistended.  MUSCULOSKELETAL: Ambulatory with normal tandem gait.  EXTREMITIES: Warm with good color, no clubbing or cyanois. There is no edema noted.       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  Goal net negative 2 liters daily-she was -3 L for the day yesterday.    Once euvolemic, we will proceed with right and  left heart catheterization and ultimately will need outpatient referral to the structural heart team.    Peripheral IV 06/19/25 20 G Left Antecubital (Active)   Site Assessment Clean;Dry;Intact 06/20/25 2100   Dressing Status Clean;Dry 06/20/25 2100   Number of days: 2       Code Status:  Full Code    Vicotr Hugo George MD         [1]   Scheduled medications   Medication Dose Route Frequency    acetaminophen  975 mg oral q6h    ascorbic acid  500 mg oral BID    budesonide  0.5 mg nebulization BID    And    ipratropium-albuteroL  3 mL nebulization TID    cefTRIAXone  2 g intravenous q24h    dilTIAZem ER  360 mg oral Daily    DULoxetine  20 mg oral Daily    guaiFENesin  600 mg oral BID    hydroxyurea  1,000 mg oral Daily    insulin lispro  0-10 Units subcutaneous Before meals & nightly    ketoconazole   Topical BID    melatonin  3 mg oral Nightly    multivitamin with minerals  1 tablet oral Daily    nicotine  1 patch transdermal Daily    Followed by    [START ON 7/31/2025] nicotine  1 patch transdermal Daily    Followed by    [START ON 8/14/2025] nicotine  1 patch transdermal Daily    nystatin   Topical BID    pneumoc 20-eros conj-dip cr(PF)  0.5 mL intramuscular During hospitalization    pramipexole  1 mg oral BID    predniSONE  40 mg oral Daily    sennosides-docusate sodium  2 tablet oral BID    spironolactone  50 mg oral Daily    [Held by provider] torsemide  40 mg oral BID    triamcinolone   Topical BID    venlafaxine XR  150 mg oral Daily    [Held by provider] warfarin  7.5 mg oral Daily    white petrolatum  1 Application Topical BID    zinc oxide  1 Application Topical TID    zinc sulfate  50 mg of elemental zinc oral Daily   [2]   PRN medications   Medication    ammonium lactate    benzocaine    cyclobenzaprine    dextrose    dextrose    glucagon    glucagon    HYDROmorphone    hydrOXYzine HCL    ipratropium-albuteroL    lidocaine    oxyCODONE    oxyCODONE    oxygen   [3]   Continuous Medications   Medication Dose  Last Rate    furosemide  10 mg/hr 10 mg/hr (06/22/25 0618)

## 2025-06-22 NOTE — PROGRESS NOTES
"Internal Medicine - Daily Progress Note   Hospital Day: 2    Name: Neisha Graham   Age: 65 y.o.   Gender: Female  Room: 107/107-A      Subjective    No acute overnight events. Patient doing well this morning. She reports feeling much better this morning after diuresis. Reports feeling improvement in her breathing. Plan is to continue diuresis with plan for heart cath once she is euvolemic.     ROS: 12 points review of system is negative except as stated in the HPI above.     Objective    Vitals  Visit Vitals  /80 (BP Location: Right arm)   Pulse 84   Temp 36.9 °C (98.4 °F)   Resp 18   Ht 1.626 m (5' 4\")   Wt (!) 160 kg (353 lb 8 oz)   SpO2 95%   BMI 60.68 kg/m²   Smoking Status Some Days   BSA 2.69 m²       Physical Exam    Physical Exam  Constitutional:       General: She is not in acute distress.     Appearance: She is obese.   HENT:      Head: Normocephalic and atraumatic.      Mouth/Throat:      Mouth: Mucous membranes are moist.   Eyes:      Extraocular Movements: Extraocular movements intact.      Pupils: Pupils are equal, round, and reactive to light.   Cardiovascular:      Rate and Rhythm: Normal rate and regular rhythm.      Pulses: Normal pulses.      Heart sounds: Murmur heard.   Pulmonary:      Effort: Pulmonary effort is normal. No respiratory distress.      Breath sounds: Wheezing present.   Abdominal:      General: Bowel sounds are normal. There is no distension.      Palpations: Abdomen is soft.      Tenderness: There is no abdominal tenderness.   Musculoskeletal:      Comments: Chronic bilateral lower extremity swelling consistent with lymphedema, left lower extremity wound mid dorsal tibia with drainage (wrapped with gauze)   Skin:     Findings: Erythema (BLE) present.   Neurological:      General: No focal deficit present.      Mental Status: She is alert and oriented to person, place, and time.   Psychiatric:         Mood and Affect: Mood normal.           IOs    Intake/Output Summary " "(Last 24 hours) at 6/22/2025 1609  Last data filed at 6/22/2025 1559  Gross per 24 hour   Intake 656 ml   Output 4150 ml   Net -3494 ml       Labs:   Results from last 72 hours   Lab Units 06/22/25  0643 06/21/25  1840 06/21/25  0649   SODIUM mmol/L 141 137 137   POTASSIUM mmol/L 3.7 4.1 4.2   CHLORIDE mmol/L 96* 96* 98   CO2 mmol/L 35* 33* 31   BUN mg/dL 30* 32* 30*   CREATININE mg/dL 1.05 1.16* 1.18*   GLUCOSE mg/dL 147* 262* 133*   CALCIUM mg/dL 8.9 9.1 8.8   ANION GAP mmol/L 14 12 12   EGFR mL/min/1.73m*2 59* 52* 51*   PHOSPHORUS mg/dL 3.6 4.1 3.9      Results from last 72 hours   Lab Units 06/22/25  0643 06/21/25  0649 06/20/25  0649   WBC AUTO x10*3/uL 13.4* 15.3* 20.4*   HEMOGLOBIN g/dL 12.4 11.8* 12.2   HEMATOCRIT % 39.9 39.5 37.4   PLATELETS AUTO x10*3/uL 504* 481* 429      Lab Results   Component Value Date    CALCIUM 8.9 06/22/2025    PHOS 3.6 06/22/2025      Lab Results   Component Value Date    CRP 3.23 (H) 06/19/2025      [unfilled]     Micro/ID:   Susceptibility data from last 90 days.  Collected Specimen Info Organism Clindamycin Erythromycin Penicillin Tetracycline   06/19/25 Blood culture from Peripheral Venipuncture Streptococcus agalactiae (Group B Streptococcus)       06/19/25 Blood culture from Peripheral Venipuncture Streptococcus agalactiae (Group B Streptococcus)  R  R  S  R                    No lab exists for component: \"AGALPCRNB\"   .ID  Lab Results   Component Value Date    URINECULTURE  12/30/2024     Clinically insignificant growth based on current clinical standards.    BLOODCULT Loaded on Instrument - Culture in progress 06/21/2025    BLOODCULT Loaded on Instrument - Culture in progress 06/21/2025       Images  Transthoracic Echo Complete     [facility], [address] [address]                Tel [phone] and Fax [phone]    TRANSTHORACIC ECHOCARDIOGRAM REPORT       Patient Name:       LION PEREZ     Reading Physician:    [id number] Victor Hugo    "                                                             Ariel HUANG  Study Date:         6/20/2025           Ordering Provider:    [id number] MARILYN BARROS  MRN/PID:            [id number]            Fellow:  Accession#:         [id number]        Nurse:  Date of Birth/Age:  1960 / 65      Sonographer:          Keesha parker                                     RDISABELLA  Gender assigned at  F                   Additional Staff:  Birth:  Height:             162.56 cm           Admit Date:           6/19/2025  Weight:             160.12 kg           Admission Status:     Inpatient -                                                                Routine  BSA / BMI:          2.49 m2 / 60.59     Encounter#:           [id number]                      kg/m2  Blood Pressure:     115/63 mmHg         Department Location:  [facility]                                                                Invasive    Study Type:    TRANSTHORACIC ECHO (TTE) COMPLETE  Diagnosis/ICD: Acute combined systolic (congestive) and diastolic (congestive)                 heart failure (CHF)-I50.41; Nonrheumatic aortic (valve)                 stenosis-I35.0  Indication:    Aortic stenosis, CHF  CPT Code:      Echo Complete w Full Doppler-72506    Patient History:  Pertinent History: A-Fib, Anticoagulation, CHF, Dyspnea, COPD, HTN and                     Hyperlipidemia. Severe AS, Mod MS, Pulm HTN, Elevated BNP,.    Study Detail: The following Echo studies were performed: 2D, M-Mode, Doppler and                color flow.       PHYSICIAN INTERPRETATION:  Left Ventricle: Left ventricular ejection fraction is normal calculated by Arreguin's biplane at 63%. There is severe concentric left ventricular hypertrophy. There are no regional left ventricular wall motion abnormalities. The left ventricular cavity size is normal. There is severely increased septal and  severely increased posterior left ventricular wall thickness. Spectral Doppler shows a Grade II (pseudonormal pattern) of left ventricular diastolic filling with an elevated left atrial pressure.  Left Atrium: The left atrial size is severely dilated.  Right Ventricle: The right ventricle is mildly enlarged. There is normal right ventricular global systolic function.  Right Atrium: The right atrium is moderately dilated.  Aortic Valve: The aortic valve is probably trileaflet. The aortic valve area by VTI is 0.68 cmï¿½ with a peak velocity of 4.36 m/s. The peak and mean gradients are 75 mmHg and 53 mmHg, respectively with a dimensionless index of 0.22. There is evidence of severe aortic valve stenosis. There is no evidence of aortic valve regurgitation.  Mitral Valve: The mitral valve is mildly thickened. The doppler estimated peak and mean diastolic pressure gradients are 12.4 mmHg and 7 mmHg, respectively. There is evidence of moderate mitral valve stenosis. There is moderate mitral annular calcification. The mean gradient of the mitral valve is 7 mmHg. There is mild to moderate mitral valve regurgitation. The E Vmax is 1.59 m/s.  Tricuspid Valve: The tricuspid valve is structurally normal. There is mild to moderate tricuspid regurgitation. The Doppler estimated right ventricular systolic pressure (RVSP) is moderately elevated at 60 mmHg.  Pulmonic Valve: The pulmonic valve is structurally normal. There is trace pulmonic valve regurgitation.  Pericardium: There is no pericardial effusion noted.  Aorta: The aortic root is abnormal. There is no dilatation of the ascending aorta. There is no dilatation of the aortic root.  Systemic Veins: The inferior vena cava appears severely dilated, with IVC inspiratory collapse less than 50%.  In comparison to the previous echocardiogram(s): Compared with study dated 12/12/2024,.       CONCLUSIONS:   1. Left ventricular ejection fraction is normal calculated by Arreguin's biplane  at 63%.   2. There is severely increased septal and severely increased posterior left ventricular wall thickness.   3. There is severe concentric left ventricular hypertrophy.   4. There is normal right ventricular global systolic function.   5. Mildly enlarged right ventricle.   6. The left atrial size is severely dilated.   7. The right atrium is moderately dilated.   8. Severe aortic valve stenosis. The peak and mean gradients are 76 mmHg and 53 mmHg respectively.   9. There is moderate mitral annular calcification.  10. There is moderate mitral valve stenosis with a doppler estimated mean diastolic gradient of 7 mmHg.  11. Mild to moderate mitral valve regurgitation.  12. Mild to moderate tricuspid regurgitation visualized.  13. The Doppler estimated RVSP is moderately elevated at 60 mmHg.  14. The inferior vena cava appears severely dilated, with IVC inspiratory collapse less than 50%.    QUANTITATIVE DATA SUMMARY:     2D MEASUREMENTS:          Normal Ranges:  IVSd:            1.62 cm  (0.6-1.1cm)  LVPWd:           1.75 cm  (0.6-1.1cm)  LVIDd:           4.77 cm  (3.9-5.9cm)  LVIDs:           3.08 cm  LV Mass Index:   144 g/m2  LVEDV Index:     40 ml/m2  LV % FS          35.3 %       LEFT ATRIUM:                  Normal Ranges:  LA Vol A4C:        123.0 ml   (22+/-6mL/m2)  LA Vol A2C:        123.1 ml  LA Vol BP:         125.9 ml  LA Vol Index A4C:  49.4ml/m2  LA Vol Index A2C:  49.4 ml/m2  LA Vol Index BP:   50.6 ml/m2  LA Area A4C:       30.9 cm2  LA Area A2C:       30.2 cm2  LA Major Axis A4C: 6.6 cm  LA Major Axis A2C: 6.3 cm  LA Volume Index:   50.4 ml/m2  LA Vol A4C:        116.9 ml  LA Vol A2C:        121.6 ml  LA Vol Index BSA:  47.9 ml/m2       RIGHT ATRIUM:          Normal Ranges:  RA Area A4C:  34.1 cm2       M-MODE MEASUREMENTS:         Normal Ranges:  Ao Root:             3.00 cm (2.0-3.7cm)  LAs:                 4.73 cm (2.7-4.0cm)       LV SYSTOLIC FUNCTION:                       Normal  Ranges:  EF-A4C View:    65 % (>=55%)  EF-A2C View:    61 %  EF-Biplane:     63 %  LV EF Reported: 63 %       LV DIASTOLIC FUNCTION:           Normal Ranges:  MV Peak E:             1.59 m/s  (0.7-1.2 m/s)  MV Peak A:             1.39 m/s  (0.42-0.7 m/s)  E/A Ratio:             1.14      (1.0-2.2)  MV e'                  0.110 m/s (>8.0)  MV lateral e'          0.13 m/s  MV medial e'           0.09 m/s  E/e' Ratio:            14.43     (<8.0)       MITRAL VALVE:           Normal Ranges:  MV Vmax:      1.76 m/s  (<=1.3m/s)  MV peak P.4 mmHg (<5mmHg)  MV mean P.1 mmHg  (<2mmHg)  MV VTI:       41.10 cm  (10-13cm)  MV DT:        216 msec  (150-240msec)       MITRAL INSUFFICIENCY:             Normal Ranges:  MR VTI:               181.09 cm  MR Vmax:              597.53 cm/s       AORTIC VALVE:                      Normal Ranges:  AoV Vmax:                4.36 m/s  (<=1.7m/s)  AoV Peak P.0 mmHg (<20mmHg)  AoV Mean P.3 mmHg (1.7-11.5mmHg)  LVOT Max Rodriguez:            1.09 m/s  (<=1.1m/s)  AoV VTI:                 105.83 cm (18-25cm)  LVOT VTI:                22.85 cm  LVOT Diameter:           2.00 cm   (1.8-2.4cm)  AoV Area, VTI:           0.68 cm2  (2.5-5.5cm2)  AoV Area,Vmax:           0.79 cm2  (2.5-4.5cm2)  AoV Dimensionless Index: 0.22       RIGHT VENTRICLE:  RV Basal 4.70 cm  RV Mid   4.00 cm  RV Major 8.7 cm  TAPSE:   24.0 mm  RV s'    0.15 m/s       TRICUSPID VALVE/RVSP:          Normal Ranges:  Peak TR Velocity:     3.36 m/s  Est. RA Pressure:     15  RV Syst Pressure:     60       (< 30mmHg)  IVC Diam:             3.50 cm       PULMONIC VALVE:           Normal Ranges:  PV Max Rodriguez:     1.7 m/s   (0.6-0.9m/s)  PV Max P.1 mmHg       [id number] Victor Hugo George MD  Electronically signed on 2025 at 2:26:52 PM       ** Final **  ECG 12 lead  Sinus tachycardia with Premature atrial complexes  Possible Left atrial enlargement  Borderline ECG  When compared with ECG of  13-DEC-2024 13:54,  No significant change was found      Meds  Scheduled medications  Scheduled Medications[1]  Continuous medications  Continuous Medications[2]  PRN medications  PRN Medications[3]     Assessment and Plan    Neisha Graham is a 65 y.o. female with PMHx significant for paroxysmal atrial fibrillation (on warfarin), chronic thromboembolic disease, essential thrombocytosis, moderate to severe aortic stenosis, moderate mitral stenosis, HFpEF (EF 60-65% 12/24), COPD (on 2L NC nightly), pulmonary hypertension, lymphedema with chronic lower extremity wounds, chronic sacral wounds, HTN, HLD admitted on 6/19/2025 acute on chronic hypoxic respiratory failure 2/2 HFpEF and COPD exacerbation with concern for sepsis 2/2 infection of chronic wounds.     Acute Medical Issues:   #Sepsis  #Bacteremia  #Leukocytosis, improving  #Chronic bilateral lower extremity and sacral wounds with concern for acute infection  ::WBC with significant elevation at 27.2  ::CXR and CT without distinct PNA  ::Patient notes foul smell of L lower extremity wound x 2 days - with some surrounding erythema  ::Hx of wound with MRSA 9/24 and pseudomonas bacteremia 9/24  ::Patient complaints of swollen/painful gland in R jaw and ability to express salivary stones from under tongue - minimal parotid swelling and tenderness on examination  -Blood cultures positive for group B streptococcus  -Pro-[facility] 0.26, CRP 3.23, ESR 54  -Continue ceftriaxone, azithromycin  -ID consulted, agrees with plan to continue ceftriaxone  -Wound care following  -Repeat blood cultures pending     #Acute on chronic hypoxic respiratory failure   #CHF exacerbation  #COPD exacerbation  ::TTE 12/2024 with EF 60-65%  ::Patient with SOB and desaturation to 88%, requiring 2L NC continuous (at baseline wears 2L NC at night and with transfers/exertion)  ::Patient on torsemide 40 mg BID at home but recently stopped taking evening dose  ::Increased cough, sputum production  ::BNP  elevated at 786  ::CXR and CT chest concerning for pulmonary edema   ::s/p 80 mg IV Lasix in the ED  -On 2L NC, wean as tolerated back to baseline 2 L at night and with exertion  -Prednisone 40 mg daily x 5 days   -Azithromycin x 3 days  -Mucinex 600 mg BID  -Continue home inhalers  -Duonebs scheduled and PRN  -Incentive spirometer and acapella ordered   -Continue home spironolactone 50 mg daily  -Holding home torsemide, continue with IV diuresis  -TTE: EF 63%, severely concentric left ventricular hypertrophy, severely dilated left atria, moderately dilated right atria, severe aortic stenosis with peak gradient 76 mmHg, moderate mitral valve stenosis  -1800cc/day fluid restriction  -Monitor strict I/O  -Cardiology consulted: Continue Lasix drip at 10 mg/h    #Moderate to severe aortic stenosis   #Moderate mitral valve stenosis   ::TTE 12/2024 with EF 60-65% and Grade I LV impaired relaxation pattern and mod to severe aortic stenosis and mod mitral stenosis   ::ED did speak with Dr. George who recommended admission at [facility] and would see patient in AM to determine if transfer needed for valve evaluation  -Cardiology consulted: Once euvolemic, will plan for R+LHC once euvolemic to assess for aortic and mitral stenosis  -Will likely need outpatient referral to structural heart team to discuss TAVR candidacy    #Supratherapeutic INR  #Paroxysmal atrial fibrillation   :: INR 3.4 on admission  -Holding home warfarin (takes 7.5 mg daily, INR goal 2-3), will monitor  -Continue diltiazem 360 mg daily    #Hyperglycemia   ::suspect 2/2 steroid use  -monitor  -added SSI    Chronic Medical Issues:   #Essential thrombocytosis - Hydroxyurea 1000 mg daily  #Chronic back pain - Flexeril 10 mg BID PRN for muscle spasms, takes oxycodone 10 mg PRN at home for pain - inpatient pain regimen: scheduled Tylenol 975 mg q6 hours, oxycodone 5 and 10 mg q6 hours PRN for moderate and severe pain, dilaudid 0.4 mg PRN for breakthrough  pain  #Chronic constipation 2/2 opioid use: Continue bowel regimen  #Depression, Anxiety - Duloxetine 20 mg daily, hydroxyzine 50 mg q8 hours PRN, venlafaxine 150 mg daily  #RLS - Pramipexole 1 mg BID  #Tobacco use disorder-Nicotine patch ordered, smoking cessation discussed  #HTN, HLD    Fluids: Fluid restriction 1800cc daily   Electrolytes: Replete as needed   Nutrition: Cardiac diet  GI ppx: None  DVT ppx: Warfarin (holding and monitoring INR)  Antibiotics: Ceftriaxone, azithromycin (6/19 - 6/21)  Tubes/Lines/Drains: PIV  Oxygen: 2L NC    Code Status: Full Code   Emergency Contact: Extended Emergency Contact Information  Primary Emergency Contact: Belle Chauhan  Mobile Phone: [phone]  Relation: Sibling  Secondary Emergency Contact: NICOLAS PEREZ  Mobile Phone: [phone]  Relation: Daughter     Disposition: 65 y.o. female admitted for acute on chronic HRF iso HFpEF and COPD exacerbations with concern for sepsis 2/2 bacteremia.  Pending cardiac cath once euvolemic.      Kaushik Berrios MD   06/22/25                 [1] acetaminophen, 975 mg, oral, q6h  ascorbic acid, 500 mg, oral, BID  budesonide, 0.5 mg, nebulization, BID   And  ipratropium-albuteroL, 3 mL, nebulization, TID  cefTRIAXone, 2 g, intravenous, q24h  dilTIAZem ER, 360 mg, oral, Daily  DULoxetine, 20 mg, oral, Daily  guaiFENesin, 600 mg, oral, BID  hydroxyurea, 1,000 mg, oral, Daily  insulin lispro, 0-10 Units, subcutaneous, Before meals & nightly  ketoconazole, , Topical, BID  melatonin, 3 mg, oral, Nightly  multivitamin with minerals, 1 tablet, oral, Daily  nicotine, 1 patch, transdermal, Daily   Followed by  [START ON 7/31/2025] nicotine, 1 patch, transdermal, Daily   Followed by  [START ON 8/14/2025] nicotine, 1 patch, transdermal, Daily  nystatin, , Topical, BID  pneumoc 20-eros conj-dip cr(PF), 0.5 mL, intramuscular, During hospitalization  pramipexole, 1 mg, oral, BID  predniSONE, 40 mg, oral, Daily  sennosides-docusate sodium, 2  tablet, oral, BID  spironolactone, 50 mg, oral, Daily  [Held by provider] torsemide, 40 mg, oral, BID  triamcinolone, , Topical, BID  venlafaxine XR, 150 mg, oral, Daily  [Held by provider] warfarin, 7.5 mg, oral, Daily  white petrolatum, 1 Application, Topical, BID  zinc oxide, 1 Application, Topical, TID  zinc sulfate, 50 mg of elemental zinc, oral, Daily     [2] furosemide, 10 mg/hr, Last Rate: 10 mg/hr (06/22/25 0618)     [3] PRN medications: ammonium lactate, benzocaine, cyclobenzaprine, dextrose, dextrose, glucagon, glucagon, HYDROmorphone, hydrOXYzine HCL, ipratropium-albuteroL, lidocaine, oxyCODONE, oxyCODONE, oxygen

## 2025-06-22 NOTE — CARE PLAN
The patient's goals for the shift include  out of bed    The clinical goals for the shift include wound care, out of bed, pain mangagement      Problem: Pain - Adult  Goal: Verbalizes/displays adequate comfort level or baseline comfort level  Outcome: Progressing     Problem: Safety - Adult  Goal: Free from fall injury  Outcome: Progressing     Problem: Discharge Planning  Goal: Discharge to home or other facility with appropriate resources  Outcome: Progressing     Problem: Chronic Conditions and Co-morbidities  Goal: Patient's chronic conditions and co-morbidity symptoms are monitored and maintained or improved  Outcome: Progressing

## 2025-06-23 VITALS
HEART RATE: 79 BPM | SYSTOLIC BLOOD PRESSURE: 129 MMHG | WEIGHT: 293 LBS | BODY MASS INDEX: 50.02 KG/M2 | RESPIRATION RATE: 15 BRPM | DIASTOLIC BLOOD PRESSURE: 73 MMHG | TEMPERATURE: 97.9 F | OXYGEN SATURATION: 93 % | HEIGHT: 64 IN

## 2025-06-23 LAB
ALBUMIN SERPL BCP-MCNC: 3.5 G/DL (ref 3.4–5)
ALBUMIN SERPL BCP-MCNC: 3.7 G/DL (ref 3.4–5)
ANION GAP SERPL CALC-SCNC: 10 MMOL/L (ref 10–20)
ANION GAP SERPL CALC-SCNC: 12 MMOL/L (ref 10–20)
APTT PPP: 36 SECONDS (ref 26–36)
BUN SERPL-MCNC: 32 MG/DL (ref 6–23)
BUN SERPL-MCNC: 33 MG/DL (ref 6–23)
CALCIUM SERPL-MCNC: 8.8 MG/DL (ref 8.6–10.3)
CALCIUM SERPL-MCNC: 9.1 MG/DL (ref 8.6–10.3)
CHLORIDE SERPL-SCNC: 95 MMOL/L (ref 98–107)
CHLORIDE SERPL-SCNC: 96 MMOL/L (ref 98–107)
CO2 SERPL-SCNC: 36 MMOL/L (ref 21–32)
CO2 SERPL-SCNC: 38 MMOL/L (ref 21–32)
CREAT SERPL-MCNC: 0.99 MG/DL (ref 0.5–1.05)
CREAT SERPL-MCNC: 1.08 MG/DL (ref 0.5–1.05)
EGFRCR SERPLBLD CKD-EPI 2021: 57 ML/MIN/1.73M*2
EGFRCR SERPLBLD CKD-EPI 2021: 63 ML/MIN/1.73M*2
ERYTHROCYTE [DISTWIDTH] IN BLOOD BY AUTOMATED COUNT: 16.5 % (ref 11.5–14.5)
GLUCOSE BLD MANUAL STRIP-MCNC: 120 MG/DL (ref 74–99)
GLUCOSE BLD MANUAL STRIP-MCNC: 220 MG/DL (ref 74–99)
GLUCOSE BLD MANUAL STRIP-MCNC: 232 MG/DL (ref 74–99)
GLUCOSE BLD MANUAL STRIP-MCNC: 275 MG/DL (ref 74–99)
GLUCOSE SERPL-MCNC: 126 MG/DL (ref 74–99)
GLUCOSE SERPL-MCNC: 189 MG/DL (ref 74–99)
HCT VFR BLD AUTO: 40.6 % (ref 36–46)
HGB BLD-MCNC: 12.7 G/DL (ref 12–16)
INR PPP: 2.1 (ref 0.9–1.1)
MAGNESIUM SERPL-MCNC: 2.08 MG/DL (ref 1.6–2.4)
MCH RBC QN AUTO: 32.1 PG (ref 26–34)
MCHC RBC AUTO-ENTMCNC: 31.3 G/DL (ref 32–36)
MCV RBC AUTO: 103 FL (ref 80–100)
NRBC BLD-RTO: 0 /100 WBCS (ref 0–0)
PHOSPHATE SERPL-MCNC: 2.9 MG/DL (ref 2.5–4.9)
PHOSPHATE SERPL-MCNC: 3.3 MG/DL (ref 2.5–4.9)
PLATELET # BLD AUTO: 511 X10*3/UL (ref 150–450)
POTASSIUM SERPL-SCNC: 3.7 MMOL/L (ref 3.5–5.3)
POTASSIUM SERPL-SCNC: 4.3 MMOL/L (ref 3.5–5.3)
PROTHROMBIN TIME: 23.7 SECONDS (ref 9.8–12.4)
RBC # BLD AUTO: 3.96 X10*6/UL (ref 4–5.2)
SODIUM SERPL-SCNC: 139 MMOL/L (ref 136–145)
SODIUM SERPL-SCNC: 140 MMOL/L (ref 136–145)
WBC # BLD AUTO: 11.1 X10*3/UL (ref 4.4–11.3)

## 2025-06-23 PROCEDURE — 83735 ASSAY OF MAGNESIUM: CPT

## 2025-06-23 PROCEDURE — 2500000004 HC RX 250 GENERAL PHARMACY W/ HCPCS (ALT 636 FOR OP/ED)

## 2025-06-23 PROCEDURE — 2500000001 HC RX 250 WO HCPCS SELF ADMINISTERED DRUGS (ALT 637 FOR MEDICARE OP)

## 2025-06-23 PROCEDURE — 99232 SBSQ HOSP IP/OBS MODERATE 35: CPT

## 2025-06-23 PROCEDURE — 2500000002 HC RX 250 W HCPCS SELF ADMINISTERED DRUGS (ALT 637 FOR MEDICARE OP, ALT 636 FOR OP/ED)

## 2025-06-23 PROCEDURE — 99232 SBSQ HOSP IP/OBS MODERATE 35: CPT | Performed by: NURSE PRACTITIONER

## 2025-06-23 PROCEDURE — 94668 MNPJ CHEST WALL SBSQ: CPT

## 2025-06-23 PROCEDURE — 36415 COLL VENOUS BLD VENIPUNCTURE: CPT

## 2025-06-23 PROCEDURE — 9420000001 HC RT PATIENT EDUCATION 5 MIN

## 2025-06-23 PROCEDURE — 82947 ASSAY GLUCOSE BLOOD QUANT: CPT

## 2025-06-23 PROCEDURE — 85610 PROTHROMBIN TIME: CPT

## 2025-06-23 PROCEDURE — 1200000002 HC GENERAL ROOM WITH TELEMETRY DAILY

## 2025-06-23 PROCEDURE — S4991 NICOTINE PATCH NONLEGEND: HCPCS

## 2025-06-23 PROCEDURE — 2500000005 HC RX 250 GENERAL PHARMACY W/O HCPCS: Performed by: INTERNAL MEDICINE

## 2025-06-23 PROCEDURE — 94760 N-INVAS EAR/PLS OXIMETRY 1: CPT

## 2025-06-23 PROCEDURE — 84100 ASSAY OF PHOSPHORUS: CPT

## 2025-06-23 PROCEDURE — 2500000002 HC RX 250 W HCPCS SELF ADMINISTERED DRUGS (ALT 637 FOR MEDICARE OP, ALT 636 FOR OP/ED): Performed by: INTERNAL MEDICINE

## 2025-06-23 PROCEDURE — 94640 AIRWAY INHALATION TREATMENT: CPT

## 2025-06-23 PROCEDURE — 80069 RENAL FUNCTION PANEL: CPT

## 2025-06-23 PROCEDURE — 2500000005 HC RX 250 GENERAL PHARMACY W/O HCPCS

## 2025-06-23 PROCEDURE — 85027 COMPLETE CBC AUTOMATED: CPT

## 2025-06-23 RX ORDER — POLYETHYLENE GLYCOL 3350 17 G/17G
17 POWDER, FOR SOLUTION ORAL DAILY
Status: DISCONTINUED | OUTPATIENT
Start: 2025-06-23 | End: 2025-06-28 | Stop reason: HOSPADM

## 2025-06-23 RX ADMIN — IPRATROPIUM BROMIDE AND ALBUTEROL SULFATE 3 ML: 2.5; .5 SOLUTION RESPIRATORY (INHALATION) at 08:09

## 2025-06-23 RX ADMIN — Medication 1 APPLICATION: at 08:36

## 2025-06-23 RX ADMIN — VENLAFAXINE HYDROCHLORIDE 150 MG: 75 CAPSULE, EXTENDED RELEASE ORAL at 08:30

## 2025-06-23 RX ADMIN — OXYCODONE HYDROCHLORIDE AND ACETAMINOPHEN 500 MG: 500 TABLET ORAL at 20:09

## 2025-06-23 RX ADMIN — DILTIAZEM HYDROCHLORIDE 360 MG: 120 CAPSULE, COATED, EXTENDED RELEASE ORAL at 08:35

## 2025-06-23 RX ADMIN — Medication 1 APPLICATION: at 08:38

## 2025-06-23 RX ADMIN — Medication 1 APPLICATION: at 15:53

## 2025-06-23 RX ADMIN — INSULIN LISPRO 4 UNITS: 100 INJECTION, SOLUTION INTRAVENOUS; SUBCUTANEOUS at 16:15

## 2025-06-23 RX ADMIN — Medication: at 20:22

## 2025-06-23 RX ADMIN — IPRATROPIUM BROMIDE AND ALBUTEROL SULFATE 3 ML: 2.5; .5 SOLUTION RESPIRATORY (INHALATION) at 19:45

## 2025-06-23 RX ADMIN — OXYCODONE HYDROCHLORIDE AND ACETAMINOPHEN 500 MG: 500 TABLET ORAL at 08:30

## 2025-06-23 RX ADMIN — KETOCONAZOLE: 20 CREAM TOPICAL at 20:22

## 2025-06-23 RX ADMIN — Medication 3 MG: at 20:10

## 2025-06-23 RX ADMIN — NYSTATIN CREAM: 100000 CREAM TOPICAL at 20:23

## 2025-06-23 RX ADMIN — PREDNISONE 40 MG: 20 TABLET ORAL at 08:29

## 2025-06-23 RX ADMIN — Medication 1 APPLICATION: at 20:24

## 2025-06-23 RX ADMIN — ACETAMINOPHEN 975 MG: 325 TABLET, FILM COATED ORAL at 12:03

## 2025-06-23 RX ADMIN — ZINC SULFATE 220 MG (50 MG) CAPSULE 1 CAPSULE: CAPSULE at 08:31

## 2025-06-23 RX ADMIN — GUAIFENESIN 600 MG: 600 TABLET ORAL at 08:31

## 2025-06-23 RX ADMIN — ACETAMINOPHEN 975 MG: 325 TABLET, FILM COATED ORAL at 05:31

## 2025-06-23 RX ADMIN — KETOCONAZOLE: 20 CREAM TOPICAL at 08:36

## 2025-06-23 RX ADMIN — Medication 4 L/MIN: at 08:09

## 2025-06-23 RX ADMIN — OXYCODONE HYDROCHLORIDE 10 MG: 10 TABLET ORAL at 03:33

## 2025-06-23 RX ADMIN — INSULIN LISPRO 4 UNITS: 100 INJECTION, SOLUTION INTRAVENOUS; SUBCUTANEOUS at 12:02

## 2025-06-23 RX ADMIN — POLYETHYLENE GLYCOL 3350 17 G: 17 POWDER, FOR SOLUTION ORAL at 12:07

## 2025-06-23 RX ADMIN — NYSTATIN CREAM: 100000 CREAM TOPICAL at 08:36

## 2025-06-23 RX ADMIN — DULOXETINE 20 MG: 20 CAPSULE, DELAYED RELEASE ORAL at 08:30

## 2025-06-23 RX ADMIN — SENNOSIDES AND DOCUSATE SODIUM 2 TABLET: 50; 8.6 TABLET ORAL at 08:30

## 2025-06-23 RX ADMIN — BUDESONIDE 0.5 MG: 0.5 INHALANT RESPIRATORY (INHALATION) at 19:47

## 2025-06-23 RX ADMIN — IPRATROPIUM BROMIDE AND ALBUTEROL SULFATE 3 ML: 2.5; .5 SOLUTION RESPIRATORY (INHALATION) at 15:12

## 2025-06-23 RX ADMIN — ACETAMINOPHEN 975 MG: 325 TABLET, FILM COATED ORAL at 17:01

## 2025-06-23 RX ADMIN — Medication 1 TABLET: at 08:31

## 2025-06-23 RX ADMIN — NICOTINE 1 PATCH: 21 PATCH, EXTENDED RELEASE TRANSDERMAL at 08:29

## 2025-06-23 RX ADMIN — CEFTRIAXONE 2 G: 2 INJECTION, SOLUTION INTRAVENOUS at 08:46

## 2025-06-23 RX ADMIN — BUDESONIDE 0.5 MG: 0.5 INHALANT RESPIRATORY (INHALATION) at 08:09

## 2025-06-23 RX ADMIN — HYDROXYUREA 1000 MG: 500 CAPSULE ORAL at 08:31

## 2025-06-23 RX ADMIN — GUAIFENESIN 600 MG: 600 TABLET ORAL at 20:09

## 2025-06-23 RX ADMIN — OXYCODONE HYDROCHLORIDE 10 MG: 10 TABLET ORAL at 12:03

## 2025-06-23 RX ADMIN — INSULIN LISPRO 6 UNITS: 100 INJECTION, SOLUTION INTRAVENOUS; SUBCUTANEOUS at 20:13

## 2025-06-23 RX ADMIN — PRAMIPEXOLE DIHYDROCHLORIDE 1 MG: 1 TABLET ORAL at 20:10

## 2025-06-23 RX ADMIN — TRIAMCINOLONE ACETONIDE: 1 CREAM TOPICAL at 08:38

## 2025-06-23 RX ADMIN — Medication 3 L/MIN: at 19:49

## 2025-06-23 RX ADMIN — PRAMIPEXOLE DIHYDROCHLORIDE 1 MG: 1 TABLET ORAL at 08:31

## 2025-06-23 RX ADMIN — SENNOSIDES AND DOCUSATE SODIUM 2 TABLET: 50; 8.6 TABLET ORAL at 20:09

## 2025-06-23 RX ADMIN — SPIRONOLACTONE 50 MG: 25 TABLET ORAL at 08:30

## 2025-06-23 RX ADMIN — OXYCODONE HYDROCHLORIDE 10 MG: 10 TABLET ORAL at 18:34

## 2025-06-23 ASSESSMENT — COGNITIVE AND FUNCTIONAL STATUS - GENERAL
TOILETING: A LITTLE
MOVING TO AND FROM BED TO CHAIR: A LITTLE
MOBILITY SCORE: 17
HELP NEEDED FOR BATHING: A LOT
MOVING FROM LYING ON BACK TO SITTING ON SIDE OF FLAT BED WITH BEDRAILS: A LITTLE
DRESSING REGULAR LOWER BODY CLOTHING: A LOT
TURNING FROM BACK TO SIDE WHILE IN FLAT BAD: A LITTLE
CLIMB 3 TO 5 STEPS WITH RAILING: A LOT
STANDING UP FROM CHAIR USING ARMS: A LITTLE
DAILY ACTIVITIY SCORE: 18
WALKING IN HOSPITAL ROOM: A LITTLE
DRESSING REGULAR UPPER BODY CLOTHING: A LITTLE

## 2025-06-23 ASSESSMENT — PAIN SCALES - GENERAL
PAINLEVEL_OUTOF10: 9
PAINLEVEL_OUTOF10: 7
PAINLEVEL_OUTOF10: 5 - MODERATE PAIN
PAINLEVEL_OUTOF10: 8
PAINLEVEL_OUTOF10: 4
PAINLEVEL_OUTOF10: 2
PAINLEVEL_OUTOF10: 4

## 2025-06-23 ASSESSMENT — PAIN - FUNCTIONAL ASSESSMENT
PAIN_FUNCTIONAL_ASSESSMENT: 0-10

## 2025-06-23 ASSESSMENT — PAIN DESCRIPTION - LOCATION: LOCATION: LEG

## 2025-06-23 ASSESSMENT — PAIN DESCRIPTION - ORIENTATION: ORIENTATION: RIGHT;LEFT

## 2025-06-23 NOTE — PROGRESS NOTES
Neisha Graham is a 65 y.o. female on day 4 of admission presenting with Pneumonia of both lower lobes due to infectious organism.    Subjective   Interval History: no fever, no new complaints        Review of Systems    Objective   Range of Vitals (last 24 hours)  Heart Rate:  [79-88]   Temp:  [36.3 °C (97.3 °F)-36.9 °C (98.4 °F)]   Resp:  [15-18]   BP: (128-139)/(69-95)   SpO2:  [92 %-97 %]   Daily Weight  06/19/25 : (!) 160 kg (353 lb 8 oz)    Body mass index is 60.68 kg/m².    Physical Exam  Constitutional:       Appearance: Normal appearance.   HENT:      Head: Normocephalic and atraumatic.      Mouth/Throat:      Mouth: Mucous membranes are moist.      Pharynx: Oropharynx is clear.   Eyes:      Pupils: Pupils are equal, round, and reactive to light.   Cardiovascular:      Rate and Rhythm: Normal rate and regular rhythm.      Heart sounds: Normal heart sounds.   Pulmonary:      Effort: Pulmonary effort is normal.      Breath sounds: Normal breath sounds.   Abdominal:      General: Abdomen is flat. Bowel sounds are normal.      Palpations: Abdomen is soft.   Musculoskeletal:         General: Swelling present.      Cervical back: Normal range of motion.      Comments: Stasis / wounds   Neurological:      Mental Status: She is alert.         Antibiotics  cefTRIAXone - 2 gram/50 mL    Relevant Results  Labs  Results from last 72 hours   Lab Units 06/23/25  0632 06/22/25  0643 06/21/25  0649   WBC AUTO x10*3/uL 11.1 13.4* 15.3*   HEMOGLOBIN g/dL 12.7 12.4 11.8*   HEMATOCRIT % 40.6 39.9 39.5   PLATELETS AUTO x10*3/uL 511* 504* 481*     Results from last 72 hours   Lab Units 06/23/25  0632 06/22/25  1830 06/22/25  0643   SODIUM mmol/L 140 139 141   POTASSIUM mmol/L 3.7 3.9 3.7   CHLORIDE mmol/L 96* 96* 96*   CO2 mmol/L 38* 35* 35*   BUN mg/dL 32* 35* 30*   CREATININE mg/dL 0.99 1.15* 1.05   GLUCOSE mg/dL 126* 255* 147*   CALCIUM mg/dL 8.8 8.9 8.9   ANION GAP mmol/L 10 12 14   EGFR mL/min/1.73m*2 63 53* 59*    PHOSPHORUS mg/dL 2.9 3.1 3.6     Results from last 72 hours   Lab Units 06/23/25  0632 06/22/25  1830 06/22/25  0643   ALBUMIN g/dL 3.5 3.7 3.4     Estimated Creatinine Clearance: 85.4 mL/min (by C-G formula based on SCr of 0.99 mg/dL).  C-Reactive Protein   Date Value Ref Range Status   06/19/2025 3.23 (H) <1.00 mg/dL Final   12/11/2024 0.72 <1.00 mg/dL Final   09/16/2024 16.36 (H) <1.00 mg/dL Final     Microbiology  Susceptibility data from last 14 days.  Collected Specimen Info Organism Clindamycin Erythromycin Penicillin Tetracycline   06/19/25 Blood culture from Peripheral Venipuncture Streptococcus agalactiae (Group B Streptococcus)       06/19/25 Blood culture from Peripheral Venipuncture Streptococcus agalactiae (Group B Streptococcus)  R  R  S  R   Imaging  Reviewed        Assessment/Plan   Sepsis, BC with gp B Strep, TTE reviewed, the repeat BC is negative   Legs stasis / wounds / likely cellulitis  Respiratory failure / COPD /  BE / atelectasis, for cath     Recommendations :  Continue Rocephin       I spent minutes in the professional and overall care of this patient.  The cultures and susceptibilities were reviewed and discussed with the micro lab, the antibiotics stewardship guidelines were reviewed, the infection control protocols and recommendations were reviewed with the patient and the staff                 Gorge Myers MD

## 2025-06-23 NOTE — CONSULTS
"Pulmonary Disease Navigator    History Of Present Illness  Neisha Graham is a 65 y.o. female presenting with pneumonia. Patient has a hx COPD wears 2L NC HS, AFIB, HFpEF and pulmonary hypertension.  Patient takes trelegy, Albuterol and Duo Nebs  at home, which patient states was prescribed by her PCP.  Patient is a current smoker, 1 ppd for 30 years.      Pulmonary Functions Testing Results: No PFTS noted in chart per pt had testing done over 5 years ago.     No results found for: \"FEV1\", \"FVC\", \"QVL6QDS\", \"TLC\", \"DLCO\"    Patient states independent with  ADLs with walker She is currently on 2 L NC SpO2 92%.     Last Recorded Vitals  Blood pressure (!) 129/101, pulse 86, temperature 36.9 °C (98.4 °F), temperature source Temporal, resp. rate 18, height 1.626 m (5' 4\"), weight (!) 160 kg (353 lb 8 oz), SpO2 92%.        Assessment/Plan     This RT to see patient for COPD consult. The patient was given a COPD booklet with educational materials regarding pulmonary issues. Smoking cessation education reviewed, documentation given. Smoking history:30 ppd.  I discussed various options for quitting smoking. I talked about different strategies that can be used to support changing habits and reduce the urges of withdraw symptoms.  [facility] support group discussed. Flyer given for next month's meeting. Patient diagnosed with COPD When: ears ago. I educated patient about the disease process and how it affected the lungs making it difficult to breathe. We discussed current medications and if their current medications give them relief. PFTs: not available    Patient given  pulmonary office phone number to make an appt. Patient was very receptive to all information given.   Pulmonary rehab- Educated patient on pulmonary rehab program. The patient was given information regarding our pulmonary rehabilitation program. Spoke to the patient about the benefits of this program which can offer coping skills and exercise " strengthening sessions providing a better quality of life. Patient is receptive to joining the program.           ALVARO MORRIS, RRT

## 2025-06-23 NOTE — NURSING NOTE
Sepsis Coordinator talked to pt about the [facility] program. Pt asked to be enrolled in it.    Arjun Silva RN BSN

## 2025-06-23 NOTE — CARE PLAN
The patient's goals for the shift include      The clinical goals for the shift include Patient will remain comfortable through out shift    Over the shift, the patient did not make progress toward the following goals. Barriers to progression include BLE and back pain. Recommendations to address these barriers include PRN medication.

## 2025-06-23 NOTE — PROGRESS NOTES
06/23/25 1155   Discharge Planning   Living Arrangements Alone   Support Systems Children;Family members   Assistance Needed A&OX4; independent with ADLs with walker and wheelchair; doesn't drive; 2L @ HS. Currently 2L NC; PCP Dr Gonsalez; on Coumadin prior to hospitalization   Type of Residence Private residence   Number of Stairs to Enter Residence 0   Number of Stairs Within Residence 0   Do you have animals or pets at home? No   Who is requesting discharge planning? Provider   Expected Discharge Disposition Home Health  (Pt lives in handicapped apartment. Active [facility].)   Does the patient need discharge transport arranged? Yes   RoundTrip coordination needed? Yes   Has discharge transport been arranged? No   Stroke Family Assessment   Stroke Family Assessment Needed No   Intensity of Service   Intensity of Service 0-30 min

## 2025-06-23 NOTE — PROGRESS NOTES
Subjective Data:  Breathing at rest if improving  Severe chronic BLE lymphedema with mild improvement in acute swelling noted  Denies chest pain or pressure        Objective Data:  Last Recorded Vitals:  Vitals:    06/23/25 1146 06/23/25 1305 06/23/25 1512 06/23/25 1539   BP: (!) 129/101   144/83   BP Location: Right arm   Right arm   Patient Position: Lying   Lying   Pulse: 92 86  87   Resp: 18   18   Temp: 36.9 °C (98.4 °F)   36.9 °C (98.4 °F)   TempSrc: Temporal   Temporal   SpO2: 91% 92% 91% 93%   Weight:       Height:           Last Labs:  CBC - 6/23/2025:  6:32 AM  11.1 12.7 511    40.6      CMP - 6/23/2025:  6:32 AM  8.8 8.0 28 --- 0.8   2.9 3.5 17 230      PTT - 6/23/2025:  6:32 AM  2.1   23.7 36     TROPHS   Date/Time Value Ref Range Status   06/19/2025 01:57 PM 23 0 - 13 ng/L Final   06/19/2025 12:50 PM 29 0 - 13 ng/L Final   12/11/2024 10:19 PM 26 0 - 13 ng/L Final     BNP   Date/Time Value Ref Range Status   06/19/2025 12:50  0 - 99 pg/mL Final   12/11/2024 04:51  0 - 99 pg/mL Final     HGBA1C   Date/Time Value Ref Range Status   12/11/2024 04:51 PM 5.5 See comment % Final   08/13/2024 07:35 AM 5.9 see below % Final     LDLCALC   Date/Time Value Ref Range Status   12/11/2024 04:51 PM 87 <=99 mg/dL Final     Comment:                                 Near   Borderline      AGE      Desirable  Optimal    High     High     Very High     0-19 Y     0 - 109     ---    110-129   >/= 130     ----    20-24 Y     0 - 119     ---    120-159   >/= 160     ----      >24 Y     0 -  99   100-129  130-159   160-189     >/=190       VLDL   Date/Time Value Ref Range Status   12/11/2024 04:51 PM 21 0 - 40 mg/dL Final   11/14/2022 11:01 AM 40 0 - 40 mg/dL Final   09/27/2020 12:15 AM 21 0 - 40 mg/dL Final   06/19/2020 03:03 PM 16 0 - 40 mg/dL Final      Last I/O:  I/O last 3 completed shifts:  In: 1040 (6.5 mL/kg) [P.O.:1000; I.V.:40 (0.2 mL/kg)]  Out: 5150 (32.1 mL/kg) [Urine:5150 (0.9 mL/kg/hr)]  Weight: 160.3  kg     Past Cardiology Tests (Last 3 Years):  Echo:  Transthoracic Echo (TTE) Complete 12/12/2024  CONCLUSIONS:   1. The left ventricular systolic function is normal, with a visually estimated ejection fraction of 60-65%.   2. Spectral Doppler shows a Grade I (impaired relaxation pattern) of left ventricular diastolic filling with normal left atrial filling pressure.   3. There is moderately increased septal and moderately increased posterior left ventricular wall thickness.   4. There is moderate concentric left ventricular hypertrophy.   5. There is reduced right ventricular systolic function.   6. Moderately enlarged right ventricle.   7. The left atrium is mildly dilated.   8. There is moderate mitral annular calcification.   9. There is moderate mitral valve stenosis.  10. Moderate to severe aortic valve stenosis. (4.03, 65/41, 0.88, DI 0.28)   11. Moderately elevated pulmonary artery pressure.  12. There is mild mitral and tricuspid regurgitation.     9/18/2024  CONCLUSIONS:   1. The left ventricular systolic function is normal, with a visually estimated ejection fraction of 60-65%.   2. Spectral Doppler shows an impaired relaxation pattern of left ventricular diastolic filling.   3. There is normal right ventricular global systolic function.   4. The left atrium is mildly dilated.   5. There is severe mitral annular calcification.   6. Severe aortic valve stenosis.    Inpatient Medications:  Scheduled Medications[1]  PRN Medications[2]  Continuous Medications[3]    Physical Exam:  Constitutional: Pleasant, Awake/Alert/Oriented to person place and time.  Head: Atraumatic, Normocephalic  Eyes: EOMI. MICHELE  Neck: +JVD  Cardiovascular: Regular rate and rhythm, S1, S2. 4/6 murmur RUSB   Respiratory: Crackles noted posteriorly at bases   Abdomen: Soft, Nontender, Obese. Bowel sounds appreciated  Musculoskeletal: ROM intact. Muscle strength grossly intact upper and lower extremities 5/5.   Neurological: CNII-XII  intact. Sensation grossly intact  Extremities: Severe chronic lymphedema BLE   Psychiatric: Appropriate mood and affect       Assessment/Plan   65 y.o. 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     Assessment:  Acute decompensated diastolic HF  Severe aortic stenosis  Moderate mitral stenosis  BLE cellulitis  Possible pneumonia  pSVT    Plan:  -Continue lasix drip 10mg/hr  -Continue spironolactone 50mg daily, diltiazem 360mg daily  -Continue warfarin for goal INR 2-3   -Atbx per primary team/ID recommendations   -Recommend R+LHC after adequate diuresis-- likely Thursday   -Will place referral to structural heart team prior to discharge for further discussion regarding TAVR candidacy     Peripheral IV 06/19/25 20 G Left Antecubital (Active)   Site Assessment Clean;Dry;Intact 06/23/25 0801   Dressing Status Dry;Clean 06/23/25 0801   Number of days: 4       Code Status:  Full Code    LAKEISHA Mackey-CNP       [1]   Scheduled medications   Medication Dose Route Frequency    acetaminophen  975 mg oral q6h    ascorbic acid  500 mg oral BID    budesonide  0.5 mg nebulization BID    And    ipratropium-albuteroL  3 mL nebulization TID    cefTRIAXone  2 g intravenous q24h    dilTIAZem ER  360 mg oral Daily    DULoxetine  20 mg oral Daily    guaiFENesin  600 mg oral BID    hydroxyurea  1,000 mg oral Daily    insulin lispro  0-10 Units subcutaneous Before meals & nightly    ketoconazole   Topical BID    melatonin  3 mg oral Nightly    multivitamin with minerals  1 tablet oral Daily    nicotine  1 patch transdermal Daily    Followed by    [START ON 7/31/2025] nicotine  1 patch transdermal Daily    Followed by    [START ON 8/14/2025] nicotine  1  patch transdermal Daily    nystatin   Topical BID    pneumoc 20-eros conj-dip cr(PF)  0.5 mL intramuscular During hospitalization    polyethylene glycol  17 g oral Daily    pramipexole  1 mg oral BID    sennosides-docusate sodium  2 tablet oral BID    spironolactone  50 mg oral Daily    [Held by provider] torsemide  40 mg oral BID    triamcinolone   Topical BID    [Held by provider] warfarin  7.5 mg oral Daily    white petrolatum  1 Application Topical BID    zinc oxide  1 Application Topical TID    zinc sulfate  50 mg of elemental zinc oral Daily   [2]   PRN medications   Medication    ammonium lactate    benzocaine    cyclobenzaprine    dextrose    dextrose    glucagon    glucagon    HYDROmorphone    hydrOXYzine HCL    ipratropium-albuteroL    lidocaine    oxyCODONE    oxyCODONE    oxygen   [3]   Continuous Medications   Medication Dose Last Rate    furosemide  10 mg/hr 10 mg/hr (06/23/25 0620)

## 2025-06-23 NOTE — PROGRESS NOTES
"Internal Medicine - Daily Progress Note   Hospital Day: 4    Name: Neisha Graham   Age: 65 y.o.   Gender: Female  Room: 107/107-A        HPI   Neisha Graham is a 65 y.o. year old female  patient with with PMHx significant for paroxysmal atrial fibrillation on warfarin, chronic thromboembolic disease, essential thrombocytosis, moderate to severe aortic stenosis, moderate mitral stenosis, HFpEF (EF 60-65% 12/24), COPD on 2L NC nightly, pulmonary hypertension, lymphedema with chronic lower extremity wounds, chronic sacral wounds, HTN, HLD, anxiety, and depression who presented to [facility] on 6/19/25 with chief complaint of fever and chills with associated shortness of breath. Patient states that last night she began to notice subjective fever and chills that continued into this morning which was her main concern for presenting to the ER. She does note that she had noticed her L lower extremity wound was foul smelling last night and had soaked through her bandage faster than normal. Additionally, she notes increased swelling of her bilateral lower extremities up to her knees. States that she is supposed to take torsemide 40 mg BID at home but recently stopped taking the evening dose because she felt as though it was too much. Endorses good urine output with the torsemide once daily. Additionally, she notes swelling of a gland on her R jaw and neck for the past 2-3 months that is painful and keeps her awake at night. She was recently treated for pneumonia outpatient twice by her PCP, once with a 10-day course of Augmentin and once with a 5-day course of Cefdinir, at that time she states her breathing improved but the gland remained inflamed. She started feeling increasingly short of breath again recently and noes increase of her chronic cough with sputum changing from \"flesh\" color to a more green/white color. She denies chest pain, palpitations, nasal congestion, sore throat, abdominal pain, nausea, " "vomiting.       Subjective    Patient continues to report improvement in her breathing and leg swelling.  She reports no bowel movement since Saturday, however denies abdominal pain, distention, nausea, vomiting.     Discussed continued plan for IV diuresis until euvolemic, followed by cardiac catheterization later this week.  Answered all questions.    Overnight Events: none    ROS: 12 points review of system is negative except as stated in the HPI above.     Objective    Vitals  Visit Vitals  /72 (BP Location: Right arm, Patient Position: Lying)   Pulse 85   Temp 36.6 °C (97.9 °F) (Temporal)   Resp 18   Ht 1.626 m (5' 4\")   Wt (!) 160 kg (353 lb 8 oz)   SpO2 92%   BMI 60.68 kg/m²   Smoking Status Some Days   BSA 2.69 m²       Physical Exam    Physical Exam  Constitutional:       General: She is not in acute distress.     Appearance: She is obese.   HENT:      Head: Normocephalic and atraumatic.      Mouth/Throat:      Mouth: Mucous membranes are moist.   Eyes:      Extraocular Movements: Extraocular movements intact.      Pupils: Pupils are equal, round, and reactive to light.   Cardiovascular:      Rate and Rhythm: Normal rate and regular rhythm.      Pulses: Normal pulses.      Heart sounds: Murmur heard.   Pulmonary:      Effort: Pulmonary effort is normal. No respiratory distress.      Breath sounds: Wheezing present.   Abdominal:      General: Bowel sounds are normal. There is no distension.      Palpations: Abdomen is soft.      Tenderness: There is no abdominal tenderness.   Musculoskeletal:      Comments: Chronic bilateral lower extremity swelling consistent with lymphedema, left lower extremity wound mid dorsal tibia with drainage (wrapped with gauze)   Skin:     Findings: Erythema (BLE) present.   Neurological:      General: No focal deficit present.      Mental Status: She is alert and oriented to person, place, and time.   Psychiatric:         Mood and Affect: Mood normal.         " "  IOs    Intake/Output Summary (Last 24 hours) at 6/23/2025 0735  Last data filed at 6/23/2025 0620  Gross per 24 hour   Intake 724.04 ml   Output 3600 ml   Net -2875.96 ml       Labs:   Results from last 72 hours   Lab Units 06/23/25  0632 06/22/25  1830 06/22/25  0643   SODIUM mmol/L 140 139 141   POTASSIUM mmol/L 3.7 3.9 3.7   CHLORIDE mmol/L 96* 96* 96*   CO2 mmol/L 38* 35* 35*   BUN mg/dL 32* 35* 30*   CREATININE mg/dL 0.99 1.15* 1.05   GLUCOSE mg/dL 126* 255* 147*   CALCIUM mg/dL 8.8 8.9 8.9   ANION GAP mmol/L 10 12 14   EGFR mL/min/1.73m*2 63 53* 59*   PHOSPHORUS mg/dL 2.9 3.1 3.6      Results from last 72 hours   Lab Units 06/23/25  0632 06/22/25  0643 06/21/25  0649   WBC AUTO x10*3/uL 11.1 13.4* 15.3*   HEMOGLOBIN g/dL 12.7 12.4 11.8*   HEMATOCRIT % 40.6 39.9 39.5   PLATELETS AUTO x10*3/uL 511* 504* 481*      Lab Results   Component Value Date    CALCIUM 8.8 06/23/2025    PHOS 2.9 06/23/2025      Lab Results   Component Value Date    CRP 3.23 (H) 06/19/2025      [unfilled]     Micro/ID:   Susceptibility data from last 90 days.  Collected Specimen Info Organism Clindamycin Erythromycin Penicillin Tetracycline   06/19/25 Blood culture from Peripheral Venipuncture Streptococcus agalactiae (Group B Streptococcus)       06/19/25 Blood culture from Peripheral Venipuncture Streptococcus agalactiae (Group B Streptococcus)  R  R  S  R                    No lab exists for component: \"AGALPCRNB\"   .ID  Lab Results   Component Value Date    URINECULTURE  12/30/2024     Clinically insignificant growth based on current clinical standards.    BLOODCULT No growth at 1 day 06/21/2025    BLOODCULT No growth at 1 day 06/21/2025       Images  Transthoracic Echo Complete     [facility], [address] [address]                Tel [phone] and Fax [phone]    TRANSTHORACIC ECHOCARDIOGRAM REPORT       Patient Name:       LION PEREZ     Reading Physician:    [id number] Victor Hugo               "                                                  Ariel HUANG  Study Date:         6/20/2025           Ordering Provider:    [id number] MARILYN BARROS  MRN/PID:            [id number]            Fellow:  Accession#:         [id number]        Nurse:  Date of Birth/Age:  1960 / 65      Sonographer:          Keesha parker                                     RDISABELLA  Gender assigned at  F                   Additional Staff:  Birth:  Height:             162.56 cm           Admit Date:           6/19/2025  Weight:             160.12 kg           Admission Status:     Inpatient -                                                                Routine  BSA / BMI:          2.49 m2 / 60.59     Encounter#:           [id number]                      kg/m2  Blood Pressure:     115/63 mmHg         Department Location:  [facility] Non                                                                Invasive    Study Type:    TRANSTHORACIC ECHO (TTE) COMPLETE  Diagnosis/ICD: Acute combined systolic (congestive) and diastolic (congestive)                 heart failure (CHF)-I50.41; Nonrheumatic aortic (valve)                 stenosis-I35.0  Indication:    Aortic stenosis, CHF  CPT Code:      Echo Complete w Full Doppler-17143    Patient History:  Pertinent History: A-Fib, Anticoagulation, CHF, Dyspnea, COPD, HTN and                     Hyperlipidemia. Severe AS, Mod MS, Pulm HTN, Elevated BNP,.    Study Detail: The following Echo studies were performed: 2D, M-Mode, Doppler and                color flow.       PHYSICIAN INTERPRETATION:  Left Ventricle: Left ventricular ejection fraction is normal calculated by Arreguin's biplane at 63%. There is severe concentric left ventricular hypertrophy. There are no regional left ventricular wall motion abnormalities. The left ventricular cavity size is normal. There is severely increased septal and severely  increased posterior left ventricular wall thickness. Spectral Doppler shows a Grade II (pseudonormal pattern) of left ventricular diastolic filling with an elevated left atrial pressure.  Left Atrium: The left atrial size is severely dilated.  Right Ventricle: The right ventricle is mildly enlarged. There is normal right ventricular global systolic function.  Right Atrium: The right atrium is moderately dilated.  Aortic Valve: The aortic valve is probably trileaflet. The aortic valve area by VTI is 0.68 cmï¿½ with a peak velocity of 4.36 m/s. The peak and mean gradients are 75 mmHg and 53 mmHg, respectively with a dimensionless index of 0.22. There is evidence of severe aortic valve stenosis. There is no evidence of aortic valve regurgitation.  Mitral Valve: The mitral valve is mildly thickened. The doppler estimated peak and mean diastolic pressure gradients are 12.4 mmHg and 7 mmHg, respectively. There is evidence of moderate mitral valve stenosis. There is moderate mitral annular calcification. The mean gradient of the mitral valve is 7 mmHg. There is mild to moderate mitral valve regurgitation. The E Vmax is 1.59 m/s.  Tricuspid Valve: The tricuspid valve is structurally normal. There is mild to moderate tricuspid regurgitation. The Doppler estimated right ventricular systolic pressure (RVSP) is moderately elevated at 60 mmHg.  Pulmonic Valve: The pulmonic valve is structurally normal. There is trace pulmonic valve regurgitation.  Pericardium: There is no pericardial effusion noted.  Aorta: The aortic root is abnormal. There is no dilatation of the ascending aorta. There is no dilatation of the aortic root.  Systemic Veins: The inferior vena cava appears severely dilated, with IVC inspiratory collapse less than 50%.  In comparison to the previous echocardiogram(s): Compared with study dated 12/12/2024,.       CONCLUSIONS:   1. Left ventricular ejection fraction is normal calculated by Arreguin's biplane at 63%.    2. There is severely increased septal and severely increased posterior left ventricular wall thickness.   3. There is severe concentric left ventricular hypertrophy.   4. There is normal right ventricular global systolic function.   5. Mildly enlarged right ventricle.   6. The left atrial size is severely dilated.   7. The right atrium is moderately dilated.   8. Severe aortic valve stenosis. The peak and mean gradients are 76 mmHg and 53 mmHg respectively.   9. There is moderate mitral annular calcification.  10. There is moderate mitral valve stenosis with a doppler estimated mean diastolic gradient of 7 mmHg.  11. Mild to moderate mitral valve regurgitation.  12. Mild to moderate tricuspid regurgitation visualized.  13. The Doppler estimated RVSP is moderately elevated at 60 mmHg.  14. The inferior vena cava appears severely dilated, with IVC inspiratory collapse less than 50%.    QUANTITATIVE DATA SUMMARY:     2D MEASUREMENTS:          Normal Ranges:  IVSd:            1.62 cm  (0.6-1.1cm)  LVPWd:           1.75 cm  (0.6-1.1cm)  LVIDd:           4.77 cm  (3.9-5.9cm)  LVIDs:           3.08 cm  LV Mass Index:   144 g/m2  LVEDV Index:     40 ml/m2  LV % FS          35.3 %       LEFT ATRIUM:                  Normal Ranges:  LA Vol A4C:        123.0 ml   (22+/-6mL/m2)  LA Vol A2C:        123.1 ml  LA Vol BP:         125.9 ml  LA Vol Index A4C:  49.4ml/m2  LA Vol Index A2C:  49.4 ml/m2  LA Vol Index BP:   50.6 ml/m2  LA Area A4C:       30.9 cm2  LA Area A2C:       30.2 cm2  LA Major Axis A4C: 6.6 cm  LA Major Axis A2C: 6.3 cm  LA Volume Index:   50.4 ml/m2  LA Vol A4C:        116.9 ml  LA Vol A2C:        121.6 ml  LA Vol Index BSA:  47.9 ml/m2       RIGHT ATRIUM:          Normal Ranges:  RA Area A4C:  34.1 cm2       M-MODE MEASUREMENTS:         Normal Ranges:  Ao Root:             3.00 cm (2.0-3.7cm)  LAs:                 4.73 cm (2.7-4.0cm)       LV SYSTOLIC FUNCTION:                       Normal Ranges:  EF-A4C View:     65 % (>=55%)  EF-A2C View:    61 %  EF-Biplane:     63 %  LV EF Reported: 63 %       LV DIASTOLIC FUNCTION:           Normal Ranges:  MV Peak E:             1.59 m/s  (0.7-1.2 m/s)  MV Peak A:             1.39 m/s  (0.42-0.7 m/s)  E/A Ratio:             1.14      (1.0-2.2)  MV e'                  0.110 m/s (>8.0)  MV lateral e'          0.13 m/s  MV medial e'           0.09 m/s  E/e' Ratio:            14.43     (<8.0)       MITRAL VALVE:           Normal Ranges:  MV Vmax:      1.76 m/s  (<=1.3m/s)  MV peak P.4 mmHg (<5mmHg)  MV mean P.1 mmHg  (<2mmHg)  MV VTI:       41.10 cm  (10-13cm)  MV DT:        216 msec  (150-240msec)       MITRAL INSUFFICIENCY:             Normal Ranges:  MR VTI:               181.09 cm  MR Vmax:              597.53 cm/s       AORTIC VALVE:                      Normal Ranges:  AoV Vmax:                4.36 m/s  (<=1.7m/s)  AoV Peak P.0 mmHg (<20mmHg)  AoV Mean P.3 mmHg (1.7-11.5mmHg)  LVOT Max Rodriguez:            1.09 m/s  (<=1.1m/s)  AoV VTI:                 105.83 cm (18-25cm)  LVOT VTI:                22.85 cm  LVOT Diameter:           2.00 cm   (1.8-2.4cm)  AoV Area, VTI:           0.68 cm2  (2.5-5.5cm2)  AoV Area,Vmax:           0.79 cm2  (2.5-4.5cm2)  AoV Dimensionless Index: 0.22       RIGHT VENTRICLE:  RV Basal 4.70 cm  RV Mid   4.00 cm  RV Major 8.7 cm  TAPSE:   24.0 mm  RV s'    0.15 m/s       TRICUSPID VALVE/RVSP:          Normal Ranges:  Peak TR Velocity:     3.36 m/s  Est. RA Pressure:     15  RV Syst Pressure:     60       (< 30mmHg)  IVC Diam:             3.50 cm       PULMONIC VALVE:           Normal Ranges:  PV Max Rodriguez:     1.7 m/s   (0.6-0.9m/s)  PV Max P.1 mmHg       [id number] Victor Hugo George MD  Electronically signed on 2025 at 2:26:52 PM       ** Final **  ECG 12 lead  Sinus tachycardia with Premature atrial complexes  Possible Left atrial enlargement  Borderline ECG  When compared with ECG of 13-DEC-2024 13:54,  No  significant change was found      Meds  Scheduled medications  Scheduled Medications[1]  Continuous medications  Continuous Medications[2]  PRN medications  PRN Medications[3]     Assessment and Plan    Neisha Graham is a 65 y.o. female with PMHx significant for paroxysmal atrial fibrillation (on warfarin), chronic thromboembolic disease, essential thrombocytosis, moderate to severe aortic stenosis, moderate mitral stenosis, HFpEF (EF 60-65% 12/24), COPD (on 2L NC nightly), pulmonary hypertension, lymphedema with chronic lower extremity wounds, chronic sacral wounds, HTN, HLD admitted on 6/19/2025 acute on chronic hypoxic respiratory failure 2/2 HFpEF and COPD exacerbation with concern for sepsis 2/2 infection of chronic wounds.     Acute Medical Issues:   #Sepsis  #Bacteremia  #Chronic bilateral lower extremity and sacral wounds with concern for acute infection  ::CXR and CT without distinct PNA  ::Patient notes foul smell of L lower extremity wound x 2 days - with some surrounding erythema  ::Hx of wound with MRSA 9/24 and pseudomonas bacteremia 9/24  ::Patient complaints of swollen/painful gland in R jaw and ability to express salivary stones from under tongue - minimal parotid swelling and tenderness on examination  -Blood cultures positive for group B streptococcus  -Pro-[facility] 0.26, CRP 3.23, ESR 54  -Continue ceftriaxone (6/19 - ), azithromycin (6/19 - 6/21)  -ID consulted, agrees with plan to continue ceftriaxone  -Wound care following  -Repeat blood cultures Ngx1 day     #Acute on chronic hypoxic respiratory failure   #CHF exacerbation  #COPD exacerbation  ::TTE 12/2024 with EF 60-65%  ::Patient with SOB and desaturation to 88%, requiring 2L NC continuous (at baseline wears 2L NC at night and with transfers/exertion)  ::Patient on torsemide 40 mg BID at home but recently stopped taking evening dose  ::Increased cough, sputum production  ::BNP elevated at 786  ::CXR and CT chest concerning for pulmonary edema    ::s/p 80 mg IV Lasix in the ED  -On 2L NC, wean as tolerated back to baseline 2 L at night and with exertion  -Prednisone 40 mg daily x 5 days (6/19 - 6/23)  -Azithromycin x 3 days (6/19 - 6/21)  -Mucinex 600 mg BID  -Continue home inhalers  -Duonebs scheduled and PRN  -Incentive spirometer and acapella ordered   -Continue home spironolactone 50 mg daily  -Holding home torsemide, continue with IV diuresis  -TTE: EF 63%, severely concentric left ventricular hypertrophy, severely dilated left atria, moderately dilated right atria, severe aortic stenosis with peak gradient 76 mmHg, moderate mitral valve stenosis  -1800cc/day fluid restriction  -Monitor strict I/O: Continues to be net negative >2 L daily  -Cardiology consulted: Continue Lasix drip at 10 mg/h    #Moderate to severe aortic stenosis   #Moderate mitral valve stenosis   ::TTE 12/2024 with EF 60-65% and Grade I LV impaired relaxation pattern and mod to severe aortic stenosis and mod mitral stenosis   ::ED did speak with Dr. George who recommended admission at [facility] and would see patient in AM to determine if transfer needed for valve evaluation  -Cardiology consulted: Once euvolemic, will plan for R+LHC once euvolemic to assess for aortic and mitral stenosis  -Will likely need outpatient referral to structural heart team to discuss TAVR candidacy    #Hyperglycemia   ::suspect 2/2 steroid use  -monitor  -Continue SSI      Resolved Medical Issues:  #Supratherapeutic INR  ::INR 3.4 on admission    #Leukocytosis  :: WBC 27.2 on admission      Chronic Medical Issues:   #Paroxysmal atrial fibrillation -holding home warfarin (takes 7.5 mg daily, INR goal 2-3), continue diltiazem 360 mg daily  #Essential thrombocytosis - Hydroxyurea 1000 mg daily  #Chronic back pain - Flexeril 10 mg BID PRN for muscle spasms, takes oxycodone 10 mg PRN at home for pain - inpatient pain regimen: scheduled Tylenol 975 mg q6 hours, oxycodone 5 and 10 mg q6 hours PRN for moderate and  severe pain, dilaudid 0.4 mg PRN for breakthrough pain  #Chronic constipation 2/2 opioid use: Continue bowel regimen  #Depression, Anxiety - Duloxetine 20 mg daily, hydroxyzine 50 mg q8 hours PRN, venlafaxine 150 mg daily  #RLS - Pramipexole 1 mg BID  #Tobacco use disorder-Nicotine patch ordered, smoking cessation discussed  #HTN, HLD    Fluids: Fluid restriction 1800cc daily   Electrolytes: Replete as needed   Nutrition: Cardiac diet  GI ppx: None  DVT ppx: Holding home warfarin, will need to start heparin gtt once INR <2 in anticipation of cardiac cath later this week  Antibiotics: Ceftriaxone  Tubes/Lines/Drains: PIV  Oxygen: 2L NC    Code Status: Full Code   Emergency Contact: Extended Emergency Contact Information  Primary Emergency Contact: Belle Chauhan  Mobile Phone: [phone]  Relation: Sibling  Secondary Emergency Contact: ANA,JULIE  Mobile Phone: [phone]  Relation: Daughter     Disposition: 65 y.o. female admitted for acute on chronic HRF iso HFpEF and COPD exacerbations with concern for sepsis 2/2 bacteremia.  Pending cardiac cath once euvolemic.      Violeta Hernandez,    06/23/25                 [1] acetaminophen, 975 mg, oral, q6h  ascorbic acid, 500 mg, oral, BID  budesonide, 0.5 mg, nebulization, BID   And  ipratropium-albuteroL, 3 mL, nebulization, TID  cefTRIAXone, 2 g, intravenous, q24h  dilTIAZem ER, 360 mg, oral, Daily  DULoxetine, 20 mg, oral, Daily  guaiFENesin, 600 mg, oral, BID  hydroxyurea, 1,000 mg, oral, Daily  insulin lispro, 0-10 Units, subcutaneous, Before meals & nightly  ketoconazole, , Topical, BID  melatonin, 3 mg, oral, Nightly  multivitamin with minerals, 1 tablet, oral, Daily  nicotine, 1 patch, transdermal, Daily   Followed by  [START ON 7/31/2025] nicotine, 1 patch, transdermal, Daily   Followed by  [START ON 8/14/2025] nicotine, 1 patch, transdermal, Daily  nystatin, , Topical, BID  pneumoc 20-eros conj-dip cr(PF), 0.5 mL, intramuscular, During  hospitalization  pramipexole, 1 mg, oral, BID  predniSONE, 40 mg, oral, Daily  sennosides-docusate sodium, 2 tablet, oral, BID  spironolactone, 50 mg, oral, Daily  [Held by provider] torsemide, 40 mg, oral, BID  triamcinolone, , Topical, BID  venlafaxine XR, 150 mg, oral, Daily  [Held by provider] warfarin, 7.5 mg, oral, Daily  white petrolatum, 1 Application, Topical, BID  zinc oxide, 1 Application, Topical, TID  zinc sulfate, 50 mg of elemental zinc, oral, Daily     [2] furosemide, 10 mg/hr, Last Rate: 10 mg/hr (06/23/25 0620)     [3] PRN medications: ammonium lactate, benzocaine, cyclobenzaprine, dextrose, dextrose, glucagon, glucagon, HYDROmorphone, hydrOXYzine HCL, ipratropium-albuteroL, lidocaine, oxyCODONE, oxyCODONE, oxygen

## 2025-06-24 ENCOUNTER — DOCUMENTATION (OUTPATIENT)
Dept: PRIMARY CARE | Facility: CLINIC | Age: 65
End: 2025-06-24
Payer: MEDICARE

## 2025-06-24 LAB
ALBUMIN SERPL BCP-MCNC: 3.6 G/DL (ref 3.4–5)
ALBUMIN SERPL BCP-MCNC: 3.7 G/DL (ref 3.4–5)
ANION GAP SERPL CALC-SCNC: 12 MMOL/L (ref 10–20)
ANION GAP SERPL CALC-SCNC: 15 MMOL/L (ref 10–20)
APTT PPP: 31 SECONDS (ref 26–36)
APTT PPP: 32 SECONDS (ref 26–36)
BUN SERPL-MCNC: 31 MG/DL (ref 6–23)
BUN SERPL-MCNC: 37 MG/DL (ref 6–23)
CALCIUM SERPL-MCNC: 9.2 MG/DL (ref 8.6–10.3)
CALCIUM SERPL-MCNC: 9.6 MG/DL (ref 8.6–10.3)
CHLORIDE SERPL-SCNC: 91 MMOL/L (ref 98–107)
CHLORIDE SERPL-SCNC: 93 MMOL/L (ref 98–107)
CO2 SERPL-SCNC: 38 MMOL/L (ref 21–32)
CO2 SERPL-SCNC: 42 MMOL/L (ref 21–32)
CREAT SERPL-MCNC: 0.96 MG/DL (ref 0.5–1.05)
CREAT SERPL-MCNC: 1.09 MG/DL (ref 0.5–1.05)
EGFRCR SERPLBLD CKD-EPI 2021: 56 ML/MIN/1.73M*2
EGFRCR SERPLBLD CKD-EPI 2021: 65 ML/MIN/1.73M*2
ERYTHROCYTE [DISTWIDTH] IN BLOOD BY AUTOMATED COUNT: 16.2 % (ref 11.5–14.5)
GLUCOSE BLD MANUAL STRIP-MCNC: 108 MG/DL (ref 74–99)
GLUCOSE BLD MANUAL STRIP-MCNC: 135 MG/DL (ref 74–99)
GLUCOSE BLD MANUAL STRIP-MCNC: 186 MG/DL (ref 74–99)
GLUCOSE BLD MANUAL STRIP-MCNC: 195 MG/DL (ref 74–99)
GLUCOSE SERPL-MCNC: 101 MG/DL (ref 74–99)
GLUCOSE SERPL-MCNC: 172 MG/DL (ref 74–99)
HCT VFR BLD AUTO: 46 % (ref 36–46)
HGB BLD-MCNC: 14.2 G/DL (ref 12–16)
INR PPP: 1.6 (ref 0.9–1.1)
INR PPP: 1.6 (ref 0.9–1.1)
MAGNESIUM SERPL-MCNC: 2.12 MG/DL (ref 1.6–2.4)
MCH RBC QN AUTO: 31.8 PG (ref 26–34)
MCHC RBC AUTO-ENTMCNC: 30.9 G/DL (ref 32–36)
MCV RBC AUTO: 103 FL (ref 80–100)
NRBC BLD-RTO: 0 /100 WBCS (ref 0–0)
PHOSPHATE SERPL-MCNC: 3.1 MG/DL (ref 2.5–4.9)
PHOSPHATE SERPL-MCNC: 3.1 MG/DL (ref 2.5–4.9)
PLATELET # BLD AUTO: 544 X10*3/UL (ref 150–450)
POTASSIUM SERPL-SCNC: 3.7 MMOL/L (ref 3.5–5.3)
POTASSIUM SERPL-SCNC: 3.9 MMOL/L (ref 3.5–5.3)
PROTHROMBIN TIME: 17.9 SECONDS (ref 9.8–12.4)
PROTHROMBIN TIME: 17.9 SECONDS (ref 9.8–12.4)
RBC # BLD AUTO: 4.47 X10*6/UL (ref 4–5.2)
SODIUM SERPL-SCNC: 141 MMOL/L (ref 136–145)
SODIUM SERPL-SCNC: 142 MMOL/L (ref 136–145)
UFH PPP CHRO-ACNC: 0.1 IU/ML (ref ?–1.1)
UFH PPP CHRO-ACNC: 0.1 IU/ML (ref ?–1.1)
UFH PPP CHRO-ACNC: 0.2 IU/ML (ref ?–1.1)
WBC # BLD AUTO: 11.3 X10*3/UL (ref 4.4–11.3)

## 2025-06-24 PROCEDURE — 85610 PROTHROMBIN TIME: CPT

## 2025-06-24 PROCEDURE — 94760 N-INVAS EAR/PLS OXIMETRY 1: CPT

## 2025-06-24 PROCEDURE — 2500000004 HC RX 250 GENERAL PHARMACY W/ HCPCS (ALT 636 FOR OP/ED): Mod: JW

## 2025-06-24 PROCEDURE — 1200000002 HC GENERAL ROOM WITH TELEMETRY DAILY

## 2025-06-24 PROCEDURE — 2500000004 HC RX 250 GENERAL PHARMACY W/ HCPCS (ALT 636 FOR OP/ED): Performed by: NURSE PRACTITIONER

## 2025-06-24 PROCEDURE — 85520 HEPARIN ASSAY: CPT

## 2025-06-24 PROCEDURE — 85730 THROMBOPLASTIN TIME PARTIAL: CPT

## 2025-06-24 PROCEDURE — 36415 COLL VENOUS BLD VENIPUNCTURE: CPT

## 2025-06-24 PROCEDURE — 82947 ASSAY GLUCOSE BLOOD QUANT: CPT

## 2025-06-24 PROCEDURE — 80069 RENAL FUNCTION PANEL: CPT

## 2025-06-24 PROCEDURE — 2500000002 HC RX 250 W HCPCS SELF ADMINISTERED DRUGS (ALT 637 FOR MEDICARE OP, ALT 636 FOR OP/ED)

## 2025-06-24 PROCEDURE — 99233 SBSQ HOSP IP/OBS HIGH 50: CPT

## 2025-06-24 PROCEDURE — 94668 MNPJ CHEST WALL SBSQ: CPT

## 2025-06-24 PROCEDURE — 85520 HEPARIN ASSAY: CPT | Performed by: STUDENT IN AN ORGANIZED HEALTH CARE EDUCATION/TRAINING PROGRAM

## 2025-06-24 PROCEDURE — 2500000005 HC RX 250 GENERAL PHARMACY W/O HCPCS: Performed by: STUDENT IN AN ORGANIZED HEALTH CARE EDUCATION/TRAINING PROGRAM

## 2025-06-24 PROCEDURE — 2500000001 HC RX 250 WO HCPCS SELF ADMINISTERED DRUGS (ALT 637 FOR MEDICARE OP)

## 2025-06-24 PROCEDURE — S4991 NICOTINE PATCH NONLEGEND: HCPCS

## 2025-06-24 PROCEDURE — 2500000002 HC RX 250 W HCPCS SELF ADMINISTERED DRUGS (ALT 637 FOR MEDICARE OP, ALT 636 FOR OP/ED): Performed by: INTERNAL MEDICINE

## 2025-06-24 PROCEDURE — 2500000005 HC RX 250 GENERAL PHARMACY W/O HCPCS

## 2025-06-24 PROCEDURE — 99232 SBSQ HOSP IP/OBS MODERATE 35: CPT | Performed by: NURSE PRACTITIONER

## 2025-06-24 PROCEDURE — 2500000004 HC RX 250 GENERAL PHARMACY W/ HCPCS (ALT 636 FOR OP/ED)

## 2025-06-24 PROCEDURE — 85027 COMPLETE CBC AUTOMATED: CPT

## 2025-06-24 PROCEDURE — 83735 ASSAY OF MAGNESIUM: CPT

## 2025-06-24 PROCEDURE — 2500000005 HC RX 250 GENERAL PHARMACY W/O HCPCS: Performed by: INTERNAL MEDICINE

## 2025-06-24 PROCEDURE — 94640 AIRWAY INHALATION TREATMENT: CPT

## 2025-06-24 RX ORDER — HEPARIN SODIUM 10000 [USP'U]/100ML
0-4000 INJECTION, SOLUTION INTRAVENOUS CONTINUOUS
Status: DISPENSED | OUTPATIENT
Start: 2025-06-24 | End: 2025-06-25

## 2025-06-24 RX ADMIN — IPRATROPIUM BROMIDE AND ALBUTEROL SULFATE 3 ML: 2.5; .5 SOLUTION RESPIRATORY (INHALATION) at 13:13

## 2025-06-24 RX ADMIN — HEPARIN SODIUM 1300 UNITS/HR: 10000 INJECTION, SOLUTION INTRAVENOUS at 16:12

## 2025-06-24 RX ADMIN — PRAMIPEXOLE DIHYDROCHLORIDE 1 MG: 1 TABLET ORAL at 22:28

## 2025-06-24 RX ADMIN — BUDESONIDE 0.5 MG: 0.5 INHALANT RESPIRATORY (INHALATION) at 07:19

## 2025-06-24 RX ADMIN — POLYETHYLENE GLYCOL 3350 17 G: 17 POWDER, FOR SOLUTION ORAL at 08:33

## 2025-06-24 RX ADMIN — HYDROXYUREA 1000 MG: 500 CAPSULE ORAL at 08:35

## 2025-06-24 RX ADMIN — OXYCODONE HYDROCHLORIDE AND ACETAMINOPHEN 500 MG: 500 TABLET ORAL at 08:38

## 2025-06-24 RX ADMIN — DILTIAZEM HYDROCHLORIDE 360 MG: 120 CAPSULE, COATED, EXTENDED RELEASE ORAL at 08:37

## 2025-06-24 RX ADMIN — BUDESONIDE 0.5 MG: 0.5 INHALANT RESPIRATORY (INHALATION) at 19:39

## 2025-06-24 RX ADMIN — OXYCODONE HYDROCHLORIDE AND ACETAMINOPHEN 500 MG: 500 TABLET ORAL at 20:49

## 2025-06-24 RX ADMIN — IPRATROPIUM BROMIDE AND ALBUTEROL SULFATE 3 ML: 2.5; .5 SOLUTION RESPIRATORY (INHALATION) at 07:18

## 2025-06-24 RX ADMIN — Medication 4 L/MIN: at 20:12

## 2025-06-24 RX ADMIN — HEPARIN SODIUM 1000 UNITS/HR: 10000 INJECTION, SOLUTION INTRAVENOUS at 11:59

## 2025-06-24 RX ADMIN — Medication 3 L/MIN: at 07:18

## 2025-06-24 RX ADMIN — TRIAMCINOLONE ACETONIDE: 1 CREAM TOPICAL at 20:51

## 2025-06-24 RX ADMIN — Medication 1 APPLICATION: at 20:48

## 2025-06-24 RX ADMIN — Medication 4 L/MIN: at 13:13

## 2025-06-24 RX ADMIN — DULOXETINE 20 MG: 20 CAPSULE, DELAYED RELEASE ORAL at 08:38

## 2025-06-24 RX ADMIN — ZINC SULFATE 220 MG (50 MG) CAPSULE 1 CAPSULE: CAPSULE at 08:35

## 2025-06-24 RX ADMIN — NICOTINE 1 PATCH: 21 PATCH, EXTENDED RELEASE TRANSDERMAL at 08:34

## 2025-06-24 RX ADMIN — OXYCODONE HYDROCHLORIDE 10 MG: 10 TABLET ORAL at 01:12

## 2025-06-24 RX ADMIN — OXYCODONE HYDROCHLORIDE 10 MG: 10 TABLET ORAL at 16:44

## 2025-06-24 RX ADMIN — Medication 4 L/MIN: at 19:39

## 2025-06-24 RX ADMIN — Medication 1 APPLICATION: at 14:17

## 2025-06-24 RX ADMIN — CEFTRIAXONE 2 G: 2 INJECTION, SOLUTION INTRAVENOUS at 08:35

## 2025-06-24 RX ADMIN — SPIRONOLACTONE 50 MG: 25 TABLET ORAL at 08:38

## 2025-06-24 RX ADMIN — NYSTATIN CREAM: 100000 CREAM TOPICAL at 08:38

## 2025-06-24 RX ADMIN — KETOCONAZOLE: 20 CREAM TOPICAL at 20:47

## 2025-06-24 RX ADMIN — OXYCODONE HYDROCHLORIDE 10 MG: 10 TABLET ORAL at 08:37

## 2025-06-24 RX ADMIN — PRAMIPEXOLE DIHYDROCHLORIDE 1 MG: 1 TABLET ORAL at 08:35

## 2025-06-24 RX ADMIN — SENNOSIDES AND DOCUSATE SODIUM 2 TABLET: 50; 8.6 TABLET ORAL at 08:38

## 2025-06-24 RX ADMIN — KETOCONAZOLE: 20 CREAM TOPICAL at 08:38

## 2025-06-24 RX ADMIN — GUAIFENESIN 600 MG: 600 TABLET ORAL at 20:49

## 2025-06-24 RX ADMIN — IPRATROPIUM BROMIDE AND ALBUTEROL SULFATE 3 ML: 2.5; .5 SOLUTION RESPIRATORY (INHALATION) at 19:39

## 2025-06-24 RX ADMIN — INSULIN LISPRO 2 UNITS: 100 INJECTION, SOLUTION INTRAVENOUS; SUBCUTANEOUS at 20:49

## 2025-06-24 RX ADMIN — SENNOSIDES AND DOCUSATE SODIUM 2 TABLET: 50; 8.6 TABLET ORAL at 20:49

## 2025-06-24 RX ADMIN — Medication 3 MG: at 20:49

## 2025-06-24 RX ADMIN — NYSTATIN CREAM: 100000 CREAM TOPICAL at 20:48

## 2025-06-24 RX ADMIN — Medication 1 APPLICATION: at 08:42

## 2025-06-24 RX ADMIN — GUAIFENESIN 600 MG: 600 TABLET ORAL at 08:38

## 2025-06-24 RX ADMIN — ACETAMINOPHEN 975 MG: 325 TABLET, FILM COATED ORAL at 16:45

## 2025-06-24 RX ADMIN — HEPARIN SODIUM 1600 UNITS/HR: 10000 INJECTION, SOLUTION INTRAVENOUS at 21:26

## 2025-06-24 RX ADMIN — FUROSEMIDE 10 MG/HR: 10 INJECTION, SOLUTION INTRAMUSCULAR; INTRAVENOUS at 20:49

## 2025-06-24 RX ADMIN — HYDROMORPHONE HYDROCHLORIDE 0.4 MG: 1 INJECTION, SOLUTION INTRAMUSCULAR; INTRAVENOUS; SUBCUTANEOUS at 20:50

## 2025-06-24 RX ADMIN — INSULIN LISPRO 2 UNITS: 100 INJECTION, SOLUTION INTRAVENOUS; SUBCUTANEOUS at 16:44

## 2025-06-24 ASSESSMENT — PAIN SCALES - GENERAL
PAINLEVEL_OUTOF10: 8
PAINLEVEL_OUTOF10: 5 - MODERATE PAIN
PAINLEVEL_OUTOF10: 7
PAINLEVEL_OUTOF10: 0 - NO PAIN
PAINLEVEL_OUTOF10: 9
PAINLEVEL_OUTOF10: 6
PAINLEVEL_OUTOF10: 4

## 2025-06-24 ASSESSMENT — COGNITIVE AND FUNCTIONAL STATUS - GENERAL
DRESSING REGULAR LOWER BODY CLOTHING: A LITTLE
TURNING FROM BACK TO SIDE WHILE IN FLAT BAD: A LITTLE
PERSONAL GROOMING: A LITTLE
MOVING TO AND FROM BED TO CHAIR: A LOT
CLIMB 3 TO 5 STEPS WITH RAILING: A LOT
WALKING IN HOSPITAL ROOM: A LITTLE
TURNING FROM BACK TO SIDE WHILE IN FLAT BAD: A LITTLE
HELP NEEDED FOR BATHING: A LOT
HELP NEEDED FOR BATHING: A LITTLE
MOVING FROM LYING ON BACK TO SITTING ON SIDE OF FLAT BED WITH BEDRAILS: A LITTLE
WALKING IN HOSPITAL ROOM: A LOT
STANDING UP FROM CHAIR USING ARMS: A LITTLE
MOVING TO AND FROM BED TO CHAIR: A LITTLE
MOBILITY SCORE: 14
DRESSING REGULAR UPPER BODY CLOTHING: A LITTLE
DRESSING REGULAR UPPER BODY CLOTHING: A LOT
DRESSING REGULAR LOWER BODY CLOTHING: A LOT
TOILETING: A LITTLE
TOILETING: A LOT
DAILY ACTIVITIY SCORE: 20
CLIMB 3 TO 5 STEPS WITH RAILING: A LITTLE
STANDING UP FROM CHAIR USING ARMS: A LOT
DAILY ACTIVITIY SCORE: 15
MOVING FROM LYING ON BACK TO SITTING ON SIDE OF FLAT BED WITH BEDRAILS: A LITTLE
MOBILITY SCORE: 18

## 2025-06-24 ASSESSMENT — PAIN - FUNCTIONAL ASSESSMENT
PAIN_FUNCTIONAL_ASSESSMENT: 0-10

## 2025-06-24 ASSESSMENT — PAIN DESCRIPTION - LOCATION: LOCATION: LEG

## 2025-06-24 NOTE — PROGRESS NOTES
Neisha Graham is a 65 y.o. female on day 5 of admission presenting with Pneumonia of both lower lobes due to infectious organism.    Subjective   Interval History: no fever, no new complaints        Review of Systems    Objective   Range of Vitals (last 24 hours)  Heart Rate:  []   Temp:  [36.7 °C (98.1 °F)-37 °C (98.6 °F)]   Resp:  [16-19]   BP: (127-152)/()   SpO2:  [90 %-95 %]   Daily Weight  06/19/25 : (!) 160 kg (353 lb 8 oz)    Body mass index is 60.68 kg/m².    Physical Exam  Constitutional:       Appearance: Normal appearance.   HENT:      Head: Normocephalic and atraumatic.      Mouth/Throat:      Mouth: Mucous membranes are moist.      Pharynx: Oropharynx is clear.   Eyes:      Pupils: Pupils are equal, round, and reactive to light.   Cardiovascular:      Rate and Rhythm: Normal rate and regular rhythm.      Heart sounds: Normal heart sounds.   Pulmonary:      Effort: Pulmonary effort is normal.      Breath sounds: Normal breath sounds.   Abdominal:      General: Abdomen is flat. Bowel sounds are normal.      Palpations: Abdomen is soft.   Musculoskeletal:         General: Swelling present.      Cervical back: Normal range of motion.      Comments: Stasis / wounds   Neurological:      Mental Status: She is alert.         Antibiotics  cefTRIAXone - 2 gram/50 mL    Relevant Results  Labs  Results from last 72 hours   Lab Units 06/24/25  0653 06/23/25  0632 06/22/25  0643   WBC AUTO x10*3/uL 11.3 11.1 13.4*   HEMOGLOBIN g/dL 14.2 12.7 12.4   HEMATOCRIT % 46.0 40.6 39.9   PLATELETS AUTO x10*3/uL 544* 511* 504*     Results from last 72 hours   Lab Units 06/24/25  0653 06/23/25  1710 06/23/25  0632   SODIUM mmol/L 142 139 140   POTASSIUM mmol/L 3.9 4.3 3.7   CHLORIDE mmol/L 93* 95* 96*   CO2 mmol/L 38* 36* 38*   BUN mg/dL 31* 33* 32*   CREATININE mg/dL 0.96 1.08* 0.99   GLUCOSE mg/dL 101* 189* 126*   CALCIUM mg/dL 9.2 9.1 8.8   ANION GAP mmol/L 15 12 10   EGFR mL/min/1.73m*2 65 57* 63   PHOSPHORUS  mg/dL 3.1 3.3 2.9     Results from last 72 hours   Lab Units 06/24/25  0653 06/23/25  1710 06/23/25  0632   ALBUMIN g/dL 3.6 3.7 3.5     Estimated Creatinine Clearance: 88.1 mL/min (by C-G formula based on SCr of 0.96 mg/dL).  C-Reactive Protein   Date Value Ref Range Status   06/19/2025 3.23 (H) <1.00 mg/dL Final   12/11/2024 0.72 <1.00 mg/dL Final   09/16/2024 16.36 (H) <1.00 mg/dL Final     Microbiology  Susceptibility data from last 14 days.  Collected Specimen Info Organism Clindamycin Erythromycin Penicillin Tetracycline   06/19/25 Blood culture from Peripheral Venipuncture Streptococcus agalactiae (Group B Streptococcus)       06/19/25 Blood culture from Peripheral Venipuncture Streptococcus agalactiae (Group B Streptococcus)  R  R  S  R   Imaging  Reviewed        Assessment/Plan   Sepsis, BC with gp B Strep, TTE reviewed, the repeat BC is negative   Legs stasis / wounds / likely cellulitis  Respiratory failure / COPD /  BE / atelectasis, for cath     Recommendations :  Continue Rocephin  Leg elevation  Chest PT       I spent minutes in the professional and overall care of this patient.  The cultures and susceptibilities were reviewed and discussed with the micro lab, the antibiotics stewardship guidelines were reviewed, the infection control protocols and recommendations were reviewed with the patient and the staff                 Gorge Myers MD

## 2025-06-24 NOTE — PROGRESS NOTES
Subjective Data:  Diuresing well   Denies chest pain  SOB improving at rest, but has not been out of bed.       Objective Data:  Last Recorded Vitals:  Vitals:    06/24/25 0718 06/24/25 0807 06/24/25 1200 06/24/25 1313   BP:  152/86 139/68    BP Location:  Right arm Right arm    Patient Position:  Lying Lying    Pulse:  93 88    Resp:  16 18    Temp:  36.9 °C (98.4 °F) 37 °C (98.6 °F)    TempSrc:  Temporal Temporal    SpO2: 94% 92% 92% 92%   Weight:       Height:           Last Labs:  CBC - 6/24/2025:  6:53 AM  11.3 14.2 544    46.0      CMP - 6/24/2025:  6:53 AM  9.2 8.0 28 --- 0.8   3.1 3.6 17 230      PTT - 6/24/2025:  9:51 AM  1.6   17.9 31     TROPHS   Date/Time Value Ref Range Status   06/19/2025 01:57 PM 23 0 - 13 ng/L Final   06/19/2025 12:50 PM 29 0 - 13 ng/L Final   12/11/2024 10:19 PM 26 0 - 13 ng/L Final     BNP   Date/Time Value Ref Range Status   06/19/2025 12:50  0 - 99 pg/mL Final   12/11/2024 04:51  0 - 99 pg/mL Final     HGBA1C   Date/Time Value Ref Range Status   12/11/2024 04:51 PM 5.5 See comment % Final   08/13/2024 07:35 AM 5.9 see below % Final     LDLCALC   Date/Time Value Ref Range Status   12/11/2024 04:51 PM 87 <=99 mg/dL Final     Comment:                                 Near   Borderline      AGE      Desirable  Optimal    High     High     Very High     0-19 Y     0 - 109     ---    110-129   >/= 130     ----    20-24 Y     0 - 119     ---    120-159   >/= 160     ----      >24 Y     0 -  99   100-129  130-159   160-189     >/=190       VLDL   Date/Time Value Ref Range Status   12/11/2024 04:51 PM 21 0 - 40 mg/dL Final   11/14/2022 11:01 AM 40 0 - 40 mg/dL Final   09/27/2020 12:15 AM 21 0 - 40 mg/dL Final   06/19/2020 03:03 PM 16 0 - 40 mg/dL Final      Last I/O:  I/O last 3 completed shifts:  In: 819.2 (5.1 mL/kg) [P.O.:600; I.V.:19.2 (0.1 mL/kg); IV Piggyback:200]  Out: 7000 (43.7 mL/kg) [Urine:7000 (1.2 mL/kg/hr)]  Weight: 160.3 kg     Past Cardiology Tests (Last 3  Years):  Echo:  Transthoracic Echo (TTE) Complete 12/12/2024  CONCLUSIONS:   1. The left ventricular systolic function is normal, with a visually estimated ejection fraction of 60-65%.   2. Spectral Doppler shows a Grade I (impaired relaxation pattern) of left ventricular diastolic filling with normal left atrial filling pressure.   3. There is moderately increased septal and moderately increased posterior left ventricular wall thickness.   4. There is moderate concentric left ventricular hypertrophy.   5. There is reduced right ventricular systolic function.   6. Moderately enlarged right ventricle.   7. The left atrium is mildly dilated.   8. There is moderate mitral annular calcification.   9. There is moderate mitral valve stenosis.  10. Moderate to severe aortic valve stenosis. (4.03, 65/41, 0.88, DI 0.28)   11. Moderately elevated pulmonary artery pressure.  12. There is mild mitral and tricuspid regurgitation.     9/18/2024  CONCLUSIONS:   1. The left ventricular systolic function is normal, with a visually estimated ejection fraction of 60-65%.   2. Spectral Doppler shows an impaired relaxation pattern of left ventricular diastolic filling.   3. There is normal right ventricular global systolic function.   4. The left atrium is mildly dilated.   5. There is severe mitral annular calcification.   6. Severe aortic valve stenosis.    Inpatient Medications:  Scheduled Medications[1]  PRN Medications[2]  Continuous Medications[3]    Physical Exam:  Constitutional: Pleasant, Awake/Alert/Oriented to person place and time.   Head: Atraumatic, Normocephalic  Eyes: EOMI. MICHELE  Neck: +JVD  Cardiovascular: Regular rate and rhythm, S1, S2. No extra heart sounds or murmurs  Respiratory: Crackles noted posteriorly at bases   Abdomen: Soft, Nontender, Obese. Bowel sounds appreciated  Neurological: CNII-XII intact. Sensation grossly intact  Extremities: chronic BLE lymphedema with 4+ pitting edema BLE with weeping    Psychiatric: Appropriate mood and affect       Assessment/Plan   65 y.o. 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     Assessment:  Acute decompensated diastolic HF  Severe aortic stenosis  Moderate mitral stenosis  BLE cellulitis  Possible pneumonia  pSVT     Plan:  -Continue lasix drip 10mg/hr  -Continue spironolactone 50mg daily, diltiazem 360mg daily  -Warfarin held for cath with heparin bridge   -Atbx per primary team/ID recommendations   -Recommend R+LHC after adequate diuresis-- discussed with Dr. George today-- cath likely to be next week given volume status; though she is responding well to current treatment.   -Will place referral to structural heart team prior to discharge for further discussion regarding TAVR candidacy     Peripheral IV 06/19/25 20 G Left Antecubital (Active)   Site Assessment Clean;Dry;Intact 06/24/25 0742   Dressing Status Dry;Clean 06/24/25 0742   Number of days: 5       Code Status:  Full Code        LAKEISHA Mackey-CNP       [1]   Scheduled medications   Medication Dose Route Frequency    acetaminophen  975 mg oral q6h    ascorbic acid  500 mg oral BID    budesonide  0.5 mg nebulization BID    And    ipratropium-albuteroL  3 mL nebulization TID    cefTRIAXone  2 g intravenous q24h    dilTIAZem ER  360 mg oral Daily    DULoxetine  20 mg oral Daily    guaiFENesin  600 mg oral BID    hydroxyurea  1,000 mg oral Daily    insulin lispro  0-10 Units subcutaneous Before meals & nightly    ketoconazole   Topical BID    melatonin  3 mg oral Nightly    multivitamin with minerals  1 tablet oral Daily    nicotine  1 patch transdermal Daily    Followed by    [START ON 7/31/2025] nicotine  1 patch transdermal Daily     Followed by    [START ON 8/14/2025] nicotine  1 patch transdermal Daily    nystatin   Topical BID    pneumoc 20-eros conj-dip cr(PF)  0.5 mL intramuscular During hospitalization    polyethylene glycol  17 g oral Daily    pramipexole  1 mg oral BID    sennosides-docusate sodium  2 tablet oral BID    spironolactone  50 mg oral Daily    [Held by provider] torsemide  40 mg oral BID    triamcinolone   Topical BID    [Held by provider] warfarin  7.5 mg oral Daily    white petrolatum  1 Application Topical BID    zinc oxide  1 Application Topical TID    zinc sulfate  50 mg of elemental zinc oral Daily   [2]   PRN medications   Medication    ammonium lactate    benzocaine    cyclobenzaprine    dextrose    dextrose    glucagon    glucagon    HYDROmorphone    hydrOXYzine HCL    ipratropium-albuteroL    lidocaine    oxyCODONE    oxyCODONE    oxygen   [3]   Continuous Medications   Medication Dose Last Rate    furosemide  10 mg/hr 10 mg/hr (06/23/25 0620)    heparin  0-4,000 Units/hr 1,000 Units/hr (06/24/25 1159)

## 2025-06-24 NOTE — CARE PLAN
The patient's goals for the shift include      The clinical goals for the shift include Patient will remain safe and comfortable throughout shift    Over the shift, the patient did not make progress toward the following goals. Barriers to progression include . Recommendations to address these barriers include   Problem: Pain - Adult  Goal: Verbalizes/displays adequate comfort level or baseline comfort level  Outcome: Progressing  Flowsheets (Taken 6/23/2025 2155)  Verbalizes/displays adequate comfort level or baseline comfort level:   Encourage patient to monitor pain and request assistance   Assess pain using appropriate pain scale   Administer analgesics based on type and severity of pain and evaluate response     Problem: Safety - Adult  Goal: Free from fall injury  Outcome: Progressing  Flowsheets (Taken 6/23/2025 2155)  Free from fall injury:   Instruct family/caregiver on patient safety   Based on caregiver fall risk screen, instruct family/caregiver to ask for assistance with transferring infant if caregiver noted to have fall risk factors   .

## 2025-06-24 NOTE — NURSING NOTE
1930: assumed pt care, pt has a heparin drip running at 13 ml/hr and lasix drip at 1 ml/hr    6-25-25/0437: pt's tele strip looks regular/irregular with BBB, pt was known to have NSR before, let residents know, 12 lead EKG ordered, pt states she feels ok

## 2025-06-24 NOTE — PROGRESS NOTES
"Internal Medicine - Daily Progress Note   Hospital Day: 5    Name: Neisha Graham   Age: 65 y.o.   Gender: Female  Room: 107/107-A        HPI   Neisha Graham is a 65 y.o. year old female  patient with with PMHx significant for paroxysmal atrial fibrillation on warfarin, chronic thromboembolic disease, essential thrombocytosis, moderate to severe aortic stenosis, moderate mitral stenosis, HFpEF (EF 60-65% 12/24), COPD on 2L NC nightly, pulmonary hypertension, lymphedema with chronic lower extremity wounds, chronic sacral wounds, HTN, HLD, anxiety, and depression who presented to [facility] on 6/19/25 with chief complaint of fever and chills with associated shortness of breath. Patient states that last night she began to notice subjective fever and chills that continued into this morning which was her main concern for presenting to the ER. She does note that she had noticed her L lower extremity wound was foul smelling last night and had soaked through her bandage faster than normal. Additionally, she notes increased swelling of her bilateral lower extremities up to her knees. States that she is supposed to take torsemide 40 mg BID at home but recently stopped taking the evening dose because she felt as though it was too much. Endorses good urine output with the torsemide once daily. Additionally, she notes swelling of a gland on her R jaw and neck for the past 2-3 months that is painful and keeps her awake at night. She was recently treated for pneumonia outpatient twice by her PCP, once with a 10-day course of Augmentin and once with a 5-day course of Cefdinir, at that time she states her breathing improved but the gland remained inflamed. She started feeling increasingly short of breath again recently and noes increase of her chronic cough with sputum changing from \"flesh\" color to a more green/white color. She denies chest pain, palpitations, nasal congestion, sore throat, abdominal pain, nausea, " "vomiting.       Subjective    Patient continuing to improve with her breathing.  Is now able to lie flat, which she has not been able to do for a while at home prior to hospitalization.  The swelling in her legs continues to improve and is close to her baseline.    Discussed the continued plan for diuresis, followed by cardiac catheterization later this week.  Patient asked about referral to dentist for her bad teeth, states the cardiology team recommended this be completed prior to any planned procedures.  Discussed the need to eliminate her dentition as a source of infection for potential prosthetic valves.  Patient understood and states she will likely need to have some teeth pulled.    Overnight Events: none    ROS: 12 points review of system is negative except as stated in the HPI above.     Objective    Vitals  Visit Vitals  /74 (BP Location: Right arm, Patient Position: Lying)   Pulse 76   Temp 36.8 °C (98.2 °F) (Temporal)   Resp 17   Ht 1.626 m (5' 4\")   Wt (!) 160 kg (353 lb 8 oz)   SpO2 92%   BMI 60.68 kg/m²   Smoking Status Some Days   BSA 2.69 m²       Physical Exam    Physical Exam  Constitutional:       General: She is not in acute distress.     Appearance: She is obese.   HENT:      Head: Normocephalic and atraumatic.      Mouth/Throat:      Mouth: Mucous membranes are moist.   Eyes:      Extraocular Movements: Extraocular movements intact.      Pupils: Pupils are equal, round, and reactive to light.   Cardiovascular:      Rate and Rhythm: Normal rate and regular rhythm.      Pulses: Normal pulses.      Heart sounds: Murmur heard.   Pulmonary:      Effort: Pulmonary effort is normal. No respiratory distress.      Breath sounds: No wheezing.      Comments: Crackles bilateral bases, improving  Abdominal:      General: Bowel sounds are normal. There is no distension.      Palpations: Abdomen is soft.      Tenderness: There is no abdominal tenderness.   Musculoskeletal:      Comments: Chronic " "bilateral lower extremity swelling consistent with lymphedema, left lower extremity wound mid dorsal tibia with drainage (wrapped with gauze and Ace wrap)   Skin:     Findings: Erythema (BLE) present.   Neurological:      General: No focal deficit present.      Mental Status: She is alert and oriented to person, place, and time.   Psychiatric:         Mood and Affect: Mood normal.           IOs    Intake/Output Summary (Last 24 hours) at 6/24/2025 0717  Last data filed at 6/24/2025 0430  Gross per 24 hour   Intake 625.85 ml   Output 5100 ml   Net -4474.15 ml       Labs:   Results from last 72 hours   Lab Units 06/23/25  1710 06/23/25  0632 06/22/25  1830   SODIUM mmol/L 139 140 139   POTASSIUM mmol/L 4.3 3.7 3.9   CHLORIDE mmol/L 95* 96* 96*   CO2 mmol/L 36* 38* 35*   BUN mg/dL 33* 32* 35*   CREATININE mg/dL 1.08* 0.99 1.15*   GLUCOSE mg/dL 189* 126* 255*   CALCIUM mg/dL 9.1 8.8 8.9   ANION GAP mmol/L 12 10 12   EGFR mL/min/1.73m*2 57* 63 53*   PHOSPHORUS mg/dL 3.3 2.9 3.1      Results from last 72 hours   Lab Units 06/23/25  0632 06/22/25  0643   WBC AUTO x10*3/uL 11.1 13.4*   HEMOGLOBIN g/dL 12.7 12.4   HEMATOCRIT % 40.6 39.9   PLATELETS AUTO x10*3/uL 511* 504*      Lab Results   Component Value Date    CALCIUM 9.1 06/23/2025    PHOS 3.3 06/23/2025      Lab Results   Component Value Date    CRP 3.23 (H) 06/19/2025      [unfilled]     Micro/ID:   Susceptibility data from last 90 days.  Collected Specimen Info Organism Clindamycin Erythromycin Penicillin Tetracycline   06/19/25 Blood culture from Peripheral Venipuncture Streptococcus agalactiae (Group B Streptococcus)       06/19/25 Blood culture from Peripheral Venipuncture Streptococcus agalactiae (Group B Streptococcus)  R  R  S  R                    No lab exists for component: \"AGALPCRNB\"   .ID  Lab Results   Component Value Date    URINECULTURE  12/30/2024     Clinically insignificant growth based on current clinical standards.    BLOODCULT No growth at 2 " days 06/21/2025    BLOODCULT No growth at 2 days 06/21/2025       Images  Transthoracic Echo Complete     [facility], [address] [address]                Tel [phone] and Fax [phone]    TRANSTHORACIC ECHOCARDIOGRAM REPORT       Patient Name:       LION PEREZ     Reading Physician:    [id number] Victor Hugo George MD  Study Date:         6/20/2025           Ordering Provider:    [id number] MARILYN BARROS  MRN/PID:            [id number]            Fellow:  Accession#:         [id number]        Nurse:  Date of Birth/Age:  1960 / 65      Sonographer:          Keesha parker RDCS  Gender assigned at  F                   Additional Staff:  Birth:  Height:             162.56 cm           Admit Date:           6/19/2025  Weight:             160.12 kg           Admission Status:     Inpatient -                                                                Routine  BSA / BMI:          2.49 m2 / 60.59     Encounter#:           [id number]                      kg/m2  Blood Pressure:     115/63 mmHg         Department Location:  [facility] Non                                                                Invasive    Study Type:    TRANSTHORACIC ECHO (TTE) COMPLETE  Diagnosis/ICD: Acute combined systolic (congestive) and diastolic (congestive)                 heart failure (CHF)-I50.41; Nonrheumatic aortic (valve)                 stenosis-I35.0  Indication:    Aortic stenosis, CHF  CPT Code:      Echo Complete w Full Doppler-71434    Patient History:  Pertinent History: A-Fib, Anticoagulation, CHF, Dyspnea, COPD, HTN and                     Hyperlipidemia. Severe AS, Mod MS, Pulm HTN, Elevated BNP,.    Study Detail: The following Echo studies were performed: 2D, M-Mode, Doppler and                color  flow.       PHYSICIAN INTERPRETATION:  Left Ventricle: Left ventricular ejection fraction is normal calculated by Arreguin's biplane at 63%. There is severe concentric left ventricular hypertrophy. There are no regional left ventricular wall motion abnormalities. The left ventricular cavity size is normal. There is severely increased septal and severely increased posterior left ventricular wall thickness. Spectral Doppler shows a Grade II (pseudonormal pattern) of left ventricular diastolic filling with an elevated left atrial pressure.  Left Atrium: The left atrial size is severely dilated.  Right Ventricle: The right ventricle is mildly enlarged. There is normal right ventricular global systolic function.  Right Atrium: The right atrium is moderately dilated.  Aortic Valve: The aortic valve is probably trileaflet. The aortic valve area by VTI is 0.68 cmï¿½ with a peak velocity of 4.36 m/s. The peak and mean gradients are 75 mmHg and 53 mmHg, respectively with a dimensionless index of 0.22. There is evidence of severe aortic valve stenosis. There is no evidence of aortic valve regurgitation.  Mitral Valve: The mitral valve is mildly thickened. The doppler estimated peak and mean diastolic pressure gradients are 12.4 mmHg and 7 mmHg, respectively. There is evidence of moderate mitral valve stenosis. There is moderate mitral annular calcification. The mean gradient of the mitral valve is 7 mmHg. There is mild to moderate mitral valve regurgitation. The E Vmax is 1.59 m/s.  Tricuspid Valve: The tricuspid valve is structurally normal. There is mild to moderate tricuspid regurgitation. The Doppler estimated right ventricular systolic pressure (RVSP) is moderately elevated at 60 mmHg.  Pulmonic Valve: The pulmonic valve is structurally normal. There is trace pulmonic valve regurgitation.  Pericardium: There is no pericardial effusion noted.  Aorta: The aortic root is abnormal. There is no dilatation of the ascending  aorta. There is no dilatation of the aortic root.  Systemic Veins: The inferior vena cava appears severely dilated, with IVC inspiratory collapse less than 50%.  In comparison to the previous echocardiogram(s): Compared with study dated 12/12/2024,.       CONCLUSIONS:   1. Left ventricular ejection fraction is normal calculated by Arreguin's biplane at 63%.   2. There is severely increased septal and severely increased posterior left ventricular wall thickness.   3. There is severe concentric left ventricular hypertrophy.   4. There is normal right ventricular global systolic function.   5. Mildly enlarged right ventricle.   6. The left atrial size is severely dilated.   7. The right atrium is moderately dilated.   8. Severe aortic valve stenosis. The peak and mean gradients are 76 mmHg and 53 mmHg respectively.   9. There is moderate mitral annular calcification.  10. There is moderate mitral valve stenosis with a doppler estimated mean diastolic gradient of 7 mmHg.  11. Mild to moderate mitral valve regurgitation.  12. Mild to moderate tricuspid regurgitation visualized.  13. The Doppler estimated RVSP is moderately elevated at 60 mmHg.  14. The inferior vena cava appears severely dilated, with IVC inspiratory collapse less than 50%.    QUANTITATIVE DATA SUMMARY:     2D MEASUREMENTS:          Normal Ranges:  IVSd:            1.62 cm  (0.6-1.1cm)  LVPWd:           1.75 cm  (0.6-1.1cm)  LVIDd:           4.77 cm  (3.9-5.9cm)  LVIDs:           3.08 cm  LV Mass Index:   144 g/m2  LVEDV Index:     40 ml/m2  LV % FS          35.3 %       LEFT ATRIUM:                  Normal Ranges:  LA Vol A4C:        123.0 ml   (22+/-6mL/m2)  LA Vol A2C:        123.1 ml  LA Vol BP:         125.9 ml  LA Vol Index A4C:  49.4ml/m2  LA Vol Index A2C:  49.4 ml/m2  LA Vol Index BP:   50.6 ml/m2  LA Area A4C:       30.9 cm2  LA Area A2C:       30.2 cm2  LA Major Axis A4C: 6.6 cm  LA Major Axis A2C: 6.3 cm  LA Volume Index:   50.4 ml/m2  LA Vol  A4C:        116.9 ml  LA Vol A2C:        121.6 ml  LA Vol Index BSA:  47.9 ml/m2       RIGHT ATRIUM:          Normal Ranges:  RA Area A4C:  34.1 cm2       M-MODE MEASUREMENTS:         Normal Ranges:  Ao Root:             3.00 cm (2.0-3.7cm)  LAs:                 4.73 cm (2.7-4.0cm)       LV SYSTOLIC FUNCTION:                       Normal Ranges:  EF-A4C View:    65 % (>=55%)  EF-A2C View:    61 %  EF-Biplane:     63 %  LV EF Reported: 63 %       LV DIASTOLIC FUNCTION:           Normal Ranges:  MV Peak E:             1.59 m/s  (0.7-1.2 m/s)  MV Peak A:             1.39 m/s  (0.42-0.7 m/s)  E/A Ratio:             1.14      (1.0-2.2)  MV e'                  0.110 m/s (>8.0)  MV lateral e'          0.13 m/s  MV medial e'           0.09 m/s  E/e' Ratio:            14.43     (<8.0)       MITRAL VALVE:           Normal Ranges:  MV Vmax:      1.76 m/s  (<=1.3m/s)  MV peak P.4 mmHg (<5mmHg)  MV mean P.1 mmHg  (<2mmHg)  MV VTI:       41.10 cm  (10-13cm)  MV DT:        216 msec  (150-240msec)       MITRAL INSUFFICIENCY:             Normal Ranges:  MR VTI:               181.09 cm  MR Vmax:              597.53 cm/s       AORTIC VALVE:                      Normal Ranges:  AoV Vmax:                4.36 m/s  (<=1.7m/s)  AoV Peak P.0 mmHg (<20mmHg)  AoV Mean P.3 mmHg (1.7-11.5mmHg)  LVOT Max Rodriguez:            1.09 m/s  (<=1.1m/s)  AoV VTI:                 105.83 cm (18-25cm)  LVOT VTI:                22.85 cm  LVOT Diameter:           2.00 cm   (1.8-2.4cm)  AoV Area, VTI:           0.68 cm2  (2.5-5.5cm2)  AoV Area,Vmax:           0.79 cm2  (2.5-4.5cm2)  AoV Dimensionless Index: 0.22       RIGHT VENTRICLE:  RV Basal 4.70 cm  RV Mid   4.00 cm  RV Major 8.7 cm  TAPSE:   24.0 mm  RV s'    0.15 m/s       TRICUSPID VALVE/RVSP:          Normal Ranges:  Peak TR Velocity:     3.36 m/s  Est. RA Pressure:     15  RV Syst Pressure:     60       (< 30mmHg)  IVC Diam:             3.50 cm       PULMONIC  VALVE:           Normal Ranges:  PV Max Rodriguez:     1.7 m/s   (0.6-0.9m/s)  PV Max P.1 mmHg       [id number] Victor Hugo George MD  Electronically signed on 2025 at 2:26:52 PM       ** Final **  ECG 12 lead  Sinus tachycardia with Premature atrial complexes  Possible Left atrial enlargement  Borderline ECG  When compared with ECG of 13-DEC-2024 13:54,  No significant change was found      Meds  Scheduled medications  Scheduled Medications[1]  Continuous medications  Continuous Medications[2]  PRN medications  PRN Medications[3]     Assessment and Plan    Neisha Graham is a 65 y.o. female with PMHx significant for paroxysmal atrial fibrillation (on warfarin), chronic thromboembolic disease, essential thrombocytosis, moderate to severe aortic stenosis, moderate mitral stenosis, HFpEF (EF 60-65% ), COPD (on 2L NC nightly), pulmonary hypertension, lymphedema with chronic lower extremity wounds, chronic sacral wounds, HTN, HLD admitted on 2025 acute on chronic hypoxic respiratory failure 2/2 HFpEF and COPD exacerbation with concern for sepsis 2/2 infection of chronic wounds.     Acute Medical Issues:   #Sepsis  #Bacteremia  #Chronic bilateral lower extremity and sacral wounds with concern for acute infection  ::CXR and CT without distinct PNA  ::Patient notes foul smell of L lower extremity wound x 2 days - with some surrounding erythema  ::Hx of wound with MRSA  and pseudomonas bacteremia   ::Patient complaints of swollen/painful gland in R jaw and ability to express salivary stones from under tongue - minimal parotid swelling and tenderness on examination  -Blood cultures positive for group B streptococcus  -Pro-[facility] 0.26, CRP 3.23, ESR 54  -Continue ceftriaxone ( - ), azithromycin ( - )  -ID consulted, agrees with plan to continue ceftriaxone  -Wound care following  -Repeat blood cultures Ngx2 day     #Acute on chronic hypoxic respiratory failure   #CHF exacerbation  #COPD  exacerbation  ::TTE 12/2024 with EF 60-65%  ::Patient with SOB and desaturation to 88%, requiring 2L NC continuous (at baseline wears 2L NC at night and with transfers/exertion)  ::Patient on torsemide 40 mg BID at home but recently stopped taking evening dose  ::Increased cough, sputum production  ::BNP elevated at 786  ::CXR and CT chest concerning for pulmonary edema   ::s/p 80 mg IV Lasix in the ED  -On 2L NC, wean as tolerated back to baseline 2 L at night and with exertion  -Prednisone 40 mg daily x 5 days (6/19 - 6/23)  -Azithromycin x 3 days (6/19 - 6/21)  -Mucinex 600 mg BID  -Continue home inhalers  -Duonebs scheduled and PRN  -Incentive spirometer and acapella ordered   -Continue home spironolactone 50 mg daily  -Holding home torsemide, continue with IV diuresis  -TTE: EF 63%, severely concentric left ventricular hypertrophy, severely dilated left atria, moderately dilated right atria, severe aortic stenosis with peak gradient 76 mmHg, moderate mitral valve stenosis  -1800cc/day fluid restriction  -Monitor strict I/O: Continues to be net negative >2 L daily  -Cardiology consulted: Continue Lasix drip at 10 mg/h    #Moderate to severe aortic stenosis   #Moderate mitral valve stenosis   ::TTE 12/2024 with EF 60-65% and Grade I LV impaired relaxation pattern and mod to severe aortic stenosis and mod mitral stenosis   ::ED did speak with Dr. George who recommended admission at [facility] [place] and would see patient in AM to determine if transfer needed for valve evaluation  -Cardiology consulted: Once euvolemic, will plan for R+C once euvolemic to assess aortic and mitral stenosis  -Will need outpatient referral to structural heart team to discuss TAVR candidacy  -Outpatient referral placed for dentist given the need for preop clearance of her dental caries    #Hyperglycemia   ::suspect 2/2 steroid use  -monitor  -Continue SSI      Resolved Medical Issues:  #Supratherapeutic INR  ::INR 3.4 on  admission    #Leukocytosis  :: WBC 27.2 on admission      Chronic Medical Issues:   #Paroxysmal atrial fibrillation -holding home warfarin (takes 7.5 mg daily, INR goal 2-3), continue diltiazem 360 mg daily  #Essential thrombocytosis - Hydroxyurea 1000 mg daily  #Chronic back pain - Flexeril 10 mg BID PRN for muscle spasms, takes oxycodone 10 mg PRN at home for pain - inpatient pain regimen: scheduled Tylenol 975 mg q6 hours, oxycodone 5 and 10 mg q6 hours PRN for moderate and severe pain, dilaudid 0.4 mg PRN for breakthrough pain  #Chronic constipation 2/2 opioid use: Continue bowel regimen  #Depression, Anxiety - Duloxetine 20 mg daily, hydroxyzine 50 mg q8 hours PRN, venlafaxine 150 mg daily  #RLS - Pramipexole 1 mg BID  #Tobacco use disorder-Nicotine patch ordered, smoking cessation discussed  #HTN, HLD    Fluids: Fluid restriction 1800cc daily   Electrolytes: Replete as needed   Nutrition: Cardiac diet  GI ppx: None  DVT ppx: heparin gtt  Antibiotics: Ceftriaxone  Tubes/Lines/Drains: PIV  Oxygen: 2L NC    Code Status: Full Code   Emergency Contact: Extended Emergency Contact Information  Primary Emergency Contact: Belle Chauhan  Mobile Phone: [phone]  Relation: Sibling  Secondary Emergency Contact: ANANICOLAS  Mobile Phone: [phone]  Relation: Daughter     Disposition: 65 y.o. female admitted for acute on chronic HRF iso HFpEF and COPD exacerbations with concern for sepsis 2/2 bacteremia. Pending cardiac cath once euvolemic.       Violeta Hernandez,    06/24/25           [1] acetaminophen, 975 mg, oral, q6h  ascorbic acid, 500 mg, oral, BID  budesonide, 0.5 mg, nebulization, BID   And  ipratropium-albuteroL, 3 mL, nebulization, TID  cefTRIAXone, 2 g, intravenous, q24h  dilTIAZem ER, 360 mg, oral, Daily  DULoxetine, 20 mg, oral, Daily  guaiFENesin, 600 mg, oral, BID  hydroxyurea, 1,000 mg, oral, Daily  insulin lispro, 0-10 Units, subcutaneous, Before meals & nightly  ketoconazole, , Topical,  BID  melatonin, 3 mg, oral, Nightly  multivitamin with minerals, 1 tablet, oral, Daily  nicotine, 1 patch, transdermal, Daily   Followed by  [START ON 7/31/2025] nicotine, 1 patch, transdermal, Daily   Followed by  [START ON 8/14/2025] nicotine, 1 patch, transdermal, Daily  nystatin, , Topical, BID  pneumoc 20-eros conj-dip cr(PF), 0.5 mL, intramuscular, During hospitalization  polyethylene glycol, 17 g, oral, Daily  pramipexole, 1 mg, oral, BID  sennosides-docusate sodium, 2 tablet, oral, BID  spironolactone, 50 mg, oral, Daily  [Held by provider] torsemide, 40 mg, oral, BID  triamcinolone, , Topical, BID  [Held by provider] warfarin, 7.5 mg, oral, Daily  white petrolatum, 1 Application, Topical, BID  zinc oxide, 1 Application, Topical, TID  zinc sulfate, 50 mg of elemental zinc, oral, Daily     [2] furosemide, 10 mg/hr, Last Rate: 10 mg/hr (06/23/25 0620)     [3] PRN medications: ammonium lactate, benzocaine, cyclobenzaprine, dextrose, dextrose, glucagon, glucagon, HYDROmorphone, hydrOXYzine HCL, ipratropium-albuteroL, lidocaine, oxyCODONE, oxyCODONE, oxygen

## 2025-06-25 ENCOUNTER — APPOINTMENT (OUTPATIENT)
Dept: WOUND CARE | Facility: HOSPITAL | Age: 65
End: 2025-06-25
Payer: MEDICARE

## 2025-06-25 ENCOUNTER — APPOINTMENT (OUTPATIENT)
Dept: CARDIOLOGY | Facility: HOSPITAL | Age: 65
DRG: 871 | End: 2025-06-25
Payer: MEDICARE

## 2025-06-25 LAB
ALBUMIN SERPL BCP-MCNC: 3.5 G/DL (ref 3.4–5)
ALBUMIN SERPL BCP-MCNC: 3.6 G/DL (ref 3.4–5)
ANION GAP SERPL CALC-SCNC: 12 MMOL/L (ref 10–20)
ANION GAP SERPL CALC-SCNC: 17 MMOL/L (ref 10–20)
APTT PPP: 66 SECONDS (ref 26–36)
ATRIAL RATE: 79 BPM
BACTERIA BLD CULT: NORMAL
BACTERIA BLD CULT: NORMAL
BUN SERPL-MCNC: 34 MG/DL (ref 6–23)
BUN SERPL-MCNC: 36 MG/DL (ref 6–23)
CALCIUM SERPL-MCNC: 9.1 MG/DL (ref 8.6–10.3)
CALCIUM SERPL-MCNC: 9.2 MG/DL (ref 8.6–10.3)
CHLORIDE SERPL-SCNC: 90 MMOL/L (ref 98–107)
CHLORIDE SERPL-SCNC: 90 MMOL/L (ref 98–107)
CO2 SERPL-SCNC: 38 MMOL/L (ref 21–32)
CO2 SERPL-SCNC: 42 MMOL/L (ref 21–32)
CREAT SERPL-MCNC: 1.05 MG/DL (ref 0.5–1.05)
CREAT SERPL-MCNC: 1.24 MG/DL (ref 0.5–1.05)
EGFRCR SERPLBLD CKD-EPI 2021: 48 ML/MIN/1.73M*2
EGFRCR SERPLBLD CKD-EPI 2021: 59 ML/MIN/1.73M*2
ERYTHROCYTE [DISTWIDTH] IN BLOOD BY AUTOMATED COUNT: 16.1 % (ref 11.5–14.5)
GLUCOSE BLD MANUAL STRIP-MCNC: 153 MG/DL (ref 74–99)
GLUCOSE BLD MANUAL STRIP-MCNC: 175 MG/DL (ref 74–99)
GLUCOSE BLD MANUAL STRIP-MCNC: 208 MG/DL (ref 74–99)
GLUCOSE SERPL-MCNC: 114 MG/DL (ref 74–99)
GLUCOSE SERPL-MCNC: 230 MG/DL (ref 74–99)
HCT VFR BLD AUTO: 44.2 % (ref 36–46)
HGB BLD-MCNC: 14.1 G/DL (ref 12–16)
INR PPP: 1.4 (ref 0.9–1.1)
MAGNESIUM SERPL-MCNC: 1.95 MG/DL (ref 1.6–2.4)
MCH RBC QN AUTO: 31.9 PG (ref 26–34)
MCHC RBC AUTO-ENTMCNC: 31.9 G/DL (ref 32–36)
MCV RBC AUTO: 100 FL (ref 80–100)
NRBC BLD-RTO: 0 /100 WBCS (ref 0–0)
P AXIS: 68 DEGREES
P OFFSET: 193 MS
P ONSET: 135 MS
PHOSPHATE SERPL-MCNC: 3.5 MG/DL (ref 2.5–4.9)
PHOSPHATE SERPL-MCNC: 3.8 MG/DL (ref 2.5–4.9)
PLATELET # BLD AUTO: 496 X10*3/UL (ref 150–450)
POTASSIUM SERPL-SCNC: 4 MMOL/L (ref 3.5–5.3)
POTASSIUM SERPL-SCNC: 4.1 MMOL/L (ref 3.5–5.3)
PR INTERVAL: 164 MS
PROTHROMBIN TIME: 15 SECONDS (ref 9.8–12.4)
Q ONSET: 217 MS
QRS COUNT: 13 BEATS
QRS DURATION: 100 MS
QT INTERVAL: 426 MS
QTC CALCULATION(BAZETT): 488 MS
QTC FREDERICIA: 467 MS
R AXIS: 90 DEGREES
RBC # BLD AUTO: 4.42 X10*6/UL (ref 4–5.2)
SODIUM SERPL-SCNC: 140 MMOL/L (ref 136–145)
SODIUM SERPL-SCNC: 141 MMOL/L (ref 136–145)
T AXIS: 61 DEGREES
T OFFSET: 430 MS
UFH PPP CHRO-ACNC: 0.3 IU/ML (ref ?–1.1)
UFH PPP CHRO-ACNC: 0.4 IU/ML (ref ?–1.1)
VENTRICULAR RATE: 79 BPM
WBC # BLD AUTO: 8.9 X10*3/UL (ref 4.4–11.3)

## 2025-06-25 PROCEDURE — 2500000002 HC RX 250 W HCPCS SELF ADMINISTERED DRUGS (ALT 637 FOR MEDICARE OP, ALT 636 FOR OP/ED)

## 2025-06-25 PROCEDURE — 83735 ASSAY OF MAGNESIUM: CPT

## 2025-06-25 PROCEDURE — S4991 NICOTINE PATCH NONLEGEND: HCPCS

## 2025-06-25 PROCEDURE — 85520 HEPARIN ASSAY: CPT

## 2025-06-25 PROCEDURE — 36415 COLL VENOUS BLD VENIPUNCTURE: CPT

## 2025-06-25 PROCEDURE — 80069 RENAL FUNCTION PANEL: CPT

## 2025-06-25 PROCEDURE — 94669 MECHANICAL CHEST WALL OSCILL: CPT

## 2025-06-25 PROCEDURE — 2500000004 HC RX 250 GENERAL PHARMACY W/ HCPCS (ALT 636 FOR OP/ED)

## 2025-06-25 PROCEDURE — 93005 ELECTROCARDIOGRAM TRACING: CPT

## 2025-06-25 PROCEDURE — 99232 SBSQ HOSP IP/OBS MODERATE 35: CPT | Performed by: NURSE PRACTITIONER

## 2025-06-25 PROCEDURE — 94668 MNPJ CHEST WALL SBSQ: CPT

## 2025-06-25 PROCEDURE — 2500000001 HC RX 250 WO HCPCS SELF ADMINISTERED DRUGS (ALT 637 FOR MEDICARE OP)

## 2025-06-25 PROCEDURE — 1200000002 HC GENERAL ROOM WITH TELEMETRY DAILY

## 2025-06-25 PROCEDURE — 85520 HEPARIN ASSAY: CPT | Performed by: STUDENT IN AN ORGANIZED HEALTH CARE EDUCATION/TRAINING PROGRAM

## 2025-06-25 PROCEDURE — 85027 COMPLETE CBC AUTOMATED: CPT

## 2025-06-25 PROCEDURE — 2500000001 HC RX 250 WO HCPCS SELF ADMINISTERED DRUGS (ALT 637 FOR MEDICARE OP): Performed by: NURSE PRACTITIONER

## 2025-06-25 PROCEDURE — 2500000002 HC RX 250 W HCPCS SELF ADMINISTERED DRUGS (ALT 637 FOR MEDICARE OP, ALT 636 FOR OP/ED): Performed by: INTERNAL MEDICINE

## 2025-06-25 PROCEDURE — 94640 AIRWAY INHALATION TREATMENT: CPT

## 2025-06-25 PROCEDURE — 82947 ASSAY GLUCOSE BLOOD QUANT: CPT

## 2025-06-25 PROCEDURE — 99233 SBSQ HOSP IP/OBS HIGH 50: CPT

## 2025-06-25 PROCEDURE — 85730 THROMBOPLASTIN TIME PARTIAL: CPT

## 2025-06-25 PROCEDURE — 2500000005 HC RX 250 GENERAL PHARMACY W/O HCPCS: Performed by: STUDENT IN AN ORGANIZED HEALTH CARE EDUCATION/TRAINING PROGRAM

## 2025-06-25 PROCEDURE — 94760 N-INVAS EAR/PLS OXIMETRY 1: CPT

## 2025-06-25 RX ORDER — ENOXAPARIN SODIUM 150 MG/ML
1 INJECTION SUBCUTANEOUS EVERY 12 HOURS
Status: DISCONTINUED | OUTPATIENT
Start: 2025-06-25 | End: 2025-06-28 | Stop reason: HOSPADM

## 2025-06-25 RX ADMIN — ACETAZOLAMIDE 500 MG: 500 INJECTION, POWDER, LYOPHILIZED, FOR SOLUTION INTRAVENOUS at 13:48

## 2025-06-25 RX ADMIN — BUDESONIDE 0.5 MG: 0.5 INHALANT RESPIRATORY (INHALATION) at 19:32

## 2025-06-25 RX ADMIN — DILTIAZEM HYDROCHLORIDE 360 MG: 120 CAPSULE, COATED, EXTENDED RELEASE ORAL at 09:20

## 2025-06-25 RX ADMIN — PRAMIPEXOLE DIHYDROCHLORIDE 1 MG: 1 TABLET ORAL at 21:49

## 2025-06-25 RX ADMIN — Medication 1 APPLICATION: at 21:48

## 2025-06-25 RX ADMIN — Medication 1 APPLICATION: at 15:53

## 2025-06-25 RX ADMIN — Medication 1 APPLICATION: at 12:03

## 2025-06-25 RX ADMIN — SALINE NASAL SPRAY 1 SPRAY: 1.5 SOLUTION NASAL at 21:46

## 2025-06-25 RX ADMIN — OXYCODONE HYDROCHLORIDE AND ACETAMINOPHEN 500 MG: 500 TABLET ORAL at 09:20

## 2025-06-25 RX ADMIN — OXYCODONE HYDROCHLORIDE 10 MG: 10 TABLET ORAL at 21:48

## 2025-06-25 RX ADMIN — ACETAMINOPHEN 975 MG: 325 TABLET, FILM COATED ORAL at 17:28

## 2025-06-25 RX ADMIN — KETOCONAZOLE: 20 CREAM TOPICAL at 12:00

## 2025-06-25 RX ADMIN — INSULIN LISPRO 4 UNITS: 100 INJECTION, SOLUTION INTRAVENOUS; SUBCUTANEOUS at 17:27

## 2025-06-25 RX ADMIN — Medication 4 L/MIN: at 19:32

## 2025-06-25 RX ADMIN — Medication 4 L/MIN: at 00:30

## 2025-06-25 RX ADMIN — GUAIFENESIN 600 MG: 600 TABLET ORAL at 09:19

## 2025-06-25 RX ADMIN — HYDROXYUREA 1000 MG: 500 CAPSULE ORAL at 09:20

## 2025-06-25 RX ADMIN — OXYCODONE HYDROCHLORIDE AND ACETAMINOPHEN 500 MG: 500 TABLET ORAL at 21:49

## 2025-06-25 RX ADMIN — TRIAMCINOLONE ACETONIDE: 1 CREAM TOPICAL at 12:01

## 2025-06-25 RX ADMIN — IPRATROPIUM BROMIDE AND ALBUTEROL SULFATE 3 ML: 2.5; .5 SOLUTION RESPIRATORY (INHALATION) at 07:33

## 2025-06-25 RX ADMIN — OXYCODONE HYDROCHLORIDE 10 MG: 10 TABLET ORAL at 00:37

## 2025-06-25 RX ADMIN — HEPARIN SODIUM 1600 UNITS/HR: 10000 INJECTION, SOLUTION INTRAVENOUS at 05:49

## 2025-06-25 RX ADMIN — POLYETHYLENE GLYCOL 3350 17 G: 17 POWDER, FOR SOLUTION ORAL at 09:24

## 2025-06-25 RX ADMIN — ACETAMINOPHEN 975 MG: 325 TABLET, FILM COATED ORAL at 05:49

## 2025-06-25 RX ADMIN — SENNOSIDES AND DOCUSATE SODIUM 2 TABLET: 50; 8.6 TABLET ORAL at 21:48

## 2025-06-25 RX ADMIN — INSULIN LISPRO 2 UNITS: 100 INJECTION, SOLUTION INTRAVENOUS; SUBCUTANEOUS at 21:47

## 2025-06-25 RX ADMIN — OXYCODONE HYDROCHLORIDE 10 MG: 10 TABLET ORAL at 09:26

## 2025-06-25 RX ADMIN — GUAIFENESIN 600 MG: 600 TABLET ORAL at 21:49

## 2025-06-25 RX ADMIN — NICOTINE 1 PATCH: 21 PATCH, EXTENDED RELEASE TRANSDERMAL at 09:19

## 2025-06-25 RX ADMIN — OXYCODONE HYDROCHLORIDE 10 MG: 10 TABLET ORAL at 15:29

## 2025-06-25 RX ADMIN — NYSTATIN CREAM: 100000 CREAM TOPICAL at 11:59

## 2025-06-25 RX ADMIN — PRAMIPEXOLE DIHYDROCHLORIDE 1 MG: 1 TABLET ORAL at 09:20

## 2025-06-25 RX ADMIN — SENNOSIDES AND DOCUSATE SODIUM 2 TABLET: 50; 8.6 TABLET ORAL at 09:20

## 2025-06-25 RX ADMIN — IPRATROPIUM BROMIDE AND ALBUTEROL SULFATE 3 ML: 2.5; .5 SOLUTION RESPIRATORY (INHALATION) at 19:32

## 2025-06-25 RX ADMIN — ACETAMINOPHEN 975 MG: 325 TABLET, FILM COATED ORAL at 00:36

## 2025-06-25 RX ADMIN — ACETAMINOPHEN 975 MG: 325 TABLET, FILM COATED ORAL at 11:58

## 2025-06-25 RX ADMIN — CEFTRIAXONE 2 G: 2 INJECTION, SOLUTION INTRAVENOUS at 09:19

## 2025-06-25 RX ADMIN — Medication 1 APPLICATION: at 12:00

## 2025-06-25 RX ADMIN — Medication 4 L/MIN: at 21:42

## 2025-06-25 RX ADMIN — IPRATROPIUM BROMIDE AND ALBUTEROL SULFATE 3 ML: 2.5; .5 SOLUTION RESPIRATORY (INHALATION) at 14:17

## 2025-06-25 RX ADMIN — Medication 4 L/MIN: at 04:22

## 2025-06-25 RX ADMIN — SPIRONOLACTONE 50 MG: 25 TABLET ORAL at 09:20

## 2025-06-25 RX ADMIN — DULOXETINE 20 MG: 20 CAPSULE, DELAYED RELEASE ORAL at 09:19

## 2025-06-25 RX ADMIN — ZINC SULFATE 220 MG (50 MG) CAPSULE 1 CAPSULE: CAPSULE at 09:20

## 2025-06-25 RX ADMIN — TRIAMCINOLONE ACETONIDE: 1 CREAM TOPICAL at 21:48

## 2025-06-25 RX ADMIN — ENOXAPARIN SODIUM 135 MG: 150 INJECTION SUBCUTANEOUS at 21:47

## 2025-06-25 RX ADMIN — KETOCONAZOLE: 20 CREAM TOPICAL at 21:48

## 2025-06-25 RX ADMIN — INSULIN LISPRO 2 UNITS: 100 INJECTION, SOLUTION INTRAVENOUS; SUBCUTANEOUS at 11:58

## 2025-06-25 RX ADMIN — BUDESONIDE 0.5 MG: 0.5 INHALANT RESPIRATORY (INHALATION) at 07:33

## 2025-06-25 ASSESSMENT — PAIN - FUNCTIONAL ASSESSMENT
PAIN_FUNCTIONAL_ASSESSMENT: 0-10

## 2025-06-25 ASSESSMENT — COGNITIVE AND FUNCTIONAL STATUS - GENERAL
WALKING IN HOSPITAL ROOM: A LITTLE
HELP NEEDED FOR BATHING: A LITTLE
CLIMB 3 TO 5 STEPS WITH RAILING: A LITTLE
MOVING FROM LYING ON BACK TO SITTING ON SIDE OF FLAT BED WITH BEDRAILS: A LITTLE
STANDING UP FROM CHAIR USING ARMS: A LITTLE
MOVING TO AND FROM BED TO CHAIR: A LITTLE
CLIMB 3 TO 5 STEPS WITH RAILING: A LOT
DRESSING REGULAR LOWER BODY CLOTHING: A LITTLE
TURNING FROM BACK TO SIDE WHILE IN FLAT BAD: A LITTLE
WALKING IN HOSPITAL ROOM: A LITTLE
DAILY ACTIVITIY SCORE: 19
DRESSING REGULAR UPPER BODY CLOTHING: A LITTLE
DRESSING REGULAR LOWER BODY CLOTHING: A LITTLE
MOVING FROM LYING ON BACK TO SITTING ON SIDE OF FLAT BED WITH BEDRAILS: A LITTLE
STANDING UP FROM CHAIR USING ARMS: A LITTLE
MOVING TO AND FROM BED TO CHAIR: A LITTLE
TOILETING: A LITTLE
TOILETING: A LOT
DAILY ACTIVITIY SCORE: 21
MOBILITY SCORE: 18
TURNING FROM BACK TO SIDE WHILE IN FLAT BAD: A LITTLE
HELP NEEDED FOR BATHING: A LITTLE
MOBILITY SCORE: 17

## 2025-06-25 ASSESSMENT — PAIN SCALES - GENERAL
PAINLEVEL_OUTOF10: 8
PAINLEVEL_OUTOF10: 4
PAINLEVEL_OUTOF10: 7
PAINLEVEL_OUTOF10: 7
PAINLEVEL_OUTOF10: 0 - NO PAIN
PAINLEVEL_OUTOF10: 0 - NO PAIN
PAINLEVEL_OUTOF10: 3
PAINLEVEL_OUTOF10: 8

## 2025-06-25 ASSESSMENT — PAIN DESCRIPTION - LOCATION
LOCATION: BACK
LOCATION: BACK

## 2025-06-25 ASSESSMENT — PAIN DESCRIPTION - DESCRIPTORS: DESCRIPTORS: DISCOMFORT

## 2025-06-25 ASSESSMENT — PAIN DESCRIPTION - ORIENTATION: ORIENTATION: RIGHT;LOWER;LEFT

## 2025-06-25 NOTE — CARE PLAN
The patient's goals for the shift include      The clinical goals for the shift include patient will be able to ween oxygen

## 2025-06-25 NOTE — PROGRESS NOTES
Subjective Data:  Sitting up in chair  Continues to have significant LE edema, though it is improving with weeping legs.        Objective Data:  Last Recorded Vitals:  Vitals:    06/25/25 0830 06/25/25 1009 06/25/25 1106 06/25/25 1150   BP: 143/70   136/78   BP Location:    Right arm   Patient Position:       Pulse: 88 99 96 88   Resp:    19   Temp:    36.6 °C (97.9 °F)   TempSrc:    Temporal   SpO2: 95% (!) 83% 93% 96%   Weight:       Height:           Last Labs:  CBC - 6/25/2025:  5:17 AM  8.9 14.1 496    44.2      CMP - 6/25/2025:  5:17 AM  9.1 8.0 28 --- 0.8   3.5 3.5 17 230      PTT - 6/25/2025:  5:17 AM  1.4   15.0 66     TROPHS   Date/Time Value Ref Range Status   06/19/2025 01:57 PM 23 0 - 13 ng/L Final   06/19/2025 12:50 PM 29 0 - 13 ng/L Final   12/11/2024 10:19 PM 26 0 - 13 ng/L Final     BNP   Date/Time Value Ref Range Status   06/19/2025 12:50  0 - 99 pg/mL Final   12/11/2024 04:51  0 - 99 pg/mL Final     HGBA1C   Date/Time Value Ref Range Status   12/11/2024 04:51 PM 5.5 See comment % Final   08/13/2024 07:35 AM 5.9 see below % Final     LDLCALC   Date/Time Value Ref Range Status   12/11/2024 04:51 PM 87 <=99 mg/dL Final     Comment:                                 Near   Borderline      AGE      Desirable  Optimal    High     High     Very High     0-19 Y     0 - 109     ---    110-129   >/= 130     ----    20-24 Y     0 - 119     ---    120-159   >/= 160     ----      >24 Y     0 -  99   100-129  130-159   160-189     >/=190       VLDL   Date/Time Value Ref Range Status   12/11/2024 04:51 PM 21 0 - 40 mg/dL Final   11/14/2022 11:01 AM 40 0 - 40 mg/dL Final   09/27/2020 12:15 AM 21 0 - 40 mg/dL Final   06/19/2020 03:03 PM 16 0 - 40 mg/dL Final      Last I/O:  I/O last 3 completed shifts:  In: 1380 (9.7 mL/kg) [P.O.:1040; I.V.:290 (2 mL/kg); IV Piggyback:50]  Out: 8345 (58.7 mL/kg) [Urine:8345 (1.6 mL/kg/hr)]  Weight: 142.2 kg     Past Cardiology Tests (Last 3 Years):  Echo:  Transthoracic  Echo Complete 06/20/2025  CONCLUSIONS:   1. Left ventricular ejection fraction is normal calculated by Arreguin's biplane at 63%.   2. There is severely increased septal and severely increased posterior left ventricular wall thickness.   3. There is severe concentric left ventricular hypertrophy.   4. There is normal right ventricular global systolic function.   5. Mildly enlarged right ventricle.   6. The left atrial size is severely dilated.   7. The right atrium is moderately dilated.   8. Severe aortic valve stenosis. The peak and mean gradients are 76 mmHg and 53 mmHg respectively.   9. There is moderate mitral annular calcification.  10. There is moderate mitral valve stenosis with a doppler estimated mean diastolic gradient of 7 mmHg.  11. Mild to moderate mitral valve regurgitation.  12. Mild to moderate tricuspid regurgitation visualized.  13. The Doppler estimated RVSP is moderately elevated at 60 mmHg.  14. The inferior vena cava appears severely dilated, with IVC inspiratory collapse less than 50%.    Transthoracic Echo (TTE) Complete 12/12/2024  CONCLUSIONS:   1. The left ventricular systolic function is normal, with a visually estimated ejection fraction of 60-65%.   2. Spectral Doppler shows a Grade I (impaired relaxation pattern) of left ventricular diastolic filling with normal left atrial filling pressure.   3. There is moderately increased septal and moderately increased posterior left ventricular wall thickness.   4. There is moderate concentric left ventricular hypertrophy.   5. There is reduced right ventricular systolic function.   6. Moderately enlarged right ventricle.   7. The left atrium is mildly dilated.   8. There is moderate mitral annular calcification.   9. There is moderate mitral valve stenosis.  10. Moderate to severe aortic valve stenosis.  11. Moderately elevated pulmonary artery pressure.  12. There is mild mitral and tricuspid regurgitation.    Transthoracic Echo (TTE) Complete  09/18/2024 9/18/2024  CONCLUSIONS:   1. The left ventricular systolic function is normal, with a visually estimated ejection fraction of 60-65%.   2. Spectral Doppler shows an impaired relaxation pattern of left ventricular diastolic filling.   3. There is normal right ventricular global systolic function.   4. The left atrium is mildly dilated.   5. There is severe mitral annular calcification.   6. Severe aortic valve stenosis.      Inpatient Medications:  Scheduled Medications[1]  PRN Medications[2]  Continuous Medications[3]    Physical Exam:  Constitutional: Pleasant, Awake/Alert/Oriented to person place and time.   Head: Atraumatic, Normocephalic  Eyes: EOMI. MICHELE  Neck: +JVD  Cardiovascular: Regular rate and rhythm, S1, S2. No extra heart sounds or murmurs  Respiratory: Crackles noted posteriorly at bases   Abdomen: Soft, Nontender, Obese. Bowel sounds appreciated  Neurological: CNII-XII intact. Sensation grossly intact  Extremities: chronic BLE lymphedema with 4+ pitting edema BLE with weeping   Psychiatric: Appropriate mood and affect       Assessment/Plan   65 y.o. 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     Assessment:  Acute decompensated diastolic HF  Severe aortic stenosis  Moderate mitral stenosis  BLE cellulitis  Possible pneumonia  pSVT     Plan:  -Continue lasix drip 10mg/hr  -Continue spironolactone 50mg daily, diltiazem 360mg daily  -Warfarin held for cath with heparin bridge   -Atbx per primary team/ID recommendations   -Recommend R+LHC after adequate diuresis-- discussed with Dr. George today-- cath likely to be next week given volume status; though she is responding well to current treatment.   -Will place  referral to structural [facility] at [facility]-- Dr. Franklin Alexander, prior to discharge for further discussion regarding TAVR candidacy     Peripheral IV 06/24/25 22 G Posterior;Right Hand (Active)   Site Assessment Clean;Dry;Intact 06/25/25 0900   Dressing Status Clean;Dry;Occlusive 06/25/25 0900   Number of days: 1       Peripheral IV 06/24/25 22 G Left;Posterior Forearm (Active)   Site Assessment Clean;Dry;Intact 06/25/25 0900   Dressing Status Clean;Dry;Occlusive 06/25/25 0900   Number of days: 1       Code Status:  Full Code    LAKEISHA Mackey-CNP       [1]   Scheduled medications   Medication Dose Route Frequency    acetaminophen  975 mg oral q6h    acetazolamide  500 mg intravenous Once    ascorbic acid  500 mg oral BID    budesonide  0.5 mg nebulization BID    And    ipratropium-albuteroL  3 mL nebulization TID    cefTRIAXone  2 g intravenous q24h    dilTIAZem ER  360 mg oral Daily    DULoxetine  20 mg oral Daily    guaiFENesin  600 mg oral BID    hydroxyurea  1,000 mg oral Daily    insulin lispro  0-10 Units subcutaneous Before meals & nightly    ketoconazole   Topical BID    melatonin  3 mg oral Nightly    multivitamin with minerals  1 tablet oral Daily    nicotine  1 patch transdermal Daily    Followed by    [START ON 7/31/2025] nicotine  1 patch transdermal Daily    Followed by    [START ON 8/14/2025] nicotine  1 patch transdermal Daily    nystatin   Topical BID    pneumoc 20-eros conj-dip cr(PF)  0.5 mL intramuscular During hospitalization    polyethylene glycol  17 g oral Daily    pramipexole  1 mg oral BID    sennosides-docusate sodium  2 tablet oral BID    spironolactone  50 mg oral Daily    [Held by provider] torsemide  40 mg oral BID    triamcinolone   Topical BID    [Held by provider] warfarin  7.5 mg oral Daily    white petrolatum  1 Application Topical BID    zinc oxide  1 Application Topical TID    zinc sulfate  50 mg of elemental zinc oral Daily   [2]   PRN medications   Medication    ammonium  lactate    benzocaine    cyclobenzaprine    dextrose    dextrose    glucagon    glucagon    HYDROmorphone    hydrOXYzine HCL    ipratropium-albuteroL    lidocaine    oxyCODONE    oxyCODONE    oxygen   [3]   Continuous Medications   Medication Dose Last Rate    furosemide  10 mg/hr 10 mg/hr (06/25/25 1100)    heparin  0-4,000 Units/hr 1,600 Units/hr (06/25/25 1100)

## 2025-06-25 NOTE — PROGRESS NOTES
"Nutrition Progress Note  Nutrition Follow Up Note  Patient has Malnutrition Diagnosis:  (insufficient information)  Nutrition Assessment    Pt on ATB for cellulitis, strep B bacteremia  On lasix for diuresis  Plan for L and R heart cath     Nutrition History:  Food and Nutrient History: (6/25/25)Pt seen for follow up, reports her appetite is improving, she dislikes the Juan that is being sent up from kitchen. Agrees to continue with the prostat. Pt requesting packets of juan to take home for consumption (provided 10 packets). (6/20/25)Pt reports a decreased appetite for awhile, noted pt ate 100% of breakfast per nursing documentation. Pt did not order lunch, which she typically does  not eat more than 2 meals a day at home. Pt receptive to take ONS and is asking about vitamins to help with wound healing.  Energy Intake: Good > 75 %  RX Allergies[1]     Anthropometrics:  Height: 162.6 cm (5' 4\")   Weight: 142 kg (313 lb 7.9 oz)   BMI (Calculated): 53.78  IBW/kg (Dietitian Calculated): 54.5 kg  Percent of IBW: 261 %       Weight History:     Weight         6/19/2025  1231 6/19/2025  2026 6/25/2025  0623       Weight: 154 kg (340 lb) 160 kg (353 lb 8 oz) 142 kg (313 lb 7.9 oz)             Nutrition Focused Physical Exam Findings:     Edema  Edema: +2 mild  Edema Location: BLE  Physical Findings  Skin: Positive (L tibial wound, buttocks wound, wound to abdoment per nursing documentation)  Digestive System Findings: Other (Comment) (n/a)  Mouth Findings:  (n/a)    Nutrition Significant Labs:    Results from last 7 days   Lab Units 06/25/25  0517 06/24/25  1739 06/24/25  0653 06/23/25  1710 06/23/25  0632   GLUCOSE mg/dL 114* 172* 101*   < > 126*   SODIUM mmol/L 140 141 142   < > 140   POTASSIUM mmol/L 4.1 3.7 3.9   < > 3.7   CHLORIDE mmol/L 90* 91* 93*   < > 96*   CO2 mmol/L 42* 42* 38*   < > 38*   BUN mg/dL 34* 37* 31*   < > 32*   CREATININE mg/dL 1.05 1.09* 0.96   < > 0.99   EGFR mL/min/1.73m*2 59* 56* 65   < > 63 "   CALCIUM mg/dL 9.1 9.6 9.2   < > 8.8   PHOSPHORUS mg/dL 3.5 3.1 3.1   < > 2.9   MAGNESIUM mg/dL 1.95  --  2.12  --  2.08    < > = values in this interval not displayed.     Lab Results   Component Value Date    HGBA1C 5.5 12/11/2024    HGBA1C 5.9 (H) 08/13/2024    HGBA1C 5.8 09/27/2020     Results from last 7 days   Lab Units 06/25/25  1146 06/24/25 2011 06/24/25  1600 06/24/25  1146 06/24/25  0736 06/23/25  1938 06/23/25  1537 06/23/25  1139   POCT GLUCOSE mg/dL 153* 195* 186* 135* 108* 275* 220* 232*     Lab Results   Component Value Date    ALBUMIN 3.5 06/25/2025      Lab Results   Component Value Date    CRP 3.23 (H) 06/19/2025           Nutrition Specific Medications:   Scheduled medications:  Scheduled Medications[2]  Continuous medications:  Continuous Medications[3]  PRN medications:  PRN Medications[4]     Nursing Data Per flowsheet:   Stool Appearance: Formed (06/23/25 1053)  Gastrointestinal  Gastrointestinal (WDL): Within Defined Limits  Abdomen Inspection: Rounded  Bowel Sounds: All quadrants  Bowel Sounds (All Quadrants): Active  Passing Flatus: Yes  Last BM Date: 06/23/25  Feeding assistance level: Independent    Intake/Output Summary (Last 24 hours) at 6/25/2025 1338  Last data filed at 6/25/2025 1255  Gross per 24 hour   Intake 1393.82 ml   Output 5945 ml   Net -4551.18 ml      0-10 (Numeric) Pain Score: 4   Dietary Orders (From admission, onward)       Start     Ordered    06/20/25 1359  Oral nutritional supplements  Until discontinued        Comments: 2 packets daily - mix with applesauce or beverage of pt choice   Question Answer Comment   Deliver with Breakfast no citrus flavor   Select supplement: Pro-Stat AWC        06/20/25 1359    06/19/25 2115  Adult diet Cardiac, Regular; 70 gm fat; 2 - 3 grams Sodium; 1800 mL fluid  Diet effective now        Question Answer Comment   Diet type Cardiac    Diet type Regular    Fat restriction: 70 gm fat    Sodium restriction: 2 - 3 grams Sodium    Dietary  fluid restriction / 24h: 1800 mL fluid        06/19/25 2114    06/19/25 2030  May Participate in Room Service  ( ROOM SERVICE MAY PARTICIPATE)  Once        Question:  .  Answer:  Yes    06/19/25 2029                     Estimated Needs:   Total Energy Estimated Needs in 24 hours (kCal):  (5348-4591)  Method for Estimating Needs: 30-35 kcal/kg/IBW  Total Protein Estimated Needs in 24 Hours (g):  (65-82)  Method for Estimating 24 Hour Protein Needs: 1.2-1.5 g/kg/IBW     Method for Estimating 24 Hour Fluid Needs: 1800 ml fluid restriction       Nutrition Diagnosis   Malnutrition Diagnosis  Patient has Malnutrition Diagnosis:  (insufficient information)    Nutrition Diagnosis  Patient has Nutrition Diagnosis: Yes (protein)  Diagnosis Status (1): Active  Nutrition Diagnosis 1: Increased nutrient needs  Related to (1): multiple chronic wounds, PNA  As Evidenced by (1): increased nutrient demand for illness/healing  Additional Assessment Information (1): ONS added for additional kcal and protein       Nutrition Interventions/Recommendations   Nutrition Prescription:  diet, ONS    Nutrition Interventions:   Interventions: Bioactive substance management, Vitamin and mineral supplement therapy  Vitamin and Mineral Supplement Therapy: Vitamin C supplement therapy, Zinc supplement therapy, Multivitamin multimineral supplement therapy  Goal: 1 packet prostat AWC 2 x/day- 202 kcal, 34 g protein  Bioactive Substance Management`: Food additives management    Coordination of Care: n/a      Recommendations:  Continue with current diet and multivitamin, Vitamin C and zinc supplementation   Weights: daily         Nutrition Monitoring and Evaluation   Food/Nutrient Related History Monitoring  Monitoring and Evaluation Plan: Intake / amount of food  Intake / Amount of food: Consumes at least 75% or more of meals/snacks/supplements    Anthropometric Measurements  Monitoring and Evaluation Plan: Body weight  Body Weight: Measured body  weight  Criteria: Monitor    Biochemical Data, Medical Tests and Procedures  Monitoring and Evaluation Plan: Electrolyte/renal panel, Glucose/endocrine profile  Criteria: Monitor    Physical Exam Findings  Monitoring and Evaluation Plan: Skin  Skin Finding: Impaired wound healing - Skin to heal          Goal Status: Some progress toward goal(s)  Time Spent (min): 30 minutes         [1]   Allergies  Allergen Reactions    Vancomycin Other     Patient says it caused hypotension and kidney failure and made her feel terrible     Doxycycline GI Upset    Macrobid [Nitrofurantoin Monohyd/M-Cryst] Dizziness    Sulfamethoxazole-Trimethoprim Unknown   [2] acetaminophen, 975 mg, oral, q6h  acetazolamide, 500 mg, intravenous, Once  ascorbic acid, 500 mg, oral, BID  budesonide, 0.5 mg, nebulization, BID   And  ipratropium-albuteroL, 3 mL, nebulization, TID  cefTRIAXone, 2 g, intravenous, q24h  dilTIAZem ER, 360 mg, oral, Daily  DULoxetine, 20 mg, oral, Daily  enoxaparin, 1 mg/kg, subcutaneous, q12h  guaiFENesin, 600 mg, oral, BID  hydroxyurea, 1,000 mg, oral, Daily  insulin lispro, 0-10 Units, subcutaneous, Before meals & nightly  ketoconazole, , Topical, BID  melatonin, 3 mg, oral, Nightly  multivitamin with minerals, 1 tablet, oral, Daily  nicotine, 1 patch, transdermal, Daily   Followed by  [START ON 7/31/2025] nicotine, 1 patch, transdermal, Daily   Followed by  [START ON 8/14/2025] nicotine, 1 patch, transdermal, Daily  nystatin, , Topical, BID  pneumoc 20-eros conj-dip cr(PF), 0.5 mL, intramuscular, During hospitalization  polyethylene glycol, 17 g, oral, Daily  pramipexole, 1 mg, oral, BID  sennosides-docusate sodium, 2 tablet, oral, BID  spironolactone, 50 mg, oral, Daily  [Held by provider] torsemide, 40 mg, oral, BID  triamcinolone, , Topical, BID  [Held by provider] warfarin, 7.5 mg, oral, Daily  white petrolatum, 1 Application, Topical, BID  zinc oxide, 1 Application, Topical, TID  zinc sulfate, 50 mg of elemental  zinc, oral, Daily  [3] furosemide, 10 mg/hr, Last Rate: 10 mg/hr (06/25/25 1100)  heparin, 0-4,000 Units/hr, Last Rate: 1,600 Units/hr (06/25/25 1100)  [4] PRN medications: ammonium lactate, benzocaine, cyclobenzaprine, dextrose, dextrose, glucagon, glucagon, HYDROmorphone, hydrOXYzine HCL, ipratropium-albuteroL, lidocaine, oxyCODONE, oxyCODONE, oxygen

## 2025-06-25 NOTE — PROGRESS NOTES
Neisha Graham is a 65 y.o. female on day 6 of admission presenting with Pneumonia of both lower lobes due to infectious organism.    Subjective   Interval History: no fever, no new complaints        Review of Systems    Objective   Range of Vitals (last 24 hours)  Heart Rate:  [52-99]   Temp:  [36.5 °C (97.7 °F)-37.1 °C (98.8 °F)]   Resp:  [16-19]   BP: (128-143)/(61-78)   Weight:  [142 kg (313 lb 7.9 oz)]   SpO2:  [83 %-96 %]   Daily Weight  06/25/25 : 142 kg (313 lb 7.9 oz)    Body mass index is 53.81 kg/m².    Physical Exam  Constitutional:       Appearance: Normal appearance.   HENT:      Head: Normocephalic and atraumatic.      Mouth/Throat:      Mouth: Mucous membranes are moist.      Pharynx: Oropharynx is clear.   Eyes:      Pupils: Pupils are equal, round, and reactive to light.   Cardiovascular:      Rate and Rhythm: Normal rate and regular rhythm.      Heart sounds: Normal heart sounds.   Pulmonary:      Effort: Pulmonary effort is normal.      Breath sounds: Normal breath sounds.   Abdominal:      General: Abdomen is flat. Bowel sounds are normal.      Palpations: Abdomen is soft.   Musculoskeletal:         General: Swelling present.      Cervical back: Normal range of motion.      Comments: Stasis / wounds   Neurological:      Mental Status: She is alert.         Antibiotics  cefTRIAXone - 2 gram/50 mL    Relevant Results  Labs  Results from last 72 hours   Lab Units 06/25/25  0517 06/24/25  0653 06/23/25  0632   WBC AUTO x10*3/uL 8.9 11.3 11.1   HEMOGLOBIN g/dL 14.1 14.2 12.7   HEMATOCRIT % 44.2 46.0 40.6   PLATELETS AUTO x10*3/uL 496* 544* 511*     Results from last 72 hours   Lab Units 06/25/25  0517 06/24/25  1739 06/24/25  0653   SODIUM mmol/L 140 141 142   POTASSIUM mmol/L 4.1 3.7 3.9   CHLORIDE mmol/L 90* 91* 93*   CO2 mmol/L 42* 42* 38*   BUN mg/dL 34* 37* 31*   CREATININE mg/dL 1.05 1.09* 0.96   GLUCOSE mg/dL 114* 172* 101*   CALCIUM mg/dL 9.1 9.6 9.2   ANION GAP mmol/L 12 12 15   EGFR  mL/min/1.73m*2 59* 56* 65   PHOSPHORUS mg/dL 3.5 3.1 3.1     Results from last 72 hours   Lab Units 06/25/25  0517 06/24/25  1739 06/24/25  0653   ALBUMIN g/dL 3.5 3.7 3.6     Estimated Creatinine Clearance: 74.5 mL/min (by C-G formula based on SCr of 1.05 mg/dL).  C-Reactive Protein   Date Value Ref Range Status   06/19/2025 3.23 (H) <1.00 mg/dL Final   12/11/2024 0.72 <1.00 mg/dL Final   09/16/2024 16.36 (H) <1.00 mg/dL Final     Microbiology  Susceptibility data from last 14 days.  Collected Specimen Info Organism Clindamycin Erythromycin Penicillin Tetracycline   06/19/25 Blood culture from Peripheral Venipuncture Streptococcus agalactiae (Group B Streptococcus)       06/19/25 Blood culture from Peripheral Venipuncture Streptococcus agalactiae (Group B Streptococcus)  R  R  S  R   Imaging  Reviewed        Assessment/Plan   Sepsis, BC with gp B Strep, TTE reviewed, the repeat BC is negative   Legs stasis / wounds / likely cellulitis  Respiratory failure / COPD /  BE / atelectasis, for cath     Recommendations :  Continue Rocephin  Discussed with the medical team       I spent minutes in the professional and overall care of this patient.  The cultures and susceptibilities were reviewed and discussed with the micro lab, the antibiotics stewardship guidelines were reviewed, the infection control protocols and recommendations were reviewed with the patient and the staff                 Gorge Myers MD

## 2025-06-25 NOTE — PROGRESS NOTES
06/25/25 1035   Discharge Planning   Living Arrangements Alone   Support Systems Children;Family members   Assistance Needed A&OX4; independent with ADLs with walker and wheelchair; doesn't drive; 2L @ HS. Currently 2L NC; PCP Dr Gonsalez; on Coumadin prior to hospitalization   Type of Residence Private residence   Number of Stairs to Enter Residence 0   Number of Stairs Within Residence 0   Do you have animals or pets at home? No   Who is requesting discharge planning? Provider   Expected Discharge Disposition Home Health  (Pt lives in handicapped apartment. Active [facility]- patient not interested in SNF)   Does the patient need discharge transport arranged? Yes   RoundTrip coordination needed? Yes   Has discharge transport been arranged? No   Stroke Family Assessment   Stroke Family Assessment Needed No   Intensity of Service   Intensity of Service 0-30 min

## 2025-06-25 NOTE — NURSING NOTE
0700- Assumed care of patient.     Patient seen by cardiology, N.O. for Diamox x1 dose.    Dressing change done, refused island dressing to buttocks.     Heparin will discontinue at 2100 and patient will start Lovenox.     PAULA WARD, TORON

## 2025-06-25 NOTE — PROGRESS NOTES
"Internal Medicine - Daily Progress Note   Hospital Day: 6    Name: Neisha Graham   Age: 65 y.o.   Gender: Female  Room: 107/107-A        HPI   Neisha Graham is a 65 y.o. year old female  patient with with PMHx significant for paroxysmal atrial fibrillation on warfarin, chronic thromboembolic disease, essential thrombocytosis, moderate to severe aortic stenosis, moderate mitral stenosis, HFpEF (EF 60-65% 12/24), COPD on 2L NC nightly, pulmonary hypertension, lymphedema with chronic lower extremity wounds, chronic sacral wounds, HTN, HLD, anxiety, and depression who presented to [facility] on 6/19/25 with chief complaint of fever and chills with associated shortness of breath. Patient states that last night she began to notice subjective fever and chills that continued into this morning which was her main concern for presenting to the ER. She does note that she had noticed her L lower extremity wound was foul smelling last night and had soaked through her bandage faster than normal. Additionally, she notes increased swelling of her bilateral lower extremities up to her knees. States that she is supposed to take torsemide 40 mg BID at home but recently stopped taking the evening dose because she felt as though it was too much. Endorses good urine output with the torsemide once daily. Additionally, she notes swelling of a gland on her R jaw and neck for the past 2-3 months that is painful and keeps her awake at night. She was recently treated for pneumonia outpatient twice by her PCP, once with a 10-day course of Augmentin and once with a 5-day course of Cefdinir, at that time she states her breathing improved but the gland remained inflamed. She started feeling increasingly short of breath again recently and noes increase of her chronic cough with sputum changing from \"flesh\" color to a more green/white color. She denies chest pain, palpitations, nasal congestion, sore throat, abdominal pain, nausea, " "vomiting.       Subjective    Patient sitting up in chair.  Reports significant improvement in her breathing, states she does not think she needs her oxygen.  Still tolerating lying flat. Did note some seeping from her bilateral lower extremities, but states she is happy with the progress she is making.    Discussed updated plan for cardiac catheterization early next week as opposed to tomorrow given that the cardiologist would like >50 pounds total weight loss from diuresis.  Also discussed adding Diamox given her elevated bicarb, patient agreeable.  Answered all questions.    Overnight Events: none    ROS: 12 points review of system is negative except as stated in the HPI above.     Objective    Vitals  Visit Vitals  /61 (BP Location: Right arm)   Pulse 52   Temp 36.5 °C (97.7 °F) (Temporal)   Resp 17   Ht 1.626 m (5' 4\")   Wt 142 kg (313 lb 7.9 oz)   SpO2 92%   BMI 53.81 kg/m²   Smoking Status Some Days   BSA 2.53 m²       Physical Exam    Physical Exam  Constitutional:       General: She is not in acute distress.     Appearance: She is obese.   HENT:      Head: Normocephalic and atraumatic.      Mouth/Throat:      Mouth: Mucous membranes are moist.   Eyes:      Extraocular Movements: Extraocular movements intact.      Pupils: Pupils are equal, round, and reactive to light.   Cardiovascular:      Rate and Rhythm: Normal rate and regular rhythm.      Pulses: Normal pulses.      Heart sounds: Murmur heard.   Pulmonary:      Effort: Pulmonary effort is normal. No respiratory distress.      Breath sounds: No wheezing.      Comments: Crackles bilateral bases L>R, improving  Abdominal:      General: Bowel sounds are normal. There is no distension.      Palpations: Abdomen is soft.      Tenderness: There is no abdominal tenderness.   Musculoskeletal:      Comments: Chronic bilateral lower extremity swelling consistent with lymphedema, left lower extremity wound mid dorsal tibia with drainage (wrapped with gauze " "and Ace wrap)   Skin:     Findings: Erythema (BLE) present.   Neurological:      General: No focal deficit present.      Mental Status: She is alert and oriented to person, place, and time.   Psychiatric:         Mood and Affect: Mood normal.           IOs    Intake/Output Summary (Last 24 hours) at 6/25/2025 0750  Last data filed at 6/25/2025 0604  Gross per 24 hour   Intake 1259.96 ml   Output 6045 ml   Net -4785.04 ml       Labs:   Results from last 72 hours   Lab Units 06/25/25  0517 06/24/25  1739 06/24/25  0653   SODIUM mmol/L 140 141 142   POTASSIUM mmol/L 4.1 3.7 3.9   CHLORIDE mmol/L 90* 91* 93*   CO2 mmol/L 42* 42* 38*   BUN mg/dL 34* 37* 31*   CREATININE mg/dL 1.05 1.09* 0.96   GLUCOSE mg/dL 114* 172* 101*   CALCIUM mg/dL 9.1 9.6 9.2   ANION GAP mmol/L 12 12 15   EGFR mL/min/1.73m*2 59* 56* 65   PHOSPHORUS mg/dL 3.5 3.1 3.1      Results from last 72 hours   Lab Units 06/25/25  0517 06/24/25  0653 06/23/25  0632   WBC AUTO x10*3/uL 8.9 11.3 11.1   HEMOGLOBIN g/dL 14.1 14.2 12.7   HEMATOCRIT % 44.2 46.0 40.6   PLATELETS AUTO x10*3/uL 496* 544* 511*      Lab Results   Component Value Date    CALCIUM 9.1 06/25/2025    PHOS 3.5 06/25/2025      Lab Results   Component Value Date    CRP 3.23 (H) 06/19/2025      [unfilled]     Micro/ID:   Susceptibility data from last 90 days.  Collected Specimen Info Organism Clindamycin Erythromycin Penicillin Tetracycline   06/19/25 Blood culture from Peripheral Venipuncture Streptococcus agalactiae (Group B Streptococcus)       06/19/25 Blood culture from Peripheral Venipuncture Streptococcus agalactiae (Group B Streptococcus)  R  R  S  R                    No lab exists for component: \"AGALPCRNB\"   .ID  Lab Results   Component Value Date    URINECULTURE  12/30/2024     Clinically insignificant growth based on current clinical standards.    BLOODCULT No growth at 3 days 06/21/2025    BLOODCULT No growth at 3 days 06/21/2025       Images  Transthoracic Echo Complete   "   [facility], [address] [address]                Tel [phone] and Fax [phone]    TRANSTHORACIC ECHOCARDIOGRAM REPORT       Patient Name:       LION PEREZ     Reading Physician:    [id number] Victor Hugo George MD  Study Date:         6/20/2025           Ordering Provider:    [id number] MARILYN BARROS  MRN/PID:            [id number]            Fellow:  Accession#:         [id number]        Nurse:  Date of Birth/Age:  1960 / 65      Sonographer:          Keesha parker                                     RDISABELLA  Gender assigned at  F                   Additional Staff:  Birth:  Height:             162.56 cm           Admit Date:           6/19/2025  Weight:             160.12 kg           Admission Status:     Inpatient -                                                                Routine  BSA / BMI:          2.49 m2 / 60.59     Encounter#:           [id number]                      kg/m2  Blood Pressure:     115/63 mmHg         Department Location:  [facility] Non                                                                Invasive    Study Type:    TRANSTHORACIC ECHO (TTE) COMPLETE  Diagnosis/ICD: Acute combined systolic (congestive) and diastolic (congestive)                 heart failure (CHF)-I50.41; Nonrheumatic aortic (valve)                 stenosis-I35.0  Indication:    Aortic stenosis, CHF  CPT Code:      Echo Complete w Full Doppler-[id number]    Patient History:  Pertinent History: A-Fib, Anticoagulation, CHF, Dyspnea, COPD, HTN and                     Hyperlipidemia. Severe AS, Mod MS, Pulm HTN, Elevated BNP,.    Study Detail: The following Echo studies were performed: 2D, M-Mode, Doppler and                color flow.       PHYSICIAN INTERPRETATION:  Left Ventricle: Left ventricular ejection fraction is normal  calculated by Arreguin's biplane at 63%. There is severe concentric left ventricular hypertrophy. There are no regional left ventricular wall motion abnormalities. The left ventricular cavity size is normal. There is severely increased septal and severely increased posterior left ventricular wall thickness. Spectral Doppler shows a Grade II (pseudonormal pattern) of left ventricular diastolic filling with an elevated left atrial pressure.  Left Atrium: The left atrial size is severely dilated.  Right Ventricle: The right ventricle is mildly enlarged. There is normal right ventricular global systolic function.  Right Atrium: The right atrium is moderately dilated.  Aortic Valve: The aortic valve is probably trileaflet. The aortic valve area by VTI is 0.68 cmï¿½ with a peak velocity of 4.36 m/s. The peak and mean gradients are 75 mmHg and 53 mmHg, respectively with a dimensionless index of 0.22. There is evidence of severe aortic valve stenosis. There is no evidence of aortic valve regurgitation.  Mitral Valve: The mitral valve is mildly thickened. The doppler estimated peak and mean diastolic pressure gradients are 12.4 mmHg and 7 mmHg, respectively. There is evidence of moderate mitral valve stenosis. There is moderate mitral annular calcification. The mean gradient of the mitral valve is 7 mmHg. There is mild to moderate mitral valve regurgitation. The E Vmax is 1.59 m/s.  Tricuspid Valve: The tricuspid valve is structurally normal. There is mild to moderate tricuspid regurgitation. The Doppler estimated right ventricular systolic pressure (RVSP) is moderately elevated at 60 mmHg.  Pulmonic Valve: The pulmonic valve is structurally normal. There is trace pulmonic valve regurgitation.  Pericardium: There is no pericardial effusion noted.  Aorta: The aortic root is abnormal. There is no dilatation of the ascending aorta. There is no dilatation of the aortic root.  Systemic Veins: The inferior vena cava appears  severely dilated, with IVC inspiratory collapse less than 50%.  In comparison to the previous echocardiogram(s): Compared with study dated 12/12/2024,.       CONCLUSIONS:   1. Left ventricular ejection fraction is normal calculated by Arreguin's biplane at 63%.   2. There is severely increased septal and severely increased posterior left ventricular wall thickness.   3. There is severe concentric left ventricular hypertrophy.   4. There is normal right ventricular global systolic function.   5. Mildly enlarged right ventricle.   6. The left atrial size is severely dilated.   7. The right atrium is moderately dilated.   8. Severe aortic valve stenosis. The peak and mean gradients are 76 mmHg and 53 mmHg respectively.   9. There is moderate mitral annular calcification.  10. There is moderate mitral valve stenosis with a doppler estimated mean diastolic gradient of 7 mmHg.  11. Mild to moderate mitral valve regurgitation.  12. Mild to moderate tricuspid regurgitation visualized.  13. The Doppler estimated RVSP is moderately elevated at 60 mmHg.  14. The inferior vena cava appears severely dilated, with IVC inspiratory collapse less than 50%.    QUANTITATIVE DATA SUMMARY:     2D MEASUREMENTS:          Normal Ranges:  IVSd:            1.62 cm  (0.6-1.1cm)  LVPWd:           1.75 cm  (0.6-1.1cm)  LVIDd:           4.77 cm  (3.9-5.9cm)  LVIDs:           3.08 cm  LV Mass Index:   144 g/m2  LVEDV Index:     40 ml/m2  LV % FS          35.3 %       LEFT ATRIUM:                  Normal Ranges:  LA Vol A4C:        123.0 ml   (22+/-6mL/m2)  LA Vol A2C:        123.1 ml  LA Vol BP:         125.9 ml  LA Vol Index A4C:  49.4ml/m2  LA Vol Index A2C:  49.4 ml/m2  LA Vol Index BP:   50.6 ml/m2  LA Area A4C:       30.9 cm2  LA Area A2C:       30.2 cm2  LA Major Axis A4C: 6.6 cm  LA Major Axis A2C: 6.3 cm  LA Volume Index:   50.4 ml/m2  LA Vol A4C:        116.9 ml  LA Vol A2C:        121.6 ml  LA Vol Index BSA:  47.9 ml/m2       RIGHT  ATRIUM:          Normal Ranges:  RA Area A4C:  34.1 cm2       M-MODE MEASUREMENTS:         Normal Ranges:  Ao Root:             3.00 cm (2.0-3.7cm)  LAs:                 4.73 cm (2.7-4.0cm)       LV SYSTOLIC FUNCTION:                       Normal Ranges:  EF-A4C View:    65 % (>=55%)  EF-A2C View:    61 %  EF-Biplane:     63 %  LV EF Reported: 63 %       LV DIASTOLIC FUNCTION:           Normal Ranges:  MV Peak E:             1.59 m/s  (0.7-1.2 m/s)  MV Peak A:             1.39 m/s  (0.42-0.7 m/s)  E/A Ratio:             1.14      (1.0-2.2)  MV e'                  0.110 m/s (>8.0)  MV lateral e'          0.13 m/s  MV medial e'           0.09 m/s  E/e' Ratio:            14.43     (<8.0)       MITRAL VALVE:           Normal Ranges:  MV Vmax:      1.76 m/s  (<=1.3m/s)  MV peak P.4 mmHg (<5mmHg)  MV mean P.1 mmHg  (<2mmHg)  MV VTI:       41.10 cm  (10-13cm)  MV DT:        216 msec  (150-240msec)       MITRAL INSUFFICIENCY:             Normal Ranges:  MR VTI:               181.09 cm  MR Vmax:              597.53 cm/s       AORTIC VALVE:                      Normal Ranges:  AoV Vmax:                4.36 m/s  (<=1.7m/s)  AoV Peak P.0 mmHg (<20mmHg)  AoV Mean P.3 mmHg (1.7-11.5mmHg)  LVOT Max Rodriguez:            1.09 m/s  (<=1.1m/s)  AoV VTI:                 105.83 cm (18-25cm)  LVOT VTI:                22.85 cm  LVOT Diameter:           2.00 cm   (1.8-2.4cm)  AoV Area, VTI:           0.68 cm2  (2.5-5.5cm2)  AoV Area,Vmax:           0.79 cm2  (2.5-4.5cm2)  AoV Dimensionless Index: 0.22       RIGHT VENTRICLE:  RV Basal 4.70 cm  RV Mid   4.00 cm  RV Major 8.7 cm  TAPSE:   24.0 mm  RV s'    0.15 m/s       TRICUSPID VALVE/RVSP:          Normal Ranges:  Peak TR Velocity:     3.36 m/s  Est. RA Pressure:     15  RV Syst Pressure:     60       (< 30mmHg)  IVC Diam:             3.50 cm       PULMONIC VALVE:           Normal Ranges:  PV Max Rodriguez:     1.7 m/s   (0.6-0.9m/s)  PV Max PG:       12.1 mmHg       [id number] Victor Hugo George MD  Electronically signed on 6/20/2025 at 2:26:52 PM       ** Final **  ECG 12 lead  Sinus tachycardia with Premature atrial complexes  Possible Left atrial enlargement  Borderline ECG  When compared with ECG of 13-DEC-2024 13:54,  No significant change was found      Meds  Scheduled medications  Scheduled Medications[1]  Continuous medications  Continuous Medications[2]  PRN medications  PRN Medications[3]     Assessment and Plan    Neisha Graham is a 65 y.o. female with PMHx significant for paroxysmal atrial fibrillation (on warfarin), chronic thromboembolic disease, essential thrombocytosis, moderate to severe aortic stenosis, moderate mitral stenosis, HFpEF (EF 60-65% 12/24), COPD (on 2L NC nightly), pulmonary hypertension, lymphedema with chronic lower extremity wounds, chronic sacral wounds, HTN, HLD admitted on 6/19/2025 acute on chronic hypoxic respiratory failure 2/2 HFpEF and COPD exacerbation with concern for sepsis 2/2 infection of chronic wounds.     Acute Medical Issues:   #Sepsis  #Bacteremia  #Chronic bilateral lower extremity and sacral wounds with concern for acute infection  ::CXR and CT without distinct PNA  ::Patient notes foul smell of L lower extremity wound x 2 days - with some surrounding erythema  ::Hx of wound with MRSA 9/24 and pseudomonas bacteremia 9/24  ::Patient complaints of swollen/painful gland in R jaw and ability to express salivary stones from under tongue - minimal parotid swelling and tenderness on examination  -Blood cultures positive for group B streptococcus  -Pro-[facility] 0.26, CRP 3.23, ESR 54  -Continue ceftriaxone (6/19 - ), azithromycin (6/19 - 6/21)  -ID consulted, agrees with plan to continue ceftriaxone  -Wound care following  -Repeat blood cultures Ngx3 day     #Acute on chronic hypoxic respiratory failure   #CHF exacerbation  #COPD exacerbation  ::TTE 12/2024 with EF 60-65%  ::Patient with SOB and desaturation to 88%, requiring 1-2L  NC continuous (at baseline wears 2L NC at night and with transfers/exertion)  ::Patient on torsemide 40 mg BID at home but recently stopped taking evening dose  ::Increased cough, sputum production  ::BNP elevated at 786  ::CXR and CT chest concerning for pulmonary edema   ::s/p 80 mg IV Lasix in the ED  -On 2L NC, wean as tolerated back to baseline 2 L at night and with exertion  -Prednisone 40 mg daily x 5 days (6/19 - 6/23)  -Azithromycin x 3 days (6/19 - 6/21)  -Mucinex 600 mg BID  -Continue home inhalers  -Duonebs scheduled and PRN  -Incentive spirometer and acapella ordered   -Continue home spironolactone 50 mg daily  -Holding home torsemide, continue with IV diuresis  -TTE: EF 63%, severely concentric left ventricular hypertrophy, severely dilated left atria, moderately dilated right atria, severe aortic stenosis with peak gradient 76 mmHg, moderate mitral valve stenosis  -1800cc/day fluid restriction  -Monitor strict I/O: Continues to be net negative >2 L daily  -Cardiology consulted: Continue Lasix drip at 10 mg/h, goal >50 pounds total weight loss prior to cardiac cath    #Moderate to severe aortic stenosis   #Moderate mitral valve stenosis   ::TTE 12/2024 with EF 60-65% and Grade I LV impaired relaxation pattern and mod to severe aortic stenosis and mod mitral stenosis   ::ED did speak with Dr. George who recommended [place] [facility] [place] and would see patient in AM to determine if transfer needed for valve evaluation  -Cardiology consulted: Once euvolemic, will plan for R+C once euvolemic to assess aortic and mitral stenosis  -Will need outpatient referral to structural heart team to discuss TAVR candidacy  -Outpatient referral placed for dentist given the need for preop clearance of her dental caries    #Hyperglycemia   ::suspect 2/2 steroid use  -monitor  -Continue SSI    #Contraction Alkalosis  ::Bicarb elevated at 42 on day 5 of diuresis  -will give acetazolamide  -Continue to monitor    Resolved  Medical Issues:  #Supratherapeutic INR  ::INR 3.4 on admission    #Leukocytosis  :: WBC 27.2 on admission      Chronic Medical Issues:   #Paroxysmal atrial fibrillation -holding home warfarin (takes 7.5 mg daily, INR goal 2-3), continue diltiazem 360 mg daily  #Essential thrombocytosis - Hydroxyurea 1000 mg daily  #Chronic back pain - Flexeril 10 mg BID PRN for muscle spasms, takes oxycodone 10 mg PRN at home for pain - inpatient pain regimen: scheduled Tylenol 975 mg q6 hours, oxycodone 5 and 10 mg q6 hours PRN for moderate and severe pain, dilaudid 0.4 mg PRN for breakthrough pain  #Chronic constipation 2/2 opioid use: Continue bowel regimen  #Depression, Anxiety - Duloxetine 20 mg daily, hydroxyzine 50 mg q8 hours PRN, venlafaxine 150 mg daily  #RLS - Pramipexole 1 mg BID  #Tobacco use disorder-Nicotine patch ordered, smoking cessation discussed  #HTN, HLD    Fluids: Fluid restriction 1800cc daily   Electrolytes: Replete as needed   Nutrition: Cardiac diet  GI ppx: None  DVT ppx: heparin gtt  Antibiotics: Ceftriaxone  Tubes/Lines/Drains: PIV  Oxygen: 1L NC    Code Status: Full Code   Emergency Contact: Extended Emergency Contact Information  Primary Emergency Contact: Belle Chauhan  Mobile Phone: [phone]  Relation: Sibling  Secondary Emergency Contact: ANANICOLAS  Mobile Phone: [phone]  Relation: Daughter     Disposition: 65 y.o. female admitted for acute on chronic HRF iso HFpEF and COPD exacerbations with concern for sepsis 2/2 bacteremia. Pending cardiac cath once euvolemic.       Violeta Hernandez, DO   06/25/25             [1] acetaminophen, 975 mg, oral, q6h  ascorbic acid, 500 mg, oral, BID  budesonide, 0.5 mg, nebulization, BID   And  ipratropium-albuteroL, 3 mL, nebulization, TID  cefTRIAXone, 2 g, intravenous, q24h  dilTIAZem ER, 360 mg, oral, Daily  DULoxetine, 20 mg, oral, Daily  guaiFENesin, 600 mg, oral, BID  hydroxyurea, 1,000 mg, oral, Daily  insulin lispro, 0-10 Units,  subcutaneous, Before meals & nightly  ketoconazole, , Topical, BID  melatonin, 3 mg, oral, Nightly  multivitamin with minerals, 1 tablet, oral, Daily  nicotine, 1 patch, transdermal, Daily   Followed by  [START ON 7/31/2025] nicotine, 1 patch, transdermal, Daily   Followed by  [START ON 8/14/2025] nicotine, 1 patch, transdermal, Daily  nystatin, , Topical, BID  pneumoc 20-eros conj-dip cr(PF), 0.5 mL, intramuscular, During hospitalization  polyethylene glycol, 17 g, oral, Daily  pramipexole, 1 mg, oral, BID  sennosides-docusate sodium, 2 tablet, oral, BID  spironolactone, 50 mg, oral, Daily  [Held by provider] torsemide, 40 mg, oral, BID  triamcinolone, , Topical, BID  [Held by provider] warfarin, 7.5 mg, oral, Daily  white petrolatum, 1 Application, Topical, BID  zinc oxide, 1 Application, Topical, TID  zinc sulfate, 50 mg of elemental zinc, oral, Daily     [2] furosemide, 10 mg/hr, Last Rate: 10 mg/hr (06/25/25 0233)  heparin, 0-4,000 Units/hr, Last Rate: 1,600 Units/hr (06/25/25 0932)     [3] PRN medications: ammonium lactate, benzocaine, cyclobenzaprine, dextrose, dextrose, glucagon, glucagon, HYDROmorphone, hydrOXYzine HCL, ipratropium-albuteroL, lidocaine, oxyCODONE, oxyCODONE, oxygen

## 2025-06-26 LAB
ALBUMIN SERPL BCP-MCNC: 3.8 G/DL (ref 3.4–5)
ALBUMIN SERPL BCP-MCNC: 4 G/DL (ref 3.4–5)
ANION GAP SERPL CALC-SCNC: 13 MMOL/L (ref 10–20)
ANION GAP SERPL CALC-SCNC: 15 MMOL/L (ref 10–20)
APTT PPP: 37 SECONDS (ref 26–36)
BUN SERPL-MCNC: 32 MG/DL (ref 6–23)
BUN SERPL-MCNC: 35 MG/DL (ref 6–23)
CALCIUM SERPL-MCNC: 9.6 MG/DL (ref 8.6–10.3)
CALCIUM SERPL-MCNC: 9.7 MG/DL (ref 8.6–10.3)
CHLORIDE SERPL-SCNC: 91 MMOL/L (ref 98–107)
CHLORIDE SERPL-SCNC: 92 MMOL/L (ref 98–107)
CO2 SERPL-SCNC: 38 MMOL/L (ref 21–32)
CO2 SERPL-SCNC: 38 MMOL/L (ref 21–32)
CREAT SERPL-MCNC: 1.23 MG/DL (ref 0.5–1.05)
CREAT SERPL-MCNC: 1.4 MG/DL (ref 0.5–1.05)
EGFRCR SERPLBLD CKD-EPI 2021: 42 ML/MIN/1.73M*2
EGFRCR SERPLBLD CKD-EPI 2021: 49 ML/MIN/1.73M*2
ERYTHROCYTE [DISTWIDTH] IN BLOOD BY AUTOMATED COUNT: 15.9 % (ref 11.5–14.5)
GLUCOSE BLD MANUAL STRIP-MCNC: 155 MG/DL (ref 74–99)
GLUCOSE BLD MANUAL STRIP-MCNC: 161 MG/DL (ref 74–99)
GLUCOSE BLD MANUAL STRIP-MCNC: 173 MG/DL (ref 74–99)
GLUCOSE BLD MANUAL STRIP-MCNC: 188 MG/DL (ref 74–99)
GLUCOSE SERPL-MCNC: 139 MG/DL (ref 74–99)
GLUCOSE SERPL-MCNC: 190 MG/DL (ref 74–99)
HCT VFR BLD AUTO: 47.5 % (ref 36–46)
HGB BLD-MCNC: 15.5 G/DL (ref 12–16)
INR PPP: 1.2 (ref 0.9–1.1)
MAGNESIUM SERPL-MCNC: 2.26 MG/DL (ref 1.6–2.4)
MCH RBC QN AUTO: 32 PG (ref 26–34)
MCHC RBC AUTO-ENTMCNC: 32.6 G/DL (ref 32–36)
MCV RBC AUTO: 98 FL (ref 80–100)
NRBC BLD-RTO: 0 /100 WBCS (ref 0–0)
PHOSPHATE SERPL-MCNC: 3.7 MG/DL (ref 2.5–4.9)
PHOSPHATE SERPL-MCNC: 4.1 MG/DL (ref 2.5–4.9)
PLATELET # BLD AUTO: 531 X10*3/UL (ref 150–450)
POTASSIUM SERPL-SCNC: 3.5 MMOL/L (ref 3.5–5.3)
POTASSIUM SERPL-SCNC: 4.3 MMOL/L (ref 3.5–5.3)
PROTHROMBIN TIME: 13.1 SECONDS (ref 9.8–12.4)
RBC # BLD AUTO: 4.85 X10*6/UL (ref 4–5.2)
SODIUM SERPL-SCNC: 138 MMOL/L (ref 136–145)
SODIUM SERPL-SCNC: 141 MMOL/L (ref 136–145)
WBC # BLD AUTO: 8.7 X10*3/UL (ref 4.4–11.3)

## 2025-06-26 PROCEDURE — 2500000005 HC RX 250 GENERAL PHARMACY W/O HCPCS

## 2025-06-26 PROCEDURE — 2500000001 HC RX 250 WO HCPCS SELF ADMINISTERED DRUGS (ALT 637 FOR MEDICARE OP)

## 2025-06-26 PROCEDURE — 2500000004 HC RX 250 GENERAL PHARMACY W/ HCPCS (ALT 636 FOR OP/ED): Performed by: NURSE PRACTITIONER

## 2025-06-26 PROCEDURE — 85610 PROTHROMBIN TIME: CPT

## 2025-06-26 PROCEDURE — 99233 SBSQ HOSP IP/OBS HIGH 50: CPT

## 2025-06-26 PROCEDURE — 2500000002 HC RX 250 W HCPCS SELF ADMINISTERED DRUGS (ALT 637 FOR MEDICARE OP, ALT 636 FOR OP/ED)

## 2025-06-26 PROCEDURE — 80069 RENAL FUNCTION PANEL: CPT

## 2025-06-26 PROCEDURE — 94760 N-INVAS EAR/PLS OXIMETRY 1: CPT

## 2025-06-26 PROCEDURE — 83735 ASSAY OF MAGNESIUM: CPT

## 2025-06-26 PROCEDURE — 2500000002 HC RX 250 W HCPCS SELF ADMINISTERED DRUGS (ALT 637 FOR MEDICARE OP, ALT 636 FOR OP/ED): Performed by: INTERNAL MEDICINE

## 2025-06-26 PROCEDURE — 2500000004 HC RX 250 GENERAL PHARMACY W/ HCPCS (ALT 636 FOR OP/ED)

## 2025-06-26 PROCEDURE — 36415 COLL VENOUS BLD VENIPUNCTURE: CPT

## 2025-06-26 PROCEDURE — 2500000005 HC RX 250 GENERAL PHARMACY W/O HCPCS: Performed by: STUDENT IN AN ORGANIZED HEALTH CARE EDUCATION/TRAINING PROGRAM

## 2025-06-26 PROCEDURE — 99232 SBSQ HOSP IP/OBS MODERATE 35: CPT | Performed by: NURSE PRACTITIONER

## 2025-06-26 PROCEDURE — 94669 MECHANICAL CHEST WALL OSCILL: CPT

## 2025-06-26 PROCEDURE — 94668 MNPJ CHEST WALL SBSQ: CPT

## 2025-06-26 PROCEDURE — 1100000001 HC PRIVATE ROOM DAILY

## 2025-06-26 PROCEDURE — 82947 ASSAY GLUCOSE BLOOD QUANT: CPT

## 2025-06-26 PROCEDURE — 85027 COMPLETE CBC AUTOMATED: CPT

## 2025-06-26 PROCEDURE — 94640 AIRWAY INHALATION TREATMENT: CPT

## 2025-06-26 PROCEDURE — S4991 NICOTINE PATCH NONLEGEND: HCPCS

## 2025-06-26 RX ADMIN — OXYCODONE HYDROCHLORIDE AND ACETAMINOPHEN 500 MG: 500 TABLET ORAL at 08:04

## 2025-06-26 RX ADMIN — POLYETHYLENE GLYCOL 3350 17 G: 17 POWDER, FOR SOLUTION ORAL at 08:03

## 2025-06-26 RX ADMIN — OXYCODONE HYDROCHLORIDE 10 MG: 10 TABLET ORAL at 10:47

## 2025-06-26 RX ADMIN — OXYCODONE HYDROCHLORIDE 10 MG: 10 TABLET ORAL at 16:56

## 2025-06-26 RX ADMIN — INSULIN LISPRO 2 UNITS: 100 INJECTION, SOLUTION INTRAVENOUS; SUBCUTANEOUS at 11:28

## 2025-06-26 RX ADMIN — OXYCODONE HYDROCHLORIDE AND ACETAMINOPHEN 500 MG: 500 TABLET ORAL at 21:04

## 2025-06-26 RX ADMIN — PRAMIPEXOLE DIHYDROCHLORIDE 1 MG: 1 TABLET ORAL at 21:04

## 2025-06-26 RX ADMIN — BUDESONIDE 0.5 MG: 0.5 INHALANT RESPIRATORY (INHALATION) at 07:19

## 2025-06-26 RX ADMIN — FUROSEMIDE 10 MG/HR: 10 INJECTION, SOLUTION INTRAMUSCULAR; INTRAVENOUS at 21:04

## 2025-06-26 RX ADMIN — TRIAMCINOLONE ACETONIDE: 1 CREAM TOPICAL at 08:11

## 2025-06-26 RX ADMIN — NICOTINE 1 PATCH: 21 PATCH, EXTENDED RELEASE TRANSDERMAL at 08:03

## 2025-06-26 RX ADMIN — PRAMIPEXOLE DIHYDROCHLORIDE 1 MG: 1 TABLET ORAL at 08:05

## 2025-06-26 RX ADMIN — DILTIAZEM HYDROCHLORIDE 360 MG: 120 CAPSULE, COATED, EXTENDED RELEASE ORAL at 08:04

## 2025-06-26 RX ADMIN — Medication 1 APPLICATION: at 16:19

## 2025-06-26 RX ADMIN — INSULIN LISPRO 2 UNITS: 100 INJECTION, SOLUTION INTRAVENOUS; SUBCUTANEOUS at 21:19

## 2025-06-26 RX ADMIN — INSULIN LISPRO 2 UNITS: 100 INJECTION, SOLUTION INTRAVENOUS; SUBCUTANEOUS at 08:02

## 2025-06-26 RX ADMIN — ENOXAPARIN SODIUM 135 MG: 150 INJECTION SUBCUTANEOUS at 08:04

## 2025-06-26 RX ADMIN — ZINC SULFATE 220 MG (50 MG) CAPSULE 1 CAPSULE: CAPSULE at 08:05

## 2025-06-26 RX ADMIN — NYSTATIN CREAM: 100000 CREAM TOPICAL at 08:05

## 2025-06-26 RX ADMIN — IPRATROPIUM BROMIDE AND ALBUTEROL SULFATE 3 ML: 2.5; .5 SOLUTION RESPIRATORY (INHALATION) at 07:19

## 2025-06-26 RX ADMIN — GUAIFENESIN 600 MG: 600 TABLET ORAL at 08:04

## 2025-06-26 RX ADMIN — Medication 1 TABLET: at 08:04

## 2025-06-26 RX ADMIN — Medication 1 APPLICATION: at 08:32

## 2025-06-26 RX ADMIN — Medication 4 L/MIN: at 07:19

## 2025-06-26 RX ADMIN — Medication 3 L/MIN: at 01:00

## 2025-06-26 RX ADMIN — Medication 3 MG: at 21:04

## 2025-06-26 RX ADMIN — GUAIFENESIN 600 MG: 600 TABLET ORAL at 21:04

## 2025-06-26 RX ADMIN — SPIRONOLACTONE 50 MG: 25 TABLET ORAL at 08:04

## 2025-06-26 RX ADMIN — Medication 1 APPLICATION: at 08:07

## 2025-06-26 RX ADMIN — SENNOSIDES AND DOCUSATE SODIUM 2 TABLET: 50; 8.6 TABLET ORAL at 08:04

## 2025-06-26 RX ADMIN — HYDROXYUREA 1000 MG: 500 CAPSULE ORAL at 08:05

## 2025-06-26 RX ADMIN — ACETAMINOPHEN 975 MG: 325 TABLET, FILM COATED ORAL at 06:36

## 2025-06-26 RX ADMIN — DULOXETINE 20 MG: 20 CAPSULE, DELAYED RELEASE ORAL at 08:03

## 2025-06-26 RX ADMIN — KETOCONAZOLE: 20 CREAM TOPICAL at 08:05

## 2025-06-26 RX ADMIN — ACETAZOLAMIDE 500 MG: 500 INJECTION, POWDER, LYOPHILIZED, FOR SOLUTION INTRAVENOUS at 14:37

## 2025-06-26 RX ADMIN — IPRATROPIUM BROMIDE AND ALBUTEROL SULFATE 3 ML: 2.5; .5 SOLUTION RESPIRATORY (INHALATION) at 13:26

## 2025-06-26 RX ADMIN — IPRATROPIUM BROMIDE AND ALBUTEROL SULFATE 3 ML: 2.5; .5 SOLUTION RESPIRATORY (INHALATION) at 20:23

## 2025-06-26 RX ADMIN — SALINE NASAL SPRAY 1 SPRAY: 1.5 SOLUTION NASAL at 08:05

## 2025-06-26 RX ADMIN — OXYCODONE HYDROCHLORIDE 10 MG: 10 TABLET ORAL at 22:00

## 2025-06-26 RX ADMIN — INSULIN LISPRO 2 UNITS: 100 INJECTION, SOLUTION INTRAVENOUS; SUBCUTANEOUS at 16:19

## 2025-06-26 RX ADMIN — ACETAMINOPHEN 975 MG: 325 TABLET, FILM COATED ORAL at 11:04

## 2025-06-26 RX ADMIN — Medication 1 APPLICATION: at 21:20

## 2025-06-26 RX ADMIN — ACETAMINOPHEN 975 MG: 325 TABLET, FILM COATED ORAL at 17:36

## 2025-06-26 RX ADMIN — CEFTRIAXONE 2 G: 2 INJECTION, SOLUTION INTRAVENOUS at 08:03

## 2025-06-26 RX ADMIN — ENOXAPARIN SODIUM 135 MG: 150 INJECTION SUBCUTANEOUS at 21:04

## 2025-06-26 RX ADMIN — BUDESONIDE 0.5 MG: 0.5 INHALANT RESPIRATORY (INHALATION) at 20:23

## 2025-06-26 RX ADMIN — OXYCODONE HYDROCHLORIDE 10 MG: 10 TABLET ORAL at 03:02

## 2025-06-26 RX ADMIN — SENNOSIDES AND DOCUSATE SODIUM 2 TABLET: 50; 8.6 TABLET ORAL at 21:03

## 2025-06-26 ASSESSMENT — COGNITIVE AND FUNCTIONAL STATUS - GENERAL
DRESSING REGULAR LOWER BODY CLOTHING: A LITTLE
MOVING FROM LYING ON BACK TO SITTING ON SIDE OF FLAT BED WITH BEDRAILS: A LITTLE
HELP NEEDED FOR BATHING: A LITTLE
STANDING UP FROM CHAIR USING ARMS: A LITTLE
CLIMB 3 TO 5 STEPS WITH RAILING: A LITTLE
MOBILITY SCORE: 18
DRESSING REGULAR UPPER BODY CLOTHING: A LITTLE
DAILY ACTIVITIY SCORE: 19
TOILETING: A LOT
TURNING FROM BACK TO SIDE WHILE IN FLAT BAD: A LITTLE
MOVING TO AND FROM BED TO CHAIR: A LITTLE
WALKING IN HOSPITAL ROOM: A LITTLE

## 2025-06-26 ASSESSMENT — PAIN - FUNCTIONAL ASSESSMENT
PAIN_FUNCTIONAL_ASSESSMENT: 0-10

## 2025-06-26 ASSESSMENT — PAIN SCALES - GENERAL
PAINLEVEL_OUTOF10: 7
PAINLEVEL_OUTOF10: 8
PAINLEVEL_OUTOF10: 0 - NO PAIN

## 2025-06-26 ASSESSMENT — PAIN DESCRIPTION - LOCATION: LOCATION: LEG

## 2025-06-26 ASSESSMENT — PAIN DESCRIPTION - DESCRIPTORS
DESCRIPTORS: ACHING
DESCRIPTORS: ACHING

## 2025-06-26 ASSESSMENT — PAIN DESCRIPTION - ORIENTATION: ORIENTATION: RIGHT;LEFT;LOWER

## 2025-06-26 NOTE — CARE PLAN
The patient's goals for the shift include  pt will use call light to maintain safety throughout shift.    The clinical goals for the shift include pt will sleep      Problem: Pain - Adult  Goal: Verbalizes/displays adequate comfort level or baseline comfort level  Outcome: Progressing     Problem: Safety - Adult  Goal: Free from fall injury  Outcome: Progressing     Problem: Discharge Planning  Goal: Discharge to home or other facility with appropriate resources  Outcome: Progressing     Problem: Chronic Conditions and Co-morbidities  Goal: Patient's chronic conditions and co-morbidity symptoms are monitored and maintained or improved  Outcome: Progressing     Problem: Nutrition  Goal: Nutrient intake appropriate for maintaining nutritional needs  Outcome: Progressing     Problem: Skin  Goal: Decreased wound size/increased tissue granulation at next dressing change  Outcome: Progressing  Goal: Participates in plan/prevention/treatment measures  Outcome: Progressing  Goal: Prevent/manage excess moisture  Outcome: Progressing  Goal: Prevent/minimize sheer/friction injuries  Outcome: Progressing  Flowsheets (Taken 6/26/2025 1214)  Prevent/minimize sheer/friction injuries: Turn/reposition every 2 hours/use positioning/transfer devices  Goal: Promote/optimize nutrition  Outcome: Progressing  Goal: Promote skin healing  Outcome: Progressing     Problem: Fall/Injury  Goal: Not fall by end of shift  Outcome: Progressing  Goal: Be free from injury by end of the shift  Outcome: Progressing  Goal: Verbalize understanding of personal risk factors for fall in the hospital  Outcome: Progressing  Goal: Verbalize understanding of risk factor reduction measures to prevent injury from fall in the home  Outcome: Progressing  Goal: Use assistive devices by end of the shift  Outcome: Progressing  Goal: Pace activities to prevent fatigue by end of the shift  Outcome: Progressing     Problem: Pain  Goal: Takes deep breaths with improved  pain control throughout the shift  Outcome: Progressing  Goal: Turns in bed with improved pain control throughout the shift  Outcome: Progressing  Goal: Walks with improved pain control throughout the shift  Outcome: Progressing  Goal: Performs ADL's with improved pain control throughout shift  Outcome: Progressing  Goal: Participates in PT with improved pain control throughout the shift  Outcome: Progressing  Goal: Free from opioid side effects throughout the shift  Outcome: Progressing  Goal: Free from acute confusion related to pain meds throughout the shift  Outcome: Progressing

## 2025-06-26 NOTE — PROGRESS NOTES
"Internal Medicine - Daily Progress Note   Hospital Day: 7    Name: Neisha Graham   Age: 65 y.o.   Gender: Female  Room: 107/107-A        HPI   Neisha Graham is a 65 y.o. year old female  patient with with PMHx significant for paroxysmal atrial fibrillation on warfarin, chronic thromboembolic disease, essential thrombocytosis, moderate to severe aortic stenosis, moderate mitral stenosis, HFpEF (EF 60-65% 12/24), COPD on 2L NC nightly, pulmonary hypertension, lymphedema with chronic lower extremity wounds, chronic sacral wounds, HTN, HLD, anxiety, and depression who presented to [facility] on 6/19/25 with chief complaint of fever and chills with associated shortness of breath. Patient states that last night she began to notice subjective fever and chills that continued into this morning which was her main concern for presenting to the ER. She does note that she had noticed her L lower extremity wound was foul smelling last night and had soaked through her bandage faster than normal. Additionally, she notes increased swelling of her bilateral lower extremities up to her knees. States that she is supposed to take torsemide 40 mg BID at home but recently stopped taking the evening dose because she felt as though it was too much. Endorses good urine output with the torsemide once daily. Additionally, she notes swelling of a gland on her R jaw and neck for the past 2-3 months that is painful and keeps her awake at night. She was recently treated for pneumonia outpatient twice by her PCP, once with a 10-day course of Augmentin and once with a 5-day course of Cefdinir, at that time she states her breathing improved but the gland remained inflamed. She started feeling increasingly short of breath again recently and noes increase of her chronic cough with sputum changing from \"flesh\" color to a more green/white color. She denies chest pain, palpitations, nasal congestion, sore throat, abdominal pain, nausea, " "vomiting.       Subjective    Patient doing well this morning, sitting up in chair.  Reports her breathing continues to improve every day.  Reports the swelling in her legs is better overall, however tends to increase when she is seated in chair for long periods.  States really request for today is to be able to shower.    Discussed known history of BE.  Given improvement in her pulmonary edema and tolerance for lying more flat, suspect patient's sleep apnea is now more prevalent.  Patient states she used to wear CPAP machine, however years ago it was accidentally thrown away and she has not used one since.  States she does not like wearing a CPAP mask as it is uncomfortable.  Patient did well with increase in oxygen overnight, discussed continued daily oxygen at 2 L and nightly oxygen at 4 L if she becomes hypoxic.    Discussed keeping the legs elevated when possible to assist with the swelling.  Discussed plan for continued diuresis, for cardiology to determine when it is appropriate to perform cardiac cath.  Discussed switching from heparin drip to therapeutic Lovenox.  Patient understands and is agreeable to plan.  All questions answered.    Overnight Events: Nursing requested CPAP, but patient refused to wear it because the moisture system wasn't the same as the one she has at home    ROS: 12 points review of system is negative except as stated in the HPI above.     Objective    Vitals  Visit Vitals  /71 (BP Location: Right arm)   Pulse 95   Temp 36.6 °C (97.9 °F) (Temporal)   Resp 17   Ht 1.626 m (5' 4\")   Wt 139 kg (307 lb)   SpO2 93%   BMI 52.70 kg/m²   Smoking Status Some Days   BSA 2.51 m²       Physical Exam    Physical Exam  Constitutional:       General: She is not in acute distress.     Appearance: She is obese.   HENT:      Head: Normocephalic and atraumatic.      Mouth/Throat:      Mouth: Mucous membranes are moist.   Eyes:      Extraocular Movements: Extraocular movements intact.      Pupils: " Pupils are equal, round, and reactive to light.   Cardiovascular:      Rate and Rhythm: Normal rate and regular rhythm.      Pulses: Normal pulses.      Heart sounds: Murmur heard.   Pulmonary:      Effort: Pulmonary effort is normal. No respiratory distress.      Breath sounds: No wheezing.   Abdominal:      General: Bowel sounds are normal. There is no distension.      Palpations: Abdomen is soft.      Tenderness: There is no abdominal tenderness.   Musculoskeletal:      Comments: Chronic bilateral lower extremity swelling consistent with lymphedema, left lower extremity wound mid dorsal tibia    Skin:     Findings: Erythema (BLE) present.   Neurological:      General: No focal deficit present.      Mental Status: She is alert and oriented to person, place, and time.   Psychiatric:         Mood and Affect: Mood normal.           IOs    Intake/Output Summary (Last 24 hours) at 6/26/2025 0742  Last data filed at 6/26/2025 0657  Gross per 24 hour   Intake 1354.06 ml   Output 6280 ml   Net -4925.94 ml       Labs:   Results from last 72 hours   Lab Units 06/25/25  1825 06/25/25  0517 06/24/25  1739   SODIUM mmol/L 141 140 141   POTASSIUM mmol/L 4.0 4.1 3.7   CHLORIDE mmol/L 90* 90* 91*   CO2 mmol/L 38* 42* 42*   BUN mg/dL 36* 34* 37*   CREATININE mg/dL 1.24* 1.05 1.09*   GLUCOSE mg/dL 230* 114* 172*   CALCIUM mg/dL 9.2 9.1 9.6   ANION GAP mmol/L 17 12 12   EGFR mL/min/1.73m*2 48* 59* 56*   PHOSPHORUS mg/dL 3.8 3.5 3.1      Results from last 72 hours   Lab Units 06/25/25  0517 06/24/25  0653   WBC AUTO x10*3/uL 8.9 11.3   HEMOGLOBIN g/dL 14.1 14.2   HEMATOCRIT % 44.2 46.0   PLATELETS AUTO x10*3/uL 496* 544*      Lab Results   Component Value Date    CALCIUM 9.2 06/25/2025    PHOS 3.8 06/25/2025      Lab Results   Component Value Date    CRP 3.23 (H) 06/19/2025      [unfilled]     Micro/ID:   Susceptibility data from last 90 days.  Collected Specimen Info Organism Clindamycin Erythromycin Penicillin Tetracycline  "  06/19/25 Blood culture from Peripheral Venipuncture Streptococcus agalactiae (Group B Streptococcus)       06/19/25 Blood culture from Peripheral Venipuncture Streptococcus agalactiae (Group B Streptococcus)  R  R  S  R                    No lab exists for component: \"AGALPCRNB\"   .ID  Lab Results   Component Value Date    URINECULTURE  12/30/2024     Clinically insignificant growth based on current clinical standards.    BLOODCULT No growth at 4 days -  FINAL REPORT 06/21/2025    BLOODCULT No growth at 4 days -  FINAL REPORT 06/21/2025       Images  Electrocardiogram, 12-lead PRN ACS symptoms  Normal sinus rhythm  Possible Left atrial enlargement  Rightward axis  Incomplete right bundle branch block  Borderline ECG  When compared with ECG of 19-JUN-2025 12:37, (unconfirmed)  Premature atrial complexes are no longer Present      Meds  Scheduled medications  Scheduled Medications[1]  Continuous medications  Continuous Medications[2]  PRN medications  PRN Medications[3]     Assessment and Plan    Neisha Graham is a 65 y.o. female with PMHx significant for paroxysmal atrial fibrillation (on warfarin), chronic thromboembolic disease, essential thrombocytosis, moderate to severe aortic stenosis, moderate mitral stenosis, HFpEF (EF 60-65% 12/24), COPD (on 2L NC nightly), pulmonary hypertension, lymphedema with chronic lower extremity wounds, chronic sacral wounds, HTN, HLD admitted on 6/19/2025 acute on chronic hypoxic respiratory failure 2/2 HFpEF and COPD exacerbation with concern for sepsis 2/2 infection of chronic wounds.     Acute Medical Issues:   #Sepsis  #Bacteremia  #Chronic bilateral lower extremity and sacral wounds with concern for acute infection  ::CXR and CT without distinct PNA  ::Patient notes foul smell of L lower extremity wound x 2 days - with some surrounding erythema  ::Hx of wound with MRSA 9/24 and pseudomonas bacteremia 9/24  ::Patient complaints of swollen/painful gland in R jaw and ability " to express salivary stones from under tongue - minimal parotid swelling and tenderness on examination  -Blood cultures positive for group B streptococcus  -Pro-[facility] 0.26, CRP 3.23, ESR 54  -Continue ceftriaxone (6/19 - ), azithromycin (6/19 - 6/21)  -ID consulted, agrees with plan to continue ceftriaxone  -Wound care following  -Repeat blood cultures Ngx4 day     #Acute on chronic hypoxic respiratory failure   #CHF exacerbation  #COPD exacerbation  #BE (previously wore CPAP, but not anymore)  ::TTE 12/2024 with EF 60-65%  ::Patient with SOB and desaturation to 88%, requiring 1-2L NC continuous (at baseline wears 2L NC at night and with transfers/exertion)  ::Patient on torsemide 40 mg BID at home but recently stopped taking evening dose  ::Increased cough, sputum production  ::BNP elevated at 786  ::CXR and CT chest concerning for pulmonary edema   ::s/p 80 mg IV Lasix in the ED  -On 2L NC, wean as tolerated back to baseline 2 L at night and with exertion  -Prednisone 40 mg daily x 5 days (6/19 - 6/23)  -Azithromycin x 3 days (6/19 - 6/21)  -Mucinex 600 mg BID  -Continue home inhalers  -Duonebs scheduled and PRN  -Incentive spirometer and acapella ordered   -Continue home spironolactone 50 mg daily  -Holding home torsemide, continue with IV diuresis  -TTE: EF 63%, severely concentric left ventricular hypertrophy, severely dilated left atria, moderately dilated right atria, severe aortic stenosis with peak gradient 76 mmHg, moderate mitral valve stenosis  -1800cc/day fluid restriction  -Monitor strict I/O: Continues to be net negative >2 L daily  -Cardiology consulted: Continue Lasix drip at 10 mg/h, goal >50 pounds total weight loss prior to cardiac cath  -CPAP ordered overnight 6/25, she did not want to wear it- increased nightly O2 to 4L     #Moderate to severe aortic stenosis   #Moderate mitral valve stenosis   ::TTE 12/2024 with EF 60-65% and Grade I LV impaired relaxation pattern and mod to severe aortic  stenosis and mod mitral stenosis   ::ED did speak with Dr. George who recommended [place] at [facility] and would see patient in AM to determine if transfer needed for valve evaluation  -Cardiology consulted: Once euvolemic, will plan for R+LHC once euvolemic to assess aortic and mitral stenosis  -Will need outpatient referral to structural heart team to discuss TAVR candidacy  -Outpatient referral placed for dentist given the need for preop clearance of her dental caries    #Hyperglycemia   ::suspect 2/2 steroid use  -monitor  -Continue SSI    #Contraction Alkalosis  ::Bicarb elevated at 42 on day 5 of diuresis  -s/p acetazolamide -> 38  -continue to monitor    Resolved Medical Issues:  #Supratherapeutic INR  ::INR 3.4 on admission    #Leukocytosis  :: WBC 27.2 on admission      Chronic Medical Issues:   #Paroxysmal atrial fibrillation -holding home warfarin (takes 7.5 mg daily, INR goal 2-3), continue diltiazem 360 mg daily  #Essential thrombocytosis - Hydroxyurea 1000 mg daily  #Chronic back pain - Flexeril 10 mg BID PRN for muscle spasms, takes oxycodone 10 mg PRN at home for pain - inpatient pain regimen: scheduled Tylenol 975 mg q6 hours, oxycodone 5 and 10 mg q6 hours PRN for moderate and severe pain, dilaudid 0.4 mg PRN for breakthrough pain  #Chronic constipation 2/2 opioid use: Continue bowel regimen  #Depression, Anxiety - Duloxetine 20 mg daily, hydroxyzine 50 mg q8 hours PRN, venlafaxine 150 mg daily  #RLS - Pramipexole 1 mg BID  #Tobacco use disorder-Nicotine patch ordered, smoking cessation discussed  #HTN, HLD    Fluids: Fluid restriction 1800cc daily   Electrolytes: Replete as needed   Nutrition: Cardiac diet  GI ppx: None  DVT ppx: heparin gtt  Antibiotics: Ceftriaxone  Tubes/Lines/Drains: PIV  Oxygen: 3L NC    Code Status: Full Code   Emergency Contact: Extended Emergency Contact Information  Primary Emergency Contact: Belle Chauhan  Mobile Phone: [phone]  Relation: Sibling  Secondary Emergency  Contact: NICOLAS PEREZ  Mobile Phone: [phone]  Relation: Daughter     Disposition: 65 y.o. female admitted for acute on chronic HRF iso HFpEF and COPD exacerbations with concern for sepsis 2/2 bacteremia. Pending cardiac cath once euvolemic.       Violeta Hernandez,    06/26/25               [1] acetaminophen, 975 mg, oral, q6h  ascorbic acid, 500 mg, oral, BID  budesonide, 0.5 mg, nebulization, BID   And  ipratropium-albuteroL, 3 mL, nebulization, TID  cefTRIAXone, 2 g, intravenous, q24h  dilTIAZem ER, 360 mg, oral, Daily  DULoxetine, 20 mg, oral, Daily  enoxaparin, 1 mg/kg, subcutaneous, q12h  guaiFENesin, 600 mg, oral, BID  hydroxyurea, 1,000 mg, oral, Daily  insulin lispro, 0-10 Units, subcutaneous, Before meals & nightly  ketoconazole, , Topical, BID  melatonin, 3 mg, oral, Nightly  multivitamin with minerals, 1 tablet, oral, Daily  nicotine, 1 patch, transdermal, Daily   Followed by  [START ON 7/31/2025] nicotine, 1 patch, transdermal, Daily   Followed by  [START ON 8/14/2025] nicotine, 1 patch, transdermal, Daily  nystatin, , Topical, BID  pneumoc 20-eros conj-dip cr(PF), 0.5 mL, intramuscular, During hospitalization  polyethylene glycol, 17 g, oral, Daily  pramipexole, 1 mg, oral, BID  sennosides-docusate sodium, 2 tablet, oral, BID  spironolactone, 50 mg, oral, Daily  [Held by provider] torsemide, 40 mg, oral, BID  triamcinolone, , Topical, BID  [Held by provider] warfarin, 7.5 mg, oral, Daily  white petrolatum, 1 Application, Topical, BID  zinc oxide, 1 Application, Topical, TID  zinc sulfate, 50 mg of elemental zinc, oral, Daily     [2] furosemide, 10 mg/hr, Last Rate: 10 mg/hr (06/25/25 9923)     [3] PRN medications: ammonium lactate, benzocaine, cyclobenzaprine, dextrose, dextrose, glucagon, glucagon, HYDROmorphone, hydrOXYzine HCL, ipratropium-albuteroL, lidocaine, oxyCODONE, oxyCODONE, oxygen, sodium chloride

## 2025-06-26 NOTE — PROGRESS NOTES
Neisha Graham is a 65 y.o. female on day 7 of admission presenting with Pneumonia of both lower lobes due to infectious organism.    Subjective   Interval History: no fever, no new complaints        Review of Systems    Objective   Range of Vitals (last 24 hours)  Heart Rate:  [48-96]   Temp:  [36.3 °C (97.3 °F)-36.6 °C (97.9 °F)]   Resp:  [17-20]   BP: (114-161)/(61-86)   Weight:  [139 kg (307 lb)]   SpO2:  [87 %-96 %]   Daily Weight  06/26/25 : 139 kg (307 lb)    Body mass index is 52.7 kg/m².    Physical Exam  Constitutional:       Appearance: Normal appearance.   HENT:      Head: Normocephalic and atraumatic.      Mouth/Throat:      Mouth: Mucous membranes are moist.      Pharynx: Oropharynx is clear.   Eyes:      Pupils: Pupils are equal, round, and reactive to light.   Cardiovascular:      Rate and Rhythm: Normal rate and regular rhythm.      Heart sounds: Normal heart sounds.   Pulmonary:      Effort: Pulmonary effort is normal.      Breath sounds: Normal breath sounds.   Abdominal:      General: Abdomen is flat. Bowel sounds are normal.      Palpations: Abdomen is soft.   Musculoskeletal:         General: Swelling present.      Cervical back: Normal range of motion.      Comments: Stasis / wounds   Neurological:      Mental Status: She is alert.         Antibiotics  cefTRIAXone - 2 gram/50 mL    Relevant Results  Labs  Results from last 72 hours   Lab Units 06/26/25  0733 06/25/25  0517 06/24/25  0653   WBC AUTO x10*3/uL 8.7 8.9 11.3   HEMOGLOBIN g/dL 15.5 14.1 14.2   HEMATOCRIT % 47.5* 44.2 46.0   PLATELETS AUTO x10*3/uL 531* 496* 544*     Results from last 72 hours   Lab Units 06/26/25  0733 06/25/25  1825 06/25/25  0517   SODIUM mmol/L 138 141 140   POTASSIUM mmol/L 4.3 4.0 4.1   CHLORIDE mmol/L 91* 90* 90*   CO2 mmol/L 38* 38* 42*   BUN mg/dL 32* 36* 34*   CREATININE mg/dL 1.23* 1.24* 1.05   GLUCOSE mg/dL 139* 230* 114*   CALCIUM mg/dL 9.6 9.2 9.1   ANION GAP mmol/L 13 17 12   EGFR mL/min/1.73m*2 49*  48* 59*   PHOSPHORUS mg/dL 3.7 3.8 3.5     Results from last 72 hours   Lab Units 06/26/25  0733 06/25/25  1825 06/25/25  0517   ALBUMIN g/dL 4.0 3.6 3.5     Estimated Creatinine Clearance: 62.8 mL/min (A) (by C-G formula based on SCr of 1.23 mg/dL (H)).  C-Reactive Protein   Date Value Ref Range Status   06/19/2025 3.23 (H) <1.00 mg/dL Final   12/11/2024 0.72 <1.00 mg/dL Final   09/16/2024 16.36 (H) <1.00 mg/dL Final     Microbiology  Susceptibility data from last 14 days.  Collected Specimen Info Organism Clindamycin Erythromycin Penicillin Tetracycline   06/19/25 Blood culture from Peripheral Venipuncture Streptococcus agalactiae (Group B Streptococcus)       06/19/25 Blood culture from Peripheral Venipuncture Streptococcus agalactiae (Group B Streptococcus)  R  R  S  R   Imaging  Reviewed        Assessment/Plan   Sepsis, BC with gp B Strep, TTE reviewed, the repeat BC is negative   Legs stasis / wounds / likely cellulitis  Respiratory failure / COPD /  BE / atelectasis, for cath     Recommendations :  Continue Rocephin, will keep her on iv while inhouse then oral with discharge  Discussed with the medical team       I spent minutes in the professional and overall care of this patient.  The cultures and susceptibilities were reviewed and discussed with the micro lab, the antibiotics stewardship guidelines were reviewed, the infection control protocols and recommendations were reviewed with the patient and the staff                 Gorge Myers MD

## 2025-06-26 NOTE — NURSING NOTE
1930: assumed pt care    6-26-25/0137: pt's oxygen level on 3L NC while sleeping was dropping to about 87% or lower, put pt back on 4 L NC, pt's oxygen level came up to about 94%. Pt states she used to wear a CPAP at night but threw it away b/c her machine broke, but she does have a history of sleep apnea. Patient stated she was willing to wear a CPAP while here at the hospital, let residents know.     6-26-25/0301: pt refused to wear CPAP b/c it did not have moisture, let residents know

## 2025-06-27 ENCOUNTER — SURGERY (OUTPATIENT)
Age: 65
End: 2025-06-27
Payer: MEDICARE

## 2025-06-27 PROBLEM — B95.1 BACTEREMIA DUE TO GROUP B STREPTOCOCCUS: Status: ACTIVE | Noted: 2025-06-27

## 2025-06-27 PROBLEM — L03.119 CELLULITIS OF LOWER EXTREMITY: Status: ACTIVE | Noted: 2025-06-27

## 2025-06-27 PROBLEM — R78.81 BACTEREMIA DUE TO GROUP B STREPTOCOCCUS: Status: ACTIVE | Noted: 2025-06-27

## 2025-06-27 PROBLEM — I35.0 SEVERE AORTIC STENOSIS: Status: ACTIVE | Noted: 2025-06-19

## 2025-06-27 PROBLEM — I50.33 ACUTE ON CHRONIC DIASTOLIC CHF (CONGESTIVE HEART FAILURE): Status: ACTIVE | Noted: 2024-12-11

## 2025-06-27 LAB
ALBUMIN SERPL BCP-MCNC: 3.8 G/DL (ref 3.4–5)
ANION GAP SERPL CALC-SCNC: 14 MMOL/L (ref 10–20)
BUN SERPL-MCNC: 33 MG/DL (ref 6–23)
CALCIUM SERPL-MCNC: 9.2 MG/DL (ref 8.6–10.3)
CHLORIDE SERPL-SCNC: 94 MMOL/L (ref 98–107)
CO2 SERPL-SCNC: 35 MMOL/L (ref 21–32)
CREAT SERPL-MCNC: 1.32 MG/DL (ref 0.5–1.05)
EGFRCR SERPLBLD CKD-EPI 2021: 45 ML/MIN/1.73M*2
ERYTHROCYTE [DISTWIDTH] IN BLOOD BY AUTOMATED COUNT: 16 % (ref 11.5–14.5)
GLUCOSE BLD MANUAL STRIP-MCNC: 151 MG/DL (ref 74–99)
GLUCOSE BLD MANUAL STRIP-MCNC: 156 MG/DL (ref 74–99)
GLUCOSE BLD MANUAL STRIP-MCNC: 231 MG/DL (ref 74–99)
GLUCOSE SERPL-MCNC: 140 MG/DL (ref 74–99)
HCT VFR BLD AUTO: 45.4 % (ref 36–46)
HGB BLD-MCNC: 14.8 G/DL (ref 12–16)
MAGNESIUM SERPL-MCNC: 2.24 MG/DL (ref 1.6–2.4)
MCH RBC QN AUTO: 31.8 PG (ref 26–34)
MCHC RBC AUTO-ENTMCNC: 32.6 G/DL (ref 32–36)
MCV RBC AUTO: 97 FL (ref 80–100)
NRBC BLD-RTO: 0 /100 WBCS (ref 0–0)
PHOSPHATE SERPL-MCNC: 3.2 MG/DL (ref 2.5–4.9)
PLATELET # BLD AUTO: 454 X10*3/UL (ref 150–450)
POTASSIUM SERPL-SCNC: 3.8 MMOL/L (ref 3.5–5.3)
RBC # BLD AUTO: 4.66 X10*6/UL (ref 4–5.2)
SODIUM SERPL-SCNC: 139 MMOL/L (ref 136–145)
WBC # BLD AUTO: 9 X10*3/UL (ref 4.4–11.3)

## 2025-06-27 PROCEDURE — 2550000001 HC RX 255 CONTRASTS: Mod: JW | Performed by: INTERNAL MEDICINE

## 2025-06-27 PROCEDURE — 2500000005 HC RX 250 GENERAL PHARMACY W/O HCPCS

## 2025-06-27 PROCEDURE — 99233 SBSQ HOSP IP/OBS HIGH 50: CPT

## 2025-06-27 PROCEDURE — C1760 CLOSURE DEV, VASC: HCPCS | Performed by: INTERNAL MEDICINE

## 2025-06-27 PROCEDURE — 85027 COMPLETE CBC AUTOMATED: CPT

## 2025-06-27 PROCEDURE — 83735 ASSAY OF MAGNESIUM: CPT

## 2025-06-27 PROCEDURE — 93460 R&L HRT ART/VENTRICLE ANGIO: CPT | Performed by: INTERNAL MEDICINE

## 2025-06-27 PROCEDURE — 2500000002 HC RX 250 W HCPCS SELF ADMINISTERED DRUGS (ALT 637 FOR MEDICARE OP, ALT 636 FOR OP/ED)

## 2025-06-27 PROCEDURE — C1894 INTRO/SHEATH, NON-LASER: HCPCS | Performed by: INTERNAL MEDICINE

## 2025-06-27 PROCEDURE — 94668 MNPJ CHEST WALL SBSQ: CPT

## 2025-06-27 PROCEDURE — 2720000007 HC OR 272 NO HCPCS: Performed by: INTERNAL MEDICINE

## 2025-06-27 PROCEDURE — 2500000004 HC RX 250 GENERAL PHARMACY W/ HCPCS (ALT 636 FOR OP/ED)

## 2025-06-27 PROCEDURE — 94640 AIRWAY INHALATION TREATMENT: CPT

## 2025-06-27 PROCEDURE — 93458 L HRT ARTERY/VENTRICLE ANGIO: CPT | Performed by: INTERNAL MEDICINE

## 2025-06-27 PROCEDURE — 99152 MOD SED SAME PHYS/QHP 5/>YRS: CPT | Performed by: INTERNAL MEDICINE

## 2025-06-27 PROCEDURE — 2500000005 HC RX 250 GENERAL PHARMACY W/O HCPCS: Performed by: INTERNAL MEDICINE

## 2025-06-27 PROCEDURE — 93503 INSERT/PLACE HEART CATHETER: CPT | Performed by: INTERNAL MEDICINE

## 2025-06-27 PROCEDURE — 2500000001 HC RX 250 WO HCPCS SELF ADMINISTERED DRUGS (ALT 637 FOR MEDICARE OP)

## 2025-06-27 PROCEDURE — C1769 GUIDE WIRE: HCPCS | Performed by: INTERNAL MEDICINE

## 2025-06-27 PROCEDURE — 94669 MECHANICAL CHEST WALL OSCILL: CPT

## 2025-06-27 PROCEDURE — 80069 RENAL FUNCTION PANEL: CPT

## 2025-06-27 PROCEDURE — 1100000001 HC PRIVATE ROOM DAILY

## 2025-06-27 PROCEDURE — 99232 SBSQ HOSP IP/OBS MODERATE 35: CPT | Performed by: NURSE PRACTITIONER

## 2025-06-27 PROCEDURE — B2111ZZ FLUOROSCOPY OF MULTIPLE CORONARY ARTERIES USING LOW OSMOLAR CONTRAST: ICD-10-PCS | Performed by: INTERNAL MEDICINE

## 2025-06-27 PROCEDURE — 76937 US GUIDE VASCULAR ACCESS: CPT | Performed by: INTERNAL MEDICINE

## 2025-06-27 PROCEDURE — 94760 N-INVAS EAR/PLS OXIMETRY 1: CPT

## 2025-06-27 PROCEDURE — 82947 ASSAY GLUCOSE BLOOD QUANT: CPT

## 2025-06-27 PROCEDURE — S4991 NICOTINE PATCH NONLEGEND: HCPCS

## 2025-06-27 PROCEDURE — 99153 MOD SED SAME PHYS/QHP EA: CPT | Performed by: INTERNAL MEDICINE

## 2025-06-27 PROCEDURE — 7100000010 HC PHASE TWO TIME - EACH INCREMENTAL 1 MINUTE: Performed by: INTERNAL MEDICINE

## 2025-06-27 PROCEDURE — 2500000002 HC RX 250 W HCPCS SELF ADMINISTERED DRUGS (ALT 637 FOR MEDICARE OP, ALT 636 FOR OP/ED): Performed by: INTERNAL MEDICINE

## 2025-06-27 PROCEDURE — 7100000009 HC PHASE TWO TIME - INITIAL BASE CHARGE: Performed by: INTERNAL MEDICINE

## 2025-06-27 PROCEDURE — C1887 CATHETER, GUIDING: HCPCS | Performed by: INTERNAL MEDICINE

## 2025-06-27 PROCEDURE — 2500000005 HC RX 250 GENERAL PHARMACY W/O HCPCS: Performed by: STUDENT IN AN ORGANIZED HEALTH CARE EDUCATION/TRAINING PROGRAM

## 2025-06-27 PROCEDURE — 2500000004 HC RX 250 GENERAL PHARMACY W/ HCPCS (ALT 636 FOR OP/ED): Performed by: INTERNAL MEDICINE

## 2025-06-27 PROCEDURE — 4A023N7 MEASUREMENT OF CARDIAC SAMPLING AND PRESSURE, LEFT HEART, PERCUTANEOUS APPROACH: ICD-10-PCS | Performed by: INTERNAL MEDICINE

## 2025-06-27 PROCEDURE — 36415 COLL VENOUS BLD VENIPUNCTURE: CPT

## 2025-06-27 RX ORDER — MIDAZOLAM HYDROCHLORIDE 1 MG/ML
INJECTION, SOLUTION INTRAMUSCULAR; INTRAVENOUS AS NEEDED
Status: DISCONTINUED | OUTPATIENT
Start: 2025-06-27 | End: 2025-06-27 | Stop reason: HOSPADM

## 2025-06-27 RX ORDER — LIDOCAINE HYDROCHLORIDE 20 MG/ML
INJECTION, SOLUTION INFILTRATION; PERINEURAL AS NEEDED
Status: DISCONTINUED | OUTPATIENT
Start: 2025-06-27 | End: 2025-06-27 | Stop reason: HOSPADM

## 2025-06-27 RX ORDER — AMMONIUM LACTATE 12 G/100G
1 LOTION TOPICAL 2 TIMES DAILY
Status: DISCONTINUED | OUTPATIENT
Start: 2025-06-27 | End: 2025-06-28 | Stop reason: HOSPADM

## 2025-06-27 RX ORDER — SODIUM CHLORIDE 9 MG/ML
INJECTION, SOLUTION INTRAVENOUS CONTINUOUS PRN
Status: COMPLETED | OUTPATIENT
Start: 2025-06-27 | End: 2025-06-27

## 2025-06-27 RX ORDER — ENOXAPARIN SODIUM 150 MG/ML
1 INJECTION SUBCUTANEOUS EVERY 12 HOURS
Qty: 10 EACH | Refills: 0 | Status: CANCELLED | OUTPATIENT
Start: 2025-06-27 | End: 2025-07-02

## 2025-06-27 RX ORDER — HEPARIN SODIUM 1000 [USP'U]/ML
INJECTION, SOLUTION INTRAVENOUS; SUBCUTANEOUS AS NEEDED
Status: DISCONTINUED | OUTPATIENT
Start: 2025-06-27 | End: 2025-06-27 | Stop reason: HOSPADM

## 2025-06-27 RX ORDER — FENTANYL CITRATE 50 UG/ML
INJECTION, SOLUTION INTRAMUSCULAR; INTRAVENOUS AS NEEDED
Status: DISCONTINUED | OUTPATIENT
Start: 2025-06-27 | End: 2025-06-27 | Stop reason: HOSPADM

## 2025-06-27 RX ORDER — AMMONIUM LACTATE 12 G/100G
LOTION TOPICAL 2 TIMES DAILY
Status: DISCONTINUED | OUTPATIENT
Start: 2025-06-27 | End: 2025-06-27

## 2025-06-27 RX ORDER — VERAPAMIL HYDROCHLORIDE 2.5 MG/ML
INJECTION INTRAVENOUS AS NEEDED
Status: DISCONTINUED | OUTPATIENT
Start: 2025-06-27 | End: 2025-06-27 | Stop reason: HOSPADM

## 2025-06-27 RX ORDER — NITROGLYCERIN 5 MG/ML
INJECTION, SOLUTION INTRAVENOUS AS NEEDED
Status: DISCONTINUED | OUTPATIENT
Start: 2025-06-27 | End: 2025-06-27 | Stop reason: HOSPADM

## 2025-06-27 RX ADMIN — PRAMIPEXOLE DIHYDROCHLORIDE 1 MG: 1 TABLET ORAL at 21:28

## 2025-06-27 RX ADMIN — HYDROXYUREA 1000 MG: 500 CAPSULE ORAL at 08:07

## 2025-06-27 RX ADMIN — OXYCODONE HYDROCHLORIDE AND ACETAMINOPHEN 500 MG: 500 TABLET ORAL at 08:07

## 2025-06-27 RX ADMIN — NICOTINE 1 PATCH: 21 PATCH, EXTENDED RELEASE TRANSDERMAL at 08:06

## 2025-06-27 RX ADMIN — POLYETHYLENE GLYCOL 3350 17 G: 17 POWDER, FOR SOLUTION ORAL at 08:07

## 2025-06-27 RX ADMIN — DILTIAZEM HYDROCHLORIDE 360 MG: 120 CAPSULE, COATED, EXTENDED RELEASE ORAL at 08:07

## 2025-06-27 RX ADMIN — Medication 1 APPLICATION: at 16:23

## 2025-06-27 RX ADMIN — INSULIN LISPRO 4 UNITS: 100 INJECTION, SOLUTION INTRAVENOUS; SUBCUTANEOUS at 21:48

## 2025-06-27 RX ADMIN — GUAIFENESIN 600 MG: 600 TABLET ORAL at 21:28

## 2025-06-27 RX ADMIN — CYCLOBENZAPRINE 10 MG: 10 TABLET, FILM COATED ORAL at 08:31

## 2025-06-27 RX ADMIN — LIDOCAINE HYDROCHLORIDE 10 ML: 20 INJECTION, SOLUTION INFILTRATION; PERINEURAL at 13:40

## 2025-06-27 RX ADMIN — FENTANYL CITRATE 50 MCG: 50 INJECTION, SOLUTION INTRAMUSCULAR; INTRAVENOUS at 13:39

## 2025-06-27 RX ADMIN — NYSTATIN CREAM: 100000 CREAM TOPICAL at 08:08

## 2025-06-27 RX ADMIN — INSULIN LISPRO 8 UNITS: 100 INJECTION, SOLUTION INTRAVENOUS; SUBCUTANEOUS at 08:05

## 2025-06-27 RX ADMIN — TRIAMCINOLONE ACETONIDE: 1 CREAM TOPICAL at 08:09

## 2025-06-27 RX ADMIN — SENNOSIDES AND DOCUSATE SODIUM 2 TABLET: 50; 8.6 TABLET ORAL at 21:28

## 2025-06-27 RX ADMIN — OXYCODONE HYDROCHLORIDE 10 MG: 10 TABLET ORAL at 06:01

## 2025-06-27 RX ADMIN — PRAMIPEXOLE DIHYDROCHLORIDE 1 MG: 1 TABLET ORAL at 08:07

## 2025-06-27 RX ADMIN — NITROGLYCERIN 200 MCG: 5 INJECTION, SOLUTION INTRAVENOUS at 14:10

## 2025-06-27 RX ADMIN — IOHEXOL 55 ML: 350 INJECTION, SOLUTION INTRAVENOUS at 14:21

## 2025-06-27 RX ADMIN — IPRATROPIUM BROMIDE AND ALBUTEROL SULFATE 3 ML: 2.5; .5 SOLUTION RESPIRATORY (INHALATION) at 09:01

## 2025-06-27 RX ADMIN — GUAIFENESIN 600 MG: 600 TABLET ORAL at 08:07

## 2025-06-27 RX ADMIN — SENNOSIDES AND DOCUSATE SODIUM 2 TABLET: 50; 8.6 TABLET ORAL at 08:06

## 2025-06-27 RX ADMIN — Medication 1 APPLICATION: at 08:09

## 2025-06-27 RX ADMIN — ACETAMINOPHEN 975 MG: 325 TABLET, FILM COATED ORAL at 18:27

## 2025-06-27 RX ADMIN — Medication 1 TABLET: at 08:06

## 2025-06-27 RX ADMIN — OXYCODONE HYDROCHLORIDE 10 MG: 10 TABLET ORAL at 21:28

## 2025-06-27 RX ADMIN — Medication 2 L/MIN: at 13:13

## 2025-06-27 RX ADMIN — ACETAMINOPHEN 975 MG: 325 TABLET, FILM COATED ORAL at 06:01

## 2025-06-27 RX ADMIN — Medication 1 APPLICATION: at 08:08

## 2025-06-27 RX ADMIN — Medication 4 L/MIN: at 07:51

## 2025-06-27 RX ADMIN — MIDAZOLAM 1 MG: 1 INJECTION INTRAMUSCULAR; INTRAVENOUS at 13:40

## 2025-06-27 RX ADMIN — CEFTRIAXONE 2 G: 2 INJECTION, SOLUTION INTRAVENOUS at 08:08

## 2025-06-27 RX ADMIN — Medication 3 MG: at 21:28

## 2025-06-27 RX ADMIN — OXYCODONE HYDROCHLORIDE 10 MG: 10 TABLET ORAL at 15:30

## 2025-06-27 RX ADMIN — IPRATROPIUM BROMIDE AND ALBUTEROL SULFATE 3 ML: 2.5; .5 SOLUTION RESPIRATORY (INHALATION) at 19:45

## 2025-06-27 RX ADMIN — INSULIN LISPRO 2 UNITS: 100 INJECTION, SOLUTION INTRAVENOUS; SUBCUTANEOUS at 15:30

## 2025-06-27 RX ADMIN — VERAPAMIL HYDROCHLORIDE 5 MG: 5 INJECTION INTRAVENOUS at 14:10

## 2025-06-27 RX ADMIN — ENOXAPARIN SODIUM 135 MG: 150 INJECTION SUBCUTANEOUS at 08:07

## 2025-06-27 RX ADMIN — HEPARIN SODIUM 5000 UNITS: 1000 INJECTION INTRAVENOUS; SUBCUTANEOUS at 14:13

## 2025-06-27 RX ADMIN — SPIRONOLACTONE 50 MG: 25 TABLET ORAL at 08:07

## 2025-06-27 RX ADMIN — ZINC SULFATE 220 MG (50 MG) CAPSULE 1 CAPSULE: CAPSULE at 08:07

## 2025-06-27 RX ADMIN — SODIUM CHLORIDE 25 ML/HR: 9 INJECTION, SOLUTION INTRAVENOUS at 13:14

## 2025-06-27 RX ADMIN — BUDESONIDE 0.5 MG: 0.5 INHALANT RESPIRATORY (INHALATION) at 09:01

## 2025-06-27 RX ADMIN — BUDESONIDE 0.5 MG: 0.5 INHALANT RESPIRATORY (INHALATION) at 19:45

## 2025-06-27 RX ADMIN — KETOCONAZOLE: 20 CREAM TOPICAL at 08:08

## 2025-06-27 RX ADMIN — DULOXETINE 20 MG: 20 CAPSULE, DELAYED RELEASE ORAL at 08:07

## 2025-06-27 RX ADMIN — MIDAZOLAM 1 MG: 1 INJECTION INTRAMUSCULAR; INTRAVENOUS at 13:58

## 2025-06-27 ASSESSMENT — COGNITIVE AND FUNCTIONAL STATUS - GENERAL
HELP NEEDED FOR BATHING: A LITTLE
DRESSING REGULAR LOWER BODY CLOTHING: A LITTLE
DRESSING REGULAR UPPER BODY CLOTHING: A LITTLE
CLIMB 3 TO 5 STEPS WITH RAILING: A LOT
TURNING FROM BACK TO SIDE WHILE IN FLAT BAD: A LITTLE
STANDING UP FROM CHAIR USING ARMS: A LITTLE
DAILY ACTIVITIY SCORE: 20
MOVING FROM LYING ON BACK TO SITTING ON SIDE OF FLAT BED WITH BEDRAILS: A LITTLE
MOBILITY SCORE: 17
TOILETING: A LITTLE
WALKING IN HOSPITAL ROOM: A LITTLE
MOVING TO AND FROM BED TO CHAIR: A LITTLE

## 2025-06-27 ASSESSMENT — PAIN - FUNCTIONAL ASSESSMENT
PAIN_FUNCTIONAL_ASSESSMENT: 0-10

## 2025-06-27 ASSESSMENT — PAIN SCALES - GENERAL
PAINLEVEL_OUTOF10: 6
PAINLEVEL_OUTOF10: 0 - NO PAIN
PAINLEVEL_OUTOF10: 0 - NO PAIN
PAINLEVEL_OUTOF10: 5 - MODERATE PAIN
PAINLEVEL_OUTOF10: 0 - NO PAIN
PAINLEVEL_OUTOF10: 7

## 2025-06-27 NOTE — PROGRESS NOTES
06/27/25 1102   Discharge Planning   Living Arrangements Alone   Support Systems Children;Family members   Assistance Needed A&OX4; independent with ADLs with walker and wheelchair; doesn't drive; 2L @ HS. Currently 2L NC; PCP Dr Gonsalez; on Coumadin prior to hospitalization   Type of Residence Private residence   Number of Stairs to Enter Residence 0   Number of Stairs Within Residence 0   Do you have animals or pets at home? No   Who is requesting discharge planning? Provider   Expected Discharge Disposition Home Health  (Pt lives in handicapped apartment. Active [facility]- patient not interested in SNF)   Does the patient need discharge transport arranged? Yes   RoundTrip coordination needed? Yes   Has discharge transport been arranged? No   Stroke Family Assessment   Stroke Family Assessment Needed No   Intensity of Service   Intensity of Service 0-30 min

## 2025-06-27 NOTE — SIGNIFICANT EVENT
TR band removed, gauze/tegaderm applied, splint board and gauze applied for reinforcement. Radial post care instructions verbally given to the patient.

## 2025-06-27 NOTE — PROGRESS NOTES
Subjective Data:  Feeling well overall-- Net negative 23.5L   Remains on 4L NC        Objective Data:  Last Recorded Vitals:  Vitals:    06/27/25 0159 06/27/25 0442 06/27/25 0448 06/27/25 0801   BP:   139/76 136/75   BP Location:    Left arm   Patient Position:    Lying   Pulse: 89 87 92 85   Resp:  17     Temp:    35.5 °C (95.9 °F)   TempSrc:  Temporal  Temporal   SpO2: 93% 95% 96% 95%   Weight:       Height:           Last Labs:  CBC - 6/27/2025:  7:13 AM  9.0 14.8 454    45.4      CMP - 6/27/2025:  7:13 AM  9.2 8.0 28 --- 0.8   3.2 3.8 17 230      PTT - 6/26/2025:  7:33 AM  1.2   13.1 37     TROPHS   Date/Time Value Ref Range Status   06/19/2025 01:57 PM 23 0 - 13 ng/L Final   06/19/2025 12:50 PM 29 0 - 13 ng/L Final   12/11/2024 10:19 PM 26 0 - 13 ng/L Final     BNP   Date/Time Value Ref Range Status   06/19/2025 12:50  0 - 99 pg/mL Final   12/11/2024 04:51  0 - 99 pg/mL Final     HGBA1C   Date/Time Value Ref Range Status   12/11/2024 04:51 PM 5.5 See comment % Final   08/13/2024 07:35 AM 5.9 see below % Final     LDLCALC   Date/Time Value Ref Range Status   12/11/2024 04:51 PM 87 <=99 mg/dL Final     Comment:                                 Near   Borderline      AGE      Desirable  Optimal    High     High     Very High     0-19 Y     0 - 109     ---    110-129   >/= 130     ----    20-24 Y     0 - 119     ---    120-159   >/= 160     ----      >24 Y     0 -  99   100-129  130-159   160-189     >/=190       VLDL   Date/Time Value Ref Range Status   12/11/2024 04:51 PM 21 0 - 40 mg/dL Final   11/14/2022 11:01 AM 40 0 - 40 mg/dL Final   09/27/2020 12:15 AM 21 0 - 40 mg/dL Final   06/19/2020 03:03 PM 16 0 - 40 mg/dL Final      Last I/O:  I/O last 3 completed shifts:  In: 838.6 (6 mL/kg) [P.O.:750; I.V.:88.6 (0.6 mL/kg)]  Out: 6730 (48.3 mL/kg) [Urine:6730 (1.3 mL/kg/hr)]  Weight: 139.3 kg     Past Cardiology Tests (Last 3 Years):  Echo:  Transthoracic Echo Complete 06/20/2025  CONCLUSIONS:   1. Left  ventricular ejection fraction is normal calculated by Arreguin's biplane at 63%.   2. There is severely increased septal and severely increased posterior left ventricular wall thickness.   3. There is severe concentric left ventricular hypertrophy.   4. There is normal right ventricular global systolic function.   5. Mildly enlarged right ventricle.   6. The left atrial size is severely dilated.   7. The right atrium is moderately dilated.   8. Severe aortic valve stenosis. The peak and mean gradients are 76 mmHg and 53 mmHg respectively.   9. There is moderate mitral annular calcification.  10. There is moderate mitral valve stenosis with a doppler estimated mean diastolic gradient of 7 mmHg.  11. Mild to moderate mitral valve regurgitation.  12. Mild to moderate tricuspid regurgitation visualized.  13. The Doppler estimated RVSP is moderately elevated at 60 mmHg.  14. The inferior vena cava appears severely dilated, with IVC inspiratory collapse less than 50%.     Transthoracic Echo (TTE) Complete 12/12/2024  CONCLUSIONS:   1. The left ventricular systolic function is normal, with a visually estimated ejection fraction of 60-65%.   2. Spectral Doppler shows a Grade I (impaired relaxation pattern) of left ventricular diastolic filling with normal left atrial filling pressure.   3. There is moderately increased septal and moderately increased posterior left ventricular wall thickness.   4. There is moderate concentric left ventricular hypertrophy.   5. There is reduced right ventricular systolic function.   6. Moderately enlarged right ventricle.   7. The left atrium is mildly dilated.   8. There is moderate mitral annular calcification.   9. There is moderate mitral valve stenosis.  10. Moderate to severe aortic valve stenosis.  11. Moderately elevated pulmonary artery pressure.  12. There is mild mitral and tricuspid regurgitation.     Transthoracic Echo (TTE) Complete 09/18/2024 9/18/2024  CONCLUSIONS:   1. The  left ventricular systolic function is normal, with a visually estimated ejection fraction of 60-65%.   2. Spectral Doppler shows an impaired relaxation pattern of left ventricular diastolic filling.   3. There is normal right ventricular global systolic function.   4. The left atrium is mildly dilated.   5. There is severe mitral annular calcification.   6. Severe aortic valve stenosis.         Inpatient Medications:  Scheduled Medications[1]  PRN Medications[2]  Continuous Medications[3]    Physical Exam:  Constitutional: Pleasant, Awake/Alert/Oriented to person place and time.   Head: Atraumatic, Normocephalic  Eyes: EOMI. MICHELE  Neck: +JVD  Cardiovascular: Regular rate and rhythm, S1, S2. No extra heart sounds or murmurs  Respiratory: Crackles noted posteriorly at bases   Abdomen: Soft, Nontender, Obese. Bowel sounds appreciated  Neurological: CNII-XII intact. Sensation grossly intact  Extremities: BLE chronic lymphedema, 2+ pitting edema   Psychiatric: Appropriate mood and affect        Assessment/Plan   65 y.o. 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     Assessment:  Acute decompensated diastolic HF  Severe aortic stenosis  Moderate mitral stenosis  BLE cellulitis  Possible pneumonia  pSVT     Plan:  -NPO for R+LHC today   -Stop lasix drip-- further diuretic recommendations pending RHC results  -Continue spironolactone 50mg daily, diltiazem 360mg daily  -Warfarin held for cath with Lovenox bridge   -Atbx per primary team/ID recommendations   -Referral placed to [facility] at [facility]-- Dr. Franklin Alexander, for further discussion regarding TAVR candidacy     Peripheral IV 06/24/25 22 G Posterior;Right Hand (Active)   Site  Assessment Clean;Dry;Intact 06/27/25 0817   Dressing Status Clean;Dry 06/27/25 0817   Number of days: 3       Peripheral IV 06/24/25 22 G Left;Posterior Forearm (Active)   Site Assessment Clean;Dry;Intact 06/27/25 0817   Dressing Status Clean;Dry 06/27/25 0817   Number of days: 3       Code Status:  Full Code      LAKEISHA Mackey-CNP       [1]   Scheduled medications   Medication Dose Route Frequency    acetaminophen  975 mg oral q6h    ammonium lactate  1 Application Topical BID    budesonide  0.5 mg nebulization BID    And    ipratropium-albuteroL  3 mL nebulization TID    cefTRIAXone  2 g intravenous q24h    dilTIAZem ER  360 mg oral Daily    DULoxetine  20 mg oral Daily    [Held by provider] enoxaparin  1 mg/kg subcutaneous q12h    guaiFENesin  600 mg oral BID    hydroxyurea  1,000 mg oral Daily    insulin lispro  0-10 Units subcutaneous Before meals & nightly    ketoconazole   Topical BID    melatonin  3 mg oral Nightly    multivitamin with minerals  1 tablet oral Daily    nicotine  1 patch transdermal Daily    Followed by    [START ON 7/31/2025] nicotine  1 patch transdermal Daily    Followed by    [START ON 8/14/2025] nicotine  1 patch transdermal Daily    nystatin   Topical BID    pneumoc 20-eros conj-dip cr(PF)  0.5 mL intramuscular During hospitalization    polyethylene glycol  17 g oral Daily    pramipexole  1 mg oral BID    sennosides-docusate sodium  2 tablet oral BID    spironolactone  50 mg oral Daily    [Held by provider] torsemide  40 mg oral BID    triamcinolone   Topical BID    [Held by provider] warfarin  7.5 mg oral Daily    white petrolatum  1 Application Topical BID    zinc oxide  1 Application Topical TID    zinc sulfate  50 mg of elemental zinc oral Daily   [2]   PRN medications   Medication    benzocaine    cyclobenzaprine    dextrose    dextrose    glucagon    glucagon    HYDROmorphone    hydrOXYzine HCL    ipratropium-albuteroL    lidocaine    oxyCODONE    oxyCODONE    oxygen    sodium  chloride   [3]   Continuous Medications   Medication Dose Last Rate

## 2025-06-27 NOTE — PROGRESS NOTES
Neisha Graham is a 65 y.o. female on day 8 of admission presenting with Acute on chronic diastolic CHF (congestive heart failure).    Subjective   Interval History: no fever, no new complaints, her edema seems to be getting better       Review of Systems    Objective   Range of Vitals (last 24 hours)  Heart Rate:  [80-94]   Temp:  [35.5 °C (95.9 °F)-36.8 °C (98.2 °F)]   Resp:  [16-20]   BP: (122-139)/(64-79)   SpO2:  [93 %-96 %]   Daily Weight  06/26/25 : 139 kg (307 lb)    Body mass index is 52.7 kg/m².    Physical Exam  Constitutional:       Appearance: Normal appearance.   HENT:      Head: Normocephalic and atraumatic.      Mouth/Throat:      Mouth: Mucous membranes are moist.      Pharynx: Oropharynx is clear.   Eyes:      Pupils: Pupils are equal, round, and reactive to light.   Cardiovascular:      Rate and Rhythm: Normal rate and regular rhythm.      Heart sounds: Normal heart sounds.   Pulmonary:      Effort: Pulmonary effort is normal.      Breath sounds: Normal breath sounds.   Abdominal:      General: Abdomen is flat. Bowel sounds are normal.      Palpations: Abdomen is soft.   Musculoskeletal:         General: Swelling present.      Cervical back: Normal range of motion.      Comments: Stasis / wounds   Neurological:      Mental Status: She is alert.         Antibiotics  cefTRIAXone - 2 gram/50 mL    Relevant Results  Labs  Results from last 72 hours   Lab Units 06/27/25  0713 06/26/25  0733 06/25/25  0517   WBC AUTO x10*3/uL 9.0 8.7 8.9   HEMOGLOBIN g/dL 14.8 15.5 14.1   HEMATOCRIT % 45.4 47.5* 44.2   PLATELETS AUTO x10*3/uL 454* 531* 496*     Results from last 72 hours   Lab Units 06/27/25  0713 06/26/25  1758 06/26/25  0733   SODIUM mmol/L 139 141 138   POTASSIUM mmol/L 3.8 3.5 4.3   CHLORIDE mmol/L 94* 92* 91*   CO2 mmol/L 35* 38* 38*   BUN mg/dL 33* 35* 32*   CREATININE mg/dL 1.32* 1.40* 1.23*   GLUCOSE mg/dL 140* 190* 139*   CALCIUM mg/dL 9.2 9.7 9.6   ANION GAP mmol/L 14 15 13   EGFR  mL/min/1.73m*2 45* 42* 49*   PHOSPHORUS mg/dL 3.2 4.1 3.7     Results from last 72 hours   Lab Units 06/27/25  0713 06/26/25  1758 06/26/25  0733   ALBUMIN g/dL 3.8 3.8 4.0     Estimated Creatinine Clearance: 58.5 mL/min (A) (by C-G formula based on SCr of 1.32 mg/dL (H)).  C-Reactive Protein   Date Value Ref Range Status   06/19/2025 3.23 (H) <1.00 mg/dL Final   12/11/2024 0.72 <1.00 mg/dL Final   09/16/2024 16.36 (H) <1.00 mg/dL Final     Microbiology  Susceptibility data from last 14 days.  Collected Specimen Info Organism Clindamycin Erythromycin Penicillin Tetracycline   06/19/25 Blood culture from Peripheral Venipuncture Streptococcus agalactiae (Group B Streptococcus)       06/19/25 Blood culture from Peripheral Venipuncture Streptococcus agalactiae (Group B Streptococcus)  R  R  S  R   Imaging  Reviewed        Assessment/Plan   Sepsis, BC with gp B Strep, TTE reviewed, the repeat BC is negative   Legs stasis / wounds / likely cellulitis  Respiratory failure / COPD /  BE / atelectasis     Recommendations :  Continue Rocephin, will keep her on iv while inhouse then oral with discharge  Discussed with the medical team       I spent minutes in the professional and overall care of this patient.  The cultures and susceptibilities were reviewed and discussed with the micro lab, the antibiotics stewardship guidelines were reviewed, the infection control protocols and recommendations were reviewed with the patient and the staff                 Gorge Myers MD

## 2025-06-27 NOTE — CARE PLAN
The patient's goals for the shift include  pt will use call light to maintain safety throughout shift.    The clinical goals for the shift include pt will use call light to maintain safety throughout shift.      Problem: Pain - Adult  Goal: Verbalizes/displays adequate comfort level or baseline comfort level  Outcome: Progressing     Problem: Safety - Adult  Goal: Free from fall injury  Outcome: Progressing     Problem: Discharge Planning  Goal: Discharge to home or other facility with appropriate resources  Outcome: Progressing     Problem: Chronic Conditions and Co-morbidities  Goal: Patient's chronic conditions and co-morbidity symptoms are monitored and maintained or improved  Outcome: Progressing     Problem: Nutrition  Goal: Nutrient intake appropriate for maintaining nutritional needs  Outcome: Progressing     Problem: Skin  Goal: Decreased wound size/increased tissue granulation at next dressing change  Outcome: Progressing  Goal: Participates in plan/prevention/treatment measures  Outcome: Progressing  Goal: Prevent/manage excess moisture  Outcome: Progressing  Goal: Prevent/minimize sheer/friction injuries  Outcome: Progressing  Flowsheets (Taken 6/27/2025 6616)  Prevent/minimize sheer/friction injuries: Turn/reposition every 2 hours/use positioning/transfer devices  Goal: Promote/optimize nutrition  Outcome: Progressing  Goal: Promote skin healing  Outcome: Progressing     Problem: Fall/Injury  Goal: Not fall by end of shift  Outcome: Progressing  Goal: Be free from injury by end of the shift  Outcome: Progressing  Goal: Verbalize understanding of personal risk factors for fall in the hospital  Outcome: Progressing  Goal: Verbalize understanding of risk factor reduction measures to prevent injury from fall in the home  Outcome: Progressing  Goal: Use assistive devices by end of the shift  Outcome: Progressing  Goal: Pace activities to prevent fatigue by end of the shift  Outcome: Progressing     Problem:  Pain  Goal: Takes deep breaths with improved pain control throughout the shift  Outcome: Progressing  Goal: Turns in bed with improved pain control throughout the shift  Outcome: Progressing  Goal: Walks with improved pain control throughout the shift  Outcome: Progressing  Goal: Performs ADL's with improved pain control throughout shift  Outcome: Progressing  Goal: Participates in PT with improved pain control throughout the shift  Outcome: Progressing  Goal: Free from opioid side effects throughout the shift  Outcome: Progressing  Goal: Free from acute confusion related to pain meds throughout the shift  Outcome: Progressing

## 2025-06-27 NOTE — PROGRESS NOTES
"Internal Medicine - Daily Progress Note   Hospital Day: 8    Name: Neisha Graham   Age: 65 y.o.   Gender: Female  Room: 107/107-A        HPI   Neisha Graham is a 65 y.o. year old female  patient with with PMHx significant for paroxysmal atrial fibrillation on warfarin, chronic thromboembolic disease, essential thrombocytosis, moderate to severe aortic stenosis, moderate mitral stenosis, HFpEF (EF 60-65% 12/24), COPD on 2L NC nightly, pulmonary hypertension, lymphedema with chronic lower extremity wounds, chronic sacral wounds, HTN, HLD, anxiety, and depression who presented to [facility] on 6/19/25 with chief complaint of fever and chills with associated shortness of breath. Patient states that last night she began to notice subjective fever and chills that continued into this morning which was her main concern for presenting to the ER. She does note that she had noticed her L lower extremity wound was foul smelling last night and had soaked through her bandage faster than normal. Additionally, she notes increased swelling of her bilateral lower extremities up to her knees. States that she is supposed to take torsemide 40 mg BID at home but recently stopped taking the evening dose because she felt as though it was too much. Endorses good urine output with the torsemide once daily. Additionally, she notes swelling of a gland on her R jaw and neck for the past 2-3 months that is painful and keeps her awake at night. She was recently treated for pneumonia outpatient twice by her PCP, once with a 10-day course of Augmentin and once with a 5-day course of Cefdinir, at that time she states her breathing improved but the gland remained inflamed. She started feeling increasingly short of breath again recently and noes increase of her chronic cough with sputum changing from \"flesh\" color to a more green/white color. She denies chest pain, palpitations, nasal congestion, sore throat, abdominal pain, nausea, " "vomiting.       Subjective    Patient doing well this morning, reports breathing continues to improve and the swelling in her legs continues to improve.  She is restless and eager to get the procedure done with.  Discussed the continued plan to diuresis and performed the cardiac cath when she is euvolemic.    Also discussed patient's wheelchair.  She is not due for an upgrade until next year, however, patient is interested in starting the process now.  She is hoping to get a power wheelchair that can recline so her feet can be elevated, as the current chair does not have this capability.  Given her continued difficulty with lower extremity swelling, this would be medically appropriate.    Overnight Events: none    ROS: 12 points review of system is negative except as stated in the HPI above.     Objective    Vitals  Visit Vitals  /76   Pulse 92   Temp 36.8 °C (98.2 °F)   Resp 17   Ht 1.626 m (5' 4\")   Wt 139 kg (307 lb)   SpO2 96%   BMI 52.70 kg/m²   Smoking Status Some Days   BSA 2.51 m²       Physical Exam    Physical Exam  Constitutional:       General: She is not in acute distress.     Appearance: She is obese.   HENT:      Head: Normocephalic and atraumatic.      Mouth/Throat:      Mouth: Mucous membranes are moist.   Eyes:      Extraocular Movements: Extraocular movements intact.      Pupils: Pupils are equal, round, and reactive to light.   Cardiovascular:      Rate and Rhythm: Normal rate and regular rhythm.      Pulses: Normal pulses.      Heart sounds: Murmur heard.   Pulmonary:      Effort: Pulmonary effort is normal. No respiratory distress.      Breath sounds: No wheezing.   Abdominal:      General: Bowel sounds are normal. There is no distension.      Palpations: Abdomen is soft.      Tenderness: There is no abdominal tenderness.   Musculoskeletal:      Comments: Chronic bilateral lower extremity swelling consistent with lymphedema, left lower extremity wound mid dorsal tibia    Skin:     " "Findings: Erythema (BLE) present.   Neurological:      General: No focal deficit present.      Mental Status: She is alert and oriented to person, place, and time.   Psychiatric:         Mood and Affect: Mood normal.           IOs    Intake/Output Summary (Last 24 hours) at 6/27/2025 0738  Last data filed at 6/27/2025 0448  Gross per 24 hour   Intake 492.57 ml   Output 3400 ml   Net -2907.43 ml       Labs:   Results from last 72 hours   Lab Units 06/26/25  1758 06/26/25  0733 06/25/25  1825   SODIUM mmol/L 141 138 141   POTASSIUM mmol/L 3.5 4.3 4.0   CHLORIDE mmol/L 92* 91* 90*   CO2 mmol/L 38* 38* 38*   BUN mg/dL 35* 32* 36*   CREATININE mg/dL 1.40* 1.23* 1.24*   GLUCOSE mg/dL 190* 139* 230*   CALCIUM mg/dL 9.7 9.6 9.2   ANION GAP mmol/L 15 13 17   EGFR mL/min/1.73m*2 42* 49* 48*   PHOSPHORUS mg/dL 4.1 3.7 3.8      Results from last 72 hours   Lab Units 06/27/25  0713 06/26/25  0733 06/25/25  0517   WBC AUTO x10*3/uL 9.0 8.7 8.9   HEMOGLOBIN g/dL 14.8 15.5 14.1   HEMATOCRIT % 45.4 47.5* 44.2   PLATELETS AUTO x10*3/uL 454* 531* 496*      Lab Results   Component Value Date    CALCIUM 9.7 06/26/2025    PHOS 4.1 06/26/2025      Lab Results   Component Value Date    CRP 3.23 (H) 06/19/2025      [unfilled]     Micro/ID:   Susceptibility data from last 90 days.  Collected Specimen Info Organism Clindamycin Erythromycin Penicillin Tetracycline   06/19/25 Blood culture from Peripheral Venipuncture Streptococcus agalactiae (Group B Streptococcus)       06/19/25 Blood culture from Peripheral Venipuncture Streptococcus agalactiae (Group B Streptococcus)  R  R  S  R                    No lab exists for component: \"AGALPCRNB\"   .ID  Lab Results   Component Value Date    URINECULTURE  12/30/2024     Clinically insignificant growth based on current clinical standards.    BLOODCULT No growth at 4 days -  FINAL REPORT 06/21/2025    BLOODCULT No growth at 4 days -  FINAL REPORT 06/21/2025       Images  Electrocardiogram, 12-lead " PRN ACS symptoms  Normal sinus rhythm  Possible Left atrial enlargement  Rightward axis  Incomplete right bundle branch block  Borderline ECG  When compared with ECG of 19-JUN-2025 12:37, (unconfirmed)  Premature atrial complexes are no longer Present      Meds  Scheduled medications  Scheduled Medications[1]  Continuous medications  Continuous Medications[2]  PRN medications  PRN Medications[3]     Assessment and Plan    Neisha Graham is a 65 y.o. female with PMHx significant for paroxysmal atrial fibrillation (on warfarin), chronic thromboembolic disease, essential thrombocytosis, moderate to severe aortic stenosis, moderate mitral stenosis, HFpEF (EF 60-65% 12/24), COPD (on 2L NC nightly), pulmonary hypertension, lymphedema with chronic lower extremity wounds, chronic sacral wounds, HTN, HLD admitted on 6/19/2025 acute on chronic hypoxic respiratory failure 2/2 HFpEF and COPD exacerbation with concern for sepsis 2/2 infection of chronic wounds.     Acute Medical Issues:   #Acute on chronic hypoxic respiratory failure   #CHF exacerbation, improving  ::TTE 12/2024 with EF 60-65%  ::Patient with SOB and desaturation to 88%, requiring 1-2L NC continuous (at baseline wears 2L NC at night and with transfers/exertion)  ::Patient on torsemide 40 mg BID at home but recently stopped taking evening dose  ::BNP elevated at 786  ::CXR and CT chest concerning for pulmonary edema   ::s/p 80 mg IV Lasix in the ED  -On 2L NC, wean as tolerated back to baseline 2 L at night and with exertion  -Mucinex 600 mg BID  -Incentive spirometer and acapella ordered   -Continue home spironolactone 50 mg daily  -Holding home torsemide, Lasix drip stopped 6/27  -TTE: EF 63%, severely concentric left ventricular hypertrophy, severely dilated left atria, moderately dilated right atria, severe aortic stenosis with peak gradient 76 mmHg, moderate mitral valve stenosis  -1800cc/day fluid restriction  -Monitor strict I/O: Total net negative >50  pounds as of 6/27  -Cardiology following: Plan for cardiac cath today    #Moderate to severe aortic stenosis   #Moderate mitral valve stenosis   ::TTE 12/2024 with EF 60-65% and Grade I LV impaired relaxation pattern and mod to severe aortic stenosis and mod mitral stenosis   ::ED did speak with Dr. George who recommended [place] [facility] and would see patient in AM to determine if transfer needed for valve evaluation  -Cardiology following: Once euvolemic, will plan for R+LHC once euvolemic to assess aortic and mitral stenosis (planned 6/27)  -Will need outpatient referral to structural heart team to discuss TAVR candidacy  -Outpatient referral placed for dentist given the need for preop clearance of her dental caries    #Hyperglycemia   ::suspect 2/2 steroid use  -monitor  -Continue SSI    #Contraction Alkalosis  ::Bicarb elevated at 42 on day 5 of diuresis  -s/p acetazolamide -> 38   -continue to monitor    Resolved Medical Issues:  #Supratherapeutic INR  ::INR 3.4 on admission    #Leukocytosis  :: WBC 27.2 on admission    #Sepsis  #Bacteremia  #Chronic bilateral lower extremity and sacral wounds with concern for acute infection  ::CXR and CT without distinct PNA  ::Patient notes foul smell of L lower extremity wound x 2 days - with some surrounding erythema  ::Hx of wound with MRSA 9/24 and pseudomonas bacteremia 9/24  ::Patient complaints of swollen/painful gland in R jaw and ability to express salivary stones from under tongue - minimal parotid swelling and tenderness on examination  -Blood cultures positive for group B streptococcus  -Pro-Duke 0.26, CRP 3.23, ESR 54  -Continue ceftriaxone (6/19 - ), azithromycin (6/19 - 6/21)  -ID consulted, agrees with plan to continue ceftriaxone  -Wound care following  -Repeat blood cultures Ngx4 day    #COPD exacerbation  -Prednisone 40 mg daily x 5 days (6/19 - 6/23)  -Azithromycin x 3 days (6/19 - 6/21)  -Continue home inhalers  -Duonebs scheduled and PRN    Chronic  Medical Issues:   #Paroxysmal atrial fibrillation -holding home warfarin (takes 7.5 mg daily, INR goal 2-3), continue diltiazem 360 mg daily  #Essential thrombocytosis - Hydroxyurea 1000 mg daily  #Chronic back pain - Flexeril 10 mg BID PRN for muscle spasms, takes oxycodone 10 mg PRN at home for pain - inpatient pain regimen: scheduled Tylenol 975 mg q6 hours, oxycodone 5 and 10 mg q6 hours PRN for moderate and severe pain, dilaudid 0.4 mg PRN for breakthrough pain  #Chronic constipation 2/2 opioid use: Continue bowel regimen  #Depression, Anxiety - Duloxetine 20 mg daily, hydroxyzine 50 mg q8 hours PRN, venlafaxine 150 mg daily  #RLS - Pramipexole 1 mg BID  #Tobacco use disorder-Nicotine patch ordered, smoking cessation discussed  #HTN, HLD  #BE-previously wore CPAP, but not anymore and does not want to wear one, increase nightly O2 as needed    Fluids: Fluid restriction 1800cc daily   Electrolytes: Replete as needed   Nutrition: Cardiac diet  GI ppx: None  DVT ppx: therapeutic lovenox -> resume home warfarin post cath (will likely need Lovenox bridge for home-going)  Antibiotics: Ceftriaxone  Tubes/Lines/Drains: PIV  Oxygen: 2L NC    Code Status: Full Code   Emergency Contact: Extended Emergency Contact Information  Primary Emergency Contact: Belle Chauhan  Mobile Phone: [phone]  Relation: Sibling  Secondary Emergency Contact: NICOLAS PEREZ  Mobile Phone: [phone]  Relation: Daughter     Disposition: 65 y.o. female admitted for acute on chronic HRF iso HFpEF and COPD exacerbations with concern for sepsis 2/2 bacteremia.  Cardiac cath planned for today, will update dispo plan following.      Violeta Hernandez, DO   06/27/25                 [1] acetaminophen, 975 mg, oral, q6h  ascorbic acid, 500 mg, oral, BID  budesonide, 0.5 mg, nebulization, BID   And  ipratropium-albuteroL, 3 mL, nebulization, TID  cefTRIAXone, 2 g, intravenous, q24h  dilTIAZem ER, 360 mg, oral, Daily  DULoxetine, 20 mg, oral,  Daily  enoxaparin, 1 mg/kg, subcutaneous, q12h  guaiFENesin, 600 mg, oral, BID  hydroxyurea, 1,000 mg, oral, Daily  insulin lispro, 0-10 Units, subcutaneous, Before meals & nightly  ketoconazole, , Topical, BID  melatonin, 3 mg, oral, Nightly  multivitamin with minerals, 1 tablet, oral, Daily  nicotine, 1 patch, transdermal, Daily   Followed by  [START ON 7/31/2025] nicotine, 1 patch, transdermal, Daily   Followed by  [START ON 8/14/2025] nicotine, 1 patch, transdermal, Daily  nystatin, , Topical, BID  pneumoc 20-eros conj-dip cr(PF), 0.5 mL, intramuscular, During hospitalization  polyethylene glycol, 17 g, oral, Daily  pramipexole, 1 mg, oral, BID  sennosides-docusate sodium, 2 tablet, oral, BID  spironolactone, 50 mg, oral, Daily  [Held by provider] torsemide, 40 mg, oral, BID  triamcinolone, , Topical, BID  [Held by provider] warfarin, 7.5 mg, oral, Daily  white petrolatum, 1 Application, Topical, BID  zinc oxide, 1 Application, Topical, TID  zinc sulfate, 50 mg of elemental zinc, oral, Daily     [2] furosemide, 10 mg/hr, Last Rate: 10 mg/hr (06/26/25 2104)     [3] PRN medications: ammonium lactate, benzocaine, cyclobenzaprine, dextrose, dextrose, glucagon, glucagon, HYDROmorphone, hydrOXYzine HCL, ipratropium-albuteroL, lidocaine, oxyCODONE, oxyCODONE, oxygen, sodium chloride

## 2025-06-28 VITALS
RESPIRATION RATE: 18 BRPM | HEART RATE: 85 BPM | TEMPERATURE: 97.7 F | SYSTOLIC BLOOD PRESSURE: 136 MMHG | DIASTOLIC BLOOD PRESSURE: 86 MMHG | OXYGEN SATURATION: 91 % | HEIGHT: 64 IN | BODY MASS INDEX: 50.02 KG/M2 | WEIGHT: 293 LBS

## 2025-06-28 LAB
ALBUMIN SERPL BCP-MCNC: 3.7 G/DL (ref 3.4–5)
ANION GAP SERPL CALC-SCNC: 13 MMOL/L (ref 10–20)
BUN SERPL-MCNC: 36 MG/DL (ref 6–23)
CALCIUM SERPL-MCNC: 9.3 MG/DL (ref 8.6–10.3)
CHLORIDE SERPL-SCNC: 95 MMOL/L (ref 98–107)
CO2 SERPL-SCNC: 34 MMOL/L (ref 21–32)
CREAT SERPL-MCNC: 1.25 MG/DL (ref 0.5–1.05)
EGFRCR SERPLBLD CKD-EPI 2021: 48 ML/MIN/1.73M*2
ERYTHROCYTE [DISTWIDTH] IN BLOOD BY AUTOMATED COUNT: 16.3 % (ref 11.5–14.5)
GLUCOSE BLD MANUAL STRIP-MCNC: 141 MG/DL (ref 74–99)
GLUCOSE BLD MANUAL STRIP-MCNC: 209 MG/DL (ref 74–99)
GLUCOSE SERPL-MCNC: 133 MG/DL (ref 74–99)
HCT VFR BLD AUTO: 45.7 % (ref 36–46)
HGB BLD-MCNC: 14.6 G/DL (ref 12–16)
MAGNESIUM SERPL-MCNC: 2.44 MG/DL (ref 1.6–2.4)
MCH RBC QN AUTO: 32.6 PG (ref 26–34)
MCHC RBC AUTO-ENTMCNC: 31.9 G/DL (ref 32–36)
MCV RBC AUTO: 102 FL (ref 80–100)
NRBC BLD-RTO: 0 /100 WBCS (ref 0–0)
PHOSPHATE SERPL-MCNC: 3.7 MG/DL (ref 2.5–4.9)
PLATELET # BLD AUTO: 412 X10*3/UL (ref 150–450)
POTASSIUM SERPL-SCNC: 3.8 MMOL/L (ref 3.5–5.3)
RBC # BLD AUTO: 4.48 X10*6/UL (ref 4–5.2)
SODIUM SERPL-SCNC: 138 MMOL/L (ref 136–145)
WBC # BLD AUTO: 8 X10*3/UL (ref 4.4–11.3)

## 2025-06-28 PROCEDURE — 94760 N-INVAS EAR/PLS OXIMETRY 1: CPT

## 2025-06-28 PROCEDURE — 85027 COMPLETE CBC AUTOMATED: CPT

## 2025-06-28 PROCEDURE — 99232 SBSQ HOSP IP/OBS MODERATE 35: CPT | Performed by: INTERNAL MEDICINE

## 2025-06-28 PROCEDURE — 2500000001 HC RX 250 WO HCPCS SELF ADMINISTERED DRUGS (ALT 637 FOR MEDICARE OP)

## 2025-06-28 PROCEDURE — 2500000002 HC RX 250 W HCPCS SELF ADMINISTERED DRUGS (ALT 637 FOR MEDICARE OP, ALT 636 FOR OP/ED)

## 2025-06-28 PROCEDURE — 2500000004 HC RX 250 GENERAL PHARMACY W/ HCPCS (ALT 636 FOR OP/ED)

## 2025-06-28 PROCEDURE — 80069 RENAL FUNCTION PANEL: CPT

## 2025-06-28 PROCEDURE — 83735 ASSAY OF MAGNESIUM: CPT

## 2025-06-28 PROCEDURE — 94668 MNPJ CHEST WALL SBSQ: CPT

## 2025-06-28 PROCEDURE — 2500000002 HC RX 250 W HCPCS SELF ADMINISTERED DRUGS (ALT 637 FOR MEDICARE OP, ALT 636 FOR OP/ED): Performed by: INTERNAL MEDICINE

## 2025-06-28 PROCEDURE — S4991 NICOTINE PATCH NONLEGEND: HCPCS

## 2025-06-28 PROCEDURE — 2500000001 HC RX 250 WO HCPCS SELF ADMINISTERED DRUGS (ALT 637 FOR MEDICARE OP): Performed by: STUDENT IN AN ORGANIZED HEALTH CARE EDUCATION/TRAINING PROGRAM

## 2025-06-28 PROCEDURE — 94669 MECHANICAL CHEST WALL OSCILL: CPT

## 2025-06-28 PROCEDURE — 2500000005 HC RX 250 GENERAL PHARMACY W/O HCPCS: Performed by: STUDENT IN AN ORGANIZED HEALTH CARE EDUCATION/TRAINING PROGRAM

## 2025-06-28 PROCEDURE — 82947 ASSAY GLUCOSE BLOOD QUANT: CPT

## 2025-06-28 PROCEDURE — 99239 HOSP IP/OBS DSCHRG MGMT >30: CPT

## 2025-06-28 PROCEDURE — 36415 COLL VENOUS BLD VENIPUNCTURE: CPT

## 2025-06-28 PROCEDURE — 94640 AIRWAY INHALATION TREATMENT: CPT

## 2025-06-28 RX ORDER — AMOXICILLIN 500 MG/1
1000 CAPSULE ORAL EVERY 8 HOURS SCHEDULED
Qty: 60 CAPSULE | Refills: 0 | Status: SHIPPED | OUTPATIENT
Start: 2025-06-28 | End: 2025-07-08

## 2025-06-28 RX ORDER — ENOXAPARIN SODIUM 150 MG/ML
1 INJECTION SUBCUTANEOUS EVERY 12 HOURS
Qty: 10 EACH | Refills: 0 | Status: SHIPPED | OUTPATIENT
Start: 2025-06-28 | End: 2025-07-02 | Stop reason: SDUPTHER

## 2025-06-28 RX ADMIN — OXYCODONE HYDROCHLORIDE 10 MG: 10 TABLET ORAL at 17:11

## 2025-06-28 RX ADMIN — SENNOSIDES AND DOCUSATE SODIUM 2 TABLET: 50; 8.6 TABLET ORAL at 10:37

## 2025-06-28 RX ADMIN — ZINC SULFATE 220 MG (50 MG) CAPSULE 1 CAPSULE: CAPSULE at 10:35

## 2025-06-28 RX ADMIN — SPIRONOLACTONE 50 MG: 25 TABLET ORAL at 10:35

## 2025-06-28 RX ADMIN — WARFARIN SODIUM 7.5 MG: 5 TABLET ORAL at 17:11

## 2025-06-28 RX ADMIN — Medication 1 APPLICATION: at 10:54

## 2025-06-28 RX ADMIN — INSULIN LISPRO 4 UNITS: 100 INJECTION, SOLUTION INTRAVENOUS; SUBCUTANEOUS at 13:56

## 2025-06-28 RX ADMIN — DULOXETINE 20 MG: 20 CAPSULE, DELAYED RELEASE ORAL at 10:35

## 2025-06-28 RX ADMIN — Medication 4 L/MIN: at 07:31

## 2025-06-28 RX ADMIN — NICOTINE 1 PATCH: 21 PATCH, EXTENDED RELEASE TRANSDERMAL at 10:59

## 2025-06-28 RX ADMIN — CEFTRIAXONE 2 G: 2 INJECTION, SOLUTION INTRAVENOUS at 10:34

## 2025-06-28 RX ADMIN — POLYETHYLENE GLYCOL 3350 17 G: 17 POWDER, FOR SOLUTION ORAL at 10:33

## 2025-06-28 RX ADMIN — KETOCONAZOLE: 20 CREAM TOPICAL at 10:34

## 2025-06-28 RX ADMIN — DILTIAZEM HYDROCHLORIDE 360 MG: 120 CAPSULE, COATED, EXTENDED RELEASE ORAL at 10:35

## 2025-06-28 RX ADMIN — NYSTATIN CREAM: 100000 CREAM TOPICAL at 10:54

## 2025-06-28 RX ADMIN — OXYCODONE HYDROCHLORIDE 10 MG: 10 TABLET ORAL at 05:02

## 2025-06-28 RX ADMIN — IPRATROPIUM BROMIDE AND ALBUTEROL SULFATE 3 ML: 2.5; .5 SOLUTION RESPIRATORY (INHALATION) at 07:31

## 2025-06-28 RX ADMIN — PRAMIPEXOLE DIHYDROCHLORIDE 1 MG: 1 TABLET ORAL at 10:35

## 2025-06-28 RX ADMIN — TORSEMIDE 40 MG: 20 TABLET ORAL at 10:53

## 2025-06-28 RX ADMIN — OXYCODONE HYDROCHLORIDE 10 MG: 10 TABLET ORAL at 11:00

## 2025-06-28 RX ADMIN — Medication 1 APPLICATION: at 10:55

## 2025-06-28 RX ADMIN — ACETAMINOPHEN 975 MG: 325 TABLET, FILM COATED ORAL at 05:02

## 2025-06-28 RX ADMIN — ENOXAPARIN SODIUM 135 MG: 150 INJECTION SUBCUTANEOUS at 10:56

## 2025-06-28 RX ADMIN — Medication 1 TABLET: at 10:37

## 2025-06-28 RX ADMIN — GUAIFENESIN 600 MG: 600 TABLET ORAL at 10:37

## 2025-06-28 RX ADMIN — TRIAMCINOLONE ACETONIDE: 1 CREAM TOPICAL at 10:58

## 2025-06-28 RX ADMIN — HYDROXYUREA 1000 MG: 500 CAPSULE ORAL at 10:35

## 2025-06-28 RX ADMIN — BUDESONIDE 0.5 MG: 0.5 INHALANT RESPIRATORY (INHALATION) at 07:31

## 2025-06-28 ASSESSMENT — COGNITIVE AND FUNCTIONAL STATUS - GENERAL
DRESSING REGULAR UPPER BODY CLOTHING: A LITTLE
TOILETING: A LITTLE
HELP NEEDED FOR BATHING: A LITTLE
DRESSING REGULAR LOWER BODY CLOTHING: A LITTLE
CLIMB 3 TO 5 STEPS WITH RAILING: A LOT
MOVING TO AND FROM BED TO CHAIR: A LITTLE
STANDING UP FROM CHAIR USING ARMS: A LITTLE
WALKING IN HOSPITAL ROOM: A LOT
MOBILITY SCORE: 17
TURNING FROM BACK TO SIDE WHILE IN FLAT BAD: A LITTLE
DAILY ACTIVITIY SCORE: 20

## 2025-06-28 ASSESSMENT — PAIN SCALES - GENERAL
PAINLEVEL_OUTOF10: 7
PAINLEVEL_OUTOF10: 0 - NO PAIN
PAINLEVEL_OUTOF10: 7

## 2025-06-28 ASSESSMENT — PAIN DESCRIPTION - LOCATION
LOCATION: BACK
LOCATION: GENERALIZED

## 2025-06-28 ASSESSMENT — PAIN - FUNCTIONAL ASSESSMENT
PAIN_FUNCTIONAL_ASSESSMENT: 0-10

## 2025-06-28 NOTE — PROGRESS NOTES
06/28/25 1300   Discharge Planning   Expected Discharge Disposition Home Health  (DC confirmed. Transport requested 2pm stretcher by floor. AVS and HOME CARE ORDERS sent to [facility] for resumption of home care services)

## 2025-06-28 NOTE — CARE PLAN
The patient's goals for the shift include      The clinical goals for the shift include pt to remain comfortable and safe throughout shift. Use call light when needed    Over the shift, the patient did not make progress toward the following goals. Barriers to progression include none. Recommendations to address these barriers include none.

## 2025-06-28 NOTE — DISCHARGE SUMMARY
Discharge Diagnosis  Acute on chronic diastolic CHF (congestive heart failure)  Severe aortic stenosis, moderate mitral stenosis  Bilateral lower extremity lymphedema       Issues Requiring Follow-Up  Take amoxicillin 1,000 mg 3 times daily for 10 days  Bridged back to warfarin using Lovenox twice a day for 5 days  Resume taking your 7.5 mg warfarin nightly  Resume taking your torsemide 40 mg twice daily  Keep your legs elevated to aid with circulation, wrap with Ace wraps as tolerated  Please check your INR early next week to determine if you are back in therapeutic range  Referral has been sent to Dr. Alexander to discuss aortic valve replacement  Referral has been sent for dental evaluation to be performed prior to valve intervention  Follow-up with PCP    Discharge Meds     Medication List      START taking these medications     amoxicillin 500 mg capsule; Commonly known as: Amoxil; Take 2 capsules   (1,000 mg) by mouth every 8 hours for 10 days.   enoxaparin 150 mg/mL injection; Commonly known as: Lovenox; Inject 0.95   mL (142.5 mg) under the skin every 12 hours for 5 days.     CONTINUE taking these medications     albuterol 90 mcg/actuation inhaler; INHALE 2 PUFFS BY MOUTH AND INTO THE   LUNGS EVERY 4 HOURS IF NEEDED FOR WHEEZING   ciclopirox 8 % solution; Commonly known as: Penlac; Apply topically once   daily at bedtime.   cyclobenzaprine 10 mg tablet; Commonly known as: Flexeril; Take 1 tablet   (10 mg) by mouth 2 times a day as needed for muscle spasms.   dilTIAZem  mg 24 hr capsule; Commonly known as: Tiazac; Take 1   capsule (360 mg) by mouth once daily.   DULoxetine 20 mg DR capsule; Commonly known as: Cymbalta; Take 1 capsule   (20 mg) by mouth once daily.   hydroxyurea 500 mg capsule; Commonly known as: Hydrea   ipratropium-albuteroL 0.5-2.5 mg/3 mL nebulizer solution; Commonly known   as: Duo-Neb; Take 3 mL by nebulization 4 times a day as needed for   wheezing or shortness of breath.    ketoconazole 2 % cream; Commonly known as: NIZOral; APPLY TOPICALLY   TWICE A DAY FOR 28 DAYS   * lidocaine 5 % gel; Apply 1 inch topically 3 times a day as needed   (apply to affected area).   * lidocaine 5 % ointment; Commonly known as: Xylocaine; Apply topically   if needed for mild pain (1 - 3). Apply up to three times a day as needed   for pain.   naloxone 4 mg/0.1 mL nasal spray; Commonly known as: Narcan; Administer   1 spray (4 mg) into affected nostril(s) if needed for opioid reversal or   respiratory depression.   nebulizer accessories kit; UAD   nystatin cream; Commonly known as: Mycostatin; APPLY TO AFFECTED AREA   TWO TO THREE TIMES A DAY   * oxyCODONE 10 mg immediate release tablet; Commonly known as:   Roxicodone; Take 1 tablet (10 mg) by mouth every 4 hours if needed for   severe pain (7 - 10) for up to 28 days. Do not fill before June 9, 2025.   * oxyCODONE 10 mg immediate release tablet; Commonly known as:   Roxicodone; Take 1 tablet (10 mg) by mouth every 4 hours if needed for   severe pain (7 - 10) for up to 28 days. Do not fill before July 7, 2025.;   Start taking on: July 7, 2025   pramipexole 1 mg tablet; Commonly known as: Mirapex; Take 1 tablet (1   mg) by mouth 2 times a day.   sennosides-docusate sodium 8.6-50 mg tablet; Commonly known as:   Rebecca-Colace; Take 2 tablets by mouth 2 times a day.   spironolactone 50 mg tablet; Commonly known as: Aldactone; Take 1 tablet   (50 mg) by mouth once daily.   torsemide 20 mg tablet; Commonly known as: Demadex; Take 2 tablets (40   mg) by mouth 2 times a day.   Trelegy Ellipta 200-62.5-25 mcg blister with device; Generic drug:   fluticasone-umeclidin-vilanter; INHALE 1 PUFF BY MOUTH AND INTO THE LUNGS   ONCE DAILY   triamcinolone 0.1 % cream; Commonly known as: Kenalog; Apply topically 2   times a day. Apply to affected area 1-2 times daily as needed. Avoid face   and groin.   warfarin 7.5 mg tablet; Commonly known as: Coumadin; Take one po every  "  day  * This list has 4 medication(s) that are the same as other medications   prescribed for you. Read the directions carefully, and ask your doctor or   other care provider to review them with you.           Hospital Course  Neisha Graham is a 65 y.o. year old female patient with with PMHx significant for paroxysmal atrial fibrillation on warfarin, chronic thromboembolic disease, essential thrombocytosis, moderate to severe aortic stenosis, moderate mitral stenosis, HFpEF (EF 60-65% 12/24), COPD on 2L NC nightly, pulmonary hypertension, lymphedema with chronic lower extremity wounds, chronic sacral wounds, HTN, HLD, anxiety, and depression who presented to [facility] on 6/19/25 with chief complaint of fever and chills with associated shortness of breath. Patient states that last night she began to notice subjective fever and chills that continued into this morning which was her main concern for presenting to the ER. She does note that she had noticed her L lower extremity wound was foul smelling last night and had soaked through her bandage faster than normal. Additionally, she notes increased swelling of her bilateral lower extremities up to her knees. States that she is supposed to take torsemide 40 mg BID at home but recently stopped taking the evening dose because she felt as though it was too much. Endorses good urine output with the torsemide once daily. Additionally, she notes swelling of a gland on her R jaw and neck for the past 2-3 months that is painful and keeps her awake at night. She was recently treated for pneumonia outpatient twice by her PCP, once with a 10-day course of Augmentin and once with a 5-day course of Cefdinir, at that time she states her breathing improved but the gland remained inflamed. She started feeling increasingly short of breath again recently and noes increase of her chronic cough with sputum changing from \"flesh\" color to a more green/white color. She denies " chest pain, palpitations, nasal congestion, sore throat, abdominal pain, nausea, vomiting.     In the ED, patient was septic with tachycardia and hypoxia requiring 2 L of nasal cannula.  Labs showed leukocytosis, mild hyponatremia, hyperglycemia, elevated BNP.  Chest x-ray showed pulmonary edema.  She was started on linezolid, Zosyn, azithromycin, Lasix.  Cardiology was consulted who recommended admission for diuresis and assessment for valve intervention.    On the floor, blood cultures returned positive for GBS bacteremia.  Likely source was her left lower extremity or chronic sacral wounds.  Wound care and podiatry were consulted.  She was continued on ceftriaxone, azithromycin, prednisone, IV Lasix.  Cardiology was consulted who recommended Lasix drip at 10 mg/hr.  TTE showed EF 63%, severe aortic stenosis with peak gradient 76 mmHg, moderate mitral valve stenosis.  Over the course of her hospital stay, patient lost over 45 pounds of water weight from diuresis.  Cardiology took patient for left heart cath on 6/27 which did not show any signs of CAD.  She was continued on her home medications and bridged to warfarin with Lovenox for home-going. Patient was discharged home on 6/28 in stable condition with appropriate referrals and follow-ups.    Pertinent Physical Exam At Time of Discharge  Constitutional:       General: She is not in acute distress.     Appearance: She is obese.   HENT:      Head: Normocephalic and atraumatic.      Mouth/Throat:      Mouth: Mucous membranes are moist.   Eyes:      Extraocular Movements: Extraocular movements intact.      Pupils: Pupils are equal, round, and reactive to light.   Cardiovascular:      Rate and Rhythm: Normal rate and regular rhythm.      Pulses: Normal pulses.      Heart sounds: Murmur heard.   Pulmonary:      Effort: Pulmonary effort is normal. No respiratory distress.      Breath sounds: No wheezing.  Minimal crackles bilateral bases.  Abdominal:      General:  Bowel sounds are normal. There is no distension.      Palpations: Abdomen is soft.      Tenderness: There is no abdominal tenderness.   Musculoskeletal:      Comments: Chronic bilateral lower extremity swelling consistent with lymphedema, left lower extremity wound mid dorsal tibia    Skin:     Findings: Erythema (BLE) present.   Neurological:      General: No focal deficit present.      Mental Status: She is alert and oriented to person, place, and time.   Psychiatric:         Mood and Affect: Mood normal.     Outpatient Follow-Up  Future Appointments   Date Time Provider Department [place]   7/2/2025 12:45 PM LAKEISHA Regalado-CNP [facility]   7/29/2025  1:00 PM [facility]         Violeta Hernandez DO

## 2025-06-28 NOTE — DISCHARGE INSTRUCTIONS
Please, take your home medications as instructed after being discharged from the hospital.     NEW MEDICATIONS:  Take amoxicillin 1,000 mg 3 times daily for 10 days  Bridged back to warfarin using Lovenox twice a day for 5 days  Resume taking your 7.5 mg warfarin nightly  Resume taking your torsemide 40 mg twice daily    OTHER INSTRUCTIONS:  Keep your legs elevated to aid with circulation, wrap with Ace wraps as tolerated  Please check your INR early next week to determine if you are back in therapeutic range    UPCOMING APPOINTMENTS:  Referral has been sent to Dr. Alexander to discuss aortic valve replacement  Referral has been sent for dental evaluation to be performed prior to valve intervention    Please, follow-up with your PCP within 7 to 14 days after leaving the hospital. /appointment services has been requested to make an appointment for you, however if you do not hear back from them within 1 to 2 days, please call your primary physician's office to schedule an appointment. Bring your photo ID and insurance card to your appointment.   [facility]  services can be reached at [phone].     If you experience any worsening symptoms or have any concerns, please contact your primary care provider to schedule an appointment. If you cannot get in touch with your primary care physician, please return to the nearest emergency room or urgent care clinic for an evaluation and treatment.     Thank you for choosing [facility] and allowing us to partake in your medical care!     - Your [facility] inpatient primary care team.

## 2025-06-28 NOTE — PROGRESS NOTES
Neisha Graham is a 65 y.o. female on day 9 of admission presenting with Acute on chronic diastolic CHF (congestive heart failure).    Subjective   Interval History: no fever, no new complaints, her edema seems to be getting better       Review of Systems    Objective   Range of Vitals (last 24 hours)  Heart Rate:  []   Temp:  [36.3 °C (97.3 °F)-36.9 °C (98.4 °F)]   Resp:  [15-20]   BP: (101-169)/(60-96)   SpO2:  [92 %-98 %]   Daily Weight  06/26/25 : 139 kg (307 lb)    Body mass index is 52.7 kg/m².    Physical Exam  Constitutional:       Appearance: Normal appearance.   HENT:      Head: Normocephalic and atraumatic.      Mouth/Throat:      Mouth: Mucous membranes are moist.      Pharynx: Oropharynx is clear.   Eyes:      Pupils: Pupils are equal, round, and reactive to light.   Cardiovascular:      Rate and Rhythm: Normal rate and regular rhythm.      Heart sounds: Normal heart sounds.   Pulmonary:      Effort: Pulmonary effort is normal.      Breath sounds: Normal breath sounds.   Abdominal:      General: Abdomen is flat. Bowel sounds are normal.      Palpations: Abdomen is soft.   Musculoskeletal:         General: Swelling present.      Cervical back: Normal range of motion.      Comments: Stasis / wounds   Neurological:      Mental Status: She is alert.         Antibiotics  cefTRIAXone - 2 gram/50 mL    Relevant Results  Labs  Results from last 72 hours   Lab Units 06/28/25  0637 06/27/25  0713 06/26/25  0733   WBC AUTO x10*3/uL 8.0 9.0 8.7   HEMOGLOBIN g/dL 14.6 14.8 15.5   HEMATOCRIT % 45.7 45.4 47.5*   PLATELETS AUTO x10*3/uL 412 454* 531*     Results from last 72 hours   Lab Units 06/28/25  0637 06/27/25  0713 06/26/25  1758   SODIUM mmol/L 138 139 141   POTASSIUM mmol/L 3.8 3.8 3.5   CHLORIDE mmol/L 95* 94* 92*   CO2 mmol/L 34* 35* 38*   BUN mg/dL 36* 33* 35*   CREATININE mg/dL 1.25* 1.32* 1.40*   GLUCOSE mg/dL 133* 140* 190*   CALCIUM mg/dL 9.3 9.2 9.7   ANION GAP mmol/L 13 14 15   EGFR  mL/min/1.73m*2 48* 45* 42*   PHOSPHORUS mg/dL 3.7 3.2 4.1     Results from last 72 hours   Lab Units 06/28/25  0637 06/27/25  0713 06/26/25  1758   ALBUMIN g/dL 3.7 3.8 3.8     Estimated Creatinine Clearance: 61.8 mL/min (A) (by C-G formula based on SCr of 1.25 mg/dL (H)).  C-Reactive Protein   Date Value Ref Range Status   06/19/2025 3.23 (H) <1.00 mg/dL Final   12/11/2024 0.72 <1.00 mg/dL Final   09/16/2024 16.36 (H) <1.00 mg/dL Final     Microbiology  Susceptibility data from last 14 days.  Collected Specimen Info Organism Clindamycin Erythromycin Penicillin Tetracycline   06/19/25 Blood culture from Peripheral Venipuncture Streptococcus agalactiae (Group B Streptococcus)       06/19/25 Blood culture from Peripheral Venipuncture Streptococcus agalactiae (Group B Streptococcus)  R  R  S  R   Imaging  Reviewed        Assessment/Plan   Sepsis, BC with gp B Strep, TTE reviewed, the repeat BC is negative   Legs stasis / wounds / likely cellulitis  Respiratory failure / COPD /  BE / atelectasis     Recommendations :  Continue Rocephin, Plan on high dose amoxicillin x 10 days with discharge oral with discharge  Discussed with the medical team       I spent minutes in the professional and overall care of this patient.  The cultures and susceptibilities were reviewed and discussed with the micro lab, the antibiotics stewardship guidelines were reviewed, the infection control protocols and recommendations were reviewed with the patient and the staff                 Gorge Myers MD

## 2025-06-28 NOTE — PROGRESS NOTES
Subjective Data:  [facility] yesterday without CAD, feeling well for discharge today      Objective Data:  Last Recorded Vitals:  Vitals:    06/28/25 0731 06/28/25 0745 06/28/25 1034 06/28/25 1205   BP:  (!) 169/96 138/89 134/86   BP Location:  Left arm  Left arm   Patient Position:  Sitting  Sitting   Pulse:  95  94   Resp:  18  18   Temp:  36.5 °C (97.7 °F)  36.9 °C (98.4 °F)   TempSrc:  Temporal  Temporal   SpO2: 94% 98%  94%   Weight:       Height:           Last Labs:  CBC - 6/28/2025:  6:37 AM  8.0 14.6 412    45.7      CMP - 6/28/2025:  6:37 AM  9.3 8.0 28 --- 0.8   3.7 3.7 17 230      PTT - 6/26/2025:  7:33 AM  1.2   13.1 37     TROPHS   Date/Time Value Ref Range Status   06/19/2025 01:57 PM 23 0 - 13 ng/L Final   06/19/2025 12:50 PM 29 0 - 13 ng/L Final   12/11/2024 10:19 PM 26 0 - 13 ng/L Final     BNP   Date/Time Value Ref Range Status   06/19/2025 12:50  0 - 99 pg/mL Final   12/11/2024 04:51  0 - 99 pg/mL Final     HGBA1C   Date/Time Value Ref Range Status   12/11/2024 04:51 PM 5.5 See comment % Final   08/13/2024 07:35 AM 5.9 see below % Final     LDLCALC   Date/Time Value Ref Range Status   12/11/2024 04:51 PM 87 <=99 mg/dL Final     Comment:                                 Near   Borderline      AGE      Desirable  Optimal    High     High     Very High     0-19 Y     0 - 109     ---    110-129   >/= 130     ----    20-24 Y     0 - 119     ---    120-159   >/= 160     ----      >24 Y     0 -  99   100-129  130-159   160-189     >/=190       VLDL   Date/Time Value Ref Range Status   12/11/2024 04:51 PM 21 0 - 40 mg/dL Final   11/14/2022 11:01 AM 40 0 - 40 mg/dL Final   09/27/2020 12:15 AM 21 0 - 40 mg/dL Final   06/19/2020 03:03 PM 16 0 - 40 mg/dL Final      Last I/O:  I/O last 3 completed shifts:  In: 747.2 (5.4 mL/kg) [P.O.:700; I.V.:47.2 (0.3 mL/kg)]  Out: 3460 (24.8 mL/kg) [Urine:3450 (0.7 mL/kg/hr); Blood:10]  Weight: 139.3 kg     Past Cardiology Tests (Last 3 Years):  Echo:  Transthoracic  Echo Complete 06/20/2025  CONCLUSIONS:   1. Left ventricular ejection fraction is normal calculated by Arreguin's biplane at 63%.   2. There is severely increased septal and severely increased posterior left ventricular wall thickness.   3. There is severe concentric left ventricular hypertrophy.   4. There is normal right ventricular global systolic function.   5. Mildly enlarged right ventricle.   6. The left atrial size is severely dilated.   7. The right atrium is moderately dilated.   8. Severe aortic valve stenosis. The peak and mean gradients are 76 mmHg and 53 mmHg respectively.   9. There is moderate mitral annular calcification.  10. There is moderate mitral valve stenosis with a doppler estimated mean diastolic gradient of 7 mmHg.  11. Mild to moderate mitral valve regurgitation.  12. Mild to moderate tricuspid regurgitation visualized.  13. The Doppler estimated RVSP is moderately elevated at 60 mmHg.  14. The inferior vena cava appears severely dilated, with IVC inspiratory collapse less than 50%.      Inpatient Medications:  Scheduled Medications[1]  PRN Medications[2]  Continuous Medications[3]    Physical Exam:  Constitutional: Pleasant, Awake/Alert/Oriented to person place and time.   Head: Atraumatic, Normocephalic  Eyes: EOMI. MICHELE  Neck: +JVD  Cardiovascular: Regular rate and rhythm, S1, S2. No extra heart sounds or murmurs  Respiratory: Crackles noted posteriorly at bases   Abdomen: Soft, Nontender, Obese. Bowel sounds appreciated  Neurological: CNII-XII intact. Sensation grossly intact  Extremities: BLE chronic lymphedema, 2+ pitting edema   Psychiatric: Appropriate mood and affect     Assessment/Plan   65 y.o. female from home with h/o morbid obesity, moderate to severe aortic stenosis, moderate mitral stenosis, BLE chronic lymphedema, COPD on 2L at bedtime, SVT, PAF on warfarin, cavernous sinus thrombosis on warfarin presenting to the ER with progressive SOB as well as increased BLE edema with  severe pain/cramping in her legs.     Assessment:  Acute decompensated diastolic HF  Severe aortic stenosis  Moderate mitral stenosis  BLE cellulitis  Possible pneumonia  pSVT    Plan:  -OK for discharge today -- has lost over 45lbs since admit with diuresis  -Cardiac Discharge meds:  Cardizem 360mg daily  Spironolactone 50mg daily  Torsemide 40mg twice daily  Resume warfarin  -Referral placed to structural heart team at [facility]-- Dr. Franklin Alexander, for further discussion regarding TAVR candidacy    Peripheral IV 06/24/25 22 G Left;Posterior Forearm (Active)   Site Assessment Clean;Dry;Intact 06/28/25 0900   Dressing Status Clean;Dry;Occlusive 06/28/25 0900   Number of days: 4       Code Status:  Full Code          Judson Chaudhry MD       [1]   Scheduled medications   Medication Dose Route Frequency    acetaminophen  975 mg oral q6h    ammonium lactate  1 Application Topical BID    budesonide  0.5 mg nebulization BID    And    ipratropium-albuteroL  3 mL nebulization TID    cefTRIAXone  2 g intravenous q24h    dilTIAZem ER  360 mg oral Daily    DULoxetine  20 mg oral Daily    enoxaparin  1 mg/kg subcutaneous q12h    guaiFENesin  600 mg oral BID    hydroxyurea  1,000 mg oral Daily    insulin lispro  0-10 Units subcutaneous Before meals & nightly    ketoconazole   Topical BID    melatonin  3 mg oral Nightly    multivitamin with minerals  1 tablet oral Daily    nicotine  1 patch transdermal Daily    Followed by    [START ON 7/31/2025] nicotine  1 patch transdermal Daily    Followed by    [START ON 8/14/2025] nicotine  1 patch transdermal Daily    nystatin   Topical BID    pneumoc 20-eros conj-dip cr(PF)  0.5 mL intramuscular During hospitalization    polyethylene glycol  17 g oral Daily    pramipexole  1 mg oral BID    sennosides-docusate sodium  2 tablet oral BID    spironolactone  50 mg oral Daily    torsemide  40 mg oral BID    triamcinolone   Topical BID    warfarin  7.5 mg oral Daily    white petrolatum  1  Application Topical BID    zinc oxide  1 Application Topical TID    zinc sulfate  50 mg of elemental zinc oral Daily   [2]   PRN medications   Medication    benzocaine    cyclobenzaprine    dextrose    dextrose    glucagon    glucagon    HYDROmorphone    hydrOXYzine HCL    ipratropium-albuteroL    lidocaine    oxyCODONE    oxyCODONE    oxygen    sodium chloride   [3]   Continuous Medications   Medication Dose Last Rate

## 2025-06-29 ENCOUNTER — DOCUMENTATION (OUTPATIENT)
Dept: HOME HEALTH SERVICES | Facility: HOME HEALTH | Age: 65
End: 2025-06-29
Payer: MEDICARE

## 2025-06-29 NOTE — HH CARE COORDINATION
Home Care received a referral for Nursing, Physical Therapy, and Occupational Therapy. Unfortunately, we are unable to accept and process the referral at this time.    Reason:  Internal referral received in error - Referral was intended for alternate agency or alternate services    Patients, please reach out to the referring provider or your PCP to assist in obtaining an alternative home care agency and/or guidance to meet your needs.    Providers, please reach out to [facility] at [phone] with any questions regarding the declined referral.

## 2025-06-30 ENCOUNTER — PATIENT OUTREACH (OUTPATIENT)
Dept: PRIMARY CARE | Facility: CLINIC | Age: 65
End: 2025-06-30
Payer: MEDICARE

## 2025-06-30 ENCOUNTER — TELEPHONE (OUTPATIENT)
Dept: PRIMARY CARE | Facility: CLINIC | Age: 65
End: 2025-06-30
Payer: MEDICARE

## 2025-06-30 DIAGNOSIS — F41.0 PANIC DISORDER WITHOUT AGORAPHOBIA: Primary | ICD-10-CM

## 2025-06-30 RX ORDER — PROPRANOLOL HYDROCHLORIDE 20 MG/1
20 TABLET ORAL 2 TIMES DAILY PRN
Qty: 60 TABLET | Refills: 5 | Status: SHIPPED | OUTPATIENT
Start: 2025-06-30 | End: 2025-12-27

## 2025-06-30 NOTE — PROGRESS NOTES
Discharge Facility:   [facility]  Discharge Diagnosis: CHF  Admission Date:  6/19/25  Discharge Date:  6/28/25    PCP Appointment Date: Message sent to Dr Gonsalez's office  Specialist Appointment Date: encouraged appt with Cardiology and dentistry  Hospital Encounter and Summary Linked: Yes    Discharge Summary by Violeta Hernandez DO (06/28/2025 12:54)   ED Provider Notes by Wil Bergman MD (06/19/2025 12:45)   See discharge assessment below for further details       Wrap Up  Wrap Up Additional Comments: Patient denies CP or SOB. Patient states she checked her INR and did call Dr Gonsalez's office. Patient also called for an appt and she is waitng for office to call back. Reivewed upcoming follow ups needed and reviewed number for denitist referal. Reviewed medications and patient states understanding for indication. Patient states her radial approach site is without drainage or increased pain, redness or swelling. Patient continues with her daily wts. Patient states the [facility] aide will be out tomorrow she is waiting to hear from skilled nursing patient has number to reach [facility]. This CM gives contact information for non urgent matters (6/30/2025 11:41 AM)    Engagement  Call Start Time: 1130 (6/30/2025 11:41 AM)    Medications  Medications reviewed with patient/caregiver?: Yes (6/30/2025 11:41 AM)  Is the patient having any side effects they believe may be caused by any medication additions or changes?: No (6/30/2025 11:41 AM)  Does the patient have all medications ordered at discharge?: Yes (using previous Lovenox waiting for pharmacy to have medication) (6/30/2025 11:41 AM)  Prescription Comments: START taking:  amoxicillin (Amoxil)   enoxaparin (Lovenox) (6/30/2025 11:41 AM)  Is the patient taking all medications as directed (includes completed medication regime)?: Yes (6/30/2025 11:41 AM)    Appointments  Does the patient have a primary care provider?: Yes (6/30/2025 11:41 AM)  Care Management Interventions:  Advised patient to make appointment (6/30/2025 11:41 AM)  Care Management Interventions: Advised to schedule with specialist (6/30/2025 11:41 AM)  Follow Up Tasks: Appointments (6/30/2025 11:41 AM)    Self Management  What is the home health agency?: [facility] (6/30/2025 11:41 AM)    Patient Teaching  Does the patient have access to their discharge instructions?: Yes (6/30/2025 11:41 AM)  Care Management Interventions: Reviewed instructions with patient (6/30/2025 11:41 AM)

## 2025-06-30 NOTE — TELEPHONE ENCOUNTER
Neisha was supposed to get hydroxizine from pharm.  It is 7 dollars and she can't afford it.  Is there something similar they can see if the ins co will pay for.[phone]

## 2025-07-01 ENCOUNTER — PATIENT OUTREACH (OUTPATIENT)
Dept: CARE COORDINATION | Age: 65
End: 2025-07-01
Payer: MEDICARE

## 2025-07-02 ENCOUNTER — OFFICE VISIT (OUTPATIENT)
Dept: WOUND CARE | Facility: HOSPITAL | Age: 65
End: 2025-07-02
Payer: MEDICARE

## 2025-07-02 ENCOUNTER — PATIENT MESSAGE (OUTPATIENT)
Dept: CARDIOLOGY | Facility: HOSPITAL | Age: 65
End: 2025-07-02
Payer: MEDICARE

## 2025-07-02 DIAGNOSIS — R23.8 SKIN IRRITATION: ICD-10-CM

## 2025-07-02 DIAGNOSIS — I50.9 ACUTE ON CHRONIC CONGESTIVE HEART FAILURE, UNSPECIFIED HEART FAILURE TYPE: ICD-10-CM

## 2025-07-02 DIAGNOSIS — I48.91 ATRIAL FIBRILLATION, UNSPECIFIED TYPE (MULTI): ICD-10-CM

## 2025-07-02 PROCEDURE — 11042 DBRDMT SUBQ TIS 1ST 20SQCM/<: CPT | Mod: LT

## 2025-07-02 PROCEDURE — 11045 DBRDMT SUBQ TISS EACH ADDL: CPT | Mod: LT

## 2025-07-02 RX ORDER — TRIAMCINOLONE ACETONIDE 1 MG/G
CREAM TOPICAL 2 TIMES DAILY
Qty: 30 G | Refills: 0 | Status: SHIPPED | OUTPATIENT
Start: 2025-07-02 | End: 2025-07-02 | Stop reason: SDUPTHER

## 2025-07-02 RX ORDER — TRIAMCINOLONE ACETONIDE 1 MG/G
CREAM TOPICAL 2 TIMES DAILY
Qty: 30 G | Refills: 0 | Status: SHIPPED | OUTPATIENT
Start: 2025-07-02

## 2025-07-02 RX ORDER — ENOXAPARIN SODIUM 150 MG/ML
1 INJECTION SUBCUTANEOUS EVERY 12 HOURS
Qty: 60 EACH | Refills: 0 | Status: SHIPPED | OUTPATIENT
Start: 2025-07-02 | End: 2025-08-01

## 2025-07-02 NOTE — TELEPHONE ENCOUNTER
I would like to add Jardiance 10mg daily. Can you verify her pharmacy please so we can send the script.  If it is too costly we can send it to the specialty pharmacy. Please let her know Dr. Myers is the ID physician. Also, she still does not have an appointment with the structural heart team-- we referred her to see Dr. Franklin Alexander.  Can you please provide her with his office phone number to call and arrange an appointment.

## 2025-07-03 ENCOUNTER — TELEPHONE (OUTPATIENT)
Dept: PRIMARY CARE | Facility: CLINIC | Age: 65
End: 2025-07-03
Payer: MEDICARE

## 2025-07-03 NOTE — SIGNIFICANT EVENT
FOLLOW up phone call: talked directly with patient. Patient verbalized understanding of all home going instructions and that she pickedup her prescriptions already. Denied having any questions about her medications. Patient stated she has a primary care provider and will call him with any questions or concerns.

## 2025-07-06 LAB
ATRIAL RATE: 79 BPM
P AXIS: 68 DEGREES
P OFFSET: 193 MS
P ONSET: 135 MS
PR INTERVAL: 164 MS
Q ONSET: 217 MS
QRS COUNT: 13 BEATS
QRS DURATION: 100 MS
QT INTERVAL: 426 MS
QTC CALCULATION(BAZETT): 488 MS
QTC FREDERICIA: 467 MS
R AXIS: 90 DEGREES
T AXIS: 61 DEGREES
T OFFSET: 430 MS
VENTRICULAR RATE: 79 BPM

## 2025-07-07 ENCOUNTER — TELEMEDICINE (OUTPATIENT)
Dept: PRIMARY CARE | Facility: CLINIC | Age: 65
End: 2025-07-07
Payer: MEDICARE

## 2025-07-07 DIAGNOSIS — D75.1 POLYCYTHEMIA: ICD-10-CM

## 2025-07-07 DIAGNOSIS — S81.802A WOUND OF LEFT LOWER EXTREMITY, INITIAL ENCOUNTER: Primary | ICD-10-CM

## 2025-07-07 PROCEDURE — 1160F RVW MEDS BY RX/DR IN RCRD: CPT | Performed by: FAMILY MEDICINE

## 2025-07-07 PROCEDURE — 1111F DSCHRG MED/CURRENT MED MERGE: CPT | Performed by: FAMILY MEDICINE

## 2025-07-07 PROCEDURE — 99213 OFFICE O/P EST LOW 20 MIN: CPT | Performed by: FAMILY MEDICINE

## 2025-07-07 PROCEDURE — 1159F MED LIST DOCD IN RCRD: CPT | Performed by: FAMILY MEDICINE

## 2025-07-07 RX ORDER — HYDROXYUREA 500 MG/1
1000 CAPSULE ORAL DAILY
Qty: 60 CAPSULE | Refills: 11 | Status: SHIPPED | OUTPATIENT
Start: 2025-07-07 | End: 2026-07-07

## 2025-07-07 NOTE — PROGRESS NOTES
Subjective   Patient ID: Neisha Graham is a 65 y.o. female who presents for Leg Pain.  HPI  History of Present Illness  The patient presents via virtual visit for evaluation of a wound on her left leg.    She recounts an incident at the wound care clinic last week where a nurse practitioner inadvertently cut her leg while attempting to remove the top layer of the wound. This resulted in a 0.75-inch red hole that bled until yesterday. She was hesitant to remove the bandage due to her blood thinner medication, but did so on Friday morning for a shower. The bleeding persisted, making it difficult for her to reapply the bandage and wrap it tightly enough with an Ace bandage to stop the bleeding. However, when the nurse removed the bandage yesterday, the bleeding had ceased. She reports constant redness around the wound, which is a dark red spot, but does not believe it is infected. She experiences intermittent sharp stabbing pain, which is less severe than initially. She is currently on a high dose of amoxicillin from the hospital.     She has been in contact with physical therapy and nursing services who are assisting her in obtaining a new tilt and space power wheelchair that allows her to elevate her feet more, in hopes of alleviating her bed sores. She discontinued her enoxaparin injections on Saturday due to the persistent bleeding from the wound. Her INR level was 2.2 yesterday.    She is seeking refills for her hydroxyurea prescription, which she takes twice daily.    Current Medications[1]   Surgical History[2]   Medical History[3]  Social History[4]   Family History[5]   Review of Systems    Objective   There were no vitals taken for this visit.   Physical Exam  Physical Exam  Extremities: Redness around wound on left leg, dark red spot noted  Skin: Wound on left leg, approximately 3/4 inch, no signs of infection       Assessment/Plan   Problem List Items Addressed This Visit       Polycythemia    Relevant  Medications    hydroxyurea (Hydrea) 500 mg capsule     Other Visit Diagnoses         Wound of left lower extremity, initial encounter    -  Primary            Assessment & Plan  1. Left leg wound.  - The wound does not appear to be infected.  - The patient reports a 3/4 inch red hole that bled until yesterday.  - Advised to continue amoxicillin and maintain cleanliness and dryness of the wound.  - If the wound appears infected, further medical attention will be necessary.    2. Medication management.  - Prescription refill for hydroxyurea 2 tablets a day will be provided.  - The patient stopped enoxaparin shots on Saturday due to bleeding issues.  - INR was 2.2 yesterday, which is within the therapeutic range of 2-3.     Virtual or Telephone Consent    While technically available, the patient was unable or unwilling to consent to connect via audio/video telehealth technology; therefore, I performed this visit using a real-time audio only connection between Neisha Graham & Micheal Gonsalez DO.  Verbal consent was requested and obtained from Neisha Graham on this date, 07/07/25 for a telehealth visit and the patient's location was confirmed at the time of the visit.    I discussed with the patient the potential benefits and risks of the use of telephone or video-conferencing that differ from in-person services (e.g., limits to patient confidentiality, limitations on the provider’s ability to observe the patient, limitations on the diagnostic tools available). I explained to the patient that I may determine at any point that telehealth services are not appropriate based on the patient’s circumstances and either party may therefore end the service to schedule an alternative in-person service or contact 911 to address a medical emergency. With the understanding of these risks, benefits and alternatives, the patient agreed to use the telephone or video-conferencing platform selected for this virtual session and  "further the patient confirmed his/her understanding that the services do not guarantee a specific outcome or recovery.  \"Spent 11 minutes with patient on phone discussing health concerns.\"     Patient understands and agrees with treatment plan    This medical note was created with the assistance of artificial intelligence (AI) for documentation purposes. The content has been reviewed and confirmed by the healthcare provider for accuracy and completeness. Patient consented to the use of audio recording and use of AI during their visit.     Micheal Gonsalez DO          [1]   Current Outpatient Medications:     albuterol 90 mcg/actuation inhaler, INHALE 2 PUFFS BY MOUTH AND INTO THE LUNGS EVERY 4 HOURS IF NEEDED FOR WHEEZING, Disp: 8 g, Rfl: 3    amoxicillin (Amoxil) 500 mg capsule, Take 2 capsules (1,000 mg) by mouth every 8 hours for 10 days., Disp: 60 capsule, Rfl: 0    ciclopirox (Penlac) 8 % solution, Apply topically once daily at bedtime., Disp: 6.6 mL, Rfl: 11    cyclobenzaprine (Flexeril) 10 mg tablet, Take 1 tablet (10 mg) by mouth 2 times a day as needed for muscle spasms., Disp: 30 tablet, Rfl: 2    dilTIAZem ER (Tiazac) 360 mg 24 hr capsule, Take 1 capsule (360 mg) by mouth once daily., Disp: 90 capsule, Rfl: 3    DULoxetine (Cymbalta) 20 mg DR capsule, Take 1 capsule (20 mg) by mouth once daily., Disp: 90 capsule, Rfl: 1    empagliflozin (Jardiance) 10 mg tablet, Take 1 tablet (10 mg) by mouth once daily., Disp: 90 tablet, Rfl: 3    enoxaparin (Lovenox) 150 mg/mL injection, Inject 0.95 mL (142.5 mg) under the skin every 12 hours., Disp: 60 each, Rfl: 0    hydroxyurea (Hydrea) 500 mg capsule, Take 2 capsules (1,000 mg total) by mouth once daily., Disp: 60 capsule, Rfl: 11    ipratropium-albuteroL (Duo-Neb) 0.5-2.5 mg/3 mL nebulizer solution, Take 3 mL by nebulization 4 times a day as needed for wheezing or shortness of breath., Disp: 360 mL, Rfl: 11    ketoconazole (NIZOral) 2 % cream, APPLY TOPICALLY " TWICE A DAY FOR 28 DAYS, Disp: 30 g, Rfl: 0    lidocaine (Xylocaine) 5 % ointment, Apply topically if needed for mild pain (1 - 3). Apply up to three times a day as needed for pain., Disp: 35 g, Rfl: 5    lidocaine 5 % gel, Apply 1 inch topically 3 times a day as needed (apply to affected area)., Disp: 100 g, Rfl: 2    naloxone (Narcan) 4 mg/0.1 mL nasal spray, Administer 1 spray (4 mg) into affected nostril(s) if needed for opioid reversal or respiratory depression., Disp: 2 each, Rfl: 0    nebulizer accessories kit, UAD, Disp: 1 kit, Rfl: 3    nystatin (Mycostatin) cream, APPLY TO AFFECTED AREA TWO TO THREE TIMES A DAY, Disp: 30 g, Rfl: 2    oxyCODONE (Roxicodone) 10 mg immediate release tablet, Take 1 tablet (10 mg) by mouth every 4 hours if needed for severe pain (7 - 10) for up to 28 days. Do not fill before June 9, 2025., Disp: 168 tablet, Rfl: 0    oxyCODONE (Roxicodone) 10 mg immediate release tablet, Take 1 tablet (10 mg) by mouth every 4 hours if needed for severe pain (7 - 10) for up to 28 days. Do not fill before July 7, 2025., Disp: 168 tablet, Rfl: 0    pramipexole (Mirapex) 1 mg tablet, Take 1 tablet (1 mg) by mouth 2 times a day., Disp: 180 tablet, Rfl: 3    propranolol (Inderal) 20 mg tablet, Take 1 tablet (20 mg) by mouth 2 times a day as needed (anxiety)., Disp: 60 tablet, Rfl: 5    sennosides-docusate sodium (Rebecca-Colace) 8.6-50 mg tablet, Take 2 tablets by mouth 2 times a day., Disp: 120 tablet, Rfl: 0    spironolactone (Aldactone) 50 mg tablet, Take 1 tablet (50 mg) by mouth once daily., Disp: 90 tablet, Rfl: 3    torsemide (Demadex) 20 mg tablet, Take 2 tablets (40 mg) by mouth 2 times a day., Disp: 360 tablet, Rfl: 3    Trelegy Ellipta 200-62.5-25 mcg blister with device, INHALE 1 PUFF BY MOUTH AND INTO THE LUNGS ONCE DAILY, Disp: 180 each, Rfl: 0    triamcinolone (Kenalog) 0.1 % cream, Apply topically 2 times a day., Disp: 30 g, Rfl: 0    warfarin (Coumadin) 7.5 mg tablet, Take one po every  day, Disp: 90 tablet, Rfl: 3  [2]   Past Surgical History:  Procedure Laterality Date    CARDIAC CATHETERIZATION      CARDIAC CATHETERIZATION N/A 6/27/2025    Procedure: LHC, With LV;  Surgeon: Judson Chaudhry MD;  Location: [facility];  Service: Cardiovascular;  Laterality: N/A;    KNEE SURGERY  09/19/2013    Knee Surgery Right    OTHER SURGICAL HISTORY  09/19/2013    Direct Laryngoscopy With Foreign Body Removal    OTHER SURGICAL HISTORY  09/19/2013    Laminectomy With Drainage Of Intramedullary Cyst    OTHER SURGICAL HISTORY  09/27/2013    Ovarian Cystectomy    TONSILLECTOMY  09/19/2013    Tonsillectomy    TUBAL LIGATION  09/19/2013    Tubal Ligation   [3]   Past Medical History:  Diagnosis Date    Acute bronchitis due to other specified organisms 10/14/2019    Acute bronchitis due to other specified organisms    Acute viral conjunctivitis of left eye 03/10/2023    Alkaline phosphatase elevation 08/17/2023    Anemia 03/10/2023    Bone marrow disorder 08/17/2023    Cellulitis of left lower limb 04/08/2021    Cellulitis of left lower leg    CHF (congestive heart failure)     Chronic obstructive pulmonary disease with (acute) exacerbation (Multi) 01/09/2019    COPD exacerbation    Chronic sinusitis, unspecified     Sinusitis    COVID-19 11/28/2022    COVID-19    Daily headache 08/17/2023    Elevated bilirubin 08/17/2023    Elevated transaminase level 08/17/2023    Fever 08/17/2023    Fever 08/17/2023    Heart murmur 03/10/2023    Hemoptysis 03/10/2023    Hyperkalemia 03/10/2023    Malaise and fatigue 03/10/2023    Morbid (severe) obesity due to excess calories (Multi)     Morbid obesity    Other conditions influencing health status     History Of ___ Previous Pregnancies    Other conditions influencing health status 03/12/2020    Arthritis    Other conditions influencing health status 03/06/2017    History of cough    Other conditions influencing health status     Menstruation    Other conditions  influencing health status     Osteoarthritis    Other conditions influencing health status     Pulmonary Disease    Pain in unspecified ankle and joints of unspecified foot 06/24/2016    Ankle joint pain    Pain in unspecified foot 12/08/2015    Foot pain    Pain in unspecified knee     Joint pain, knee    Palpitations 03/10/2023    Personal history of diseases of the skin and subcutaneous tissue     History of cellulitis    Personal history of other diseases of the female genital tract     Vaginal delivery    Personal history of other diseases of the nervous system and sense organs 09/12/2019    History of conjunctivitis    Personal history of other diseases of the nervous system and sense organs 09/17/2015    History of blurred vision    Personal history of other diseases of the respiratory system     History of pleurisy    Personal history of other diseases of the respiratory system 10/17/2016    History of bronchitis    Personal history of other diseases of the respiratory system 11/29/2017    History of acute bronchitis    Personal history of other diseases of the respiratory system 11/16/2016    History of acute pharyngitis    Personal history of other drug therapy 10/14/2016    History of influenza vaccination    Personal history of other infectious and parasitic diseases     History of hepatitis    Personal history of other infectious and parasitic diseases 07/22/2020    History of candidiasis of mouth    Personal history of other medical treatment     History of mammogram    Personal history of other specified conditions     History of shortness of breath    Personal history of other specified conditions 01/28/2015    History of shortness of breath    Personal history of other specified conditions     History of abnormal Pap smear    Personal history of pneumonia (recurrent)     History of pneumonia    Pneumonia, unspecified organism 01/11/2018    Community acquired pneumonia    Postmenopausal bleeding  09/30/2014    Postmenopausal bleeding    Right knee pain 03/10/2023    Shortness of breath 03/10/2023    Sinus tachycardia 03/10/2023    Submandibular sialolithiasis 03/10/2023    Unilateral primary osteoarthritis, unspecified knee 02/03/2017    Osteoarthritis, localized, knee    Unspecified acute conjunctivitis, bilateral 08/20/2020    Acute bacterial conjunctivitis of both eyes   [4]   Social History  Tobacco Use    Smoking status: Some Days     Types: Cigarettes    Smokeless tobacco: Never   Substance Use Topics    Alcohol use: Never    Drug use: Never   [5] No family history on file.

## 2025-07-09 ENCOUNTER — APPOINTMENT (OUTPATIENT)
Dept: WOUND CARE | Facility: HOSPITAL | Age: 65
End: 2025-07-09
Payer: MEDICARE

## 2025-07-11 ENCOUNTER — APPOINTMENT (OUTPATIENT)
Dept: PRIMARY CARE | Facility: CLINIC | Age: 65
End: 2025-07-11
Payer: MEDICARE

## 2025-07-11 DIAGNOSIS — B95.1 BACTEREMIA DUE TO GROUP B STREPTOCOCCUS: ICD-10-CM

## 2025-07-11 DIAGNOSIS — R78.81 BACTEREMIA DUE TO GROUP B STREPTOCOCCUS: ICD-10-CM

## 2025-07-11 DIAGNOSIS — L03.116 CELLULITIS OF LEFT LOWER EXTREMITY: Primary | ICD-10-CM

## 2025-07-11 DIAGNOSIS — E11.622 TYPE 2 DIABETES MELLITUS WITH OTHER SKIN ULCER (CODE): ICD-10-CM

## 2025-07-11 PROCEDURE — 1160F RVW MEDS BY RX/DR IN RCRD: CPT | Performed by: FAMILY MEDICINE

## 2025-07-11 PROCEDURE — 99495 TRANSJ CARE MGMT MOD F2F 14D: CPT | Performed by: FAMILY MEDICINE

## 2025-07-11 PROCEDURE — 1111F DSCHRG MED/CURRENT MED MERGE: CPT | Performed by: FAMILY MEDICINE

## 2025-07-11 PROCEDURE — 1159F MED LIST DOCD IN RCRD: CPT | Performed by: FAMILY MEDICINE

## 2025-07-11 RX ORDER — AMOXICILLIN AND CLAVULANATE POTASSIUM 875; 125 MG/1; MG/1
875 TABLET, FILM COATED ORAL 2 TIMES DAILY
Qty: 20 TABLET | Refills: 0 | Status: SHIPPED | OUTPATIENT
Start: 2025-07-11 | End: 2025-07-21

## 2025-07-11 NOTE — PROGRESS NOTES
"Patient: Neisha Graham  : 1960  PCP: Micheal Gonsalez DO  MRN: [id number]  Program: Transitional Care Management  Status: Enrolled  Effective Dates: 2025 - present  Responsible Staff: Adry Babcock RN  Social Drivers to be Addressed: Tobacco Use         Neisha Graham is a 65 y.o. female presenting today for follow-up after being discharged from the hospital 13 days ago. The main problem requiring admission was CHF and cellulitis. The discharge summary and/or Transitional Care Management documentation was reviewed. Medication reconciliation was performed as indicated via the \"Harold[place] as Reviewed\" timestamp.     Neisha Graham was contacted by Transitional Care Management services two days after her discharge. This encounter and supporting documentation was reviewed.    History of Present Illness  The patient presents via virtual visit for a follow-up from the hospital.    She was discharged from the hospital 2 weeks ago after being treated for congestive heart failure. She is now experiencing symptoms suggestive of an infection in her left leg, including fever and redness. Her temperature has been fluctuating between 100.7 and 100.3 degrees, even with the use of Tylenol. The leg is red and swollen, and she reports a sensation similar to bee stings, along with intermittent foot pain. She also notes that some previously healed areas on her leg have reopened and are leaking. She reports no shortness of breath but does experience mild wheezing. She was prescribed amoxicillin, which she has been taking twice daily, but still has some left over. She expresses concern about her ability to survive this condition and doubts if she will live long enough to receive a heart valve.       Review of Systems    There were no vitals taken for this visit.    Physical Exam    The complexity of medical decision making for this patient's transitional care is high.    Assessment/Plan   Problem List Items Addressed This " "Visit           ICD-10-CM    Type 2 diabetes mellitus with other skin ulcer (CODE) E11.622    Cellulitis of lower extremity - Primary L03.119    Relevant Medications    amoxicillin-clavulanate (Augmentin) 875-125 mg tablet    Bacteremia due to group B Streptococcus R78.81, B95.1    Relevant Medications    amoxicillin-clavulanate (Augmentin) 875-125 mg tablet     Assessment & Plan  1. Cellulitis.  - Reports fever ranging from 100.3°F to 100.7°F despite taking Tylenol.  - Left leg is red, swollen, and seeping, with periodic pain described as bee stings and foot pain.  - Advised to increase amoxicillin dosage to 2 tablets 3 times daily until Augmentin is available, which should be taken twice daily. Tylenol 1000 mg 3 times daily and ibuprofen 600 mg 4 times daily were also recommended.  - If no improvement within the next day, patient should return to the hospital for intravenous antibiotics.     I discussed with the patient the potential benefits and risks of the use of telephone or video-conferencing that differ from in-person services (e.g., limits to patient confidentiality, limitations on the provider’s ability to observe the patient, limitations on the diagnostic tools available). I explained to the patient that I may determine at any point that telehealth services are not appropriate based on the patient’s circumstances and either party may therefore end the service to schedule an alternative in-person service or contact 911 to address a medical emergency. With the understanding of these risks, benefits and alternatives, the patient agreed to use the telephone or video-conferencing platform selected for this virtual session and further the patient confirmed his/her understanding that the services do not guarantee a specific outcome or recovery.  \"Spent 9 minutes with patient on phone discussing health concerns.\"    Virtual or Telephone Consent    While technically available, the patient was unable or unwilling " to consent to connect via audio/video telehealth technology; therefore, I performed this visit using a real-time audio only connection between Neisha Graham & Micheal Gonsalez DO.  Verbal consent was requested and obtained from Neisha Graham on this date, 07/12/25 for a telehealth visit and the patient's location was confirmed at the time of the visit.          This medical note was created with the assistance of artificial intelligence (AI) for documentation purposes. The content has been reviewed and confirmed by the healthcare provider for accuracy and completeness. Patient consented to the use of audio recording and use of AI during their visit.

## 2025-07-14 ENCOUNTER — PATIENT OUTREACH (OUTPATIENT)
Dept: PRIMARY CARE | Facility: CLINIC | Age: 65
End: 2025-07-14
Payer: MEDICARE

## 2025-07-15 DIAGNOSIS — I50.31 ACUTE HEART FAILURE WITH PRESERVED EJECTION FRACTION (HFPEF): Primary | ICD-10-CM

## 2025-07-16 RX ORDER — METOLAZONE 2.5 MG/1
2.5 TABLET ORAL 2 TIMES WEEKLY
Qty: 8 TABLET | Refills: 11 | Status: SHIPPED | OUTPATIENT
Start: 2025-07-17 | End: 2026-07-17

## 2025-07-18 LAB
ATRIAL RATE: 113 BPM
P AXIS: 62 DEGREES
P OFFSET: 195 MS
P ONSET: 143 MS
PR INTERVAL: 158 MS
Q ONSET: 222 MS
QRS COUNT: 18 BEATS
QRS DURATION: 90 MS
QT INTERVAL: 340 MS
QTC CALCULATION(BAZETT): 466 MS
QTC FREDERICIA: 419 MS
R AXIS: 64 DEGREES
T AXIS: 62 DEGREES
T OFFSET: 392 MS
VENTRICULAR RATE: 113 BPM

## 2025-07-19 NOTE — PROGRESS NOTES
Chief Complaint  Dyspnea, Fatigue, and orthopnea    HPI:   Ms. Neisha Graham is an 65 y.o. female, who is a patient of Dr. Micheal Gonsalez, DO   I have been asked to see them by Franklin Castro to evaluate aortic stenosis and mitral stenosis.  She has longstanding lymphedema, but has become much worse in the last year.  She has been wheelchair bound for about 4 years now.  She has occasional chest heaviness, dyspnea, PND as well.  She denies dizziness, syncope.   She was recently admitted to the hospital at [facility] for HF exacerbation and leg wound from what sounds like venous stasis.  She was treated with IV diuretic therapy, where she lost 45 lbs of water weight. During this admission she has GBS positive blood cultures and a history of left jaw gland enlargement keeping her awake at night.       Medical History[1]    Surgical History[2]    Family History[3]    Social History     Socioeconomic History    Marital status: Single     Spouse name: Not on file    Number of children: Not on file    Years of education: Not on file    Highest education level: Not on file   Occupational History    Not on file   Tobacco Use    Smoking status: Some Days     Types: Cigarettes    Smokeless tobacco: Never   Substance and Sexual Activity    Alcohol use: Never    Drug use: Never    Sexual activity: Defer   Other Topics Concern    Not on file   Social History Narrative    Not on file     Social Drivers of Health     Financial Resource Strain: Low Risk  (6/20/2025)    Overall Financial Resource Strain (CARDIA)     Difficulty of Paying Living Expenses: Not hard at all   Food Insecurity: No Food Insecurity (6/19/2025)    Hunger Vital Sign     Worried About Running Out of Food in the Last Year: Never true     Ran Out of Food in the Last Year: Never true   Transportation Needs: No Transportation Needs (6/20/2025)    PRAPARE - Transportation     Lack of Transportation (Medical): No     Lack of Transportation (Non-Medical): No    Physical Activity: Inactive (12/12/2024)    Exercise Vital Sign     Days of Exercise per Week: 0 days     Minutes of Exercise per Session: 0 min   Stress: Stress Concern Present (12/12/2024)    [employer] - Occupational Stress Questionnaire     Feeling of Stress : To some extent   Social Connections: Unknown (12/12/2024)    Social Connection and Isolation Panel     Frequency of Communication with Friends and Family: Three times a week     Frequency of Social Gatherings with Friends and Family: Once a week     Attends Yazidism Services: Not on file     Active Member of Clubs or Organizations: Not on file     Attends Club or Organization Meetings: Not on file     Marital Status: Not on file   Intimate Partner Violence: Not At Risk (6/19/2025)    Humiliation, Afraid, Rape, and Kick questionnaire     Fear of Current or Ex-Partner: No     Emotionally Abused: No     Physically Abused: No     Sexually Abused: No   Housing Stability: Low Risk  (6/20/2025)    Housing Stability Vital Sign     Unable to Pay for Housing in the Last Year: No     Number of Times Moved in the Last Year: 0     Homeless in the Last Year: No       RX Allergies[4]    Encounter Medications[5]    Physical Exam  Constitutional:       Appearance: She is obese. She is ill-appearing.     Eyes:      General: No scleral icterus.     Pupils: Pupils are equal, round, and reactive to light.     Neck:      Comments: 2+ JVD  Cardiovascular:      Rate and Rhythm: Normal rate.      Comments: III/VI LIZBETH,  blunted S2  Pulmonary:      Effort: Pulmonary effort is normal.      Breath sounds: Wheezing present.     Musculoskeletal:         General: Swelling present.      Cervical back: Normal range of motion.      Right lower leg: Edema present.      Left lower leg: Edema present.      Comments: Wheelchair bound     Neurological:      Mental Status: She is alert and oriented to person, place, and time.     Psychiatric:         Mood and  Affect: Mood normal.         Lab Results   Component Value Date    WBC 8.0 06/28/2025    HGB 14.6 06/28/2025    HCT 45.7 06/28/2025     (H) 06/28/2025     06/28/2025     Lab Results   Component Value Date    GLUCOSE 133 (H) 06/28/2025    CALCIUM 9.3 06/28/2025     06/28/2025    K 3.8 06/28/2025    CO2 34 (H) 06/28/2025    CL 95 (L) 06/28/2025    BUN 36 (H) 06/28/2025    CREATININE 1.25 (H) 06/28/2025       Encounter Date: 06/19/25   Electrocardiogram, 12-lead PRN ACS symptoms   Result Value    Ventricular Rate 79    Atrial Rate 79    WI Interval 164    QRS Duration 100    QT Interval 426    QTC Calculation(Bazett) 488    P Axis 68    R Axis 90    T Axis 61    QRS Count 13    Q Onset 217    P Onset 135    P Offset 193    T Offset 430    QTC Fredericia 467    Narrative    Normal sinus rhythm  Possible Left atrial enlargement  Rightward axis  Incomplete right bundle branch block  Borderline ECG  When compared with ECG of 19-JUN-2025 12:37, (unconfirmed)  Premature atrial complexes are no longer Present  Confirmed by Adan Beckett ([id number]) on 7/6/2025 12:16:18 PM     CT chest, PE protocol, June 19, 2025: No evidence of proximal pulmonary embolism, pulmonary artery is dilated at 3.8 cm.    TTE June 20, 2025: The left ventricle is severely hypertrophied, the ejection fraction is normal at 60%.  The right ventricle is normal in size and function.  The left atrium is severely dilated at 6.1 x 6.2 cm.  The right atrium is moderately dilated.  The aortic valve is trileaflet, it is heavily calcified with decreased excursion.  The aortic valve velocity is 4.6 m/s, the mean aortic valve gradient is 53 mmHg, the aortic valve area is calculated 0.68 cm².  The mitral valve is mildly thickened, there is mild mitral annular calcification.  The mean mitral valve gradient is 7 mmHg.  There is moderate mitral regurgitation.  The tricuspid valve is mildly dilated, and there is moderate tricuspid regurgitation.   The RVSP is elevated at 60 mmHg.     [facility] June 27, 2025: Right dominant coronary circulation.  No significant coronary disease in any coronary tree.    Assessment and Plan:   Ms. Neisha Graham is an 65 y.o. female who presents with symptomatic severe aortic stenosis and moderate mitral stenosis with moderate mitral regurgitation.   This is associated with hypertension, hyperlipidemia, paroxysmal atrial fibrillation, heart failure with preserved ejection fraction; and in the setting of iron deficiency anemia, thalassemia, COPD, asthma, obesity, venous stasis, lymphedema, lumbar disc disease and obesity..  Their symptoms include Dyspnea and Fatigue.  She has NYHA III heart failure symptoms.  Their imaging is consistent with symptomatic severe aortic stenosis.  With aortic valve velocity, mean aortic valve gradient, and aortic valve area all being in the severe range; mitral valve disease is considered moderate both regurgitation and stenosis    STS PROM: (isolated AVR): 10.5%    [id number] score: 6  HASBLED score: 2    We had a nice discussion regarding the indications for intervention on their valvular disease.  This included percutaneous as well as open surgical approaches. I do believe that a catheter based approach is in her best interest, given her age and co-morbidities.  She understands that the goal of this procedure will be to relieve symptoms if present, preserve left ventricular function, and prolong life.  I discussed the specific risks of vascular access bleeding, stroke and need for pacemaker placement.  In further evaluation we will obtain Pulmonary Function Testing, Computed tomography - TAVR protocol, and Dental evaluation     With regards to their surgical fitness, she is a High risk candidate.  If there is compromise of aortic root anatomy/geometry, I would not offer them an open surgical approach.  If there is compromise of hervascular access I would prefer alternative access approach to surgical  aortic valve replacement.  Because of her risk I would not, offer them emergent sternotomy if needed.            [1]   Past Medical History:  Diagnosis Date    Acute bronchitis due to other specified organisms 10/14/2019    Acute bronchitis due to other specified organisms    Acute on chronic diastolic CHF (congestive heart failure) 12/11/2024    Acute viral conjunctivitis of left eye 03/10/2023    Alkaline phosphatase elevation 08/17/2023    Anemia 03/10/2023    Aortic stenosis, severe 03/10/2023    Arthritis of knee 02/01/2010    Asthma 04/22/2011    Atrial dilatation, bilateral 03/10/2023    Bone marrow disorder 08/17/2023    Cavernous sinus thrombosis ([facility]) 05/10/2012    Cellulitis of left lower limb 04/08/2021    Cellulitis of left lower leg    CHF (congestive heart failure)     Chronic lumbar radiculopathy 03/10/2023    Chronic obstructive pulmonary disease with (acute) exacerbation (Multi) 01/09/2019    COPD exacerbation    Chronic sinusitis, unspecified     Sinusitis    Chronic thromboembolic disease (Multi) 03/10/2023    Chronic venous insufficiency 03/10/2023    Class 3 severe obesity due to excess calories with serious comorbidity and body mass index (BMI) of 50.0 to 59.9 in adult 08/17/2023    COPD (chronic obstructive pulmonary disease) (Multi) 03/10/2023    COVID-19 11/28/2022    COVID-19    Daily headache 08/17/2023    Depression 07/06/2009    Elevated bilirubin 08/17/2023    Elevated transaminase level 08/17/2023    Essential thrombocytosis 08/17/2023    Fatty liver 08/17/2023    Fever 08/17/2023    Fever 08/17/2023    Folate-deficiency anemia 03/10/2023    Generalized anxiety disorder 03/10/2023    Heart murmur 03/10/2023    Hemoptysis 03/10/2023    Hyperkalemia 03/10/2023    Iron deficiency anemia 03/10/2023    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    Lymphedema 03/10/2023    Malaise and fatigue 03/10/2023    MDD (major depressive disorder), recurrent episode, moderate 03/10/2023    Mixed hyperlipidemia  03/10/2023    Morbid (severe) obesity due to excess calories (Multi)     Morbid obesity    Neurogenic claudication due to lumbar spinal stenosis 03/10/2023    Nonrheumatic mitral valve stenosis 03/10/2023    BE on CPAP 12/12/2011    Osteoarthritis of left knee 03/10/2023    Osteoarthritis of right knee 03/10/2023    Other conditions influencing health status     History Of ___ Previous Pregnancies    Other conditions influencing health status 03/12/2020    Arthritis    Other conditions influencing health status 03/06/2017    History of cough    Other conditions influencing health status     Menstruation    Other conditions influencing health status     Osteoarthritis    Other conditions influencing health status     Pulmonary Disease    Pain in unspecified ankle and joints of unspecified foot 06/24/2016    Ankle joint pain    Pain in unspecified foot 12/08/2015    Foot pain    Pain in unspecified knee     Joint pain, knee    Palpitations 03/10/2023    Panic disorder without agoraphobia 07/06/2009    Personal history of diseases of the skin and subcutaneous tissue     History of cellulitis    Personal history of other diseases of the female genital tract     Vaginal delivery    Personal history of other diseases of the nervous system and sense organs 09/12/2019    History of conjunctivitis    Personal history of other diseases of the nervous system and sense organs 09/17/2015    History of blurred vision    Personal history of other diseases of the respiratory system     History of pleurisy    Personal history of other diseases of the respiratory system 10/17/2016    History of bronchitis    Personal history of other diseases of the respiratory system 11/29/2017    History of acute bronchitis    Personal history of other diseases of the respiratory system 11/16/2016    History of acute pharyngitis    Personal history of other drug therapy 10/14/2016    History of influenza vaccination    Personal history of other  infectious and parasitic diseases     History of hepatitis    Personal history of other infectious and parasitic diseases 07/22/2020    History of candidiasis of mouth    Personal history of other medical treatment     History of mammogram    Personal history of other specified conditions     History of shortness of breath    Personal history of other specified conditions 01/28/2015    History of shortness of breath    Personal history of other specified conditions     History of abnormal Pap smear    Personal history of pneumonia (recurrent)     History of pneumonia    Pneumonia, unspecified organism 01/11/2018    Community acquired pneumonia    Polycythemia 03/10/2023    Postmenopausal bleeding 09/30/2014    Postmenopausal bleeding    Primary hypertension 03/10/2023    Primary localized osteoarthritis of ankle, left 03/10/2023    Pulmonary hypertension (Multi) 03/10/2023    Right knee pain 03/10/2023    Shortness of breath 03/10/2023    Sinus tachycardia 03/10/2023    Submandibular sialolithiasis 03/10/2023    Type 2 diabetes mellitus with other skin ulcer (CODE) 07/11/2025    Typical atrial flutter (Multi) 08/17/2023    Unilateral primary osteoarthritis, unspecified knee 02/03/2017    Osteoarthritis, localized, knee    Unspecified acute conjunctivitis, bilateral 08/20/2020    Acute bacterial conjunctivitis of both eyes    Vitamin B12 deficiency 03/10/2023   [2]   Past Surgical History:  Procedure Laterality Date    CARDIAC CATHETERIZATION      CARDIAC CATHETERIZATION N/A 6/27/2025    Procedure: LHC, With LV;  Surgeon: Judson Chaudhry MD;  Location: [facility] Cardiac Cath Lab;  Service: Cardiovascular;  Laterality: N/A;    KNEE SURGERY  09/19/2013    Knee Surgery Right    OTHER SURGICAL HISTORY  09/19/2013    Direct Laryngoscopy With Foreign Body Removal    OTHER SURGICAL HISTORY  09/19/2013    Laminectomy With Drainage Of Intramedullary Cyst    OTHER SURGICAL HISTORY  09/27/2013    Ovarian Cystectomy     TONSILLECTOMY  2013    Tonsillectomy    TUBAL LIGATION  2013    Tubal Ligation   [3] No family history on file.  [4]   Allergies  Allergen Reactions    Vancomycin Other     Patient says it caused hypotension and kidney failure and made her feel terrible     Doxycycline GI Upset    Jardiance [Empagliflozin] Itching and Other     Peeling skin     Macrobid [Nitrofurantoin Monohyd/M-Cryst] Dizziness    Sulfamethoxazole-Trimethoprim Unknown   [5]   Outpatient Encounter Medications as of 2025   Medication Sig Dispense Refill    albuterol 90 mcg/actuation inhaler INHALE 2 PUFFS BY MOUTH AND INTO THE LUNGS EVERY 4 HOURS IF NEEDED FOR WHEEZING 8 g 3    [] amoxicillin (Amoxil) 500 mg capsule Take 2 capsules (1,000 mg) by mouth every 8 hours for 10 days. 60 capsule 0    amoxicillin-clavulanate (Augmentin) 875-125 mg tablet Take 1 tablet by mouth 2 times a day for 10 days. 20 tablet 0    ciclopirox (Penlac) 8 % solution Apply topically once daily at bedtime. 6.6 mL 11    cyclobenzaprine (Flexeril) 10 mg tablet Take 1 tablet (10 mg) by mouth 2 times a day as needed for muscle spasms. 30 tablet 2    dilTIAZem ER (Tiazac) 360 mg 24 hr capsule Take 1 capsule (360 mg) by mouth once daily. 90 capsule 3    DULoxetine (Cymbalta) 20 mg DR capsule Take 1 capsule (20 mg) by mouth once daily. 90 capsule 1    enoxaparin (Lovenox) 150 mg/mL injection Inject 0.95 mL (142.5 mg) under the skin every 12 hours. 60 each 0    hydroxyurea (Hydrea) 500 mg capsule Take 2 capsules (1,000 mg total) by mouth once daily. 60 capsule 11    ipratropium-albuteroL (Duo-Neb) 0.5-2.5 mg/3 mL nebulizer solution Take 3 mL by nebulization 4 times a day as needed for wheezing or shortness of breath. 360 mL 11    ketoconazole (NIZOral) 2 % cream APPLY TOPICALLY TWICE A DAY FOR 28 DAYS 30 g 0    lidocaine (Xylocaine) 5 % ointment Apply topically if needed for mild pain (1 - 3). Apply up to three times a day as needed for pain. 35 g 5     lidocaine 5 % gel Apply 1 inch topically 3 times a day as needed (apply to affected area). 100 g 2    metOLazone (Zaroxolyn) 2.5 mg tablet Take 1 tablet (2.5 mg) by mouth 2 times a week. 8 tablet 11    naloxone (Narcan) 4 mg/0.1 mL nasal spray Administer 1 spray (4 mg) into affected nostril(s) if needed for opioid reversal or respiratory depression. 2 each 0    nebulizer accessories kit UAD 1 kit 3    nystatin (Mycostatin) cream APPLY TO AFFECTED AREA TWO TO THREE TIMES A DAY 30 g 2    oxyCODONE (Roxicodone) 10 mg immediate release tablet Take 1 tablet (10 mg) by mouth every 4 hours if needed for severe pain (7 - 10) for up to 28 days. Do not fill before July 7, 2025. 168 tablet 0    pramipexole (Mirapex) 1 mg tablet Take 1 tablet (1 mg) by mouth 2 times a day. 180 tablet 3    propranolol (Inderal) 20 mg tablet Take 1 tablet (20 mg) by mouth 2 times a day as needed (anxiety). 60 tablet 5    sennosides-docusate sodium (Rebecca-Colace) 8.6-50 mg tablet Take 2 tablets by mouth 2 times a day. 120 tablet 0    spironolactone (Aldactone) 50 mg tablet Take 1 tablet (50 mg) by mouth once daily. 90 tablet 3    torsemide (Demadex) 20 mg tablet Take 2 tablets (40 mg) by mouth 2 times a day. 360 tablet 3    Trelegy Ellipta 200-62.5-25 mcg blister with device INHALE 1 PUFF BY MOUTH AND INTO THE LUNGS ONCE DAILY 180 each 0    triamcinolone (Kenalog) 0.1 % cream Apply topically 2 times a day. 30 g 0    warfarin (Coumadin) 7.5 mg tablet Take one po every day 90 tablet 3    [DISCONTINUED] empagliflozin (Jardiance) 10 mg tablet Take 1 tablet (10 mg) by mouth once daily. 90 tablet 3    [DISCONTINUED] enoxaparin (Lovenox) 150 mg/mL injection Inject 0.95 mL (142.5 mg) under the skin every 12 hours for 5 days. 10 each 0    [DISCONTINUED] hydroxyurea (Hydrea) 500 mg capsule Take 2 capsules (1,000 mg total) by mouth once daily.  Take at the same time each day.      [DISCONTINUED] oxyCODONE (Roxicodone) 10 mg immediate release tablet Take 1  tablet (10 mg) by mouth every 4 hours if needed for severe pain (7 - 10) for up to 28 days. Do not fill before June 9, 2025. 168 tablet 0    [DISCONTINUED] triamcinolone (Kenalog) 0.1 % cream Apply topically 2 times a day. Apply to affected area 1-2 times daily as needed. Avoid face and groin. 30 g 0    [DISCONTINUED] triamcinolone (Kenalog) 0.1 % cream Apply topically 2 times a day. Apply to affected area 1-2 times daily as needed. Avoid face and groin. 30 g 0     No facility-administered encounter medications on file as of 7/21/2025.

## 2025-07-21 ENCOUNTER — OFFICE VISIT (OUTPATIENT)
Dept: CARDIAC SURGERY | Facility: CLINIC | Age: 65
End: 2025-07-21
Payer: MEDICARE

## 2025-07-21 ENCOUNTER — OFFICE VISIT (OUTPATIENT)
Dept: CARDIOLOGY | Facility: CLINIC | Age: 65
End: 2025-07-21
Payer: MEDICARE

## 2025-07-21 VITALS
SYSTOLIC BLOOD PRESSURE: 125 MMHG | DIASTOLIC BLOOD PRESSURE: 81 MMHG | OXYGEN SATURATION: 95 % | BODY MASS INDEX: 53.92 KG/M2 | WEIGHT: 293 LBS | HEIGHT: 62 IN | HEART RATE: 82 BPM

## 2025-07-21 VITALS
HEART RATE: 82 BPM | BODY MASS INDEX: 53.92 KG/M2 | WEIGHT: 293 LBS | DIASTOLIC BLOOD PRESSURE: 81 MMHG | OXYGEN SATURATION: 95 % | SYSTOLIC BLOOD PRESSURE: 125 MMHG | HEIGHT: 62 IN

## 2025-07-21 DIAGNOSIS — I35.0 NONRHEUMATIC AORTIC (VALVE) STENOSIS: Primary | ICD-10-CM

## 2025-07-21 DIAGNOSIS — I35.0 AORTIC STENOSIS, SEVERE: Primary | ICD-10-CM

## 2025-07-21 DIAGNOSIS — I05.0 MITRAL VALVE STENOSIS, UNSPECIFIED ETIOLOGY: ICD-10-CM

## 2025-07-21 DIAGNOSIS — F41.1 GENERALIZED ANXIETY DISORDER: Primary | ICD-10-CM

## 2025-07-21 PROCEDURE — 1111F DSCHRG MED/CURRENT MED MERGE: CPT | Performed by: INTERNAL MEDICINE

## 2025-07-21 PROCEDURE — G2211 COMPLEX E/M VISIT ADD ON: HCPCS | Performed by: INTERNAL MEDICINE

## 2025-07-21 PROCEDURE — 3074F SYST BP LT 130 MM HG: CPT | Performed by: INTERNAL MEDICINE

## 2025-07-21 PROCEDURE — 99205 OFFICE O/P NEW HI 60 MIN: CPT | Performed by: THORACIC SURGERY (CARDIOTHORACIC VASCULAR SURGERY)

## 2025-07-21 PROCEDURE — 3008F BODY MASS INDEX DOCD: CPT | Performed by: THORACIC SURGERY (CARDIOTHORACIC VASCULAR SURGERY)

## 2025-07-21 PROCEDURE — 1111F DSCHRG MED/CURRENT MED MERGE: CPT | Performed by: THORACIC SURGERY (CARDIOTHORACIC VASCULAR SURGERY)

## 2025-07-21 PROCEDURE — 3074F SYST BP LT 130 MM HG: CPT | Performed by: THORACIC SURGERY (CARDIOTHORACIC VASCULAR SURGERY)

## 2025-07-21 PROCEDURE — 3079F DIAST BP 80-89 MM HG: CPT | Performed by: INTERNAL MEDICINE

## 2025-07-21 PROCEDURE — 3008F BODY MASS INDEX DOCD: CPT | Performed by: INTERNAL MEDICINE

## 2025-07-21 PROCEDURE — 1159F MED LIST DOCD IN RCRD: CPT | Performed by: THORACIC SURGERY (CARDIOTHORACIC VASCULAR SURGERY)

## 2025-07-21 PROCEDURE — 99203 OFFICE O/P NEW LOW 30 MIN: CPT | Performed by: THORACIC SURGERY (CARDIOTHORACIC VASCULAR SURGERY)

## 2025-07-21 PROCEDURE — 1125F AMNT PAIN NOTED PAIN PRSNT: CPT | Performed by: THORACIC SURGERY (CARDIOTHORACIC VASCULAR SURGERY)

## 2025-07-21 PROCEDURE — 3079F DIAST BP 80-89 MM HG: CPT | Performed by: THORACIC SURGERY (CARDIOTHORACIC VASCULAR SURGERY)

## 2025-07-21 PROCEDURE — 1125F AMNT PAIN NOTED PAIN PRSNT: CPT | Performed by: INTERNAL MEDICINE

## 2025-07-21 PROCEDURE — 99215 OFFICE O/P EST HI 40 MIN: CPT | Performed by: INTERNAL MEDICINE

## 2025-07-21 RX ORDER — DULOXETIN HYDROCHLORIDE 30 MG/1
30 CAPSULE, DELAYED RELEASE ORAL DAILY
Qty: 90 CAPSULE | Refills: 1 | Status: SHIPPED | OUTPATIENT
Start: 2025-07-21 | End: 2026-01-17

## 2025-07-21 ASSESSMENT — LIFESTYLE VARIABLES
AUDIT-C TOTAL SCORE: 0
HOW OFTEN DO YOU HAVE A DRINK CONTAINING ALCOHOL: NEVER
SKIP TO QUESTIONS 9-10: 1
AUDIT-C TOTAL SCORE: 0
SKIP TO QUESTIONS 9-10: 1
HOW MANY STANDARD DRINKS CONTAINING ALCOHOL DO YOU HAVE ON A TYPICAL DAY: PATIENT DOES NOT DRINK
HOW OFTEN DO YOU HAVE SIX OR MORE DRINKS ON ONE OCCASION: NEVER
HOW OFTEN DO YOU HAVE A DRINK CONTAINING ALCOHOL: NEVER
HOW OFTEN DO YOU HAVE SIX OR MORE DRINKS ON ONE OCCASION: NEVER
HOW MANY STANDARD DRINKS CONTAINING ALCOHOL DO YOU HAVE ON A TYPICAL DAY: PATIENT DOES NOT DRINK

## 2025-07-21 ASSESSMENT — ENCOUNTER SYMPTOMS
LOSS OF SENSATION IN FEET: 0
DEPRESSION: 0
OCCASIONAL FEELINGS OF UNSTEADINESS: 0
LOSS OF SENSATION IN FEET: 0
DEPRESSION: 0
OCCASIONAL FEELINGS OF UNSTEADINESS: 0

## 2025-07-21 ASSESSMENT — PATIENT HEALTH QUESTIONNAIRE - PHQ9
2. FEELING DOWN, DEPRESSED OR HOPELESS: NOT AT ALL
SUM OF ALL RESPONSES TO PHQ9 QUESTIONS 1 AND 2: 0
1. LITTLE INTEREST OR PLEASURE IN DOING THINGS: NOT AT ALL

## 2025-07-21 ASSESSMENT — PAIN SCALES - GENERAL
PAINLEVEL_OUTOF10: 6
PAINLEVEL_OUTOF10: 6

## 2025-07-21 NOTE — PROGRESS NOTES
Referred by Indy Sanders, APRN-CNP  PCP: Micheal Gonsalez DO   Chief complaint: Evaluation of aortic valve stenosis.       [facility]    Interventional Cardiologist: Franklin Alexander MD    Cardiothoracic Surgeon: Judson Velazquez MD    ROS:    66 y/o female presents for evaluation of severe, symptomatic aortic stenosis.    She has had several years of worsening lower extremity edema as well as shortness of breath, fatigue, orthopnea, lightheadedness.  She denies any syncope.  She denies any significant exertional angina.  Given her significant lower extremity edema she has developed some leg wounds and is being treated.  She had a recent hospitalization at [facility] for heart failure.  During this admission there was concern for bacteremia from her lower extremity wound.  She was having fevers and chills.  She was treated with antibiotics.  She has atrial fibrillation and is on warfarin.  She had a heart catheterization which did not reveal any significant coronary disease.  She was found to have very severe aortic stenosis by echo and by cath.  Her mean gradient is in the 50s across her aortic valve.  Her ejection fraction is preserved.  She had an echocardiogram in December of last year which revealed a mean gradient of 41.      Full 14 point ROS complete and negative except as noted above.     PMH: Medical History[1]   PSH: Surgical History[2]     Outpatient Medications:  Current Outpatient Medications   Medication Instructions    albuterol 90 mcg/actuation inhaler 2 puffs, inhalation, Every 4 hours PRN    amoxicillin-clavulanate (Augmentin) 875-125 mg tablet 1 tablet, oral, 2 times daily    ciclopirox (Penlac) 8 % solution Topical, Nightly    cyclobenzaprine (FLEXERIL) 10 mg, oral, 2 times daily PRN    dilTIAZem ER (TIAZAC) 360 mg, oral, Daily    DULoxetine (CYMBALTA) 30 mg, oral, Daily    enoxaparin (LOVENOX) 1 mg/kg, subcutaneous, Every 12 hours    hydroxyurea (HYDREA) 1,000  mg, oral, Daily    ipratropium-albuteroL (Duo-Neb) 0.5-2.5 mg/3 mL nebulizer solution 3 mL, nebulization, 4 times daily PRN    ketoconazole (NIZOral) 2 % cream APPLY TOPICALLY TWICE A DAY FOR 28 DAYS    lidocaine (Xylocaine) 5 % ointment Topical, As needed, Apply up to three times a day as needed for pain.    lidocaine 5 % gel 1 inch, topical (top), 3 times daily PRN    metOLazone (ZAROXOLYN) 2.5 mg, oral, 2 times weekly    naloxone (NARCAN) 4 mg, nasal, As needed    nebulizer accessories kit UAD    nystatin (Mycostatin) cream APPLY TO AFFECTED AREA TWO TO THREE TIMES A DAY    oxyCODONE (ROXICODONE) 10 mg, oral, Every 4 hours PRN    pramipexole (MIRAPEX) 1 mg, oral, 2 times daily    propranolol (INDERAL) 20 mg, oral, 2 times daily PRN    sennosides-docusate sodium (Rebecca-Colace) 8.6-50 mg tablet 2 tablets, oral, 2 times daily    spironolactone (ALDACTONE) 50 mg, oral, Daily    torsemide (DEMADEX) 40 mg, oral, 2 times daily    Trelegy Ellipta 200-62.5-25 mcg blister with device 1 puff, inhalation, Daily    triamcinolone (Kenalog) 0.1 % cream Topical, 2 times daily    warfarin (Coumadin) 7.5 mg tablet Take one po every day         Vitals:  Vitals:    07/21/25 1527   BP: 125/81   Pulse: 82   SpO2: 95%        Physical Exam:  General: NAD, well-appearing  HEENT: moist mucous membranes, no jaundice  Neck: + JVD, + carotid bruit  Lungs: CTA sergio, no wheezing or rales  Cardiac: RRR, 3/6 LIZBETH  Abdomen: soft, non-tender, non-distended  Extremities: 2+ radial pulses, 4+ edema, 1+ pulses  Skin: warm, dry, wrapped leg wound  Neurologic: AAOx3,  no focal deficits    [facility] WORK[facility]    - NYHA: Class III  - Frailty: /5  - EKG: NSR, incomplete RBBB  - Echo: Completed 6/20/25. EF 63 %, severe aortic stenosis  MG/PG 76/53, Peak velocity 4.36 m/s JADYN 0.68 cm/2   - [facility]: Completed 6/10/25. No significant coronary disease in right dominant coronary circulation..   - CT TAVR: pending  - Dental clearance: pending  - STS:  Procedure Type: Isolated AVR  PERIOPERATIVE OUTCOME ESTIMATE % Operative Mortality 6.18%  Morbidity & Mortality 16%  Stroke 0.393%  Renal Failure 2.93%  Reoperation 2.27%  Prolonged Ventilation 12.9%  Deep Sternal Wound Infection 0.351%  Long Hospital Stay (>14 days) 15.4%  Short Hospital Stay (<6 days)* 21.6%    - [facility] walk: pending    Assessment/Plan   She has severe symptomatic aortic stenosis.  Her gradient across her aortic valve has been increasing over the past several years.  She states that she has had lymphedema for many years but it has worsened in the past year or so and has been more difficult to control.  She remains wheelchair-bound secondary to her shortness of breath and edema.  We will plan to move toward TAVR.  She is a high surgical risk.  She will need a TAVR protocol CTA.  Her heart catheterization has been completed.  Following her TAVR if she continues to have elevated right-sided pressures and struggles with edema a right heart catheterization may be of benefit.  Of note she is on diltiazem which may be partially contributing to her edema.          The overall decision regarding the best treatment for this condition is complex.  We discussed options of both surgical aortic valve replacement and transcatheter aortic valve replacement along with risks and benefits involved with both of them in detail.    We discussed all the risks associated with the procedure, including but not limited to stroke, MI, pericardial tamponade, vascular complications, infection and death were discussed with the patient. The risk of needing a permament pacemaker was also discussed in detail.     We will discuss this patient's case at our Valve Team meeting with representatives from Structural Heart and Cardiac Surgery. Our nurse navigators will contact patient with further diagnostic needs and formal plan.     Franklin Alexander MD MPH  Interventional Cardiologist/[facility]  [facility] [place]    I, Juliana Yung RN am scribing for, and in the presence of Dr. Franklin Alexander.    I, Dr. Franklin Alexander, personally performed the services described in the documentation as scribed by Juliana Yung RN in my presence, and confirm it is both accurate and complete.     **Disclaimer: This note was dictated by speech recognition, and every effort has been made to prevent any error in transcription, however minor errors may be present**           [1]   Past Medical History:  Diagnosis Date    Acute bronchitis due to other specified organisms 10/14/2019    Acute bronchitis due to other specified organisms    Acute on chronic diastolic CHF (congestive heart failure) 12/11/2024    Acute viral conjunctivitis of left eye 03/10/2023    Alkaline phosphatase elevation 08/17/2023    Anemia 03/10/2023    Anxiety     Aortic stenosis, severe 03/10/2023    Arthritis     Arthritis of knee 02/01/2010    Asthma 04/22/2011    Atrial dilatation, bilateral 03/10/2023    Bone marrow disorder 08/17/2023    Cavernous sinus thrombosis ([facility]) 05/10/2012    Cellulitis of left lower limb 04/08/2021    Cellulitis of left lower leg    CHF (congestive heart failure) 12/13/2024    Chronic lumbar radiculopathy 03/10/2023    Chronic obstructive pulmonary disease with (acute) exacerbation (Multi) 01/09/2019    COPD exacerbation    Chronic sinusitis, unspecified     Sinusitis    Chronic thromboembolic disease (Multi) 03/10/2023    Chronic venous insufficiency 03/10/2023    Class 3 severe obesity due to excess calories with serious comorbidity and body mass index (BMI) of 50.0 to 59.9 in adult 08/17/2023    COPD (chronic obstructive pulmonary disease) (Multi) 03/10/2023    COVID-19 11/28/2022    COVID-19    Daily headache 08/17/2023    Depression 07/06/2009    Elevated bilirubin 08/17/2023    Elevated transaminase level 08/17/2023    Essential thrombocytosis 08/17/2023    Fatty liver 08/17/2023    Fever  08/17/2023    Fever 08/17/2023    Folate-deficiency anemia 03/10/2023    Generalized anxiety disorder 03/10/2023    Heart murmur 03/10/2023    Hemoptysis 03/10/2023    Hyperkalemia 03/10/2023    Iron deficiency anemia 03/10/2023    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    Lymphedema 03/10/2023    Malaise and fatigue 03/10/2023    MDD (major depressive disorder), recurrent episode, moderate 03/10/2023    Mitral valve prolapse     Mixed hyperlipidemia 03/10/2023    Morbid (severe) obesity due to excess calories (Multi)     Morbid obesity    Neurogenic claudication due to lumbar spinal stenosis 03/10/2023    Nonrheumatic mitral valve stenosis 03/10/2023    BE on CPAP 12/12/2011    Osteoarthritis of left knee 03/10/2023    Osteoarthritis of right knee 03/10/2023    Other conditions influencing health status     History Of ___ Previous Pregnancies    Other conditions influencing health status 03/12/2020    Arthritis    Other conditions influencing health status 03/06/2017    History of cough    Other conditions influencing health status     Menstruation    Other conditions influencing health status     Osteoarthritis    Other conditions influencing health status     Pulmonary Disease    Pain in unspecified ankle and joints of unspecified foot 06/24/2016    Ankle joint pain    Pain in unspecified foot 12/08/2015    Foot pain    Pain in unspecified knee     Joint pain, knee    Palpitations 03/10/2023    Panic disorder without agoraphobia 07/06/2009    Personal history of diseases of the skin and subcutaneous tissue     History of cellulitis    Personal history of other diseases of the female genital tract     Vaginal delivery    Personal history of other diseases of the nervous system and sense organs 09/12/2019    History of conjunctivitis    Personal history of other diseases of the nervous system and sense organs 09/17/2015    History of blurred vision    Personal history of other diseases of the respiratory system     History of  pleurisy    Personal history of other diseases of the respiratory system 10/17/2016    History of bronchitis    Personal history of other diseases of the respiratory system 11/29/2017    History of acute bronchitis    Personal history of other diseases of the respiratory system 11/16/2016    History of acute pharyngitis    Personal history of other drug therapy 10/14/2016    History of influenza vaccination    Personal history of other infectious and parasitic diseases     History of hepatitis    Personal history of other infectious and parasitic diseases 07/22/2020    History of candidiasis of mouth    Personal history of other medical treatment     History of mammogram    Personal history of other specified conditions     History of shortness of breath    Personal history of other specified conditions 01/28/2015    History of shortness of breath    Personal history of other specified conditions     History of abnormal Pap smear    Personal history of pneumonia (recurrent)     History of pneumonia    Pneumonia, unspecified organism 01/11/2018    Community acquired pneumonia    Polycythemia 03/10/2023    Postmenopausal bleeding 09/30/2014    Postmenopausal bleeding    Primary hypertension 03/10/2023    Primary localized osteoarthritis of ankle, left 03/10/2023    Pulmonary hypertension (Multi) 03/10/2023    Right knee pain 03/10/2023    Shortness of breath 03/10/2023    Sinus tachycardia 03/10/2023    Submandibular sialolithiasis 03/10/2023    Type 2 diabetes mellitus with other skin ulcer (CODE) 07/11/2025    Typical atrial flutter (Multi) 08/17/2023    Unilateral primary osteoarthritis, unspecified knee 02/03/2017    Osteoarthritis, localized, knee    Unspecified acute conjunctivitis, bilateral 08/20/2020    Acute bacterial conjunctivitis of both eyes    Vitamin B12 deficiency 03/10/2023   [2]   Past Surgical History:  Procedure Laterality Date    CARDIAC CATHETERIZATION      CARDIAC CATHETERIZATION N/A 6/27/2025     Procedure: C, With LV;  Surgeon: Judson Chaudhry MD;  Location: [facility];  Service: Cardiovascular;  Laterality: N/A;    KNEE SURGERY  09/19/2013    Knee Surgery Right    OTHER SURGICAL HISTORY  09/19/2013    Direct Laryngoscopy With Foreign Body Removal    OTHER SURGICAL HISTORY  09/19/2013    Laminectomy With Drainage Of Intramedullary Cyst    OTHER SURGICAL HISTORY  09/27/2013    Ovarian Cystectomy    TONSILLECTOMY  09/19/2013    Tonsillectomy    TUBAL LIGATION  09/19/2013    Tubal Ligation

## 2025-07-21 NOTE — PROGRESS NOTES
[facility]Q Questionnaire      1  Heart failure affects different people in different ways. Some feel shortness of breath while others feel fatigue. Please indicate how much you are limited by heart failure (shortness of breath or fatigue) in your ability to do the following activities over the past 2 weeks. PRE PROCEDURE    A.) Showering/bathing  4. Slightly  B.) Walking 1 block on level ground 6. Limited for other reastons  C.) Hurrying or Jogging   6. Limited for other reastons    2.  Over the past 2 weeks, how many times did you have swelling in your feet, ankles or legs when you woke up in the morning? 1. Every morning    3.  Over the past 2 weeks, on average, how many times has fatigue limited your ability to do what you wanted? 2. Several times a day    4.  Over the past 2 weeks, on average, how many times has shortness of breath limited your ability to do what you wanted? 2. Several times a day    5.  Over the past 2 weeks, on average, how many times have you been forced to sleep sitting up in a chair or with at least 3 pillows to prop you up because of shortness of breath? Every night    6. Over the past 2 weeks, how much has your heart failure limited your enjoyment of life? It has extremely limited my enjoyment of life    7. If you had to spend the rest of your life with your heart failure the way it is right now, how would you feel about this? 1. Not at all     8. How much does your heart failure affect your lifestyle? Please indicate how your heart failure may have limited yourparticipation in the following activities over the past 2 weeks    A.)  Hobbies, recreational activities  1. Severely limited    B.) Working or doing household chores  3. Moderately limited    C.) Visiting family or friends out of your home  1. Severely limited    5 Meter Walk____n/a______seconds

## 2025-07-21 NOTE — LETTER
July 21, 2025     No Recipients    Patient: Neisha Graham   YOB: 1960   Date of Visit: 7/21/2025       Dear Dr. Rodriguez Recipients:    Thank you for referring Neisha Graham to me for evaluation. Below are my notes for this consultation.  If you have questions, please do not hesitate to call me. I look forward to following your patient along with you.       Sincerely,     Judson Velazquez MD      CC: No Recipients  ______________________________________________________________________________________    Chief Complaint  Dyspnea, Fatigue, and orthopnea    HPI:   Ms. Neisha Graham is an 65 y.o. female, who is a patient of Dr. Micheal Gonsalez, DO   I have been asked to see them by Franklin Castro to evaluate aortic stenosis and mitral stenosis.  She has longstanding lymphedema, but has become much worse in the last year.  She has been wheelchair bound for about 4 years now.  She has occasional chest heaviness, dyspnea, PND as well.  She denies dizziness, syncope.   She was recently admitted to the hospital at [facility] for HF exacerbation and leg wound from what sounds like venous stasis.  She was treated with IV diuretic therapy, where she lost 45 lbs of water weight. During this admission she has GBS positive blood cultures and a history of left jaw gland enlargement keeping her awake at night.       Medical History[1]    Surgical History[2]    Family History[3]    Social History     Socioeconomic History   • Marital status: Single     Spouse name: Not on file   • Number of children: Not on file   • Years of education: Not on file   • Highest education level: Not on file   Occupational History   • Not on file   Tobacco Use   • Smoking status: Some Days     Types: Cigarettes   • Smokeless tobacco: Never   Substance and Sexual Activity   • Alcohol use: Never   • Drug use: Never   • Sexual activity: Defer   Other Topics Concern   • Not on file   Social History Narrative   • Not on file     Social  Drivers of Health     Financial Resource Strain: Low Risk  (6/20/2025)    Overall Financial Resource Strain (CARDIA)    • Difficulty of Paying Living Expenses: Not hard at all   Food Insecurity: No Food Insecurity (6/19/2025)    Hunger Vital Sign    • Worried About Running Out of Food in the Last Year: Never true    • Ran Out of Food in the Last Year: Never true   Transportation Needs: No Transportation Needs (6/20/2025)    PRAPARE - Transportation    • Lack of Transportation (Medical): No    • Lack of Transportation (Non-Medical): No   Physical Activity: Inactive (12/12/2024)    Exercise Vital Sign    • Days of Exercise per Week: 0 days    • Minutes of Exercise per Session: 0 min   Stress: Stress Concern Present (12/12/2024)    [employer] [place] [employer] - Occupational Stress Questionnaire    • Feeling of Stress : To some extent   Social Connections: Unknown (12/12/2024)    Social Connection and Isolation Panel    • Frequency of Communication with Friends and Family: Three times a week    • Frequency of Social Gatherings with Friends and Family: Once a week    • Attends Denominational Services: Not on file    • Active Member of Clubs or Organizations: Not on file    • Attends Club or Organization Meetings: Not on file    • Marital Status: Not on file   Intimate Partner Violence: Not At Risk (6/19/2025)    Humiliation, Afraid, Rape, and Kick questionnaire    • Fear of Current or Ex-Partner: No    • Emotionally Abused: No    • Physically Abused: No    • Sexually Abused: No   Housing Stability: Low Risk  (6/20/2025)    Housing Stability Vital Sign    • Unable to Pay for Housing in the Last Year: No    • Number of Times Moved in the Last Year: 0    • Homeless in the Last Year: No       RX Allergies[4]    Encounter Medications[5]    Physical Exam  Constitutional:       Appearance: She is obese. She is ill-appearing.     Eyes:      General: No scleral icterus.     Pupils: Pupils are equal, round, and reactive to  light.     Neck:      Comments: 2+ JVD  Cardiovascular:      Rate and Rhythm: Normal rate.      Comments: III/VI LIZBETH,  blunted S2  Pulmonary:      Effort: Pulmonary effort is normal.      Breath sounds: Wheezing present.     Musculoskeletal:         General: Swelling present.      Cervical back: Normal range of motion.      Right lower leg: Edema present.      Left lower leg: Edema present.      Comments: Wheelchair bound     Neurological:      Mental Status: She is alert and oriented to person, place, and time.     Psychiatric:         Mood and Affect: Mood normal.         Lab Results   Component Value Date    WBC 8.0 06/28/2025    HGB 14.6 06/28/2025    HCT 45.7 06/28/2025     (H) 06/28/2025     06/28/2025     Lab Results   Component Value Date    GLUCOSE 133 (H) 06/28/2025    CALCIUM 9.3 06/28/2025     06/28/2025    K 3.8 06/28/2025    CO2 34 (H) 06/28/2025    CL 95 (L) 06/28/2025    BUN 36 (H) 06/28/2025    CREATININE 1.25 (H) 06/28/2025       Encounter Date: 06/19/25   Electrocardiogram, 12-lead PRN ACS symptoms   Result Value    Ventricular Rate 79    Atrial Rate 79    TX Interval 164    QRS Duration 100    QT Interval 426    QTC Calculation(Bazett) 488    P Axis 68    R Axis 90    T Axis 61    QRS Count 13    Q Onset 217    P Onset 135    P Offset 193    T Offset 430    QTC Fredericia 467    Narrative    Normal sinus rhythm  Possible Left atrial enlargement  Rightward axis  Incomplete right bundle branch block  Borderline ECG  When compared with ECG of 19-JUN-2025 12:37, (unconfirmed)  Premature atrial complexes are no longer Present  Confirmed by Adan Beckett ([id number]) on 7/6/2025 12:16:18 PM     CT chest, PE protocol, June 19, 2025: No evidence of proximal pulmonary embolism, pulmonary artery is dilated at 3.8 cm.    TTE June 20, 2025: The left ventricle is severely hypertrophied, the ejection fraction is normal at 60%.  The right ventricle is normal in size and function.  The  left atrium is severely dilated at 6.1 x 6.2 cm.  The right atrium is moderately dilated.  The aortic valve is trileaflet, it is heavily calcified with decreased excursion.  The aortic valve velocity is 4.6 m/s, the mean aortic valve gradient is 53 mmHg, the aortic valve area is calculated 0.68 cm².  The mitral valve is mildly thickened, there is mild mitral annular calcification.  The mean mitral valve gradient is 7 mmHg.  There is moderate mitral regurgitation.  The tricuspid valve is mildly dilated, and there is moderate tricuspid regurgitation.  The RVSP is elevated at 60 mmHg.     [facility] June 27, 2025: Right dominant coronary circulation.  No significant coronary disease in any coronary tree.    Assessment and Plan:   Ms. Neisha Graham is an 65 y.o. female who presents with symptomatic severe aortic stenosis and moderate mitral stenosis with moderate mitral regurgitation.   This is associated with hypertension, hyperlipidemia, paroxysmal atrial fibrillation, heart failure with preserved ejection fraction; and in the setting of iron deficiency anemia, thalassemia, COPD, asthma, obesity, venous stasis, lymphedema, lumbar disc disease and obesity..  Their symptoms include Dyspnea and Fatigue.  She has NYHA III heart failure symptoms.  Their imaging is consistent with symptomatic severe aortic stenosis.  With aortic valve velocity, mean aortic valve gradient, and aortic valve area all being in the severe range; mitral valve disease is considered moderate both regurgitation and stenosis    STS PROM: (isolated AVR): 10.5%    [id number] score: 6  HASBLED score: 2    We had a nice discussion regarding the indications for intervention on their valvular disease.  This included percutaneous as well as open surgical approaches. I do believe that a catheter based approach is in her best interest, given her age and co-morbidities.  She understands that the goal of this procedure will be to relieve symptoms if present,  preserve left ventricular function, and prolong life.  I discussed the specific risks of vascular access bleeding, stroke and need for pacemaker placement.  In further evaluation we will obtain Pulmonary Function Testing, Computed tomography - TAVR protocol, and Dental evaluation     With regards to their surgical fitness, she is a High risk candidate.  If there is compromise of aortic root anatomy/geometry, I would not offer them an open surgical approach.  If there is compromise of hervascular access I would prefer alternative access approach to surgical aortic valve replacement.  Because of her risk I would not, offer them emergent sternotomy if needed.            [1]  Past Medical History:  Diagnosis Date   • Acute bronchitis due to other specified organisms 10/14/2019    Acute bronchitis due to other specified organisms   • Acute on chronic diastolic CHF (congestive heart failure) 12/11/2024   • Acute viral conjunctivitis of left eye 03/10/2023   • Alkaline phosphatase elevation 08/17/2023   • Anemia 03/10/2023   • Aortic stenosis, severe 03/10/2023   • Arthritis of knee 02/01/2010   • Asthma 04/22/2011   • Atrial dilatation, bilateral 03/10/2023   • Bone marrow disorder 08/17/2023   • Cavernous sinus thrombosis ([facility]) 05/10/2012   • Cellulitis of left lower limb 04/08/2021    Cellulitis of left lower leg   • CHF (congestive heart failure)    • Chronic lumbar radiculopathy 03/10/2023   • Chronic obstructive pulmonary disease with (acute) exacerbation (Multi) 01/09/2019    COPD exacerbation   • Chronic sinusitis, unspecified     Sinusitis   • Chronic thromboembolic disease (Multi) 03/10/2023   • Chronic venous insufficiency 03/10/2023   • Class 3 severe obesity due to excess calories with serious comorbidity and body mass index (BMI) of 50.0 to 59.9 in adult 08/17/2023   • COPD (chronic obstructive pulmonary disease) (Multi) 03/10/2023   • COVID-19 11/28/2022    COVID-19   • Daily headache 08/17/2023   •  Depression 07/06/2009   • Elevated bilirubin 08/17/2023   • Elevated transaminase level 08/17/2023   • Essential thrombocytosis 08/17/2023   • Fatty liver 08/17/2023   • Fever 08/17/2023   • Fever 08/17/2023   • Folate-deficiency anemia 03/10/2023   • Generalized anxiety disorder 03/10/2023   • Heart murmur 03/10/2023   • Hemoptysis 03/10/2023   • Hyperkalemia 03/10/2023   • Iron deficiency anemia 03/10/2023   • 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   • Lymphedema 03/10/2023   • Malaise and fatigue 03/10/2023   • MDD (major depressive disorder), recurrent episode, moderate 03/10/2023   • Mixed hyperlipidemia 03/10/2023   • Morbid (severe) obesity due to excess calories (Multi)     Morbid obesity   • Neurogenic claudication due to lumbar spinal stenosis 03/10/2023   • Nonrheumatic mitral valve stenosis 03/10/2023   • BE on CPAP 12/12/2011   • Osteoarthritis of left knee 03/10/2023   • Osteoarthritis of right knee 03/10/2023   • Other conditions influencing health status     History Of ___ Previous Pregnancies   • Other conditions influencing health status 03/12/2020    Arthritis   • Other conditions influencing health status 03/06/2017    History of cough   • Other conditions influencing health status     Menstruation   • Other conditions influencing health status     Osteoarthritis   • Other conditions influencing health status     Pulmonary Disease   • Pain in unspecified ankle and joints of unspecified foot 06/24/2016    Ankle joint pain   • Pain in unspecified foot 12/08/2015    Foot pain   • Pain in unspecified knee     Joint pain, knee   • Palpitations 03/10/2023   • Panic disorder without agoraphobia 07/06/2009   • Personal history of diseases of the skin and subcutaneous tissue     History of cellulitis   • Personal history of other diseases of the female genital tract     Vaginal delivery   • Personal history of other diseases of the nervous system and sense organs 09/12/2019    History of conjunctivitis   • Personal  history of other diseases of the nervous system and sense organs 09/17/2015    History of blurred vision   • Personal history of other diseases of the respiratory system     History of pleurisy   • Personal history of other diseases of the respiratory system 10/17/2016    History of bronchitis   • Personal history of other diseases of the respiratory system 11/29/2017    History of acute bronchitis   • Personal history of other diseases of the respiratory system 11/16/2016    History of acute pharyngitis   • Personal history of other drug therapy 10/14/2016    History of influenza vaccination   • Personal history of other infectious and parasitic diseases     History of hepatitis   • Personal history of other infectious and parasitic diseases 07/22/2020    History of candidiasis of mouth   • Personal history of other medical treatment     History of mammogram   • Personal history of other specified conditions     History of shortness of breath   • Personal history of other specified conditions 01/28/2015    History of shortness of breath   • Personal history of other specified conditions     History of abnormal Pap smear   • Personal history of pneumonia (recurrent)     History of pneumonia   • Pneumonia, unspecified organism 01/11/2018    Community acquired pneumonia   • Polycythemia 03/10/2023   • Postmenopausal bleeding 09/30/2014    Postmenopausal bleeding   • Primary hypertension 03/10/2023   • Primary localized osteoarthritis of ankle, left 03/10/2023   • Pulmonary hypertension (Multi) 03/10/2023   • Right knee pain 03/10/2023   • Shortness of breath 03/10/2023   • Sinus tachycardia 03/10/2023   • Submandibular sialolithiasis 03/10/2023   • Type 2 diabetes mellitus with other skin ulcer (CODE) 07/11/2025   • Typical atrial flutter (Multi) 08/17/2023   • Unilateral primary osteoarthritis, unspecified knee 02/03/2017    Osteoarthritis, localized, knee   • Unspecified acute conjunctivitis, bilateral 08/20/2020     Acute bacterial conjunctivitis of both eyes   • Vitamin B12 deficiency 03/10/2023   [2]  Past Surgical History:  Procedure Laterality Date   • CARDIAC CATHETERIZATION     • CARDIAC CATHETERIZATION N/A 2025    Procedure: LHC, With LV;  Surgeon: Judson Chaudhry MD;  Location: [facility];  Service: Cardiovascular;  Laterality: N/A;   • KNEE SURGERY  2013    Knee Surgery Right   • OTHER SURGICAL HISTORY  2013    Direct Laryngoscopy With Foreign Body Removal   • OTHER SURGICAL HISTORY  2013    Laminectomy With Drainage Of Intramedullary Cyst   • OTHER SURGICAL HISTORY  2013    Ovarian Cystectomy   • TONSILLECTOMY  2013    Tonsillectomy   • TUBAL LIGATION  2013    Tubal Ligation   [3]  No family history on file.  [4]  Allergies  Allergen Reactions   • Vancomycin Other     Patient says it caused hypotension and kidney failure and made her feel terrible    • Doxycycline GI Upset   • Jardiance [Empagliflozin] Itching and Other     Peeling skin    • Macrobid [Nitrofurantoin Monohyd/M-Cryst] Dizziness   • Sulfamethoxazole-Trimethoprim Unknown   [5]  Outpatient Encounter Medications as of 2025   Medication Sig Dispense Refill   • albuterol 90 mcg/actuation inhaler INHALE 2 PUFFS BY MOUTH AND INTO THE LUNGS EVERY 4 HOURS IF NEEDED FOR WHEEZING 8 g 3   • [] amoxicillin (Amoxil) 500 mg capsule Take 2 capsules (1,000 mg) by mouth every 8 hours for 10 days. 60 capsule 0   • amoxicillin-clavulanate (Augmentin) 875-125 mg tablet Take 1 tablet by mouth 2 times a day for 10 days. 20 tablet 0   • ciclopirox (Penlac) 8 % solution Apply topically once daily at bedtime. 6.6 mL 11   • cyclobenzaprine (Flexeril) 10 mg tablet Take 1 tablet (10 mg) by mouth 2 times a day as needed for muscle spasms. 30 tablet 2   • dilTIAZem ER (Tiazac) 360 mg 24 hr capsule Take 1 capsule (360 mg) by mouth once daily. 90 capsule 3   • DULoxetine (Cymbalta) 20 mg DR capsule Take 1 capsule (20 mg)  by mouth once daily. 90 capsule 1   • enoxaparin (Lovenox) 150 mg/mL injection Inject 0.95 mL (142.5 mg) under the skin every 12 hours. 60 each 0   • hydroxyurea (Hydrea) 500 mg capsule Take 2 capsules (1,000 mg total) by mouth once daily. 60 capsule 11   • ipratropium-albuteroL (Duo-Neb) 0.5-2.5 mg/3 mL nebulizer solution Take 3 mL by nebulization 4 times a day as needed for wheezing or shortness of breath. 360 mL 11   • ketoconazole (NIZOral) 2 % cream APPLY TOPICALLY TWICE A DAY FOR 28 DAYS 30 g 0   • lidocaine (Xylocaine) 5 % ointment Apply topically if needed for mild pain (1 - 3). Apply up to three times a day as needed for pain. 35 g 5   • lidocaine 5 % gel Apply 1 inch topically 3 times a day as needed (apply to affected area). 100 g 2   • metOLazone (Zaroxolyn) 2.5 mg tablet Take 1 tablet (2.5 mg) by mouth 2 times a week. 8 tablet 11   • naloxone (Narcan) 4 mg/0.1 mL nasal spray Administer 1 spray (4 mg) into affected nostril(s) if needed for opioid reversal or respiratory depression. 2 each 0   • nebulizer accessories kit UAD 1 kit 3   • nystatin (Mycostatin) cream APPLY TO AFFECTED AREA TWO TO THREE TIMES A DAY 30 g 2   • oxyCODONE (Roxicodone) 10 mg immediate release tablet Take 1 tablet (10 mg) by mouth every 4 hours if needed for severe pain (7 - 10) for up to 28 days. Do not fill before July 7, 2025. 168 tablet 0   • pramipexole (Mirapex) 1 mg tablet Take 1 tablet (1 mg) by mouth 2 times a day. 180 tablet 3   • propranolol (Inderal) 20 mg tablet Take 1 tablet (20 mg) by mouth 2 times a day as needed (anxiety). 60 tablet 5   • sennosides-docusate sodium (Rebecca-Colace) 8.6-50 mg tablet Take 2 tablets by mouth 2 times a day. 120 tablet 0   • spironolactone (Aldactone) 50 mg tablet Take 1 tablet (50 mg) by mouth once daily. 90 tablet 3   • torsemide (Demadex) 20 mg tablet Take 2 tablets (40 mg) by mouth 2 times a day. 360 tablet 3   • Trelegy Ellipta 200-62.5-25 mcg blister with device INHALE 1 PUFF BY  MOUTH AND INTO THE LUNGS ONCE DAILY 180 each 0   • triamcinolone (Kenalog) 0.1 % cream Apply topically 2 times a day. 30 g 0   • warfarin (Coumadin) 7.5 mg tablet Take one po every day 90 tablet 3   • [DISCONTINUED] empagliflozin (Jardiance) 10 mg tablet Take 1 tablet (10 mg) by mouth once daily. 90 tablet 3   • [DISCONTINUED] enoxaparin (Lovenox) 150 mg/mL injection Inject 0.95 mL (142.5 mg) under the skin every 12 hours for 5 days. 10 each 0   • [DISCONTINUED] hydroxyurea (Hydrea) 500 mg capsule Take 2 capsules (1,000 mg total) by mouth once daily.  Take at the same time each day.     • [DISCONTINUED] oxyCODONE (Roxicodone) 10 mg immediate release tablet Take 1 tablet (10 mg) by mouth every 4 hours if needed for severe pain (7 - 10) for up to 28 days. Do not fill before June 9, 2025. 168 tablet 0   • [DISCONTINUED] triamcinolone (Kenalog) 0.1 % cream Apply topically 2 times a day. Apply to affected area 1-2 times daily as needed. Avoid face and groin. 30 g 0   • [DISCONTINUED] triamcinolone (Kenalog) 0.1 % cream Apply topically 2 times a day. Apply to affected area 1-2 times daily as needed. Avoid face and groin. 30 g 0     No facility-administered encounter medications on file as of 7/21/2025.          [1]  Past Medical History:  Diagnosis Date   • Acute bronchitis due to other specified organisms 10/14/2019    Acute bronchitis due to other specified organisms   • Acute on chronic diastolic CHF (congestive heart failure) 12/11/2024   • Acute viral conjunctivitis of left eye 03/10/2023   • Alkaline phosphatase elevation 08/17/2023   • Anemia 03/10/2023   • Aortic stenosis, severe 03/10/2023   • Arthritis of knee 02/01/2010   • Asthma 04/22/2011   • Atrial dilatation, bilateral 03/10/2023   • Bone marrow disorder 08/17/2023   • Cavernous sinus thrombosis ([facility]) 05/10/2012   • Cellulitis of left lower limb 04/08/2021    Cellulitis of left lower leg   • CHF (congestive heart failure)    • Chronic lumbar  radiculopathy 03/10/2023   • Chronic obstructive pulmonary disease with (acute) exacerbation (Multi) 01/09/2019    COPD exacerbation   • Chronic sinusitis, unspecified     Sinusitis   • Chronic thromboembolic disease (Multi) 03/10/2023   • Chronic venous insufficiency 03/10/2023   • Class 3 severe obesity due to excess calories with serious comorbidity and body mass index (BMI) of 50.0 to 59.9 in adult 08/17/2023   • COPD (chronic obstructive pulmonary disease) (Multi) 03/10/2023   • COVID-19 11/28/2022    COVID-19   • Daily headache 08/17/2023   • Depression 07/06/2009   • Elevated bilirubin 08/17/2023   • Elevated transaminase level 08/17/2023   • Essential thrombocytosis 08/17/2023   • Fatty liver 08/17/2023   • Fever 08/17/2023   • Fever 08/17/2023   • Folate-deficiency anemia 03/10/2023   • Generalized anxiety disorder 03/10/2023   • Heart murmur 03/10/2023   • Hemoptysis 03/10/2023   • Hyperkalemia 03/10/2023   • Iron deficiency anemia 03/10/2023   • 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   • Lymphedema 03/10/2023   • Malaise and fatigue 03/10/2023   • MDD (major depressive disorder), recurrent episode, moderate 03/10/2023   • Mixed hyperlipidemia 03/10/2023   • Morbid (severe) obesity due to excess calories (Multi)     Morbid obesity   • Neurogenic claudication due to lumbar spinal stenosis 03/10/2023   • Nonrheumatic mitral valve stenosis 03/10/2023   • BE on CPAP 12/12/2011   • Osteoarthritis of left knee 03/10/2023   • Osteoarthritis of right knee 03/10/2023   • Other conditions influencing health status     History Of ___ Previous Pregnancies   • Other conditions influencing health status 03/12/2020    Arthritis   • Other conditions influencing health status 03/06/2017    History of cough   • Other conditions influencing health status     Menstruation   • Other conditions influencing health status     Osteoarthritis   • Other conditions influencing health status     Pulmonary Disease   • Pain in unspecified ankle and  joints of unspecified foot 06/24/2016    Ankle joint pain   • Pain in unspecified foot 12/08/2015    Foot pain   • Pain in unspecified knee     Joint pain, knee   • Palpitations 03/10/2023   • Panic disorder without agoraphobia 07/06/2009   • Personal history of diseases of the skin and subcutaneous tissue     History of cellulitis   • Personal history of other diseases of the female genital tract     Vaginal delivery   • Personal history of other diseases of the nervous system and sense organs 09/12/2019    History of conjunctivitis   • Personal history of other diseases of the nervous system and sense organs 09/17/2015    History of blurred vision   • Personal history of other diseases of the respiratory system     History of pleurisy   • Personal history of other diseases of the respiratory system 10/17/2016    History of bronchitis   • Personal history of other diseases of the respiratory system 11/29/2017    History of acute bronchitis   • Personal history of other diseases of the respiratory system 11/16/2016    History of acute pharyngitis   • Personal history of other drug therapy 10/14/2016    History of influenza vaccination   • Personal history of other infectious and parasitic diseases     History of hepatitis   • Personal history of other infectious and parasitic diseases 07/22/2020    History of candidiasis of mouth   • Personal history of other medical treatment     History of mammogram   • Personal history of other specified conditions     History of shortness of breath   • Personal history of other specified conditions 01/28/2015    History of shortness of breath   • Personal history of other specified conditions     History of abnormal Pap smear   • Personal history of pneumonia (recurrent)     History of pneumonia   • Pneumonia, unspecified organism 01/11/2018    Community acquired pneumonia   • Polycythemia 03/10/2023   • Postmenopausal bleeding 09/30/2014    Postmenopausal bleeding   • Primary  hypertension 03/10/2023   • Primary localized osteoarthritis of ankle, left 03/10/2023   • Pulmonary hypertension (Multi) 03/10/2023   • Right knee pain 03/10/2023   • Shortness of breath 03/10/2023   • Sinus tachycardia 03/10/2023   • Submandibular sialolithiasis 03/10/2023   • Type 2 diabetes mellitus with other skin ulcer (CODE) 2025   • Typical atrial flutter (Multi) 2023   • Unilateral primary osteoarthritis, unspecified knee 2017    Osteoarthritis, localized, knee   • Unspecified acute conjunctivitis, bilateral 2020    Acute bacterial conjunctivitis of both eyes   • Vitamin B12 deficiency 03/10/2023   [2]  Past Surgical History:  Procedure Laterality Date   • CARDIAC CATHETERIZATION     • CARDIAC CATHETERIZATION N/A 2025    Procedure: LHC, With LV;  Surgeon: Judson Chaudhry MD;  Location: [facility];  Service: Cardiovascular;  Laterality: N/A;   • KNEE SURGERY  2013    Knee Surgery Right   • OTHER SURGICAL HISTORY  2013    Direct Laryngoscopy With Foreign Body Removal   • OTHER SURGICAL HISTORY  2013    Laminectomy With Drainage Of Intramedullary Cyst   • OTHER SURGICAL HISTORY  2013    Ovarian Cystectomy   • TONSILLECTOMY  2013    Tonsillectomy   • TUBAL LIGATION  2013    Tubal Ligation   [3]  No family history on file.  [4]  Allergies  Allergen Reactions   • Vancomycin Other     Patient says it caused hypotension and kidney failure and made her feel terrible    • Doxycycline GI Upset   • Jardiance [Empagliflozin] Itching and Other     Peeling skin    • Macrobid [Nitrofurantoin Monohyd/M-Cryst] Dizziness   • Sulfamethoxazole-Trimethoprim Unknown   [5]  Outpatient Encounter Medications as of 2025   Medication Sig Dispense Refill   • albuterol 90 mcg/actuation inhaler INHALE 2 PUFFS BY MOUTH AND INTO THE LUNGS EVERY 4 HOURS IF NEEDED FOR WHEEZING 8 g 3   • [] amoxicillin (Amoxil) 500 mg capsule Take 2 capsules (1,000 mg)  by mouth every 8 hours for 10 days. 60 capsule 0   • amoxicillin-clavulanate (Augmentin) 875-125 mg tablet Take 1 tablet by mouth 2 times a day for 10 days. 20 tablet 0   • ciclopirox (Penlac) 8 % solution Apply topically once daily at bedtime. 6.6 mL 11   • cyclobenzaprine (Flexeril) 10 mg tablet Take 1 tablet (10 mg) by mouth 2 times a day as needed for muscle spasms. 30 tablet 2   • dilTIAZem ER (Tiazac) 360 mg 24 hr capsule Take 1 capsule (360 mg) by mouth once daily. 90 capsule 3   • DULoxetine (Cymbalta) 20 mg DR capsule Take 1 capsule (20 mg) by mouth once daily. 90 capsule 1   • enoxaparin (Lovenox) 150 mg/mL injection Inject 0.95 mL (142.5 mg) under the skin every 12 hours. 60 each 0   • hydroxyurea (Hydrea) 500 mg capsule Take 2 capsules (1,000 mg total) by mouth once daily. 60 capsule 11   • ipratropium-albuteroL (Duo-Neb) 0.5-2.5 mg/3 mL nebulizer solution Take 3 mL by nebulization 4 times a day as needed for wheezing or shortness of breath. 360 mL 11   • ketoconazole (NIZOral) 2 % cream APPLY TOPICALLY TWICE A DAY FOR 28 DAYS 30 g 0   • lidocaine (Xylocaine) 5 % ointment Apply topically if needed for mild pain (1 - 3). Apply up to three times a day as needed for pain. 35 g 5   • lidocaine 5 % gel Apply 1 inch topically 3 times a day as needed (apply to affected area). 100 g 2   • metOLazone (Zaroxolyn) 2.5 mg tablet Take 1 tablet (2.5 mg) by mouth 2 times a week. 8 tablet 11   • naloxone (Narcan) 4 mg/0.1 mL nasal spray Administer 1 spray (4 mg) into affected nostril(s) if needed for opioid reversal or respiratory depression. 2 each 0   • nebulizer accessories kit UAD 1 kit 3   • nystatin (Mycostatin) cream APPLY TO AFFECTED AREA TWO TO THREE TIMES A DAY 30 g 2   • oxyCODONE (Roxicodone) 10 mg immediate release tablet Take 1 tablet (10 mg) by mouth every 4 hours if needed for severe pain (7 - 10) for up to 28 days. Do not fill before July 7, 2025. 168 tablet 0   • pramipexole (Mirapex) 1 mg tablet Take  1 tablet (1 mg) by mouth 2 times a day. 180 tablet 3   • propranolol (Inderal) 20 mg tablet Take 1 tablet (20 mg) by mouth 2 times a day as needed (anxiety). 60 tablet 5   • sennosides-docusate sodium (Rebecca-Colace) 8.6-50 mg tablet Take 2 tablets by mouth 2 times a day. 120 tablet 0   • spironolactone (Aldactone) 50 mg tablet Take 1 tablet (50 mg) by mouth once daily. 90 tablet 3   • torsemide (Demadex) 20 mg tablet Take 2 tablets (40 mg) by mouth 2 times a day. 360 tablet 3   • Trelegy Ellipta 200-62.5-25 mcg blister with device INHALE 1 PUFF BY MOUTH AND INTO THE LUNGS ONCE DAILY 180 each 0   • triamcinolone (Kenalog) 0.1 % cream Apply topically 2 times a day. 30 g 0   • warfarin (Coumadin) 7.5 mg tablet Take one po every day 90 tablet 3   • [DISCONTINUED] empagliflozin (Jardiance) 10 mg tablet Take 1 tablet (10 mg) by mouth once daily. 90 tablet 3   • [DISCONTINUED] enoxaparin (Lovenox) 150 mg/mL injection Inject 0.95 mL (142.5 mg) under the skin every 12 hours for 5 days. 10 each 0   • [DISCONTINUED] hydroxyurea (Hydrea) 500 mg capsule Take 2 capsules (1,000 mg total) by mouth once daily.  Take at the same time each day.     • [DISCONTINUED] oxyCODONE (Roxicodone) 10 mg immediate release tablet Take 1 tablet (10 mg) by mouth every 4 hours if needed for severe pain (7 - 10) for up to 28 days. Do not fill before June 9, 2025. 168 tablet 0   • [DISCONTINUED] triamcinolone (Kenalog) 0.1 % cream Apply topically 2 times a day. Apply to affected area 1-2 times daily as needed. Avoid face and groin. 30 g 0   • [DISCONTINUED] triamcinolone (Kenalog) 0.1 % cream Apply topically 2 times a day. Apply to affected area 1-2 times daily as needed. Avoid face and groin. 30 g 0     No facility-administered encounter medications on file as of 7/21/2025.

## 2025-07-23 ENCOUNTER — APPOINTMENT (OUTPATIENT)
Dept: PRIMARY CARE | Facility: CLINIC | Age: 65
End: 2025-07-23
Payer: MEDICARE

## 2025-07-23 DIAGNOSIS — I89.0 LYMPHEDEMA: ICD-10-CM

## 2025-07-23 DIAGNOSIS — M17.12 PRIMARY OSTEOARTHRITIS OF LEFT KNEE: ICD-10-CM

## 2025-07-23 DIAGNOSIS — L98.421 SKIN ULCER OF SACRUM, LIMITED TO BREAKDOWN OF SKIN: ICD-10-CM

## 2025-07-23 DIAGNOSIS — E11.622 TYPE 2 DIABETES MELLITUS WITH OTHER SKIN ULCER (CODE): ICD-10-CM

## 2025-07-23 DIAGNOSIS — M17.11 PRIMARY OSTEOARTHRITIS OF RIGHT KNEE: ICD-10-CM

## 2025-07-23 DIAGNOSIS — R29.898 WEAKNESS OF BOTH LOWER EXTREMITIES: ICD-10-CM

## 2025-07-23 DIAGNOSIS — R60.0 LOWER LEG EDEMA: ICD-10-CM

## 2025-07-23 DIAGNOSIS — M48.062 NEUROGENIC CLAUDICATION DUE TO LUMBAR SPINAL STENOSIS: Primary | ICD-10-CM

## 2025-07-23 PROCEDURE — 1159F MED LIST DOCD IN RCRD: CPT | Performed by: FAMILY MEDICINE

## 2025-07-23 PROCEDURE — 99213 OFFICE O/P EST LOW 20 MIN: CPT | Performed by: FAMILY MEDICINE

## 2025-07-23 PROCEDURE — 1160F RVW MEDS BY RX/DR IN RCRD: CPT | Performed by: FAMILY MEDICINE

## 2025-07-23 PROCEDURE — 1111F DSCHRG MED/CURRENT MED MERGE: CPT | Performed by: FAMILY MEDICINE

## 2025-07-23 NOTE — PROGRESS NOTES
Subjective   Patient ID: Neisha Graham is a 65 y.o. female who presents for mobility assessment.  HPI  History of Present Illness  The patient presents via virtual visit for evaluation of mobility issues.    She is seeking a more comfortable power chair, as her current one lacks the necessary features for her needs, including a power tilt and power elevating leg rest. She reports an inability to walk independently and can only use a walker for short distances due to significant swelling in her legs. The swelling cuts off circulation when she sits, causing her feet to turn purple and hurt after just 20 minutes. She is unable to use a scooter due to space constraints in her apartment and does not have the strength in her arms or legs to operate a manual wheelchair. The current power wheelchair she uses is effective but does not prevent bedsores, which she experiences on the backs of her thighs. She also reports being very unsteady when walking. Gets out of breath when gets up to walk. Sometimes oxygen drops down in the upper 80s     Current Medications[1]   Surgical History[2]   Medical History[3]  Social History[4]   Family History[5]   Review of Systems    Objective   There were no vitals taken for this visit.   Physical Exam  Physical Exam      Unsteady gait when stand sup     Assessment/Plan   Problem List Items Addressed This Visit       Weakness of both lower extremities    Type 2 diabetes mellitus with other skin ulcer (CODE)    Osteoarthritis of right knee    Osteoarthritis of left knee    Neurogenic claudication due to lumbar spinal stenosis - Primary    Lymphedema    Lower leg edema     Other Visit Diagnoses         Skin ulcer of sacrum, limited to breakdown of skin                Assessment & Plan  1. Mobility issues.  - Unsteady when walking; unable to use a cane and can only walk about 5 feet with a walker.  - Lacks strength in arms and legs to propel herself in a manual wheelchair; apartment too narrow  for a scooter.  - Currently uses a power wheelchair, but it is not adaptable to her needs, particularly for leg swelling.  - Suffers from bedsores, especially on the backs of her thighs; feet turn purple after sitting for 20 minutes due to poor circulation.       Patient understands and agrees with treatment plan    This medical note was created with the assistance of artificial intelligence (AI) for documentation purposes. The content has been reviewed and confirmed by the healthcare provider for accuracy and completeness. Patient consented to the use of audio recording and use of AI during their visit.     Micheal Gonsalez DO          [1]   Current Outpatient Medications:     albuterol 90 mcg/actuation inhaler, INHALE 2 PUFFS BY MOUTH AND INTO THE LUNGS EVERY 4 HOURS IF NEEDED FOR WHEEZING, Disp: 8 g, Rfl: 3    ciclopirox (Penlac) 8 % solution, Apply topically once daily at bedtime., Disp: 6.6 mL, Rfl: 11    cyclobenzaprine (Flexeril) 10 mg tablet, Take 1 tablet (10 mg) by mouth 2 times a day as needed for muscle spasms., Disp: 30 tablet, Rfl: 2    dilTIAZem ER (Tiazac) 360 mg 24 hr capsule, Take 1 capsule (360 mg) by mouth once daily., Disp: 90 capsule, Rfl: 3    DULoxetine (Cymbalta) 30 mg DR capsule, Take 1 capsule (30 mg) by mouth once daily., Disp: 90 capsule, Rfl: 1    enoxaparin (Lovenox) 150 mg/mL injection, Inject 0.95 mL (142.5 mg) under the skin every 12 hours. (Patient not taking: Reported on 7/21/2025), Disp: 60 each, Rfl: 0    hydroxyurea (Hydrea) 500 mg capsule, Take 2 capsules (1,000 mg total) by mouth once daily., Disp: 60 capsule, Rfl: 11    ipratropium-albuteroL (Duo-Neb) 0.5-2.5 mg/3 mL nebulizer solution, Take 3 mL by nebulization 4 times a day as needed for wheezing or shortness of breath., Disp: 360 mL, Rfl: 11    ketoconazole (NIZOral) 2 % cream, APPLY TOPICALLY TWICE A DAY FOR 28 DAYS, Disp: 30 g, Rfl: 0    lidocaine (Xylocaine) 5 % ointment, Apply topically if needed for mild pain (1 -  3). Apply up to three times a day as needed for pain., Disp: 35 g, Rfl: 5    lidocaine 5 % gel, Apply 1 inch topically 3 times a day as needed (apply to affected area)., Disp: 100 g, Rfl: 2    metOLazone (Zaroxolyn) 2.5 mg tablet, Take 1 tablet (2.5 mg) by mouth 2 times a week., Disp: 8 tablet, Rfl: 11    naloxone (Narcan) 4 mg/0.1 mL nasal spray, Administer 1 spray (4 mg) into affected nostril(s) if needed for opioid reversal or respiratory depression., Disp: 2 each, Rfl: 0    nebulizer accessories kit, UAD, Disp: 1 kit, Rfl: 3    nystatin (Mycostatin) cream, APPLY TO AFFECTED AREA TWO TO THREE TIMES A DAY, Disp: 30 g, Rfl: 2    oxyCODONE (Roxicodone) 10 mg immediate release tablet, Take 1 tablet (10 mg) by mouth every 4 hours if needed for severe pain (7 - 10) for up to 28 days. Do not fill before July 7, 2025., Disp: 168 tablet, Rfl: 0    pramipexole (Mirapex) 1 mg tablet, Take 1 tablet (1 mg) by mouth 2 times a day., Disp: 180 tablet, Rfl: 3    propranolol (Inderal) 20 mg tablet, Take 1 tablet (20 mg) by mouth 2 times a day as needed (anxiety)., Disp: 60 tablet, Rfl: 5    sennosides-docusate sodium (Rebecca-Colace) 8.6-50 mg tablet, Take 2 tablets by mouth 2 times a day., Disp: 120 tablet, Rfl: 0    spironolactone (Aldactone) 50 mg tablet, Take 1 tablet (50 mg) by mouth once daily., Disp: 90 tablet, Rfl: 3    torsemide (Demadex) 20 mg tablet, Take 2 tablets (40 mg) by mouth 2 times a day., Disp: 360 tablet, Rfl: 3    Trelegy Ellipta 200-62.5-25 mcg blister with device, INHALE 1 PUFF BY MOUTH AND INTO THE LUNGS ONCE DAILY, Disp: 180 each, Rfl: 0    triamcinolone (Kenalog) 0.1 % cream, Apply topically 2 times a day., Disp: 30 g, Rfl: 0    warfarin (Coumadin) 7.5 mg tablet, Take one po every day, Disp: 90 tablet, Rfl: 3  [2]   Past Surgical History:  Procedure Laterality Date    CARDIAC CATHETERIZATION      CARDIAC CATHETERIZATION N/A 6/27/2025    Procedure: LHC, With LV;  Surgeon: Judson Chaudhry MD;  Location:  [facility];  Service: Cardiovascular;  Laterality: N/A;    KNEE SURGERY  09/19/2013    Knee Surgery Right    OTHER SURGICAL HISTORY  09/19/2013    Direct Laryngoscopy With Foreign Body Removal    OTHER SURGICAL HISTORY  09/19/2013    Laminectomy With Drainage Of Intramedullary Cyst    OTHER SURGICAL HISTORY  09/27/2013    Ovarian Cystectomy    TONSILLECTOMY  09/19/2013    Tonsillectomy    TUBAL LIGATION  09/19/2013    Tubal Ligation   [3]   Past Medical History:  Diagnosis Date    Acute bronchitis due to other specified organisms 10/14/2019    Acute bronchitis due to other specified organisms    Acute on chronic diastolic CHF (congestive heart failure) 12/11/2024    Acute viral conjunctivitis of left eye 03/10/2023    Alkaline phosphatase elevation 08/17/2023    Anemia 03/10/2023    Anxiety     Aortic stenosis, severe 03/10/2023    Arthritis     Arthritis of knee 02/01/2010    Asthma 04/22/2011    Atrial dilatation, bilateral 03/10/2023    Bone marrow disorder 08/17/2023    Cavernous sinus thrombosis ([facility]) 05/10/2012    Cellulitis of left lower limb 04/08/2021    Cellulitis of left lower leg    CHF (congestive heart failure) 12/13/2024    Chronic lumbar radiculopathy 03/10/2023    Chronic obstructive pulmonary disease with (acute) exacerbation (Multi) 01/09/2019    COPD exacerbation    Chronic sinusitis, unspecified     Sinusitis    Chronic thromboembolic disease (Multi) 03/10/2023    Chronic venous insufficiency 03/10/2023    Class 3 severe obesity due to excess calories with serious comorbidity and body mass index (BMI) of 50.0 to 59.9 in adult 08/17/2023    COPD (chronic obstructive pulmonary disease) (Multi) 03/10/2023    COVID-19 11/28/2022    COVID-19    Daily headache 08/17/2023    Depression 07/06/2009    Elevated bilirubin 08/17/2023    Elevated transaminase level 08/17/2023    Essential thrombocytosis 08/17/2023    Fatty liver 08/17/2023    Fever 08/17/2023    Fever 08/17/2023     Folate-deficiency anemia 03/10/2023    Generalized anxiety disorder 03/10/2023    Heart murmur 03/10/2023    Hemoptysis 03/10/2023    Hyperkalemia 03/10/2023    Iron deficiency anemia 03/10/2023    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    Lymphedema 03/10/2023    Malaise and fatigue 03/10/2023    MDD (major depressive disorder), recurrent episode, moderate 03/10/2023    Mitral valve prolapse     Mixed hyperlipidemia 03/10/2023    Morbid (severe) obesity due to excess calories (Multi)     Morbid obesity    Neurogenic claudication due to lumbar spinal stenosis 03/10/2023    Nonrheumatic mitral valve stenosis 03/10/2023    BE on CPAP 12/12/2011    Osteoarthritis of left knee 03/10/2023    Osteoarthritis of right knee 03/10/2023    Other conditions influencing health status     History Of ___ Previous Pregnancies    Other conditions influencing health status 03/12/2020    Arthritis    Other conditions influencing health status 03/06/2017    History of cough    Other conditions influencing health status     Menstruation    Other conditions influencing health status     Osteoarthritis    Other conditions influencing health status     Pulmonary Disease    Pain in unspecified ankle and joints of unspecified foot 06/24/2016    Ankle joint pain    Pain in unspecified foot 12/08/2015    Foot pain    Pain in unspecified knee     Joint pain, knee    Palpitations 03/10/2023    Panic disorder without agoraphobia 07/06/2009    Personal history of diseases of the skin and subcutaneous tissue     History of cellulitis    Personal history of other diseases of the female genital tract     Vaginal delivery    Personal history of other diseases of the nervous system and sense organs 09/12/2019    History of conjunctivitis    Personal history of other diseases of the nervous system and sense organs 09/17/2015    History of blurred vision    Personal history of other diseases of the respiratory system     History of pleurisy    Personal history of other  diseases of the respiratory system 10/17/2016    History of bronchitis    Personal history of other diseases of the respiratory system 11/29/2017    History of acute bronchitis    Personal history of other diseases of the respiratory system 11/16/2016    History of acute pharyngitis    Personal history of other drug therapy 10/14/2016    History of influenza vaccination    Personal history of other infectious and parasitic diseases     History of hepatitis    Personal history of other infectious and parasitic diseases 07/22/2020    History of candidiasis of mouth    Personal history of other medical treatment     History of mammogram    Personal history of other specified conditions     History of shortness of breath    Personal history of other specified conditions 01/28/2015    History of shortness of breath    Personal history of other specified conditions     History of abnormal Pap smear    Personal history of pneumonia (recurrent)     History of pneumonia    Pneumonia, unspecified organism 01/11/2018    Community acquired pneumonia    Polycythemia 03/10/2023    Postmenopausal bleeding 09/30/2014    Postmenopausal bleeding    Primary hypertension 03/10/2023    Primary localized osteoarthritis of ankle, left 03/10/2023    Pulmonary hypertension (Multi) 03/10/2023    Right knee pain 03/10/2023    Shortness of breath 03/10/2023    Sinus tachycardia 03/10/2023    Submandibular sialolithiasis 03/10/2023    Type 2 diabetes mellitus with other skin ulcer (CODE) 07/11/2025    Typical atrial flutter (Multi) 08/17/2023    Unilateral primary osteoarthritis, unspecified knee 02/03/2017    Osteoarthritis, localized, knee    Unspecified acute conjunctivitis, bilateral 08/20/2020    Acute bacterial conjunctivitis of both eyes    Vitamin B12 deficiency 03/10/2023   [4]   Social History  Tobacco Use    Smoking status: Some Days     Current packs/day: 0.25     Average packs/day: 0.3 packs/day for 30.3 years (7.6 ttl pk-yrs)      Types: Cigarettes     Start date: 4/10/1995    Smokeless tobacco: Never    Tobacco comments:     Vape   Substance Use Topics    Alcohol use: Never    Drug use: Never   [5] No family history on file.

## 2025-07-27 DIAGNOSIS — R23.8 SKIN IRRITATION: ICD-10-CM

## 2025-07-28 DIAGNOSIS — L03.116 CELLULITIS OF LEFT LOWER EXTREMITY: Primary | ICD-10-CM

## 2025-07-28 RX ORDER — CEPHALEXIN 500 MG/1
500 CAPSULE ORAL 2 TIMES DAILY
Qty: 14 CAPSULE | Refills: 0 | Status: SHIPPED | OUTPATIENT
Start: 2025-07-28 | End: 2025-08-04

## 2025-07-28 RX ORDER — TRIAMCINOLONE ACETONIDE 1 MG/G
CREAM TOPICAL
Qty: 30 G | Refills: 0 | Status: SHIPPED | OUTPATIENT
Start: 2025-07-28

## 2025-07-29 ENCOUNTER — OFFICE VISIT (OUTPATIENT)
Dept: PAIN MEDICINE | Facility: CLINIC | Age: 65
End: 2025-07-29
Payer: MEDICARE

## 2025-07-29 VITALS — HEART RATE: 62 BPM | DIASTOLIC BLOOD PRESSURE: 86 MMHG | RESPIRATION RATE: 20 BRPM | SYSTOLIC BLOOD PRESSURE: 138 MMHG

## 2025-07-29 DIAGNOSIS — M96.1 POSTLAMINECTOMY SYNDROME OF LUMBAR REGION: ICD-10-CM

## 2025-07-29 DIAGNOSIS — M96.1 POSTLAMINECTOMY SYNDROME, CERVICAL REGION: ICD-10-CM

## 2025-07-29 DIAGNOSIS — M79.18 MYOFASCIAL PAIN: ICD-10-CM

## 2025-07-29 DIAGNOSIS — M48.062 NEUROGENIC CLAUDICATION DUE TO LUMBAR SPINAL STENOSIS: Primary | ICD-10-CM

## 2025-07-29 DIAGNOSIS — M54.16 CHRONIC LUMBAR RADICULOPATHY: ICD-10-CM

## 2025-07-29 DIAGNOSIS — M17.11 PRIMARY OSTEOARTHRITIS OF RIGHT KNEE: ICD-10-CM

## 2025-07-29 DIAGNOSIS — M48.062 LUMBAR STENOSIS WITH NEUROGENIC CLAUDICATION: ICD-10-CM

## 2025-07-29 DIAGNOSIS — M17.12 PRIMARY OSTEOARTHRITIS OF LEFT KNEE: ICD-10-CM

## 2025-07-29 PROCEDURE — 99214 OFFICE O/P EST MOD 30 MIN: CPT | Performed by: ANESTHESIOLOGY

## 2025-07-29 PROCEDURE — 3079F DIAST BP 80-89 MM HG: CPT | Performed by: ANESTHESIOLOGY

## 2025-07-29 PROCEDURE — G2211 COMPLEX E/M VISIT ADD ON: HCPCS | Performed by: ANESTHESIOLOGY

## 2025-07-29 PROCEDURE — 3075F SYST BP GE 130 - 139MM HG: CPT | Performed by: ANESTHESIOLOGY

## 2025-07-29 PROCEDURE — 1159F MED LIST DOCD IN RCRD: CPT | Performed by: ANESTHESIOLOGY

## 2025-07-29 RX ORDER — OXYCODONE HYDROCHLORIDE 10 MG/1
10 TABLET ORAL EVERY 4 HOURS PRN
Qty: 168 TABLET | Refills: 0 | Status: SHIPPED | OUTPATIENT
Start: 2025-08-06 | End: 2025-09-03

## 2025-07-29 RX ORDER — OXYCODONE HYDROCHLORIDE 10 MG/1
10 TABLET ORAL EVERY 4 HOURS PRN
Qty: 168 TABLET | Refills: 0 | Status: SHIPPED | OUTPATIENT
Start: 2025-09-03 | End: 2025-10-01

## 2025-07-29 RX ORDER — OXYCODONE HYDROCHLORIDE 10 MG/1
10 TABLET ORAL EVERY 4 HOURS PRN
Qty: 168 TABLET | Refills: 0 | Status: SHIPPED | OUTPATIENT
Start: 2025-10-01 | End: 2025-10-29

## 2025-07-29 ASSESSMENT — PAIN SCALES - GENERAL: PAINLEVEL_OUTOF10: 5 - MODERATE PAIN

## 2025-07-29 ASSESSMENT — PAIN - FUNCTIONAL ASSESSMENT: PAIN_FUNCTIONAL_ASSESSMENT: 0-10

## 2025-07-29 NOTE — PROGRESS NOTES
Subjective   Patient ID: Neisha Graham is a 64 y.o. female with PMH of morbid obesity, severe aortic stenosis, pulmonary hypertension, acute on chronic diastolic heart failure and bilateral lower extremity lymphedema complaining of low back pain and bilateral knee pain.     Patient is wheelchair-bound due to chronic medical conditions.  Pain is mainly in the low back but also has bilateral lower extremity pain.  Pain is on average 5 out of 10 in severity with her current pain regimen.  Patient taking medication as prescribed, no side effects.  Patient maintained on oxycodone IR 10 mg every 4 hours as needed. Patient does not endorse any new symptoms, has some new ulcers on her leg that she sees wound care for and is improving.   Patient is presenting to our clinic for refill for medications  Patient is not receiving any interventions.   States she needs a valve replacement.          Review of Systems   13-point ROS done and negative except for HPI.     Current Outpatient Medications   Medication Instructions    albuterol 90 mcg/actuation inhaler 2 puffs, inhalation, Every 4 hours PRN    cephalexin (KEFLEX) 500 mg, oral, 2 times daily    ciclopirox (Penlac) 8 % solution Topical, Nightly    cyclobenzaprine (FLEXERIL) 10 mg, oral, 2 times daily PRN    dilTIAZem ER (TIAZAC) 360 mg, oral, Daily    DULoxetine (CYMBALTA) 30 mg, oral, Daily    enoxaparin (LOVENOX) 1 mg/kg, subcutaneous, Every 12 hours    hydroxyurea (HYDREA) 1,000 mg, oral, Daily    ipratropium-albuteroL (Duo-Neb) 0.5-2.5 mg/3 mL nebulizer solution 3 mL, nebulization, 4 times daily PRN    ketoconazole (NIZOral) 2 % cream APPLY TOPICALLY TWICE A DAY FOR 28 DAYS    lidocaine (Xylocaine) 5 % ointment Topical, As needed, Apply up to three times a day as needed for pain.    lidocaine 5 % gel 1 inch, topical (top), 3 times daily PRN    metOLazone (ZAROXOLYN) 2.5 mg, oral, 2 times weekly    naloxone (NARCAN) 4 mg, nasal, As needed    nebulizer accessories kit  UAD    nystatin (Mycostatin) cream APPLY TO AFFECTED AREA TWO TO THREE TIMES A DAY    [START ON 8/6/2025] oxyCODONE (ROXICODONE) 10 mg, oral, Every 4 hours PRN    [START ON 9/3/2025] oxyCODONE (ROXICODONE) 10 mg, oral, Every 4 hours PRN    [START ON 10/1/2025] oxyCODONE (ROXICODONE) 10 mg, oral, Every 4 hours PRN    pramipexole (MIRAPEX) 1 mg, oral, 2 times daily    propranolol (INDERAL) 20 mg, oral, 2 times daily PRN    sennosides-docusate sodium (Rebecca-Colace) 8.6-50 mg tablet 2 tablets, oral, 2 times daily    spironolactone (ALDACTONE) 50 mg, oral, Daily    torsemide (DEMADEX) 40 mg, oral, 2 times daily    Trelegy Ellipta 200-62.5-25 mcg blister with device 1 puff, inhalation, Daily    triamcinolone (Kenalog) 0.1 % cream APPLY  CREAM EXTERNALLY TWICE DAILY    warfarin (Coumadin) 7.5 mg tablet Take one po every day       Medical History[1]     Surgical History[2]     Family History[3]     RX Allergies[4]     Objective     Vitals:    07/29/25 1247   BP: 138/86   Pulse: 62   Resp: 20        Physical Exam  General: NAD, well groomed, well nourished  Eyes: Non-icteric sclera, EOMI  Ears, Nose, Mouth, and Throat: External ears and nose appear to be without deformity or rash. No lesions or masses noted. Hearing is grossly intact.   Neck: Trachea midline  Respiratory: Nonlabored breathing   Cardiovascular: +3 peripheral edema   Skin: bandaged right leg given wound.     Physical exam limited due to patient being wheelchair bound.   Examination of the muscles/joints/bones show normal range of motion.  The patient is mildly tender to palpation in the cervical and lumbar paraspinal musculature as well as the medial lateral joint lines of both knees.  Lower extremity: Presence of lymphedema on bilateral lower extremities with open wounds on the left shin.  Psychiatric: Alert, orientation to person, place, and time. Cooperative.    Imaging personally reviewed and independently interpreted:     XR 5/17/2022  FINDINGS:  There is  advanced tricompartmental degenerative change in the left  knee with bone-on-bone appearance of both the medial tibiofemoral and  lateral tibiofemoral compartments. There is slight lateral  subluxation of the tibia in like relation to the femur. There is no  fracture or dislocation.     There is advanced tricompartmental degenerative change in the right  knee. Bone-on-bone appearance of both the medial tibiofemoral and  lateral tibiofemoral compartments. Lateral subluxation of the tibia  in relation to the femoral condyles. No acute fracture or dislocation.     IMPRESSION:  Advanced degenerative change in both knees.    Assessment/Plan   Neisha Graham is a 64 y.o. female with PMH of morbid obesity, severe aortic stenosis, pulmonary hypertension, acute on chronic diastolic heart failure and bilateral lower extremity lymphedema complaining of low back pain and bilateral leg pain. Consistent with lumbar radiculopathy and osteoarthritis on left and right knee. Patient is maintained on current medications.    I have personally reviewed the OARRS report for Neisha Graham I have considered the risks of abuse, dependence, addiction and diversion    OARRS:  No data recorded  I have personally reviewed the OARRS report for Neisha Graham. I have considered the risks of abuse, dependence, addiction and diversion  Average MME/day 84    Is the patient prescribed a combination of a benzodiazepine and opioid?  No  Controlled Substance Agreement:  Reviewed Controlled Substance Agreement including but not limited to the benefits, risks, and alternatives to treatment with a Controlled Substance medication(s).      Plan:  - Continue patient's current medication regimen  - Counseled on smoking cessation  - Continue optimization of care of chronic medical conditions    Follow up: In 3 months    The patient was invited to contact us back anytime with any questions or concerns and follow-up with us in the office as needed.      Diagnoses and all orders for this visit:  Neurogenic claudication due to lumbar spinal stenosis  -     oxyCODONE (Roxicodone) 10 mg immediate release tablet; Take 1 tablet (10 mg) by mouth every 4 hours if needed for severe pain (7 - 10) for up to 28 days. Do not fill before August 6, 2025.  -     oxyCODONE (Roxicodone) 10 mg immediate release tablet; Take 1 tablet (10 mg) by mouth every 4 hours if needed for severe pain (7 - 10) for up to 28 days. Do not fill before October 1, 2025.  Chronic lumbar radiculopathy  -     oxyCODONE (Roxicodone) 10 mg immediate release tablet; Take 1 tablet (10 mg) by mouth every 4 hours if needed for severe pain (7 - 10) for up to 28 days. Do not fill before August 6, 2025.  Primary osteoarthritis of left knee  -     oxyCODONE (Roxicodone) 10 mg immediate release tablet; Take 1 tablet (10 mg) by mouth every 4 hours if needed for severe pain (7 - 10) for up to 28 days. Do not fill before August 6, 2025.  -     oxyCODONE (Roxicodone) 10 mg immediate release tablet; Take 1 tablet (10 mg) by mouth every 4 hours if needed for severe pain (7 - 10) for up to 28 days. Do not fill before October 1, 2025.  Primary osteoarthritis of right knee  -     oxyCODONE (Roxicodone) 10 mg immediate release tablet; Take 1 tablet (10 mg) by mouth every 4 hours if needed for severe pain (7 - 10) for up to 28 days. Do not fill before August 6, 2025.  Myofascial pain  -     oxyCODONE (Roxicodone) 10 mg immediate release tablet; Take 1 tablet (10 mg) by mouth every 4 hours if needed for severe pain (7 - 10) for up to 28 days. Do not fill before August 6, 2025.  Lumbar stenosis with neurogenic claudication  -     oxyCODONE (Roxicodone) 10 mg immediate release tablet; Take 1 tablet (10 mg) by mouth every 4 hours if needed for severe pain (7 - 10) for up to 28 days. Do not fill before September 3, 2025.  Postlaminectomy syndrome, cervical region  -     oxyCODONE (Roxicodone) 10 mg immediate release tablet;  Take 1 tablet (10 mg) by mouth every 4 hours if needed for severe pain (7 - 10) for up to 28 days. Do not fill before September 3, 2025.  Postlaminectomy syndrome of lumbar region  -     oxyCODONE (Roxicodone) 10 mg immediate release tablet; Take 1 tablet (10 mg) by mouth every 4 hours if needed for severe pain (7 - 10) for up to 28 days. Do not fill before September 3, 2025.      The patient was discussed and seen with Dr. Davila.      Damián Mota MD  Pain Fellow           [1]   Past Medical History:  Diagnosis Date    Acute bronchitis due to other specified organisms 10/14/2019    Acute bronchitis due to other specified organisms    Acute on chronic diastolic CHF (congestive heart failure) 12/11/2024    Acute viral conjunctivitis of left eye 03/10/2023    Alkaline phosphatase elevation 08/17/2023    Anemia 03/10/2023    Anxiety     Aortic stenosis, severe 03/10/2023    Arthritis     Arthritis of knee 02/01/2010    Asthma 04/22/2011    Atrial dilatation, bilateral 03/10/2023    Bone marrow disorder 08/17/2023    Cavernous sinus thrombosis ([facility]) 05/10/2012    Cellulitis of left lower limb 04/08/2021    Cellulitis of left lower leg    CHF (congestive heart failure) 12/13/2024    Chronic lumbar radiculopathy 03/10/2023    Chronic obstructive pulmonary disease with (acute) exacerbation (Multi) 01/09/2019    COPD exacerbation    Chronic sinusitis, unspecified     Sinusitis    Chronic thromboembolic disease (Multi) 03/10/2023    Chronic venous insufficiency 03/10/2023    Class 3 severe obesity due to excess calories with serious comorbidity and body mass index (BMI) of 50.0 to 59.9 in adult 08/17/2023    COPD (chronic obstructive pulmonary disease) (Multi) 03/10/2023    COVID-19 11/28/2022    COVID-19    Daily headache 08/17/2023    Depression 07/06/2009    Elevated bilirubin 08/17/2023    Elevated transaminase level 08/17/2023    Essential thrombocytosis 08/17/2023    Fatty liver 08/17/2023    Fever 08/17/2023     Fever 08/17/2023    Folate-deficiency anemia 03/10/2023    Generalized anxiety disorder 03/10/2023    Heart murmur 03/10/2023    Hemoptysis 03/10/2023    Hyperkalemia 03/10/2023    Iron deficiency anemia 03/10/2023    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    Lymphedema 03/10/2023    Malaise and fatigue 03/10/2023    MDD (major depressive disorder), recurrent episode, moderate 03/10/2023    Mitral valve prolapse     Mixed hyperlipidemia 03/10/2023    Morbid (severe) obesity due to excess calories (Multi)     Morbid obesity    Neurogenic claudication due to lumbar spinal stenosis 03/10/2023    Nonrheumatic mitral valve stenosis 03/10/2023    BE on CPAP 12/12/2011    Osteoarthritis of left knee 03/10/2023    Osteoarthritis of right knee 03/10/2023    Other conditions influencing health status     History Of ___ Previous Pregnancies    Other conditions influencing health status 03/12/2020    Arthritis    Other conditions influencing health status 03/06/2017    History of cough    Other conditions influencing health status     Menstruation    Other conditions influencing health status     Osteoarthritis    Other conditions influencing health status     Pulmonary Disease    Pain in unspecified ankle and joints of unspecified foot 06/24/2016    Ankle joint pain    Pain in unspecified foot 12/08/2015    Foot pain    Pain in unspecified knee     Joint pain, knee    Palpitations 03/10/2023    Panic disorder without agoraphobia 07/06/2009    Personal history of diseases of the skin and subcutaneous tissue     History of cellulitis    Personal history of other diseases of the female genital tract     Vaginal delivery    Personal history of other diseases of the nervous system and sense organs 09/12/2019    History of conjunctivitis    Personal history of other diseases of the nervous system and sense organs 09/17/2015    History of blurred vision    Personal history of other diseases of the respiratory system     History of pleurisy     Personal history of other diseases of the respiratory system 10/17/2016    History of bronchitis    Personal history of other diseases of the respiratory system 11/29/2017    History of acute bronchitis    Personal history of other diseases of the respiratory system 11/16/2016    History of acute pharyngitis    Personal history of other drug therapy 10/14/2016    History of influenza vaccination    Personal history of other infectious and parasitic diseases     History of hepatitis    Personal history of other infectious and parasitic diseases 07/22/2020    History of candidiasis of mouth    Personal history of other medical treatment     History of mammogram    Personal history of other specified conditions     History of shortness of breath    Personal history of other specified conditions 01/28/2015    History of shortness of breath    Personal history of other specified conditions     History of abnormal Pap smear    Personal history of pneumonia (recurrent)     History of pneumonia    Pneumonia, unspecified organism 01/11/2018    Community acquired pneumonia    Polycythemia 03/10/2023    Postmenopausal bleeding 09/30/2014    Postmenopausal bleeding    Primary hypertension 03/10/2023    Primary localized osteoarthritis of ankle, left 03/10/2023    Pulmonary hypertension (Multi) 03/10/2023    Right knee pain 03/10/2023    Shortness of breath 03/10/2023    Sinus tachycardia 03/10/2023    Submandibular sialolithiasis 03/10/2023    Type 2 diabetes mellitus with other skin ulcer (CODE) 07/11/2025    Typical atrial flutter (Multi) 08/17/2023    Unilateral primary osteoarthritis, unspecified knee 02/03/2017    Osteoarthritis, localized, knee    Unspecified acute conjunctivitis, bilateral 08/20/2020    Acute bacterial conjunctivitis of both eyes    Vitamin B12 deficiency 03/10/2023   [2]   Past Surgical History:  Procedure Laterality Date    CARDIAC CATHETERIZATION      CARDIAC CATHETERIZATION N/A 6/27/2025     Procedure: C, With LV;  Surgeon: Judson Chaudhry MD;  Location: [facility];  Service: Cardiovascular;  Laterality: N/A;    KNEE SURGERY  09/19/2013    Knee Surgery Right    OTHER SURGICAL HISTORY  09/19/2013    Direct Laryngoscopy With Foreign Body Removal    OTHER SURGICAL HISTORY  09/19/2013    Laminectomy With Drainage Of Intramedullary Cyst    OTHER SURGICAL HISTORY  09/27/2013    Ovarian Cystectomy    TONSILLECTOMY  09/19/2013    Tonsillectomy    TUBAL LIGATION  09/19/2013    Tubal Ligation   [3] No family history on file.  [4]   Allergies  Allergen Reactions    Vancomycin Other     Patient says it caused hypotension and kidney failure and made her feel terrible     Doxycycline GI Upset    Jardiance [Empagliflozin] Itching and Other     Peeling skin     Macrobid [Nitrofurantoin Monohyd/M-Cryst] Dizziness    Sulfamethoxazole-Trimethoprim Unknown

## 2025-07-31 ENCOUNTER — TELEPHONE (OUTPATIENT)
Dept: CARDIOLOGY | Facility: HOSPITAL | Age: 65
End: 2025-07-31
Payer: MEDICARE

## 2025-07-31 NOTE — TELEPHONE ENCOUNTER
Reached out to patient regarding scheduling repeat labs, CT TAVR for per valve and structural protocol. Left voicemail with contact information for return call.

## 2025-08-04 ENCOUNTER — PATIENT MESSAGE (OUTPATIENT)
Dept: CARDIOLOGY | Facility: HOSPITAL | Age: 65
End: 2025-08-04
Payer: MEDICARE

## 2025-08-04 DIAGNOSIS — I50.31 ACUTE HEART FAILURE WITH PRESERVED EJECTION FRACTION (HFPEF): ICD-10-CM

## 2025-08-05 ENCOUNTER — OFFICE VISIT (OUTPATIENT)
Dept: WOUND CARE | Facility: HOSPITAL | Age: 65
End: 2025-08-05
Payer: MEDICARE

## 2025-08-05 PROCEDURE — 11042 DBRDMT SUBQ TIS 1ST 20SQCM/<: CPT | Mod: RT

## 2025-08-05 PROCEDURE — 11045 DBRDMT SUBQ TISS EACH ADDL: CPT | Mod: LT

## 2025-08-09 DIAGNOSIS — R23.8 SKIN IRRITATION: ICD-10-CM

## 2025-08-09 DIAGNOSIS — B37.0 THRUSH: ICD-10-CM

## 2025-08-11 RX ORDER — CLOTRIMAZOLE 10 MG/1
LOZENGE ORAL
Qty: 50 TROCHE | Refills: 1 | Status: SHIPPED | OUTPATIENT
Start: 2025-08-11

## 2025-08-11 RX ORDER — TRIAMCINOLONE ACETONIDE 1 MG/G
CREAM TOPICAL
Qty: 30 G | Refills: 0 | Status: SHIPPED | OUTPATIENT
Start: 2025-08-11

## 2025-08-11 RX ORDER — METOLAZONE 5 MG/1
5 TABLET ORAL 2 TIMES WEEKLY
Qty: 8 TABLET | Refills: 11 | Status: SHIPPED | OUTPATIENT
Start: 2025-08-11 | End: 2026-08-11

## 2025-08-12 ENCOUNTER — OFFICE VISIT (OUTPATIENT)
Dept: WOUND CARE | Facility: HOSPITAL | Age: 65
End: 2025-08-12
Payer: MEDICARE

## 2025-08-12 ENCOUNTER — TELEPHONE (OUTPATIENT)
Dept: PRIMARY CARE | Facility: CLINIC | Age: 65
End: 2025-08-12

## 2025-08-12 DIAGNOSIS — L03.115 CELLULITIS OF RIGHT LOWER EXTREMITY: Primary | ICD-10-CM

## 2025-08-12 PROCEDURE — 11045 DBRDMT SUBQ TISS EACH ADDL: CPT | Mod: LT

## 2025-08-12 PROCEDURE — 11042 DBRDMT SUBQ TIS 1ST 20SQCM/<: CPT | Mod: RT

## 2025-08-12 RX ORDER — CEPHALEXIN 500 MG/1
500 CAPSULE ORAL 4 TIMES DAILY
Qty: 40 CAPSULE | Refills: 0 | Status: SHIPPED | OUTPATIENT
Start: 2025-08-12 | End: 2025-08-22

## 2025-08-15 ENCOUNTER — TELEPHONE (OUTPATIENT)
Dept: CARDIOLOGY | Facility: HOSPITAL | Age: 65
End: 2025-08-15
Payer: MEDICARE

## 2025-08-18 DIAGNOSIS — I50.31 ACUTE HEART FAILURE WITH PRESERVED EJECTION FRACTION (HFPEF): ICD-10-CM

## 2025-08-19 DIAGNOSIS — J43.2 CENTRILOBULAR EMPHYSEMA (MULTI): ICD-10-CM

## 2025-08-19 RX ORDER — ALBUTEROL SULFATE 90 UG/1
2 INHALANT RESPIRATORY (INHALATION) EVERY 4 HOURS PRN
Qty: 9 G | Refills: 0 | Status: SHIPPED | OUTPATIENT
Start: 2025-08-19

## 2025-08-21 ENCOUNTER — OFFICE VISIT (OUTPATIENT)
Dept: WOUND CARE | Facility: HOSPITAL | Age: 65
End: 2025-08-21
Payer: MEDICARE

## 2025-08-21 DIAGNOSIS — L30.4 INTERTRIGO: ICD-10-CM

## 2025-08-21 PROCEDURE — 11045 DBRDMT SUBQ TISS EACH ADDL: CPT

## 2025-08-21 PROCEDURE — 11042 DBRDMT SUBQ TIS 1ST 20SQCM/<: CPT

## 2025-08-21 RX ORDER — KETOCONAZOLE 20 MG/G
CREAM TOPICAL DAILY
Qty: 60 G | Refills: 1 | Status: SHIPPED | OUTPATIENT
Start: 2025-08-21

## 2025-08-25 ENCOUNTER — HOSPITAL ENCOUNTER (INPATIENT)
Facility: HOSPITAL | Age: 65
LOS: 3 days | Discharge: NURSING, REHAB OR LONG-TERM CARE | DRG: 872 | End: 2025-08-28
Attending: STUDENT IN AN ORGANIZED HEALTH CARE EDUCATION/TRAINING PROGRAM | Admitting: INTERNAL MEDICINE
Payer: MEDICARE

## 2025-08-25 ENCOUNTER — APPOINTMENT (OUTPATIENT)
Dept: RADIOLOGY | Facility: HOSPITAL | Age: 65
DRG: 872 | End: 2025-08-25
Payer: MEDICARE

## 2025-08-25 ENCOUNTER — APPOINTMENT (OUTPATIENT)
Dept: CARDIOLOGY | Facility: HOSPITAL | Age: 65
DRG: 872 | End: 2025-08-25
Payer: MEDICARE

## 2025-08-25 PROBLEM — N19 RENAL FAILURE, UNSPECIFIED CHRONICITY: Status: ACTIVE | Noted: 2025-08-25

## 2025-08-25 SDOH — SOCIAL STABILITY: SOCIAL INSECURITY: HAVE YOU HAD THOUGHTS OF HARMING ANYONE ELSE?: NO

## 2025-08-25 SDOH — ECONOMIC STABILITY: FOOD INSECURITY: WITHIN THE PAST 12 MONTHS, THE FOOD YOU BOUGHT JUST DIDN'T LAST AND YOU DIDN'T HAVE MONEY TO GET MORE.: NEVER TRUE

## 2025-08-25 SDOH — HEALTH STABILITY: PHYSICAL HEALTH: ON AVERAGE, HOW MANY MINUTES DO YOU ENGAGE IN EXERCISE AT THIS LEVEL?: 0 MIN

## 2025-08-25 SDOH — ECONOMIC STABILITY: FOOD INSECURITY: WITHIN THE PAST 12 MONTHS, YOU WORRIED THAT YOUR FOOD WOULD RUN OUT BEFORE YOU GOT THE MONEY TO BUY MORE.: NEVER TRUE

## 2025-08-25 SDOH — SOCIAL STABILITY: SOCIAL INSECURITY: WITHIN THE LAST YEAR, HAVE YOU BEEN HUMILIATED OR EMOTIONALLY ABUSED IN OTHER WAYS BY YOUR PARTNER OR EX-PARTNER?: NO

## 2025-08-25 SDOH — SOCIAL STABILITY: SOCIAL INSECURITY: ARE YOU OR HAVE YOU BEEN THREATENED OR ABUSED PHYSICALLY, EMOTIONALLY, OR SEXUALLY BY ANYONE?: NO

## 2025-08-25 SDOH — SOCIAL STABILITY: SOCIAL INSECURITY: WITHIN THE LAST YEAR, HAVE YOU BEEN AFRAID OF YOUR PARTNER OR EX-PARTNER?: NO

## 2025-08-25 SDOH — ECONOMIC STABILITY: HOUSING INSECURITY: AT ANY TIME IN THE PAST 12 MONTHS, WERE YOU HOMELESS OR LIVING IN A SHELTER (INCLUDING NOW)?: NO

## 2025-08-25 SDOH — SOCIAL STABILITY: SOCIAL INSECURITY: HAS ANYONE EVER THREATENED TO HURT YOUR FAMILY OR YOUR PETS?: NO

## 2025-08-25 SDOH — SOCIAL STABILITY: SOCIAL INSECURITY: HAVE YOU HAD ANY THOUGHTS OF HARMING ANYONE ELSE?: NO

## 2025-08-25 SDOH — HEALTH STABILITY: PHYSICAL HEALTH: ON AVERAGE, HOW MANY DAYS PER WEEK DO YOU ENGAGE IN MODERATE TO STRENUOUS EXERCISE (LIKE A BRISK WALK)?: 0 DAYS

## 2025-08-25 SDOH — ECONOMIC STABILITY: HOUSING INSECURITY: IN THE LAST 12 MONTHS, WAS THERE A TIME WHEN YOU WERE NOT ABLE TO PAY THE MORTGAGE OR RENT ON TIME?: NO

## 2025-08-25 SDOH — ECONOMIC STABILITY: TRANSPORTATION INSECURITY: IN THE PAST 12 MONTHS, HAS LACK OF TRANSPORTATION KEPT YOU FROM MEDICAL APPOINTMENTS OR FROM GETTING MEDICATIONS?: NO

## 2025-08-25 SDOH — ECONOMIC STABILITY: HOUSING INSECURITY: IN THE PAST 12 MONTHS, HOW MANY TIMES HAVE YOU MOVED WHERE YOU WERE LIVING?: 0

## 2025-08-25 SDOH — SOCIAL STABILITY: SOCIAL INSECURITY: WERE YOU ABLE TO COMPLETE ALL THE BEHAVIORAL HEALTH SCREENINGS?: YES

## 2025-08-25 SDOH — ECONOMIC STABILITY: INCOME INSECURITY: IN THE PAST 12 MONTHS HAS THE ELECTRIC, GAS, OIL, OR WATER COMPANY THREATENED TO SHUT OFF SERVICES IN YOUR HOME?: NO

## 2025-08-25 SDOH — ECONOMIC STABILITY: FOOD INSECURITY: HOW HARD IS IT FOR YOU TO PAY FOR THE VERY BASICS LIKE FOOD, HOUSING, MEDICAL CARE, AND HEATING?: NOT VERY HARD

## 2025-08-25 SDOH — SOCIAL STABILITY: SOCIAL INSECURITY: ABUSE: ADULT

## 2025-08-25 SDOH — SOCIAL STABILITY: SOCIAL INSECURITY: DOES ANYONE TRY TO KEEP YOU FROM HAVING/CONTACTING OTHER FRIENDS OR DOING THINGS OUTSIDE YOUR HOME?: NO

## 2025-08-25 SDOH — SOCIAL STABILITY: SOCIAL INSECURITY: DO YOU FEEL UNSAFE GOING BACK TO THE PLACE WHERE YOU ARE LIVING?: NO

## 2025-08-25 SDOH — SOCIAL STABILITY: SOCIAL INSECURITY: DO YOU FEEL ANYONE HAS EXPLOITED OR TAKEN ADVANTAGE OF YOU FINANCIALLY OR OF YOUR PERSONAL PROPERTY?: NO

## 2025-08-25 ASSESSMENT — PAIN - FUNCTIONAL ASSESSMENT
PAIN_FUNCTIONAL_ASSESSMENT: 0-10

## 2025-08-25 ASSESSMENT — COGNITIVE AND FUNCTIONAL STATUS - GENERAL
MOVING TO AND FROM BED TO CHAIR: A LOT
EATING MEALS: A LITTLE
WALKING IN HOSPITAL ROOM: A LOT
PATIENT BASELINE BEDBOUND: NO
DRESSING REGULAR LOWER BODY CLOTHING: A LITTLE
CLIMB 3 TO 5 STEPS WITH RAILING: A LOT
DRESSING REGULAR UPPER BODY CLOTHING: A LITTLE
STANDING UP FROM CHAIR USING ARMS: A LOT
TURNING FROM BACK TO SIDE WHILE IN FLAT BAD: A LOT
MOVING FROM LYING ON BACK TO SITTING ON SIDE OF FLAT BED WITH BEDRAILS: A LOT
MOBILITY SCORE: 12
DAILY ACTIVITIY SCORE: 18
TOILETING: A LITTLE
PERSONAL GROOMING: A LITTLE
HELP NEEDED FOR BATHING: A LITTLE

## 2025-08-25 ASSESSMENT — ENCOUNTER SYMPTOMS
GASTROINTESTINAL NEGATIVE: 1
HEADACHES: 1
ORTHOPNEA: 1
PSYCHIATRIC NEGATIVE: 1
FEVER: 0
COLOR CHANGE: 1
HEMATURIA: 0
NECK PAIN: 1
ABDOMINAL PAIN: 0
NEAR-SYNCOPE: 1
CONSTIPATION: 0
CHILLS: 0
DIARRHEA: 0
DYSPNEA ON EXERTION: 1
FREQUENCY: 0
DYSURIA: 0
FLANK PAIN: 1
RESPIRATORY NEGATIVE: 1

## 2025-08-25 ASSESSMENT — LIFESTYLE VARIABLES
HAVE PEOPLE ANNOYED YOU BY CRITICIZING YOUR DRINKING: NO
HOW OFTEN DO YOU HAVE A DRINK CONTAINING ALCOHOL: NEVER
HOW OFTEN DO YOU HAVE 6 OR MORE DRINKS ON ONE OCCASION: NEVER
EVER FELT BAD OR GUILTY ABOUT YOUR DRINKING: NO
HAVE YOU EVER FELT YOU SHOULD CUT DOWN ON YOUR DRINKING: NO
HOW MANY STANDARD DRINKS CONTAINING ALCOHOL DO YOU HAVE ON A TYPICAL DAY: PATIENT DOES NOT DRINK
SKIP TO QUESTIONS 9-10: 1
AUDIT-C TOTAL SCORE: 0
AUDIT-C TOTAL SCORE: 0
EVER HAD A DRINK FIRST THING IN THE MORNING TO STEADY YOUR NERVES TO GET RID OF A HANGOVER: NO
TOTAL SCORE: 0

## 2025-08-25 ASSESSMENT — PAIN DESCRIPTION - FREQUENCY
FREQUENCY: CONSTANT/CONTINUOUS
FREQUENCY: CONSTANT/CONTINUOUS

## 2025-08-25 ASSESSMENT — ACTIVITIES OF DAILY LIVING (ADL)
HEARING - LEFT EAR: FUNCTIONAL
FEEDING YOURSELF: INDEPENDENT
LACK_OF_TRANSPORTATION: NO
WALKS IN HOME: INDEPENDENT
GROOMING: INDEPENDENT
ADEQUATE_TO_COMPLETE_ADL: YES
HEARING - RIGHT EAR: FUNCTIONAL
BATHING: INDEPENDENT
LACK_OF_TRANSPORTATION: NO
ASSISTIVE_DEVICE: WHEELCHAIR;WALKER
TOILETING: INDEPENDENT
DRESSING YOURSELF: INDEPENDENT
LACK_OF_TRANSPORTATION: NO
JUDGMENT_ADEQUATE_SAFELY_COMPLETE_DAILY_ACTIVITIES: YES
PATIENT'S MEMORY ADEQUATE TO SAFELY COMPLETE DAILY ACTIVITIES?: YES

## 2025-08-25 ASSESSMENT — PAIN DESCRIPTION - DESCRIPTORS
DESCRIPTORS: ACHING

## 2025-08-25 ASSESSMENT — PAIN SCALES - GENERAL
PAINLEVEL_OUTOF10: 6
PAINLEVEL_OUTOF10: 5 - MODERATE PAIN
PAINLEVEL_OUTOF10: 4
PAINLEVEL_OUTOF10: 7
PAINLEVEL_OUTOF10: 0 - NO PAIN
PAINLEVEL_OUTOF10: 0 - NO PAIN

## 2025-08-25 ASSESSMENT — PAIN DESCRIPTION - LOCATION
LOCATION: FOOT
LOCATION: LEG
LOCATION: LEG

## 2025-08-25 ASSESSMENT — PATIENT HEALTH QUESTIONNAIRE - PHQ9
SUM OF ALL RESPONSES TO PHQ9 QUESTIONS 1 & 2: 0
1. LITTLE INTEREST OR PLEASURE IN DOING THINGS: NOT AT ALL
2. FEELING DOWN, DEPRESSED OR HOPELESS: NOT AT ALL

## 2025-08-25 ASSESSMENT — PAIN DESCRIPTION - PAIN TYPE
TYPE: CHRONIC PAIN
TYPE: CHRONIC PAIN

## 2025-08-25 ASSESSMENT — PAIN DESCRIPTION - ORIENTATION
ORIENTATION: RIGHT;LEFT
ORIENTATION: LEFT
ORIENTATION: LEFT

## 2025-08-26 ENCOUNTER — APPOINTMENT (OUTPATIENT)
Dept: CARDIOLOGY | Facility: HOSPITAL | Age: 65
DRG: 872 | End: 2025-08-26
Payer: MEDICARE

## 2025-08-26 ASSESSMENT — COGNITIVE AND FUNCTIONAL STATUS - GENERAL
DRESSING REGULAR LOWER BODY CLOTHING: TOTAL
TOILETING: TOTAL
DAILY ACTIVITIY SCORE: 15
PERSONAL GROOMING: A LITTLE
HELP NEEDED FOR BATHING: A LOT

## 2025-08-26 ASSESSMENT — PAIN - FUNCTIONAL ASSESSMENT
PAIN_FUNCTIONAL_ASSESSMENT: 0-10

## 2025-08-26 ASSESSMENT — ACTIVITIES OF DAILY LIVING (ADL)
BATHING_ASSISTANCE: MODERATE
ADL_ASSISTANCE: INDEPENDENT

## 2025-08-26 ASSESSMENT — PAIN SCALES - GENERAL
PAINLEVEL_OUTOF10: 4
PAINLEVEL_OUTOF10: 6
PAINLEVEL_OUTOF10: 3
PAINLEVEL_OUTOF10: 0 - NO PAIN
PAINLEVEL_OUTOF10: 6
PAINLEVEL_OUTOF10: 3
PAINLEVEL_OUTOF10: 0 - NO PAIN

## 2025-08-26 ASSESSMENT — PAIN DESCRIPTION - DESCRIPTORS: DESCRIPTORS: ACHING

## 2025-08-26 ASSESSMENT — PAIN DESCRIPTION - ORIENTATION: ORIENTATION: RIGHT;LEFT

## 2025-08-26 ASSESSMENT — PAIN DESCRIPTION - LOCATION: LOCATION: LEG

## 2025-08-27 ASSESSMENT — PAIN DESCRIPTION - LOCATION
LOCATION: LEG

## 2025-08-27 ASSESSMENT — ACTIVITIES OF DAILY LIVING (ADL)
ADLS_ADDRESSED: YES
ADL_ASSISTANCE: INDEPENDENT

## 2025-08-27 ASSESSMENT — PAIN - FUNCTIONAL ASSESSMENT
PAIN_FUNCTIONAL_ASSESSMENT: 0-10

## 2025-08-27 ASSESSMENT — PAIN SCALES - GENERAL
PAINLEVEL_OUTOF10: 4
PAINLEVEL_OUTOF10: 6
PAINLEVEL_OUTOF10: 10 - WORST POSSIBLE PAIN
PAINLEVEL_OUTOF10: 10 - WORST POSSIBLE PAIN
PAINLEVEL_OUTOF10: 3
PAINLEVEL_OUTOF10: 2

## 2025-08-27 ASSESSMENT — COGNITIVE AND FUNCTIONAL STATUS - GENERAL
MOVING TO AND FROM BED TO CHAIR: A LITTLE
MOBILITY SCORE: 14
WALKING IN HOSPITAL ROOM: A LITTLE
STANDING UP FROM CHAIR USING ARMS: A LITTLE
TURNING FROM BACK TO SIDE WHILE IN FLAT BAD: A LOT
CLIMB 3 TO 5 STEPS WITH RAILING: TOTAL
MOVING FROM LYING ON BACK TO SITTING ON SIDE OF FLAT BED WITH BEDRAILS: A LOT

## 2025-08-27 ASSESSMENT — PAIN DESCRIPTION - ORIENTATION
ORIENTATION: RIGHT;LEFT
ORIENTATION: LEFT;RIGHT
ORIENTATION: RIGHT;LEFT

## 2025-08-27 ASSESSMENT — PAIN DESCRIPTION - DESCRIPTORS: DESCRIPTORS: SORE

## 2025-08-28 ENCOUNTER — APPOINTMENT (OUTPATIENT)
Dept: WOUND CARE | Facility: HOSPITAL | Age: 65
End: 2025-08-28
Payer: MEDICARE

## 2025-08-28 ASSESSMENT — PAIN DESCRIPTION - ORIENTATION: ORIENTATION: RIGHT;LEFT

## 2025-08-28 ASSESSMENT — PAIN - FUNCTIONAL ASSESSMENT
PAIN_FUNCTIONAL_ASSESSMENT: 0-10

## 2025-08-28 ASSESSMENT — PAIN SCALES - GENERAL
PAINLEVEL_OUTOF10: 10 - WORST POSSIBLE PAIN
PAINLEVEL_OUTOF10: 7
PAINLEVEL_OUTOF10: 3
PAINLEVEL_OUTOF10: 10 - WORST POSSIBLE PAIN

## 2025-08-28 ASSESSMENT — ENCOUNTER SYMPTOMS
COLOR CHANGE: 1
NEUROLOGICAL NEGATIVE: 1
GASTROINTESTINAL NEGATIVE: 1
CARDIOVASCULAR NEGATIVE: 1
MUSCULOSKELETAL NEGATIVE: 1
PSYCHIATRIC NEGATIVE: 1
CONSTITUTIONAL NEGATIVE: 1

## 2025-08-28 ASSESSMENT — PAIN DESCRIPTION - DESCRIPTORS: DESCRIPTORS: SORE;ACHING

## 2025-08-28 ASSESSMENT — COGNITIVE AND FUNCTIONAL STATUS - GENERAL
PERSONAL GROOMING: A LITTLE
DRESSING REGULAR LOWER BODY CLOTHING: TOTAL
DRESSING REGULAR UPPER BODY CLOTHING: A LITTLE
TOILETING: TOTAL
HELP NEEDED FOR BATHING: A LOT
DAILY ACTIVITIY SCORE: 14

## 2025-08-28 ASSESSMENT — ACTIVITIES OF DAILY LIVING (ADL): HOME_MANAGEMENT_TIME_ENTRY: 10

## 2025-08-28 ASSESSMENT — PAIN DESCRIPTION - LOCATION: LOCATION: LEG

## 2025-08-29 ENCOUNTER — NURSING HOME VISIT (OUTPATIENT)
Dept: POST ACUTE CARE | Facility: EXTERNAL LOCATION | Age: 65
End: 2025-08-29
Payer: MEDICARE

## 2025-08-29 DIAGNOSIS — I10 PRIMARY HYPERTENSION: ICD-10-CM

## 2025-08-29 DIAGNOSIS — I89.0 LYMPHEDEMA: ICD-10-CM

## 2025-08-29 DIAGNOSIS — D52.8 OTHER FOLATE DEFICIENCY ANEMIAS: ICD-10-CM

## 2025-08-29 DIAGNOSIS — I87.2 CHRONIC VENOUS INSUFFICIENCY: ICD-10-CM

## 2025-08-29 DIAGNOSIS — I35.0 SEVERE AORTIC STENOSIS: Primary | ICD-10-CM

## 2025-08-29 DIAGNOSIS — J43.2 CENTRILOBULAR EMPHYSEMA (MULTI): ICD-10-CM

## 2025-08-29 DIAGNOSIS — E87.5 HYPERKALEMIA: ICD-10-CM

## 2025-08-29 PROCEDURE — 99306 1ST NF CARE HIGH MDM 50: CPT | Performed by: FAMILY MEDICINE

## 2025-09-01 ASSESSMENT — ENCOUNTER SYMPTOMS
FEVER: 0
COUGH: 0
CHILLS: 0
CONSTIPATION: 0
HEADACHES: 0
DYSURIA: 0
BRUISES/BLEEDS EASILY: 1
ARTHRALGIAS: 1
FATIGUE: 1
WEAKNESS: 1
DIARRHEA: 0
NAUSEA: 0
ABDOMINAL PAIN: 0
SHORTNESS OF BREATH: 0
SORE THROAT: 0
VOMITING: 0

## 2025-09-02 ENCOUNTER — NURSING HOME VISIT (OUTPATIENT)
Dept: POST ACUTE CARE | Facility: EXTERNAL LOCATION | Age: 65
End: 2025-09-02
Payer: MEDICARE

## 2025-09-02 DIAGNOSIS — R30.0 DYSURIA: Primary | ICD-10-CM

## 2025-09-02 DIAGNOSIS — L97.909 CHRONIC SKIN ULCER OF LOWER LEG (MULTI): ICD-10-CM

## 2025-09-02 DIAGNOSIS — R74.8 ELEVATED SERUM GGT LEVEL: ICD-10-CM

## 2025-09-02 DIAGNOSIS — L30.9 DERMATITIS: ICD-10-CM

## 2025-09-02 PROCEDURE — 99309 SBSQ NF CARE MODERATE MDM 30: CPT | Performed by: NURSE PRACTITIONER

## 2025-09-03 VITALS
TEMPERATURE: 98.5 F | WEIGHT: 293 LBS | RESPIRATION RATE: 16 BRPM | SYSTOLIC BLOOD PRESSURE: 110 MMHG | OXYGEN SATURATION: 94 % | BODY MASS INDEX: 51.98 KG/M2 | DIASTOLIC BLOOD PRESSURE: 64 MMHG | HEART RATE: 77 BPM

## 2025-09-04 ENCOUNTER — APPOINTMENT (OUTPATIENT)
Dept: WOUND CARE | Facility: HOSPITAL | Age: 65
End: 2025-09-04
Payer: MEDICARE

## 2025-09-17 ENCOUNTER — APPOINTMENT (OUTPATIENT)
Dept: PRIMARY CARE | Facility: CLINIC | Age: 65
End: 2025-09-17
Payer: MEDICARE

## (undated) DEVICE — CATHETER, WEDGE PRESSURE, BALLOON, DOUBLE LUMEN, 5 FR, 110 CM

## (undated) DEVICE — SHEATH, GLIDESHEATH, SLENDER, 6FR 10CM

## (undated) DEVICE — CATHETER, ANGIO, IMPULSE, FL3.5, 6 FR X 100 CM

## (undated) DEVICE — WIRE, NITINOL,.018 X 40CM, PLATINUM COIL

## (undated) DEVICE — GUIDEWIRE, INQUIRE, J TIP, .035 X 210CM, FIXED CORE, DIAGNOSTIC

## (undated) DEVICE — CATHETER, ANGIO, IMPULSE, FR4, 6 FR X 100 CM

## (undated) DEVICE — GUIDEWIRE, HI-TORQUE, VERSACORE, 145CM, MODIFIED J

## (undated) DEVICE — ANGIO KIT, LEFT HEART, LF, CUSTOM

## (undated) DEVICE — TR BAND, RADIAL COMPRESSION, STANDARD, 24CM

## (undated) DEVICE — NEEDLE, ENTRY, PERCUTANEOUS, 21 G X 2.5 CM

## (undated) DEVICE — INTRODUCER SHEATH, GLIDESHEATH, 5FR 10CM